# Patient Record
Sex: MALE | Race: WHITE | NOT HISPANIC OR LATINO | Employment: OTHER | URBAN - NONMETROPOLITAN AREA
[De-identification: names, ages, dates, MRNs, and addresses within clinical notes are randomized per-mention and may not be internally consistent; named-entity substitution may affect disease eponyms.]

---

## 2017-01-19 ENCOUNTER — TRANSFERRED RECORDS (OUTPATIENT)
Dept: HEALTH INFORMATION MANAGEMENT | Facility: HOSPITAL | Age: 77
End: 2017-01-19

## 2017-01-19 LAB
ALT SERPL-CCNC: 29 U/L (ref 23–71)
AST SERPL-CCNC: 19 U/L (ref 9–34)
CREAT SERPL-MCNC: 0.9 MG/DL (ref 0.7–1.4)
GFR SERPL CREATININE-BSD FRML MDRD: 88 ML/MIN/1.73M2
GLUCOSE SERPL-MCNC: 191 MG/DL (ref 64–112)
HBA1C MFR BLD: 8.2 % (ref 4.4–6.4)
POTASSIUM SERPL-SCNC: 4.1 MEQ/L (ref 3.5–5.3)

## 2017-01-23 ENCOUNTER — TRANSFERRED RECORDS (OUTPATIENT)
Dept: HEALTH INFORMATION MANAGEMENT | Facility: HOSPITAL | Age: 77
End: 2017-01-23

## 2017-01-23 ENCOUNTER — HOSPITAL ENCOUNTER (EMERGENCY)
Facility: HOSPITAL | Age: 77
Discharge: HOME | End: 2017-01-23
Attending: NURSE PRACTITIONER | Admitting: NURSE PRACTITIONER
Payer: MEDICARE

## 2017-01-23 VITALS
TEMPERATURE: 97.4 F | OXYGEN SATURATION: 96 % | SYSTOLIC BLOOD PRESSURE: 171 MMHG | RESPIRATION RATE: 18 BRPM | HEART RATE: 66 BPM | DIASTOLIC BLOOD PRESSURE: 83 MMHG

## 2017-01-23 DIAGNOSIS — L53.9 ERYTHEMATOUS CONDITION: ICD-10-CM

## 2017-01-23 DIAGNOSIS — R59.1 LYMPHADENOPATHY: ICD-10-CM

## 2017-01-23 DIAGNOSIS — Z91.148 NONCOMPLIANCE WITH MEDICATION REGIMEN: ICD-10-CM

## 2017-01-23 DIAGNOSIS — R60.0 EDEMA OF RIGHT LOWER EXTREMITY: ICD-10-CM

## 2017-01-23 LAB
ALBUMIN SERPL-MCNC: 3.7 G/DL (ref 3.4–5)
ALP SERPL-CCNC: 79 U/L (ref 40–150)
ALT SERPL W P-5'-P-CCNC: 25 U/L (ref 0–70)
ANION GAP SERPL CALCULATED.3IONS-SCNC: 8 MMOL/L (ref 3–14)
AST SERPL W P-5'-P-CCNC: 14 U/L (ref 0–45)
BASOPHILS # BLD AUTO: 0 10E9/L (ref 0–0.2)
BASOPHILS NFR BLD AUTO: 0.4 %
BILIRUB SERPL-MCNC: 0.4 MG/DL (ref 0.2–1.3)
BUN SERPL-MCNC: 19 MG/DL (ref 7–30)
CALCIUM SERPL-MCNC: 8.5 MG/DL (ref 8.5–10.1)
CHLORIDE SERPL-SCNC: 105 MMOL/L (ref 94–109)
CO2 SERPL-SCNC: 27 MMOL/L (ref 20–32)
CREAT SERPL-MCNC: 0.89 MG/DL (ref 0.66–1.25)
CRP SERPL-MCNC: 5.6 MG/L (ref 0–8)
DIFFERENTIAL METHOD BLD: ABNORMAL
EOSINOPHIL # BLD AUTO: 0.1 10E9/L (ref 0–0.7)
EOSINOPHIL NFR BLD AUTO: 1.5 %
ERYTHROCYTE [DISTWIDTH] IN BLOOD BY AUTOMATED COUNT: 13.2 % (ref 10–15)
ERYTHROCYTE [SEDIMENTATION RATE] IN BLOOD BY WESTERGREN METHOD: 9 MM/H (ref 0–20)
EST. AVERAGE GLUCOSE BLD GHB EST-MCNC: 180 MG/DL
GFR SERPL CREATININE-BSD FRML MDRD: 83 ML/MIN/1.7M2
GLUCOSE SERPL-MCNC: 190 MG/DL (ref 70–99)
HBA1C MFR BLD: 7.9 % (ref 4.3–6)
HCT VFR BLD AUTO: 38.8 % (ref 40–53)
HGB BLD-MCNC: 13.9 G/DL (ref 13.3–17.7)
IMM GRANULOCYTES # BLD: 0 10E9/L (ref 0–0.4)
IMM GRANULOCYTES NFR BLD: 0.6 %
LACTATE SERPL-SCNC: 0.9 MMOL/L (ref 0.4–2)
LYMPHOCYTES # BLD AUTO: 1.7 10E9/L (ref 0.8–5.3)
LYMPHOCYTES NFR BLD AUTO: 24.7 %
MCH RBC QN AUTO: 31.2 PG (ref 26.5–33)
MCHC RBC AUTO-ENTMCNC: 35.8 G/DL (ref 31.5–36.5)
MCV RBC AUTO: 87 FL (ref 78–100)
MONOCYTES # BLD AUTO: 0.5 10E9/L (ref 0–1.3)
MONOCYTES NFR BLD AUTO: 7.9 %
NEUTROPHILS # BLD AUTO: 4.4 10E9/L (ref 1.6–8.3)
NEUTROPHILS NFR BLD AUTO: 64.9 %
NRBC # BLD AUTO: 0 10*3/UL
NRBC BLD AUTO-RTO: 0 /100
PLATELET # BLD AUTO: 158 10E9/L (ref 150–450)
POTASSIUM SERPL-SCNC: 4 MMOL/L (ref 3.4–5.3)
PROT SERPL-MCNC: 7.2 G/DL (ref 6.8–8.8)
RBC # BLD AUTO: 4.46 10E12/L (ref 4.4–5.9)
SODIUM SERPL-SCNC: 140 MMOL/L (ref 133–144)
WBC # BLD AUTO: 6.7 10E9/L (ref 4–11)

## 2017-01-23 PROCEDURE — 80053 COMPREHEN METABOLIC PANEL: CPT | Performed by: NURSE PRACTITIONER

## 2017-01-23 PROCEDURE — 40000788 ZZHCL STATISTIC ESTIMATED AVERAGE GLUCOSE: Performed by: NURSE PRACTITIONER

## 2017-01-23 PROCEDURE — 87040 BLOOD CULTURE FOR BACTERIA: CPT | Mod: 91 | Performed by: NURSE PRACTITIONER

## 2017-01-23 PROCEDURE — 85652 RBC SED RATE AUTOMATED: CPT | Performed by: NURSE PRACTITIONER

## 2017-01-23 PROCEDURE — 85025 COMPLETE CBC W/AUTO DIFF WBC: CPT | Performed by: NURSE PRACTITIONER

## 2017-01-23 PROCEDURE — 36415 COLL VENOUS BLD VENIPUNCTURE: CPT | Performed by: NURSE PRACTITIONER

## 2017-01-23 PROCEDURE — 76882 US LMTD JT/FCL EVL NVASC XTR: CPT | Mod: TC,RT

## 2017-01-23 PROCEDURE — 83605 ASSAY OF LACTIC ACID: CPT | Performed by: NURSE PRACTITIONER

## 2017-01-23 PROCEDURE — 83036 HEMOGLOBIN GLYCOSYLATED A1C: CPT | Performed by: NURSE PRACTITIONER

## 2017-01-23 PROCEDURE — 99214 OFFICE O/P EST MOD 30 MIN: CPT | Performed by: NURSE PRACTITIONER

## 2017-01-23 PROCEDURE — 99214 OFFICE O/P EST MOD 30 MIN: CPT | Mod: 25

## 2017-01-23 PROCEDURE — 86140 C-REACTIVE PROTEIN: CPT | Performed by: NURSE PRACTITIONER

## 2017-01-23 ASSESSMENT — ENCOUNTER SYMPTOMS
APPETITE CHANGE: 0
FEVER: 0
WOUND: 1
SHORTNESS OF BREATH: 0
ABDOMINAL PAIN: 0

## 2017-01-23 NOTE — ED NOTES
Pt presents today alone for c/o swelling in right ankle and foot. Pt says this has been going on for about 3 weeks and gets worse everyday, he has no known injury.

## 2017-01-23 NOTE — DISCHARGE INSTRUCTIONS
Follow up with PCP for elevated blood sugars. You should start metformin as discussed.       Leg Swelling (1 Leg Only)  Swelling of the arms, feet, ankles, and legs is called edema. It is caused by extra fluid collecting in the tissues. Because of gravity, extra fluid in the body settles to the lowest part. That is why the legs and feet are most affected. You have swelling in only 1 leg.  Some of the causes for swelling in only one leg include:    Infection in the foot or leg    Long-term problem with a vein not working well (venous insufficiency)    Swollen, twisted vein in the leg (varicose veins)    Insect bite or sting on the foot or leg    Injury or recent surgery on the foot or leg    Blood clot in a deep vein of the leg (deep vein thrombosis or DVT)  Medical treatment will depend on what is causing your swelling.  Home care  Follow these guidelines when caring for yourself at home:    Don t wear tight clothing.    Keep your legs up while lying or sitting.    Take any medicines as directed.    If infection, injury, or recent surgery is the cause of your swelling, stay off your legs as much as possible until your symptoms get better.    If you have venous insufficiency or varicose veins, don t sit or  one place for long periods of time. Take breaks and walk around every few hours. Talk with your healthcare provider about wearing support stockings to help lessen swelling during the day.  Follow-up care  Follow up with your health care provider as advised.  Call 911  Call 911 if any of these occur:    Shortness of breath or trouble breathing    Chest pain    Coughing up blood    Fainting or loss of consciousness   When to seek medical advice  Call your healthcare provider right away if any of these occur:    Increased pain, swelling, warmth, or redness of the leg, ankle, or foot    Fever of 100.4 F (38 C) or higher, or as directed by your healthcare provider    Weakness or dizziness    5559-7083 The  [employer]. [address] [place] [address]. All rights reserved. This information is not intended as a substitute for professional medical care. Always follow your healthcare professional's instructions.          Lymphadenopathy  Lymphadenopathy is swelling of the lymph nodes. Lymph nodes are small, bean-shaped glands around the body.  What are lymph nodes?  Lymph nodes are part of your immune system. The glands are found in your neck, armpits, groin, chest, and abdomen. They act as filters for lymph fluid as it flows through your body. Lymph fluid contains white blood cells and other things that fight infection.  Why lymph nodes swell  Lymphadenopathy is very common. The glands often enlarge during a viral or bacterial infection. It can happen during a cold, the flu, or strep throat. The nodes may swell in just one area of the body, such as the neck (localized). Or nodes may swell all over the body (generalized). The neck (cervical) lymph nodes are the most common site of lymphadenopathy.  What causes lymphadenopathy?  Dead cells and fluid build up in the lymph nodes as they help fight infection or disease. This causes them to swell in size. Enlarged lymph nodes are often near the source of infection. This can help to find the cause of an infection. For example, swollen lymph nodes around the jaw may be because of an infection in the teeth or mouth. But lymphadenopathy may also be generalized. This is common in some viral illnesses such as mononucleosis or chickenpox (varicella).  Lymphadenopathy can also be caused by:    Infection of a lymph node or small group of nodes (lymphadenitis)    Cancer    Reactions to medicines such as antibiotics and some seizure medicines    Other health conditions, such as lupus  Symptoms of lymphadenopathy  Lymphadenopathy can cause symptoms such as:    Lumps under the jaw, on the sides or back of the neck, in the armpits, in the groin, or in the chest or belly  (abdomen)    Pain or tenderness in any of these areas    Redness or warmth in any of these areas  You may also have symptoms from an infection causing the swollen glands. These symptoms may include fever, sore throat, body aches, or cough.  Diagnosing lymphadenopathy  Your health care provider will ask about your health history and symptoms. He or she will give you a physical exam and check the areas where lymph nodes are enlarged. Your health care provider will check the size and location of the nodes, and ask how long they have been swollen and if they are painful. Diagnostic tests and referral to specialists may be recommended. They may include:    Blood tests. These are done to check for signs of infection and other problems.    Urine test. This is also done to check for infection and other problems.    Chest X-ray. This test can show enlarged lymph nodes or other problems.    Lymph node biopsy. If lymph nodes are swollen for 3 to 4 weeks, they may be checked with a biopsy. Small samples of lymph node tissue are taken and checked in a lab for signs of cancer. You may be referred to a specialist in blood disorders and cancer (hematologist and oncologist).  Treatment for lymphadenopathy  The treatment of enlarged lymph nodes depends on the cause. Enlarged lymph nodes are often harmless and go away without any treatment. Treatment is most often done on the cause of the enlarged nodes and may include:    Antibiotic medicine to treat a bacterial infection    Incision and drainage (I & D) of a lymph node for lymphadenitis    Other medicines or procedures to treat the cause of the enlarged nodes  You may need follow-up exam in 3 to 4 weeks to recheck enlarged nodes.     When to call your health care provider  Call your health care provider if you have lymph nodes that are still swollen after 3 to 4 weeks.           Diabetes and Heart Disease  If you have diabetes, you are two to four times more likely to have heart  disease than someone without diabetes. This higher risk is due to diabetes, but it is also due to other risk factors for heart disease that occur in people with diabetes. But there s good news. You can help control your health risks by making some changes in your life. You can take steps to reduce your risk of heart disease by half--similar to the risk in people who don't have diabetes.        Your health care team will develop a treatment plan that works best for you.     Your main risk factors  Three major risk factors for heart disease are high blood sugar, high blood pressure, and high levels of lipids. By keeping risk factors under control, you can help keep your heart and arteries healthy. This may reduce your chances of a heart attack.    Blood sugar. High blood sugar can make artery walls tough and rough. Plaque (waxy material in the blood) can then build up, making it harder for blood to flow through the arteries. Having high blood sugar increases the chances of having high blood pressure and high cholesterol.    Blood pressure. When blood pressure is high all the time, artery walls become damaged, increasing the risk for plaque build up.    Lipids. The body needs some lipids in the blood to stay healthy. But lipid levels that are too high can damage the artery walls. Lipids include cholesterol and triglycerides. There are two kinds of cholesterol. LDL ( bad ) cholesterol can damage the arteries. But HDL ( good ) cholesterol helps clear LDL cholesterol from the blood vessels. This helps keep the arteries healthy. When blood sugar is high, the level of triglycerides in the blood may also be high. High blood triglyceride levels can cause plaque to form.   Other risk factors  Certain lifestyle factors can increase levels of your blood sugar, blood pressure, and lipids. Such increases raise your risk of heart disease.    Smoking damages the lining of your arteries. This allows plaque to build up in the artery  walls. Smoking also constricts (narrows) the arteries, which can raise blood pressure. Smoking also increases your risk of getting type 2 diabetes.    Not being active makes it harder for your heart to do its work. Inactivity is linked to many other risk factors, such as high blood pressure and poor cholesterol levels. Inactivity also increases your risk of getting type 2 diabetes.    Being overweight makes it harder for your body to use insulin. It also makes your heart work too hard. Being overweight is also the main contributor to the development of type 2 diabetes,   Changes you can make     Take your medications as directed each day, even if you feel fine.   Following a few simple steps can help keep your risk factors under control. Work with your health care team to reach your goals.    Quitting smoking could save your life. Smoking damages the lining of the blood vessels and raises blood pressure. Smoking also affects how your body uses insulin. This makes it harder to keep blood sugar under control. If you smoke and need help quitting, talk to your health care team.     Testing your blood sugar is the only way to know whether it is under control. Be sure to test your blood sugar yourself. Also get your blood tested in the lab, as directed.    Monitoring your blood pressure and lipid levels can help you achieve safe levels. Visit your health care team as scheduled.    Taking medications as directed can help control blood sugar, blood pressure, blood clotting, and/or cholesterol levels.    Eating right can reduce your risk factors and help you lose weight. Try to limit the amount of processed or refined carbohydrates you eat at one time. Cut back on your total calorie intake. Eat foods low in saturated fat and cholesterol. Eat fiber, including vegetables and whole grains, and cut down on salt. A dietitian or diabetes educator can help form a meal plan that works for you.    Being active can help reduce your  weight, strengthen your heart, and lower your lipid levels and blood pressure. Exercise and activity are good for your whole body. Talk to your health care team about increasing your activity safely over time.    Keeping your appointments with your health care provider helps you stay healthy. Go in for checkups and lab tests as scheduled.    [id number] [employer] [facility]. [address] [place] [address], [place] [address]. All rights reserved. This information is not intended as a substitute for professional medical care. Always follow your healthcare professional's instructions.

## 2017-01-23 NOTE — ED PROVIDER NOTES
History     Chief Complaint   Patient presents with     Leg Swelling     right ankle and foot edema, red area above     The history is provided by the patient. No  was used.     Neel Kerr is a 76 year old male who presents with lower right ankle and foot swelling, with redness above his area of pain for the past 2-3 weeks. No injury. [facility] Was there last week as well and treated with Doxycycline. Has also seen Dr. Dean at the [facility] in [place]. He states both facilities have told him they don't know what it is. Here for evaluation of infection. Symptoms are getting progressively worse, starting to hurt now, didn't initially. Also noticed lumps in his upper leg as well that are getting worse. Patient has a history of diabetes and psoriasis.   Had an US last week that was negative for DVT. Labs were normal as well. Per the records reviewed, was advised to get a biopsy - [facility]. It was offered to do this for him in [place] at [facility] but would have to bill his Medicare insurance and would have to pay for part of the bill.     I have reviewed the Medications, Allergies, Past Medical and Surgical History, and Social History in the Epic system.    Review of Systems   Constitutional: Negative for fever and appetite change.   Respiratory: Negative for shortness of breath.    Cardiovascular: Negative for chest pain.   Gastrointestinal: Negative for abdominal pain.   Musculoskeletal:        Right leg pain, edema and erythema   Skin: Positive for rash and wound.       Physical Exam   BP: 171/83 mmHg  Pulse: 66  Temp: 97.4  F (36.3  C)  Resp: 18  SpO2: 96 %  Physical Exam   Constitutional: He is oriented to person, place, and time. He appears well-developed and well-nourished. No distress.   HENT:   Head: Normocephalic and atraumatic.   Right Ear: External ear normal.   Left Ear: External  ear normal.   Eyes: Conjunctivae are normal. No scleral icterus.   Neck: Normal range of motion.   Cardiovascular: Normal rate, regular rhythm and normal heart sounds.    Pulmonary/Chest: Effort normal and breath sounds normal.   Abdominal: Soft. Bowel sounds are normal. He exhibits no distension. There is no tenderness.   Musculoskeletal:        Right hip: He exhibits normal range of motion.        Legs:  5 cm mass near groin, below this is a 3 cm local swelling below this, then following along the greater saphenous there are 10 additional approximately 1 cm nodules traveling to the area of erythema on his lower leg. He has 2+pitting edema in his lower leg, CMS is intact. Good movement in his R foot, ankle and knee.    Lymphadenopathy:        Right: Inguinal adenopathy present.   Neurological: He is alert and oriented to person, place, and time. Coordination normal.   Skin: Skin is warm and dry. He is not diaphoretic.   Psychiatric: He has a normal mood and affect. His speech is normal and behavior is normal.   Nursing note and vitals reviewed.      ED Course   Procedures    Right lower extremity US: Dr. Silvestreheal in to perform US with tech present as well.     ULTRASOUND OF RIGHT LOWER EXTREMITY - LIMITED     HISTORY:  A 76-year-old male with right lower extremity cellulitis.  Patient has developed red discoloration in the medial aspect of the  lower leg just above the ankle, with palpable focal nodules or masses  deep to the skin surface along the medial aspect of the leg, roughly  corresponding to location of the greater saphenous and lesser  saphenous veins.  There is some redness associated with these palpable  areas of mass or firmness.     FINDINGS:  Gray scale and color Duplex ultrasonography was performed  of the affected area, from the groin to the level of the ankle in the  right lower extremity.  There is a hyperemic lymph node in the groin  correlating with the palpable area, measuring 2.3 x 2.8 x 1.0  cm in  dimension.  There is subcutaneous edema correlating with the other  palpable areas of concern.  These are closely associated with the  greater saphenous/lesser saphenous veins.  No focal soft tissue mass  or abnormal fluid collection is otherwise seen.  There is questionable  thrombosis of a small superficial vessel at the level of the ankle.     IMPRESSION:  THERE IS SUBCUTANEOUS EDEMA AND A REACTIVE LYMPH NODE  WITHIN THE GROIN.  NO FLUID COLLECTIONS ARE SEEN TO SUGGEST ABSCESS.  THERE IS NO EVIDENCE OF SUSPICIOUS SOFT TISSUE MASS.  Exam Date: Jan 23, 2017 03:10:00 PM  Author: ROSANGELA PENALOZA  This report is final and signed     Labs Ordered and Resulted from Time of ED Arrival Up to the Time of Departure from the ED   HEMOGLOBIN A1C - Abnormal; Notable for the following:     Hemoglobin A1C 7.9 (*)     All other components within normal limits   CBC WITH PLATELETS DIFFERENTIAL - Abnormal; Notable for the following:     Hematocrit 38.8 (*)     All other components within normal limits   COMPREHENSIVE METABOLIC PANEL - Abnormal; Notable for the following:     Glucose 190 (*)     All other components within normal limits   ERYTHROCYTE SEDIMENTATION RATE AUTO   CRP INFLAMMATION   LACTIC ACID   ESTIMATED AVERAGE GLUCOSE   BLOOD CULTURE   BLOOD CULTURE       Assessments & Plan (with Medical Decision Making)     I have reviewed the nursing notes.  I have reviewed the findings, diagnosis, plan and need for follow up with the patient.  Discussed elevated A1c and treatment for this. Discussed risks of uncontrolled DM, including infection, stroke, heart complications, kidney compromise.   Patient declined medications. Is going to start taking a herbal medication. Advised to see PCP for re-evaluation.   Discussed results of diagnostic tests today. Advised to pursue biopsy, f/u with PCP as scheduled.   Offered general surgery referral today for evaluation and treatment, did advise [facility]. Patient  declined referral and appointment.   [facility]. Given written educational material regarding his diagnosis for today's visit.    Final diagnoses:   Lymphadenopathy   Edema of right lower extremity   Erythematous condition - RLL   Uncontrolled type 2 diabetes mellitus with complication, without long-term current use of insulin (H)   Noncompliance with medication regimen       1/23/2017   [facility]      Karen Trivedi NP  01/23/17 3011

## 2017-01-23 NOTE — ED AVS SNAPSHOT
[facility]    [address] [address]    Phone:  [phone]                                       Neel Kerr   MRN: [id number]    Department:  [facility]   Date of Visit:  1/23/2017           After Visit Summary Signature Page     I have received my discharge instructions, and my questions have been answered. I have discussed any challenges I see with this plan with the nurse or doctor.    ..........................................................................................................................................  Patient/Patient Representative Signature      ..........................................................................................................................................  Patient Representative Print Name and Relationship to Patient    ..................................................               ................................................  Date                                            Time    ..........................................................................................................................................  Reviewed by Signature/Title    ...................................................              ..............................................  Date                                                            Time

## 2017-01-24 NOTE — PROGRESS NOTES
Quick Note:    Ultrasound of Right Lower Extremity - Limited report faxed to PCP, Sonya Sanford NP and [facility] per [facility] provider request. Impression - Subcutaneous edema and a reactive lymph node within the groin. Pt advised to follow up with [facility] as scheduled [facility]  ______

## 2017-01-29 LAB
BACTERIA SPEC CULT: NORMAL
BACTERIA SPEC CULT: NORMAL
Lab: NORMAL
Lab: NORMAL
MICRO REPORT STATUS: NORMAL
MICRO REPORT STATUS: NORMAL
SPECIMEN SOURCE: NORMAL
SPECIMEN SOURCE: NORMAL

## 2017-07-05 ENCOUNTER — HOSPITAL ENCOUNTER (EMERGENCY)
Facility: HOSPITAL | Age: 77
Discharge: HOME | End: 2017-07-05
Attending: FAMILY MEDICINE | Admitting: FAMILY MEDICINE
Payer: MEDICARE

## 2017-07-05 VITALS
HEIGHT: 67 IN | WEIGHT: 140 LBS | BODY MASS INDEX: 21.97 KG/M2 | SYSTOLIC BLOOD PRESSURE: 134 MMHG | RESPIRATION RATE: 16 BRPM | OXYGEN SATURATION: 94 % | TEMPERATURE: 97.1 F | DIASTOLIC BLOOD PRESSURE: 84 MMHG

## 2017-07-05 DIAGNOSIS — R11.0 NAUSEA: ICD-10-CM

## 2017-07-05 LAB
ANION GAP SERPL CALCULATED.3IONS-SCNC: 8 MMOL/L (ref 3–14)
BASOPHILS # BLD AUTO: 0 10E9/L (ref 0–0.2)
BASOPHILS NFR BLD AUTO: 0.4 %
BUN SERPL-MCNC: 19 MG/DL (ref 7–30)
CALCIUM SERPL-MCNC: 8.1 MG/DL (ref 8.5–10.1)
CHLORIDE SERPL-SCNC: 101 MMOL/L (ref 94–109)
CO2 SERPL-SCNC: 27 MMOL/L (ref 20–32)
CREAT SERPL-MCNC: 0.86 MG/DL (ref 0.66–1.25)
DIFFERENTIAL METHOD BLD: ABNORMAL
EOSINOPHIL # BLD AUTO: 0.1 10E9/L (ref 0–0.7)
EOSINOPHIL NFR BLD AUTO: 0.8 %
ERYTHROCYTE [DISTWIDTH] IN BLOOD BY AUTOMATED COUNT: 14.6 % (ref 10–15)
GFR SERPL CREATININE-BSD FRML MDRD: 87 ML/MIN/1.7M2
GLUCOSE SERPL-MCNC: 237 MG/DL (ref 70–99)
HCT VFR BLD AUTO: 38.4 % (ref 40–53)
HGB BLD-MCNC: 13.8 G/DL (ref 13.3–17.7)
IMM GRANULOCYTES # BLD: 0.1 10E9/L (ref 0–0.4)
IMM GRANULOCYTES NFR BLD: 1.5 %
LYMPHOCYTES # BLD AUTO: 1.2 10E9/L (ref 0.8–5.3)
LYMPHOCYTES NFR BLD AUTO: 14.7 %
MCH RBC QN AUTO: 30.5 PG (ref 26.5–33)
MCHC RBC AUTO-ENTMCNC: 35.9 G/DL (ref 31.5–36.5)
MCV RBC AUTO: 85 FL (ref 78–100)
MONOCYTES # BLD AUTO: 0.6 10E9/L (ref 0–1.3)
MONOCYTES NFR BLD AUTO: 6.9 %
NEUTROPHILS # BLD AUTO: 6.1 10E9/L (ref 1.6–8.3)
NEUTROPHILS NFR BLD AUTO: 75.7 %
NRBC # BLD AUTO: 0 10*3/UL
NRBC BLD AUTO-RTO: 0 /100
PLATELET # BLD AUTO: 177 10E9/L (ref 150–450)
POTASSIUM SERPL-SCNC: 4.3 MMOL/L (ref 3.4–5.3)
RBC # BLD AUTO: 4.53 10E12/L (ref 4.4–5.9)
SODIUM SERPL-SCNC: 136 MMOL/L (ref 133–144)
TROPONIN I SERPL-MCNC: 0.04 UG/L (ref 0–0.04)
WBC # BLD AUTO: 8 10E9/L (ref 4–11)

## 2017-07-05 PROCEDURE — 80048 BASIC METABOLIC PNL TOTAL CA: CPT | Performed by: FAMILY MEDICINE

## 2017-07-05 PROCEDURE — 85025 COMPLETE CBC W/AUTO DIFF WBC: CPT | Performed by: FAMILY MEDICINE

## 2017-07-05 PROCEDURE — 99285 EMERGENCY DEPT VISIT HI MDM: CPT | Performed by: FAMILY MEDICINE

## 2017-07-05 PROCEDURE — 93005 ELECTROCARDIOGRAM TRACING: CPT

## 2017-07-05 PROCEDURE — 93010 ELECTROCARDIOGRAM REPORT: CPT | Performed by: INTERNAL MEDICINE

## 2017-07-05 PROCEDURE — 36415 COLL VENOUS BLD VENIPUNCTURE: CPT | Performed by: FAMILY MEDICINE

## 2017-07-05 PROCEDURE — 71020 ZZHC CHEST TWO VIEWS, FRONT/LAT: CPT | Mod: TC

## 2017-07-05 PROCEDURE — 84484 ASSAY OF TROPONIN QUANT: CPT | Performed by: FAMILY MEDICINE

## 2017-07-05 PROCEDURE — 99285 EMERGENCY DEPT VISIT HI MDM: CPT | Mod: 25

## 2017-07-05 RX ORDER — HYDROCHLOROTHIAZIDE 25 MG/1
25 TABLET ORAL DAILY
Status: ON HOLD | COMMUNITY
End: 2020-09-21

## 2017-07-05 RX ORDER — ASPIRIN 81 MG/1
324 TABLET, CHEWABLE ORAL ONCE
Status: DISCONTINUED | OUTPATIENT
Start: 2017-07-05 | End: 2017-07-05 | Stop reason: HOSPADM

## 2017-07-05 RX ORDER — AMLODIPINE BESYLATE 10 MG/1
10 TABLET ORAL DAILY
Status: ON HOLD | COMMUNITY
End: 2020-09-21

## 2017-07-05 RX ORDER — ONDANSETRON 4 MG/1
4 TABLET, ORALLY DISINTEGRATING ORAL EVERY 8 HOURS PRN
Qty: 10 TABLET | Refills: 0 | Status: SHIPPED | OUTPATIENT
Start: 2017-07-05 | End: 2017-07-08

## 2017-07-05 RX ORDER — ITRACONAZOLE 100 MG/1
200 CAPSULE ORAL 2 TIMES DAILY
Status: ON HOLD | COMMUNITY
End: 2020-09-21

## 2017-07-05 ASSESSMENT — ENCOUNTER SYMPTOMS
ACTIVITY CHANGE: 1
DIAPHORESIS: 0
CONSTIPATION: 0
ABDOMINAL PAIN: 0
SHORTNESS OF BREATH: 0
FATIGUE: 1
DIARRHEA: 0
MUSCULOSKELETAL NEGATIVE: 1
DYSURIA: 0
PSYCHIATRIC NEGATIVE: 1
NAUSEA: 1
VOMITING: 0
WOUND: 1
NEUROLOGICAL NEGATIVE: 1

## 2017-07-05 NOTE — ED PROVIDER NOTES
"  History     Chief Complaint   Patient presents with     Nausea     c/o nausea and fatigue     HPI  Neel Kerr is a 76 year old male who is complaining of nausea and fatigue.  The concern is that there is a strong family history of cardiac disease and he continues to smoke.  He is diabetic as well.  He was seen at [place] and hospitalized for a rule out MI, left AMA due to being disgruntled with the hospital.  His troponin in [place] was as high as 0.18, was down to 0.14 yesterday.  Today it is significantly lower, but he had some chest pain this morning, took 2 NTG and came to the ED with ongoing nausea and fatigue.  He has a wound on his right leg that is not healing well, and his diabetes has been in good control per his account, 130-140 in the morning, he does not check it regularly during the day.    I have reviewed the Medications, Allergies, Past Medical and Surgical History, and Social History in the Epic system.    Allergies: No Known Allergies      No current facility-administered medications on file prior to encounter.   Current Outpatient Prescriptions on File Prior to Encounter:  Cholecalciferol (VITAMIN D3 PO) Take by mouth daily   Ascorbic Acid (VITAMIN C PO)    Cyanocobalamin (VITAMIN B 12 PO)    zinc sulfate (ZINCATE) 220 MG capsule Take 220 mg by mouth daily   NO ACTIVE MEDICATIONS        Patient Active Problem List   Diagnosis     Advanced care planning/counseling discussion       History reviewed. No pertinent surgical history.    Social History   Substance Use Topics     Smoking status: Former Smoker     Types: Cigarettes     Smokeless tobacco: Not on file      Comment: tried to quit, no passive exposure     Alcohol use No      Comment: former, last drink 25 years ago         There is no immunization history on file for this patient.    BMI: Estimated body mass index is 21.93 kg/(m^2) as calculated from the following:    Height as of this encounter: 1.702 m (5' 7\").    Weight as of this " encounter: 63.5 kg (140 lb).      Review of Systems   Constitutional: Positive for activity change and fatigue. Negative for diaphoresis.   HENT: Negative.    Respiratory: Negative for shortness of breath.    Cardiovascular: Positive for chest pain.        Gone before arrival in ED.   Gastrointestinal: Positive for nausea. Negative for abdominal pain, constipation, diarrhea and vomiting.   Genitourinary: Negative for dysuria.   Musculoskeletal: Negative.    Skin: Positive for wound.        Being treated for wound on leg.   Neurological: Negative.    Psychiatric/Behavioral: Negative.        Physical Exam      Physical Exam   Constitutional: He is oriented to person, place, and time. He appears well-developed and well-nourished. No distress.   HENT:   Head: Normocephalic and atraumatic.   Neck: Normal range of motion. Neck supple.   Cardiovascular: Normal rate, regular rhythm, normal heart sounds and intact distal pulses.    No murmur heard.  Pulmonary/Chest: Effort normal and breath sounds normal. No respiratory distress.   Abdominal: Soft. Bowel sounds are normal. He exhibits no distension. There is no tenderness.   Musculoskeletal: Normal range of motion. He exhibits no edema.   Neurological: He is alert and oriented to person, place, and time.   Skin: Skin is warm and dry.   Psychiatric: His affect is blunt.   Nursing note and vitals reviewed.      ED Course     ED Course     Procedures             EKG Interpretation:      Interpreted by Angelika Knott  Time reviewed: 0935  Symptoms at time of EKG: nausea and fatigue   Rhythm: normal sinus   Rate: normal  Axis: normal  Ectopy: none  Conduction: normal  ST Segments/ T Waves: No ST-T wave changes  Q Waves: none  Comparison to prior: Unchanged    Clinical Impression: normal EKG    Labs Ordered and Resulted from Time of ED Arrival Up to the Time of Departure from the ED   CBC WITH PLATELETS DIFFERENTIAL - Abnormal; Notable for the following:        Result  Value    Hematocrit 38.4 (*)     All other components within normal limits   BASIC METABOLIC PANEL - Abnormal; Notable for the following:     Glucose 237 (*)     Calcium 8.1 (*)     All other components within normal limits   TROPONIN I   PULSE OXIMETRY NURSING   CARDIAC CONTINUOUS MONITORING       Assessments & Plan (with Medical Decision Making)   [facility] [place].  They had apparently arranged for him to come there for angiogram, but the patient left AMA prior to their getting the approval.  He needs to go to his primary care and they can arrange for him to see cardiology, they will call him today to schedule this.  He is on an ACE inhibitor and Plavix, heart rate of 68 not adequate for beta blocker.  Patient informed of all of this and voices understanding.    I have reviewed the nursing notes.    I have reviewed the findings, diagnosis, plan and need for follow up with the patient.    New Prescriptions    ONDANSETRON (ZOFRAN ODT) 4 MG ODT TAB    Take 1 tablet (4 mg) by mouth every 8 hours as needed for nausea       Final diagnoses:   Nausea       7/5/2017   [facility]     Angelika Winslow MD  07/05/17 7007

## 2017-07-05 NOTE — ED NOTES
DC instructions reviewed with patient and family.  All verbalize understanding.  Home.  [facility] with the [facility].

## 2017-07-05 NOTE — ED AVS SNAPSHOT
[facility]    [address]    [place] [address]    Phone:  [phone]                                       Neel Kerr   MRN: [id number]    Department:  [facility]   Date of Visit:  7/5/2017           Patient Information     Date Of Birth          1940        Your diagnoses for this visit were:     Nausea        You were seen by Angelika Winslow MD.      Follow-up Information     Follow up with Sonya Sanford    Specialty:  Nurse Practitioner    Why:  they will call you with an appointment time to get your heart further evaluated.    Contact information:    [facility]  [address]   [facility] [id number] [phone]        Discharge References/Attachments     HEART ATTACK OR ANGINA, RECOGNIZING A (ENGLISH)         Review of your medicines      START taking        Dose / Directions Last dose taken    ondansetron 4 MG ODT tab   Commonly known as:  ZOFRAN ODT   Dose:  4 mg   Quantity:  10 tablet        Take 1 tablet (4 mg) by mouth every 8 hours as needed for nausea   Refills:  0          Our records show that you are taking the medicines listed below. If these are incorrect, please call your family doctor or clinic.        Dose / Directions Last dose taken    AMLODIPINE BESYLATE PO   Dose:  10 mg        Take 10 mg by mouth daily   Refills:  0        aspirin 81 MG tablet   Dose:  81 mg        Take 81 mg by mouth daily   Refills:  0        hydrochlorothiazide 25 MG tablet   Commonly known as:  HYDRODIURIL   Dose:  25 mg        Take 25 mg by mouth daily   Refills:  0        itraconazole 100 MG capsule   Commonly known as:  SPORANOX   Dose:  200 mg        Take 200 mg by mouth 2 times daily   Refills:  0        LISINOPRIL PO   Dose:  40 mg        Take 40 mg by mouth daily   Refills:  0        METFORMIN HCL PO   Dose:  1000 mg        Take 1,000 mg by mouth 2 times daily (with meals)   Refills:  0        NO ACTIVE MEDICATIONS        Refills:  0  "       PLAVIX PO   Dose:  75 mg        Take 75 mg by mouth daily   Refills:  0        VITAMIN B 12 PO        Refills:  0        VITAMIN C PO        Refills:  0        VITAMIN D3 PO        Take by mouth daily   Refills:  0        zinc sulfate 220 (50 ZN) MG capsule   Commonly known as:  ZINCATE   Dose:  220 mg        Take 220 mg by mouth daily   Refills:  0                Prescriptions were sent or printed at these locations (1 Prescription)                   [facility] [address]    Telephone:  [phone]   Fax:  [phone]   Hours:                  E-Prescribed (1 of 1)         ondansetron (ZOFRAN ODT) 4 MG ODT tab                Procedures and tests performed during your visit     Basic metabolic panel    CBC with platelets differential    Cardiac Continuous Monitoring    EKG 12-lead, tracing only    Pulse oximetry nursing    Troponin I    XR Chest 2 Views      Orders Needing Specimen Collection     None      Pending Results     No orders found from 7/3/2017 to 7/6/2017.            Pending Culture Results     No orders found from 7/3/2017 to 7/6/2017.            Thank you for choosing [facility]       Thank you for choosing [facility] for your care. Our goal is always to provide you with excellent care. Hearing back from our patients is one way we can continue to improve our services. Please take a few minutes to complete the written survey that you may receive in the mail after you visit with us. Thank you!        Violet Information     Violet lets you send messages to your doctor, view your test results, renew your prescriptions, schedule appointments and more. To sign up, go to [email or web address] . Click on \"Log in\" on the left side of the screen, which will take you to the Welcome page. Then click on \"Sign up Now\" on the right side of the page.     You will be asked to enter the access code listed below, as well as some " personal information. Please follow the directions to create your username and password.     Your access code is: [email or web address]  Expires: 10/3/2017  1:52 PM     Your access code will  in 90 days. If you need help or a new code, please call your [facility] clinic or [phone].        Care EveryWhere ID     This is your Care EveryWhere ID. This could be used by other organizations to access your [facility] medical records  [id number]        Equal Access to Services     [facility] Center[facility] : Radha bermudezo Soricardo, waaxda luqadaha, qaybta kaalmada adevidal, payton kirk . So [facility] [phone].    ATENCIÓN: Si habla español, tiene a johnson disposición servicios gratuitos de asistencia lingüística. Llame al [phone].    We comply with applicable federal civil rights laws and [place] laws. We do not discriminate on the basis of race, color, national origin, age, disability sex, sexual orientation or gender identity.            After Visit Summary       This is your record. Keep this with you and show to your community pharmacist(s) and doctor(s) at your next visit.

## 2017-07-05 NOTE — ED AVS SNAPSHOT
[facility]    [address] [address]    Phone:  [phone]                                       Neel Kerr   MRN: [id number]    Department[facility]  [facility]   Date of Visit:  7/5/2017           After Visit Summary Signature Page     I have received my discharge instructions, and my questions have been answered. I have discussed any challenges I see with this plan with the nurse or doctor.    ..........................................................................................................................................  Patient/Patient Representative Signature      ..........................................................................................................................................  Patient Representative Print Name and Relationship to Patient    ..................................................               ................................................  Date                                            Time    ..........................................................................................................................................  Reviewed by Signature/Title    ...................................................              ..............................................  Date                                                            Time

## 2017-07-06 ENCOUNTER — HOSPITAL ENCOUNTER (EMERGENCY)
Facility: HOSPITAL | Age: 77
Discharge: ANOTHER HOSPITAL | End: 2017-07-07
Attending: INTERNAL MEDICINE | Admitting: INTERNAL MEDICINE
Payer: MEDICARE

## 2017-07-06 DIAGNOSIS — I21.4 NSTEMI (NON-ST ELEVATED MYOCARDIAL INFARCTION) (H): ICD-10-CM

## 2017-07-06 LAB
BASOPHILS # BLD AUTO: 0 10E9/L (ref 0–0.2)
BASOPHILS NFR BLD AUTO: 0.3 %
DIFFERENTIAL METHOD BLD: NORMAL
EOSINOPHIL # BLD AUTO: 0.1 10E9/L (ref 0–0.7)
EOSINOPHIL NFR BLD AUTO: 1.3 %
ERYTHROCYTE [DISTWIDTH] IN BLOOD BY AUTOMATED COUNT: 14.4 % (ref 10–15)
HCT VFR BLD AUTO: 42.5 % (ref 40–53)
HGB BLD-MCNC: 15.1 G/DL (ref 13.3–17.7)
IMM GRANULOCYTES # BLD: 0.2 10E9/L (ref 0–0.4)
IMM GRANULOCYTES NFR BLD: 1.6 %
LYMPHOCYTES # BLD AUTO: 3 10E9/L (ref 0.8–5.3)
LYMPHOCYTES NFR BLD AUTO: 28.2 %
MCH RBC QN AUTO: 30.6 PG (ref 26.5–33)
MCHC RBC AUTO-ENTMCNC: 35.5 G/DL (ref 31.5–36.5)
MCV RBC AUTO: 86 FL (ref 78–100)
MONOCYTES # BLD AUTO: 0.8 10E9/L (ref 0–1.3)
MONOCYTES NFR BLD AUTO: 7.8 %
NEUTROPHILS # BLD AUTO: 6.4 10E9/L (ref 1.6–8.3)
NEUTROPHILS NFR BLD AUTO: 60.8 %
NRBC # BLD AUTO: 0 10*3/UL
NRBC BLD AUTO-RTO: 0 /100
PLATELET # BLD AUTO: 220 10E9/L (ref 150–450)
RBC # BLD AUTO: 4.94 10E12/L (ref 4.4–5.9)
WBC # BLD AUTO: 10.5 10E9/L (ref 4–11)

## 2017-07-06 PROCEDURE — 71020 ZZHC CHEST TWO VIEWS, FRONT/LAT: CPT | Mod: TC

## 2017-07-06 PROCEDURE — 82150 ASSAY OF AMYLASE: CPT | Performed by: INTERNAL MEDICINE

## 2017-07-06 PROCEDURE — 85025 COMPLETE CBC W/AUTO DIFF WBC: CPT | Performed by: INTERNAL MEDICINE

## 2017-07-06 PROCEDURE — 96375 TX/PRO/DX INJ NEW DRUG ADDON: CPT

## 2017-07-06 PROCEDURE — 36415 COLL VENOUS BLD VENIPUNCTURE: CPT | Performed by: INTERNAL MEDICINE

## 2017-07-06 PROCEDURE — 80053 COMPREHEN METABOLIC PANEL: CPT | Performed by: INTERNAL MEDICINE

## 2017-07-06 PROCEDURE — 99285 EMERGENCY DEPT VISIT HI MDM: CPT | Mod: 25

## 2017-07-06 PROCEDURE — 25000125 ZZHC RX 250: Performed by: INTERNAL MEDICINE

## 2017-07-06 PROCEDURE — S0028 INJECTION, FAMOTIDINE, 20 MG: HCPCS | Performed by: INTERNAL MEDICINE

## 2017-07-06 PROCEDURE — 84484 ASSAY OF TROPONIN QUANT: CPT | Performed by: INTERNAL MEDICINE

## 2017-07-06 PROCEDURE — 93005 ELECTROCARDIOGRAM TRACING: CPT

## 2017-07-06 PROCEDURE — 83690 ASSAY OF LIPASE: CPT | Performed by: INTERNAL MEDICINE

## 2017-07-06 PROCEDURE — 99285 EMERGENCY DEPT VISIT HI MDM: CPT | Performed by: INTERNAL MEDICINE

## 2017-07-06 PROCEDURE — 25000132 ZZH RX MED GY IP 250 OP 250 PS 637: Mod: GY | Performed by: INTERNAL MEDICINE

## 2017-07-06 PROCEDURE — A9270 NON-COVERED ITEM OR SERVICE: HCPCS | Mod: GY | Performed by: INTERNAL MEDICINE

## 2017-07-06 RX ORDER — ALUMINA, MAGNESIA, AND SIMETHICONE 2400; 2400; 240 MG/30ML; MG/30ML; MG/30ML
30 SUSPENSION ORAL EVERY 4 HOURS PRN
Status: DISCONTINUED | OUTPATIENT
Start: 2017-07-06 | End: 2017-07-07 | Stop reason: HOSPADM

## 2017-07-06 RX ADMIN — ALUMINUM HYDROXIDE, MAGNESIUM HYDROXIDE, AND DIMETHICONE 30 ML: 400; 400; 40 SUSPENSION ORAL at 23:51

## 2017-07-06 RX ADMIN — FAMOTIDINE 20 MG: 10 INJECTION, SOLUTION INTRAVENOUS at 23:51

## 2017-07-07 ENCOUNTER — TRANSFERRED RECORDS (OUTPATIENT)
Dept: HEALTH INFORMATION MANAGEMENT | Facility: HOSPITAL | Age: 77
End: 2017-07-07

## 2017-07-07 VITALS
RESPIRATION RATE: 15 BRPM | DIASTOLIC BLOOD PRESSURE: 83 MMHG | TEMPERATURE: 97.9 F | OXYGEN SATURATION: 97 % | SYSTOLIC BLOOD PRESSURE: 150 MMHG

## 2017-07-07 LAB
ALBUMIN SERPL-MCNC: 4.3 G/DL (ref 3.4–5)
ALP SERPL-CCNC: 92 U/L (ref 40–150)
ALT SERPL W P-5'-P-CCNC: 52 U/L (ref 0–70)
AMYLASE SERPL-CCNC: 47 U/L (ref 30–110)
ANION GAP SERPL CALCULATED.3IONS-SCNC: 10 MMOL/L (ref 3–14)
AST SERPL W P-5'-P-CCNC: 52 U/L (ref 0–45)
BILIRUB SERPL-MCNC: 0.8 MG/DL (ref 0.2–1.3)
BUN SERPL-MCNC: 21 MG/DL (ref 7–30)
CALCIUM SERPL-MCNC: 9.1 MG/DL (ref 8.5–10.1)
CHLORIDE SERPL-SCNC: 100 MMOL/L (ref 94–109)
CO2 SERPL-SCNC: 24 MMOL/L (ref 20–32)
CREAT SERPL-MCNC: 0.87 MG/DL (ref 0.66–1.25)
GFR SERPL CREATININE-BSD FRML MDRD: 85 ML/MIN/1.7M2
GLUCOSE SERPL-MCNC: 121 MG/DL (ref 70–99)
LIPASE SERPL-CCNC: 160 U/L (ref 73–393)
POTASSIUM SERPL-SCNC: 4.8 MMOL/L (ref 3.4–5.3)
PROT SERPL-MCNC: 8.3 G/DL (ref 6.8–8.8)
SODIUM SERPL-SCNC: 134 MMOL/L (ref 133–144)
TROPONIN I SERPL-MCNC: 0.22 UG/L (ref 0–0.04)

## 2017-07-07 PROCEDURE — A9270 NON-COVERED ITEM OR SERVICE: HCPCS | Mod: GY | Performed by: INTERNAL MEDICINE

## 2017-07-07 PROCEDURE — 96366 THER/PROPH/DIAG IV INF ADDON: CPT

## 2017-07-07 PROCEDURE — 96376 TX/PRO/DX INJ SAME DRUG ADON: CPT

## 2017-07-07 PROCEDURE — 25000132 ZZH RX MED GY IP 250 OP 250 PS 637: Mod: GY | Performed by: INTERNAL MEDICINE

## 2017-07-07 PROCEDURE — 96365 THER/PROPH/DIAG IV INF INIT: CPT

## 2017-07-07 PROCEDURE — 25000128 H RX IP 250 OP 636: Performed by: INTERNAL MEDICINE

## 2017-07-07 RX ORDER — CLOPIDOGREL 300 MG/1
300 TABLET, FILM COATED ORAL ONCE
Status: COMPLETED | OUTPATIENT
Start: 2017-07-07 | End: 2017-07-07

## 2017-07-07 RX ORDER — ASPIRIN 81 MG/1
162 TABLET, CHEWABLE ORAL ONCE
Status: COMPLETED | OUTPATIENT
Start: 2017-07-07 | End: 2017-07-07

## 2017-07-07 RX ADMIN — HEPARIN SODIUM 12 UNITS/KG/HR: 10000 INJECTION, SOLUTION INTRAVENOUS at 00:35

## 2017-07-07 RX ADMIN — CLOPIDOGREL BISULFATE 300 MG: 300 TABLET, FILM COATED ORAL at 00:33

## 2017-07-07 RX ADMIN — ASPIRIN 81 MG 162 MG: 81 TABLET ORAL at 00:33

## 2017-07-07 ASSESSMENT — ENCOUNTER SYMPTOMS
CHILLS: 0
CHEST TIGHTNESS: 0
LIGHT-HEADEDNESS: 0
PALPITATIONS: 0
CONFUSION: 0
MYALGIAS: 0
BLOOD IN STOOL: 0
COUGH: 0
VOMITING: 0
FREQUENCY: 0
COLOR CHANGE: 0
FEVER: 0
NUMBNESS: 0
FLANK PAIN: 0
SLEEP DISTURBANCE: 0
ABDOMINAL PAIN: 0
BACK PAIN: 0
VOICE CHANGE: 0
HEADACHES: 0
DIAPHORESIS: 0
WEAKNESS: 0
NECK PAIN: 0
NAUSEA: 1
WHEEZING: 0
DIZZINESS: 0
DYSURIA: 0
ANAL BLEEDING: 0
SHORTNESS OF BREATH: 0
ABDOMINAL DISTENTION: 0

## 2017-07-07 NOTE — ED NOTES
"77 y/o male presents with c/o chest pain, nausea and SOB. Patient states symptoms started 30 minutes ago while driving. States he felt nauseated and took an ODT Zofran. Patient was seen yesterday for similar symptoms as well as in [facility] within the last week. Patient states \"I had a heart attack 2 years ago and one earlier this week.\" When asked what was done yesterday in the ED and while in [facility], patient disgruntled and states \"just look it up why don't you?\" Patient repeatedly saying \"I'm hear because I'm having a heart attack.\" IV established, monitors applied.  "

## 2017-07-07 NOTE — ED PROVIDER NOTES
History     Chief Complaint   Patient presents with     Chest Pain     Patient is a 76 year old male presenting with chest pain. The history is provided by the patient.   Chest Pain   Pain location:  L chest  Pain quality: dull and pressure    Pain radiates to:  L shoulder  Onset quality:  Sudden  Duration:  30 minutes  Progression:  Improving  Chronicity:  Recurrent  Associated symptoms: nausea    Associated symptoms: no abdominal pain, no back pain, no cough, no diaphoresis, no dizziness, no fever, no headache, no numbness, no palpitations, no shortness of breath, no vomiting and no weakness          I have reviewed the Medications, Allergies, Past Medical and Surgical History, and Social History in the Epic system.        Review of Systems   Constitutional: Negative for chills, diaphoresis and fever.   HENT: Negative for voice change.    Eyes: Negative for visual disturbance.   Respiratory: Negative for cough, chest tightness, shortness of breath and wheezing.    Cardiovascular: Positive for chest pain. Negative for palpitations and leg swelling.   Gastrointestinal: Positive for nausea. Negative for abdominal distention, abdominal pain, anal bleeding, blood in stool and vomiting.   Genitourinary: Negative for decreased urine volume, dysuria, flank pain and frequency.   Musculoskeletal: Negative for back pain, gait problem, myalgias and neck pain.   Skin: Negative for color change, pallor and rash.   Neurological: Negative for dizziness, syncope, weakness, light-headedness, numbness and headaches.   Psychiatric/Behavioral: Negative for confusion, sleep disturbance and suicidal ideas.       Physical Exam   BP: 178/83  Heart Rate: 78  Temp: 97.9  F (36.6  C)  Resp: 16  SpO2: 98 %  Physical Exam   Constitutional: He is oriented to person, place, and time. He appears well-developed and well-nourished.   HENT:   Head: Normocephalic and atraumatic.   Mouth/Throat: No oropharyngeal exudate.   Eyes: Conjunctivae are  normal. Pupils are equal, round, and reactive to light.   Neck: Normal range of motion. Neck supple. No JVD present. No tracheal deviation present. No thyromegaly present.   Cardiovascular: Normal rate, regular rhythm, normal heart sounds and intact distal pulses.  Exam reveals no gallop and no friction rub.    No murmur heard.  Pulmonary/Chest: Effort normal and breath sounds normal. No stridor. No respiratory distress. He has no wheezes. He has no rales. He exhibits no tenderness.   Abdominal: Soft. Bowel sounds are normal. He exhibits no distension and no mass. There is no tenderness. There is no rebound and no guarding.   Musculoskeletal: Normal range of motion. He exhibits no edema or tenderness.   Lymphadenopathy:     He has no cervical adenopathy.   Neurological: He is alert and oriented to person, place, and time.   Skin: Skin is warm and dry. No rash noted. No erythema. No pallor.   Psychiatric: His behavior is normal.   Nursing note and vitals reviewed.      ED Course     ED Course     Procedures                 Labs Ordered and Resulted from Time of ED Arrival Up to the Time of Departure from the ED   COMPREHENSIVE METABOLIC PANEL - Abnormal; Notable for the following:        Result Value    Glucose 121 (*)     AST 52 (*)     All other components within normal limits   TROPONIN I - Abnormal; Notable for the following:     Troponin I ES 0.216 (*)     All other components within normal limits   CBC WITH PLATELETS DIFFERENTIAL   LIPASE   AMYLASE       Assessments & Plan (with Medical Decision Making)   Chest pain off and on for 1 wk, last episode 30 min ago, already improving  last week was in [facility] for the same problem , planning for cardiac cath but he signed AMA  Yesterday was here in our, trop came negative, discharged home  CXR; No acute finding, EKG: almost as yesterday, slight St depression in V4-6  Trop : 0.2  Heparin + ASA+ plavix started  [facility]  Pt preferred [facility]  Spoke to Dr Dobbins in [facility], accepted for transfer  I have reviewed the nursing notes.    I have reviewed the findings, diagnosis, plan and need for follow up with the patient.      Current Discharge Medication List          Final diagnoses:   NSTEMI (non-ST elevated myocardial infarction) ([facility])       7/6/2017   [facility]     Valdo Daniels MD  07/07/17 0120

## 2017-07-07 NOTE — ED NOTES
DATE:  7/7/2017   TIME OF RECEIPT FROM LAB:  0002  LAB TEST:  Troponin  LAB VALUE:  0.216  RESULTS GIVEN WITH READ-BACK TO (PROVIDER):  Valdo Daniels MD  TIME LAB VALUE REPORTED TO PROVIDER:   0003

## 2017-07-28 ENCOUNTER — AMBULATORY - GICH (OUTPATIENT)
Dept: INTERNAL MEDICINE | Facility: OTHER | Age: 77
End: 2017-07-28

## 2017-07-28 ENCOUNTER — COMMUNICATION - GICH (OUTPATIENT)
Dept: CARDIAC REHAB | Facility: OTHER | Age: 77
End: 2017-07-28

## 2017-07-28 DIAGNOSIS — Z95.5 PRESENCE OF CORONARY ANGIOPLASTY IMPLANT AND GRAFT: ICD-10-CM

## 2017-08-03 ENCOUNTER — HOSPITAL ENCOUNTER (OUTPATIENT)
Dept: CARDIAC REHAB | Facility: OTHER | Age: 77
Setting detail: THERAPIES SERIES
End: 2017-08-03
Attending: INTERNAL MEDICINE

## 2017-08-03 ENCOUNTER — COMMUNICATION - GICH (OUTPATIENT)
Dept: CARDIAC REHAB | Facility: OTHER | Age: 77
End: 2017-08-03

## 2017-08-03 ENCOUNTER — HISTORY (OUTPATIENT)
Dept: CARDIAC REHAB | Facility: OTHER | Age: 77
End: 2017-08-03

## 2017-08-03 DIAGNOSIS — Z95.5 PRESENCE OF CORONARY ANGIOPLASTY IMPLANT AND GRAFT: ICD-10-CM

## 2017-08-03 DIAGNOSIS — I21.4 NON-ST ELEVATION (NSTEMI) MYOCARDIAL INFARCTION (H): ICD-10-CM

## 2017-08-04 ENCOUNTER — HOSPITAL ENCOUNTER (OUTPATIENT)
Dept: CARDIAC REHAB | Facility: OTHER | Age: 77
Setting detail: THERAPIES SERIES
End: 2017-08-04
Attending: INTERNAL MEDICINE

## 2017-08-04 DIAGNOSIS — I21.4 NON-ST ELEVATION (NSTEMI) MYOCARDIAL INFARCTION (H): ICD-10-CM

## 2017-08-07 ENCOUNTER — HOSPITAL ENCOUNTER (OUTPATIENT)
Dept: CARDIAC REHAB | Facility: OTHER | Age: 77
Setting detail: THERAPIES SERIES
End: 2017-08-07
Attending: INTERNAL MEDICINE

## 2017-08-07 DIAGNOSIS — I21.4 NON-ST ELEVATION (NSTEMI) MYOCARDIAL INFARCTION (H): ICD-10-CM

## 2017-08-14 ENCOUNTER — HOSPITAL ENCOUNTER (OUTPATIENT)
Dept: CARDIAC REHAB | Facility: OTHER | Age: 77
Setting detail: THERAPIES SERIES
End: 2017-08-14
Attending: INTERNAL MEDICINE

## 2017-08-14 DIAGNOSIS — I21.4 NON-ST ELEVATION (NSTEMI) MYOCARDIAL INFARCTION (H): ICD-10-CM

## 2017-08-18 ENCOUNTER — HOSPITAL ENCOUNTER (OUTPATIENT)
Dept: CARDIAC REHAB | Facility: OTHER | Age: 77
Setting detail: THERAPIES SERIES
End: 2017-08-18
Attending: INTERNAL MEDICINE

## 2017-08-18 DIAGNOSIS — I21.4 NON-ST ELEVATION (NSTEMI) MYOCARDIAL INFARCTION (H): ICD-10-CM

## 2017-08-21 ENCOUNTER — HOSPITAL ENCOUNTER (OUTPATIENT)
Dept: CARDIAC REHAB | Facility: OTHER | Age: 77
Setting detail: THERAPIES SERIES
End: 2017-08-21
Attending: INTERNAL MEDICINE

## 2017-08-21 DIAGNOSIS — I21.4 NON-ST ELEVATION (NSTEMI) MYOCARDIAL INFARCTION (H): ICD-10-CM

## 2017-08-25 ENCOUNTER — HOSPITAL ENCOUNTER (OUTPATIENT)
Dept: CARDIAC REHAB | Facility: OTHER | Age: 77
Setting detail: THERAPIES SERIES
End: 2017-08-25
Attending: INTERNAL MEDICINE

## 2017-08-25 DIAGNOSIS — I21.4 NON-ST ELEVATION (NSTEMI) MYOCARDIAL INFARCTION (H): ICD-10-CM

## 2017-09-01 ENCOUNTER — HOSPITAL ENCOUNTER (OUTPATIENT)
Dept: CARDIAC REHAB | Facility: OTHER | Age: 77
Setting detail: THERAPIES SERIES
End: 2017-09-01
Attending: INTERNAL MEDICINE

## 2017-09-08 ENCOUNTER — HOSPITAL ENCOUNTER (OUTPATIENT)
Dept: CARDIAC REHAB | Facility: OTHER | Age: 77
Setting detail: THERAPIES SERIES
End: 2017-09-08
Attending: INTERNAL MEDICINE

## 2017-09-08 DIAGNOSIS — I21.4 NON-ST ELEVATION (NSTEMI) MYOCARDIAL INFARCTION (H): ICD-10-CM

## 2017-09-11 ENCOUNTER — HOSPITAL ENCOUNTER (OUTPATIENT)
Dept: CARDIAC REHAB | Facility: OTHER | Age: 77
Setting detail: THERAPIES SERIES
End: 2017-09-11
Attending: INTERNAL MEDICINE

## 2017-09-11 DIAGNOSIS — I21.4 NON-ST ELEVATION (NSTEMI) MYOCARDIAL INFARCTION (H): ICD-10-CM

## 2017-09-18 ENCOUNTER — HOSPITAL ENCOUNTER (OUTPATIENT)
Dept: CARDIAC REHAB | Facility: OTHER | Age: 77
Setting detail: THERAPIES SERIES
End: 2017-09-18
Attending: INTERNAL MEDICINE

## 2017-09-18 DIAGNOSIS — I21.4 NON-ST ELEVATION (NSTEMI) MYOCARDIAL INFARCTION (H): ICD-10-CM

## 2017-09-22 ENCOUNTER — HOSPITAL ENCOUNTER (OUTPATIENT)
Dept: CARDIAC REHAB | Facility: OTHER | Age: 77
Setting detail: THERAPIES SERIES
End: 2017-09-22
Attending: INTERNAL MEDICINE

## 2017-09-22 DIAGNOSIS — I21.4 NON-ST ELEVATION (NSTEMI) MYOCARDIAL INFARCTION (H): ICD-10-CM

## 2017-09-25 ENCOUNTER — HOSPITAL ENCOUNTER (OUTPATIENT)
Dept: CARDIAC REHAB | Facility: OTHER | Age: 77
Setting detail: THERAPIES SERIES
End: 2017-09-25
Attending: INTERNAL MEDICINE

## 2017-09-25 DIAGNOSIS — I21.4 NON-ST ELEVATION (NSTEMI) MYOCARDIAL INFARCTION (H): ICD-10-CM

## 2017-12-28 NOTE — TELEPHONE ENCOUNTER
Patient Information     Patient Name MRN Neel Dolan [id number] Male 1940      Telephone Encounter by Shannan Hess RN at 8/3/2017 11:00 AM     Author:  Shannan Hess RN Service:  (none) Author Type:  NURS- Registered Nurse     Filed:  8/3/2017 11:04 AM Encounter Date:  8/3/2017 Status:  Signed     :  Shannan Hess RN (NURS- Registered Nurse)            Called [facility] as pt reports he is out of his plavix which [facility] had ordered for him and he had filled at [facility].  Pt will go today to [facility] to  plavix.  [facility] spoke [facility] Rodriguez to put in request for the Plavix as per the discharge instruction sheet medications from [facility] discharge on .  Nurse states she will bring this to provider today and put the request in.

## 2017-12-28 NOTE — TELEPHONE ENCOUNTER
Patient Information     Patient Name MRN Neel Dolan [id number] Male 1940      Telephone Encounter by Shannan Hess RN at 2017  2:02 PM     Author:  Shannan Hess RN Service:  (none) Author Type:  NURS- Registered Nurse     Filed:  2017  2:03 PM Encounter Date:  2017 Status:  Signed     :  Shannan Hess RN (NURS- Registered Nurse)            Our direct number left on message machine to return our call at [facility].

## 2017-12-28 NOTE — PROGRESS NOTES
Patient Information     Patient Name MRN Sex Neel Talbot [id number] Male 1940      Progress Notes by Shannan Hess RN at 2017  1:15 PM     Author:  Shannan Hess RN Service:  (none) Author Type:  NURS- Registered Nurse     Filed:  2017  1:25 PM Date of Service:  2017  1:15 PM Status:  Signed     :  Shannan Hess RN (NURS- Registered Nurse)            Cardiac Rehabilitation   Outpatient  Discharge Summary    Neel Kerr has completed Phase II Cardiac Rehab on 2017.    Objective:          Diagnosis: Myocardial Infarction and Percutaneous Intervention        Sessions Attended:  12             Beginning Date: August 3, 2017                    Ending Date:  2017        Average beginning Met Level:  2.1                   Average Discharge Met Level:  4.7    Most Recent Labs:         No results for input(s): CHOL, HDL, LDL, TRIGLYCERIDE, HGBA1C, CK, TRPTQUANT in the last 720 hours.     Exercise Levels Completed:  Treadmill:     Initial:  10 Minutes    Speed 1.5     0.0 Grade    2.1 Mets     Discharge:       45 Minutes    Speed 2.0    8.0    4.7 Mets          Resting Heart Rate Range: 66                      Resting Blood Pressure Range:  144/72        Exercise Heart Rate Range: 112                    Exercise Blood Pressure Range:  120/64        Rhythm: Sinus Rhythm                 Initial Weight:  142.6lb        Discharge Weight:  141.4lb    Discharge Assessment:   Activity:    Aerobic Exercise: Mvezijqqu-9-5 times per week, Modality-Walking and Dancing, Minutes/Day: 45 Minutes or more     States appropriate RPE range: Yes       Long term plan: square dancing and walking.     Exercise program reviewed and sent: Yes    Nutrition:    Current Diet: Low Fat, Low Cholesterol, No Added Salt      Frequency with which the patient follows prescribed diet: Some of the time, 50%  CV Disease:    Verbalizes understanding of normal anatomy: Met     Verbalizes understanding of  pathological disease: Met     Demonstrates compliance toward personal risk factor modification: Met     States use of medications: Met     Verbalizes cardiac signs and symptoms and knows appropriate actions: Met     States personal signs and symptoms of exercise intolerance: Met     Demonstrates appropriate heart rate and blood pressure at rest: Met     Demonstrates ability to increase exercise levels safely: Met  Hyperlipidemia:    Unknown  Weight:    Current weight: 141.4lb  Tobacco:    Quit: Yes  , if yes, quit date: 1992  Hypertension:    Appropriate blood pressure response during rehab sessions: Yes    Other:    Completed 8 or more of prescribed rehab sessions: Yes    Symptoms: None    Goals:     Patient goal(s) as indentified in the initial evaluation have been met.     Tolerated exercise session(s) without cardiac symptoms or complaints.     Prevented deconditioning by increasing tolerance for activity through graded exercise program.     Increased knowledge of cardiac condition and provided with home exercise program and activity guidelines.    Plan:  Discharge to Home   structions on how    to complete the program safely and effectively.     Recommend: Pt continue with walking most days of the week and square dancing 1 to 2 times per week.

## 2017-12-28 NOTE — PROGRESS NOTES
"Patient Information     Patient Name MRN Sex Neel Talbot [id number] Male 1940      Progress Notes by Shannan Hess RN at 8/3/2017  1:54 PM     Author:  Shannan Hess RN Service:  (none) Author Type:  NURS- Registered Nurse     Filed:  2017  2:07 PM Date of Service:  8/3/2017  1:54 PM Status:  Signed     :  Shannan Hess RN (NURS- Registered Nurse)             Cardiac Rehabilitation   Outpatient Initial Assessment     Patient:  Neel Kerr    Primary Care Provider:  No primary care provider on file.  CR Referring MD/Provider: SMITHA ([facility] )  Cardiologist:        Date of Evaluation: 8/3/2017  Onset Date: 17  Outpatient Cardiac Rehab Provider: [facility] [phone]    Diagnosis: Myocardial Infarction and Percutaneous Intervention    Allergies:  No Known Allergies    Current Laboratory Results:     No results for input(s): CHOL, HDL, LDLCHOL, TRIGLYCERIDE, HGBA1C, CK, TRPTQUANT in the last 720 hours.    Physical Assessment:    Cardiovascular Exam:  Skin Color: Normal  Vital Signs:  /70  Pulse 70  Resp 20  Ht 1.702 m (5' 7\")  Wt 64.7 kg (142 lb 9.6 oz)  SpO2 94%  BMI 22.33 kg/m2   Auscultation:  Right Anterior-Clear  Right Posteriorr-Clear  Left Anteriorr-Clear  Left Posteriorr-Clear  Heart Rate:  70  Heart Sounds:  S1S2  Blood Pressure:  Right Arm, Sitting, 122/70 and Cuff Size:  Medium   Pain Assessment:         Location / Type: (not recorded)     Pain Intensity (0-10): (not recorded)     Onset: (not recorded)     Location / Type: (not recorded)     Pain Intensity (0-10): (not recorded)     Quality: (not recorded)  Body Composition:     Age: 76 y.o.     Height: Height: 170.2 cm (5' 7\")     Weight: Wt.: 64.7 kg (142 lb 9.6 oz)     BMI-kg/m: BMI: 22.33  Musculoskeletal: Neuropathy of feet   Fall Risk Assessment Total Score:       Total Score (If 5 or > is High Risk): 1  Patient Reports an Unexplained Fall in the Past " Year/Patient Considers him to be at Risk for Falling: No    Risk Factor Assessment:     Family History     Gender     Nutrition:    Current Diet: Low Fat, Low Cholesterol, No Added Salt     Readiness to Learn: Willing to Learn     Stage of Change: Contemplation     CV Disease:    Patient was given teaching materials.,    Readiness to Learn: Willing to Learn and    Stage of Change: Contemplation     Diabetes:    Management:       Oral Agents     Readiness to Learn: Willing to Learn     Stage of Change: Contemplation     Hyperlipidemia:    Unknown     Readiness to Learn: Willing to Learn     Stage of Change: Contemplation     Hypertension:    See Medications Above     Readiness to Learn: Willing to Learn     Stage of Change: Contemplation  Risk Stratification: Intermediate Risk: Functional capacity < 5 METs    Medical History:   Myocardial Infarction and Percutaneous Intervention    Social/Functional History:      Marital Status: Single        Home Responsibilities: lives alone independently         Living Situation:      lives alone         Substance Use/Abuse:  No          Domestic Violence:  No    Current Mobility and Impairments: Independent    Treatment Plan/Targeted Outcomes:      Frequency of Treatment: 2-3 Times Per Week      Duration of Treatment: 8-12 weeks    Goals:     Activity: Increase strength and endurance by exercising 35 minutes at the end of 2  weeks in Cardiac Rehab  Verbalizes an understanding of home exercise guidelines  Exercises 3-5 times per week  Develop long term exercise by discharge     Nutrition:     Verbalizes understanding of dietary limitation of fat, salt and cholesterol     CV Disease: Understands normal CV Anatomy  Understands basic definition of disease  Identifies personal risk factors of HD  Carries a list of medication and emergency information  Understands cardiac signs and symptoms  Understands normal CV symptoms of exercise tolerance     Hypertension: Verbalizes understanding  of hypertension  Verbalizes methods to help control high blood pressure  Blood pressure measurements within normal limits before, during and following exercise session     General Health:  Increased energy for day to day activities and self care.  Endurance / desire to return to leisure and social activities.     Return to normal daily activities..  Patient Personal Goal(s):  Increased endurance.     Patient and/or Family were included in goal selection: yes     Exercise Prescription: Mode: Treadmill  Frequency: 2-3 times per week  Duration: 30-40 minutes     Target Heart Rate (THR): THR= 65-80% (age adjusted THR)  THR=   On Beta Blocker        Exercise Equipment Safety Discussed: Yes    Patient Received the Following Information:    Nutritional Health Screen     Admission education done.     The patient was oriented to Cardiac Rehabilitation.  Exercise sessions can be seen on the flowsheet..

## 2017-12-28 NOTE — TELEPHONE ENCOUNTER
Patient Information     Patient Name MRN Neel Dolan [id number] Male 1940      Telephone Encounter by Shannan Hess RN at 2017  2:44 PM     Author:  Shannan Hess RN Service:  (none) Author Type:  NURS- Registered Nurse     Filed:  2017  2:45 PM Encounter Date:  2017 Status:  Signed     :  Shannan Hess RN (NURS- Registered Nurse)            Pt returned our call.  Goals and purpose of cardiac rehab instructed to patient.  Appt made for intake. Verbalized understanding.

## 2018-02-01 ENCOUNTER — DOCUMENTATION ONLY (OUTPATIENT)
Dept: FAMILY MEDICINE | Facility: OTHER | Age: 78
End: 2018-02-01

## 2018-02-01 PROBLEM — I21.4 NON-ST ELEVATION (NSTEMI) MYOCARDIAL INFARCTION (H): Status: ACTIVE | Noted: 2017-07-02

## 2018-02-01 PROBLEM — L40.9 PSORIASIS: Status: ACTIVE | Noted: 2018-02-01

## 2018-02-01 RX ORDER — PERPHENAZINE 16 MG
1200 TABLET ORAL DAILY
Status: ON HOLD | COMMUNITY
End: 2021-05-12

## 2018-02-01 RX ORDER — CLOPIDOGREL BISULFATE 75 MG/1
75 TABLET ORAL DAILY
Status: ON HOLD | COMMUNITY
Start: 2017-07-03 | End: 2020-09-21

## 2018-02-01 RX ORDER — ITRACONAZOLE 100 MG/1
100 CAPSULE ORAL 2 TIMES DAILY
Status: ON HOLD | COMMUNITY
End: 2020-09-21

## 2018-02-01 RX ORDER — LISINOPRIL 2.5 MG/1
40 TABLET ORAL
Status: ON HOLD | COMMUNITY
End: 2020-09-21

## 2018-02-01 RX ORDER — HYDROCHLOROTHIAZIDE 25 MG/1
25 TABLET ORAL
Status: ON HOLD | COMMUNITY
End: 2020-09-21

## 2018-02-01 RX ORDER — AMLODIPINE BESYLATE 2.5 MG/1
10 TABLET ORAL
Status: ON HOLD | COMMUNITY
End: 2020-09-21

## 2018-02-01 RX ORDER — LISINOPRIL 40 MG/1
40 TABLET ORAL DAILY
Status: ON HOLD | COMMUNITY
End: 2020-09-21

## 2018-02-01 RX ORDER — ROSUVASTATIN CALCIUM 40 MG/1
40 TABLET, COATED ORAL AT BEDTIME
Status: ON HOLD | COMMUNITY
End: 2020-09-21

## 2018-02-01 RX ORDER — GABAPENTIN 100 MG/1
200 CAPSULE ORAL AT BEDTIME
Status: ON HOLD | COMMUNITY
End: 2020-09-21

## 2018-02-01 RX ORDER — NITROGLYCERIN 0.4 MG/1
0.4 TABLET SUBLINGUAL EVERY 5 MIN PRN
COMMUNITY
Start: 2017-07-03 | End: 2018-07-08

## 2018-02-01 RX ORDER — ASPIRIN 81 MG/1
81 TABLET ORAL DAILY
Status: ON HOLD | COMMUNITY
End: 2020-09-21

## 2018-02-26 ENCOUNTER — HOSPITAL ENCOUNTER (EMERGENCY)
Facility: HOSPITAL | Age: 78
Discharge: HOME | End: 2018-02-26
Attending: FAMILY MEDICINE | Admitting: FAMILY MEDICINE
Payer: COMMERCIAL

## 2018-02-26 ENCOUNTER — APPOINTMENT (OUTPATIENT)
Dept: CT IMAGING | Facility: HOSPITAL | Age: 78
End: 2018-02-26
Attending: FAMILY MEDICINE
Payer: COMMERCIAL

## 2018-02-26 VITALS
DIASTOLIC BLOOD PRESSURE: 67 MMHG | OXYGEN SATURATION: 95 % | SYSTOLIC BLOOD PRESSURE: 109 MMHG | TEMPERATURE: 99.2 F | HEART RATE: 91 BPM | RESPIRATION RATE: 18 BRPM

## 2018-02-26 DIAGNOSIS — K11.20 PAROTITIS: ICD-10-CM

## 2018-02-26 LAB
ALBUMIN SERPL-MCNC: 4.3 G/DL (ref 3.4–5)
ALP SERPL-CCNC: 90 U/L (ref 40–150)
ALT SERPL W P-5'-P-CCNC: 22 U/L (ref 0–70)
ANION GAP SERPL CALCULATED.3IONS-SCNC: 9 MMOL/L (ref 3–14)
AST SERPL W P-5'-P-CCNC: 20 U/L (ref 0–45)
BASOPHILS # BLD AUTO: 0.1 10E9/L (ref 0–0.2)
BASOPHILS NFR BLD AUTO: 0.3 %
BILIRUB SERPL-MCNC: 0.2 MG/DL (ref 0.2–1.3)
BUN SERPL-MCNC: 31 MG/DL (ref 7–30)
CALCIUM SERPL-MCNC: 9.1 MG/DL (ref 8.5–10.1)
CHLORIDE SERPL-SCNC: 95 MMOL/L (ref 94–109)
CO2 SERPL-SCNC: 24 MMOL/L (ref 20–32)
CREAT SERPL-MCNC: 1.37 MG/DL (ref 0.66–1.25)
DIFFERENTIAL METHOD BLD: ABNORMAL
EOSINOPHIL # BLD AUTO: 0.1 10E9/L (ref 0–0.7)
EOSINOPHIL NFR BLD AUTO: 0.4 %
ERYTHROCYTE [DISTWIDTH] IN BLOOD BY AUTOMATED COUNT: 13.2 % (ref 10–15)
GFR SERPL CREATININE-BSD FRML MDRD: 51 ML/MIN/1.7M2
GLUCOSE SERPL-MCNC: 233 MG/DL (ref 70–99)
HCT VFR BLD AUTO: 38.7 % (ref 40–53)
HGB BLD-MCNC: 13.4 G/DL (ref 13.3–17.7)
IMM GRANULOCYTES # BLD: 0.1 10E9/L (ref 0–0.4)
IMM GRANULOCYTES NFR BLD: 0.7 %
LYMPHOCYTES # BLD AUTO: 2.2 10E9/L (ref 0.8–5.3)
LYMPHOCYTES NFR BLD AUTO: 13.3 %
MCH RBC QN AUTO: 31.4 PG (ref 26.5–33)
MCHC RBC AUTO-ENTMCNC: 34.6 G/DL (ref 31.5–36.5)
MCV RBC AUTO: 91 FL (ref 78–100)
MONOCYTES # BLD AUTO: 1.1 10E9/L (ref 0–1.3)
MONOCYTES NFR BLD AUTO: 6.6 %
NEUTROPHILS # BLD AUTO: 12.8 10E9/L (ref 1.6–8.3)
NEUTROPHILS NFR BLD AUTO: 78.7 %
NRBC # BLD AUTO: 0 10*3/UL
NRBC BLD AUTO-RTO: 0 /100
PLATELET # BLD AUTO: 314 10E9/L (ref 150–450)
POTASSIUM SERPL-SCNC: 5.4 MMOL/L (ref 3.4–5.3)
PROT SERPL-MCNC: 9.3 G/DL (ref 6.8–8.8)
RBC # BLD AUTO: 4.27 10E12/L (ref 4.4–5.9)
SODIUM SERPL-SCNC: 128 MMOL/L (ref 133–144)
WBC # BLD AUTO: 16.3 10E9/L (ref 4–11)

## 2018-02-26 PROCEDURE — 25000128 H RX IP 250 OP 636: Performed by: RADIOLOGY

## 2018-02-26 PROCEDURE — 99285 EMERGENCY DEPT VISIT HI MDM: CPT | Mod: 25

## 2018-02-26 PROCEDURE — 99285 EMERGENCY DEPT VISIT HI MDM: CPT | Performed by: FAMILY MEDICINE

## 2018-02-26 PROCEDURE — 36415 COLL VENOUS BLD VENIPUNCTURE: CPT | Performed by: FAMILY MEDICINE

## 2018-02-26 PROCEDURE — 85025 COMPLETE CBC W/AUTO DIFF WBC: CPT | Performed by: FAMILY MEDICINE

## 2018-02-26 PROCEDURE — 80053 COMPREHEN METABOLIC PANEL: CPT | Performed by: FAMILY MEDICINE

## 2018-02-26 PROCEDURE — 25000128 H RX IP 250 OP 636: Performed by: FAMILY MEDICINE

## 2018-02-26 PROCEDURE — 70491 CT SOFT TISSUE NECK W/DYE: CPT | Mod: TC

## 2018-02-26 PROCEDURE — 96365 THER/PROPH/DIAG IV INF INIT: CPT | Mod: XU

## 2018-02-26 RX ORDER — CEFTRIAXONE SODIUM 1 G/50ML
1 INJECTION, SOLUTION INTRAVENOUS ONCE
Status: COMPLETED | OUTPATIENT
Start: 2018-02-26 | End: 2018-02-26

## 2018-02-26 RX ORDER — IOPAMIDOL 612 MG/ML
100 INJECTION, SOLUTION INTRAVASCULAR ONCE
Status: COMPLETED | OUTPATIENT
Start: 2018-02-26 | End: 2018-02-26

## 2018-02-26 RX ADMIN — CEFTRIAXONE SODIUM 1 G: 1 INJECTION, SOLUTION INTRAVENOUS at 17:34

## 2018-02-26 RX ADMIN — IOPAMIDOL 100 ML: 612 INJECTION, SOLUTION INTRAVENOUS at 16:43

## 2018-02-26 NOTE — ED AVS SNAPSHOT
" [facility]    [address]    [place] [address]    Phone:  [phone]                                       Neel Kerr   MRN: [id number]    Department:  [facility]   Date of Visit:  2/26/2018           Patient Information     Date Of Birth          1940        Your diagnoses for this visit were:     Parotitis        You were seen by Ingrid Gloria MD.        Discharge Instructions           Salivary Gland Infection  Salivary glands make saliva. Saliva is mostly water. It also has minerals and proteins that help break down food and keep the mouth and teeth healthy. There are three pairs of salivary glands:    Parotid glands (in front of the ear)    Submandibular glands (below the jaw)    Sublingual glands (below the tongue)  A salivary gland can become infected by bacteria (germs). Things that make this more likely include dehydration and taking medicines that affect saliva flow. Infection is also more likely when the duct (channel) that carries saliva from the gland to the mouth is blocked. It may be blocked by a salivary gland \"stone.\" This is a collection of minerals that forms in the salivary gland.  Signs of infection include fever, severe pain in the gland, and redness and swelling over the gland. It may hurt to open the mouth. Symptoms may be worse when the flow of saliva is stimulated, such as by the smell of food.   Antibiotics are used to treat the infection. Drainage of the infection with a simple surgical procedure is sometimes needed. It a salivary gland stone is present, a procedure may be done to remove it.  Home Care:    Take antibiotics as directed until they are finished. Do this even if you start to feel better after only a few days.    Unless another medicine was prescribed, take over-the-counter medicines, such as acetaminophen or ibuprofen, to help relieve pain.    Moist heat can also help relieve swelling and pain. Wet a cloth with warm water and " put it over the affected gland for 10-15 minutes several times a day.    Gently massage the gland a few times a day.     Suck on lemon or other tart hard candies to encourage flow of saliva.  To help prevent blockages and infections:    Drink 6-8 glasses of fluid per day (such as water, tea, and clear soup) to keep well hydrated.    If you smoke, ask your healthcare provider for help to quit. Smoking makes salivary gland stones more likely.    Maintain good dental hygiene. Brush and floss your teeth daily. See your dentist for regular cleanings.  Follow-up care  Follow up with your healthcare provider or as advised.   When to seek medical advice  Call your healthcare provider if any of the following occur:    Fever over 100.4 F (38 C) after two days of taking antibiotics    Symptoms get worse or don't improve within a few days    Trouble breathing or swallowing  Date Last Reviewed: 5/4/2015 2000-2017 [employer]. [address] [place] [address]. All rights reserved. This information is not intended as a substitute for professional medical care. Always follow your healthcare professional's instructions.      What to expect when you have contrast    During your exam, we will inject  contrast  into your vein or artery. (Contrast is a clear liquid with iodine in it. It shows up on X-rays.)    You may feel warm or hot. You may have a metal taste in your mouth and a slight upset stomach. You may also feel pressure near the kidneys and bladder. These effects will last about 1 to 3 minutes.    Please tell us if you have:    Sneezing     Itching    Hives     Swelling in the face    A hoarse voice    Breathing problems    Other new symptoms    Serious problems are rare.  They may include:    Irregular heartbeat     Seizures    Kidney failure              Tissue damage    Shock      Death    If you have any problems during the exam, we  will treat them right away.    When you get home    Call your  hospital if you have any new symptoms in the next 2 days, like hives or swelling. (Phone numbers are at the bottom of this page.) Or call your family doctor.     If you have wheezing or trouble breathing, call 911.    If you have kidney problems or take metformin    Drink 4 to 8 large glasses of water for the next  2 days, if you are not on a fluid restriction.    ?If you take metformin (Glucophage or Glucovance) for diabetes, keep taking it.      ?Your kidney function tests are abnormal.  If you take Metformin, do not take it for 48 hours. Please go to your clinic for a blood test within 3 days after your exam before the restarting this medicine.     (Note to provider:please give patient prescription for lab tests.)    ?Special instructions: n/a    I have read and understand the above information.    Patient Sign Here:______________________________________Date:________Time:______    Staff Sign Here:________________________________________Date:_______Time:______      Radiology Departments:     ?[facility]: [phone] ?[place]: [phone]     ?[place]: [phone] ?[facility]:[phone]      ?[facility]: [phone]  ?[facility]: [phone]  ?[facility]:[phone]    ?[facility]:[phone]  ?[facility]:[phone]    [facility].       Review of your medicines      START taking        Dose / Directions Last dose taken    amoxicillin-clavulanate 875-125 MG per tablet   Commonly known as:  AUGMENTIN   Dose:  1 tablet   Quantity:  28 tablet        Take 1 tablet by mouth 2 times daily   Refills:  0          Our records show that you are taking the medicines listed below. If these are incorrect, please call your family doctor or clinic.        Dose / Directions Last dose taken    alpha-lipoic acid 600 MG Caps   Dose:  1200 mg        Take 1,200 mg by mouth   Refills:  0        * AMLODIPINE BESYLATE PO   Dose:  10 mg        Take 10 mg  by mouth daily   Refills:  0        * amLODIPine 2.5 MG tablet   Commonly known as:  NORVASC   Dose:  10 mg        Take 10 mg by mouth   Refills:  0        * aspirin 81 MG tablet   Dose:  81 mg        Take 81 mg by mouth daily   Refills:  0        * aspirin EC 81 MG EC tablet   Dose:  81 mg        Take 81 mg by mouth daily   Refills:  0        Aspirin-Calcium Carbonate  MG Tabs   Dose:  81 mg        Take 81 mg by mouth   Refills:  0        gabapentin 100 MG capsule   Commonly known as:  NEURONTIN   Dose:  200 mg        Take 200 mg by mouth At Bedtime   Refills:  0        * hydrochlorothiazide 25 MG tablet   Commonly known as:  HYDRODIURIL   Dose:  25 mg        Take 25 mg by mouth daily   Refills:  0        * hydrochlorothiazide 25 MG tablet   Commonly known as:  HYDRODIURIL   Dose:  25 mg        Take 25 mg by mouth   Refills:  0        * itraconazole 100 MG capsule   Commonly known as:  SPORANOX   Dose:  200 mg        Take 200 mg by mouth 2 times daily   Refills:  0        * itraconazole 100 MG capsule   Commonly known as:  SPORANOX   Dose:  100 mg        Take 100 mg by mouth 2 times daily   Refills:  0        * LISINOPRIL PO   Dose:  40 mg        Take 40 mg by mouth daily   Refills:  0        * lisinopril 40 MG tablet   Commonly known as:  PRINIVIL/ZESTRIL   Dose:  40 mg        Take 40 mg by mouth daily   Refills:  0        * lisinopril 2.5 MG tablet   Commonly known as:  PRINIVIL/Zestril   Dose:  40 mg        Take 40 mg by mouth   Refills:  0        * METFORMIN HCL PO   Dose:  1000 mg        Take 1,000 mg by mouth 2 times daily (with meals)   Refills:  0        * metFORMIN 500 MG tablet   Commonly known as:  GLUCOPHAGE   Dose:  500 mg        Take 500 mg by mouth Take 500 mg by mouth 2 times a day   Refills:  0        nitroGLYcerin 0.4 MG sublingual tablet   Commonly known as:  NITROSTAT   Dose:  0.4 mg        Place 0.4 mg under the tongue every 5 minutes as needed   Refills:  0        * PLAVIX PO   Dose:  75  mg        Take 75 mg by mouth daily   Refills:  0        * clopidogrel 75 MG tablet   Commonly known as:  PLAVIX   Dose:  75 mg        Take 75 mg by mouth daily   Refills:  0        rosuvastatin 40 MG tablet   Commonly known as:  CRESTOR   Dose:  40 mg        Take 40 mg by mouth At Bedtime   Refills:  0        zinc sulfate 220 (50 ZN) MG capsule   Commonly known as:  ZINCATE   Dose:  220 mg        Take 220 mg by mouth daily   Refills:  0        * Notice:  This list has 15 medication(s) that are the same as other medications prescribed for you. Read the directions carefully, and ask your doctor or other care provider to review them with you.            Prescriptions were sent or printed at these locations (1 Prescription)                   [facility] [address] [address]    Telephone:  [phone]   Fax:  [phone]   Hours:                  E-Prescribed (1 of 1)         amoxicillin-clavulanate (AUGMENTIN) 875-125 MG per tablet                Procedures and tests performed during your visit     CBC with platelets differential    CT Soft Tissue Neck w Contrast    Comprehensive metabolic panel      Orders Needing Specimen Collection     None      Pending Results     No orders found from 2/24/2018 to 2/27/2018.            Pending Culture Results     No orders found from 2/24/2018 to 2/27/2018.            Thank you for choosing [facility]       Thank you for choosing [facility] for your care. Our goal is always to provide you with excellent care. Hearing back from our patients is one way we can continue to improve our services. Please take a few minutes to complete the written survey that you may receive in the mail after you visit with us. Thank you!        [employer] Information     [employer] lets you send messages to your doctor, view your test results, renew your prescriptions, schedule appointments and more. To sign up, go to [email or web address] .  "Click on \"Log in\" on the left side of the screen, which will take you to the Welcome page. Then click on \"Sign up Now\" on the right side of the page.     You will be asked to enter the access code listed below, as well as some personal information. Please follow the directions to create your username and password.     Your access code is: [email or web address]  Expires: 2018  6:25 PM     Your access code will  in 90 days. If you need help or a new code, please call your [place] clinic or [phone].        Care EveryWhere ID     This is your Care EveryWhere ID. This could be used by other organizations to access your [place] medical records  [id number]        Equal Access to Services     ZELDA SALCIDO : Radha Strong, gsaton mcdonald, mani og, payton ramirez. So [facility] [phone].    ATENCIÓN: Si habla español, tiene a johnson disposición servicios gratuitos de asistencia lingüística. Llame al [phone].    We comply with applicable federal civil rights laws and [place] laws. We do not discriminate on the basis of race, color, national origin, age, disability, sex, sexual orientation, or gender identity.            After Visit Summary       This is your record. Keep this with you and show to your community pharmacist(s) and doctor(s) at your next visit.                  "

## 2018-02-26 NOTE — ED NOTES
"76 y/o male presents with reports of bilateral neck swelling. 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 Patient had \"spot\" removed from L neck 6 weeks ago. Patient states he has had swelling for last 3 weeks, was improving while on antibiotics, but now increasing again. [facility] for March, but [facility] provider would like it completed sooner.  "

## 2018-02-26 NOTE — DISCHARGE INSTRUCTIONS
"    Salivary Gland Infection  Salivary glands make saliva. Saliva is mostly water. It also has minerals and proteins that help break down food and keep the mouth and teeth healthy. There are three pairs of salivary glands:    Parotid glands (in front of the ear)    Submandibular glands (below the jaw)    Sublingual glands (below the tongue)  A salivary gland can become infected by bacteria (germs). Things that make this more likely include dehydration and taking medicines that affect saliva flow. Infection is also more likely when the duct (channel) that carries saliva from the gland to the mouth is blocked. It may be blocked by a salivary gland \"stone.\" This is a collection of minerals that forms in the salivary gland.  Signs of infection include fever, severe pain in the gland, and redness and swelling over the gland. It may hurt to open the mouth. Symptoms may be worse when the flow of saliva is stimulated, such as by the smell of food.   Antibiotics are used to treat the infection. Drainage of the infection with a simple surgical procedure is sometimes needed. It a salivary gland stone is present, a procedure may be done to remove it.  Home Care:    Take antibiotics as directed until they are finished. Do this even if you start to feel better after only a few days.    Unless another medicine was prescribed, take over-the-counter medicines, such as acetaminophen or ibuprofen, to help relieve pain.    Moist heat can also help relieve swelling and pain. Wet a cloth with warm water and put it over the affected gland for 10-15 minutes several times a day.    Gently massage the gland a few times a day.     Suck on lemon or other tart hard candies to encourage flow of saliva.  To help prevent blockages and infections:    Drink 6-8 glasses of fluid per day (such as water, tea, and clear soup) to keep well hydrated.    If you smoke, ask your healthcare provider for help to quit. Smoking makes salivary gland stones more " likely.    Maintain good dental hygiene. Brush and floss your teeth daily. See your dentist for regular cleanings.  Follow-up care  Follow up with your healthcare provider or as advised.   When to seek medical advice  Call your healthcare provider if any of the following occur:    Fever over 100.4 F (38 C) after two days of taking antibiotics    Symptoms get worse or don't improve within a few days    Trouble breathing or swallowing  Date Last Reviewed: 5/4/2015 2000-2017 [employer]. [address] [place] [address]. All rights reserved. This information is not intended as a substitute for professional medical care. Always follow your healthcare professional's instructions.      What to expect when you have contrast    During your exam, we will inject  contrast  into your vein or artery. (Contrast is a clear liquid with iodine in it. It shows up on X-rays.)    You may feel warm or hot. You may have a metal taste in your mouth and a slight upset stomach. You may also feel pressure near the kidneys and bladder. These effects will last about 1 to 3 minutes.    Please tell us if you have:    Sneezing     Itching    Hives     Swelling in the face    A hoarse voice    Breathing problems    Other new symptoms    Serious problems are rare.  They may include:    Irregular heartbeat     Seizures    Kidney failure              Tissue damage    Shock      Death    If you have any problems during the exam, we  will treat them right away.    When you get home    Call your hospital if you have any new symptoms in the next 2 days, like hives or swelling. (Phone numbers are at the bottom of this page.) Or call your family doctor.     If you have wheezing or trouble breathing, call 911.    If you have kidney problems or take metformin    Drink 4 to 8 large glasses of water for the next  2 days, if you are not on a fluid restriction.    ?If you take metformin (Glucophage or Glucovance) for diabetes, keep  taking it.      ?Your kidney function tests are abnormal.  If you take Metformin, do not take it for 48 hours. Please go to your clinic for a blood test within 3 days after your exam before the restarting this medicine.     (Note to provider:please give patient prescription for lab tests.)    ?Special instructions: n/a    I have read and understand the above information.    Patient Sign Here:______________________________________Date:________Time:______    Staff Sign Here:________________________________________Date:_______Time:______      Radiology Departments:     ?[facility]: [phone] ?[place]: [phone]     ?[place]: [phone] ?[facility]:[phone]      ?[facility]: [phone]  ?[facility]: [phone]  ?[facility]:[phone]    ?[facility]:[phone]  ?[facility]:[phone]    [facility] at the end of this week.

## 2018-02-26 NOTE — ED AVS SNAPSHOT
[facility]    [address] [address]    Phone:  [phone]                                       Neel Kerr   MRN: [id number]    Department[facility]  [facility]   Date of Visit:  2/26/2018           After Visit Summary Signature Page     I have received my discharge instructions, and my questions have been answered. I have discussed any challenges I see with this plan with the nurse or doctor.    ..........................................................................................................................................  Patient/Patient Representative Signature      ..........................................................................................................................................  Patient Representative Print Name and Relationship to Patient    ..................................................               ................................................  Date                                            Time    ..........................................................................................................................................  Reviewed by Signature/Title    ...................................................              ..............................................  Date                                                            Time

## 2018-03-01 NOTE — ED PROVIDER NOTES
eMERGENCY dEPARTMENT eNCOUnter        CHIEF COMPLAINT    Chief Complaint   Patient presents with     Facial Swelling     jaw swollen R>L       HPI    Neel Kerr is a 77 year old male who [facility].  He has a history of basal cell carcinoma removed from his left neck and he developed some sort of abscess after that.  He was placed on antibiotics which he just finished.  He was at the clinic today for reevaluation he was felt to have enlarged lymph nodes and is transferred here for evaluation.  When I see him he is complaining of some tenderness in his face but no difficulty breathing no difficulty swallowing he otherwise feels well has not had any fevers.       REVIEW OF SYSTEMS    Cardiac:  No syncope  Respiratory: no SOB, no chest tightness  ENT: no throat swelling, no tongue swelling  GI: No Vomiting  General: No Fever  SKIN: no rash  All other systems reviewed and are negative.    PAST MEDICAL & SURGICAL HISTORY    Past Medical History:   Diagnosis Date     Essential (primary) hypertension     No Comments Provided     Hyperlipidemia     No Comments Provided     Non-ST elevation (NSTEMI) myocardial infarction (H)     07/07/2017,[facility] PCI with stent circumflex     Old myocardial infarction     02/11/2015,[facility]  PCI with stent     Polyneuropathy     No Comments Provided     Type 2 diabetes mellitus without complications (H)     No Comments Provided     History reviewed. No pertinent surgical history.    CURRENT MEDICATIONS    Current Outpatient Rx   Medication Sig Dispense Refill     amoxicillin-clavulanate (AUGMENTIN) 875-125 MG per tablet Take 1 tablet by mouth 2 times daily 28 tablet 0     alpha-lipoic acid 600 MG CAPS Take 1,200 mg by mouth       Aspirin-Calcium Carbonate  MG TABS Take 81 mg by mouth       gabapentin (NEURONTIN) 100 MG capsule Take 200 mg by mouth At Bedtime       nitroGLYcerin (NITROSTAT) 0.4 MG sublingual tablet  Place 0.4 mg under the tongue every 5 minutes as needed       rosuvastatin (CRESTOR) 40 MG tablet Take 40 mg by mouth At Bedtime       amLODIPine (NORVASC) 2.5 MG tablet Take 10 mg by mouth       aspirin EC 81 MG EC tablet Take 81 mg by mouth daily       clopidogrel (PLAVIX) 75 MG tablet Take 75 mg by mouth daily       hydrochlorothiazide (HYDRODIURIL) 25 MG tablet Take 25 mg by mouth       itraconazole (SPORANOX) 100 MG capsule Take 100 mg by mouth 2 times daily       lisinopril (PRINIVIL/ZESTRIL) 40 MG tablet Take 40 mg by mouth daily       lisinopril (PRINIVIL/ZESTRIL) 2.5 MG tablet Take 40 mg by mouth       metFORMIN (GLUCOPHAGE) 500 MG tablet Take 500 mg by mouth Take 500 mg by mouth 2 times a day       METFORMIN HCL PO Take 1,000 mg by mouth 2 times daily (with meals)       itraconazole (SPORANOX) 100 MG capsule Take 200 mg by mouth 2 times daily       AMLODIPINE BESYLATE PO Take 10 mg by mouth daily       Clopidogrel Bisulfate (PLAVIX PO) Take 75 mg by mouth daily       hydrochlorothiazide (HYDRODIURIL) 25 MG tablet Take 25 mg by mouth daily       LISINOPRIL PO Take 40 mg by mouth daily       aspirin 81 MG tablet Take 81 mg by mouth daily       zinc sulfate (ZINCATE) 220 MG capsule Take 220 mg by mouth daily         ALLERGIES    No Known Allergies    SOCIAL & FAMILY HISTORY    Social History     Social History     Marital status: Single     Spouse name: N/A     Number of children: N/A     Years of education: N/A     Social History Main Topics     Smoking status: Former Smoker     Quit date: 1/1/1992     Smokeless tobacco: Never Used     Alcohol use No      Comment: Alcoholic Drinks/day: quit drinking 1985     Drug use: No     Sexual activity: Not Asked     Other Topics Concern     None     Social History Narrative    Pre loaded 03/25/13    Primary care through [place], [place]     No family history on file.    PHYSICAL EXAM    VITAL SIGNS: /67  Pulse 91  Temp 99.2  F (37.3  C) (Tympanic)  Resp 18   SpO2 95%   Constitutional:  Well developed, well nourished, no acute distress   HENT:  Atraumatic, no throat and tongue swelling, he has bilateral tenderness over the parotid glands.  No submandibular tenderness, no masses noted.   Neck: supple, no JVD, no neck swelling  Respiratory:  Lungs clear , no  retractions   Cardiovascular:  regular rate, no murmurs  GI:  Soft, nontender, normal bowel sounds  Musculoskeletal:  No edema, no acute deformities  Integument:  Skin is warm and dry, no rash   Neurologic:  Alert & oriented, no slurred speech  Psych: Pleasant affect, not anxious        RADIOLOGY/PROCEDURES    Results for orders placed or performed during the hospital encounter of 02/26/18   CT Soft Tissue Neck w Contrast    Narrative    PROCEDURE: CT SOFT TISSUE NECK W CONTRAST 2/26/2018 4:49 PM    HISTORY: history neck mass;     COMPARISONS: None.    Meds/Dose Given:    TECHNIQUE: CT scan of the neck with IV contrast. Sagittal coronal  reconstructions were obtained.    FINDINGS: There is moderate mucosal thickening in the left maxillary  sinus. Mild mucosal thickening is seen in the right maxillary sinus.  The muscles of mastication appear normal. Both parotid glands are  abnormally enlarged with pathologic enhancement. This would be  consistent with bilateral peritonitis. There are some small  calcifications seen in the right parotid gland. No parotid duct stones  were seen. Submandibular glands appear normal. The larynx and upper  trachea appears normal thyroid gland appears normal upper mediastinal  lymph nodes are normal. The cervical lymph nodes are normal in  caliber.           Impression    IMPRESSION: Bilateral parotid gland inflammation. No abscess is seen.    DOREEN UGALDE MD         ED COURSE & MEDICAL DECISION MAKING      See chart for details of medications given during the ED stay.    Vitals:    02/26/18 1413 02/26/18 1628 02/26/18 1710 02/26/18 1827   BP: 94/54 101/66 108/57 109/67   Pulse: 91       Resp: 16   18   Temp: 96.5  F (35.8  C)   99.2  F (37.3  C)   TempSrc: Tympanic   Tympanic   SpO2: 98%   95%       FINAL IMPRESSION    1. Parotitis        PLAN  He is given a dose of Rocephin here and discharged on Augmentin.  No evidence of abscess or other abnormalities on CT.  [facility]  At this point I think they can cancel his CT in [place] and he is happy about that.    (Please note that this note was completed with a voice recognition program.  Every attempt was made to edit the dictations, but inevitably there remain words that are mis-transcribed.)       Ingrid Gloria MD  02/28/18 1945

## 2019-03-12 ENCOUNTER — ALLIED HEALTH/NURSE VISIT (OUTPATIENT)
Dept: EDUCATION SERVICES | Facility: OTHER | Age: 79
End: 2019-03-12
Attending: NURSE PRACTITIONER
Payer: MEDICARE

## 2019-03-12 DIAGNOSIS — E11.65 UNCONTROLLED TYPE 2 DIABETES MELLITUS WITH HYPERGLYCEMIA (H): Primary | ICD-10-CM

## 2019-03-12 PROCEDURE — G0108 DIAB MANAGE TRN  PER INDIV: HCPCS | Performed by: REGISTERED NURSE

## 2019-03-12 NOTE — PATIENT INSTRUCTIONS
Diabetes Goals and Reminders    Your A1c test should be done every 3 months.  Your goal is less than 8%.   Your last result is[facility]  Lab Results   Component Value Date    A1C 7.9 01/23/2017       Your LDL cholesterol test should be done at least once a year.  Take a statin, if prescribed by your doctor, based on age and risk factors.  Your last result is:  No results found for: LDL    Have blood pressure and weight checked every three months.  Your blood pressure goal is 140/90 or less.  Your last blood pressure reading was:   BP Readings from Last 1 Encounters:   02/26/18 109/67       Test your blood sugar 3 times per day.  Your home blood glucose target ranges are:  Fasting or Before meals: 100-130  2 hours after a meal: Less than 200  Bedtime 110 - 150 mg/dL          Avoid all tobacco.  Follow your healthy diet and exercise plan.  See the eye doctor every year.  See the dentist every six months.  Have kidney function tested yearly.    Take all medicine as prescribed.  Please let me know if you are having symptoms you don t expect or if you wish to stop any medicine.    Follow Up Plan  Please call or visit Diabetes Education if blood sugars are consistently out of target.  Your future lab plan is:  HgA1c in May 2019.    If you need your cholesterol checked at your next appointment, you should fast 8 to 10 hours before your appointment.  Do not eat or drink anything besides water.  Drink plenty of water and take all your usual medicine.    SUMMARY FOR TODAY'S VISIT    Discussed background insulin adjustment to help tighten BG control.  Recommend Lantus increase from 18 to 22 units every morning.      1.  Continue testing blood sugar 3 time(s) daily, as directed.    2.  Discussed carbohydrate counting, using plate method for portion control.  Recommend limiting snacks to help with weight loss and tighter blood sugar control.     3.  Recommend low to moderate physical activity, such as  walking, working up to a minimum of 30 minutes most days, as tolerated.     4.  Follow-up for continued diabetes education, as needed.     Cinthia Benavides RN, BSN, CDE  3/12/2019 3:56 PM

## 2019-03-12 NOTE — PROGRESS NOTES
"Diabetes Self-Management Education & Support    Diabetes Education Self Management & Training    SUBJECTIVE/OBJECTIVE:  Presents for: Individual review  Accompanied by: Self  Diabetes education in the past 24mo: No  Focus of Visit: Reducing Risks, Problem Solving  Diabetes type: Type 2  Date of diagnosis: (\"about 3 - 4 years ago.\")  Disease course: Getting harder to manage  How confident are you filling out medical forms by yourself:: Somewhat  Transportation concerns: No  Other concerns:: None  Cultural Influences/Ethnic Background:  American      Diabetes Symptoms & Complications  Blurred vision: No  Fatigue: Yes(Less energy)  Neuropathy: Yes  Foot ulcerations: No  Polydipsia: Yes  Polyphagia: No  Polyuria: Yes  Visual change: No  Weakness: No  Weight loss: No  Slow healing wounds: No  Recent Infection(s): No  Symptom course: Stable  Weight trend: Stable  Autonomic neuropathy: No  CVA: No  Heart disease: Yes  Nephropathy: No  Peripheral neuropathy: Yes  Peripheral Vascular Disease: No  Retinopathy: No    Patient Problem List and Family Medical History reviewed for relevant medical history, current medical status, and diabetes risk factors.    Vitals:  There were no vitals taken for this visit.  Estimated body mass index is 21.93 kg/m  as calculated from the following:    Height as of 7/5/17: 1.702 m (5' 7\").    Weight as of 7/5/17: 63.5 kg (140 lb).   Last 3 BP:   BP Readings from Last 3 Encounters:   02/26/18 109/67   07/07/17 150/83   07/05/17 134/84       History   Smoking Status     Former Smoker     Quit date: 1/1/1992   Smokeless Tobacco     Never Used       Labs:  Lab Results   Component Value Date    A1C 7.9 01/23/2017     Lab Results   Component Value Date     02/26/2018     No results found for: LDL  No results found for: HDL]  GFR Estimate   Date Value Ref Range Status   02/26/2018 51 (L) >60 mL/min/1.7m2 Final     Comment:     Non  GFR Calc     GFR Estimate If Black   Date Value " Ref Range Status   02/26/2018 61 >60 mL/min/1.7m2 Final     Comment:      GFR Calc     Lab Results   Component Value Date    CR 1.37 02/26/2018     No results found for: MICROALBUMIN    Healthy Eating  Healthy Eating Assessed Today: Yes  Cultural/Caodaism diet restrictions?: No  Patient on a regular basis: Eats 3 meals a day  Meal planning: None  Meals include: Breakfast, Lunch, Dinner(rare snacking.)  Beverages: Water, Coffee  Has patient met with a dietitian in the past?: Yes    Being Active  Being Active Assessed Today: Yes  Exercise:: Currently not exercising(I work one day a week and square dance about 3 x a month.)  Barrier to exercise: None    Monitoring  Monitoring Assessed Today: Yes  Did patient bring glucose meter to appointment? : No  Blood Glucose Meter: Accu-check  Home Glucose (Sugar) Monitoring: 3-4 times per day  Low Glucose Range (mg/dL): 140-180  High Glucose Range (mg/dL): >200  Overall Range (mg/dL): >200    Patient did not bring meter or log book to appointment, but states BG range lowest 160 to highest lower 400s within the last two weeks.        Taking Medications  Diabetes Medication(s)     Biguanides       metFORMIN (GLUCOPHAGE) 500 MG tablet    Take 500 mg by mouth Take 500 mg by mouth 2 times a day     METFORMIN HCL PO    Take 1,000 mg by mouth 2 times daily (with meals)          Taking Medication Assessed Today: Yes  Current Treatments: Oral Agent (monotherapy), Diet  Problems taking diabetes medications regularly?: No  Diabetes medication side effects?: No  Treatment Compliance: All of the time    Problem Solving  Problem Solving Assessed Today: Yes  Hypoglycemia Frequency: Never    Reducing Risks  Reducing Risks Assessed Today: Yes  Diabetes Risks: Age over 45 years, Hyperlipidemia, Family History  CAD Risks: Family history, Diabetes Mellitus, Male sex, Dyslipidemia, Hypertension  Has dilated eye exam at least once a year?: No  Sees dentist every 6 months?: No  Sees  podiatrist (foot doctor)?: No    Healthy Coping  Healthy Coping Assessed Today: Yes  Emotional response to diabetes: Acceptance, Confidence diabetes can be controlled  Informal Support system:: None  Stage of change: PREPARATION (Decided to change - considering how)  Difficulty affording diabetes management supplies?: No  Support resources: Offerings in Clinic Communities  Patient Activation Measure Survey Score:  No flowsheet data found.    ASSESSMENT:  Reviewed diabetes regimen.  Patient shares has made dietary changes, reviewed plate method for portion control.      Discussed pathophysiology with interpretation and meaning of HgA1c.  Stressed importance of testing BG daily to help keep tight control of DM2, helping to minimize risk for possible complications of uncontrolled diabetes.  Discussed benefits of keeping A1c under 8% and encouraged patient to continue testing BG daily.    Recommend Lantus increase from 18 to 22 units every evening at bedtime.   Due to hyperglycemia discussed increasing Lantus 2 units every three days until FBG 90 - 130 mg/dL.      Patient interested in medication to help him lose weight and tighten BG control.  Discussed continuing Lantus and Metformin with future plan to discontinue Glipizide and begin SGLT2i, Jardiance with added CVD benefit.  Will discuss with PCP and follow up with patient.           Patient's most recent A1c 12.2%, [facility] lab 2/7/2019  Lab Results   Component Value Date    A1C 7.9 01/23/2017    is not meeting goal of <8.0    INTERVENTION:   Diabetes knowledge and skills assessment:     Patient is knowledgeable in diabetes management concepts related to: Monitoring and Taking Medication    Patient needs further education on the following diabetes management concepts: Healthy Eating, Being Active, Problem Solving and Reducing Risks    Based on learning assessment above, most appropriate setting for further diabetes education would be: Group class or Individual  setting.    Education provided today on:  AADE Self-Care Behaviors:  Diabetes Pathophysiology  Healthy Eating: portion control and plate planning method  Being Active: relationship to blood glucose  Problem Solving: high blood glucose - causes, signs/symptoms, treatment and prevention, low blood glucose - causes, signs/symptoms, treatment and prevention, carrying a carbohydrate source at all times and medical identification  Reducing Risks: major complications of diabetes, prevention, early diagnostic measures and treatment of complications, A1C - goals, relating to blood glucose levels, how often to check, lipids levels and goals and blood pressure and goals    Opportunities for ongoing education and support in diabetes-self management were discussed.    Pt verbalized understanding of concepts discussed and recommendations provided today.       Education Materials Provided:  Eating For Better Health, Activity Pyramid, A1c flier, Diabetes Success Plan, DSMS sheet.     PLAN:  See Patient Instructions for co-developed, patient-stated behavior change goals.  AVS printed and provided to patient today. See Follow-Up section for recommended follow-up.    Cinthia Benavides RN, BSN, CDE  3/12/2019 6:34 PM   Time Spent: 60 minutes  Encounter Type: Individual    Any diabetes medication dose changes were made via the CDE Protocol and Collaborative Practice Agreement with the patient's primary care provider. A copy of this encounter was shared with the provider.

## 2019-03-28 ENCOUNTER — TELEPHONE (OUTPATIENT)
Dept: EDUCATION SERVICES | Facility: OTHER | Age: 79
End: 2019-03-28

## 2019-05-22 NOTE — TELEPHONE ENCOUNTER
"Patient states his BG are better, \"I'm usually waking up in the lower 200s.\"  I am up to 40 units at bedtime.  Patient says he has stopped increasing the dose.    Recommend Lantus increase from 40 to 43 units every evening at bedtime.      Due to BG remaining elevated and insulin need up to 0.6 unit per kg, recommend additional medication such as SGLT2i.  Jardiance seems to be covered by patient's current insurance.    Message left with recommendation to patient PCP.     Cinthia Benavides RN, BSN, CDE  5/22/2019 2:34 PM   "

## 2019-06-03 NOTE — TELEPHONE ENCOUNTER
Message received, patient meets requirements for Jardiance.  He will begin 10 mg once daily per PCP.      Patient will follow up, as needed, for continued DSME.    Cinthia Benavides RN, BSN, CDE  6/3/2019 4:44 PM

## 2019-10-25 LAB — RETINOPATHY: NEGATIVE

## 2019-12-02 LAB
ALT SERPL-CCNC: 19 U/L (ref 13–61)
AST SERPL-CCNC: 20 U/L (ref 15–37)
CHOLEST SERPL-MCNC: 203 MG/DL (ref 0–200)
CREAT SERPL-MCNC: 1 MG/DL (ref 0.7–1.2)
GFR SERPL CREATININE-BSD FRML MDRD: >60 ML/MIN/1.73M2
GLUCOSE SERPL-MCNC: 206 MG/DL (ref 74–106)
HBA1C MFR BLD: 10.1 % (ref 4–6)
HDLC SERPL-MCNC: 27 MG/DL
LDLC SERPL CALC-MCNC: 118 MG/DL
NONHDLC SERPL-MCNC: 176 MG/DL
POTASSIUM SERPL-SCNC: 4.4 MMOL/L (ref 3.5–5)
TRIGL SERPL-MCNC: 288 MG/DL (ref 0–150)

## 2019-12-13 ENCOUNTER — HOSPITAL ENCOUNTER (OUTPATIENT)
Dept: CT IMAGING | Facility: OTHER | Age: 79
Discharge: HOME | End: 2019-12-13
Attending: NURSE PRACTITIONER | Admitting: NURSE PRACTITIONER
Payer: COMMERCIAL

## 2019-12-13 DIAGNOSIS — R59.1 LYMPHADENOPATHY: ICD-10-CM

## 2019-12-13 PROCEDURE — 70491 CT SOFT TISSUE NECK W/DYE: CPT

## 2019-12-13 PROCEDURE — 25500064 ZZH RX 255 OP 636: Performed by: RADIOLOGY

## 2019-12-13 RX ORDER — IODIXANOL 320 MG/ML
100 INJECTION, SOLUTION INTRAVASCULAR ONCE
Status: COMPLETED | OUTPATIENT
Start: 2019-12-13 | End: 2019-12-13

## 2019-12-13 RX ADMIN — IODIXANOL 100 ML: 320 INJECTION, SOLUTION INTRAVASCULAR at 13:05

## 2020-02-10 LAB
CREAT SERPL-MCNC: 1.1 MG/DL (ref 0.7–1.2)
GFR SERPL CREATININE-BSD FRML MDRD: >60 ML/MIN/1.73M2
GLUCOSE SERPL-MCNC: 348 MG/DL (ref 74–106)
HBA1C MFR BLD: 9.4 % (ref 4–6)
POTASSIUM SERPL-SCNC: 4.5 MMOL/L (ref 3.5–5)

## 2020-07-08 ENCOUNTER — TRANSFERRED RECORDS (OUTPATIENT)
Dept: HEALTH INFORMATION MANAGEMENT | Facility: CLINIC | Age: 80
End: 2020-07-08

## 2020-08-31 ENCOUNTER — TRANSFERRED RECORDS (OUTPATIENT)
Dept: HEALTH INFORMATION MANAGEMENT | Facility: CLINIC | Age: 80
End: 2020-08-31

## 2020-08-31 LAB
CREAT SERPL-MCNC: 1.3 MG/DL (ref 0.7–1.2)
GFR SERPL CREATININE-BSD FRML MDRD: 53 ML/MIN/1.73M2
GLUCOSE SERPL-MCNC: 442 MG/DL (ref 74–106)
HBA1C MFR BLD: 8.2 % (ref 4–6)

## 2020-09-20 ENCOUNTER — APPOINTMENT (OUTPATIENT)
Dept: GENERAL RADIOLOGY | Facility: HOSPITAL | Age: 80
DRG: 177 | End: 2020-09-20
Attending: PHYSICIAN ASSISTANT
Payer: COMMERCIAL

## 2020-09-20 ENCOUNTER — HOSPITAL ENCOUNTER (INPATIENT)
Facility: HOSPITAL | Age: 80
LOS: 1 days | Discharge: OTHER INSTITUTION | DRG: 177 | End: 2020-09-21
Attending: PHYSICIAN ASSISTANT | Admitting: INTERNAL MEDICINE
Payer: COMMERCIAL

## 2020-09-20 DIAGNOSIS — E11.9 TYPE 2 DIABETES MELLITUS WITHOUT COMPLICATION, WITH LONG-TERM CURRENT USE OF INSULIN (H): Primary | ICD-10-CM

## 2020-09-20 DIAGNOSIS — J96.01 ACUTE RESPIRATORY FAILURE WITH HYPOXIA (H): ICD-10-CM

## 2020-09-20 DIAGNOSIS — Z79.4 TYPE 2 DIABETES MELLITUS WITHOUT COMPLICATION, WITH LONG-TERM CURRENT USE OF INSULIN (H): Primary | ICD-10-CM

## 2020-09-20 DIAGNOSIS — J15.9 COMMUNITY ACQUIRED BACTERIAL PNEUMONIA: ICD-10-CM

## 2020-09-20 DIAGNOSIS — N18.30 CKD (CHRONIC KIDNEY DISEASE) STAGE 3, GFR 30-59 ML/MIN (H): ICD-10-CM

## 2020-09-20 DIAGNOSIS — E87.1 HYPONATREMIA: ICD-10-CM

## 2020-09-20 PROBLEM — J18.9 CAP (COMMUNITY ACQUIRED PNEUMONIA): Status: ACTIVE | Noted: 2020-09-20

## 2020-09-20 LAB
ALBUMIN SERPL-MCNC: 3.2 G/DL (ref 3.4–5)
ALP SERPL-CCNC: 82 U/L (ref 40–150)
ALT SERPL W P-5'-P-CCNC: 33 U/L (ref 0–70)
ANION GAP SERPL CALCULATED.3IONS-SCNC: 12 MMOL/L (ref 3–14)
AST SERPL W P-5'-P-CCNC: 50 U/L (ref 0–45)
BASOPHILS # BLD AUTO: 0 10E9/L (ref 0–0.2)
BASOPHILS NFR BLD AUTO: 0.2 %
BILIRUB SERPL-MCNC: 0.7 MG/DL (ref 0.2–1.3)
BUN SERPL-MCNC: 49 MG/DL (ref 7–30)
CALCIUM SERPL-MCNC: 8.5 MG/DL (ref 8.5–10.1)
CHLORIDE SERPL-SCNC: 93 MMOL/L (ref 94–109)
CO2 SERPL-SCNC: 20 MMOL/L (ref 20–32)
CREAT SERPL-MCNC: 1.34 MG/DL (ref 0.66–1.25)
DIFFERENTIAL METHOD BLD: ABNORMAL
EOSINOPHIL # BLD AUTO: 0 10E9/L (ref 0–0.7)
EOSINOPHIL NFR BLD AUTO: 0 %
ERYTHROCYTE [DISTWIDTH] IN BLOOD BY AUTOMATED COUNT: 13.1 % (ref 10–15)
EST. AVERAGE GLUCOSE BLD GHB EST-MCNC: 192 MG/DL
GFR SERPL CREATININE-BSD FRML MDRD: 50 ML/MIN/{1.73_M2}
GLUCOSE BLDC GLUCOMTR-MCNC: 227 MG/DL (ref 70–99)
GLUCOSE BLDC GLUCOMTR-MCNC: 292 MG/DL (ref 70–99)
GLUCOSE SERPL-MCNC: 252 MG/DL (ref 70–99)
HBA1C MFR BLD: 8.3 % (ref 0–5.6)
HCT VFR BLD AUTO: 50.3 % (ref 40–53)
HGB BLD-MCNC: 17.4 G/DL (ref 13.3–17.7)
IMM GRANULOCYTES # BLD: 0.1 10E9/L (ref 0–0.4)
IMM GRANULOCYTES NFR BLD: 0.8 %
LACTATE BLD-SCNC: 1.9 MMOL/L (ref 0.7–2)
LACTATE BLD-SCNC: 1.9 MMOL/L (ref 0.7–2)
LACTATE BLD-SCNC: 3.7 MMOL/L (ref 0.7–2)
LYMPHOCYTES # BLD AUTO: 1.5 10E9/L (ref 0.8–5.3)
LYMPHOCYTES NFR BLD AUTO: 11.8 %
MCH RBC QN AUTO: 30.2 PG (ref 26.5–33)
MCHC RBC AUTO-ENTMCNC: 34.6 G/DL (ref 31.5–36.5)
MCV RBC AUTO: 87 FL (ref 78–100)
MONOCYTES # BLD AUTO: 0.7 10E9/L (ref 0–1.3)
MONOCYTES NFR BLD AUTO: 6 %
NEUTROPHILS # BLD AUTO: 10 10E9/L (ref 1.6–8.3)
NEUTROPHILS NFR BLD AUTO: 81.2 %
NRBC # BLD AUTO: 0 10*3/UL
NRBC BLD AUTO-RTO: 0 /100
NT-PROBNP SERPL-MCNC: 140 PG/ML (ref 0–1800)
PLATELET # BLD AUTO: 215 10E9/L (ref 150–450)
POTASSIUM SERPL-SCNC: 5 MMOL/L (ref 3.4–5.3)
PROT SERPL-MCNC: 8.2 G/DL (ref 6.8–8.8)
RBC # BLD AUTO: 5.76 10E12/L (ref 4.4–5.9)
SODIUM SERPL-SCNC: 125 MMOL/L (ref 133–144)
WBC # BLD AUTO: 12.3 10E9/L (ref 4–11)

## 2020-09-20 PROCEDURE — 99284 EMERGENCY DEPT VISIT MOD MDM: CPT | Mod: Z6 | Performed by: PHYSICIAN ASSISTANT

## 2020-09-20 PROCEDURE — 85025 COMPLETE CBC W/AUTO DIFF WBC: CPT | Performed by: PHYSICIAN ASSISTANT

## 2020-09-20 PROCEDURE — 87635 SARS-COV-2 COVID-19 AMP PRB: CPT | Performed by: PHYSICIAN ASSISTANT

## 2020-09-20 PROCEDURE — 94664 DEMO&/EVAL PT USE INHALER: CPT

## 2020-09-20 PROCEDURE — 25000132 ZZH RX MED GY IP 250 OP 250 PS 637: Performed by: PHYSICIAN ASSISTANT

## 2020-09-20 PROCEDURE — 96365 THER/PROPH/DIAG IV INF INIT: CPT

## 2020-09-20 PROCEDURE — 25800030 ZZH RX IP 258 OP 636: Performed by: PHYSICIAN ASSISTANT

## 2020-09-20 PROCEDURE — 00000146 ZZHCL STATISTIC GLUCOSE BY METER IP

## 2020-09-20 PROCEDURE — 83605 ASSAY OF LACTIC ACID: CPT | Performed by: PHYSICIAN ASSISTANT

## 2020-09-20 PROCEDURE — 83605 ASSAY OF LACTIC ACID: CPT | Performed by: INTERNAL MEDICINE

## 2020-09-20 PROCEDURE — 25000131 ZZH RX MED GY IP 250 OP 636 PS 637: Performed by: INTERNAL MEDICINE

## 2020-09-20 PROCEDURE — 83880 ASSAY OF NATRIURETIC PEPTIDE: CPT | Performed by: PHYSICIAN ASSISTANT

## 2020-09-20 PROCEDURE — 96367 TX/PROPH/DG ADDL SEQ IV INF: CPT

## 2020-09-20 PROCEDURE — C9803 HOPD COVID-19 SPEC COLLECT: HCPCS

## 2020-09-20 PROCEDURE — 25000128 H RX IP 250 OP 636: Performed by: PHYSICIAN ASSISTANT

## 2020-09-20 PROCEDURE — 93010 ELECTROCARDIOGRAM REPORT: CPT | Performed by: INTERNAL MEDICINE

## 2020-09-20 PROCEDURE — 93005 ELECTROCARDIOGRAM TRACING: CPT

## 2020-09-20 PROCEDURE — 71045 X-RAY EXAM CHEST 1 VIEW: CPT | Mod: TC

## 2020-09-20 PROCEDURE — 87040 BLOOD CULTURE FOR BACTERIA: CPT | Performed by: PHYSICIAN ASSISTANT

## 2020-09-20 PROCEDURE — 12000000 ZZH R&B MED SURG/OB

## 2020-09-20 PROCEDURE — 83036 HEMOGLOBIN GLYCOSYLATED A1C: CPT | Performed by: INTERNAL MEDICINE

## 2020-09-20 PROCEDURE — 25800030 ZZH RX IP 258 OP 636: Performed by: INTERNAL MEDICINE

## 2020-09-20 PROCEDURE — 99223 1ST HOSP IP/OBS HIGH 75: CPT | Performed by: INTERNAL MEDICINE

## 2020-09-20 PROCEDURE — 40000275 ZZH STATISTIC RCP TIME EA 10 MIN

## 2020-09-20 PROCEDURE — 94640 AIRWAY INHALATION TREATMENT: CPT | Mod: 76

## 2020-09-20 PROCEDURE — 36415 COLL VENOUS BLD VENIPUNCTURE: CPT | Performed by: INTERNAL MEDICINE

## 2020-09-20 PROCEDURE — 25000132 ZZH RX MED GY IP 250 OP 250 PS 637: Performed by: INTERNAL MEDICINE

## 2020-09-20 PROCEDURE — 94640 AIRWAY INHALATION TREATMENT: CPT

## 2020-09-20 PROCEDURE — 36415 COLL VENOUS BLD VENIPUNCTURE: CPT | Performed by: PHYSICIAN ASSISTANT

## 2020-09-20 PROCEDURE — 99285 EMERGENCY DEPT VISIT HI MDM: CPT | Mod: 25

## 2020-09-20 PROCEDURE — 80053 COMPREHEN METABOLIC PANEL: CPT | Performed by: PHYSICIAN ASSISTANT

## 2020-09-20 PROCEDURE — 40000788 ZZHCL STATISTIC ESTIMATED AVERAGE GLUCOSE: Performed by: INTERNAL MEDICINE

## 2020-09-20 RX ORDER — LIDOCAINE 40 MG/G
CREAM TOPICAL
Status: DISCONTINUED | OUTPATIENT
Start: 2020-09-20 | End: 2020-09-21 | Stop reason: HOSPADM

## 2020-09-20 RX ORDER — PROCHLORPERAZINE MALEATE 5 MG
5 TABLET ORAL EVERY 6 HOURS PRN
Status: DISCONTINUED | OUTPATIENT
Start: 2020-09-20 | End: 2020-09-21 | Stop reason: HOSPADM

## 2020-09-20 RX ORDER — CEFTRIAXONE SODIUM 2 G/50ML
2 INJECTION, SOLUTION INTRAVENOUS ONCE
Status: COMPLETED | OUTPATIENT
Start: 2020-09-20 | End: 2020-09-20

## 2020-09-20 RX ORDER — AMLODIPINE BESYLATE 5 MG/1
10 TABLET ORAL DAILY
Status: DISCONTINUED | OUTPATIENT
Start: 2020-09-21 | End: 2020-09-21 | Stop reason: HOSPADM

## 2020-09-20 RX ORDER — ONDANSETRON 2 MG/ML
4 INJECTION INTRAMUSCULAR; INTRAVENOUS EVERY 6 HOURS PRN
Status: DISCONTINUED | OUTPATIENT
Start: 2020-09-20 | End: 2020-09-21 | Stop reason: HOSPADM

## 2020-09-20 RX ORDER — ACETAMINOPHEN 325 MG/1
650 TABLET ORAL EVERY 4 HOURS PRN
Status: DISCONTINUED | OUTPATIENT
Start: 2020-09-20 | End: 2020-09-21 | Stop reason: HOSPADM

## 2020-09-20 RX ORDER — CLOPIDOGREL BISULFATE 75 MG/1
75 TABLET ORAL DAILY
Status: DISCONTINUED | OUTPATIENT
Start: 2020-09-20 | End: 2020-09-21

## 2020-09-20 RX ORDER — ALBUTEROL SULFATE 90 UG/1
2 AEROSOL, METERED RESPIRATORY (INHALATION)
Status: DISCONTINUED | OUTPATIENT
Start: 2020-09-20 | End: 2020-09-21 | Stop reason: HOSPADM

## 2020-09-20 RX ORDER — ONDANSETRON 4 MG/1
4 TABLET, ORALLY DISINTEGRATING ORAL EVERY 6 HOURS PRN
Status: DISCONTINUED | OUTPATIENT
Start: 2020-09-20 | End: 2020-09-21 | Stop reason: HOSPADM

## 2020-09-20 RX ORDER — AMOXICILLIN 250 MG
2 CAPSULE ORAL 2 TIMES DAILY
Status: DISCONTINUED | OUTPATIENT
Start: 2020-09-20 | End: 2020-09-21 | Stop reason: HOSPADM

## 2020-09-20 RX ORDER — IPRATROPIUM BROMIDE AND ALBUTEROL SULFATE 2.5; .5 MG/3ML; MG/3ML
3 SOLUTION RESPIRATORY (INHALATION)
Status: DISCONTINUED | OUTPATIENT
Start: 2020-09-20 | End: 2020-09-20

## 2020-09-20 RX ORDER — ALBUTEROL SULFATE 90 UG/1
6 AEROSOL, METERED RESPIRATORY (INHALATION) ONCE
Status: COMPLETED | OUTPATIENT
Start: 2020-09-20 | End: 2020-09-20

## 2020-09-20 RX ORDER — GABAPENTIN 100 MG/1
200 CAPSULE ORAL AT BEDTIME
Status: DISCONTINUED | OUTPATIENT
Start: 2020-09-20 | End: 2020-09-21 | Stop reason: HOSPADM

## 2020-09-20 RX ORDER — NICOTINE POLACRILEX 4 MG
15-30 LOZENGE BUCCAL
Status: DISCONTINUED | OUTPATIENT
Start: 2020-09-20 | End: 2020-09-21 | Stop reason: HOSPADM

## 2020-09-20 RX ORDER — PROCHLORPERAZINE 25 MG
12.5 SUPPOSITORY, RECTAL RECTAL EVERY 12 HOURS PRN
Status: DISCONTINUED | OUTPATIENT
Start: 2020-09-20 | End: 2020-09-21 | Stop reason: HOSPADM

## 2020-09-20 RX ORDER — CEFTRIAXONE SODIUM 1 G/50ML
1 INJECTION, SOLUTION INTRAVENOUS AT BEDTIME
Status: DISCONTINUED | OUTPATIENT
Start: 2020-09-21 | End: 2020-09-21 | Stop reason: HOSPADM

## 2020-09-20 RX ORDER — LACTOBACILLUS RHAMNOSUS GG 10B CELL
1 CAPSULE ORAL 2 TIMES DAILY
Status: DISCONTINUED | OUTPATIENT
Start: 2020-09-20 | End: 2020-09-21 | Stop reason: HOSPADM

## 2020-09-20 RX ORDER — NALOXONE HYDROCHLORIDE 0.4 MG/ML
.1-.4 INJECTION, SOLUTION INTRAMUSCULAR; INTRAVENOUS; SUBCUTANEOUS
Status: DISCONTINUED | OUTPATIENT
Start: 2020-09-20 | End: 2020-09-21 | Stop reason: HOSPADM

## 2020-09-20 RX ORDER — DEXTROSE MONOHYDRATE 25 G/50ML
25-50 INJECTION, SOLUTION INTRAVENOUS
Status: DISCONTINUED | OUTPATIENT
Start: 2020-09-20 | End: 2020-09-21 | Stop reason: HOSPADM

## 2020-09-20 RX ORDER — LISINOPRIL 10 MG/1
40 TABLET ORAL DAILY
Status: DISCONTINUED | OUTPATIENT
Start: 2020-09-21 | End: 2020-09-21 | Stop reason: HOSPADM

## 2020-09-20 RX ORDER — HYDROCHLOROTHIAZIDE 25 MG/1
25 TABLET ORAL DAILY
Status: DISCONTINUED | OUTPATIENT
Start: 2020-09-21 | End: 2020-09-20

## 2020-09-20 RX ORDER — CALCIUM CARBONATE 500 MG/1
1000 TABLET, CHEWABLE ORAL 4 TIMES DAILY PRN
Status: DISCONTINUED | OUTPATIENT
Start: 2020-09-20 | End: 2020-09-21 | Stop reason: HOSPADM

## 2020-09-20 RX ORDER — DOXYCYCLINE 100 MG/1
100 CAPSULE ORAL 2 TIMES DAILY
Status: DISCONTINUED | OUTPATIENT
Start: 2020-09-20 | End: 2020-09-21 | Stop reason: HOSPADM

## 2020-09-20 RX ORDER — ASPIRIN 81 MG/1
81 TABLET, CHEWABLE ORAL DAILY
Status: DISCONTINUED | OUTPATIENT
Start: 2020-09-21 | End: 2020-09-21

## 2020-09-20 RX ORDER — AMOXICILLIN 250 MG
1 CAPSULE ORAL 2 TIMES DAILY
Status: DISCONTINUED | OUTPATIENT
Start: 2020-09-20 | End: 2020-09-21 | Stop reason: HOSPADM

## 2020-09-20 RX ORDER — PREDNISONE 20 MG/1
20 TABLET ORAL DAILY
Status: DISCONTINUED | OUTPATIENT
Start: 2020-09-20 | End: 2020-09-21 | Stop reason: HOSPADM

## 2020-09-20 RX ADMIN — INSULIN ASPART 2 UNITS: 100 INJECTION, SOLUTION INTRAVENOUS; SUBCUTANEOUS at 22:21

## 2020-09-20 RX ADMIN — AZITHROMYCIN 500 MG: 500 INJECTION, POWDER, LYOPHILIZED, FOR SOLUTION INTRAVENOUS at 17:41

## 2020-09-20 RX ADMIN — DOXYCYCLINE HYCLATE 100 MG: 100 CAPSULE ORAL at 20:09

## 2020-09-20 RX ADMIN — Medication 1 CAPSULE: at 20:09

## 2020-09-20 RX ADMIN — CEFTRIAXONE SODIUM 2 G: 2 INJECTION, SOLUTION INTRAVENOUS at 17:11

## 2020-09-20 RX ADMIN — ALBUTEROL SULFATE 6 PUFF: 90 AEROSOL, METERED RESPIRATORY (INHALATION) at 17:15

## 2020-09-20 RX ADMIN — ALBUTEROL SULFATE 2 PUFF: 90 INHALANT RESPIRATORY (INHALATION) at 21:05

## 2020-09-20 RX ADMIN — SODIUM CHLORIDE 1000 ML: 9 INJECTION, SOLUTION INTRAVENOUS at 19:13

## 2020-09-20 RX ADMIN — PREDNISONE 20 MG: 20 TABLET ORAL at 20:09

## 2020-09-20 ASSESSMENT — ENCOUNTER SYMPTOMS
NECK PAIN: 0
CHILLS: 0
SHORTNESS OF BREATH: 1
FEVER: 0
CHEST TIGHTNESS: 0
NAUSEA: 1
BRUISES/BLEEDS EASILY: 0
FATIGUE: 1
COUGH: 0
APPETITE CHANGE: 1
ABDOMINAL PAIN: 0
NECK STIFFNESS: 0
VOMITING: 0
WEAKNESS: 1
PALPITATIONS: 0
LIGHT-HEADEDNESS: 0
DIZZINESS: 0
ACTIVITY CHANGE: 1

## 2020-09-20 ASSESSMENT — MIFFLIN-ST. JEOR: SCORE: 1283.63

## 2020-09-20 ASSESSMENT — ACTIVITIES OF DAILY LIVING (ADL): ADLS_ACUITY_SCORE: 12

## 2020-09-20 NOTE — H&P
[facility]    History and Physical - Hospitalist Service       Date of Admission:  9/20/2020    Assessment & Plan   Neel Kerr is a 80 year old male admitted on 9/20/2020.    CAP interstitial pneumonia; RT treatments, empiric antibiotics, steroids, incentive spirometry    Associated acute hypoxic respiratory failure: treatments detailed above, oxygen supportive cares    Sepsis criteria met () + WBC 12.3): lactic acid also elevated. IV fluids, trend lactic acid    Hyponatremia: hold hydrochlorothiazide, IV fluids, check Legionella UA antigen, trend sodium    Uncontrolled type 2 diabetes (HgA1c 8.3 updated today) with stage 3 renal failure without insulin use: correction protocol          Diet: combination  DVT Prophylaxis: Pneumatic Compression Devices  Mata Catheter: not present  Code Status: DNR  Rule Out COVID-19 Handoff:       NOT Low Suspicion PUI Low Suspicion PUI   Clinical findings Symptoms highly suggestive of COVID-19 infection, including but not limited to fever, cough, dyspnea, hypoxemia. Few or no symptoms consistent with COVID-19 infection and/or an established alternative diagnosis.     No hypoxemia, fever, or cough.   Lab Abnormalities inconsistent with alternate diagnosis and/or findings consistent with COVID-19 infection. Abnormalities clearly suggestive of alternative diagnosis   Imaging Chest imaging with bilateral infiltrate Normal chest imaging   Rule Out Criteria Clinical course atypical for COVID and/or an alternative diagnosis emerges PLUS 2 negative tests* performed at least 72 hours apart.  Clinical course atypical for COVID and/or an alternative diagnosis is suspected PLUS 1 negative test*.        Disposition Plan   Expected discharge: 2 - 3 days, recommended to prior living arrangement once discharge plan established.  Entered: Alvino Luna DO 09/20/2020, 6:36 PM     The patient's care was discussed with the Patient.    [facility]    ______________________________________________________________________    Chief Complaint   Nausea, fatigue    History is obtained from the patient    History of Present Illness   Neel Kerr is a 80 year old male who is a former smoker, rarely has pneumonia and has uncontrolled type 2 diabetes    About a week ago, without noting shortness of breath or a cough, he started loosing his appetite and was nauseated without vomiting. These symptoms progressed over this last week    ER Course: vitals showed hypoxia (85%) on RA. Chest images shoed a bilateral nterstitial pneumonia. Lab diagnostics showed an elevated WBC (12.3) with left shift, chemistry panel showed hyponatremia (125). HgA1c was updated to 8.3 and lactic acid was elevated (3.7). he was given empiric antibiotics    Review of Systems       Constitutional: No fever or chills, some generalized weakness and lethargy, no unintentional weight change, loss of appetite  Ears, Nose & Throat: no sore throat, no nasal drainage, no congestion. No ear pain, no ear drainage, no particular change in hearing  Eyes: no particular change in vision, no redness, no drainage  Cardiovascular: No chest pain at rest, no chest pain with familiar activities.  Pulmonary: No cough, no particular change in work of breathing, no particular change in shortness of breath with position changes or familiar activites  Gastrointestinal: No abdominal pain, nausea, without vomiting, no diarrhea. No particular change in bowel movement pattern, no black stools, no bloody stools  Genitourinary: No particular change in incontinence, no pain with urination, no particular change in stream, no particular change in amount urinated with urge, no discharge  Skin: No particular change in bruising, no rashes  Musculoskeletal: no particular change in strength, no particular change in muscle development  Neurological: no numbness and tingling, no headache, no particular change in balance, no  dizziness  Psychologic: No particular change in depression and/or anxiety  Endocrine: No particular change in heat or cold intolerance        Past Medical History    I have reviewed this patient's medical history and updated it with pertinent information if needed.   Past Medical History:   Diagnosis Date     Essential (primary) hypertension     No Comments Provided     Hyperlipidemia     No Comments Provided     Non-ST elevation (NSTEMI) myocardial infarction (H)     2017,[facility] PCI with stent circumflex     Old myocardial infarction     2015,[facility]  PCI with stent     Polyneuropathy     No Comments Provided     Type 2 diabetes mellitus without complications (H)     No Comments Provided       Past Surgical History   I have reviewed this patient's surgical history and updated it with pertinent information if needed.  History reviewed. No pertinent surgical history.    Social History   I have reviewed this patient's social history and updated it with pertinent information if needed.  Social History     Tobacco Use     Smoking status: Former Smoker     Last attempt to quit: 1992     Years since quittin.7     Smokeless tobacco: Never Used   Substance Use Topics     Alcohol use: No     Comment: Alcoholic Drinks/day: quit drinking      Drug use: No       Family History         Prior to Admission Medications   Prior to Admission Medications   Prescriptions Last Dose Informant Patient Reported? Taking?   AMLODIPINE BESYLATE PO   Yes No   Sig: Take 10 mg by mouth daily   Aspirin-Calcium Carbonate  MG TABS   Yes No   Sig: Take 81 mg by mouth   Clopidogrel Bisulfate (PLAVIX PO)   Yes No   Sig: Take 75 mg by mouth daily   LISINOPRIL PO   Yes No   Sig: Take 40 mg by mouth daily   METFORMIN HCL PO   Yes No   Sig: Take 1,000 mg by mouth 2 times daily (with meals)   alpha-lipoic acid 600 MG CAPS   Yes No   Sig: Take 1,200 mg by mouth   amLODIPine (NORVASC) 2.5 MG tablet   Yes No   Sig:  "Take 10 mg by mouth   amoxicillin-clavulanate (AUGMENTIN) 875-125 MG per tablet   No No   Sig: Take 1 tablet by mouth 2 times daily   aspirin 81 MG tablet   Yes No   Sig: Take 81 mg by mouth daily   aspirin EC 81 MG EC tablet   Yes No   Sig: Take 81 mg by mouth daily   clopidogrel (PLAVIX) 75 MG tablet   Yes No   Sig: Take 75 mg by mouth daily   gabapentin (NEURONTIN) 100 MG capsule   Yes No   Sig: Take 200 mg by mouth At Bedtime   hydrochlorothiazide (HYDRODIURIL) 25 MG tablet   Yes No   Sig: Take 25 mg by mouth daily   hydrochlorothiazide (HYDRODIURIL) 25 MG tablet   Yes No   Sig: Take 25 mg by mouth   itraconazole (SPORANOX) 100 MG capsule   Yes No   Sig: Take 200 mg by mouth 2 times daily   itraconazole (SPORANOX) 100 MG capsule   Yes No   Sig: Take 100 mg by mouth 2 times daily   lisinopril (PRINIVIL/ZESTRIL) 2.5 MG tablet   Yes No   Sig: Take 40 mg by mouth   lisinopril (PRINIVIL/ZESTRIL) 40 MG tablet   Yes No   Sig: Take 40 mg by mouth daily   metFORMIN (GLUCOPHAGE) 500 MG tablet   Yes No   Sig: Take 500 mg by mouth Take 500 mg by mouth 2 times a day   rosuvastatin (CRESTOR) 40 MG tablet   Yes No   Sig: Take 40 mg by mouth At Bedtime   zinc sulfate (ZINCATE) 220 MG capsule   Yes No   Sig: Take 220 mg by mouth daily      Facility-Administered Medications: None     Allergies   No Known Allergies    Physical Exam   Vital Signs: Temp: 98.8  F (37.1  C) Temp src: Oral BP: 146/84 Pulse: 98   Resp: 12 SpO2: 95 % O2 Device: Nasal cannula Oxygen Delivery: 4 LPM  Weight: 0 lbs 0 oz       Case reviewed with the ER Provider, EHR reviewed; patient seen in ER room 3    Vital signs:  Temp: 98.8  F (37.1  C) Temp src: Temporal BP: 139/65 Pulse: 93   Resp: 14 SpO2: 92 % O2 Device: Nasal cannula Oxygen Delivery: 5 LPM Height: 170.2 cm (5' 7\") Weight: 60.5 kg (133 lb 6.1 oz)  Estimated body mass index is 20.89 kg/m  as calculated from the following:    Height as of this encounter: 1.702 m (5' 7\").    Weight as of this " encounter: 60.5 kg (133 lb 6.1 oz).      General: No distress, interactive  Head: normocephalic, no obvious trauma  Eyes: Gaze directed normally, sclera clear, no discharge, no abnormal ocular movements  Ears: Normal appearing age-related external ears, no discharge, stable hearing acuity loss  Nose: Normal age-related appearance  Mouth: Normal appearing oral mucosa, Gums and throat appear age-related normal  Neck: Normal age-related appearance, age-related range of motion, supple, no adenopathy  Pulmonary: Normal work of breathing with oxygen support, no expiratory delay, no coarseness, no wheezing  Cardiovascular: Distant heart sounds, regular rhythm   Abdomen: No obvious distention, soft, bowel sounds present with normal frequency and pitch  Rectal: Deferred  Back: Age-related normal  Skin: Age-related normal, no rashes  Extremities: Not tender, no lower extremity edema. Moving upper and lower extremities  Neurological: Grossly in tact  Psychiatric: Mood is stable, appropriately interactive          Data   Data reviewed today: I reviewed all medications, new labs and imaging results over the last 24 hours

## 2020-09-20 NOTE — ED PROVIDER NOTES
"  History     Chief Complaint   Patient presents with     Nausea     \"im kind of nauseated\" denies other symptoms     The history is provided by the patient.     Neel Kerr is a 80 year old male who presented to the emergency department for evaluation of \"nausea.\"  The patient was found to be rather appreciably hypoxic on arrival.  Reports he has been feeling short of breath for approximate last 1 week.  Denies any fevers.  Denies any chills.  Denies any cough.  Denies any chest discomfort.  Denies any abdominal pain.  He has no local primary care.    Allergies:  No Known Allergies    Problem List:    Patient Active Problem List    Diagnosis Date Noted     Psoriasis 2018     Priority: Medium     Non-ST elevation (NSTEMI) myocardial infarction (H) 2017     Priority: Medium     Advanced care planning/counseling discussion 2012     Priority: Medium     Bilateral impacted cerumen 03/10/2011     Priority: Medium        Past Medical History:    Past Medical History:   Diagnosis Date     Essential (primary) hypertension      Hyperlipidemia      Non-ST elevation (NSTEMI) myocardial infarction (H)      Old myocardial infarction      Polyneuropathy      Type 2 diabetes mellitus without complications (H)        Past Surgical History:    No past surgical history on file.    Family History:    No family history on file.    Social History:  Marital Status:  Single [1]  Social History     Tobacco Use     Smoking status: Former Smoker     Last attempt to quit: 1992     Years since quittin.7     Smokeless tobacco: Never Used   Substance Use Topics     Alcohol use: No     Comment: Alcoholic Drinks/day: quit drinking      Drug use: No        Medications:    alpha-lipoic acid 600 MG CAPS  amLODIPine (NORVASC) 2.5 MG tablet  AMLODIPINE BESYLATE PO  amoxicillin-clavulanate (AUGMENTIN) 875-125 MG per tablet  aspirin 81 MG tablet  aspirin EC 81 MG EC tablet  Aspirin-Calcium Carbonate  MG " TABS  clopidogrel (PLAVIX) 75 MG tablet  Clopidogrel Bisulfate (PLAVIX PO)  gabapentin (NEURONTIN) 100 MG capsule  hydrochlorothiazide (HYDRODIURIL) 25 MG tablet  hydrochlorothiazide (HYDRODIURIL) 25 MG tablet  itraconazole (SPORANOX) 100 MG capsule  itraconazole (SPORANOX) 100 MG capsule  lisinopril (PRINIVIL/ZESTRIL) 2.5 MG tablet  lisinopril (PRINIVIL/ZESTRIL) 40 MG tablet  LISINOPRIL PO  metFORMIN (GLUCOPHAGE) 500 MG tablet  METFORMIN HCL PO  rosuvastatin (CRESTOR) 40 MG tablet  zinc sulfate (ZINCATE) 220 MG capsule          Review of Systems   Constitutional: Positive for activity change, appetite change and fatigue. Negative for chills and fever.   Respiratory: Positive for shortness of breath. Negative for cough and chest tightness.    Cardiovascular: Negative for chest pain, palpitations and leg swelling.   Gastrointestinal: Positive for nausea. Negative for abdominal pain and vomiting.   Genitourinary: Negative.    Musculoskeletal: Negative for neck pain and neck stiffness.   Skin: Negative.    Neurological: Positive for weakness. Negative for dizziness and light-headedness.   Hematological: Does not bruise/bleed easily.       Physical Exam   BP: 157/92  Pulse: 102  Temp: 97.4  F (36.3  C)  Resp: 18  SpO2: (!) 85 %      Physical Exam  Vitals signs and nursing note reviewed.   Constitutional:       Appearance: Normal appearance. He is normal weight.      Comments: Chronically ill-appearing 80-year-old male found in no distress and seated upright on the exam bed   Cardiovascular:      Rate and Rhythm: Normal rate and regular rhythm.   Pulmonary:      Comments: Diminished bilaterally with mild expiratory rhonchi.  Abdominal:      General: Abdomen is flat.      Palpations: Abdomen is soft.   Musculoskeletal:      Right lower leg: No edema.      Left lower leg: No edema.   Skin:     General: Skin is warm and dry.      Capillary Refill: Capillary refill takes less than 2 seconds.   Neurological:      General: No  focal deficit present.      Mental Status: He is alert and oriented to person, place, and time.         ED Course        Procedures  EKG shows a normal sinus rhythm at a rate of 96.  Normal MT interval.  Normal QRS duration.  Normal QTC.  There is left axis deviation of -69 degrees.  There are no concerning ST segments.  He does have T wave inversion in aVL.  Comparison to previous EKG shows no significant change.        Chest x-ray shows rather generalized interstitial fullness right worse than left.    Critical Care time:  none               Results for orders placed or performed during the hospital encounter of 09/20/20 (from the past 24 hour(s))   CBC with platelets differential   Result Value Ref Range    WBC 12.3 (H) 4.0 - 11.0 10e9/L    RBC Count 5.76 4.4 - 5.9 10e12/L    Hemoglobin 17.4 13.3 - 17.7 g/dL    Hematocrit 50.3 40.0 - 53.0 %    MCV 87 78 - 100 fl    MCH 30.2 26.5 - 33.0 pg    MCHC 34.6 31.5 - 36.5 g/dL    RDW 13.1 10.0 - 15.0 %    Platelet Count 215 150 - 450 10e9/L    Diff Method Automated Method     % Neutrophils 81.2 %    % Lymphocytes 11.8 %    % Monocytes 6.0 %    % Eosinophils 0.0 %    % Basophils 0.2 %    % Immature Granulocytes 0.8 %    Nucleated RBCs 0 0 /100    Absolute Neutrophil 10.0 (H) 1.6 - 8.3 10e9/L    Absolute Lymphocytes 1.5 0.8 - 5.3 10e9/L    Absolute Monocytes 0.7 0.0 - 1.3 10e9/L    Absolute Eosinophils 0.0 0.0 - 0.7 10e9/L    Absolute Basophils 0.0 0.0 - 0.2 10e9/L    Abs Immature Granulocytes 0.1 0 - 0.4 10e9/L    Absolute Nucleated RBC 0.0    Comprehensive metabolic panel   Result Value Ref Range    Sodium 125 (L) 133 - 144 mmol/L    Potassium 5.0 3.4 - 5.3 mmol/L    Chloride 93 (L) 94 - 109 mmol/L    Carbon Dioxide 20 20 - 32 mmol/L    Anion Gap 12 3 - 14 mmol/L    Glucose 252 (H) 70 - 99 mg/dL    Urea Nitrogen 49 (H) 7 - 30 mg/dL    Creatinine 1.34 (H) 0.66 - 1.25 mg/dL    GFR Estimate 50 (L) >60 mL/min/[1.73_m2]    GFR Estimate If Black 58 (L) >60 mL/min/[1.73_m2]     Calcium 8.5 8.5 - 10.1 mg/dL    Bilirubin Total 0.7 0.2 - 1.3 mg/dL    Albumin 3.2 (L) 3.4 - 5.0 g/dL    Protein Total 8.2 6.8 - 8.8 g/dL    Alkaline Phosphatase 82 40 - 150 U/L    ALT 33 0 - 70 U/L    AST 50 (H) 0 - 45 U/L   Lactic acid whole blood   Result Value Ref Range    Lactic Acid 3.7 (H) 0.7 - 2.0 mmol/L   Nt probnp inpatient (BNP)   Result Value Ref Range    N-Terminal Pro BNP Inpatient 140 0 - 1,800 pg/mL   XR Chest Port 1 View    Narrative    PROCEDURE:  XR CHEST PORT 1 VW    HISTORY:  hypoxia.     COMPARISON:  2017    FINDINGS:   The cardiac silhouette is normal in size. The pulmonary vasculature is  normal.  Widespread interstitial thickening is noted worse on the  right than the left. This represents a change from 2017 and is  suspicious for interstitial pneumonia No pleural effusion or  pneumothorax.      Impression    IMPRESSION:  Widespread interstitial opacities worse on the right than  the left suspicious for interstitial pneumonia      DOREEN UGALDE MD       Medications   cefTRIAXone IN D5W (ROCEPHIN) intermittent infusion 2 g (2 g Intravenous New Bag 9/20/20 1711)   azithromycin (ZITHROMAX) 500 mg in sodium chloride 0.9 % 250 mL intermittent infusion (has no administration in time range)   albuterol (PROAIR HFA/PROVENTIL HFA/VENTOLIN HFA) 108 (90 Base) MCG/ACT inhaler 6 puff (6 puffs Inhalation Given 9/20/20 1715)       Assessments & Plan (with Medical Decision Making)   Findings as above.  Presented rather hypoxic and requires 4 L via nasal cannula to maintain oxygen saturations above 92%.  No profound tachypnea or increased work of breathing.  Chest x-ray shows concerns for possible widespread interstitial pneumonia.  Lactic acid is elevated.  Blood cultures were taken and the patient was provided IV fluids, Rocephin, and azithromycin.  Cover test was obtained.  Would fit any reasonable indication for admission.  Albuterol inhaler in the emergency department as well.  Patient was  graciously accepted by Dr. Luna.    This document was prepared using a combination of typing and voice generated software.  While every attempt was made for accuracy, spelling and grammatical errors may exist.    I have reviewed the nursing notes.    I have reviewed the findings, diagnosis, plan and need for follow up with the patient.       New Prescriptions    No medications on file       Final diagnoses:   Community acquired bacterial pneumonia   Acute respiratory failure with hypoxia (H)   Hyponatremia   CKD (chronic kidney disease) stage 3, GFR 30-59 ml/min (H)       9/20/2020   [facility]     Silver Matias PA-C  09/20/20 3437

## 2020-09-20 NOTE — ED NOTES
DATE:  9/20/2020   TIME OF RECEIPT FROM LAB:  4836  LAB TEST:  Lactic  LAB VALUE:  3.7  RESULTS GIVEN WITH READ-BACK TO (PROVIDER):  ELENA Sheppard  TIME LAB VALUE REPORTED TO PROVIDER:   6954

## 2020-09-20 NOTE — ED NOTES
"Patient's brother called and update given.  Patient's brother Mari is having a hernia surgery tomorrow morning.  States he and/or his wife will call tomorrow for an update.  They do have a family friend coming up that can be his \"1 visitor\" if need be.  Numbers verified in the chart.  Family notes that the patient has been weaker than normal lately.  Noted that today when they picked him up that he sounded \"congested\" and seemed to be struggling for breathes.      Report given to Shawnee WHALEY on [facility].  "

## 2020-09-21 ENCOUNTER — TRANSFERRED RECORDS (OUTPATIENT)
Dept: HEALTH INFORMATION MANAGEMENT | Facility: CLINIC | Age: 80
End: 2020-09-21

## 2020-09-21 VITALS
RESPIRATION RATE: 22 BRPM | SYSTOLIC BLOOD PRESSURE: 122 MMHG | HEIGHT: 67 IN | WEIGHT: 133.38 LBS | BODY MASS INDEX: 20.93 KG/M2 | TEMPERATURE: 98.5 F | DIASTOLIC BLOOD PRESSURE: 57 MMHG | OXYGEN SATURATION: 91 % | HEART RATE: 80 BPM

## 2020-09-21 PROBLEM — J96.01 ACUTE RESPIRATORY FAILURE WITH HYPOXIA (H): Status: ACTIVE | Noted: 2020-09-21

## 2020-09-21 LAB
ALBUMIN UR-MCNC: NEGATIVE MG/DL
ANION GAP SERPL CALCULATED.3IONS-SCNC: 7 MMOL/L (ref 3–14)
APPEARANCE UR: CLEAR
BILIRUB UR QL STRIP: NEGATIVE
BUN SERPL-MCNC: 42 MG/DL (ref 7–30)
CALCIUM SERPL-MCNC: 7.5 MG/DL (ref 8.5–10.1)
CHLORIDE SERPL-SCNC: 101 MMOL/L (ref 94–109)
CO2 SERPL-SCNC: 21 MMOL/L (ref 20–32)
COLOR UR AUTO: YELLOW
CREAT SERPL-MCNC: 0.96 MG/DL (ref 0.66–1.25)
ERYTHROCYTE [DISTWIDTH] IN BLOOD BY AUTOMATED COUNT: 13.1 % (ref 10–15)
GFR SERPL CREATININE-BSD FRML MDRD: 76 ML/MIN/{1.73_M2}
GLUCOSE BLDC GLUCOMTR-MCNC: 249 MG/DL (ref 70–99)
GLUCOSE BLDC GLUCOMTR-MCNC: 253 MG/DL (ref 70–99)
GLUCOSE BLDC GLUCOMTR-MCNC: 283 MG/DL (ref 70–99)
GLUCOSE BLDC GLUCOMTR-MCNC: 321 MG/DL (ref 70–99)
GLUCOSE SERPL-MCNC: 254 MG/DL (ref 70–99)
GLUCOSE UR STRIP-MCNC: NEGATIVE MG/DL
HCT VFR BLD AUTO: 39.3 % (ref 40–53)
HGB BLD-MCNC: 13.9 G/DL (ref 13.3–17.7)
HGB UR QL STRIP: NEGATIVE
KETONES UR STRIP-MCNC: NEGATIVE MG/DL
L PNEUMO1 AG UR QL IA: NORMAL
LABORATORY COMMENT REPORT: ABNORMAL
LACTATE BLD-SCNC: 1.1 MMOL/L (ref 0.7–2)
LEUKOCYTE ESTERASE UR QL STRIP: NEGATIVE
MCH RBC QN AUTO: 30.5 PG (ref 26.5–33)
MCHC RBC AUTO-ENTMCNC: 35.4 G/DL (ref 31.5–36.5)
MCV RBC AUTO: 86 FL (ref 78–100)
NITRATE UR QL: NEGATIVE
PH UR STRIP: 6 PH (ref 5–7)
PLATELET # BLD AUTO: 141 10E9/L (ref 150–450)
POTASSIUM SERPL-SCNC: 4.8 MMOL/L (ref 3.4–5.3)
RBC # BLD AUTO: 4.55 10E12/L (ref 4.4–5.9)
SARS-COV-2 RNA SPEC QL NAA+PROBE: NORMAL
SARS-COV-2 RNA SPEC QL NAA+PROBE: POSITIVE
SODIUM SERPL-SCNC: 129 MMOL/L (ref 133–144)
SOURCE: NORMAL
SP GR UR STRIP: 1.01 (ref 1–1.03)
SPECIMEN SOURCE: ABNORMAL
SPECIMEN SOURCE: NORMAL
SPECIMEN SOURCE: NORMAL
UROBILINOGEN UR STRIP-MCNC: NORMAL MG/DL (ref 0–2)
WBC # BLD AUTO: 7.1 10E9/L (ref 4–11)

## 2020-09-21 PROCEDURE — 94640 AIRWAY INHALATION TREATMENT: CPT

## 2020-09-21 PROCEDURE — 83605 ASSAY OF LACTIC ACID: CPT | Performed by: INTERNAL MEDICINE

## 2020-09-21 PROCEDURE — 00000146 ZZHCL STATISTIC GLUCOSE BY METER IP

## 2020-09-21 PROCEDURE — 99239 HOSP IP/OBS DSCHRG MGMT >30: CPT | Performed by: INTERNAL MEDICINE

## 2020-09-21 PROCEDURE — 36415 COLL VENOUS BLD VENIPUNCTURE: CPT | Performed by: INTERNAL MEDICINE

## 2020-09-21 PROCEDURE — 85027 COMPLETE CBC AUTOMATED: CPT | Performed by: INTERNAL MEDICINE

## 2020-09-21 PROCEDURE — 25000132 ZZH RX MED GY IP 250 OP 250 PS 637: Performed by: INTERNAL MEDICINE

## 2020-09-21 PROCEDURE — 94640 AIRWAY INHALATION TREATMENT: CPT | Mod: 76

## 2020-09-21 PROCEDURE — 87899 AGENT NOS ASSAY W/OPTIC: CPT | Performed by: INTERNAL MEDICINE

## 2020-09-21 PROCEDURE — 25800030 ZZH RX IP 258 OP 636: Performed by: INTERNAL MEDICINE

## 2020-09-21 PROCEDURE — 40000275 ZZH STATISTIC RCP TIME EA 10 MIN

## 2020-09-21 PROCEDURE — 25000131 ZZH RX MED GY IP 250 OP 636 PS 637: Performed by: INTERNAL MEDICINE

## 2020-09-21 PROCEDURE — 80048 BASIC METABOLIC PNL TOTAL CA: CPT | Performed by: INTERNAL MEDICINE

## 2020-09-21 PROCEDURE — 81003 URINALYSIS AUTO W/O SCOPE: CPT | Performed by: INTERNAL MEDICINE

## 2020-09-21 RX ORDER — ACETAMINOPHEN 325 MG/1
650 TABLET ORAL EVERY 4 HOURS PRN
COMMUNITY
Start: 2020-09-21 | End: 2022-01-01

## 2020-09-21 RX ORDER — TRIAMCINOLONE ACETONIDE 0.25 MG/G
CREAM TOPICAL DAILY PRN
Status: ON HOLD | COMMUNITY
End: 2020-09-21

## 2020-09-21 RX ORDER — AMLODIPINE BESYLATE 10 MG/1
10 TABLET ORAL DAILY
COMMUNITY
Start: 2020-09-22 | End: 2020-11-27

## 2020-09-21 RX ORDER — DOXYCYCLINE 100 MG/1
100 CAPSULE ORAL 2 TIMES DAILY
COMMUNITY
Start: 2020-09-21 | End: 2020-11-27

## 2020-09-21 RX ORDER — CEFTRIAXONE SODIUM 1 G/50ML
1 INJECTION, SOLUTION INTRAVENOUS EVERY 24 HOURS
COMMUNITY
Start: 2020-09-21 | End: 2020-11-27

## 2020-09-21 RX ORDER — SODIUM CHLORIDE 9 MG/ML
INJECTION, SOLUTION INTRAVENOUS CONTINUOUS
Status: DISCONTINUED | OUTPATIENT
Start: 2020-09-21 | End: 2020-09-21 | Stop reason: HOSPADM

## 2020-09-21 RX ORDER — NITROGLYCERIN 0.4 MG/1
0.4 TABLET SUBLINGUAL EVERY 5 MIN PRN
Status: ON HOLD | COMMUNITY
End: 2020-09-21

## 2020-09-21 RX ORDER — ALBUTEROL SULFATE 90 UG/1
2 AEROSOL, METERED RESPIRATORY (INHALATION)
COMMUNITY
Start: 2020-09-21

## 2020-09-21 RX ORDER — LISINOPRIL 40 MG/1
40 TABLET ORAL DAILY
Status: ON HOLD | COMMUNITY
Start: 2020-09-22 | End: 2021-05-12

## 2020-09-21 RX ADMIN — Medication 1 CAPSULE: at 11:11

## 2020-09-21 RX ADMIN — AMLODIPINE BESYLATE 10 MG: 5 TABLET ORAL at 11:16

## 2020-09-21 RX ADMIN — LISINOPRIL 40 MG: 10 TABLET ORAL at 11:11

## 2020-09-21 RX ADMIN — DOXYCYCLINE HYCLATE 100 MG: 100 CAPSULE ORAL at 11:11

## 2020-09-21 RX ADMIN — PREDNISONE 20 MG: 20 TABLET ORAL at 11:10

## 2020-09-21 RX ADMIN — ALBUTEROL SULFATE 2 PUFF: 90 INHALANT RESPIRATORY (INHALATION) at 08:09

## 2020-09-21 RX ADMIN — SODIUM CHLORIDE: 9 INJECTION, SOLUTION INTRAVENOUS at 08:07

## 2020-09-21 RX ADMIN — SENNOSIDES AND DOCUSATE SODIUM 1 TABLET: 8.6; 5 TABLET ORAL at 11:11

## 2020-09-21 RX ADMIN — METFORMIN HCL 500 MG: 500 TABLET ORAL at 11:11

## 2020-09-21 ASSESSMENT — ACTIVITIES OF DAILY LIVING (ADL)
ADLS_ACUITY_SCORE: 17
ADLS_ACUITY_SCORE: 13
ADLS_ACUITY_SCORE: 17
ADLS_ACUITY_SCORE: 13
ADLS_ACUITY_SCORE: 13

## 2020-09-21 NOTE — PLAN OF CARE
Here for CAP w/ COVID rule out. Pt is disoriented to situation. States he is here because of his diabetes. LSs have fine crackles to bases and right side. Satting 92-93% on 8 LPM HFNC. At beginning of shift pt was holding steady at 96% on 6 LPM HFNC. Attempted to wean pt to 5 LPM NC, but desatted to mid 80s. It was a struggle to get pt back up to 92-94% and at one point the pt was on 10 LPM HFNC for over an hour. BSs audible. Denies nausea. Report N&T to BLEs. Psoriasis and nonblanchable redness noted to buttocks. Barrier cream applied and pt encouraged to shift weight in bed. BGs of 253 & 254. AM LA of 1.1. Bed in lowest position. Call light in reach. ID band in place. COVID precautions maintained.     Face to face report given with opportunity to observe patient.    Report given to Darya Dean   9/21/2020  7:25 AM

## 2020-09-21 NOTE — PLAN OF CARE
Prior to Admission Medication Reconciliation:     Medications added:   [] None  [x] As listed below:    [facility]- [facility]- compliancy unknown- pt unsure and unsure of units     Insulin glargine- per [facility]- filled this month- compliancy possible- pt confirmed but is unsure of units    jardiance- [facility]- filled last month- compliancy possible- pt is unsure of if he takes    Triamcinolone- last filed February- prn- pt confirmed    Nitrostat- listed as NON [facility] medication- unsure if pt has at home- pt stated no and does not have heart pain- took off medlist    Medications deleted:   [] None  [x] As listed below:    plavix- was on for a year in 2015 and one year in 2018 for a stent, pt confirmed not taking    Gabapentin last filled 2017- [facility], pt confirmed not taking    Sporanox- from 2017- [facility]. Pt confirmed not taking    Changes made to existing medications:   [x] None  [] As listed below:    Last times/dates taken verified with patient:  [x] Yes- completed myself- pt thinks he last took medications a couple of days ago  [] Nurse completed no changes made  [] Unable to review with patient:  [] Nurse completed/changes made:     Allergy modifications:    [x]Did not review  []Patient verified NKA  []Patient verified current existing allergies: no changes made  []New allergies: listed below    Medication reconciliation sources:   [x]Patient  [x]Patient family member/emergency contact: Qiana Kerr- sister in law  [][facility] Report Review  []Epic Chart Review  []Care Everywhere review  [x]Pharmacy med list: [facility] pharmacy pharmacist Maryana   Name Strength Instructions Last Fill Date QTY/DS Notes   [x] Amlodipine  10 mg daily 2/26/20 90    [x] Hydrochlorothiazide  25 mg daily 4/23/20 90    [x] lisinopril 40 mg 40 mg daily 2/26/20 90    [x] metformin 1000 mg bid 2/26/20 180    [x] rosuvastatin 40 mg  daily 4/23/20 90    [x] Insulin aspart 100 units/ml 20 units TID wc 3/1/20 22 ds    [x]  Insulin glargine  63 units qam  9/10/20 45 ds     [x] nitrostat 0.4 mg SL PRN [facility]     [] acetylcarnitine  2 caps BID NON [facility]     [] prenisolone acetate 1% 1 gtt left eye qid  after surgery x 1 wk, then 1 gtt TID x 5 wks  3/11/20     [] Ketorolac 0.5%   1 gtt left eye qid 3 days before surgery x 1 wk, then 1 gtt TID x 5 wks 3/18/20     [x] jardiance 25 mg daily 8/3/20 60    [x] Triamcinolone cream 0.025% Topically daily prn to rash on buttock 2/26/20     []Pharmacy phone call  []Outside meds dispense report  []Nursing home or Assisted Living MAR:  []Other: **    Pharmacy desired at discharge: [facility] in [place]    Is patient on coumadin?  [x]No      Requests for consultation by provider or pharmacist:   [] Patient understands why all of their meds were prescribed and how to take them. No questions.   [] Fill dates coincide with compliancy for all maintenance meds.   [x] Fill dates do not coincide with compliancy with maintenance meds. See notes in PTA medlist about how patient is taking.   [] Patient has questions about the following:    Comments: spoke with Qiana who wasn't sure what pt is supposed to be taking. Stated that he is not usually compliant with his medication and can be forgetful. I spoke with patient and tried to get as much information as I could about his medication but there was a lot he was uncertain about. He doesn't know if he is taking both jardiance and metformin and is uncertain about his insulin dosing. His fill dates do not reflect compliancy for most of his medications. Both the [facility] and [facility]. Pt is only confident about the metformin. No other concerns at this time. Qiana is trying to track down someone to go over his pill bottles with me at his home and I will update at that time.       Mayra Chan on 9/21/2020 at 8:17 AM       Discrepancies: [x] No []Not Applicable []Yes: listed below    Time spent on medication reconciliation:   []5-20  mins  []20-40 mins  [x]> 40 mins    Issues completing PTA medication reconciliation:  [] On hold for a long time  [] Waited for a call back  [] Fax didn't come through  [] Fax took a long time  [] Other:    Notifying appropriate party of changes/additions/discrepancies:  []No pertinent changes made, notification not necessary.   [x] Notified attending provider via text page  [] Notified attending provider in person  [] Notified pharmacy  [] Notified nurse  [] Attending provider not available, left detailed notes  [] Changes/additions made don't need provider notification because provider has not seen patient or input any orders  [] Changes/additions made don't need provider notification because changes made are to medications not ordered  Medications Prior to Admission   Medication Sig Dispense Refill Last Dose     amLODIPine (NORVASC) 10 MG tablet Take 10 mg by mouth daily    Past Week at Unknown time     empagliflozin (JARDIANCE) 25 MG TABS tablet Take 25 mg by mouth daily   Past Week at Unknown time     insulin glargine (LANTUS PEN) 100 UNIT/ML pen Inject 63 Units Subcutaneous every morning   Past Week at Unknown time     lisinopril (PRINIVIL/ZESTRIL) 40 MG tablet Take 40 mg by mouth daily   Past Week at Unknown time     metFORMIN (GLUCOPHAGE) 1000 MG tablet Take 1,000 mg by mouth 2 times daily (with meals)    Past Week at Unknown time     rosuvastatin (CRESTOR) 40 MG tablet Take 40 mg by mouth At Bedtime   Past Week at Unknown time     alpha-lipoic acid 600 MG CAPS Take 1,200 mg by mouth daily    Unknown at Unknown time     hydrochlorothiazide (HYDRODIURIL) 25 MG tablet Take 25 mg by mouth daily   Unknown at Unknown time     insulin aspart (NOVOLOG PEN) 100 UNIT/ML pen Inject 20 Units Subcutaneous 3 times daily (with meals)   Unknown at Unknown time     triamcinolone (KENALOG) 0.025 % cream Apply topically daily as needed to rash on buttocks. Apply a thin layer.   Unknown at Unknown time

## 2020-09-21 NOTE — PROGRESS NOTES
[facility]    Hospitalist Progress Note      Assessment & Plan   Neel Kerr is a 80 year old male who was admitted on 9/20/2020.      1.  Acute respiratory failure with hypoxia: Patient is 72-year-old gentleman who was brought to the ER basically by family members.  He has been somewhat more fatigued over the last week or so, has somewhat poor appetite.  When he got to the ER he was hemodynamically stable temp 97 4 but room air sats were 85%.  He was started on nasal cannula oxygen.  Chest x-ray shows some interstitial changes.  His BNP was normal.  Did have a mild elevation of white count of 12,000.  Did not do procalcitonin.  Really was in no distress at all.  He was empirically started on doxycycline and Rocephin.  Overnight it was noted that they increased his O2 up to 10 L even at 1 point of high flow nasal cannula.  Patient continues to be in no distress.  No fever overnight white count is 7000 today.  I did order procalcitonin.  Looking at the patient he is completely asymptomatic in terms of pulmonary status.  Denies any shortness of breath cough purulent sputum.  Does have diffuse fine Velcro type crackles throughout his lung fields.  His neck veins are flat.  When I walked in his oxygen was on his cheek and his O2 sats were 91%.  Discussed with respiratory therapy will try and wean him down on his oxygen.  Really does not appear to be in heart failure at all he is being ruled out for COVID.  I think that is to be a low probability.  We will continue with some bronchodilators antibiotics wean his O2.  He is on some oral prednisone also.  Really does not have any obvious wheezes noted on examination either.  Hopefully will continue to improve over these next 1 to 2 days.    2.  Probable community acquired pneumonia: As above some interstitial changes on his chest x-ray.  No significant fevers mild elevation white count is normal.  Treated with Rocephin and doxycycline at this point.  White  counts normal no further fevers.  Continue with her current regimen.  Support his oxygen.    3.  Diabetes mellitus type 2: Usually is on metformin.  [facility]  Really not sure when he had his last visit.  His A1c  Was 8.3%.  He is on sliding scale currently.  We will restart his metformin.      4.  Hyponatremia: 125 on admission.  Up to 129 today.  Is on hydrochlorothiazide.  But think it is probably more because of his pneumonia poor intake increased free water intake.  Continue to monitor this closely.  Really do not feel he requires the hydrochlorothiazide only.  Will hold for now.    5.  History of coronary artery disease: Looking his records he did have a non-STEMI back in 2015 with stenting of his circumflex.  An echocardiogram done around that same time showed normal systolic function EF from the 70% range.  Troponins are negative for currently.  No real signs of heart failure or ischemia.      6.  Disposition: Anticipate will be able to go home in the next couple of days.  He is not a very good historian.  Being some of the previous admissions he is left AMA.  We will try and get his respiratory status a little more tuned up.  No signs of sepsis at this time.  No signs of heart failure.    7.  Renal status: BUN/creatinine were 49 and 1.34 on admission down to 42 and 0.96 currently.  Really did not get any IV fluid overnight.  I think he could stand a little bit of IV fluid there is really no signs of volume overload neck veins are flat no peripheral edema.      Diet: Combination Diet Low Saturated Fat Na <2400mg Diet  Fluids: Normal saline 100 cc an hour    DVT Prophylaxis: Pneumatic Compression Devices  Code Status: No CPR- Do NOT Intubate    Disposition: Expected discharge in 1-2 days once pulmonary status improved.    Leo Mackey    Interval History   Overnight nursing staff noted O2 sats were on the lower side.  They increase his O2 to high flow nasal cannula.  However this morning when  I walked in his oxygen nasal cannula is on his cheek and sats are 91%.  Will continue to work on trying to decrease this.  His exam is consistent with Velcro crackles throughout may be does have some chronic interstitial disease.  No real signs of sepsis he is got no fevers white counts normal no purulent sputum is on antibiotic therapy.  We will see how he does today we will get him up wean down his O2 and proceed after that once his COVID test is ruled out.  Really no wheezing at all.  If anything appears to be somewhat on the dry side really see no signs of any heart failure at all has a history of coronary artery stenting of his circumflex back about 5 years ago but no signs of ischemia.  He is not a very good historian.  Not really sure what his home status is I believe he lives with his brother.    -Data reviewed today: I reviewed all new labs and imaging results over the last 24 hours. I personally reviewed no images or EKG's today.    Physical Exam   Temp: 96.8  F (36  C) Temp src: Tympanic BP: 144/64 Pulse: 80   Resp: 18 SpO2: 93 % O2 Device: Nasal cannula Oxygen Delivery: 9 LPM  Vitals:    09/20/20 1850   Weight: 60.5 kg (133 lb 6.1 oz)     Vital Signs with Ranges  Temp:  [96.8  F (36  C)-98.8  F (37.1  C)] 96.8  F (36  C)  Pulse:  [] 80  Resp:  [12-21] 18  BP: (129-159)/() 144/64  SpO2:  [81 %-96 %] 93 %  I/O last 3 completed shifts:  In: -   Out: 500 [Urine:500]    Peripheral IV 09/20/20 Left (Active)   Site Assessment WDL 09/21/20 0038   Line Status Infusing 09/21/20 0038   Phlebitis Scale 0-->no symptoms 09/21/20 0038   Infiltration Scale 0 09/21/20 0038   Infiltration Site Treatment Method  None 09/20/20 1609   Extravasation? No 09/21/20 0038   Number of days: 1       Pressure Injury 09/20/20 Coccyx Stage 1 (Active)   Wound Base Erythema, non-blanchable 09/21/20 0038   Dorie-wound Assessment Blanchable erythema 09/21/20 0038   Wound Care/Cleansing Barrier applied ;Other (Comment) 09/21/20  0038   Dressing Open to air 09/21/20 0038   Number of days: 1     No line/device    Constitutional: Alert and oriented x3. No distress    HEENT: Normocephalic/atraumatic, PERRL, EOMI, mouth moist, neck supple, no LN.     Cardiovascular: RRR. no Murmur, no  rubs, or gallops.  JVD flat.  Bruits no.  Pulses 2+    Respiratory: Patient bilateral fine Velcro type crackles all lung fields.  Wheezing, no areas of consolidation or rhonchi noted.  Is in no respiratory distress    Abdomen: Soft, nontender, nondistended, no organomegaly. Bowel sounds present    Extremities: Warm/dry. noedema    Neuro:   Non focal, cranial nerves intact, Moves all extremities.  Medications     sodium chloride         amLODIPine  10 mg Oral Daily     aspirin  81 mg Oral Daily     cefTRIAXone  1 g Intravenous At Bedtime     clopidogrel  75 mg Oral Daily     doxycycline hyclate  100 mg Oral BID     gabapentin  200 mg Oral At Bedtime     insulin aspart  1-5 Units Subcutaneous At Bedtime     insulin aspart  1-7 Units Subcutaneous TID AC     ipratropium-albuterol  1 puff Inhalation 4x daily     lactobacillus rhamnosus (GG)  1 capsule Oral BID     lisinopril  40 mg Oral Daily     metFORMIN  500 mg Oral BID w/meals     predniSONE  20 mg Oral Daily     senna-docusate  1 tablet Oral BID    Or     senna-docusate  2 tablet Oral BID     sodium chloride (PF)  3 mL Intracatheter Q8H       Data   Recent Labs   Lab 09/21/20  0544 09/20/20  1604   WBC 7.1 12.3*   HGB 13.9 17.4   MCV 86 87   * 215   * 125*   POTASSIUM 4.8 5.0   CHLORIDE 101 93*   CO2 21 20   BUN 42* 49*   CR 0.96 1.34*   ANIONGAP 7 12   CARLOS 7.5* 8.5   * 252*   ALBUMIN  --  3.2*   PROTTOTAL  --  8.2   BILITOTAL  --  0.7   ALKPHOS  --  82   ALT  --  33   AST  --  50*       Recent Results (from the past 24 hour(s))   XR Chest Port 1 View    Narrative    PROCEDURE:  XR CHEST PORT 1 VW    HISTORY:  hypoxia.     COMPARISON:  2017    FINDINGS:   The cardiac silhouette is normal in  size. The pulmonary vasculature is  normal.  Widespread interstitial thickening is noted worse on the  right than the left. This represents a change from 2017 and is  suspicious for interstitial pneumonia No pleural effusion or  pneumothorax.      Impression    IMPRESSION:  Widespread interstitial opacities worse on the right than  the left suspicious for interstitial pneumonia      DOREEN UGALDE MD

## 2020-09-21 NOTE — DISCHARGE SUMMARY
[facility]  Hospitalist Discharge Summary      Date of Admission:  9/20/2020  Date of Discharge:  9/21/2020  Discharging Provider: Leo Mackey MD      Discharge Diagnoses   Acute respiratory failure with hypoxia  COVID-19 infection  Coronary artery disease  Diabetes mellitus type 2  Hyponatremia      Follow-ups Needed After Discharge       Unresulted Labs Ordered in the Past 30 Days of this Admission     Date and Time Order Name Status Description    9/20/2020 1854 Legionella pneumonia antigen urine In process     9/20/2020 1617 Blood culture Preliminary     9/20/2020 1617 Blood culture Preliminary       These results will be followed up by PCP    Discharge Disposition   Transferred to Critical access hospital  Condition at discharge: Stable      Hospital Course     Patient 80-year-old [facility]  He has diabetes mellitus type 2 is on insulin therapy with fair control.  Also has a history of coronary artery disease with a previous non-STEMI and stenting done about 5 years ago.  He presented to the ER yesterday with complaints of somewhat short of breath last week and some nausea just not feeling well.  He is a very vague historian.  When he came to the ER he was somewhat hypoxic sats 85% on room air he was hemodynamically stable he was afebrile.  Chest x-ray did show some diffuse interstitial changes.  BNP was normal had mild white count 12,000.  And not a significant moderate distress.  He was empirically treated as an outpatient pneumonia with doxycycline Rocephin.  He had no exposure to COVID-19 that he was aware of.  Overnight he did require increasing O2 up to even a 10 L high flow nasal cannula at 1.9.  No major distress except when he would get up and move around.  White count was 7000 today with no significant fever.  Really no significant purulent sputum at all.  His exam he did have some fine diffuse Velcro type crackles throughout his lung fields.  Neck  veins are flat if anything appear to be on the dry side of things.    His blood sugars were fairly well controlled.  Restart his metformin he is on Lantus is an outpatient.  Was getting sliding scale coverage here.  Tolerating regular diet.    Sodium was 125 admission up to 129 today.  He was definitely on the dry side of things which we are giving him some IV fluids.    Cardiac wise hemodynamically stable.  Does have a previous history of stenting of his circumflex back in 2015 no real signs of heart failure or ischemia or arrhythmias during his stay here.  Blood pressures remained stable.  Apparently he is no longer on Plavix or aspirin [facility] the [facility].    Does seem perhaps a bit prerenal as we have continued IV fluids on him.  BUN/creatinine were 49 and 1.34 admission down to 40-1.96 this morning.    His COVID-19 swab did return positive late this afternoon.  Informed the patient of this.  Our facility policy at this point given the fact that he is acutely hypoxic was to transfer him to a tertiary care center which can care for him more appropriately.  I did contact [facility].  Dr. Wright accepted him in transfer.  He will be sent her via ground ambulance.    His family will be notified of his diagnosis and transfer.    Consultations This Hospital Stay   None    Code Status   No CPR- Do NOT Intubate    Time Spent on this Encounter   I, Leo Mackey MD, personally saw the patient today and spent greater than 30 minutes discharging this patient.       Leo Mackey MD  [facility]  ______________________________________________________________________    Physical Exam   Vital Signs: Temp: 96.9  F (36.1  C) Temp src: Tympanic BP: 131/68 Pulse: 80   Resp: 18 SpO2: 92 % O2 Device: High Flow Nasal Cannula (HFNC) Oxygen Delivery: 7 LPM  Weight: 133 lbs 6.05 oz  Constitutional: awake, alert, cooperative, no apparent distress, and appears stated age  Respiratory: No obvious respiratory distress.   Does have some fine crackles throughout his lung fields.  No wheezing.  Cardiovascular: Normal apical impulse, regular rate and rhythm, normal S1 and S2, no S3 or S4, and no murmur noted  GI: No scars, normal bowel sounds, soft, non-distended, non-tender, no masses palpated, no hepatosplenomegally  Musculoskeletal: There is no redness, warmth, or swelling of the joints.  Full range of motion noted.  Motor strength is 5 out of 5 all extremities bilaterally.  Tone is normal.       Primary Care Physician   [facility]    Discharge Orders      IV access    Peripheral IV.     No CPR- Do NOT Intubate     Contact and Droplet Isolation       Significant Results and Procedures   Most Recent 2 LFT's:  Recent Labs   Lab Test 09/20/20  1604 02/26/18  1600   AST 50* 20   ALT 33 22   ALKPHOS 82 90   BILITOTAL 0.7 0.2     Most Recent 3 BNP's:  Recent Labs   Lab Test 09/20/20  1604 02/11/15  1010   NTBNPI 140 209     Most Recent 6 Bacteria Isolates From Any Culture (See EPIC Reports for Culture Details):  Recent Labs   Lab Test 09/20/20  1627 09/20/20  1604 01/23/17  1450 01/23/17  1442   CULT No growth after 16 hours No growth after 16 hours No growth after 6 days No growth after 6 days     Most Recent Hemoglobin A1c:  Recent Labs   Lab Test 09/20/20  1604   A1C 8.3*   ,   Results for orders placed or performed during the hospital encounter of 09/20/20   XR Chest Port 1 View    Narrative    PROCEDURE:  XR CHEST PORT 1 VW    HISTORY:  hypoxia.     COMPARISON:  2017    FINDINGS:   The cardiac silhouette is normal in size. The pulmonary vasculature is  normal.  Widespread interstitial thickening is noted worse on the  right than the left. This represents a change from 2017 and is  suspicious for interstitial pneumonia No pleural effusion or  pneumothorax.      Impression    IMPRESSION:  Widespread interstitial opacities worse on the right than  the left suspicious for interstitial pneumonia      DOREEN UGALDE MD        Discharge Medications   Current Discharge Medication List      START taking these medications    Details   acetaminophen (TYLENOL) 325 MG tablet Take 2 tablets (650 mg) by mouth every 4 hours as needed for mild pain  Qty:        albuterol (PROAIR HFA/PROVENTIL HFA/VENTOLIN HFA) 108 (90 Base) MCG/ACT inhaler Inhale 2 puffs into the lungs every 2 hours as needed for wheezing  Qty:      Comments: Pharmacy may dispense brand covered by insurance (Proair, or proventil or ventolin or generic albuterol inhaler)      cefTRIAXone in d5w (ROCEPHIN) 20 MG/ML SOLN Inject 1 g into the vein every 24 hours      doxycycline hyclate (VIBRAMYCIN) 100 MG capsule Take 1 capsule (100 mg) by mouth 2 times daily  Qty:           CONTINUE these medications which have CHANGED    Details   amLODIPine (NORVASC) 10 MG tablet Take 1 tablet (10 mg) by mouth daily  Qty:        !! insulin aspart (NOVOLOG PEN) 100 UNIT/ML pen Inject 1-5 Units Subcutaneous At Bedtime  Qty:        !! insulin aspart (NOVOLOG PEN) 100 UNIT/ML pen Inject 1-7 Units Subcutaneous 3 times daily (before meals)  Qty:        insulin glargine (LANTUS PEN) 100 UNIT/ML pen Inject 62 Units Subcutaneous every morning (before breakfast)  Qty:      Comments: If Lantus is not covered by insurance, may substitute Basaglar at same dose and frequency.    Associated Diagnoses: Type 2 diabetes mellitus without complication, with long-term current use of insulin (H)      lisinopril (ZESTRIL) 40 MG tablet Take 1 tablet (40 mg) by mouth daily  Qty:        metFORMIN (GLUCOPHAGE) 500 MG tablet Take 2 tablets (1,000 mg) by mouth 2 times daily (with meals)       !! - Potential duplicate medications found. Please discuss with provider.      CONTINUE these medications which have NOT CHANGED    Details   alpha-lipoic acid 600 MG CAPS Take 1,200 mg by mouth daily          STOP taking these medications       empagliflozin (JARDIANCE) 25 MG TABS tablet Comments:   Reason for Stopping:          gabapentin (NEURONTIN) 100 MG capsule Comments:   Reason for Stopping:         hydrochlorothiazide (HYDRODIURIL) 25 MG tablet Comments:   Reason for Stopping:         rosuvastatin (CRESTOR) 40 MG tablet Comments:   Reason for Stopping:         triamcinolone (KENALOG) 0.025 % cream Comments:   Reason for Stopping:             Allergies   No Known Allergies

## 2020-09-21 NOTE — PROGRESS NOTES
Patient's COVID test came back positive this afternoon.  He remains on 6 to 7 L high flow nasal cannula.  Hemodynamically stable no current fevers.    It is been our facility plan to send out positive studies with significant hypoxia.  Did contact [facility] they are evaluating whether or not they can accept him at this time.

## 2020-09-21 NOTE — PLAN OF CARE
0925- MD updated on 02 parameters., ok to titrate 02 to keep sats >90% per Dr. Mackey.     1015: Pt gives verbal consent for staff talk to friend Ema Willie. Update provided

## 2020-09-21 NOTE — PROGRESS NOTES
"Assessment completed by telephone with patient..    LOC: alert oriented    Dx: CAP (community acquired pneumonia)  Chronic Disease Management: Acute respiratory failure w/hypoxia, DM II, Hyponatremia, HX of CAD    Lives with: Alone  Living at:  Home, [place] area  Transportation: YES , pt states that he still drives himself around.  Brother/Nicolas will transport home via personal vehicle.    [facility]: [facility]  Insurance:  [employer]/[facility]  Medicare IM letter reviewed with patient.    Support System:  Family  Homecare/PCA: None, pt declines interest in any homecare services.    : YES      How was the [facility] notification completed: FDE emailed to [facility], patient financial, Florencia uJlee et Marta Reilly cc'd as well.    Health Care Directive: No, pt declined offer of resources from [employer] [employer].    Pharmacy: [facility], [place].  Meds management: Independent    Adequate Resources for needs (housing, utilities, food/med): YES  Household chores: Independent  Work/community/social activity: YES , pt states that he has to \" get back to square dancing\"     ADLs: Independent  Ambulation: Independent  Falls: Pt states that he has falls occasionally, his last one that he states that he had was \" the other day in his home\", but does not go into detail as to what caused the fall.  Nutrition: No concerns voiced.  Sleep: No concerns voiced.  Patient states that he sleeps in a bed.    Equipment used: None at home      Oxygen supplier: N/A      Does the supplier have valid oxygen orders: N/A    Mental health: No concerns voiced.  Substance abuse: Denies  Exposure to violence/abuse: Denies    Able to Return to Prior Living Arrangements: YES      ROM: Low score    Plan: Return to home via brother/ Nicolas    "

## 2020-09-21 NOTE — PLAN OF CARE
VSS. Afebrile. On 6L, sats at low to mid 90's. Disoriented to situation. When asked what brought him in, he stated 'maybe my diabetes'. Alert. Lungs dim throughout. Denies cough. Due to void. UA needed. Bolus infusing. Scattered bruising pt states they are from falls. Also scattered rashes (hx of psoriasis). Red, non blanchable buttocks. Barrier cream applied. Educated on importance of turning. BG in 200's. Sliding scale given. Stand by assistance. Call light in reach. Reminded pt frequently to hit red nursing button to make needs known. Call light in reach.     Face to face report given with opportunity to observe patient.    Report given to Ha Kruse RN   9/20/2020

## 2020-09-21 NOTE — PLAN OF CARE
Pt alert and oriented to self and place, disoriented to time and situation. VS and assessment as charted. Denies pain. On 7 LPM via HFNC with 02 sats 91-93% at rest, desats with any activity into the low - mid 80's but rebounds within a couple minutes. Remains CAPONE, lungs with fine crackles t/o, encouraging pulmonary hygiene. Blood sugar 249/321- coverage per MAR. Tolerating a cons cho low fat low sodium diet with a good appetite, IV infusing NS @ 75/hr. Free from falls/ injury, call light within reach and alarms activated.     Face to face report given with opportunity to observe patient.    Report given to Maris BLOCK RN  Darya Mohan RN   9/21/2020  3:21 PM

## 2020-09-22 NOTE — PLAN OF CARE
Patient discharged at 6:00 PM via ambulance transport. Prescriptions - None ordered for discharge.    Listed belongings gathered and sent with patient.    Patient discharged to [facility].   Report called to 7W medical floor as soon as patient left via ambulance.    Brother, Nicolas, updated that patient was Covid positive and being transferred.

## 2020-09-26 LAB
BACTERIA SPEC CULT: NORMAL
BACTERIA SPEC CULT: NORMAL
Lab: NORMAL
SPECIMEN SOURCE: NORMAL
SPECIMEN SOURCE: NORMAL

## 2020-10-05 ENCOUNTER — TRANSFERRED RECORDS (OUTPATIENT)
Dept: HEALTH INFORMATION MANAGEMENT | Facility: CLINIC | Age: 80
End: 2020-10-05

## 2020-10-27 ENCOUNTER — TRANSFERRED RECORDS (OUTPATIENT)
Dept: HEALTH INFORMATION MANAGEMENT | Facility: CLINIC | Age: 80
End: 2020-10-27

## 2020-11-10 ENCOUNTER — APPOINTMENT (OUTPATIENT)
Dept: GENERAL RADIOLOGY | Facility: HOSPITAL | Age: 80
End: 2020-11-10
Attending: EMERGENCY MEDICINE
Payer: MEDICARE

## 2020-11-10 ENCOUNTER — TRANSFERRED RECORDS (OUTPATIENT)
Dept: HEALTH INFORMATION MANAGEMENT | Facility: CLINIC | Age: 80
End: 2020-11-10

## 2020-11-10 ENCOUNTER — HOSPITAL ENCOUNTER (EMERGENCY)
Facility: HOSPITAL | Age: 80
Discharge: LEFT BEFORE DISCHARGE | End: 2020-11-10
Attending: EMERGENCY MEDICINE | Admitting: EMERGENCY MEDICINE
Payer: MEDICARE

## 2020-11-10 VITALS
HEART RATE: 84 BPM | RESPIRATION RATE: 18 BRPM | DIASTOLIC BLOOD PRESSURE: 106 MMHG | TEMPERATURE: 98 F | SYSTOLIC BLOOD PRESSURE: 178 MMHG | OXYGEN SATURATION: 96 %

## 2020-11-10 DIAGNOSIS — J96.01 ACUTE RESPIRATORY FAILURE WITH HYPOXIA (H): Primary | ICD-10-CM

## 2020-11-10 DIAGNOSIS — U07.1 INFECTION DUE TO 2019 NOVEL CORONAVIRUS: ICD-10-CM

## 2020-11-10 LAB
ALBUMIN SERPL-MCNC: 3.5 G/DL (ref 3.4–5)
ALP SERPL-CCNC: 107 U/L (ref 40–150)
ALT SERPL W P-5'-P-CCNC: 47 U/L (ref 0–70)
ANION GAP SERPL CALCULATED.3IONS-SCNC: 7 MMOL/L (ref 3–14)
AST SERPL W P-5'-P-CCNC: 29 U/L (ref 0–45)
BASE EXCESS BLDA CALC-SCNC: 1.2 MMOL/L
BASOPHILS # BLD AUTO: 0 10E9/L (ref 0–0.2)
BASOPHILS NFR BLD AUTO: 0.4 %
BILIRUB SERPL-MCNC: 1 MG/DL (ref 0.2–1.3)
BUN SERPL-MCNC: 19 MG/DL (ref 7–30)
CALCIUM SERPL-MCNC: 8.6 MG/DL (ref 8.5–10.1)
CHLORIDE SERPL-SCNC: 100 MMOL/L (ref 94–109)
CO2 SERPL-SCNC: 28 MMOL/L (ref 20–32)
CREAT SERPL-MCNC: 0.79 MG/DL (ref 0.66–1.25)
CRP SERPL-MCNC: 9.1 MG/L (ref 0–8)
D DIMER PPP FEU-MCNC: 1.6 UG/ML FEU (ref 0–0.5)
DIFFERENTIAL METHOD BLD: NORMAL
EOSINOPHIL # BLD AUTO: 0.1 10E9/L (ref 0–0.7)
EOSINOPHIL NFR BLD AUTO: 1.3 %
ERYTHROCYTE [DISTWIDTH] IN BLOOD BY AUTOMATED COUNT: 14 % (ref 10–15)
FERRITIN SERPL-MCNC: 377 NG/ML (ref 26–388)
GFR SERPL CREATININE-BSD FRML MDRD: 86 ML/MIN/{1.73_M2}
GLUCOSE SERPL-MCNC: 222 MG/DL (ref 70–99)
HCO3 BLD-SCNC: 26 MMOL/L (ref 21–28)
HCT VFR BLD AUTO: 42.7 % (ref 40–53)
HGB BLD-MCNC: 13.9 G/DL (ref 13.3–17.7)
IMM GRANULOCYTES # BLD: 0.1 10E9/L (ref 0–0.4)
IMM GRANULOCYTES NFR BLD: 1 %
LDH SERPL L TO P-CCNC: 188 U/L (ref 85–227)
LYMPHOCYTES # BLD AUTO: 1.5 10E9/L (ref 0.8–5.3)
LYMPHOCYTES NFR BLD AUTO: 19.3 %
MAGNESIUM SERPL-MCNC: 1.9 MG/DL (ref 1.6–2.3)
MCH RBC QN AUTO: 29.9 PG (ref 26.5–33)
MCHC RBC AUTO-ENTMCNC: 32.6 G/DL (ref 31.5–36.5)
MCV RBC AUTO: 92 FL (ref 78–100)
MONOCYTES # BLD AUTO: 0.5 10E9/L (ref 0–1.3)
MONOCYTES NFR BLD AUTO: 6.3 %
NEUTROPHILS # BLD AUTO: 5.6 10E9/L (ref 1.6–8.3)
NEUTROPHILS NFR BLD AUTO: 71.7 %
NRBC # BLD AUTO: 0 10*3/UL
NRBC BLD AUTO-RTO: 0 /100
NT-PROBNP SERPL-MCNC: 262 PG/ML (ref 0–1800)
O2/TOTAL GAS SETTING VFR VENT: NORMAL %
OXYHGB MFR BLD: 97 % (ref 92–100)
PCO2 BLD: 39 MM HG (ref 35–45)
PH BLD: 7.43 PH (ref 7.35–7.45)
PLATELET # BLD AUTO: 228 10E9/L (ref 150–450)
PO2 BLD: 90 MM HG (ref 80–105)
POTASSIUM SERPL-SCNC: 3.9 MMOL/L (ref 3.4–5.3)
PROCALCITONIN SERPL-MCNC: <0.05 NG/ML
PROT SERPL-MCNC: 8.4 G/DL (ref 6.8–8.8)
RBC # BLD AUTO: 4.65 10E12/L (ref 4.4–5.9)
SODIUM SERPL-SCNC: 135 MMOL/L (ref 133–144)
TROPONIN I SERPL-MCNC: <0.015 UG/L (ref 0–0.04)
WBC # BLD AUTO: 7.8 10E9/L (ref 4–11)

## 2020-11-10 PROCEDURE — 94640 AIRWAY INHALATION TREATMENT: CPT

## 2020-11-10 PROCEDURE — 80053 COMPREHEN METABOLIC PANEL: CPT | Performed by: EMERGENCY MEDICINE

## 2020-11-10 PROCEDURE — 99285 EMERGENCY DEPT VISIT HI MDM: CPT | Performed by: EMERGENCY MEDICINE

## 2020-11-10 PROCEDURE — 71045 X-RAY EXAM CHEST 1 VIEW: CPT

## 2020-11-10 PROCEDURE — 84145 PROCALCITONIN (PCT): CPT | Performed by: EMERGENCY MEDICINE

## 2020-11-10 PROCEDURE — 99285 EMERGENCY DEPT VISIT HI MDM: CPT | Mod: 25

## 2020-11-10 PROCEDURE — 86140 C-REACTIVE PROTEIN: CPT | Performed by: INTERNAL MEDICINE

## 2020-11-10 PROCEDURE — 83735 ASSAY OF MAGNESIUM: CPT | Performed by: EMERGENCY MEDICINE

## 2020-11-10 PROCEDURE — 250N000013 HC RX MED GY IP 250 OP 250 PS 637: Performed by: EMERGENCY MEDICINE

## 2020-11-10 PROCEDURE — 93010 ELECTROCARDIOGRAM REPORT: CPT | Performed by: INTERNAL MEDICINE

## 2020-11-10 PROCEDURE — 82728 ASSAY OF FERRITIN: CPT | Performed by: INTERNAL MEDICINE

## 2020-11-10 PROCEDURE — A9270 NON-COVERED ITEM OR SERVICE: HCPCS | Performed by: EMERGENCY MEDICINE

## 2020-11-10 PROCEDURE — 85025 COMPLETE CBC W/AUTO DIFF WBC: CPT | Performed by: EMERGENCY MEDICINE

## 2020-11-10 PROCEDURE — 82805 BLOOD GASES W/O2 SATURATION: CPT | Performed by: EMERGENCY MEDICINE

## 2020-11-10 PROCEDURE — 84484 ASSAY OF TROPONIN QUANT: CPT | Performed by: EMERGENCY MEDICINE

## 2020-11-10 PROCEDURE — 36600 WITHDRAWAL OF ARTERIAL BLOOD: CPT

## 2020-11-10 PROCEDURE — 93005 ELECTROCARDIOGRAM TRACING: CPT

## 2020-11-10 PROCEDURE — 83615 LACTATE (LD) (LDH) ENZYME: CPT | Performed by: INTERNAL MEDICINE

## 2020-11-10 PROCEDURE — 83880 ASSAY OF NATRIURETIC PEPTIDE: CPT | Performed by: EMERGENCY MEDICINE

## 2020-11-10 PROCEDURE — 36415 COLL VENOUS BLD VENIPUNCTURE: CPT

## 2020-11-10 PROCEDURE — 85379 FIBRIN DEGRADATION QUANT: CPT | Performed by: EMERGENCY MEDICINE

## 2020-11-10 RX ORDER — ALBUTEROL SULFATE 90 UG/1
6 AEROSOL, METERED RESPIRATORY (INHALATION)
Status: DISCONTINUED | OUTPATIENT
Start: 2020-11-10 | End: 2020-11-10 | Stop reason: HOSPADM

## 2020-11-10 RX ADMIN — ALBUTEROL SULFATE 6 PUFF: 90 AEROSOL, METERED RESPIRATORY (INHALATION) at 10:16

## 2020-11-10 ASSESSMENT — ENCOUNTER SYMPTOMS
FEVER: 0
ABDOMINAL PAIN: 0
SHORTNESS OF BREATH: 1

## 2020-11-10 NOTE — ED PROVIDER NOTES
History     Chief Complaint   Patient presents with     Shortness of Breath     HPI  Neel Kerr is a 80 year old male who res[facility] because of worsening shortness of breath.  Patient has been on oxygen 2 L/min nasal cannula for the last 1 month after being diagnosed with COVID-19 on September 28.  He was hospitalized and discharged home on oxygen.  He was feeling more short of breath than usual today.  Denies fever, chills, nausea or vomiting.  No chest pain.  Oxygen saturation on room air was 88% and was restarted on oxygen per nasal cannula.  [facility] from the local [facility]AS reported that patient's oxygen saturation dropped to 80% on room air.  He is feeling more short of breath and exam by [facility] showed wheezing, crackles.  Patient is feeling much better at the time of history and physical at the ED.    Allergies:  No Known Allergies    Problem List:    Patient Active Problem List    Diagnosis Date Noted     Hyponatremia 09/21/2020     Priority: Medium     Acute respiratory failure with hypoxia (H) 09/21/2020     Priority: Medium     Infection due to 2019 novel coronavirus 09/21/2020     Priority: Medium     CAP (community acquired pneumonia) 09/20/2020     Priority: Medium     Psoriasis 02/01/2018     Priority: Medium     Non-ST elevation (NSTEMI) myocardial infarction (H) 07/02/2017     Priority: Medium     Advanced care planning/counseling discussion 06/19/2012     Priority: Medium     Bilateral impacted cerumen 03/10/2011     Priority: Medium        Past Medical History:    Past Medical History:   Diagnosis Date     Essential (primary) hypertension      Hyperlipidemia      Non-ST elevation (NSTEMI) myocardial infarction (H)      Old myocardial infarction      Polyneuropathy      Type 2 diabetes mellitus without complications (H)        Past Surgical History:    No past surgical history on file.    Family History:    No family history on  file.    Social History:  Marital Status:  Single [1]  Social History     Tobacco Use     Smoking status: Former Smoker     Quit date: 1992     Years since quittin.8     Smokeless tobacco: Never Used   Substance Use Topics     Alcohol use: No     Comment: Alcoholic Drinks/day: quit drinking      Drug use: No        Medications:         acetaminophen (TYLENOL) 325 MG tablet       albuterol (PROAIR HFA/PROVENTIL HFA/VENTOLIN HFA) 108 (90 Base) MCG/ACT inhaler       alpha-lipoic acid 600 MG CAPS       amLODIPine (NORVASC) 10 MG tablet       cefTRIAXone in d5w (ROCEPHIN) 20 MG/ML SOLN       doxycycline hyclate (VIBRAMYCIN) 100 MG capsule       insulin aspart (NOVOLOG PEN) 100 UNIT/ML pen       insulin aspart (NOVOLOG PEN) 100 UNIT/ML pen       insulin glargine (LANTUS PEN) 100 UNIT/ML pen       lisinopril (ZESTRIL) 40 MG tablet       metFORMIN (GLUCOPHAGE) 500 MG tablet          Review of Systems   Constitutional: Negative for fever.   Respiratory: Positive for shortness of breath.    Cardiovascular: Negative for chest pain.   Gastrointestinal: Negative for abdominal pain.   All other systems reviewed and are negative.      Physical Exam   BP: 172/94  Pulse: 81  Temp: 98  F (36.7  C)  Resp: 18  SpO2: 97 %      Physical Exam  Vitals signs and nursing note reviewed.   Constitutional:       General: He is not in acute distress.     Appearance: He is well-developed. He is not diaphoretic.   HENT:      Head: Normocephalic and atraumatic.   Eyes:      Pupils: Pupils are equal, round, and reactive to light.   Cardiovascular:      Rate and Rhythm: Normal rate and regular rhythm.      Pulses: Normal pulses.      Heart sounds: Normal heart sounds.   Pulmonary:      Effort: Respiratory distress present.      Breath sounds: No stridor. Wheezing present.   Abdominal:      General: Bowel sounds are normal. There is no distension.      Tenderness: There is no abdominal tenderness.   Musculoskeletal:         General: No  tenderness.   Neurological:      General: No focal deficit present.      Mental Status: He is alert and oriented to person, place, and time.         ED Course   Patient evaluated and labs ordered including chest x-ray.  Albuterol nebs given.  Continued oxygen per nasal cannula.    Procedures       EKG Interpretation:      Interpreted by Tadeo Peters MD  Time reviewed: 10:49 AM  Symptoms at time of EKG: Shortness of breath  Rhythm: normal sinus   Rate: normal  Axis: normal  Ectopy: none  Conduction: Left anterior fascicular block  ST Segments/ T Waves: No ST-T wave changes  Q Waves: none  Comparison to prior: No old EKG available    Clinical Impression: Normal sinus rhythm, left anterior fascicular block      Results for orders placed or performed during the hospital encounter of 11/10/20 (from the past 24 hour(s))   Blood gas arterial and oxyhgb   Result Value Ref Range    pH Arterial 7.43 7.35 - 7.45 pH    pCO2 Arterial 39 35 - 45 mm Hg    pO2 Arterial 90 80 - 105 mm Hg    Bicarbonate Arterial 26 21 - 28 mmol/L    FIO2 28%     Oxyhemoglobin Arterial 97 92 - 100 %    Base Excess Art 1.2 mmol/L   CBC with platelets differential   Result Value Ref Range    WBC 7.8 4.0 - 11.0 10e9/L    RBC Count 4.65 4.4 - 5.9 10e12/L    Hemoglobin 13.9 13.3 - 17.7 g/dL    Hematocrit 42.7 40.0 - 53.0 %    MCV 92 78 - 100 fl    MCH 29.9 26.5 - 33.0 pg    MCHC 32.6 31.5 - 36.5 g/dL    RDW 14.0 10.0 - 15.0 %    Platelet Count 228 150 - 450 10e9/L    Diff Method Automated Method     % Neutrophils 71.7 %    % Lymphocytes 19.3 %    % Monocytes 6.3 %    % Eosinophils 1.3 %    % Basophils 0.4 %    % Immature Granulocytes 1.0 %    Nucleated RBCs 0 0 /100    Absolute Neutrophil 5.6 1.6 - 8.3 10e9/L    Absolute Lymphocytes 1.5 0.8 - 5.3 10e9/L    Absolute Monocytes 0.5 0.0 - 1.3 10e9/L    Absolute Eosinophils 0.1 0.0 - 0.7 10e9/L    Absolute Basophils 0.0 0.0 - 0.2 10e9/L    Abs Immature Granulocytes 0.1 0 - 0.4 10e9/L    Absolute Nucleated  RBC 0.0    Comprehensive metabolic panel   Result Value Ref Range    Sodium 135 133 - 144 mmol/L    Potassium 3.9 3.4 - 5.3 mmol/L    Chloride 100 94 - 109 mmol/L    Carbon Dioxide 28 20 - 32 mmol/L    Anion Gap 7 3 - 14 mmol/L    Glucose 222 (H) 70 - 99 mg/dL    Urea Nitrogen 19 7 - 30 mg/dL    Creatinine 0.79 0.66 - 1.25 mg/dL    GFR Estimate 86 >60 mL/min/[1.73_m2]    GFR Estimate If Black >90 >60 mL/min/[1.73_m2]    Calcium 8.6 8.5 - 10.1 mg/dL    Bilirubin Total 1.0 0.2 - 1.3 mg/dL    Albumin 3.5 3.4 - 5.0 g/dL    Protein Total 8.4 6.8 - 8.8 g/dL    Alkaline Phosphatase 107 40 - 150 U/L    ALT 47 0 - 70 U/L    AST 29 0 - 45 U/L   Magnesium   Result Value Ref Range    Magnesium 1.9 1.6 - 2.3 mg/dL   D dimer quantitative   Result Value Ref Range    D Dimer 1.6 (H) 0.0 - 0.50 ug/ml FEU   Troponin I   Result Value Ref Range    Troponin I ES <0.015 0.000 - 0.045 ug/L   Nt probnp inpatient   Result Value Ref Range    N-Terminal Pro BNP Inpatient 262 0 - 1,800 pg/mL   Procalcitonin   Result Value Ref Range    Procalcitonin <0.05 ng/ml   CRP inflammation   Result Value Ref Range    CRP Inflammation 9.1 (H) 0.0 - 8.0 mg/L   Ferritin   Result Value Ref Range    Ferritin 377 26 - 388 ng/mL   Lactate Dehydrogenase   Result Value Ref Range    Lactate Dehydrogenase 188 85 - 227 U/L   XR Chest Port 1 View    Narrative    PROCEDURE:  XR CHEST PORT 1 VW    HISTORY:  Shortness of breath.     COMPARISON:  None.    FINDINGS:   The cardiac silhouette is normal in size. The pulmonary vasculature is  normal.  Interstitial opacities in both lungs have remained unchanged  on the right and worsened on the left as compared to September 20, 2020 No pleural effusion or pneumothorax.      Impression    IMPRESSION:  Worsening interstitial opacities in the left lung as  compared to September 2020      DOREEN UGALDE MD       Medications   albuterol (PROAIR HFA/PROVENTIL HFA/VENTOLIN HFA) 108 (90 Base) MCG/ACT inhaler 6 puff (6 puffs  Inhalation Given 11/10/20 1016)       Assessments & Plan (with Medical Decision Making)   COVID-19:  Acute respiratory failure with hypoxia:  [facility]ness of breath.  Chest x-ray showed worsening left lower lobe patchy consolidation.  Patient is currently on oxygen at home but has increased oxygen demand.  Dr. Awad graciously accepted to admit this patient for further evaluation and trial of steroids on remdesivir but patient was adamant that he wanted to leave AMA.  He understood the consequences of his decision including death.  He left the emergency department AMA.  I have reviewed the nursing notes.    I have reviewed the findings, diagnosis, plan and need for follow up with the patient.    Discharge Medication List as of 11/10/2020 12:17 PM          Final diagnoses:   Acute respiratory failure with hypoxia (H)   Infection due to 2019 novel coronavirus       11/10/2020   [facility]     Tadeo Peters MD  11/10/20 1059

## 2020-11-27 ENCOUNTER — APPOINTMENT (OUTPATIENT)
Dept: GENERAL RADIOLOGY | Facility: HOSPITAL | Age: 80
End: 2020-11-27
Attending: EMERGENCY MEDICINE
Payer: COMMERCIAL

## 2020-11-27 ENCOUNTER — HOSPITAL ENCOUNTER (EMERGENCY)
Facility: HOSPITAL | Age: 80
Discharge: HOME | End: 2020-11-28
Attending: EMERGENCY MEDICINE | Admitting: EMERGENCY MEDICINE
Payer: COMMERCIAL

## 2020-11-27 DIAGNOSIS — J40 BRONCHITIS: ICD-10-CM

## 2020-11-27 LAB
ALBUMIN SERPL-MCNC: 3.9 G/DL (ref 3.4–5)
ALP SERPL-CCNC: 97 U/L (ref 40–150)
ALT SERPL W P-5'-P-CCNC: 30 U/L (ref 0–70)
ANION GAP SERPL CALCULATED.3IONS-SCNC: 8 MMOL/L (ref 3–14)
AST SERPL W P-5'-P-CCNC: 17 U/L (ref 0–45)
BASOPHILS # BLD AUTO: 0 10E9/L (ref 0–0.2)
BASOPHILS NFR BLD AUTO: 0.4 %
BILIRUB SERPL-MCNC: 0.6 MG/DL (ref 0.2–1.3)
BUN SERPL-MCNC: 31 MG/DL (ref 7–30)
CALCIUM SERPL-MCNC: 8.8 MG/DL (ref 8.5–10.1)
CHLORIDE SERPL-SCNC: 98 MMOL/L (ref 94–109)
CO2 SERPL-SCNC: 23 MMOL/L (ref 20–32)
CREAT SERPL-MCNC: 1.28 MG/DL (ref 0.66–1.25)
DIFFERENTIAL METHOD BLD: ABNORMAL
EOSINOPHIL # BLD AUTO: 0.1 10E9/L (ref 0–0.7)
EOSINOPHIL NFR BLD AUTO: 0.8 %
ERYTHROCYTE [DISTWIDTH] IN BLOOD BY AUTOMATED COUNT: 13.6 % (ref 10–15)
GFR SERPL CREATININE-BSD FRML MDRD: 53 ML/MIN/{1.73_M2}
GLUCOSE SERPL-MCNC: 416 MG/DL (ref 70–99)
HCT VFR BLD AUTO: 42.7 % (ref 40–53)
HGB BLD-MCNC: 14.1 G/DL (ref 13.3–17.7)
IMM GRANULOCYTES # BLD: 0.1 10E9/L (ref 0–0.4)
IMM GRANULOCYTES NFR BLD: 1.2 %
INR PPP: 0.96 (ref 0.86–1.14)
LYMPHOCYTES # BLD AUTO: 2.5 10E9/L (ref 0.8–5.3)
LYMPHOCYTES NFR BLD AUTO: 22.7 %
MCH RBC QN AUTO: 29.9 PG (ref 26.5–33)
MCHC RBC AUTO-ENTMCNC: 33 G/DL (ref 31.5–36.5)
MCV RBC AUTO: 91 FL (ref 78–100)
MONOCYTES # BLD AUTO: 0.7 10E9/L (ref 0–1.3)
MONOCYTES NFR BLD AUTO: 5.9 %
NEUTROPHILS # BLD AUTO: 7.7 10E9/L (ref 1.6–8.3)
NEUTROPHILS NFR BLD AUTO: 69 %
NRBC # BLD AUTO: 0 10*3/UL
NRBC BLD AUTO-RTO: 0 /100
NT-PROBNP SERPL-MCNC: 197 PG/ML (ref 0–1800)
PLATELET # BLD AUTO: 212 10E9/L (ref 150–450)
POTASSIUM SERPL-SCNC: 4.2 MMOL/L (ref 3.4–5.3)
PROT SERPL-MCNC: 8.4 G/DL (ref 6.8–8.8)
RBC # BLD AUTO: 4.72 10E12/L (ref 4.4–5.9)
SODIUM SERPL-SCNC: 129 MMOL/L (ref 133–144)
TROPONIN I SERPL-MCNC: <0.015 UG/L (ref 0–0.04)
WBC # BLD AUTO: 11.1 10E9/L (ref 4–11)

## 2020-11-27 PROCEDURE — 84484 ASSAY OF TROPONIN QUANT: CPT | Performed by: EMERGENCY MEDICINE

## 2020-11-27 PROCEDURE — 99284 EMERGENCY DEPT VISIT MOD MDM: CPT | Performed by: EMERGENCY MEDICINE

## 2020-11-27 PROCEDURE — 85025 COMPLETE CBC W/AUTO DIFF WBC: CPT | Performed by: EMERGENCY MEDICINE

## 2020-11-27 PROCEDURE — C9803 HOPD COVID-19 SPEC COLLECT: HCPCS

## 2020-11-27 PROCEDURE — 93010 ELECTROCARDIOGRAM REPORT: CPT | Performed by: INTERNAL MEDICINE

## 2020-11-27 PROCEDURE — 93005 ELECTROCARDIOGRAM TRACING: CPT

## 2020-11-27 PROCEDURE — 83880 ASSAY OF NATRIURETIC PEPTIDE: CPT | Performed by: EMERGENCY MEDICINE

## 2020-11-27 PROCEDURE — 71045 X-RAY EXAM CHEST 1 VIEW: CPT

## 2020-11-27 PROCEDURE — 80053 COMPREHEN METABOLIC PANEL: CPT | Performed by: EMERGENCY MEDICINE

## 2020-11-27 PROCEDURE — 85379 FIBRIN DEGRADATION QUANT: CPT | Performed by: EMERGENCY MEDICINE

## 2020-11-27 PROCEDURE — 99285 EMERGENCY DEPT VISIT HI MDM: CPT | Mod: 25

## 2020-11-27 PROCEDURE — 36415 COLL VENOUS BLD VENIPUNCTURE: CPT

## 2020-11-27 PROCEDURE — 85610 PROTHROMBIN TIME: CPT | Performed by: EMERGENCY MEDICINE

## 2020-11-27 PROCEDURE — U0003 INFECTIOUS AGENT DETECTION BY NUCLEIC ACID (DNA OR RNA); SEVERE ACUTE RESPIRATORY SYNDROME CORONAVIRUS 2 (SARS-COV-2) (CORONAVIRUS DISEASE [COVID-19]), AMPLIFIED PROBE TECHNIQUE, MAKING USE OF HIGH THROUGHPUT TECHNOLOGIES AS DESCRIBED BY CMS-2020-01-R: HCPCS | Performed by: EMERGENCY MEDICINE

## 2020-11-27 RX ORDER — ALBUTEROL SULFATE 90 UG/1
6 AEROSOL, METERED RESPIRATORY (INHALATION)
Status: DISPENSED | OUTPATIENT
Start: 2020-11-27 | End: 2020-11-27

## 2020-11-27 NOTE — ED AVS SNAPSHOT
[facility]  [address] [address]  Phone: [phone]                                    Neel Kerr   MRN: [id number]    Department: [facility]   Date of Visit: 11/27/2020           After Visit Summary Signature Page    I have received my discharge instructions, and my questions have been answered. I have discussed any challenges I see with this plan with the nurse or doctor.    ..........................................................................................................................................  Patient/Patient Representative Signature      ..........................................................................................................................................  Patient Representative Print Name and Relationship to Patient    ..................................................               ................................................  Date                                   Time    ..........................................................................................................................................  Reviewed by Signature/Title    ...................................................              ..............................................  Date                                               Time          22EPIC Rev 08/18

## 2020-11-28 ENCOUNTER — APPOINTMENT (OUTPATIENT)
Dept: CT IMAGING | Facility: HOSPITAL | Age: 80
End: 2020-11-28
Attending: EMERGENCY MEDICINE
Payer: COMMERCIAL

## 2020-11-28 VITALS
DIASTOLIC BLOOD PRESSURE: 91 MMHG | OXYGEN SATURATION: 96 % | SYSTOLIC BLOOD PRESSURE: 157 MMHG | TEMPERATURE: 97.4 F | RESPIRATION RATE: 22 BRPM | HEART RATE: 101 BPM

## 2020-11-28 LAB
D DIMER PPP FEU-MCNC: 5.7 UG/ML FEU (ref 0–0.5)
GLUCOSE BLDC GLUCOMTR-MCNC: 293 MG/DL (ref 70–99)

## 2020-11-28 PROCEDURE — 250N000013 HC RX MED GY IP 250 OP 250 PS 637: Performed by: EMERGENCY MEDICINE

## 2020-11-28 PROCEDURE — 71275 CT ANGIOGRAPHY CHEST: CPT

## 2020-11-28 PROCEDURE — 250N000012 HC RX MED GY IP 250 OP 636 PS 637: Performed by: EMERGENCY MEDICINE

## 2020-11-28 PROCEDURE — 250N000011 HC RX IP 250 OP 636: Performed by: EMERGENCY MEDICINE

## 2020-11-28 PROCEDURE — 999N001017 HC STATISTIC GLUCOSE BY METER IP

## 2020-11-28 RX ORDER — IOPAMIDOL 755 MG/ML
75 INJECTION, SOLUTION INTRAVASCULAR ONCE
Status: COMPLETED | OUTPATIENT
Start: 2020-11-28 | End: 2020-11-28

## 2020-11-28 RX ORDER — AZITHROMYCIN 250 MG/1
250 TABLET, FILM COATED ORAL DAILY
Qty: 4 TABLET | Refills: 0 | Status: SHIPPED | OUTPATIENT
Start: 2020-11-29 | End: 2020-12-03

## 2020-11-28 RX ORDER — AZITHROMYCIN 250 MG/1
500 TABLET, FILM COATED ORAL ONCE
Status: COMPLETED | OUTPATIENT
Start: 2020-11-28 | End: 2020-11-28

## 2020-11-28 RX ORDER — PREDNISONE 50 MG/1
50 TABLET ORAL DAILY
Qty: 2 TABLET | Refills: 0 | Status: ON HOLD | OUTPATIENT
Start: 2020-11-29 | End: 2021-05-12

## 2020-11-28 RX ORDER — PREDNISONE 20 MG/1
60 TABLET ORAL ONCE
Status: COMPLETED | OUTPATIENT
Start: 2020-11-28 | End: 2020-11-28

## 2020-11-28 RX ADMIN — AZITHROMYCIN 500 MG: 250 TABLET, FILM COATED ORAL at 04:17

## 2020-11-28 RX ADMIN — IOPAMIDOL 75 ML: 755 INJECTION, SOLUTION INTRAVENOUS at 01:40

## 2020-11-28 RX ADMIN — PREDNISONE 60 MG: 20 TABLET ORAL at 01:03

## 2020-11-28 ASSESSMENT — ENCOUNTER SYMPTOMS
CHILLS: 0
SHORTNESS OF BREATH: 1
ALLERGIC/IMMUNOLOGIC NEGATIVE: 1
ENDOCRINE NEGATIVE: 1
NECK PAIN: 0
MUSCULOSKELETAL NEGATIVE: 1
NEUROLOGICAL NEGATIVE: 1
COUGH: 1
HEMATOLOGIC/LYMPHATIC NEGATIVE: 1
GASTROINTESTINAL NEGATIVE: 1
PHOTOPHOBIA: 0
EYES NEGATIVE: 1
MYALGIAS: 0
PSYCHIATRIC NEGATIVE: 1
CARDIOVASCULAR NEGATIVE: 1
FEVER: 0
CONSTITUTIONAL NEGATIVE: 1
NECK STIFFNESS: 0

## 2020-11-28 NOTE — ED NOTES
To CT via bed. Oxygen off at this time to see how patient tolerates no oxygen for a bit. SpO2 94% on RA when leaving for CT.

## 2020-11-28 NOTE — ED NOTES
Discharge instructions reviewed. Verbalized understanding. Denies pain. States is going to get dressed and watch out the window for it to be a bit more light out before he drives home. Denies any further questions or concerns.

## 2020-11-28 NOTE — ED NOTES
Attempted to get patient up to walk around ED to see how oxygen level would be. SpO2 dropped to 75% on RA just sitting at edge of bed. Had patient lay back in bed with HOB elevated. SpO2 77% on RA. Placed back on oxygen via NC at 3 LPM and SpO2 went back above 90% within 30 seconds. Dr. Aceves informed and to bedside.

## 2020-11-28 NOTE — ED PROVIDER NOTES
History     Chief Complaint   Patient presents with     Shortness of Breath     c/o shortness of breath and cough for quite a while. notes symptoms worse tonight     HPI  Neel Kerr is a 80 year old male who presents today with complaints of shortness of breath or cough.  Symptoms gradually worsened prompting ER visit.  Patient seen recently seen and admitted to the hospital secondary to COPD exacerbation.  Patient today denies any chest pain.  Patient is currently chronically on oxygen at home.  No additional complaints at this time.    Allergies:  No Known Allergies    Problem List:    Patient Active Problem List    Diagnosis Date Noted     Hyponatremia 2020     Priority: Medium     Acute respiratory failure with hypoxia (H) 2020     Priority: Medium     Infection due to 2019 novel coronavirus 2020     Priority: Medium     CAP (community acquired pneumonia) 2020     Priority: Medium     Psoriasis 2018     Priority: Medium     Non-ST elevation (NSTEMI) myocardial infarction (H) 2017     Priority: Medium     Advanced care planning/counseling discussion 2012     Priority: Medium     Bilateral impacted cerumen 03/10/2011     Priority: Medium        Past Medical History:    Past Medical History:   Diagnosis Date     Essential (primary) hypertension      Hyperlipidemia      Non-ST elevation (NSTEMI) myocardial infarction (H)      Old myocardial infarction      Polyneuropathy      Type 2 diabetes mellitus without complications (H)        Past Surgical History:    No past surgical history on file.    Family History:    No family history on file.    Social History:  Marital Status:  Single [1]  Social History     Tobacco Use     Smoking status: Former Smoker     Quit date: 1992     Years since quittin.9     Smokeless tobacco: Never Used   Substance Use Topics     Alcohol use: No     Comment: Alcoholic Drinks/day: quit drinking      Drug use: No        Medications:          albuterol (PROAIR HFA/PROVENTIL HFA/VENTOLIN HFA) 108 (90 Base) MCG/ACT inhaler       alpha-lipoic acid 600 MG CAPS       lisinopril (ZESTRIL) 40 MG tablet       metFORMIN (GLUCOPHAGE) 500 MG tablet       acetaminophen (TYLENOL) 325 MG tablet          Review of Systems   Constitutional: Negative.  Negative for chills and fever.   HENT: Negative.    Eyes: Negative.  Negative for photophobia.   Respiratory: Positive for cough and shortness of breath.    Cardiovascular: Negative.  Negative for chest pain.   Gastrointestinal: Negative.    Endocrine: Negative.    Genitourinary: Negative.    Musculoskeletal: Negative.  Negative for myalgias, neck pain and neck stiffness.   Skin: Negative.    Allergic/Immunologic: Negative.    Neurological: Negative.    Hematological: Negative.    Psychiatric/Behavioral: Negative.        Physical Exam   BP: 166/99  Pulse: 99  Temp: 97.1  F (36.2  C)  Resp: 22  SpO2: (!) 87 %      Physical Exam  Constitutional:       General: He is not in acute distress.     Appearance: He is well-developed and normal weight. He is not toxic-appearing.   HENT:      Head: Normocephalic and atraumatic.   Neck:      Musculoskeletal: Normal range of motion.   Cardiovascular:      Rate and Rhythm: Normal rate and regular rhythm.   Pulmonary:      Effort: Pulmonary effort is normal.      Breath sounds: No decreased breath sounds, wheezing or rhonchi.   Abdominal:      Palpations: Abdomen is soft.   Musculoskeletal: Normal range of motion.   Skin:     General: Skin is warm.      Capillary Refill: Capillary refill takes less than 2 seconds.   Neurological:      Mental Status: He is alert.   Psychiatric:         Mood and Affect: Mood normal. Mood is not anxious.         Behavior: Behavior normal. Behavior is not agitated.         ED Course     ED Course as of Nov 28 0633   Sat Nov 28, 2020   0349 Patient sleeping comfortably.  Denies shortness of breath.  Sats 96% on 2 L.  Repeat blood sugar is 293         Procedures    EKG - NSR with LAE and incomplete RBBB and LVH      CXR - coarse reticular interstitial opacities suspicious for chronic interstitial lung disease. No pneumonia noted.     Results for orders placed or performed during the hospital encounter of 11/27/20 (from the past 24 hour(s))   D dimer quantitative   Result Value Ref Range    D Dimer 5.7 (H) 0.0 - 0.50 ug/ml FEU   CBC with platelets differential   Result Value Ref Range    WBC 11.1 (H) 4.0 - 11.0 10e9/L    RBC Count 4.72 4.4 - 5.9 10e12/L    Hemoglobin 14.1 13.3 - 17.7 g/dL    Hematocrit 42.7 40.0 - 53.0 %    MCV 91 78 - 100 fl    MCH 29.9 26.5 - 33.0 pg    MCHC 33.0 31.5 - 36.5 g/dL    RDW 13.6 10.0 - 15.0 %    Platelet Count 212 150 - 450 10e9/L    Diff Method Automated Method     % Neutrophils 69.0 %    % Lymphocytes 22.7 %    % Monocytes 5.9 %    % Eosinophils 0.8 %    % Basophils 0.4 %    % Immature Granulocytes 1.2 %    Nucleated RBCs 0 0 /100    Absolute Neutrophil 7.7 1.6 - 8.3 10e9/L    Absolute Lymphocytes 2.5 0.8 - 5.3 10e9/L    Absolute Monocytes 0.7 0.0 - 1.3 10e9/L    Absolute Eosinophils 0.1 0.0 - 0.7 10e9/L    Absolute Basophils 0.0 0.0 - 0.2 10e9/L    Abs Immature Granulocytes 0.1 0 - 0.4 10e9/L    Absolute Nucleated RBC 0.0    Comprehensive metabolic panel   Result Value Ref Range    Sodium 129 (L) 133 - 144 mmol/L    Potassium 4.2 3.4 - 5.3 mmol/L    Chloride 98 94 - 109 mmol/L    Carbon Dioxide 23 20 - 32 mmol/L    Anion Gap 8 3 - 14 mmol/L    Glucose 416 (H) 70 - 99 mg/dL    Urea Nitrogen 31 (H) 7 - 30 mg/dL    Creatinine 1.28 (H) 0.66 - 1.25 mg/dL    GFR Estimate 53 (L) >60 mL/min/[1.73_m2]    GFR Estimate If Black 62 >60 mL/min/[1.73_m2]    Calcium 8.8 8.5 - 10.1 mg/dL    Bilirubin Total 0.6 0.2 - 1.3 mg/dL    Albumin 3.9 3.4 - 5.0 g/dL    Protein Total 8.4 6.8 - 8.8 g/dL    Alkaline Phosphatase 97 40 - 150 U/L    ALT 30 0 - 70 U/L    AST 17 0 - 45 U/L   Troponin I   Result Value Ref Range    Troponin I ES <0.015 0.000 -  0.045 ug/L   Nt probnp inpatient (BNP)   Result Value Ref Range    N-Terminal Pro BNP Inpatient 197 0 - 1,800 pg/mL   INR   Result Value Ref Range    INR 0.96 0.86 - 1.14   XR Chest Port 1 View    Narrative    Procedure:XR CHEST PORT 1 VW    Clinical history:Male, 80 years, 72-year-old male with complaints of  gradual shortness of breath.  History of COPD.  Rule out pneumonia    Technique: Single view was obtained.    Comparison: 11/10/2020    Findings: The cardiac silhouette is normal. The pulmonary vasculature  is normal.    The lungs again demonstrate dense reticulonodular pattern throughout  both lungs. No focal pneumonia. Bony structures demonstrate no  evidence of an acute fracture. Degenerative changes are again seen at  the AC joints.      Impression    Impression:   No acute change compared to 11/10/2020. Reticular nodular pattern  again seen throughout both lungs suggesting interstitial lung disease.  No focal pneumonia.    This report is in agreement with the preliminary report.    KAZ BAH MD   Glucose by meter   Result Value Ref Range    Glucose 293 (H) 70 - 99 mg/dL       Medications   albuterol (PROAIR HFA/PROVENTIL HFA/VENTOLIN HFA) 108 (90 Base) MCG/ACT inhaler 6 puff (has no administration in time range)   predniSONE (DELTASONE) tablet 60 mg (has no administration in time range)   ipratropium-albuterol (COMBIVENT RESPIMAT) inhaler 1 puff (has no administration in time range)       Assessments & Plan (with Medical Decision Making)     80-year-old male who presents today with complaints of cough shortness of breath.  Shortness of breath has been gradual.  No chest pain.  Patient was notably hypoxic on triage at 86%.  He is normally on oxygen at home.      Patient received supportive care in the form albuterol and steroids and was able to ambulate with out difficulty.  Saturations were 96% to 90% on 2 L which he states is his baseline.      Labs as above and notable for elevated D-dimer and  blood sugar. CT of the chest showed no evidence of PE.  Chest x-ray showed no distinct pneumonia.  At no time did patient have any chest pain.  Etiology of symptoms not entirely clear.  I explained that he should be admitted to the hospital for observation but patient refused.  But given rapid improvement with albuterol and steroids I suspect COPD exacerbation.  Patient instructed to continue with albuterol inhalers at home and will be given patient antibiotics in the form of azithromycin to take at home.  Patient told that he may return at any time to continue his care.  Patient understands to return if he develops any new or worsening symptoms. COVID pending.     Due to the nature of this electronic medical record, laboratory results, imaging results, diagnosis, other information and medications reported above may not represent information available to me at the the time of my care and disposition. Medications reported above may have not been ordered by me.     Portions of the record may have been created with voice recognition software. Occasional wrong-word or 'sound-a- like' substitution may have occurred due to the inherent limitations of voice recognition software. Though the chart has been reviewed, there may be inadvertent transcription errors. Read the chart carefully and recognize, using context, where substitutions have occurred.       New Prescriptions    No medications on file       Final diagnoses:   Bronchitis       11/27/2020   [facility]     Patti Aceves MD  11/28/20 0838

## 2020-11-28 NOTE — DISCHARGE INSTRUCTIONS
1) You may return at anytime to continue your care.   2) Follow the aftercare instructions provided.   3) You have been given a prescription for a medication that can cause an allergic reactions. Return to the ER if you develop any itching, tongue swelling, wheezing or shortness of breath.  4) You may return at anytime to continue your care.   5) You must follow up with your doctor in 12 to 24 hours.     What to expect when you have contrast    During your exam, we will inject  contrast  into your vein or artery. (Contrast is a clear liquid with iodine in it. It shows up on X-rays.)    You may feel warm or hot. You may have a metal taste in your mouth and a slight upset stomach. You may also feel pressure near the kidneys and bladder. These effects will last about 1 to 3 minutes.    Please tell us if you have:   Sneezing    Itching   Hives    Swelling in the face   A hoarse voice   Breathing problems   Other new symptoms    Serious problems are rare.  They may include:   Irregular heartbeat    Seizures   Kidney failure             Tissue damage   Shock     Death    If you have any problems during the exam, we  will treat them right away.    When you get home    Call your hospital if you have any new symptoms in the next 2 days, like hives or swelling. (Phone numbers are at the bottom of this page.) Or call your family doctor.     If you have wheezing or trouble breathing, call 911.    Self-care  The contrast will pass out of your body in your  Urine(pee). This will happen in the next 24 hours. You  will not feel this. Your urine will not  change color.    If you have kidney problems or take metformin    Drink 4 to 8 large glasses of water for the next  2 days, if you are not on a fluid restriction.    ?If you take metformin (Glucophage or Glucovance) for diabetes, keep taking it.      ?Your kidney function tests are abnormal.  If you take Metformin, do not take it for 48 hours. Please go to your clinic for a blood  test within 3 days after your exam before the restarting this medicine.     (Note to provider:please give patient prescription for lab tests.)    ?Special instructions: -    I have read and understand the above information.    Patient Sign Here:______________________________________Date:________Time:______    Staff Sign Here:________________________________________Date:_______Time:______      Radiology Departments:     ?[facility]: [phone] ?[place]: [phone]     ?[place]: [phone] ?[facility]:[phone]      ?[place]: [phone]  ?[place]: [phone]  ?[place]:[phone]    ?[facility] [place]:[phone]  ?[facility] [place]:[phone]

## 2020-11-28 NOTE — ED NOTES
Ambulated into ED room 3 independently with steady gait with c/o SOB for several weeks. States it hasn't increased but it isn't getting better. Reports he was admitted to the hospital at the start of it but is not sure if he was tested for covid or not. Per chart review patient was admitted 9/20/20 and covid swab was done which was negative. Reports he is on oxygen at home most of the time and has been using that today. Reports he does have an albuterol inhaler he is suppose to use but has not used it. Denies pain. Denies any chest pressure or pain. Denies fevers. Changed into gown. Placed on oxygen via NC at 2 LPM which brought SpO2 from 87% on RA to 93% on 2 LPM. Call light within reach.

## 2020-11-29 LAB
SARS-COV-2 RNA SPEC QL NAA+PROBE: NOT DETECTED
SPECIMEN SOURCE: NORMAL

## 2021-05-11 ENCOUNTER — HOSPITAL ENCOUNTER (INPATIENT)
Facility: HOSPITAL | Age: 81
LOS: 14 days | Discharge: NURSING, REHAB OR LONG-TERM CARE | DRG: 177 | End: 2021-05-25
Attending: INTERNAL MEDICINE | Admitting: INTERNAL MEDICINE
Payer: COMMERCIAL

## 2021-05-11 ENCOUNTER — APPOINTMENT (OUTPATIENT)
Dept: GENERAL RADIOLOGY | Facility: HOSPITAL | Age: 81
DRG: 177 | End: 2021-05-11
Attending: INTERNAL MEDICINE
Payer: COMMERCIAL

## 2021-05-11 ENCOUNTER — APPOINTMENT (OUTPATIENT)
Dept: CT IMAGING | Facility: HOSPITAL | Age: 81
DRG: 177 | End: 2021-05-11
Attending: INTERNAL MEDICINE
Payer: COMMERCIAL

## 2021-05-11 DIAGNOSIS — E11.9 TYPE 2 DIABETES MELLITUS WITHOUT COMPLICATION, WITH LONG-TERM CURRENT USE OF INSULIN (H): ICD-10-CM

## 2021-05-11 DIAGNOSIS — R33.9 URINARY RETENTION: Primary | ICD-10-CM

## 2021-05-11 DIAGNOSIS — E11.10 DIABETIC KETOACIDOSIS WITHOUT COMA ASSOCIATED WITH TYPE 2 DIABETES MELLITUS (H): ICD-10-CM

## 2021-05-11 DIAGNOSIS — J44.9 CHRONIC OBSTRUCTIVE PULMONARY DISEASE, UNSPECIFIED COPD TYPE (H): ICD-10-CM

## 2021-05-11 DIAGNOSIS — Z79.4 TYPE 2 DIABETES MELLITUS WITHOUT COMPLICATION, WITH LONG-TERM CURRENT USE OF INSULIN (H): ICD-10-CM

## 2021-05-11 PROBLEM — N17.9 ACUTE ON CHRONIC RENAL FAILURE (H): Status: ACTIVE | Noted: 2021-05-11

## 2021-05-11 PROBLEM — E87.20 METABOLIC ACIDOSIS: Status: ACTIVE | Noted: 2021-05-11

## 2021-05-11 PROBLEM — R09.02 HYPOXIA: Status: ACTIVE | Noted: 2021-05-11
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
## 2021-05-11 PROBLEM — N18.9 ACUTE ON CHRONIC RENAL FAILURE (H): Status: ACTIVE | Noted: 2021-05-11

## 2021-05-11 LAB
ALBUMIN SERPL-MCNC: 3.6 G/DL (ref 3.4–5)
ALBUMIN UR-MCNC: 10 MG/DL
ALP SERPL-CCNC: 107 U/L (ref 40–150)
ALT SERPL W P-5'-P-CCNC: 16 U/L (ref 0–70)
AMPHETAMINES UR QL: NOT DETECTED NG/ML
ANION GAP SERPL CALCULATED.3IONS-SCNC: 19 MMOL/L (ref 3–14)
ANION GAP SERPL CALCULATED.3IONS-SCNC: 25 MMOL/L (ref 3–14)
APPEARANCE UR: CLEAR
AST SERPL W P-5'-P-CCNC: 10 U/L (ref 0–45)
BACTERIA #/AREA URNS HPF: ABNORMAL /HPF
BARBITURATES UR QL SCN: NOT DETECTED NG/ML
BASE DEFICIT BLDA-SCNC: 18.2 MMOL/L
BASOPHILS # BLD AUTO: 0 10E9/L (ref 0–0.2)
BASOPHILS NFR BLD AUTO: 0.2 %
BENZODIAZ UR QL SCN: NOT DETECTED NG/ML
BILIRUB SERPL-MCNC: 0.7 MG/DL (ref 0.2–1.3)
BILIRUB UR QL STRIP: NEGATIVE
BUN SERPL-MCNC: 81 MG/DL (ref 7–30)
BUN SERPL-MCNC: 98 MG/DL (ref 7–30)
BUPRENORPHINE UR QL: NOT DETECTED NG/ML
CALCIUM SERPL-MCNC: 7.3 MG/DL (ref 8.5–10.1)
CALCIUM SERPL-MCNC: 8.8 MG/DL (ref 8.5–10.1)
CANNABINOIDS UR QL: NOT DETECTED NG/ML
CHLORIDE SERPL-SCNC: 101 MMOL/L (ref 94–109)
CHLORIDE SERPL-SCNC: 83 MMOL/L (ref 94–109)
CK SERPL-CCNC: 116 U/L (ref 30–300)
CO2 SERPL-SCNC: 10 MMOL/L (ref 20–32)
CO2 SERPL-SCNC: 11 MMOL/L (ref 20–32)
COCAINE UR QL SCN: NOT DETECTED NG/ML
COLOR UR AUTO: ABNORMAL
CREAT SERPL-MCNC: 1.38 MG/DL (ref 0.66–1.25)
CREAT SERPL-MCNC: 1.83 MG/DL (ref 0.66–1.25)
CRP SERPL-MCNC: 130 MG/L (ref 0–8)
D-METHAMPHET UR QL: NOT DETECTED NG/ML
DIFFERENTIAL METHOD BLD: ABNORMAL
EOSINOPHIL # BLD AUTO: 0 10E9/L (ref 0–0.7)
EOSINOPHIL NFR BLD AUTO: 0 %
ERYTHROCYTE [DISTWIDTH] IN BLOOD BY AUTOMATED COUNT: 12.9 % (ref 10–15)
ETHANOL SERPL-MCNC: <0.01 G/DL
GFR SERPL CREATININE-BSD FRML MDRD: 34 ML/MIN/{1.73_M2}
GFR SERPL CREATININE-BSD FRML MDRD: 48 ML/MIN/{1.73_M2}
GLUCOSE BLDC GLUCOMTR-MCNC: 392 MG/DL (ref 70–99)
GLUCOSE SERPL-MCNC: 308 MG/DL (ref 70–99)
GLUCOSE SERPL-MCNC: 661 MG/DL (ref 70–99)
GLUCOSE UR STRIP-MCNC: >1000 MG/DL
HCO3 BLD-SCNC: 8 MMOL/L (ref 21–28)
HCT VFR BLD AUTO: 42.5 % (ref 40–53)
HGB BLD-MCNC: 14.5 G/DL (ref 13.3–17.7)
HGB UR QL STRIP: NEGATIVE
IMM GRANULOCYTES # BLD: 0.1 10E9/L (ref 0–0.4)
IMM GRANULOCYTES NFR BLD: 0.3 %
KETONES BLD-SCNC: 5.8 MMOL/L (ref 0–0.6)
KETONES UR STRIP-MCNC: 40 MG/DL
LABORATORY COMMENT REPORT: ABNORMAL
LACTATE BLD-SCNC: 2.4 MMOL/L (ref 0.7–2)
LEUKOCYTE ESTERASE UR QL STRIP: NEGATIVE
LYMPHOCYTES # BLD AUTO: 1.1 10E9/L (ref 0.8–5.3)
LYMPHOCYTES NFR BLD AUTO: 6.8 %
MCH RBC QN AUTO: 30.8 PG (ref 26.5–33)
MCHC RBC AUTO-ENTMCNC: 34.1 G/DL (ref 31.5–36.5)
MCV RBC AUTO: 90 FL (ref 78–100)
METHADONE UR QL SCN: NOT DETECTED NG/ML
MONOCYTES # BLD AUTO: 1.8 10E9/L (ref 0–1.3)
MONOCYTES NFR BLD AUTO: 11.4 %
MUCOUS THREADS #/AREA URNS LPF: PRESENT /LPF
NEUTROPHILS # BLD AUTO: 13 10E9/L (ref 1.6–8.3)
NEUTROPHILS NFR BLD AUTO: 81.3 %
NITRATE UR QL: NEGATIVE
NRBC # BLD AUTO: 0 10*3/UL
NRBC BLD AUTO-RTO: 0 /100
O2/TOTAL GAS SETTING VFR VENT: ABNORMAL %
OPIATES UR QL SCN: NOT DETECTED NG/ML
OXYCODONE UR QL SCN: NOT DETECTED NG/ML
OXYHGB MFR BLD: 93 % (ref 92–100)
PCO2 BLD: 19 MM HG (ref 35–45)
PCP UR QL SCN: NOT DETECTED NG/ML
PH BLD: 7.21 PH (ref 7.35–7.45)
PH UR STRIP: 5 PH (ref 4.7–8)
PLATELET # BLD AUTO: 282 10E9/L (ref 150–450)
PO2 BLD: 86 MM HG (ref 80–105)
POTASSIUM SERPL-SCNC: 4.8 MMOL/L (ref 3.4–5.3)
POTASSIUM SERPL-SCNC: 5 MMOL/L (ref 3.4–5.3)
PROPOXYPH UR QL: NOT DETECTED NG/ML
PROT SERPL-MCNC: 7.7 G/DL (ref 6.8–8.8)
RBC # BLD AUTO: 4.71 10E12/L (ref 4.4–5.9)
RBC #/AREA URNS AUTO: 0 /HPF (ref 0–2)
SARS-COV-2 RNA RESP QL NAA+PROBE: POSITIVE
SODIUM SERPL-SCNC: 119 MMOL/L (ref 133–144)
SODIUM SERPL-SCNC: 130 MMOL/L (ref 133–144)
SOURCE: ABNORMAL
SP GR UR STRIP: 1.02 (ref 1–1.03)
SPECIMEN SOURCE: ABNORMAL
SQUAMOUS #/AREA URNS AUTO: 0 /HPF (ref 0–1)
TRICYCLICS UR QL SCN: NOT DETECTED NG/ML
TROPONIN I SERPL-MCNC: <0.015 UG/L (ref 0–0.04)
UROBILINOGEN UR STRIP-MCNC: NORMAL MG/DL (ref 0–2)
WBC # BLD AUTO: 16 10E9/L (ref 4–11)
WBC #/AREA URNS AUTO: 1 /HPF (ref 0–5)

## 2021-05-11 PROCEDURE — 99285 EMERGENCY DEPT VISIT HI MDM: CPT | Performed by: INTERNAL MEDICINE

## 2021-05-11 PROCEDURE — 258N000003 HC RX IP 258 OP 636: Performed by: INTERNAL MEDICINE

## 2021-05-11 PROCEDURE — 96361 HYDRATE IV INFUSION ADD-ON: CPT | Performed by: NURSE PRACTITIONER

## 2021-05-11 PROCEDURE — 80048 BASIC METABOLIC PNL TOTAL CA: CPT | Performed by: INTERNAL MEDICINE

## 2021-05-11 PROCEDURE — 250N000012 HC RX MED GY IP 250 OP 636 PS 637: Performed by: INTERNAL MEDICINE

## 2021-05-11 PROCEDURE — 999N000157 HC STATISTIC RCP TIME EA 10 MIN

## 2021-05-11 PROCEDURE — 36415 COLL VENOUS BLD VENIPUNCTURE: CPT | Performed by: INTERNAL MEDICINE

## 2021-05-11 PROCEDURE — 82010 KETONE BODYS QUAN: CPT | Performed by: INTERNAL MEDICINE

## 2021-05-11 PROCEDURE — 93010 ELECTROCARDIOGRAM REPORT: CPT | Performed by: INTERNAL MEDICINE

## 2021-05-11 PROCEDURE — 250N000012 HC RX MED GY IP 250 OP 636 PS 637

## 2021-05-11 PROCEDURE — U0003 INFECTIOUS AGENT DETECTION BY NUCLEIC ACID (DNA OR RNA); SEVERE ACUTE RESPIRATORY SYNDROME CORONAVIRUS 2 (SARS-COV-2) (CORONAVIRUS DISEASE [COVID-19]), AMPLIFIED PROBE TECHNIQUE, MAKING USE OF HIGH THROUGHPUT TECHNOLOGIES AS DESCRIBED BY CMS-2020-01-R: HCPCS | Performed by: INTERNAL MEDICINE

## 2021-05-11 PROCEDURE — 81001 URINALYSIS AUTO W/SCOPE: CPT | Performed by: INTERNAL MEDICINE

## 2021-05-11 PROCEDURE — 99285 EMERGENCY DEPT VISIT HI MDM: CPT | Mod: 25 | Performed by: NURSE PRACTITIONER

## 2021-05-11 PROCEDURE — 96374 THER/PROPH/DIAG INJ IV PUSH: CPT | Performed by: NURSE PRACTITIONER

## 2021-05-11 PROCEDURE — 71045 X-RAY EXAM CHEST 1 VIEW: CPT

## 2021-05-11 PROCEDURE — 83605 ASSAY OF LACTIC ACID: CPT | Performed by: INTERNAL MEDICINE

## 2021-05-11 PROCEDURE — 82805 BLOOD GASES W/O2 SATURATION: CPT | Performed by: INTERNAL MEDICINE

## 2021-05-11 PROCEDURE — 96376 TX/PRO/DX INJ SAME DRUG ADON: CPT | Performed by: NURSE PRACTITIONER

## 2021-05-11 PROCEDURE — 86140 C-REACTIVE PROTEIN: CPT | Performed by: INTERNAL MEDICINE

## 2021-05-11 PROCEDURE — 36600 WITHDRAWAL OF ARTERIAL BLOOD: CPT

## 2021-05-11 PROCEDURE — 84484 ASSAY OF TROPONIN QUANT: CPT | Performed by: INTERNAL MEDICINE

## 2021-05-11 PROCEDURE — 200N000001 HC R&B ICU

## 2021-05-11 PROCEDURE — 93005 ELECTROCARDIOGRAM TRACING: CPT | Performed by: NURSE PRACTITIONER

## 2021-05-11 PROCEDURE — C9803 HOPD COVID-19 SPEC COLLECT: HCPCS | Performed by: NURSE PRACTITIONER

## 2021-05-11 PROCEDURE — 250N000009 HC RX 250: Performed by: INTERNAL MEDICINE

## 2021-05-11 PROCEDURE — 70450 CT HEAD/BRAIN W/O DYE: CPT

## 2021-05-11 PROCEDURE — U0005 INFEC AGEN DETEC AMPLI PROBE: HCPCS | Performed by: INTERNAL MEDICINE

## 2021-05-11 PROCEDURE — 80306 DRUG TEST PRSMV INSTRMNT: CPT | Performed by: INTERNAL MEDICINE

## 2021-05-11 PROCEDURE — 999N001017 HC STATISTIC GLUCOSE BY METER IP

## 2021-05-11 PROCEDURE — 80053 COMPREHEN METABOLIC PANEL: CPT | Performed by: INTERNAL MEDICINE

## 2021-05-11 PROCEDURE — 250N000009 HC RX 250

## 2021-05-11 PROCEDURE — 85025 COMPLETE CBC W/AUTO DIFF WBC: CPT | Performed by: INTERNAL MEDICINE

## 2021-05-11 PROCEDURE — 82077 ASSAY SPEC XCP UR&BREATH IA: CPT | Performed by: INTERNAL MEDICINE

## 2021-05-11 PROCEDURE — 999N001182 HC STATISTIC ESTIMATED AVERAGE GLUCOSE: Performed by: INTERNAL MEDICINE

## 2021-05-11 PROCEDURE — 83036 HEMOGLOBIN GLYCOSYLATED A1C: CPT | Performed by: INTERNAL MEDICINE

## 2021-05-11 PROCEDURE — 82550 ASSAY OF CK (CPK): CPT | Performed by: INTERNAL MEDICINE

## 2021-05-11 PROCEDURE — 99223 1ST HOSP IP/OBS HIGH 75: CPT | Performed by: INTERNAL MEDICINE

## 2021-05-11 RX ORDER — DEXTROSE MONOHYDRATE 25 G/50ML
25-50 INJECTION, SOLUTION INTRAVENOUS
Status: DISCONTINUED | OUTPATIENT
Start: 2021-05-11 | End: 2021-05-11

## 2021-05-11 RX ORDER — SODIUM CHLORIDE 9 MG/ML
INJECTION, SOLUTION INTRAVENOUS CONTINUOUS
Status: DISCONTINUED | OUTPATIENT
Start: 2021-05-11 | End: 2021-05-13

## 2021-05-11 RX ORDER — NICOTINE POLACRILEX 4 MG
15-30 LOZENGE BUCCAL
Status: DISCONTINUED | OUTPATIENT
Start: 2021-05-11 | End: 2021-05-25 | Stop reason: HOSPADM

## 2021-05-11 RX ORDER — NICOTINE POLACRILEX 4 MG
15-30 LOZENGE BUCCAL
Status: DISCONTINUED | OUTPATIENT
Start: 2021-05-11 | End: 2021-05-11

## 2021-05-11 RX ORDER — DEXTROSE MONOHYDRATE 100 MG/ML
INJECTION, SOLUTION INTRAVENOUS CONTINUOUS PRN
Status: DISCONTINUED | OUTPATIENT
Start: 2021-05-11 | End: 2021-05-13

## 2021-05-11 RX ORDER — SODIUM CHLORIDE 9 MG/ML
INJECTION, SOLUTION INTRAVENOUS CONTINUOUS
Status: DISCONTINUED | OUTPATIENT
Start: 2021-05-11 | End: 2021-05-12

## 2021-05-11 RX ORDER — LIDOCAINE HYDROCHLORIDE 20 MG/ML
JELLY TOPICAL
Status: COMPLETED
Start: 2021-05-11 | End: 2021-05-11

## 2021-05-11 RX ORDER — SODIUM CHLORIDE 9 MG/ML
INJECTION, SOLUTION INTRAVENOUS
Status: DISCONTINUED
Start: 2021-05-11 | End: 2021-05-12 | Stop reason: HOSPADM

## 2021-05-11 RX ORDER — DEXTROSE MONOHYDRATE 25 G/50ML
25-50 INJECTION, SOLUTION INTRAVENOUS
Status: DISCONTINUED | OUTPATIENT
Start: 2021-05-11 | End: 2021-05-25 | Stop reason: HOSPADM

## 2021-05-11 RX ADMIN — SODIUM CHLORIDE 1000 ML: 9 INJECTION, SOLUTION INTRAVENOUS at 21:18

## 2021-05-11 RX ADMIN — SODIUM CHLORIDE 4 UNITS/HR: 9 INJECTION, SOLUTION INTRAVENOUS at 23:35

## 2021-05-11 RX ADMIN — SODIUM CHLORIDE: 9 INJECTION, SOLUTION INTRAVENOUS at 23:36

## 2021-05-11 RX ADMIN — SODIUM CHLORIDE 1000 ML: 9 INJECTION, SOLUTION INTRAVENOUS at 20:16

## 2021-05-11 RX ADMIN — INSULIN HUMAN 10 UNITS: 100 INJECTION, SOLUTION PARENTERAL at 20:56

## 2021-05-11 RX ADMIN — SODIUM CHLORIDE 1000 ML: 9 INJECTION, SOLUTION INTRAVENOUS at 21:49

## 2021-05-11 RX ADMIN — INSULIN HUMAN 10 UNITS: 100 INJECTION, SOLUTION PARENTERAL at 23:34

## 2021-05-11 RX ADMIN — LIDOCAINE HYDROCHLORIDE: 20 JELLY TOPICAL at 21:00

## 2021-05-11 ASSESSMENT — ENCOUNTER SYMPTOMS
CHEST TIGHTNESS: 0
WEAKNESS: 1
NAUSEA: 0
NUMBNESS: 0
SHORTNESS OF BREATH: 0
WHEEZING: 0
FLANK PAIN: 0
HEADACHES: 0
SLEEP DISTURBANCE: 0
COUGH: 0
VOICE CHANGE: 0
VOMITING: 0
ABDOMINAL DISTENTION: 0
COLOR CHANGE: 0
CONFUSION: 1
LIGHT-HEADEDNESS: 0
PALPITATIONS: 0
TREMORS: 0
DIAPHORESIS: 0
MYALGIAS: 0
NECK PAIN: 0
BACK PAIN: 0
ANAL BLEEDING: 0
BLOOD IN STOOL: 0
FEVER: 0
DIZZINESS: 0
DYSURIA: 0
ABDOMINAL PAIN: 0
FATIGUE: 1
FREQUENCY: 0
ALTERED MENTAL STATUS: 1
CHILLS: 0

## 2021-05-11 ASSESSMENT — MIFFLIN-ST. JEOR: SCORE: 1255.63

## 2021-05-12 PROBLEM — U07.1 2019 NOVEL CORONAVIRUS DISEASE (COVID-19): Status: ACTIVE | Noted: 2020-09-21

## 2021-05-12 PROBLEM — E87.5 HYPERKALEMIA: Status: ACTIVE | Noted: 2021-05-12

## 2021-05-12 PROBLEM — Q61.3 POLYCYSTIC KIDNEY DISEASE: Chronic | Status: ACTIVE | Noted: 2021-05-12

## 2021-05-12 LAB
ABO + RH BLD: NORMAL
ABO + RH BLD: NORMAL
ALBUMIN SERPL-MCNC: 2.7 G/DL (ref 3.4–5)
ALBUMIN SERPL-MCNC: 2.7 G/DL (ref 3.4–5)
ALP SERPL-CCNC: 70 U/L (ref 40–150)
ALP SERPL-CCNC: 73 U/L (ref 40–150)
ALT SERPL W P-5'-P-CCNC: 13 U/L (ref 0–70)
ALT SERPL W P-5'-P-CCNC: 13 U/L (ref 0–70)
ANION GAP SERPL CALCULATED.3IONS-SCNC: 10 MMOL/L (ref 3–14)
ANION GAP SERPL CALCULATED.3IONS-SCNC: 10 MMOL/L (ref 3–14)
ANION GAP SERPL CALCULATED.3IONS-SCNC: 12 MMOL/L (ref 3–14)
ANION GAP SERPL CALCULATED.3IONS-SCNC: 8 MMOL/L (ref 3–14)
ANISOCYTOSIS BLD QL SMEAR: SLIGHT
APTT PPP: 42 SEC (ref 22–37)
AST SERPL W P-5'-P-CCNC: 23 U/L (ref 0–45)
AST SERPL W P-5'-P-CCNC: 8 U/L (ref 0–45)
BASOPHILS # BLD AUTO: 0 10E9/L (ref 0–0.2)
BASOPHILS NFR BLD AUTO: 0 %
BILIRUB SERPL-MCNC: 0.3 MG/DL (ref 0.2–1.3)
BILIRUB SERPL-MCNC: 0.3 MG/DL (ref 0.2–1.3)
BUN SERPL-MCNC: 37 MG/DL (ref 7–30)
BUN SERPL-MCNC: 51 MG/DL (ref 7–30)
BUN SERPL-MCNC: 66 MG/DL (ref 7–30)
BUN SERPL-MCNC: 73 MG/DL (ref 7–30)
CALCIUM SERPL-MCNC: 7.4 MG/DL (ref 8.5–10.1)
CALCIUM SERPL-MCNC: 7.5 MG/DL (ref 8.5–10.1)
CALCIUM SERPL-MCNC: 7.7 MG/DL (ref 8.5–10.1)
CALCIUM SERPL-MCNC: 7.7 MG/DL (ref 8.5–10.1)
CHLORIDE SERPL-SCNC: 107 MMOL/L (ref 94–109)
CHLORIDE SERPL-SCNC: 108 MMOL/L (ref 94–109)
CHLORIDE SERPL-SCNC: 108 MMOL/L (ref 94–109)
CHLORIDE SERPL-SCNC: 109 MMOL/L (ref 94–109)
CK SERPL-CCNC: 144 U/L (ref 30–300)
CO2 SERPL-SCNC: 16 MMOL/L (ref 20–32)
CO2 SERPL-SCNC: 17 MMOL/L (ref 20–32)
CO2 SERPL-SCNC: 18 MMOL/L (ref 20–32)
CO2 SERPL-SCNC: 22 MMOL/L (ref 20–32)
CREAT SERPL-MCNC: 0.87 MG/DL (ref 0.66–1.25)
CREAT SERPL-MCNC: 0.89 MG/DL (ref 0.66–1.25)
CREAT SERPL-MCNC: 1.13 MG/DL (ref 0.66–1.25)
CREAT SERPL-MCNC: 1.27 MG/DL (ref 0.66–1.25)
CRP SERPL-MCNC: 278 MG/L (ref 0–8)
D DIMER PPP FEU-MCNC: 7.5 UG/ML FEU (ref 0–0.5)
DIFFERENTIAL METHOD BLD: ABNORMAL
EOSINOPHIL # BLD AUTO: 0 10E9/L (ref 0–0.7)
EOSINOPHIL NFR BLD AUTO: 0 %
ERYTHROCYTE [DISTWIDTH] IN BLOOD BY AUTOMATED COUNT: 13.2 % (ref 10–15)
EST. AVERAGE GLUCOSE BLD GHB EST-MCNC: 338 MG/DL
FIBRINOGEN PPP-MCNC: 607 MG/DL (ref 200–420)
GFR SERPL CREATININE-BSD FRML MDRD: 53 ML/MIN/{1.73_M2}
GFR SERPL CREATININE-BSD FRML MDRD: 62 ML/MIN/{1.73_M2}
GFR SERPL CREATININE-BSD FRML MDRD: 81 ML/MIN/{1.73_M2}
GFR SERPL CREATININE-BSD FRML MDRD: 82 ML/MIN/{1.73_M2}
GLUCOSE BLDC GLUCOMTR-MCNC: 109 MG/DL (ref 70–99)
GLUCOSE BLDC GLUCOMTR-MCNC: 118 MG/DL (ref 70–99)
GLUCOSE BLDC GLUCOMTR-MCNC: 137 MG/DL (ref 70–99)
GLUCOSE BLDC GLUCOMTR-MCNC: 149 MG/DL (ref 70–99)
GLUCOSE BLDC GLUCOMTR-MCNC: 150 MG/DL (ref 70–99)
GLUCOSE BLDC GLUCOMTR-MCNC: 154 MG/DL (ref 70–99)
GLUCOSE BLDC GLUCOMTR-MCNC: 160 MG/DL (ref 70–99)
GLUCOSE BLDC GLUCOMTR-MCNC: 180 MG/DL (ref 70–99)
GLUCOSE BLDC GLUCOMTR-MCNC: 192 MG/DL (ref 70–99)
GLUCOSE BLDC GLUCOMTR-MCNC: 206 MG/DL (ref 70–99)
GLUCOSE BLDC GLUCOMTR-MCNC: 245 MG/DL (ref 70–99)
GLUCOSE BLDC GLUCOMTR-MCNC: 311 MG/DL (ref 70–99)
GLUCOSE BLDC GLUCOMTR-MCNC: 315 MG/DL (ref 70–99)
GLUCOSE SERPL-MCNC: 101 MG/DL (ref 70–99)
GLUCOSE SERPL-MCNC: 128 MG/DL (ref 70–99)
GLUCOSE SERPL-MCNC: 133 MG/DL (ref 70–99)
GLUCOSE SERPL-MCNC: 182 MG/DL (ref 70–99)
HBA1C MFR BLD: 13.4 % (ref 0–5.6)
HCT VFR BLD AUTO: 40.7 % (ref 40–53)
HGB BLD-MCNC: 13.9 G/DL (ref 13.3–17.7)
INR PPP: 1.11 (ref 0.86–1.14)
KETONES BLD-SCNC: 0.9 MMOL/L (ref 0–0.6)
KETONES BLD-SCNC: 1.3 MMOL/L (ref 0–0.6)
KETONES BLD-SCNC: 2.6 MMOL/L (ref 0–0.6)
KETONES BLD-SCNC: 2.8 MMOL/L (ref 0–0.6)
KETONES BLD-SCNC: 3.1 MMOL/L (ref 0–0.6)
LACTATE BLD-SCNC: 1.4 MMOL/L (ref 0.7–2)
LACTATE BLD-SCNC: 2.4 MMOL/L (ref 0.7–2)
LDH SERPL L TO P-CCNC: 205 U/L (ref 85–227)
LYMPHOCYTES # BLD AUTO: 1.1 10E9/L (ref 0.8–5.3)
LYMPHOCYTES NFR BLD AUTO: 15 %
MAGNESIUM SERPL-MCNC: 2.2 MG/DL (ref 1.6–2.3)
MAGNESIUM SERPL-MCNC: 2.3 MG/DL (ref 1.6–2.3)
MCH RBC QN AUTO: 30.5 PG (ref 26.5–33)
MCHC RBC AUTO-ENTMCNC: 34.2 G/DL (ref 31.5–36.5)
MCV RBC AUTO: 89 FL (ref 78–100)
METAMYELOCYTES # BLD: 0.1 10E9/L
METAMYELOCYTES NFR BLD MANUAL: 1 %
MONOCYTES # BLD AUTO: 0.2 10E9/L (ref 0–1.3)
MONOCYTES NFR BLD AUTO: 2 %
NEUTROPHILS # BLD AUTO: 3.9 10E9/L (ref 1.6–8.3)
NEUTROPHILS NFR BLD AUTO: 52 %
NEUTS BAND # BLD AUTO: 2.3 10E9/L (ref 0–0.6)
NEUTS BAND NFR BLD MANUAL: 30 %
NT-PROBNP SERPL-MCNC: 986 PG/ML (ref 0–1800)
PLATELET # BLD AUTO: 173 10E9/L (ref 150–450)
PLATELET # BLD EST: ABNORMAL 10*3/UL
POTASSIUM SERPL-SCNC: 3.1 MMOL/L (ref 3.4–5.3)
POTASSIUM SERPL-SCNC: 3.5 MMOL/L (ref 3.4–5.3)
POTASSIUM SERPL-SCNC: 3.8 MMOL/L (ref 3.4–5.3)
POTASSIUM SERPL-SCNC: 3.8 MMOL/L (ref 3.4–5.3)
PROCALCITONIN SERPL-MCNC: 1.6 NG/ML
PROT SERPL-MCNC: 6 G/DL (ref 6.8–8.8)
PROT SERPL-MCNC: 6.5 G/DL (ref 6.8–8.8)
RBC # BLD AUTO: 4.56 10E12/L (ref 4.4–5.9)
RBC MORPH BLD: ABNORMAL
SODIUM SERPL-SCNC: 135 MMOL/L (ref 133–144)
SODIUM SERPL-SCNC: 136 MMOL/L (ref 133–144)
SODIUM SERPL-SCNC: 136 MMOL/L (ref 133–144)
SODIUM SERPL-SCNC: 138 MMOL/L (ref 133–144)
SPECIMEN EXP DATE BLD: NORMAL
TROPONIN I SERPL-MCNC: <0.015 UG/L (ref 0–0.04)
WBC # BLD AUTO: 7.5 10E9/L (ref 4–11)
WBC # BLD AUTO: 9.8 10E9/L (ref 4–11)

## 2021-05-12 PROCEDURE — 250N000013 HC RX MED GY IP 250 OP 250 PS 637: Performed by: NURSE PRACTITIONER

## 2021-05-12 PROCEDURE — 84145 PROCALCITONIN (PCT): CPT | Performed by: INTERNAL MEDICINE

## 2021-05-12 PROCEDURE — 99233 SBSQ HOSP IP/OBS HIGH 50: CPT | Performed by: NURSE PRACTITIONER

## 2021-05-12 PROCEDURE — 999N000157 HC STATISTIC RCP TIME EA 10 MIN

## 2021-05-12 PROCEDURE — 258N000003 HC RX IP 258 OP 636

## 2021-05-12 PROCEDURE — 36415 COLL VENOUS BLD VENIPUNCTURE: CPT | Performed by: INTERNAL MEDICINE

## 2021-05-12 PROCEDURE — 84484 ASSAY OF TROPONIN QUANT: CPT | Performed by: NURSE PRACTITIONER

## 2021-05-12 PROCEDURE — 82010 KETONE BODYS QUAN: CPT | Performed by: INTERNAL MEDICINE

## 2021-05-12 PROCEDURE — 85025 COMPLETE CBC W/AUTO DIFF WBC: CPT | Performed by: NURSE PRACTITIONER

## 2021-05-12 PROCEDURE — 250N000009 HC RX 250: Performed by: NURSE PRACTITIONER

## 2021-05-12 PROCEDURE — 86140 C-REACTIVE PROTEIN: CPT | Performed by: NURSE PRACTITIONER

## 2021-05-12 PROCEDURE — 83605 ASSAY OF LACTIC ACID: CPT | Performed by: INTERNAL MEDICINE

## 2021-05-12 PROCEDURE — 83615 LACTATE (LD) (LDH) ENZYME: CPT | Performed by: NURSE PRACTITIONER

## 2021-05-12 PROCEDURE — 250N000011 HC RX IP 250 OP 636: Performed by: NURSE PRACTITIONER

## 2021-05-12 PROCEDURE — 82550 ASSAY OF CK (CPK): CPT | Performed by: NURSE PRACTITIONER

## 2021-05-12 PROCEDURE — 85048 AUTOMATED LEUKOCYTE COUNT: CPT | Performed by: INTERNAL MEDICINE

## 2021-05-12 PROCEDURE — 83880 ASSAY OF NATRIURETIC PEPTIDE: CPT | Performed by: NURSE PRACTITIONER

## 2021-05-12 PROCEDURE — 94799 UNLISTED PULMONARY SVC/PX: CPT

## 2021-05-12 PROCEDURE — 94640 AIRWAY INHALATION TREATMENT: CPT

## 2021-05-12 PROCEDURE — 36415 COLL VENOUS BLD VENIPUNCTURE: CPT | Performed by: NURSE PRACTITIONER

## 2021-05-12 PROCEDURE — 200N000001 HC R&B ICU

## 2021-05-12 PROCEDURE — 250N000012 HC RX MED GY IP 250 OP 636 PS 637: Performed by: NURSE PRACTITIONER

## 2021-05-12 PROCEDURE — 85379 FIBRIN DEGRADATION QUANT: CPT | Performed by: NURSE PRACTITIONER

## 2021-05-12 PROCEDURE — 83735 ASSAY OF MAGNESIUM: CPT | Performed by: INTERNAL MEDICINE

## 2021-05-12 PROCEDURE — 999N001017 HC STATISTIC GLUCOSE BY METER IP

## 2021-05-12 PROCEDURE — 250N000012 HC RX MED GY IP 250 OP 636 PS 637: Performed by: INTERNAL MEDICINE

## 2021-05-12 PROCEDURE — 80053 COMPREHEN METABOLIC PANEL: CPT | Performed by: INTERNAL MEDICINE

## 2021-05-12 PROCEDURE — 85384 FIBRINOGEN ACTIVITY: CPT | Performed by: NURSE PRACTITIONER

## 2021-05-12 PROCEDURE — 36416 COLLJ CAPILLARY BLOOD SPEC: CPT | Performed by: NURSE PRACTITIONER

## 2021-05-12 PROCEDURE — 86901 BLOOD TYPING SEROLOGIC RH(D): CPT | Performed by: NURSE PRACTITIONER

## 2021-05-12 PROCEDURE — 258N000003 HC RX IP 258 OP 636: Performed by: NURSE PRACTITIONER

## 2021-05-12 PROCEDURE — 80048 BASIC METABOLIC PNL TOTAL CA: CPT | Performed by: NURSE PRACTITIONER

## 2021-05-12 PROCEDURE — 85610 PROTHROMBIN TIME: CPT | Performed by: NURSE PRACTITIONER

## 2021-05-12 PROCEDURE — 80048 BASIC METABOLIC PNL TOTAL CA: CPT | Performed by: INTERNAL MEDICINE

## 2021-05-12 PROCEDURE — 258N000003 HC RX IP 258 OP 636: Performed by: INTERNAL MEDICINE

## 2021-05-12 PROCEDURE — 85730 THROMBOPLASTIN TIME PARTIAL: CPT | Performed by: NURSE PRACTITIONER

## 2021-05-12 PROCEDURE — 86900 BLOOD TYPING SEROLOGIC ABO: CPT | Performed by: NURSE PRACTITIONER

## 2021-05-12 PROCEDURE — XW033E5 INTRODUCTION OF REMDESIVIR ANTI-INFECTIVE INTO PERIPHERAL VEIN, PERCUTANEOUS APPROACH, NEW TECHNOLOGY GROUP 5: ICD-10-PCS | Performed by: INTERNAL MEDICINE

## 2021-05-12 PROCEDURE — 82010 KETONE BODYS QUAN: CPT | Performed by: NURSE PRACTITIONER

## 2021-05-12 PROCEDURE — 80053 COMPREHEN METABOLIC PANEL: CPT | Performed by: NURSE PRACTITIONER

## 2021-05-12 RX ORDER — DEXTROSE MONOHYDRATE, SODIUM CHLORIDE, AND POTASSIUM CHLORIDE 50; 1.49; 4.5 G/1000ML; G/1000ML; G/1000ML
INJECTION, SOLUTION INTRAVENOUS CONTINUOUS
Status: DISCONTINUED | OUTPATIENT
Start: 2021-05-12 | End: 2021-05-15

## 2021-05-12 RX ORDER — DEXTROSE MONOHYDRATE, SODIUM CHLORIDE, AND POTASSIUM CHLORIDE 50; .745; 4.5 G/1000ML; G/1000ML; G/1000ML
INJECTION, SOLUTION INTRAVENOUS
Status: COMPLETED
Start: 2021-05-12 | End: 2021-05-12

## 2021-05-12 RX ORDER — FUROSEMIDE 10 MG/ML
40 INJECTION INTRAMUSCULAR; INTRAVENOUS ONCE
Status: COMPLETED | OUTPATIENT
Start: 2021-05-12 | End: 2021-05-12

## 2021-05-12 RX ORDER — DEXTROSE MONOHYDRATE, SODIUM CHLORIDE, AND POTASSIUM CHLORIDE 50; 1.49; 4.5 G/1000ML; G/1000ML; G/1000ML
INJECTION, SOLUTION INTRAVENOUS
Status: COMPLETED
Start: 2021-05-12 | End: 2021-05-12

## 2021-05-12 RX ORDER — DEXAMETHASONE 2 MG/1
6 TABLET ORAL DAILY
Status: DISCONTINUED | OUTPATIENT
Start: 2021-05-12 | End: 2021-05-13

## 2021-05-12 RX ORDER — ATORVASTATIN CALCIUM 80 MG/1
40 TABLET, FILM COATED ORAL AT BEDTIME
COMMUNITY
End: 2021-07-26

## 2021-05-12 RX ORDER — DEXTROSE MONOHYDRATE, SODIUM CHLORIDE, AND POTASSIUM CHLORIDE 50; .745; 4.5 G/1000ML; G/1000ML; G/1000ML
INJECTION, SOLUTION INTRAVENOUS CONTINUOUS
Status: DISCONTINUED | OUTPATIENT
Start: 2021-05-12 | End: 2021-05-12 | Stop reason: DRUGHIGH

## 2021-05-12 RX ORDER — ATORVASTATIN CALCIUM 40 MG/1
40 TABLET, FILM COATED ORAL AT BEDTIME
Status: DISCONTINUED | OUTPATIENT
Start: 2021-05-12 | End: 2021-05-13

## 2021-05-12 RX ADMIN — POTASSIUM CHLORIDE, DEXTROSE MONOHYDRATE AND SODIUM CHLORIDE: 75; 5; 450 INJECTION, SOLUTION INTRAVENOUS at 13:39

## 2021-05-12 RX ADMIN — SODIUM CHLORIDE 3 UNITS/HR: 9 INJECTION, SOLUTION INTRAVENOUS at 15:31

## 2021-05-12 RX ADMIN — DEXAMETHASONE 6 MG: 2 TABLET ORAL at 18:45

## 2021-05-12 RX ADMIN — POTASSIUM CHLORIDE, DEXTROSE MONOHYDRATE AND SODIUM CHLORIDE: 150; 5; 450 INJECTION, SOLUTION INTRAVENOUS at 23:02

## 2021-05-12 RX ADMIN — POTASSIUM CHLORIDE, DEXTROSE MONOHYDRATE AND SODIUM CHLORIDE: 75; 5; 450 INJECTION, SOLUTION INTRAVENOUS at 03:50

## 2021-05-12 RX ADMIN — SODIUM CHLORIDE 50 ML: 9 INJECTION, SOLUTION INTRAVENOUS at 19:54

## 2021-05-12 RX ADMIN — FUROSEMIDE 40 MG: 10 INJECTION, SOLUTION INTRAMUSCULAR; INTRAVENOUS at 16:32

## 2021-05-12 RX ADMIN — ENOXAPARIN SODIUM 40 MG: 40 INJECTION, SOLUTION INTRAVENOUS; SUBCUTANEOUS at 16:32

## 2021-05-12 RX ADMIN — REMDESIVIR 200 MG: 100 INJECTION, POWDER, LYOPHILIZED, FOR SOLUTION INTRAVENOUS at 19:08

## 2021-05-12 RX ADMIN — IPRATROPIUM BROMIDE AND ALBUTEROL 2 PUFF: 20; 100 SPRAY, METERED RESPIRATORY (INHALATION) at 21:41

## 2021-05-12 ASSESSMENT — ACTIVITIES OF DAILY LIVING (ADL)
ADLS_ACUITY_SCORE: 18
ADLS_ACUITY_SCORE: 16
ADLS_ACUITY_SCORE: 18
ADLS_ACUITY_SCORE: 17
ADLS_ACUITY_SCORE: 18
ADLS_ACUITY_SCORE: 18

## 2021-05-12 NOTE — PLAN OF CARE
Pt oriented to self and remembered he was in a hospital. Disoriented to place and time, lethargic. On 6L per NC, LS course at start of shift. Encouraged coughing and deep breathing, unable to perform IS, attempted flutter. Received IV lasix dose x1, LS slightly less course after dose. NP ordered airvo and currently being placed on pt. Other VS stable. BS hypoactive, weathers in place with adequate urine, sediment present-large pale output after lasix, see flowsheets. Insulin gtt at 3 units/hr on alg 2. BG 130s-140s. D5 1/2NS+10K at 125ml/hr to PIV. Started decadron and remdesivir.  Remains NPO. Last ketone 1.3, next at 2200. Turn and repo every 2 hours with pillow support. Foam dressings to sacrum, R hip, elbows.     Finally able to cough up thick white sputum at 1850, suctioned with younker.     Face to face report given with opportunity to observe patient.    Report given to JUDD Alvarez RN   5/12/2021  7:20 PM

## 2021-05-12 NOTE — ED NOTES
DATE:  5/11/2021   TIME OF RECEIPT FROM LAB:  2033  LAB TEST:  glucose  LAB VALUE:  661  RESULTS GIVEN WITH READ-BACK TO (PROVIDER):  Latesha  TIME LAB VALUE REPORTED TO PROVIDER:   2033

## 2021-05-12 NOTE — ED NOTES
Abdomen distended and firm. Bladder scan revealed 999+ml. Updated Dr. Daniels. Orders to place weathers catheter.

## 2021-05-12 NOTE — ED NOTES
Call made to Notification line for admission. #[phone], talked to Jyothi. She states this admission is good for 30 days and we don't need to contact them again unless patient would need to stay longer than 30 days.   Referral ID #[id number]

## 2021-05-12 NOTE — PLAN OF CARE
[facility] Inpatient Admission Note:    Patient admitted to 3122/3122-1 at approximately 2315 via cart accompanied by transport tech and nurse from emergency room . Report received from Edison in SBAR format at 2300 via telephone. Patient transferred to bed via slide board.. Patient is alert and oriented X 1, denies pain; rates at 0 on 0-10 scale.  Patient oriented to room, unit, hourly rounding, and plan of care. Explained admission packet and patient handbook with patient bill of rights brochure. Will continue to monitor and document as needed.     Inpatient Nursing criteria listed below was met:    Health care directives status obtained and documented: No    Care Everywhere authorization obtained No    MRSA swab completed for patient 65 years and older: N/A    Patient identifies a surrogate decision maker: No If yes, who: Contact Information:     If initial lactic acid >2.0, repeat lactic acid drawn within one hour of arrival to unit: NA. If no, state reason:     Vaccination assessment and education completed: No    Clergy visit ordered if patient requests: N/A    Skin issues/needs documented: Yes    Isolation Patient: yes Education given, correct sign in place and documentation row added to PCS:  Yes    Fall Prevention Yes: Care plan updated, education given and documented, sticker and magnet in place: Yes    Care Plan initiated: Yes    Education Documented (including assessment): Yes    Patient has discharge needs : Yes If yes, please explain:Needs placement

## 2021-05-12 NOTE — PLAN OF CARE
Face to face report given with opportunity to observe patient.    Report given to JUDD Floyd   5/12/2021  6:59 AM

## 2021-05-12 NOTE — SIGNIFICANT EVENT
Significant Event Note    Time of event: 11:57 PM May 11, 2021    Description of event:  Received a call from a nurse that pt's Covid test came back positive    Plan:  Tested positive 9.20.2020 and negative 11.27.2020. Suspect reinfection.   Hold treatment for now because his symptoms are minimal.   Will implement special precautions     Discussed with bedside nurse    Tabitha Crawley MD

## 2021-05-12 NOTE — PLAN OF CARE
Pt A&O x4, intermittent confusion at times. VSS on 2L of O2 via NC. Denies any pain. Pt not OOB this shift, is able to turn himself, have also offered turns. HRR, tele reading SR 80s-90s. Lungs are now clear. BS active, denies any N&V. Pt is NPO. Insulin drip running at 3units/hr, last blood sugar was 150. IV running D5 1/2 NS with 10K at 125mls/hr. Mata in place with adequate output. Performing oral cares Q2H. Pt slept on and off throughout the night. No other significant events.

## 2021-05-12 NOTE — PROGRESS NOTES
Assessment completed by phone conversation with brothNicolas carr.  Attempted to reach sonLucas.  Unable to leave a message as voicemail full    LOC: Neel currently confused- per Nicolas, Neel has been more forgetful since having COVID in September    Dx: DKA, fall, COVID +   Chronic Disease Management: DMII, HTN    Lives with: alone   Living at:  Home   Transportation: YES Independent     Primary PCP: [facility]  Insurance:  VA/ Medicare   Medicare IMM letter reviewed with Nicolas cedeno.    Support System:  Limited to Nicolas and friend.  Typically only connect with Neel via phone conversations.  Neel's friend is who found him down and contacted Nicolas.  Not close with his three sons, who live in the cities.   Homecare/PCA: not connected   /County Services:   Not connected  : YES [employer], only eligible for snf for hospice care      How was the VA notification completed: contacted by ED    Health Care Directive: no; Nicolas doesn't believe one has been completed.  Left message for Health Information with the VA   Guardian: no   POA: no     Pharmacy: [facility]   Meds management: independent, compliance questionable     Adequate Resources for needs (housing, utilities, food/med): unable to assess   Household chores: independent   Work/community/social activity: YES out frequently for meals and is a member of the [employer] club and meets with them every Wednesday    ADLs: independently manages  Ambulation:independent   Falls: Nicolas not aware of additional falls   Nutrition: states Shaikh eats out frequently   Sleep: no able to assess     Equipment used: none       Oxygen supplier: no      Does the supplier have valid oxygen orders: n/a    Mental health: not able to assess  Substance abuse: Nicolasjannet Shaikh quit smoking and drinking 25-30 years ago   Exposure to violence/abuse: not able to assess   Stressors: not able to assess    Able to Return to Prior Living Arrangements: anticipate Neel will  need snf     Choice of Vendor: will look into skilled nursing facilities that will accept COVID +.  Brother, Nicolas would like him close to home. But also states that he hasn't ever been officially named Neel's decision maker.    Pt/family was provided with the Medicare Compare list for SNF.  Discussed associated Medicare star ratings to assist with choice for referrals/discharge planning Yes    Education was given to pt/family that star ratings are updated/maintained by Medicare and can be reviewed by visiting www.medicare.gov Yes    Patient/family choices for facility in priority are:   First Choice : Skilled Nursing Facility [facility]: [facility]     [phone]; unable to accept COVID+ d/t staffing    Second Choice: [facility]illed [facility] [place]: [facility]    [phone]; unable to accept COVID+    Third Choice: [facility]: [facility]         [phone]; unable to accept COVID+ d/t staffing    [facility]- left message     [facility]- left message     [facility]- spoke with Vida, would need to be 10 days out from positive test before willing to review.      Barriers: COVID +, cognition     ROM: low     Plan: SNF anticipated.  Per conversation with Neel's brotherNicolas

## 2021-05-12 NOTE — H&P
"[facility]    History and Physical  Hospitalist       Date of Admission:  5/11/2021    Assessment & Plan   Neel Kerr is a 80 year old male with PMH of DM-2, non-compliance, remote CAD with NSTEMI, chronic hyponatremia, bronchitis and intermittent hypoxia (usually when he gets sick for whatever reason), who presents to ED via ambulance after friend found him on the floor. Was accompanied by brother who stated that the patient has dementia. Diagnosed with DKA and will be admitted to ICU    Principal Problem:    Diabetic ketoacidosis without coma associated with type 2 diabetes mellitus (H)    Assessment: present on admission. Moderate severity. Other causes of AG metabolic acidosis considered but less likely.     Plan: will recheck his BMP (given insulin and 3 L NS in ED) and will determine what fluid to choose, insulin infusion rate.     Active Problems:    Hyponatremia    Assessment: chronic, today's \"low\" was also due to hyperglycemia    Plan: BNP 11:15 PM      Metabolic acidosis    Assessment: this is AG met acidosis with respiratory alkalosis.    Plan: will not use bibarb, only IVF, insulin. Pulmonary compensation appropriate.       Hypoxia    Assessment: He intermittently gets hypoxic when sick.     Plan: currently on 1 L NC. Monitor.       Fall    Assessment: unwitnessed. Major trauma related injuries r/o. Seizure remains the possiblity but less likely     Plan: PT evaluation in AM   In the mean time - bed rest, seizure precautions.       Acute on chronic renal failure (H)    Assessment: patient has diabetic nephropathy    Plan: will recheck bmp 11:15 PM    Leukocytosis  Assessment; present on Admission but no source of infection found. Most likely due to DKA  Plan: Monitor    Dehydration  Assessment: present on admission due to osmotic diarrhea  Plan: 3 L given in ED, continue NS but amount will be determined when 11 pM BNP results available.    Dry dark (blood looking) secretions in the mouth. " "  Assessment; denies abdominal pain, Gi bleed, black stools  Plan: will check FOBT    Dementia:   Assessment; present on admission.   Plan supportive care.     Urinary retention:   Assessment; present on admission. Mata placed in ED. 900 ml drained.   Plan remove per protocol.    Consider flomax when able to take PO    Hypoxia:   Assessment: patient diagnosed and treated for SARS-2 Covid 19 infection 9.2020. Today, he is mildly hypoxic. Covid 19 PCR ordered in ED. Results pending.     DVT Prophylaxis: Pneumatic Compression Devices. Consider lovenox if GI bleed r/o    Code Status: DNR / DNI    Disposition: Expected discharge in a few days once improved. Might need NH placement.    Tabitha Crawley    Primary Care Physician   [facility]    Chief Complaint  \"I cannot empty my bladder\"    Reason for Admission: AG metabolic acidosis, DKA, urinary retention    Admission status: ICU inpatient.     History is obtained from the patient, electronic health record and emergency department physician    History of Present Illness   Neel Kerr is a 80 year old male with PMH of DM-2, non-compliance, remote CAD with NSTEMI, chronic hyponatremia, covid pneumonia (9.2020), bronchitis and intermittent hypoxia (usually when he gets sick for whatever reason), who presents to ED via ambulance after friend found him on the floor. Fall unwitnessed, neighbor found him on the floor, brother accompanied by brother who stated that the patient has dementia. Patient's only complain is that he cannot empty his bladder. He has some dementia but able provide some reliable information about his PMH. He stated that he was not taking insulin or Metformin.   ED stay was uneventful. His blood work supports DKA - severe AG Met acidosis, respiratory alkalosis. He denies ASA or ibuprofen use and denies GI bleed.   ED w/u was negative for infection.   Mild leukocytosis, Na 119 K 4.8 CO2 11 Cr 1.83 (up from 1.28) GFR 34cRP 130  Ketone " quantitative 5.8 lactic acid 2.4   Ph 7.21/19/86  on 23% O2  Urine clean  Urine tox -ve  ETOH < 0.01    Diagnosed with DKA and will be admitted to ICU. Given 3 L N S and 10 units of regular insulin in ED.     12 elements of ROS obtained. Pertinent positives and negatives included in HPI.  The rest is negative.    ED treatment: NS boluses total 3 L and 10 Units of regular insulin IV. Glycemia decreased from 661 to 392    Past Medical History    I have reviewed this patient's medical history and updated it with pertinent information if needed.   Past Medical History:   Diagnosis Date     Essential (primary) hypertension     No Comments Provided     Hyperlipidemia     No Comments Provided     Non-ST elevation (NSTEMI) myocardial infarction (H)     07[facility]07/2017,[facility] PCI with stent circumflex     Old myocardial infarction     02/11/2015,[facility]  PCI with stent     Polyneuropathy     No Comments Provided     Type 2 diabetes mellitus without complications (H)     No Comments Provided       Past Surgical History   I have reviewed this patient's surgical history and updated it with pertinent information if needed.  No past surgical history on file.    Prior to Admission Medications   Prior to Admission Medications   Prescriptions Last Dose Informant Patient Reported? Taking?   acetaminophen (TYLENOL) 325 MG tablet Unknown at Unknown time  Yes No   Sig: Take 2 tablets (650 mg) by mouth every 4 hours as needed for mild pain   albuterol (PROAIR HFA/PROVENTIL HFA/VENTOLIN HFA) 108 (90 Base) MCG/ACT inhaler More than a month at Unknown time  Yes No   Sig: Inhale 2 puffs into the lungs every 2 hours as needed for wheezing   alpha-lipoic acid 600 MG CAPS 5/11/2021 at Unknown time  Yes Yes   Sig: Take 1,200 mg by mouth daily    lisinopril (ZESTRIL) 40 MG tablet 5/10/2021 at Unknown time  Yes Yes   Sig: Take 1 tablet (40 mg) by mouth daily   metFORMIN (GLUCOPHAGE) 500 MG tablet 5/11/2021 at Unknown time  Yes Yes   Sig: Take  2 tablets (1,000 mg) by mouth 2 times daily (with meals)   predniSONE (DELTASONE) 50 MG tablet More than a month at Unknown time  No No   Sig: Take 1 tablet (50 mg) by mouth daily      Facility-Administered Medications: None     Allergies   No Known Allergies    Social History   I have reviewed this patient's social history and updated it with pertinent information if needed. Neel Kerr  reports that he quit smoking about 29 years ago. He has never used smokeless tobacco. He reports that he does not drink alcohol or use drugs.    Family History   I have reviewed this patient's family history and updated it with pertinent information if needed.   No family history on file.    Review of Systems   See HPI.     Physical Exam   Temp: 97.2  F (36.2  C) Temp src: Tympanic BP: 137/82 Pulse: (!) 125   Resp: 24 SpO2: 98 % O2 Device: None (Room air)    Vital Signs with Ranges  Temp:  [97.2  F (36.2  C)] 97.2  F (36.2  C)  Pulse:  [100-125] 125  Resp:  [24-28] 24  BP: (114-151)/(64-93) 137/82  SpO2:  [92 %-98 %] 98 %  0 lbs 0 oz    Physical exam:   Constitutional: malnourished, disheveled, not in distress. Lays in bed.   HEENT: atraumatic, normocephalic.Dry blood look like secretions in the mouth, in the lips and palate. No obvious bleeding.     NECK: supple, no masses, no bruits, no LAD. No midline neck tenderness.    CVS: regular, S1, S2, no S3, no murmurs, rubs or gallops. No edema.  RESPIRATORY: wet lungs, congested, rales, but now wheezing or fine crackles.  GI: soft, not tender, not distended, no HSM, no pulsatile masses. BS present.   : no CVA tenderness. Bladder not palpable. Mata cath in place.  MS: normal muscle bulk, no fasciculations, mild sarcopenia.  NEUROLOGICAL: normal motor function,  no focal deficits.   PSYCHIATRIC: awake, oriented x 1-2, doesn't know the day of the month, day of week, year. Knows location and where he lives. Not agitated.   SKIN: warm, well perfused. No rash.   HEMATOLOGIC/LYMPHATIC: no  petechia, no ecchymoses, no lymphadenopathy.     Goals of care were not discussed with the patient (confused) and family (no family available). Code status was addressed on admission as well (will continue the previous code status). End of life care discussion not done.    Data   Data reviewed today:  I personally reviewed: blood work, chest XR, EKG and urinary test results.     See below radiology report.   Recent Labs   Lab 05/11/21 2005   WBC 16.0*   HGB 14.5   MCV 90      *   POTASSIUM 4.8   CHLORIDE 83*   CO2 11*   BUN 98*   CR 1.83*   ANIONGAP 25*   CARLOS 8.8   *   ALBUMIN 3.6   PROTTOTAL 7.7   BILITOTAL 0.7   ALKPHOS 107   ALT 16   AST 10   TROPI <0.015       Recent Results (from the past 24 hour(s))   XR Chest Port 1 View    Narrative    PROCEDURE:  XR CHEST PORT 1 VIEW    HISTORY: fall. .    COMPARISON:  11/28/2020    FINDINGS:    The cardiomediastinal contours are stable.  Advanced pulmonary fibrosis, grossly similar to prior. No effusion or  pneumothorax.      Impression    IMPRESSION:  Chronic pulmonary fibrosis.      JONATAN CARTER MD   CT Head w/o Contrast    Narrative    PROCEDURE: CT HEAD W/O CONTRAST     HISTORY: Head trauma, minor (Age >= 65y).    COMPARISON: None.    TECHNIQUE:  Helical images of the head from the foramen magnum to the  vertex were obtained without contrast.    FINDINGS: The ventricles and sulci are prominent, compatible with  moderate, generalized volume loss. No acute intracranial hemorrhage,  mass effect, midline shift, hydrocephalus or basilar cystern  effacement are present.    The grey-white matter interface is preserved. Scattered  hypoattenuation within the supratentorial subcortical and  periventricular white matter is most compatible with microvascular  ischemic change.     The calvarium is intact. The mastoid air cells are clear.  The  visualized paranasal sinuses are clear.      Impression    IMPRESSION: No acute intracranial hemorrhage.       JONATAN CARTER MD

## 2021-05-12 NOTE — ED NOTES
DATE:  5/11/2021   TIME OF RECEIPT FROM LAB:  2025  LAB TEST:  sodium  LAB VALUE:  119  RESULTS GIVEN WITH READ-BACK TO (PROVIDER):  Latesha  TIME LAB VALUE REPORTED TO PROVIDER:   2025

## 2021-05-12 NOTE — PLAN OF CARE
"Patient alert to self; believes he is at home in [facility], states the year is 1921, and is unsure of the month. Patient is intermittently tugging on cords, oxygen, etc; easily redirectable and reassured - restless.  Current Sats 89-90% on 4L O2 NC.    Brothlilly Valenzuela called @ 1215 - gave update on patient status/condition. Cici states \"I do my best to help, but I live 25 miles away.\" He states that he is really the only person that helps the patient out. Reports that the patient has had urinary retention/frquency/incontinence issues for some time. Reports the patient still drives and \"goes out to eat all the time, makes it over to [employer] frequently.\" Reports that the patient calls a friend in [place] daily and \"when she didn't hear from him for two days, that's when she called me, and we had him checked on.\" Patient brother cici states that he is unsure how long patient was on the floor, but he is glad he will be placed somewhere safe for him.   "

## 2021-05-12 NOTE — PROGRESS NOTES
[facility]    Hospitalist Progress Note    Date of Service (when I saw the patient): 05/12/2021    Assessment & Plan       Diabetic ketoacidosis without coma associated with type 2 diabetes mellitus (H): In the past he has only [facility]. However, A1c is >13. Glucose came down quickly with IV insulin. Anion gap now closed but ketones still elevated. Will continue IV insulin until ketones cleared. He is still quite fatigued do don't believe he is ready to eat yet either.       Hyponatremia: Resolved with IVF, likely due to dehydration and poor oral intake.       2019 novel coronavirus disease (COVID-19): Had COVID-19 in September and tested negative 11/20, positive on arrival and he is mildly hypoxic.       Fall: Fell at home, unsure how long he was laying on the floor. Will repeat CK-ED draw was normal.       Confusion: Possible metabolic encephalopathy, however family states he has been confused and having falls since his original covid diagnosis back in September.       Acute on chronic renal failure stage 3a(H): Due to dehydration/DKA.  Creatinine normally 1.0-1.3, on arrival 1.89. Cleared overnight with IVF, now back to baseline.      Probable moderate malnutrition: NO large amount of weight loss compared to 5 months ago. However he does have evidence of subcutaneous fat loss and muscle wasting.       DVT Prophylaxis: Enoxaparin (Lovenox) SQ  Code Status: No CPR- Do NOT Intubate    Disposition: Expected discharge in 1-3 days once respiratory status stable, DKA resolved.    Suzy Carpio,  CNP    Interval History   States he feels well, denies pain, dyspnea, cough, abdominal pain. Responses are very slow, cannot remember recent events, appears disoriented.     -Data reviewed today: I reviewed all new labs and imaging results over the last 24 hours. I personally reviewed .    Physical Exam   Temp: 98.3  F (36.8  C) Temp src: Tympanic BP: 128/72 Pulse: 101   Resp: 23 SpO2:  (!) 90 % O2 Device: Nasal cannula Oxygen Delivery: 6 LPM  Vitals:    05/11/21 2336   Weight: 57.7 kg (127 lb 3.3 oz)     Vital Signs with Ranges  Temp:  [97.2  F (36.2  C)-98.3  F (36.8  C)] 98.3  F (36.8  C)  Pulse:  [] 101  Resp:  [8-29] 23  BP: ()/(60-93) 128/72  SpO2:  [86 %-98 %] 90 %  I/O last 3 completed shifts:  In: 3833 [I.V.:3833]  Out: 4260 [Urine:4260]    Peripheral IV 05/11/21 Right Upper forearm (Active)   Site Assessment [facility] 05/12/21 1241   Line Status Infusing 05/12/21 1241   Phlebitis Scale 0-->no symptoms 05/12/21 1241   Infiltration Scale 0 05/12/21 1241   Number of days: 1       Peripheral IV 05/11/21 Left;Posterior Hand (Active)   Site Assessment [facility] 05/12/21 1241   Line Status Saline locked 05/12/21 1241   Phlebitis Scale 0-->no symptoms 05/12/21 1241   Infiltration Scale 0 05/12/21 1241   Number of days: 1       Urethral Catheter 16 fr (Active)   Tube Description Positional 05/12/21 0756   Catheter Care Done 05/12/21 0400   Collection Container Standard 05/12/21 0756   Securement Method Securing device (Describe) 05/12/21 0756   Rationale for Continued Use Strict 1-2 Hour I&O 05/12/21 0756   Urine Output 140 mL 05/12/21 1550   Number of days: 1       Pressure Injury 09/20/20 Coccyx Stage 1 (Active)   Number of days: 234     Line/device assessment(s) completed for medical necessity    Constitutional: Appears fatigued but awake,alert. Ill appearing, poor dentition. Ill appearing.   Respiratory: Lungs are very diminished bilaterally, no wheezes, crackles or rhonchi.   Cardiovascular: HRR, no murmurs, rubs, thrills.   GI: Soft,nontender,bowel sounds are positive.   Skin/Integumen: Pale, blanchable red areas noted to coccyx, right hip. Evidence of subcutaneous fat loss and muscle wasting.       Medications     dextrose       dextrose 5% and 0.45% NaCl + KCl 10 mEq/L 125 mL/hr at 05/12/21 1339     insulin (regular) 3 Units/hr (05/12/21 1531)     sodium chloride         atorvastatin  40  mg Oral At Bedtime       Data   Recent Labs   Lab 05/12/21  1052 05/12/21  0610 05/12/21  0258 05/11/21 2005 05/11/21 2005   WBC  --   --  9.8  --  16.0*   HGB  --   --   --   --  14.5   MCV  --   --   --   --  90   PLT  --   --   --   --  282    136 136   < > 119*   POTASSIUM 3.5 3.8 3.8   < > 4.8   CHLORIDE 109 108 107   < > 83*   CO2 16* 18* 17*   < > 11*   BUN 51* 66* 73*   < > 98*   CR 0.87 1.13 1.27*   < > 1.83*   ANIONGAP 10 10 12   < > 25*   CARLOS 7.4* 7.5* 7.7*   < > 8.8   * 133* 101*   < > 661*   ALBUMIN  --   --  2.7*  --  3.6   PROTTOTAL  --   --  6.0*  --  7.7   BILITOTAL  --   --  0.3  --  0.7   ALKPHOS  --   --  73  --  107   ALT  --   --  13  --  16   AST  --   --  8  --  10   TROPI  --   --   --   --  <0.015    < > = values in this interval not displayed.       Recent Results (from the past 24 hour(s))   XR Chest Port 1 View    Narrative    PROCEDURE:  XR CHEST PORT 1 VIEW    HISTORY: fall. .    COMPARISON:  11/28/2020    FINDINGS:    The cardiomediastinal contours are stable.  Advanced pulmonary fibrosis, grossly similar to prior. No effusion or  pneumothorax.      Impression    IMPRESSION:  Chronic pulmonary fibrosis.      JONATAN CARTER MD   CT Head w/o Contrast    Narrative    PROCEDURE: CT HEAD W/O CONTRAST     HISTORY: Head trauma, minor (Age >= 65y).    COMPARISON: None.    TECHNIQUE:  Helical images of the head from the foramen magnum to the  vertex were obtained without contrast.    FINDINGS: The ventricles and sulci are prominent, compatible with  moderate, generalized volume loss. No acute intracranial hemorrhage,  mass effect, midline shift, hydrocephalus or basilar cystern  effacement are present.    The grey-white matter interface is preserved. Scattered  hypoattenuation within the supratentorial subcortical and  periventricular white matter is most compatible with microvascular  ischemic change.     The calvarium is intact. The mastoid air cells are clear.   The  visualized paranasal sinuses are clear.      Impression    IMPRESSION: No acute intracranial hemorrhage.      JONATAN CARTER MD

## 2021-05-12 NOTE — ED PROVIDER NOTES
History     Chief Complaint   Patient presents with     Fall     Altered Mental Status     The history is provided by the patient and the EMS personnel.   Altered Mental Status  Presenting symptoms: confusion    Severity:  Mild  Episode history:  Single  Timing:  Constant  Associated symptoms: weakness    Associated symptoms: no abdominal pain, no fever, no headaches, no light-headedness, no nausea, no palpitations, no rash and no vomiting          Allergies:  No Known Allergies    Problem List:    Patient Active Problem List    Diagnosis Date Noted     Diabetic ketoacidosis without coma associated with type 2 diabetes mellitus (H) 2021     Priority: Medium     Metabolic acidosis 2021     Priority: Medium     Hypoxia 2021     Priority: Medium     Fall 2021     Priority: Medium     Hyponatremia 2020     Priority: Medium     Acute respiratory failure with hypoxia (H) 2020     Priority: Medium     Infection due to 2019 novel coronavirus 2020     Priority: Medium     CAP (community acquired pneumonia) 2020     Priority: Medium     Psoriasis 2018     Priority: Medium     Non-ST elevation (NSTEMI) myocardial infarction (H) 2017     Priority: Medium     Advanced care planning/counseling discussion 2012     Priority: Medium     Bilateral impacted cerumen 03/10/2011     Priority: Medium        Past Medical History:    Past Medical History:   Diagnosis Date     Essential (primary) hypertension      Hyperlipidemia      Non-ST elevation (NSTEMI) myocardial infarction (H)      Old myocardial infarction      Polyneuropathy      Type 2 diabetes mellitus without complications (H)        Past Surgical History:    No past surgical history on file.    Family History:    No family history on file.    Social History:  Marital Status:  Single [1]  Social History     Tobacco Use     Smoking status: Former Smoker     Quit date: 1992     Years since quittin.3      Smokeless tobacco: Never Used   Substance Use Topics     Alcohol use: No     Comment: Alcoholic Drinks/day: quit drinking 1985     Drug use: No        Medications:    alpha-lipoic acid 600 MG CAPS  lisinopril (ZESTRIL) 40 MG tablet  metFORMIN (GLUCOPHAGE) 500 MG tablet  acetaminophen (TYLENOL) 325 MG tablet  albuterol (PROAIR HFA/PROVENTIL HFA/VENTOLIN HFA) 108 (90 Base) MCG/ACT inhaler  predniSONE (DELTASONE) 50 MG tablet          Review of Systems   Constitutional: Positive for fatigue. Negative for chills, diaphoresis and fever.   HENT: Negative for voice change.    Eyes: Negative for visual disturbance.   Respiratory: Negative for cough, chest tightness, shortness of breath and wheezing.    Cardiovascular: Negative for chest pain, palpitations and leg swelling.   Gastrointestinal: Negative for abdominal distention, abdominal pain, anal bleeding, blood in stool, nausea and vomiting.   Genitourinary: Negative for decreased urine volume, dysuria, flank pain and frequency.   Musculoskeletal: Negative for back pain, gait problem, myalgias and neck pain.   Skin: Negative for color change, pallor and rash.   Neurological: Positive for weakness. Negative for dizziness, tremors, syncope, light-headedness, numbness and headaches.   Psychiatric/Behavioral: Positive for confusion. Negative for sleep disturbance and suicidal ideas.       Physical Exam   BP: 151/68  Pulse: 102  Temp: 97.2  F (36.2  C)  Resp: (!) 28  SpO2: 92 %      Physical Exam  Vitals signs and nursing note reviewed.   Constitutional:       Appearance: He is underweight. He is ill-appearing.   HENT:      Head: Normocephalic and atraumatic.   Eyes:      Conjunctiva/sclera: Conjunctivae normal.      Pupils: Pupils are equal, round, and reactive to light.   Neck:      Musculoskeletal: Normal range of motion and neck supple.      Thyroid: No thyromegaly.      Vascular: No JVD.      Trachea: No tracheal deviation.   Cardiovascular:      Rate and Rhythm: Regular  rhythm. Tachycardia present.      Heart sounds: Normal heart sounds. No murmur. No gallop.    Pulmonary:      Effort: Tachypnea present. No respiratory distress.      Breath sounds: Normal breath sounds. No stridor. No wheezing or rales.   Chest:      Chest wall: No tenderness.   Abdominal:      General: Bowel sounds are normal. There is no distension.      Palpations: Abdomen is soft. There is no mass.      Tenderness: There is no abdominal tenderness. There is no guarding or rebound.   Musculoskeletal: Normal range of motion.         General: No tenderness.   Lymphadenopathy:      Cervical: No cervical adenopathy.   Skin:     General: Skin is warm.      Coloration: Skin is not pale.      Findings: No erythema or rash.   Neurological:      Mental Status: He is confused.   Psychiatric:         Behavior: Behavior normal. Behavior is cooperative.         ED Course        Procedures                   Results for orders placed or performed during the hospital encounter of 05/11/21 (from the past 24 hour(s))   CBC with platelets differential   Result Value Ref Range    WBC 16.0 (H) 4.0 - 11.0 10e9/L    RBC Count 4.71 4.4 - 5.9 10e12/L    Hemoglobin 14.5 13.3 - 17.7 g/dL    Hematocrit 42.5 40.0 - 53.0 %    MCV 90 78 - 100 fl    MCH 30.8 26.5 - 33.0 pg    MCHC 34.1 31.5 - 36.5 g/dL    RDW 12.9 10.0 - 15.0 %    Platelet Count 282 150 - 450 10e9/L    Diff Method Automated Method     % Neutrophils 81.3 %    % Lymphocytes 6.8 %    % Monocytes 11.4 %    % Eosinophils 0.0 %    % Basophils 0.2 %    % Immature Granulocytes 0.3 %    Nucleated RBCs 0 0 /100    Absolute Neutrophil 13.0 (H) 1.6 - 8.3 10e9/L    Absolute Lymphocytes 1.1 0.8 - 5.3 10e9/L    Absolute Monocytes 1.8 (H) 0.0 - 1.3 10e9/L    Absolute Eosinophils 0.0 0.0 - 0.7 10e9/L    Absolute Basophils 0.0 0.0 - 0.2 10e9/L    Abs Immature Granulocytes 0.1 0 - 0.4 10e9/L    Absolute Nucleated RBC 0.0    CK total   Result Value Ref Range    CK Total 116 30 - 300 U/L    Comprehensive metabolic panel   Result Value Ref Range    Sodium 119 (LL) 133 - 144 mmol/L    Potassium 4.8 3.4 - 5.3 mmol/L    Chloride 83 (L) 94 - 109 mmol/L    Carbon Dioxide 11 (L) 20 - 32 mmol/L    Anion Gap 25 (H) 3 - 14 mmol/L    Glucose 661 (HH) 70 - 99 mg/dL    Urea Nitrogen 98 (H) 7 - 30 mg/dL    Creatinine 1.83 (H) 0.66 - 1.25 mg/dL    GFR Estimate 34 (L) >60 mL/min/[1.73_m2]    GFR Estimate If Black 40 (L) >60 mL/min/[1.73_m2]    Calcium 8.8 8.5 - 10.1 mg/dL    Bilirubin Total 0.7 0.2 - 1.3 mg/dL    Albumin 3.6 3.4 - 5.0 g/dL    Protein Total 7.7 6.8 - 8.8 g/dL    Alkaline Phosphatase 107 40 - 150 U/L    ALT 16 0 - 70 U/L    AST 10 0 - 45 U/L   Alcohol ethyl   Result Value Ref Range    Ethanol g/dL <0.01 0.01 g/dL   Lactic acid whole blood   Result Value Ref Range    Lactic Acid 2.4 (H) 0.7 - 2.0 mmol/L   CRP inflammation   Result Value Ref Range    CRP Inflammation 130.0 (H) 0.0 - 8.0 mg/L   Troponin I   Result Value Ref Range    Troponin I ES <0.015 0.000 - 0.045 ug/L   Ketone Beta-Hydroxybutyrate Quantitative   Result Value Ref Range    Ketone Quantitative 5.8 (H) 0.0 - 0.6 mmol/L   XR Chest Port 1 View    Narrative    PROCEDURE:  XR CHEST PORT 1 VIEW    HISTORY: fall. .    COMPARISON:  11/28/2020    FINDINGS:    The cardiomediastinal contours are stable.  Advanced pulmonary fibrosis, grossly similar to prior. No effusion or  pneumothorax.      Impression    IMPRESSION:  Chronic pulmonary fibrosis.      JONATAN CARTER MD   CT Head w/o Contrast    Narrative    PROCEDURE: CT HEAD W/O CONTRAST     HISTORY: Head trauma, minor (Age >= 65y).    COMPARISON: None.    TECHNIQUE:  Helical images of the head from the foramen magnum to the  vertex were obtained without contrast.    FINDINGS: The ventricles and sulci are prominent, compatible with  moderate, generalized volume loss. No acute intracranial hemorrhage,  mass effect, midline shift, hydrocephalus or basilar cystern  effacement are  present.    The grey-white matter interface is preserved. Scattered  hypoattenuation within the supratentorial subcortical and  periventricular white matter is most compatible with microvascular  ischemic change.     The calvarium is intact. The mastoid air cells are clear.  The  visualized paranasal sinuses are clear.      Impression    IMPRESSION: No acute intracranial hemorrhage.      JONATAN CARTER MD   Blood gas arterial and oxyhgb   Result Value Ref Range    pH Arterial 7.21 (L) 7.35 - 7.45 pH    pCO2 Arterial 19 (L) 35 - 45 mm Hg    pO2 Arterial 86 80 - 105 mm Hg    Bicarbonate Arterial 8 (L) 21 - 28 mmol/L    FIO2 1 L     Oxyhemoglobin Arterial 93 92 - 100 %    Base Deficit Art 18.2 mmol/L   UA with Microscopic reflex to Culture    Specimen: Urine clean catch; Catheterized Urine   Result Value Ref Range    Color Urine Straw     Appearance Urine Clear     Glucose Urine >1000 (A) NEG^Negative mg/dL    Bilirubin Urine Negative NEG^Negative    Ketones Urine 40 (A) NEG^Negative mg/dL    Specific Gravity Urine 1.020 1.003 - 1.035    Blood Urine Negative NEG^Negative    pH Urine 5.0 4.7 - 8.0 pH    Protein Albumin Urine 10 (A) NEG^Negative mg/dL    Urobilinogen mg/dL Normal 0.0 - 2.0 mg/dL    Nitrite Urine Negative NEG^Negative    Leukocyte Esterase Urine Negative NEG^Negative    Source Catheterized Urine     WBC Urine 1 0 - 5 /HPF    RBC Urine 0 0 - 2 /HPF    Bacteria Urine None (A) NEG^Negative /HPF    Squamous Epithelial /HPF Urine 0 0 - 1 /HPF    Mucous Urine Present (A) NEG^Negative /LPF   Urine Drugs of Abuse Screen Panel 13   Result Value Ref Range    Cannabinoids (76-yin-0-carboxy-9-THC) Not Detected NDET^Not Detected ng/mL    Phencyclidine (Phencyclidine) Not Detected NDET^Not Detected ng/mL    Cocaine (Benzoylecgonine) Not Detected NDET^Not Detected ng/mL    Methamphetamine (d-Methamphetamine) Not Detected NDET^Not Detected ng/mL    Opiates (Morphine) Not Detected NDET^Not Detected ng/mL     Amphetamine (d-Amphetamine) Not Detected NDET^Not Detected ng/mL    Benzodiazepines (Nordiazepam) Not Detected NDET^Not Detected ng/mL    Tricyclic Antidepressants (Desipramine) Not Detected NDET^Not Detected ng/mL    Methadone (Methadone) Not Detected NDET^Not Detected ng/mL    Barbiturates (Butalbital) Not Detected NDET^Not Detected ng/mL    Oxycodone (Oxycodone) Not Detected NDET^Not Detected ng/mL    Propoxyphene (Norpropoxyphene) Not Detected NDET^Not Detected ng/mL    Buprenorphine (Buprenorphine) Not Detected NDET^Not Detected ng/mL   Glucose by meter   Result Value Ref Range    Glucose 392 (H) 70 - 99 mg/dL       Medications   glucose gel 15-30 g (has no administration in time range)     Or   dextrose 50 % injection 25-50 mL (has no administration in time range)     Or   glucagon injection 1 mg (has no administration in time range)   0.9% sodium chloride BOLUS (0 mLs Intravenous Stopped 5/11/21 2117)   insulin (regular) (HumuLIN R/NovoLIN R) injection 10 Units (10 Units Intravenous Given 5/11/21 2056)   lidocaine (XYLOCAINE) 2 % external gel (  Given 5/11/21 2100)   0.9% sodium chloride BOLUS (0 mLs Intravenous Stopped 5/11/21 2149)   0.9% sodium chloride BOLUS (0 mLs Intravenous Stopped 5/11/21 2240)       Assessments & Plan (with Medical Decision Making)   Found on bathroom floor by neighbor, confused. Fall?  No obvious traumatic injury.  EKG;Sinus tachy  CXR: old scars  CT Head no acute finding  Labs: hyponatremia, DKA  3 lit NS given, 10 Unit IV insulin  I spoke to Dr Crawley, accepted for ICU admission.   I have reviewed the nursing notes.    I have reviewed the findings, diagnosis, plan and need for follow up with the patient.      New Prescriptions    No medications on file       Final diagnoses:   Diabetic ketoacidosis without coma associated with type 2 diabetes mellitus (H)       5/11/2021   [facility]     Valdo Daniels MD  05/11/21 0650

## 2021-05-12 NOTE — PLAN OF CARE
Prior to Admission Medication Reconciliation:     Medications added:   [] None  [x] As listed below:    Atorvastatin- fill dates do not reflect compliancy    jardiance- recently started- patient may not have started taking yet    Medications deleted:   [] None  [x] As listed below:    Lisinopril     Medications marked for review/removal by attending:  [x] None  [] As listed below:      Changes made to existing medications:   [] None  [x] As listed below:    Albuterol- marked as QID at [facility]. Likely pt takes PRN per fill dates    Last times/dates taken verified with patient:  [] Yes- completed myself  [] Prepared PTA medlist for review only. (will not be available to review personally)  [] Did not review with patient. Rx verification only. Review completed by nursing.    [] Nurse completed no changes made (double checked entries)  [x] Unable to review with patient at this time:  [] Nurse completed/changes made:     Allergies listed at another location:  []Not applicable   [x]See below:   augmentin    Allergy review:    []Did not review: reviewed by nursing  []Did not review: pt unable at this time  []Patient/MAR verified NKDA  [x]Input allergy from [facility]- unable to confirm with patient.     Medication reconciliation sources:   []Patient  []Patient family member/emergency contact: **  [][facility] Report Review  []Epic Chart Review  []Care Everywhere review  [x]Pharmacy med list: [facility]   Name Strength Instructions Last Fill Date QTY/DS Notes   [] Albuterol  90 mcg 1 puff QID for SOB 12/8/20 1/30    [] atorvastatin 80 mg 40 mg at bedtime  12/29/20 45/90    [] empagliflozin 25 mg 12.5 mg daily  5/3/21 45/90    [] metformin 1000 mg BID  4/27/21 180/90    [x][facility] phone call    jardiance and metformin only- insulin discontinued due to non-compliancy issues    Did not start any new bp medications due to compliance issues  []Outside meds dispense report  []Nursing home or Assisted Living MAR:  []Other: **    Pharmacy desired at  discharge: nurse input [facility]    Is patient on coumadin?  [x]No      Requests for consultation by provider or pharmacist:   [] Patient understands why all of their meds were prescribed and how to take them. No questions.   [] Managing party has no questions.   [] Patient/ managing party has questions about the following:  [x] Did not review with patient. Cannot assess.     Fill dates and reported compliancy:  [] Fill dates coincide with compliancy for all/most maintenance meds.   [] Fill dates do not coincide with compliancy with maintenance meds. See notes in PTA medlist and in comments.    [] Fill dates do not coincide with the following medications but pt reports compliancy:  [x] Did not review with patient. Cannot assess.     Historian accuracy:  [] Excellent- alert and oriented, understands why meds were prescribed and how to take, able to answer specifics  [] Good- alert and oriented, understands why meds were prescribed and how to take, some confusion   [] Fair- alert and oriented, doesn't know medications without list, cannot answer specifics about medications, but has a decent process for which to take at home  [] Poor- does not know medications, may not have a process to take at home, may be cognitively unable to review at this time  []Medication management done by family member or facility, no concerns about historian accuracy.   [x] Did not review with patient. Cannot assess.     Medication Management:  [] Manages meds independently  [] Family member/ other party manages meds:  [] Meds managed by staff at facility  [] Meds set up by home care, family/other party helps administer  [] Meds set up by home care, self administers  [x] Did not review with patient. Cannot assess.     Other medications aside from PTA:  [] Denies taking any medications aside from those listed in PTA meds  [] Reports taking another medication(s) but cannot recall the name(s)  [] Refuses to say.  [x] Did not review with patient.  Cannot assess.     Comments: pt unable to review with me. Patient last seen at the [facility] within the last 6 months and is the most up to date medlist available at this time. Pt was stopped on a lot of his medications due to non-compliancy issues. Jardiance recently started but may not have been started by patient yet since it was just shipped out 5/3/21,    Mayra Patinojohn on 5/12/2021 at 7:53 AM       Discrepancies: [x] No []Not Applicable []Yes: listed below    Time spent on medication reconciliation:   []5-20 mins  []20-40 mins  [x]> 40 mins    Issues completing PTA medication reconciliation:  [] On hold for a long time  [x] Waited for a call back  [] Fax didn't come through  [] Fax took a long time  [] Other:    Notifying appropriate party of changes/additions/discrepancies:  []No pertinent changes made, notification not necessary.   [x] Notified attending provider via text page/phone call  [] Notified attending provider in person  [] Notified pharmacy  [] Notified nurse  [] Attending provider not available, left detailed notes  [] Pt is not admitted to floor yet, PTA meds completed before admission.   Medications Prior to Admission   Medication Sig Dispense Refill Last Dose     acetaminophen (TYLENOL) 325 MG tablet Take 2 tablets (650 mg) by mouth every 4 hours as needed for mild pain   Unknown at Unknown time     albuterol (PROAIR HFA/PROVENTIL HFA/VENTOLIN HFA) 108 (90 Base) MCG/ACT inhaler Inhale 2 puffs into the lungs 4 times daily    Unknown at Unknown time     atorvastatin (LIPITOR) 80 MG tablet Take 40 mg by mouth At Bedtime   Unknown at Unknown time     empagliflozin (JARDIANCE) 25 MG TABS tablet Take 12.5 mg by mouth daily   Unknown at Unknown time     metFORMIN (GLUCOPHAGE) 1000 MG tablet Take 1,000 mg by mouth 2 times daily (with meals)    Unknown at Unknown time

## 2021-05-12 NOTE — ED NOTES
DATE:  5/11/2021   TIME OF RECEIPT FROM LAB:  2020  LAB TEST:  Lactic acid  LAB VALUE:  2.4  RESULTS GIVEN WITH READ-BACK TO (PROVIDER):  Latesha  TIME LAB VALUE REPORTED TO PROVIDER:   2020

## 2021-05-12 NOTE — PLAN OF CARE
DATE:  5/12/2021   TIME OF RECEIPT FROM LAB:  0306  LAB TEST:  Lactic  LAB VALUE:  2.4  RESULTS GIVEN WITH READ-BACK TO (PROVIDER): Dr. Crawley  TIME LAB VALUE REPORTED TO PROVIDER:   0304

## 2021-05-12 NOTE — PLAN OF CARE
Temp: 98.3  F (36.8  C) Temp src: Tympanic BP: 128/72 Pulse: 92   Resp: 20 SpO2 90% O2 Device: Nasal cannula Oxygen Delivery: 5 LPM    Patient alert and oriented only to self; believes he's at home; no evidence of learning new information. Patient full assessment(s) as charted. Foam protective dressings placed on R hip, R&L elbow, and sacral/coccyx area - blanchable redness; scattered scabs and bruises. Patient denies pain. Lungs sound coarse throughout, congested and wet. Unable to perform IS - cough is weak. Did not get patient out of bed. Mata patent; adequate pale to aliza urine output. Frequent oral cares needed - broken, missing teeth, dried blood in mouth. See earlier note by this writer citing conversation with patient's brother. COVID precautions maintained, call light in reach (does not call appropriately), bed low and locked, continual video and VS monitoring, alarms on and audible.     Face to face report given with opportunity to observe patient.    Report given to JUDD Nelson RN   5/12/2021  3:29 PM

## 2021-05-12 NOTE — ED NOTES
Patient brought here by [facility] after patient was found on bathroom floor by friend. Patient states he was in the bathroom and states he fell onto the floor. Denies pain anywhere and able to move all extremities. Patient states he doesn't remember why he fell or for sure how long. He states that it wasn't long. Patient was incontinent of urine and jeans are wet from urine.

## 2021-05-13 ENCOUNTER — HOSPITAL ENCOUNTER (INPATIENT)
Dept: CARDIOLOGY | Facility: HOSPITAL | Age: 81
DRG: 177 | End: 2021-05-13
Attending: NURSE PRACTITIONER
Payer: COMMERCIAL

## 2021-05-13 ENCOUNTER — APPOINTMENT (OUTPATIENT)
Dept: CT IMAGING | Facility: HOSPITAL | Age: 81
DRG: 177 | End: 2021-05-13
Attending: NURSE PRACTITIONER
Payer: COMMERCIAL

## 2021-05-13 ENCOUNTER — APPOINTMENT (OUTPATIENT)
Dept: SPEECH THERAPY | Facility: HOSPITAL | Age: 81
DRG: 177 | End: 2021-05-13
Attending: NURSE PRACTITIONER
Payer: COMMERCIAL

## 2021-05-13 ENCOUNTER — APPOINTMENT (OUTPATIENT)
Dept: GENERAL RADIOLOGY | Facility: HOSPITAL | Age: 81
DRG: 177 | End: 2021-05-13
Attending: NURSE PRACTITIONER
Payer: COMMERCIAL

## 2021-05-13 PROBLEM — J12.82 PNEUMONIA DUE TO 2019 NOVEL CORONAVIRUS: Status: ACTIVE | Noted: 2020-09-21

## 2021-05-13 PROBLEM — R13.12 OROPHARYNGEAL DYSPHAGIA: Status: ACTIVE | Noted: 2021-05-13

## 2021-05-13 LAB
ALBUMIN SERPL-MCNC: 2.4 G/DL (ref 3.4–5)
ALP SERPL-CCNC: 71 U/L (ref 40–150)
ALT SERPL W P-5'-P-CCNC: 14 U/L (ref 0–70)
ANION GAP SERPL CALCULATED.3IONS-SCNC: 8 MMOL/L (ref 3–14)
AST SERPL W P-5'-P-CCNC: 34 U/L (ref 0–45)
BILIRUB SERPL-MCNC: 0.3 MG/DL (ref 0.2–1.3)
BUN SERPL-MCNC: 32 MG/DL (ref 7–30)
CALCIUM SERPL-MCNC: 7.2 MG/DL (ref 8.5–10.1)
CHLORIDE SERPL-SCNC: 110 MMOL/L (ref 94–109)
CO2 SERPL-SCNC: 19 MMOL/L (ref 20–32)
CREAT SERPL-MCNC: 0.93 MG/DL (ref 0.66–1.25)
CRP SERPL-MCNC: 377 MG/L (ref 0–8)
D DIMER PPP FEU-MCNC: 9.5 UG/ML FEU (ref 0–0.5)
ERYTHROCYTE [DISTWIDTH] IN BLOOD BY AUTOMATED COUNT: 13.2 % (ref 10–15)
FIBRINOGEN PPP-MCNC: 648 MG/DL (ref 200–420)
GFR SERPL CREATININE-BSD FRML MDRD: 78 ML/MIN/{1.73_M2}
GLUCOSE BLDC GLUCOMTR-MCNC: 120 MG/DL (ref 70–99)
GLUCOSE BLDC GLUCOMTR-MCNC: 123 MG/DL (ref 70–99)
GLUCOSE BLDC GLUCOMTR-MCNC: 129 MG/DL (ref 70–99)
GLUCOSE BLDC GLUCOMTR-MCNC: 133 MG/DL (ref 70–99)
GLUCOSE BLDC GLUCOMTR-MCNC: 137 MG/DL (ref 70–99)
GLUCOSE BLDC GLUCOMTR-MCNC: 138 MG/DL (ref 70–99)
GLUCOSE BLDC GLUCOMTR-MCNC: 140 MG/DL (ref 70–99)
GLUCOSE BLDC GLUCOMTR-MCNC: 143 MG/DL (ref 70–99)
GLUCOSE BLDC GLUCOMTR-MCNC: 144 MG/DL (ref 70–99)
GLUCOSE BLDC GLUCOMTR-MCNC: 153 MG/DL (ref 70–99)
GLUCOSE BLDC GLUCOMTR-MCNC: 155 MG/DL (ref 70–99)
GLUCOSE BLDC GLUCOMTR-MCNC: 167 MG/DL (ref 70–99)
GLUCOSE BLDC GLUCOMTR-MCNC: 168 MG/DL (ref 70–99)
GLUCOSE BLDC GLUCOMTR-MCNC: 243 MG/DL (ref 70–99)
GLUCOSE BLDC GLUCOMTR-MCNC: 313 MG/DL (ref 70–99)
GLUCOSE BLDC GLUCOMTR-MCNC: 319 MG/DL (ref 70–99)
GLUCOSE SERPL-MCNC: 118 MG/DL (ref 70–99)
HCT VFR BLD AUTO: 39.5 % (ref 40–53)
HGB BLD-MCNC: 13.6 G/DL (ref 13.3–17.7)
KETONES BLD-SCNC: 0.2 MMOL/L (ref 0–0.6)
KETONES BLD-SCNC: 0.4 MMOL/L (ref 0–0.6)
KETONES BLD-SCNC: 1.9 MMOL/L (ref 0–0.6)
MCH RBC QN AUTO: 31 PG (ref 26.5–33)
MCHC RBC AUTO-ENTMCNC: 34.4 G/DL (ref 31.5–36.5)
MCV RBC AUTO: 90 FL (ref 78–100)
PLATELET # BLD AUTO: 150 10E9/L (ref 150–450)
POTASSIUM SERPL-SCNC: 3.1 MMOL/L (ref 3.4–5.3)
POTASSIUM SERPL-SCNC: 3.9 MMOL/L (ref 3.4–5.3)
PROCALCITONIN SERPL-MCNC: 1.66 NG/ML
PROT SERPL-MCNC: 6.2 G/DL (ref 6.8–8.8)
RBC # BLD AUTO: 4.39 10E12/L (ref 4.4–5.9)
SODIUM SERPL-SCNC: 137 MMOL/L (ref 133–144)
WBC # BLD AUTO: 10.3 10E9/L (ref 4–11)

## 2021-05-13 PROCEDURE — 250N000011 HC RX IP 250 OP 636: Performed by: RADIOLOGY

## 2021-05-13 PROCEDURE — 36415 COLL VENOUS BLD VENIPUNCTURE: CPT | Performed by: NURSE PRACTITIONER

## 2021-05-13 PROCEDURE — 999N001017 HC STATISTIC GLUCOSE BY METER IP

## 2021-05-13 PROCEDURE — 86140 C-REACTIVE PROTEIN: CPT | Performed by: NURSE PRACTITIONER

## 2021-05-13 PROCEDURE — 93306 TTE W/DOPPLER COMPLETE: CPT | Mod: 26 | Performed by: INTERNAL MEDICINE

## 2021-05-13 PROCEDURE — 250N000012 HC RX MED GY IP 250 OP 636 PS 637

## 2021-05-13 PROCEDURE — 93306 TTE W/DOPPLER COMPLETE: CPT

## 2021-05-13 PROCEDURE — 82010 KETONE BODYS QUAN: CPT | Performed by: NURSE PRACTITIONER

## 2021-05-13 PROCEDURE — 36415 COLL VENOUS BLD VENIPUNCTURE: CPT | Performed by: INTERNAL MEDICINE

## 2021-05-13 PROCEDURE — 70450 CT HEAD/BRAIN W/O DYE: CPT

## 2021-05-13 PROCEDURE — 82947 ASSAY GLUCOSE BLOOD QUANT: CPT | Performed by: INTERNAL MEDICINE

## 2021-05-13 PROCEDURE — 250N000011 HC RX IP 250 OP 636: Performed by: NURSE PRACTITIONER

## 2021-05-13 PROCEDURE — 85379 FIBRIN DEGRADATION QUANT: CPT | Performed by: NURSE PRACTITIONER

## 2021-05-13 PROCEDURE — 250N000012 HC RX MED GY IP 250 OP 636 PS 637: Performed by: NURSE PRACTITIONER

## 2021-05-13 PROCEDURE — 250N000012 HC RX MED GY IP 250 OP 636 PS 637: Performed by: INTERNAL MEDICINE

## 2021-05-13 PROCEDURE — 85027 COMPLETE CBC AUTOMATED: CPT | Performed by: NURSE PRACTITIONER

## 2021-05-13 PROCEDURE — 84145 PROCALCITONIN (PCT): CPT | Performed by: NURSE PRACTITIONER

## 2021-05-13 PROCEDURE — 92610 EVALUATE SWALLOWING FUNCTION: CPT | Mod: GN

## 2021-05-13 PROCEDURE — 70496 CT ANGIOGRAPHY HEAD: CPT

## 2021-05-13 PROCEDURE — 85384 FIBRINOGEN ACTIVITY: CPT | Performed by: NURSE PRACTITIONER

## 2021-05-13 PROCEDURE — 82010 KETONE BODYS QUAN: CPT | Performed by: INTERNAL MEDICINE

## 2021-05-13 PROCEDURE — 99233 SBSQ HOSP IP/OBS HIGH 50: CPT | Performed by: NURSE PRACTITIONER

## 2021-05-13 PROCEDURE — 84132 ASSAY OF SERUM POTASSIUM: CPT | Performed by: NURSE PRACTITIONER

## 2021-05-13 PROCEDURE — 82947 ASSAY GLUCOSE BLOOD QUANT: CPT | Performed by: NURSE PRACTITIONER

## 2021-05-13 PROCEDURE — 80053 COMPREHEN METABOLIC PANEL: CPT | Performed by: NURSE PRACTITIONER

## 2021-05-13 PROCEDURE — 999N000157 HC STATISTIC RCP TIME EA 10 MIN

## 2021-05-13 PROCEDURE — 200N000001 HC R&B ICU

## 2021-05-13 PROCEDURE — 250N000009 HC RX 250: Performed by: NURSE PRACTITIONER

## 2021-05-13 PROCEDURE — 71275 CT ANGIOGRAPHY CHEST: CPT

## 2021-05-13 PROCEDURE — 258N000003 HC RX IP 258 OP 636: Performed by: NURSE PRACTITIONER

## 2021-05-13 PROCEDURE — 71045 X-RAY EXAM CHEST 1 VIEW: CPT

## 2021-05-13 RX ORDER — FUROSEMIDE 10 MG/ML
20 INJECTION INTRAMUSCULAR; INTRAVENOUS ONCE
Status: COMPLETED | OUTPATIENT
Start: 2021-05-13 | End: 2021-05-13

## 2021-05-13 RX ORDER — MEROPENEM AND SODIUM CHLORIDE 1 G/50ML
1 INJECTION, SOLUTION INTRAVENOUS EVERY 8 HOURS
Status: DISCONTINUED | OUTPATIENT
Start: 2021-05-13 | End: 2021-05-16

## 2021-05-13 RX ORDER — POTASSIUM CHLORIDE 7.45 MG/ML
10 INJECTION INTRAVENOUS
Status: COMPLETED | OUTPATIENT
Start: 2021-05-13 | End: 2021-05-13

## 2021-05-13 RX ORDER — DEXAMETHASONE SODIUM PHOSPHATE 4 MG/ML
6 INJECTION, SOLUTION INTRA-ARTICULAR; INTRALESIONAL; INTRAMUSCULAR; INTRAVENOUS; SOFT TISSUE EVERY 24 HOURS
Status: DISCONTINUED | OUTPATIENT
Start: 2021-05-13 | End: 2021-05-19

## 2021-05-13 RX ORDER — IOPAMIDOL 755 MG/ML
50 INJECTION, SOLUTION INTRAVASCULAR ONCE
Status: COMPLETED | OUTPATIENT
Start: 2021-05-13 | End: 2021-05-13

## 2021-05-13 RX ORDER — FUROSEMIDE 10 MG/ML
40 INJECTION INTRAMUSCULAR; INTRAVENOUS ONCE
Status: COMPLETED | OUTPATIENT
Start: 2021-05-13 | End: 2021-05-13

## 2021-05-13 RX ORDER — IOPAMIDOL 755 MG/ML
100 INJECTION, SOLUTION INTRAVASCULAR ONCE
Status: COMPLETED | OUTPATIENT
Start: 2021-05-13 | End: 2021-05-13

## 2021-05-13 RX ADMIN — POTASSIUM CHLORIDE, DEXTROSE MONOHYDRATE AND SODIUM CHLORIDE: 150; 5; 450 INJECTION, SOLUTION INTRAVENOUS at 22:15

## 2021-05-13 RX ADMIN — IOPAMIDOL 50 ML: 755 INJECTION, SOLUTION INTRAVENOUS at 10:16

## 2021-05-13 RX ADMIN — INSULIN ASPART 3 UNITS: 100 INJECTION, SOLUTION INTRAVENOUS; SUBCUTANEOUS at 09:07

## 2021-05-13 RX ADMIN — SODIUM CHLORIDE 50 ML: 9 INJECTION, SOLUTION INTRAVENOUS at 18:49

## 2021-05-13 RX ADMIN — ENOXAPARIN SODIUM 30 MG: 30 INJECTION SUBCUTANEOUS at 12:21

## 2021-05-13 RX ADMIN — DEXAMETHASONE SODIUM PHOSPHATE 6 MG: 4 INJECTION, SOLUTION INTRA-ARTICULAR; INTRALESIONAL; INTRAMUSCULAR; INTRAVENOUS; SOFT TISSUE at 09:09

## 2021-05-13 RX ADMIN — POTASSIUM CHLORIDE 10 MEQ: 7.46 INJECTION, SOLUTION INTRAVENOUS at 08:58

## 2021-05-13 RX ADMIN — POTASSIUM CHLORIDE 10 MEQ: 7.46 INJECTION, SOLUTION INTRAVENOUS at 15:25

## 2021-05-13 RX ADMIN — POTASSIUM CHLORIDE 10 MEQ: 7.46 INJECTION, SOLUTION INTRAVENOUS at 10:22

## 2021-05-13 RX ADMIN — POTASSIUM CHLORIDE 10 MEQ: 7.46 INJECTION, SOLUTION INTRAVENOUS at 16:29

## 2021-05-13 RX ADMIN — INSULIN GLARGINE 12 UNITS: 100 INJECTION, SOLUTION SUBCUTANEOUS at 23:13

## 2021-05-13 RX ADMIN — POTASSIUM CHLORIDE 10 MEQ: 7.46 INJECTION, SOLUTION INTRAVENOUS at 12:20

## 2021-05-13 RX ADMIN — IPRATROPIUM BROMIDE AND ALBUTEROL 2 PUFF: 20; 100 SPRAY, METERED RESPIRATORY (INHALATION) at 21:19

## 2021-05-13 RX ADMIN — IPRATROPIUM BROMIDE AND ALBUTEROL 2 PUFF: 20; 100 SPRAY, METERED RESPIRATORY (INHALATION) at 16:40

## 2021-05-13 RX ADMIN — FUROSEMIDE 20 MG: 10 INJECTION, SOLUTION INTRAMUSCULAR; INTRAVENOUS at 16:34

## 2021-05-13 RX ADMIN — IPRATROPIUM BROMIDE AND ALBUTEROL 2 PUFF: 20; 100 SPRAY, METERED RESPIRATORY (INHALATION) at 12:34

## 2021-05-13 RX ADMIN — INSULIN ASPART 3 UNITS: 100 INJECTION, SOLUTION INTRAVENOUS; SUBCUTANEOUS at 12:27

## 2021-05-13 RX ADMIN — INSULIN GLARGINE 10 UNITS: 100 INJECTION, SOLUTION SUBCUTANEOUS at 01:13

## 2021-05-13 RX ADMIN — ENOXAPARIN SODIUM 30 MG: 30 INJECTION SUBCUTANEOUS at 21:16

## 2021-05-13 RX ADMIN — IPRATROPIUM BROMIDE AND ALBUTEROL 2 PUFF: 20; 100 SPRAY, METERED RESPIRATORY (INHALATION) at 09:14

## 2021-05-13 RX ADMIN — INSULIN ASPART 5 UNITS: 100 INJECTION, SOLUTION INTRAVENOUS; SUBCUTANEOUS at 02:31

## 2021-05-13 RX ADMIN — MEROPENEM AND SODIUM CHLORIDE 1 G: 1 INJECTION, SOLUTION INTRAVENOUS at 18:44

## 2021-05-13 RX ADMIN — REMDESIVIR 100 MG: 100 INJECTION, POWDER, LYOPHILIZED, FOR SOLUTION INTRAVENOUS at 16:29

## 2021-05-13 RX ADMIN — POTASSIUM CHLORIDE 10 MEQ: 7.46 INJECTION, SOLUTION INTRAVENOUS at 14:25

## 2021-05-13 RX ADMIN — POTASSIUM CHLORIDE 10 MEQ: 7.46 INJECTION, SOLUTION INTRAVENOUS at 13:21

## 2021-05-13 RX ADMIN — POTASSIUM CHLORIDE, DEXTROSE MONOHYDRATE AND SODIUM CHLORIDE: 150; 5; 450 INJECTION, SOLUTION INTRAVENOUS at 08:01

## 2021-05-13 RX ADMIN — FUROSEMIDE 40 MG: 10 INJECTION, SOLUTION INTRAMUSCULAR; INTRAVENOUS at 08:58

## 2021-05-13 RX ADMIN — IOPAMIDOL 100 ML: 755 INJECTION, SOLUTION INTRAVENOUS at 10:15

## 2021-05-13 RX ADMIN — POTASSIUM CHLORIDE 10 MEQ: 7.46 INJECTION, SOLUTION INTRAVENOUS at 11:21

## 2021-05-13 RX ADMIN — MEROPENEM AND SODIUM CHLORIDE 1 G: 1 INJECTION, SOLUTION INTRAVENOUS at 11:31

## 2021-05-13 ASSESSMENT — ACTIVITIES OF DAILY LIVING (ADL)
ADLS_ACUITY_SCORE: 15
ADLS_ACUITY_SCORE: 17
ADLS_ACUITY_SCORE: 15
ADLS_ACUITY_SCORE: 15
ADLS_ACUITY_SCORE: 16
ADLS_ACUITY_SCORE: 17

## 2021-05-13 ASSESSMENT — MIFFLIN-ST. JEOR: SCORE: 1242.63

## 2021-05-13 NOTE — PROGRESS NOTES
05/13/21 1100   General Information   Onset of Illness/Injury or Date of Surgery 05/11/21   Referring Physician Suzy Carpio, JÚNIOR   Pertinent History of Current Problem Patient is a 80 year old male with orders for a bedside swallow assessment. Patient is COVID positive. Noted coughing with intake of small sips of liquids this AM. Orders to assess swallow function and make appropriate diet recommendations.   General Observations Patient's voice is aphonic. Unable to answer how long he has been without a voice.    Past History of Dysphagia none known   Disability/Function   Difficulty Eating/Swallowing yes   Eating/Swallowing other (see comments)  (bedside swallow orders )   Type of Evaluation   Type of Evaluation Swallow Evaluation   Oral Motor   Oral Musculature generally intact   Dentition (Oral Motor)   Dentition (Oral Motor) natural dentition;significant number of missing teeth   Lip Function (Oral Motor)   Lip Range of Motion (Oral Motor) protrusion impairment   Tongue Function (Oral Motor)   Tongue ROM (Oral Motor) elevation is impaired   Jaw Function (Oral Motor)   Jaw Function (Oral Motor) strength impairment (bite/clench)   Cough/Swallow/Gag Reflex (Oral Motor)   Volitional Throat Clear/Cough (Oral Motor) impaired;reduced strength   Volitional Swallow (Oral Motor) significantly delayed;effortful;weak   Vocal Quality/Secretion Management (Oral Motor)   Vocal Quality (Oral Motor) aphonia;breathy;strained/strangled   General Swallowing Observations   Current Diet/Method of Nutritional Intake (General Swallowing Observations, NIS) NPO   Respiratory Support (General Swallowing Observations) supplemental oxygen   Comment, General Swallowing Observations A bedside swallow assessment cannot rule out silent aspiration. A video swallow may be warranted if a care provider suspects that patient may be silently aspirating.    Clinical Swallow Evaluation   Rationale for completing additional evaluation Clinical  swallow evaluation   Clinical Swallow Evaluation Textures Trialed Nectar-Thick Liquids;Honey-Thick Liquids;Puree Textures   Clinical Swallow Evaluation: Nectar-Thick Liquid Texture Trial   Mode of Presentation, Nectar spoon;fed by clinician   Volume of Nectar Presented 1 small spoonful   Oral Phase, Nectar Poor AP movement;Premature pharyngeal entry   Pharyngeal Phase, Nectar impaired;wet vocal quality after swallow;reduction in laryngeal movement   Diagnostic Statement Patient's breathing and vocal quality was wet and gurgly. Encouraged patient to cough and swallow a second time. Patient had difficulty following commancs and required tactile stimulation to swallow a second time.   Clinical Swallow Evaluation: Honey-Thick Liquid Texture Trial   Mode of Presentation, Honey spoon;fed by clinician   Volume of Honey Presented 2 small spoonfuls   Oral Phase, Honey Poor AP movement;Premature pharyngeal entry   Pharyngeal Phase, Honey impaired;wet vocal quality after swallow;reduction in laryngeal movement   Diagnostic Statement Patient's breathing and vocal quality was wet and gurgly. Encouraged patient to cough and swallow a second time. Patient had difficulty following commancs and required tactile stimulation to swallow a second time.   Clinical Swallow Evaluation: Puree Solid Texture Trial   Mode of Presentation, Puree spoon;fed by clinician   Volume of Puree Presented 1 spoon dipped in applesauce to elicit swallow   Oral Phase, Puree Poor AP movement;Premature pharyngeal entry   Pharyngeal Phase, Puree impaired;wet vocal quality after swallow;reduction in laryngeal movement   Diagnostic Statement Patient's breathing and vocal quality was wet and gurgly. Encouraged patient to cough and swallow a second time. Patient had difficulty following commancs and required tactile stimulation to swallow a second time.   Swallowing Recommendations   Diet Consistency Recommendations NPO   Comment, Swallowing Recommendations  Patient is demonstrating severe oropharyngeal dysphagia. Showing signs of aspiration with wet vocal quality despite patient being aphonic; breathing is wet and gurgly. Patient is demonstrating a significantly weak cough. Recommend NPO as patient is at high risk for aspiration. Complete frequent oral cares   General Therapy Interventions   Planned Therapy Interventions Dysphagia Treatment   SLP Therapy Assessment/Plan   Criteria for Skilled Therapeutic Interventions Met (SLP Eval) yes;treatment indicated   SLP Diagnosis Severe oropharyngeal dysphagia   Therapy Frequency (SLP Eval) 5 times/wk   Predicted Duration of Therapy Intervention (SLP Eval) 1 week   Comment, Therapy Assessment/Plan (SLP) SLP to re-assess patient tomorrow to determine if he is safe for oral intake    Therapy Plan Review/Discharge Plan (SLP)   Therapy Plan Review (SLP) evaluation/treatment results reviewed;care plan/treatment goals reviewed;patient   SLP Discharge Planning    SLP Discharge Recommendation (DC Rec) Transitional Care Facility   SLP Rationale for DC Rec Patient is demonstrating severe oropharyngeal dysphagia   SLP Brief overview of current status  Patient demonstrating signs of aspiration with all PO trials. Recommend NPO. Complete frequent oral cares. SLP will follow patient during his hospital stay to re-assess and determine if patient is safe for oral intake.    Therapy Certification   Start of Care Date 05/13/21   Certification date from 05/13/21   Certification date to 05/20/21   Medical Diagnosis Oropharyngeal dysphagia    Total Evaluation Time   Total Evaluation Time (Minutes) 15

## 2021-05-13 NOTE — PROGRESS NOTES
[facility]    Hospitalist Progress Note    Date of Service (when I saw the patient): 05/13/2021    Assessment & Plan         2019 novel coronavirus pnuemonia (COVID-19): Had COVID-19 in September and tested negative 11/20, positive on arrival and he was mildly hypoxic on arrival. Over the last 24 hours his pulmonary picture worsened significantly and he required high flow nasal cannula then Airvo. His pressure was up all the way with Fio2 requirement over 90% to maintain sats >90%. He is on remdesivir, dexamethasone, albuterol, dvt prophylaxis BID as D-dimer>9.    Acute respiratory failure with hypoxia: Multifactorial. Covid contributing, CT chest shows bilateral opacities considerably worse compared to yesterdays CXR. However he also has significant interstitial thickening bilaterally. Suspect this is pulmonary edema from the nearly 5 liters of fluid he required on arrival due to DKA. Gave 40mg of Lasix with profound response of over 2 liters in 8 hours. Will repeat with smaller dose of 20mg this evening and evaluate response. ECHO done today, result pending. His procalcitonin is also elevated with dysphagia and possible aspiration as ct shows fluid in esophagus and some thickening. Start meropenem for CAP with coverage for possible aspiration. He has not MDRO risk factors, no clear indicators need to cover for pseudomonas. I talked with both his brother and his son and confirmed DNR/DNI status with them. I made clear the severity of illness and that he may not improve and this may cause his death. This afternoon I did call his son Al and advise him that CT head and chest came back okay with exception of worsened pneumonia and that his oxygen needs improved substantially through the day.       Diabetic ketoacidosis without coma associated with type 2 diabetes mellitus (H): In the past [facility] has only [facility] Jard[facility]. However, A1c is >13. Glucose came down quickly with IV insulin. Anion gap  now closed but ketones still elevated. Will continue IV insulin until ketones cleared. He is still quite fatigued do don't believe he is ready to eat yet either.   -5/13: Insulin drip stopped during the night and lantus started as well as sliding scale. He was previously only on half dose jardiance (12.5mg) and glucophage. Since he is not able to eat anything still will continue d5 at 75ml an hour, once that is stopped adjustments to long acting insulin may be required.     Hypokalemia: Due to DKA with insulin requirements, now expect worsening due to large output after lasix. Replaced with 80meq IV, recheck at 4pm and in AM.        Hyponatremia: Resolved with IVF, likely due to dehydration and poor oral intake.       Fall: Fell at home, unsure how long he was laying on the floor.       Confusion: Possible metabolic encephalopathy, however family states he has been confused and having falls since his original covid diagnosis back in September.   -5/13: Worse this morning, he is awake and alert but admits he cannot remember even where he is or why. This afternoon nursing reports he has cleared significantly.       Acute on chronic renal failure stage 3a(H): Due to dehydration/DKA.  Creatinine normally 1.0-1.3, on arrival 1.89. Cleared overnight with IVF, now back to baseline.      Probable moderate malnutrition: No large amount of weight loss compared to 5 months ago. However he does have evidence of subcutaneous fat loss and muscle wasting.       DVT Prophylaxis: Enoxaparin (Lovenox) SQ  Code Status: No CPR- Do NOT Intubate    Disposition: Expected discharge in 3-5 days once respiratory status stable, DKA resolved.    Suzy Carpio,  CNP    Interval History   States he feels well, does admit to feeling short of breath today. Denies chest pain, abdominal pain or nausea. Responses are very slow, cannot remember recent events, appears disoriented.     -Data reviewed today: I reviewed all new labs and imaging  results over the last 24 hours. I personally reviewed .    Physical Exam   Temp: 98.6  F (37  C) Temp src: Tympanic BP: 105/66 Pulse: 100   Resp: 23 SpO2: 94 % O2 Device: Other (Comments)(AIR VO) Oxygen Delivery: 60 LPM  Vitals:    05/11/21 2336 05/13/21 0411   Weight: 57.7 kg (127 lb 3.3 oz) 56.4 kg (124 lb 5.4 oz)     Vital Signs with Ranges  Temp:  [98.3  F (36.8  C)-100  F (37.8  C)] 98.6  F (37  C)  Pulse:  [] 100  Resp:  [16-28] 23  BP: (104-143)/() 105/66  FiO2 (%):  [89 %-94 %] 94 %  SpO2:  [84 %-100 %] 94 %  I/O last 3 completed shifts:  In: 3246 [I.V.:3246]  Out: 4660 [Urine:4660]    Peripheral IV 05/11/21 Right Upper forearm (Active)   Site Assessment [facility] 05/13/21 1426   Line Status Saline locked;Checked every 1-2 hour 05/13/21 1426   Phlebitis Scale 0-->no symptoms 05/13/21 1426   Infiltration Scale 0 05/13/21 1224   Number of days: 2       Peripheral IV 05/13/21 Anterior;Right Lower forearm (Active)   Site Assessment [facility] 05/13/21 1426   Line Status Infusing;Checked every 1-2 hour 05/13/21 1426   Dressing Intervention New dressing  05/13/21 1132   Phlebitis Scale 0-->no symptoms 05/13/21 1426   Infiltration Scale 0 05/13/21 1224   Infiltration Site Treatment Method  None 05/13/21 1132   Number of days: 0       Urethral Catheter 16 fr (Active)   Tube Description Positional 05/13/21 0412   Catheter Care Catheter wipes 05/13/21 0800   Collection Container Standard 05/13/21 0800   Securement Method Securing device (Describe) 05/13/21 0800   Rationale for Continued Use Strict 1-2 Hour I&O 05/13/21 1427   Urine Output 350 mL 05/13/21 1427   Number of days: 2       Pressure Injury 09/20/20 Coccyx Stage 1 (Active)   Number of days: 235     Line/device assessment(s) completed for medical necessity    Constitutional: Appears fatigued but awake,alert. Ill appearing, poor dentition. Ill appearing.   Respiratory: Course crackles throughout, wet vocal quality. no wheezes, crackles or rhonchi.    Cardiovascular: HRR, no murmurs, rubs, thrills.   GI: Soft,nontender,bowel sounds are positive.   Skin/Integumen: Pale, blanchable red areas noted to coccyx, right hip. Evidence of subcutaneous fat loss and muscle wasting.       Medications     dextrose 5% and 0.45% NaCl + KCl 20 mEq/L 75 mL/hr at 05/13/21 0801     - MEDICATION INSTRUCTIONS -         remdesivir  100 mg Intravenous Q24H    And     sodium chloride 0.9%  50 mL Intravenous Q24H     dexamethasone  6 mg Intravenous Q24H     enoxaparin ANTICOAGULANT  30 mg Subcutaneous BID     insulin aspart  1-6 Units Subcutaneous Q4H     ipratropium-albuterol  2 puff Inhalation 4x Daily     meropenem  1 g Intravenous Q8H     potassium chloride  10 mEq Intravenous Q1H     sodium chloride (PF)  3 mL Intracatheter Q8H       Data   Recent Labs   Lab 05/13/21  0503 05/12/21  1846 05/12/21  1052 05/12/21  0258 05/12/21  0258 05/11/21 2005 05/11/21 2005   WBC 10.3 7.5  --   --  9.8  --  16.0*   HGB 13.6 13.9  --   --   --   --  14.5   MCV 90 89  --   --   --   --  90    173  --   --   --   --  282   INR  --  1.11  --   --   --   --   --     138 135   < > 136   < > 119*   POTASSIUM 3.1* 3.1* 3.5   < > 3.8   < > 4.8   CHLORIDE 110* 108 109   < > 107   < > 83*   CO2 19* 22 16*   < > 17*   < > 11*   BUN 32* 37* 51*   < > 73*   < > 98*   CR 0.93 0.89 0.87   < > 1.27*   < > 1.83*   ANIONGAP 8 8 10   < > 12   < > 25*   CARLOS 7.2* 7.7* 7.4*   < > 7.7*   < > 8.8   * 128* 182*   < > 101*   < > 661*   ALBUMIN 2.4* 2.7*  --   --  2.7*  --  3.6   PROTTOTAL 6.2* 6.5*  --   --  6.0*  --  7.7   BILITOTAL 0.3 0.3  --   --  0.3  --  0.7   ALKPHOS 71 70  --   --  73  --  107   ALT 14 13  --   --  13  --  16   AST 34 23  --   --  8  --  10   TROPI  --  <0.015  --   --   --   --  <0.015    < > = values in this interval not displayed.       Recent Results (from the past 24 hour(s))   XR Chest Port 1 View    Narrative    PROCEDURE:  XR CHEST PORT 1 VIEW    HISTORY:  COVID  PNEUMONIA.     COMPARISON:  May 11, 2021    FINDINGS:   The cardiac silhouette is normal in size. The pulmonary vasculature is  normal.  There are widespread pulmonary parenchymal opacities that  have worsened as compared to May 11, 2021. No pleural effusion or  pneumothorax.      Impression    IMPRESSION:  Worsening interstitial opacities from May 11, 2021      DOREEN UGALDE MD   CT Head w/o Contrast    Narrative    PROCEDURE: CT HEAD W/O CONTRAST     HISTORY: Neuro deficit, acute, stroke suspected.    COMPARISON: May 11, 2021    TECHNIQUE:  Helical images of the head from the foramen magnum to the  vertex were obtained without contrast.    FINDINGS: There is some enlargement of the cortical sulci consistent  with atrophy. There is white matter low density in both hemispheres  consistent with small vessel changes. There are no masses or  ventricular shifts or extracerebral collections. Cranial vault is  intact.       The visualized paranasal sinuses are clear.      Impression    IMPRESSION: No change from May 11, 2021      DOREEN UGALDE MD   CTA Chest with Contrast    Narrative    PROCEDURE: CTA CHEST WITH CONTRAST 5/13/2021 10:17 AM    HISTORY: PE suspected, high prob short of breath     COMPARISONS: November 2020    Meds/Dose Given: ISOVUE 370 150 mL    TECHNIQUE: CT angiogram of the chest with sagittal and coronal MIPS  reconstructions    FINDINGS: CT angiogram reveals no pulmonary emboli. Atherosclerotic  plaquing is seen in the thoracic aorta. Coronary artery calcifications  are noted. The heart is normal in size. Emphysematous changes are  noted in both lungs. There is bilateral interstitial thickening. There  are confluent opacities in both lungs which have worsened  significantly from previous examination and are suspicious for  pneumonia. There are some enlarged hilar and subcarinal lymph nodes  bilaterally. The esophagus wall is thickened in the upper esophagus.  There is some fluid seen in the more  distal esophagus. The upper  portion of the liver appears normal. The pancreas appears normal.  There is irregular enhancement of the spleen which may be due to  timing of the contrast. The upper portion of the pancreas appears  normal.         Impression    IMPRESSION: Opacities in both lungs suspicious for pneumonia this  represents a change from previous examination. Interstitial thickening  is noted. There are enlargements of the hilar and mediastinal lymph  nodes.    Some thickening of the esophageal wall is noted    DOREEN UGALDE MD   CTA Head Neck with Contrast    Narrative    PROCEDURE: CTA  HEAD NECK WITH CONTRAST 5/13/2021 11:48 AM    HISTORY: Neuro deficit, acute, stroke suspected; Worsening confusion,  new swallow deficit    COMPARISONS: None.    Meds/Dose Given: Isovue 370 (100 mL)    TECHNIQUE: CT angiogram of the neck and CT angiogram of the brain with  three-dimensional map reconstructions performed on a separate  workstation    FINDINGS: CT angiogram the neck: There is atherosclerotic plaquing in  the brachiocephalic and proximal right subclavian arteries. There is  calcific plaquing at the origin of the right vertebral artery. The  right vertebral artery is widely patent to the skull base. The right  common carotid artery is widely patent. There is some mild calcific  plaquing in the right carotid bifurcation. There is some  atherosclerotic plaquing in the proximal right internal carotid artery  with an approximately 45% stenosis noted. There is atherosclerotic  plaquing at the left carotid bifurcation. There is some plaquing of  the proximal internal carotid artery with an approximately 55%  stenosis of the proximal internal carotid artery. There is calcific  plaquing in the left subclavian artery. The left vertebral artery is  widely patent to the skull base.    CT angiogram of the brain: The distal vertebral and basilar arteries  are normal. The superior cerebellar and posterior cerebral  arteries  are normal. There is calcific plaquing in both carotid siphons. The  supraclinoid internal carotid arteries are patent. Both anterior  cerebral arteries appear normal. Both middle cerebral arteries are  widely patent.    The muscles of mastication and parapharyngeal spaces are normal. The  salivary glands are normal. The larynx and upper trachea is normal.  Thyroid gland appears normal. There are bilateral pleural effusions  left larger than right and advanced emphysematous changes are noted in  the lung apices. There are some confluent opacities in the left lower  lobe suspicious for pneumonia. The cervical and upper mediastinal  lymph nodes are normal in caliber. There is some wall thickening seen  in the esophagus.         Impression    IMPRESSION: Approximately 50% stenoses noted in both proximal internal  carotid arteries. No intracranial stenoses or occlusions are noted.    DOREEN UGALDE MD

## 2021-05-13 NOTE — PHARMACY-MEDICATION REGIMEN REVIEW
Pharmacy Antimicrobial Stewardship Assessment     Current Antimicrobial Therapy:  Anti-infectives (From now, onward)    Start     Dose/Rate Route Frequency Ordered Stop    05/13/21 1700  remdesivir 100 mg in sodium chloride 0.9 % 250 mL intermittent infusion     Note to Pharmacy: Gently invert the bag 20 times to mix the solution. Lyophilized powder product must be used for pediatric patients LESS than 40 kg.      100 mg  125-500 mL/hr over  Minutes Intravenous EVERY 24 HOURS 05/12/21 1830 05/17/21 1659          Indication: COVID-19    Days of Therapy: 2     Pertinent Labs:  WBC   Date Value Ref Range Status   05/13/2021 10.3 4.0 - 11.0 10e9/L Final   05/12/2021 7.5 4.0 - 11.0 10e9/L Final   05/12/2021 9.8 4.0 - 11.0 10e9/L Final     Procalcitonin   Date Value Ref Range Status   05/13/2021 1.66 ng/ml Final     Comment:     0.50-1.99 ng/ml  Moderate risk of systemic infection.  Recommendation:   Recommend antibiotics. Evaluate culture results and clinical features to   target antibacterial therapy. Obtain blood cultures and other relevant   cultures if not done.  If empiric antibiotics were started, recheck PCT in: 2 days to guide   antibiotic de-escalation.  Discontinue or de-escalate antibiotics when PCT   concentration is <80% of peak or abs PCT <0.5. If empiric antibiotics were NOT   started, recheck PCT in 6-24 hours to re-evaluate need for antibiotics.     05/12/2021 1.60 ng/ml Final     Comment:     0.50-1.99 ng/ml  Moderate risk of systemic infection.  Recommendation:   Recommend antibiotics. Evaluate culture results and clinical features to   target antibacterial therapy. Obtain blood cultures and other relevant   cultures if not done.  If empiric antibiotics were started, recheck PCT in: 2 days to guide   antibiotic de-escalation.  Discontinue or de-escalate antibiotics when PCT   concentration is <80% of peak or abs PCT <0.5. If empiric antibiotics were NOT   started, recheck PCT in 6-24 hours to  re-evaluate need for antibiotics.     11/10/2020 <0.05 ng/ml Final     Comment:     <0.05 ng/ml  Normal  Recommendation: Very low risk of bacterial infection.   Discourage antibiotics unless strong clinical suspicion for serious infection.       CRP Inflammation   Date Value Ref Range Status   05/13/2021 377.0 (H) 0.0 - 8.0 mg/L Final   05/12/2021 278.0 (H) 0.0 - 8.0 mg/L Final   05/11/2021 130.0 (H) 0.0 - 8.0 mg/L Final       Culture Results:   5/11/21: COVID positive  LFTs wnl at baseline       Recommendations/Interventions:  1. Per COVID Anticoagulation Guideline (D-dimer>5 mcg/mL) - recommend switching Lovenox to 0.5mg/kg BID = 30 mg Lovenox BID    Doreen Ramírez, [facility]  May 13, 2021

## 2021-05-13 NOTE — PLAN OF CARE
Pt confused. Throughout shift has episodes of alertness and orientated x3. 1.1 sitter at bedside. Oral cares every 2hr and prn. Remains NPO. Repositioned. Pillows in use for positioning. SCD's on. K+ being placed. CT completed this AM. Remains on AIRVO with decreased settings per RT. Mata patent - draining clear urine. Unable to get sputum. Pt has wet congested cough, cont attempting to clear throat. Lung sounds coarse/crackles. BS active. Denies pain.

## 2021-05-13 NOTE — PLAN OF CARE
Face to face report given with opportunity to observe patient.    Report given to JUDD Og   5/13/2021  6:52 AM

## 2021-05-13 NOTE — PLAN OF CARE
Attempted to give pt sips of water with meds and he was coughing the entire time. Did not give oral meds.

## 2021-05-13 NOTE — PLAN OF CARE
Skin assessed head to toe with pts permission during monthly skin rounds. Pt had areas of blanchable redness to buttock/coccyx, R and L elbows, R and L inner knees. Repositioned pt. Agree with primary RN plan of care to apply barrier cream and turn and reposition q2h.

## 2021-05-13 NOTE — PROGRESS NOTES
Assisted with transfer to CT scan.  On arrival back to room tried patient on high flow nasal cannula at 15L and SPO2 92%.  Put back on airvo with settingsl temp 31, flow 50L and FiO2 49% and SPO2 94%.

## 2021-05-13 NOTE — PLAN OF CARE
Pt Alert and oriented at times, can be intermittently confused. Pt likes to pull off his O2 and monitor cords and need frequent redirection to leave them on. VSS on airvo at 60lpm and 94% FiO2. Denies any pain. HRR, tele reading ST 100s. Lungs are coarse throughout, pt remains tachypneic with CAPONE. Pt did cough up some thick yellow sputum. BS active, 2 large black tarry stools this shift. Blood sugars have been stable ranging from 120s-150s, insulin drip stopped and 0230 and latus was given, along with novolog. Pt now on sliding scale insulin. IV running D5 1/2 NS with 20K at 125mls/hr. Pt is NPO as he was coughing with sips of water. Pt was up to the commode x3 with an A of 2. Pt was restless and up most of the night, attempted repositioning multiple times. Oral cares done as pt would allow. No other significant events to this point.

## 2021-05-13 NOTE — PLAN OF CARE
0720-PT O2 sats monitoring on monitor at nurses station. When looked up at pt video monitor, pt was noted to be laying on floor next to bed on.   2 staff entered room. Pt laying on back attempting to get up. Pt denies hitting head, but pt also confused. No noted skin alterations to head. Denies pain. Able to move all 4 extremities. Transferred with 2 staff assist and gait belt. Pt continous to deny pain. Pt up at edge of bed. Monitors replaced. VS obtained. Neuros intact. Mata patent. IV fluids infusing   Hospitalist and supervisor informed.

## 2021-05-13 NOTE — PROGRESS NOTES
Received message back from [facility] advising they would have to review with administration on a case by case basis.  Received message from [facility] advising could review but unable to accept into facility until 14-20 days post positive test depending on how immune compromised individual is.  Will continue to discharge plan as appropriate for patient.

## 2021-05-14 ENCOUNTER — APPOINTMENT (OUTPATIENT)
Dept: SPEECH THERAPY | Facility: HOSPITAL | Age: 81
DRG: 177 | End: 2021-05-14
Payer: COMMERCIAL

## 2021-05-14 LAB
ANION GAP SERPL CALCULATED.3IONS-SCNC: 5 MMOL/L (ref 3–14)
BUN SERPL-MCNC: 32 MG/DL (ref 7–30)
CALCIUM SERPL-MCNC: 6.9 MG/DL (ref 8.5–10.1)
CHLORIDE SERPL-SCNC: 112 MMOL/L (ref 94–109)
CO2 SERPL-SCNC: 24 MMOL/L (ref 20–32)
CREAT SERPL-MCNC: 0.92 MG/DL (ref 0.66–1.25)
CRP SERPL-MCNC: 322 MG/L (ref 0–8)
D DIMER PPP FEU-MCNC: 8.6 UG/ML FEU (ref 0–0.5)
ERYTHROCYTE [DISTWIDTH] IN BLOOD BY AUTOMATED COUNT: 13.5 % (ref 10–15)
FIBRINOGEN PPP-MCNC: 548 MG/DL (ref 200–420)
GFR SERPL CREATININE-BSD FRML MDRD: 78 ML/MIN/{1.73_M2}
GLUCOSE BLDC GLUCOMTR-MCNC: 272 MG/DL (ref 70–99)
GLUCOSE BLDC GLUCOMTR-MCNC: 287 MG/DL (ref 70–99)
GLUCOSE BLDC GLUCOMTR-MCNC: 288 MG/DL (ref 70–99)
GLUCOSE BLDC GLUCOMTR-MCNC: 303 MG/DL (ref 70–99)
GLUCOSE BLDC GLUCOMTR-MCNC: 457 MG/DL (ref 70–99)
GLUCOSE BLDC GLUCOMTR-MCNC: 459 MG/DL (ref 70–99)
GLUCOSE BLDC GLUCOMTR-MCNC: >600 MG/DL (ref 70–99)
GLUCOSE SERPL-MCNC: 256 MG/DL (ref 70–99)
HCT VFR BLD AUTO: 32.7 % (ref 40–53)
HGB BLD-MCNC: 11.4 G/DL (ref 13.3–17.7)
MCH RBC QN AUTO: 31 PG (ref 26.5–33)
MCHC RBC AUTO-ENTMCNC: 34.9 G/DL (ref 31.5–36.5)
MCV RBC AUTO: 89 FL (ref 78–100)
PLATELET # BLD AUTO: 152 10E9/L (ref 150–450)
POTASSIUM SERPL-SCNC: 3.6 MMOL/L (ref 3.4–5.3)
RBC # BLD AUTO: 3.68 10E12/L (ref 4.4–5.9)
SODIUM SERPL-SCNC: 141 MMOL/L (ref 133–144)
WBC # BLD AUTO: 9.4 10E9/L (ref 4–11)

## 2021-05-14 PROCEDURE — 92526 ORAL FUNCTION THERAPY: CPT | Mod: GN

## 2021-05-14 PROCEDURE — 36415 COLL VENOUS BLD VENIPUNCTURE: CPT | Performed by: NURSE PRACTITIONER

## 2021-05-14 PROCEDURE — 250N000009 HC RX 250: Performed by: NURSE PRACTITIONER

## 2021-05-14 PROCEDURE — 85027 COMPLETE CBC AUTOMATED: CPT | Performed by: NURSE PRACTITIONER

## 2021-05-14 PROCEDURE — 85384 FIBRINOGEN ACTIVITY: CPT | Performed by: NURSE PRACTITIONER

## 2021-05-14 PROCEDURE — 999N000157 HC STATISTIC RCP TIME EA 10 MIN

## 2021-05-14 PROCEDURE — 85379 FIBRIN DEGRADATION QUANT: CPT | Performed by: NURSE PRACTITIONER

## 2021-05-14 PROCEDURE — 200N000001 HC R&B ICU

## 2021-05-14 PROCEDURE — 80048 BASIC METABOLIC PNL TOTAL CA: CPT | Performed by: NURSE PRACTITIONER

## 2021-05-14 PROCEDURE — 999N001017 HC STATISTIC GLUCOSE BY METER IP

## 2021-05-14 PROCEDURE — 250N000011 HC RX IP 250 OP 636: Performed by: NURSE PRACTITIONER

## 2021-05-14 PROCEDURE — 86140 C-REACTIVE PROTEIN: CPT | Performed by: NURSE PRACTITIONER

## 2021-05-14 PROCEDURE — 99233 SBSQ HOSP IP/OBS HIGH 50: CPT | Performed by: INTERNAL MEDICINE

## 2021-05-14 PROCEDURE — 258N000003 HC RX IP 258 OP 636: Performed by: NURSE PRACTITIONER

## 2021-05-14 RX ADMIN — POTASSIUM CHLORIDE, DEXTROSE MONOHYDRATE AND SODIUM CHLORIDE: 150; 5; 450 INJECTION, SOLUTION INTRAVENOUS at 23:55

## 2021-05-14 RX ADMIN — ENOXAPARIN SODIUM 30 MG: 30 INJECTION SUBCUTANEOUS at 08:50

## 2021-05-14 RX ADMIN — INSULIN ASPART 18 UNITS: 100 INJECTION, SOLUTION INTRAVENOUS; SUBCUTANEOUS at 23:43

## 2021-05-14 RX ADMIN — ENOXAPARIN SODIUM 30 MG: 30 INJECTION SUBCUTANEOUS at 21:06

## 2021-05-14 RX ADMIN — MEROPENEM AND SODIUM CHLORIDE 1 G: 1 INJECTION, SOLUTION INTRAVENOUS at 03:35

## 2021-05-14 RX ADMIN — REMDESIVIR 100 MG: 100 INJECTION, POWDER, LYOPHILIZED, FOR SOLUTION INTRAVENOUS at 16:19

## 2021-05-14 RX ADMIN — SODIUM CHLORIDE 50 ML: 9 INJECTION, SOLUTION INTRAVENOUS at 17:40

## 2021-05-14 RX ADMIN — IPRATROPIUM BROMIDE AND ALBUTEROL 2 PUFF: 20; 100 SPRAY, METERED RESPIRATORY (INHALATION) at 09:14

## 2021-05-14 RX ADMIN — DEXAMETHASONE SODIUM PHOSPHATE 6 MG: 4 INJECTION, SOLUTION INTRA-ARTICULAR; INTRALESIONAL; INTRAMUSCULAR; INTRAVENOUS; SOFT TISSUE at 08:51

## 2021-05-14 RX ADMIN — MEROPENEM AND SODIUM CHLORIDE 1 G: 1 INJECTION, SOLUTION INTRAVENOUS at 10:56

## 2021-05-14 RX ADMIN — MEROPENEM AND SODIUM CHLORIDE 1 G: 1 INJECTION, SOLUTION INTRAVENOUS at 18:30

## 2021-05-14 RX ADMIN — IPRATROPIUM BROMIDE AND ALBUTEROL 2 PUFF: 20; 100 SPRAY, METERED RESPIRATORY (INHALATION) at 16:35

## 2021-05-14 RX ADMIN — IPRATROPIUM BROMIDE AND ALBUTEROL 2 PUFF: 20; 100 SPRAY, METERED RESPIRATORY (INHALATION) at 12:30

## 2021-05-14 RX ADMIN — IPRATROPIUM BROMIDE AND ALBUTEROL 2 PUFF: 20; 100 SPRAY, METERED RESPIRATORY (INHALATION) at 21:10

## 2021-05-14 RX ADMIN — INSULIN ASPART 18 UNITS: 100 INJECTION, SOLUTION INTRAVENOUS; SUBCUTANEOUS at 21:16

## 2021-05-14 RX ADMIN — POTASSIUM CHLORIDE, DEXTROSE MONOHYDRATE AND SODIUM CHLORIDE: 150; 5; 450 INJECTION, SOLUTION INTRAVENOUS at 10:55

## 2021-05-14 ASSESSMENT — ACTIVITIES OF DAILY LIVING (ADL)
ADLS_ACUITY_SCORE: 14
ADLS_ACUITY_SCORE: 14
ADLS_ACUITY_SCORE: 15
ADLS_ACUITY_SCORE: 14

## 2021-05-14 ASSESSMENT — MIFFLIN-ST. JEOR: SCORE: 1228.63

## 2021-05-14 NOTE — PROGRESS NOTES
05/14/21 1200   Signing Clinician's Name / Credentials   Signing clinician's name / credentials Jenna Crowe MS, CCC-SLP   Quick Adds   Rehab Discipline SLP   Quick Adds Speech Language Pathology   SLP - Dysphagia/Swallow   Minutes of Treatment 20 minutes   Symptoms Noted During/After Treatment Fatigue   Treatment Detail Patient seen for swallowing treatment to determine if patient is safe for oral intake. Patient sitting in chair at 90 degrees. Patient given cup of honey thickened liquids; took a large gulp and began coughing immediately. Patient given small spoonfuls of honey thickened liquids and demonstrated delayed cough. Instructed patient to take small spoonful, tuck his chin, and swallow 2x per bite. Signs of aspiration decreased with use of compensatory strategies. Patient tolerating pureed textures with verbal cues to use compensatory strategies. Patient did require pacing during eating as his endurance for a full meal is decreased. He reported feeling more tired as the swallowing treatment session continued. Patient trialing mechanical soft textures (diced peaches) with slowed and incomplete mastication due to missing molars and some front teeth. Patient had oral residue on the mid posterior portion of his tongue and soft velum. Recommend honey thickened liquids via spoon and pureed textures at this time. Patient continues to be a high risk for aspiration. Requires direct supervision for instruction of swallowing strategies to increase airway protection and reduce risks of aspiration. Patient unable to remember strategy use independently. A visual cue of chin tuck and swallow 2x was placed in the patient's room as a reminder. Recommendations for feeding below were placed in the patient's room for direct supervisor to follow. Give verbal instructions to the patient for small bite, small sip via spoon, eat slowly, tuck chin every swallow, swallow 2x per bite/sip.   SLP Discharge Planning    SLP  Discharge Recommendation (DC Rec) Transitional Care Facility   SLP Rationale for DC Rec Patient is demonstrating severe oropharyngeal dysphagia   SLP Brief overview of current status  Patient is at high risk for aspiration. Recommend honey thickened liquids fed via spoon and NDD1 pureed textures with use of compensatory strategies of chin tuck and swallow 2x per bite/sip. Patient requires direct supervision and verbal cues to utilize compensatory strategies to increase airway protection and reduce risks of aspiration   Additional Documentation   Rehab Comments DIRECT SUPERVISION during meals. Will need verbal cues to use compensatory strategies. Have patient stop eating if he is coughing, throat clearing, or his voice becomes wet and gurgly during oral intake   SLP Plan SLP will continue to treat patient during hospital stay.    Total Session Time   Total Session Time (minutes) 20 minutes       Recommendations for Feeding:  Diet: honey thick liquids fed via spoon and NDD1 pureed textures  - Patient requires direct supervision in his room during oral intake. Provide verbal instructions (chin down, swallow 2x per bite, liquids via spoon)   - Patient must sit in the chair at 90 degrees (may require pillows behind his back)  - Eliminate all distractions; turn off the TV  - Patient should only eat when he is alert  - Patient must drink liquids via spoon (patient would benefit from Ensure or Boost thickened to increase caloric intake)  - Tuck chin during every swallow to increase airway protection  - Swallow 2x per bite of food to eliminate pharyngeal residue  - Encourage small bite, small sip  - Patient must remain upright in chair at least 30-45 minutes after oral intake  - Patient may only be able to tolerate a few swallows per meal. He does not have the endurance for a full meal at this time.     NOTE: If patient becomes too tired, there are noted changes in his vocal quality/breathing (wet and gurgly), or he begins  coughing frequently while eating, stop feeding immediately and complete oral cares. SLP to re-assess patient s swallow

## 2021-05-14 NOTE — PROGRESS NOTES
[facility]    Hospitalist Progress Note    Date of Service (when I saw the patient): 05/14/2021    Assessment & Plan         2019 novel coronavirus pnuemonia (COVID-19):   Had COVID-19 in September and tested negative 11/20, positive on arrival and he was mildly hypoxic on arrival. Over the last 24 hours his pulmonary picture worsened significantly and he required high flow nasal cannula then Airvo. His pressure was up all the way with Fio2 requirement over 90% to maintain sats >90%. He is on remdesivir, dexamethasone, albuterol, dvt prophylaxis BID as D-dimer>9.  -5/14: Overall appears to have made significant improvement.  Supplemental oxygen requirements have declined substantially and his respiratory failure may have been as much on the basis of volume overload and aspiration pneumonitis as Covid pneumonitis.  Continue treatment directed at his Covid infection as well as vigorous pulmonary hygiene.    Acute respiratory failure with hypoxia:   Multifactorial. Covid contributing, CT chest shows bilateral opacities considerably worse compared to yesterdays CXR. However he also has significant interstitial thickening bilaterally. Suspect this is pulmonary edema from the nearly 5 liters of fluid he required on arrival due to DKA. Gave 40mg of Lasix with profound response of over 2 liters in 8 hours. Will repeat with smaller dose of 20mg this evening and evaluate response. ECHO done today, result pending. His procalcitonin is also elevated with dysphagia and possible aspiration as ct shows fluid in esophagus and some thickening. Start meropenem for CAP with coverage for possible aspiration. He has not MDRO risk factors, no clear indicators need to cover for pseudomonas. I talked with both his brother and his son and confirmed DNR/DNI status with them. I made clear the severity of illness and that he may not improve and this may cause his death. This afternoon I did call his son Al and advise him that CT  head and chest came back okay with exception of worsened pneumonia and that his oxygen needs improved substantially through the day.   -5/14: As above marked improvement.  Still requires moderate flow oxygen but significantly improved.  Tolerating standard nasal cannula.  Increasing activity which should help preserve lung volume and FRC.  Pulmonary hygiene.  As below continue antibiotic treatment but discontinuation in the near future may be possible.      Diabetic ketoacidosis without coma associated with type 2 diabetes mellitus (H):   In the past [facility] has only [facility] J[facility]. However, A1c is >13. Glucose came down quickly with IV insulin. Anion gap now closed but ketones still elevated. Will continue IV insulin until ketones cleared. He is still quite fatigued do don't believe he is ready to eat yet either.   -5/13: Insulin drip stopped during the night and lantus started as well as sliding scale. He was previously only on half dose jardiance (12.5mg) and glucophage. Since he is not able to eat anything still will continue d5 at 75ml an hour, once that is stopped adjustments to long acting insulin may be required.   -5/14: Still hyperglycemia but no evidence of ketoacidosis.  Mild elevation in ketones may have been on much on the basis of starvation ketosis given limitation of oral intake because of concerns for aspiration as from diabetes per se.  Accelerated both long and short acting insulin today to help decrease his level of glucose.    Hypokalemia: Due to DKA with insulin requirements, now expect worsening due to large output after lasix. Replaced with 80meq IV, recheck at 4pm and in AM.   -5/14: This appears largely resolved with resolution in his ketoacidosis.      Hyponatremia: Resolved with IVF, likely due to dehydration and poor oral intake.       Fall: Fell at home, unsure how long he was laying on the floor.       Confusion  Metabolic encephalopathy: Family does state he has been  confused and having falls since his original covid diagnosis back in September.   -5/13: Worse this morning, he is awake and alert but admits he cannot remember even where he is or why. This afternoon nursing reports he has cleared significantly.   -5/14: This appears to be significantly improved.  No agitation which nursing staff in particular note was a significant issue.  Still has some slowed responses and distractibility but overall is showing a significant improvement.  Likely  Predominantly metabolic encephalopathy.  May have some more chronic encephalopathy which family had noted beginning with his initial Covid infection.      Acute on chronic renal failure stage 3a(H):   Due to dehydration/DKA.  Creatinine normally 1.0-1.3, on arrival 1.89. Cleared overnight with IVF, now back to baseline.      Probable moderate malnutrition:   No large amount of weight loss compared to 5 months ago. However he does have evidence of subcutaneous fat loss and muscle wasting.    Aspiration pneumonitis  Dysphagia  -5/14: Suspect aspiration pneumonitis is inflammatory rather than infectious.  He has shown significant improvement in that time consistent with inflammatory response to an aspiration event.  Continue current antibiotic treatment at least for another day or so.  Usual laboratory evaluation such as procalcitonin poorly reflective distinction between infectious and inflammatory processes however.  Appreciate speech therapy's input.  Have been able to advance his diet with close attention on the part of nursing staff.  Neuromuscular function in terms of dysphagia appears to be relatively well-preserved but he needs regular prompting for safe swallowing.      DVT Prophylaxis: Enoxaparin (Lovenox) subcutaneously  Code Status: No CPR- Do NOT Intubate    Disposition: Expected discharge in 3-5 days once respiratory status stable, DKA resolved.        Interval History   Awake and alert.  Mildly distractible but mental state  based on review of records and discussion with nursing staff appears markedly improved.  Significant decrease in oxygen requirements.  Increased exercise tolerance.    -Data reviewed today: I reviewed all new labs and imaging results over the last 24 hours. I personally reviewed .    Physical Exam   Temp: 97.6  F (36.4  C) Temp src: Tympanic BP: 118/54 Pulse: 82   Resp: 19 SpO2: 98 % O2 Device: None (Room air) Oxygen Delivery: 2 LPM  Vitals:    05/11/21 2336 05/13/21 0411 05/14/21 0332   Weight: 57.7 kg (127 lb 3.3 oz) 56.4 kg (124 lb 5.4 oz) 55 kg (121 lb 4.1 oz)     Vital Signs with Ranges  Temp:  [97.5  F (36.4  C)-99.7  F (37.6  C)] 97.6  F (36.4  C)  Pulse:  [] 82  Resp:  [12-27] 19  BP: (100-143)/(54-82) 118/54  FiO2 (%):  [40 %-51 %] 40 %  SpO2:  [94 %-98 %] 98 %  I/O last 3 completed shifts:  In: 2615 [P.O.:120; I.V.:2495]  Out: 2680 [Urine:2680]    Peripheral IV 05/11/21 Right Upper forearm (Active)   Site Assessment WDL except;Draining 05/14/21 1306   Line Status Saline locked 05/14/21 1306   Phlebitis Scale 0-->no symptoms 05/14/21 1306   Infiltration Scale 0 05/14/21 1306   Number of days: 3       Peripheral IV 05/13/21 Anterior;Right Lower forearm (Active)   Site Assessment WDL 05/14/21 1306   Line Status Infusing;Checked every 1-2 hour 05/14/21 1306   Dressing Intervention New dressing  05/13/21 1132   Phlebitis Scale 0-->no symptoms 05/14/21 1306   Infiltration Scale 0 05/14/21 1306   Infiltration Site Treatment Method  None 05/13/21 1132   Number of days: 1       Pressure Injury 09/20/20 Coccyx Stage 1 (Active)   Number of days: 236     Line/device assessment(s) completed for medical necessity reviewed.  May 14.    Constitutional: Awake and alert.  Mildly distractible.  No resting dyspnea.  Respiratory: Aeration to bases.  Accessory muscles not in use.  Few right basilar wheezes.  Scattered mid inspiratory crackles.  No rhonchi.  No egophony.  Cardiovascular: PMI not displaced.  Distinct heart  tones.  Regular rate and rhythm.  No murmurs, rubs, thrills.   GI: Soft,nontender,bowel sounds are positive.   Skin/Integumen: Warm distally.    Medications     dextrose 5% and 0.45% NaCl + KCl 20 mEq/L 75 mL/hr at 05/14/21 1055     - MEDICATION INSTRUCTIONS -         remdesivir  100 mg Intravenous Q24H    And     sodium chloride 0.9%  50 mL Intravenous Q24H     dexamethasone  6 mg Intravenous Q24H     enoxaparin ANTICOAGULANT  30 mg Subcutaneous BID     insulin aspart  1-14 Units Subcutaneous Q4H     insulin glargine  16 Units Subcutaneous At Bedtime     ipratropium-albuterol  2 puff Inhalation 4x Daily     meropenem  1 g Intravenous Q8H     sodium chloride (PF)  3 mL Intracatheter Q8H       Data   Recent Labs   Lab 05/14/21  0518 05/13/21  1526 05/13/21  0503 05/12/21  1846 05/11/21 2005 05/11/21 2005   WBC 9.4  --  10.3 7.5   < > 16.0*   HGB 11.4*  --  13.6 13.9  --  14.5   MCV 89  --  90 89  --  90     --  150 173  --  282   INR  --   --   --  1.11  --   --      --  137 138   < > 119*   POTASSIUM 3.6 3.9 3.1* 3.1*   < > 4.8   CHLORIDE 112*  --  110* 108   < > 83*   CO2 24  --  19* 22   < > 11*   BUN 32*  --  32* 37*   < > 98*   CR 0.92  --  0.93 0.89   < > 1.83*   ANIONGAP 5  --  8 8   < > 25*   CARLOS 6.9*  --  7.2* 7.7*   < > 8.8   *  --  118* 128*   < > 661*   ALBUMIN  --   --  2.4* 2.7*   < > 3.6   PROTTOTAL  --   --  6.2* 6.5*   < > 7.7   BILITOTAL  --   --  0.3 0.3   < > 0.7   ALKPHOS  --   --  71 70   < > 107   ALT  --   --  14 13   < > 16   AST  --   --  34 23   < > 10   TROPI  --   --   --  <0.015  --  <0.015    < > = values in this interval not displayed.       No results found for this or any previous visit (from the past 24 hour(s)).

## 2021-05-14 NOTE — PLAN OF CARE
"Pt intermittently confused to time, place and situation. Pt forgetful, frequently reminded of weathers in place as pt tries to get OOB \"to use the bathroom\"- improved overnight. Remains on Airvo at 50L, 40% FiO2. LS diminished with crackles in bases. HR SR 80-90s. BS hypoactive, LBM 5/13. Weathers in place with large amounts of clear output. Scattered skin abrasions. Bruises as charted. Voice is hoarse, frequent oral cares. D5 1/2 NS +20K at 75ml/hr to PIV. attendant at bedside for impulsivity. Pt has been flipping from side to side frequently, turned as needed with pillow support. Remains NPO.    Face to face report given with opportunity to observe patient.    Report given to Tiara Richards RN   5/14/2021  7:16 AM    "

## 2021-05-14 NOTE — PLAN OF CARE
Face to face report given with opportunity to observe patient.    Report given to Mary Levin RN   5/13/2021  7:16 PM

## 2021-05-14 NOTE — PLAN OF CARE
Pt A&Ox3. Makes needs known. Denies pain. Up in chair. Transferred with SBA and gait belt. IVF infusing. Afebrile. Mata patent. Off AIRVO and on HFNC titrated from 8L to 6L.   1130 - O2 Titrated down to 5L.  1419 - Titrated to RA. Mata catheter removed. Awaiting to void.   1730 - Pt declined to ambulate. Gait steady when getting into chair.  Has been up in chair t/o day. IS 2000 and flutter valve. O2 at 2lpm via [place]. Pt has been coughing up thick yellow phlegm. Lung sound vary from coarse to crackles.   1830 - Pt has not voided, did have urge. Bladder scanned 708. Straight cathed 800cc clear aliza urine. 2 small clot clots while straight cathing.

## 2021-05-15 LAB
ANION GAP SERPL CALCULATED.3IONS-SCNC: 5 MMOL/L (ref 3–14)
BASOPHILS # BLD AUTO: 0 10E9/L (ref 0–0.2)
BASOPHILS NFR BLD AUTO: 0.3 %
BUN SERPL-MCNC: 36 MG/DL (ref 7–30)
CALCIUM SERPL-MCNC: 7.6 MG/DL (ref 8.5–10.1)
CHLORIDE SERPL-SCNC: 119 MMOL/L (ref 94–109)
CO2 SERPL-SCNC: 24 MMOL/L (ref 20–32)
CREAT SERPL-MCNC: 0.93 MG/DL (ref 0.66–1.25)
CRP SERPL-MCNC: 214 MG/L (ref 0–8)
DIFFERENTIAL METHOD BLD: ABNORMAL
EOSINOPHIL # BLD AUTO: 0 10E9/L (ref 0–0.7)
EOSINOPHIL NFR BLD AUTO: 0.2 %
ERYTHROCYTE [DISTWIDTH] IN BLOOD BY AUTOMATED COUNT: 14.1 % (ref 10–15)
FIBRINOGEN PPP-MCNC: 655 MG/DL (ref 200–420)
GFR SERPL CREATININE-BSD FRML MDRD: 78 ML/MIN/{1.73_M2}
GLUCOSE BLDC GLUCOMTR-MCNC: 130 MG/DL (ref 70–99)
GLUCOSE BLDC GLUCOMTR-MCNC: 154 MG/DL (ref 70–99)
GLUCOSE BLDC GLUCOMTR-MCNC: 160 MG/DL (ref 70–99)
GLUCOSE BLDC GLUCOMTR-MCNC: 267 MG/DL (ref 70–99)
GLUCOSE BLDC GLUCOMTR-MCNC: 308 MG/DL (ref 70–99)
GLUCOSE BLDC GLUCOMTR-MCNC: 325 MG/DL (ref 70–99)
GLUCOSE SERPL-MCNC: 113 MG/DL (ref 70–99)
HCT VFR BLD AUTO: 37 % (ref 40–53)
HGB BLD-MCNC: 12.5 G/DL (ref 13.3–17.7)
IMM GRANULOCYTES # BLD: 0.3 10E9/L (ref 0–0.4)
IMM GRANULOCYTES NFR BLD: 2.2 %
LYMPHOCYTES # BLD AUTO: 1 10E9/L (ref 0.8–5.3)
LYMPHOCYTES NFR BLD AUTO: 8.3 %
MCH RBC QN AUTO: 30.8 PG (ref 26.5–33)
MCHC RBC AUTO-ENTMCNC: 33.8 G/DL (ref 31.5–36.5)
MCV RBC AUTO: 91 FL (ref 78–100)
MONOCYTES # BLD AUTO: 1.2 10E9/L (ref 0–1.3)
MONOCYTES NFR BLD AUTO: 9.7 %
NEUTROPHILS # BLD AUTO: 9.5 10E9/L (ref 1.6–8.3)
NEUTROPHILS NFR BLD AUTO: 79.3 %
NRBC # BLD AUTO: 0 10*3/UL
NRBC BLD AUTO-RTO: 0 /100
PLATELET # BLD AUTO: 186 10E9/L (ref 150–450)
POTASSIUM SERPL-SCNC: 3.8 MMOL/L (ref 3.4–5.3)
RBC # BLD AUTO: 4.06 10E12/L (ref 4.4–5.9)
SODIUM SERPL-SCNC: 148 MMOL/L (ref 133–144)
WBC # BLD AUTO: 12 10E9/L (ref 4–11)

## 2021-05-15 PROCEDURE — 258N000003 HC RX IP 258 OP 636: Performed by: NURSE PRACTITIONER

## 2021-05-15 PROCEDURE — 250N000009 HC RX 250: Performed by: NURSE PRACTITIONER

## 2021-05-15 PROCEDURE — 250N000013 HC RX MED GY IP 250 OP 250 PS 637: Performed by: INTERNAL MEDICINE

## 2021-05-15 PROCEDURE — 80048 BASIC METABOLIC PNL TOTAL CA: CPT | Performed by: NURSE PRACTITIONER

## 2021-05-15 PROCEDURE — 99233 SBSQ HOSP IP/OBS HIGH 50: CPT | Performed by: INTERNAL MEDICINE

## 2021-05-15 PROCEDURE — 86140 C-REACTIVE PROTEIN: CPT | Performed by: NURSE PRACTITIONER

## 2021-05-15 PROCEDURE — 85025 COMPLETE CBC W/AUTO DIFF WBC: CPT | Performed by: NURSE PRACTITIONER

## 2021-05-15 PROCEDURE — 258N000003 HC RX IP 258 OP 636: Performed by: INTERNAL MEDICINE

## 2021-05-15 PROCEDURE — 36415 COLL VENOUS BLD VENIPUNCTURE: CPT | Performed by: NURSE PRACTITIONER

## 2021-05-15 PROCEDURE — 999N001017 HC STATISTIC GLUCOSE BY METER IP

## 2021-05-15 PROCEDURE — 120N000001 HC R&B MED SURG/OB

## 2021-05-15 PROCEDURE — 250N000011 HC RX IP 250 OP 636: Performed by: NURSE PRACTITIONER

## 2021-05-15 PROCEDURE — 250N000011 HC RX IP 250 OP 636: Performed by: INTERNAL MEDICINE

## 2021-05-15 PROCEDURE — 85384 FIBRINOGEN ACTIVITY: CPT | Performed by: NURSE PRACTITIONER

## 2021-05-15 RX ORDER — DEXTROSE MONOHYDRATE, SODIUM CHLORIDE, AND POTASSIUM CHLORIDE 50; 1.49; 4.5 G/1000ML; G/1000ML; G/1000ML
INJECTION, SOLUTION INTRAVENOUS CONTINUOUS
Status: DISCONTINUED | OUTPATIENT
Start: 2021-05-15 | End: 2021-05-17

## 2021-05-15 RX ORDER — TAMSULOSIN HYDROCHLORIDE 0.4 MG/1
0.4 CAPSULE ORAL DAILY
Status: DISCONTINUED | OUTPATIENT
Start: 2021-05-15 | End: 2021-05-25 | Stop reason: HOSPADM

## 2021-05-15 RX ORDER — FUROSEMIDE 20 MG
20 TABLET ORAL DAILY
Status: COMPLETED | OUTPATIENT
Start: 2021-05-15 | End: 2021-05-15

## 2021-05-15 RX ADMIN — INSULIN ASPART 10 UNITS: 100 INJECTION, SOLUTION INTRAVENOUS; SUBCUTANEOUS at 20:32

## 2021-05-15 RX ADMIN — ENOXAPARIN SODIUM 30 MG: 30 INJECTION SUBCUTANEOUS at 21:44

## 2021-05-15 RX ADMIN — SODIUM CHLORIDE 50 ML: 9 INJECTION, SOLUTION INTRAVENOUS at 18:37

## 2021-05-15 RX ADMIN — IPRATROPIUM BROMIDE AND ALBUTEROL 2 PUFF: 20; 100 SPRAY, METERED RESPIRATORY (INHALATION) at 14:43

## 2021-05-15 RX ADMIN — REMDESIVIR 100 MG: 100 INJECTION, POWDER, LYOPHILIZED, FOR SOLUTION INTRAVENOUS at 16:33

## 2021-05-15 RX ADMIN — INSULIN ASPART 13 UNITS: 100 INJECTION, SOLUTION INTRAVENOUS; SUBCUTANEOUS at 12:19

## 2021-05-15 RX ADMIN — IPRATROPIUM BROMIDE AND ALBUTEROL 2 PUFF: 20; 100 SPRAY, METERED RESPIRATORY (INHALATION) at 16:43

## 2021-05-15 RX ADMIN — DEXAMETHASONE SODIUM PHOSPHATE 6 MG: 4 INJECTION, SOLUTION INTRA-ARTICULAR; INTRALESIONAL; INTRAMUSCULAR; INTRAVENOUS; SOFT TISSUE at 08:50

## 2021-05-15 RX ADMIN — IPRATROPIUM BROMIDE AND ALBUTEROL 2 PUFF: 20; 100 SPRAY, METERED RESPIRATORY (INHALATION) at 20:41

## 2021-05-15 RX ADMIN — ENOXAPARIN SODIUM 30 MG: 30 INJECTION SUBCUTANEOUS at 08:48

## 2021-05-15 RX ADMIN — INSULIN ASPART 13 UNITS: 100 INJECTION, SOLUTION INTRAVENOUS; SUBCUTANEOUS at 16:49

## 2021-05-15 RX ADMIN — POTASSIUM CHLORIDE, DEXTROSE MONOHYDRATE AND SODIUM CHLORIDE: 150; 5; 450 INJECTION, SOLUTION INTRAVENOUS at 14:44

## 2021-05-15 RX ADMIN — MEROPENEM AND SODIUM CHLORIDE 1 G: 1 INJECTION, SOLUTION INTRAVENOUS at 19:09

## 2021-05-15 RX ADMIN — FUROSEMIDE 20 MG: 20 TABLET ORAL at 08:50

## 2021-05-15 RX ADMIN — MEROPENEM AND SODIUM CHLORIDE 1 G: 1 INJECTION, SOLUTION INTRAVENOUS at 12:14

## 2021-05-15 RX ADMIN — MEROPENEM AND SODIUM CHLORIDE 1 G: 1 INJECTION, SOLUTION INTRAVENOUS at 04:32

## 2021-05-15 RX ADMIN — TAMSULOSIN HYDROCHLORIDE 0.4 MG: 0.4 CAPSULE ORAL at 08:50

## 2021-05-15 RX ADMIN — IPRATROPIUM BROMIDE AND ALBUTEROL 2 PUFF: 20; 100 SPRAY, METERED RESPIRATORY (INHALATION) at 08:51

## 2021-05-15 RX ADMIN — INSULIN ASPART 3 UNITS: 100 INJECTION, SOLUTION INTRAVENOUS; SUBCUTANEOUS at 08:49

## 2021-05-15 ASSESSMENT — ACTIVITIES OF DAILY LIVING (ADL)
ADLS_ACUITY_SCORE: 14
ADLS_ACUITY_SCORE: 14
ADLS_ACUITY_SCORE: 15
ADLS_ACUITY_SCORE: 15
ADLS_ACUITY_SCORE: 12
ADLS_ACUITY_SCORE: 14

## 2021-05-15 ASSESSMENT — MIFFLIN-ST. JEOR: SCORE: 1243.63

## 2021-05-15 NOTE — PLAN OF CARE
"/59 (BP Location: Right arm)   Pulse 108   Temp 97.6  F (36.4  C) (Tympanic)   Resp 22   Ht 1.702 m (5' 7\")   Wt 56.5 kg (124 lb 9 oz)   SpO2 92%   BMI 19.51 kg/m      Pt is A&O x4. Denies pain. SOB at times and with activity. O2 titrated to 1.5 LPM NC. Sats 92-94% at rest. Pt did not void this shift, denied urge to void, bladder scan read 873 mL. Mata catheter in place with 950 mL of urine out. IVF infusing @ 75/hr. Has been tolerating honey thick liquids and pureed diet well this shift. Assistance with meals. Bed in lowest position, call light in reach, and alarms active. Pt has been using call light appropriately.    Face to face report given with opportunity to observe patient.    Report given to JUDD Flores RN   5/15/2021  3:43 PM    "

## 2021-05-15 NOTE — PLAN OF CARE
Staff member placed pt tray near his bed and left room. Pt then took pitcher of water and drank it all unsupervised ( regular water without thickening) pt swallowed the water without difficulty and no coughing noted afterwards. Pt ate 25% of meal. And stated he was full. sats 90% on 2 lpm while sitting up in bed eating. But when laying at rest sats 96%. dsicussed with pt and staff reasons he cannot just drink plain water. And the need to thicken it, verbalized understanding.

## 2021-05-15 NOTE — PLAN OF CARE
Face to face report given with opportunity to observe patient.    Report given to Carlotta WALKER RN   5/15/2021  7:24 AM

## 2021-05-15 NOTE — PLAN OF CARE
"Pt remains 02 at 2 LPM via NC, does have episodes of dyspnea with exertion, denies for the most part. Does have wet sounding congested cough, noted after taking po liquids, small amounts given and constant reminders to tuck chin when swallowing. Pt pulled IV x2 earlier in shift, reminded pt \"that he cant pull IV out.\" Wrapped in coban to prevent pt from pulling out IV. Pt fixates on \"wanting a drink of water.\" Small amounts of water given with rounds and when this writer's at bedside.\" Denies pain. See assessments and graphics as charted. Pt unable to void, did update provider and received order to straight cath x1 more time. Straight cath done tolerated well; results of 1400 ml of clear yellow urine. Fall precautions remains in  place and no injury t/o shift. Call light within reach and is able to use appropriately.   "

## 2021-05-15 NOTE — PLAN OF CARE
"Pt's blood glucose reading, \"was  High\" insulin given as ordered and  made aware and received new orders to give 10 units of Lantus now, done.  "

## 2021-05-15 NOTE — PLAN OF CARE
Face to face report given with opportunity to observe patient.    Report given to Luisa Levin RN   5/14/2021  7:11 PM

## 2021-05-15 NOTE — PROGRESS NOTES
[facility]    Hospitalist Progress Note    Date of Service (when I saw the patient): 05/15/2021    Assessment & Plan         2019 novel coronavirus pnuemonia (COVID-19):   Had COVID-19 in September and tested negative 11/20, positive on arrival and he was mildly hypoxic on arrival. Over the last 24 hours his pulmonary picture worsened significantly and he required high flow nasal cannula then Airvo. His pressure was up all the way with Fio2 requirement over 90% to maintain sats >90%. He is on remdesivir, dexamethasone, albuterol, dvt prophylaxis BID as D-dimer>9.  -5/14: Overall appears to have made significant improvement.  Supplemental oxygen requirements have declined substantially and his respiratory failure may have been as much on the basis of volume overload and aspiration pneumonitis as Covid pneumonitis.  Continue treatment directed at his Covid infection as well as vigorous pulmonary hygiene.  -5/15: Still has persistently low supplemental oxygen needs but certainly improved over the last 48 hours.  Significant degree of his hypoxemic respiratory failure may have been on the basis of volume overload and especially aspiration pneumonitis with certainly significant contribution from Covid pneumonitis.  Continue vigorous pulmonary hygiene and encouraging walking as well as usual treatment including remdesivir and dexamethasone directed at his Covid infection itself.    Acute respiratory failure with hypoxia:   Multifactorial. Covid contributing, CT chest shows bilateral opacities considerably worse compared to yesterdays CXR. However he also has significant interstitial thickening bilaterally. Suspect this is pulmonary edema from the nearly 5 liters of fluid he required on arrival due to DKA. Gave 40mg of Lasix with profound response of over 2 liters in 8 hours. Will repeat with smaller dose of 20mg this evening and evaluate response. ECHO done today, result pending. His procalcitonin is also  elevated with dysphagia and possible aspiration as ct shows fluid in esophagus and some thickening. Start meropenem for CAP with coverage for possible aspiration. He has not MDRO risk factors, no clear indicators need to cover for pseudomonas. I talked with both his brother and his son and confirmed DNR/DNI status with them. I made clear the severity of illness and that he may not improve and this may cause his death. This afternoon I did call his son Al and advise him that CT head and chest came back okay with exception of worsened pneumonia and that his oxygen needs improved substantially through the day.   -5/14: As above marked improvement.  Still requires moderate flow oxygen but significantly improved.  Tolerating standard nasal cannula.  Increasing activity which should help preserve lung volume and FRC.  Pulmonary hygiene.  As below continue antibiotic treatment but discontinuation in the near future may be possible.  -5/15: As above.  Continued improvement although trajectory of this has slowed.  Low flow oxygen.  Likely combination of Covid pneumonitis, aspiration pneumonitis and perhaps some degree of volume overload.  The latter 2 are under reasonable control.      Diabetic ketoacidosis without coma associated with type 2 diabetes mellitus (H):   In the past he has only been [facility] Jard[facility]. However, A1c is >13. Glucose came down quickly with IV insulin. Anion gap now closed but ketones still elevated. Will continue IV insulin until ketones cleared. He is still quite fatigued do don't believe he is ready to eat yet either.   -5/13: Insulin drip stopped during the night and lantus started as well as sliding scale. He was previously only on half dose jardiance (12.5mg) and glucophage. Since he is not able to eat anything still will continue d5 at 75ml an hour, once that is stopped adjustments to long acting insulin may be required.   -5/14: Still hyperglycemia but no evidence of ketoacidosis.   Mild elevation in ketones may have been on much on the basis of starvation ketosis given limitation of oral intake because of concerns for aspiration as from diabetes per se.  Accelerated both long and short acting insulin today to help decrease his level of glucose.  -5/15: Hyperglycemia improved although still present with significant increase in both long and short acting insulin prescription in the last 36 hours.  We will not change further significantly today.  Hyperglycemia in part driven by steroid treatment and would be in especially reluctant to increase long-acting insulin significantly.  No evidence of any ketoacidosis.    Hypokalemia:   Due to DKA with insulin requirements, now expect worsening due to large output after lasix. Replaced with 80meq IV, recheck at 4pm and in AM.   -5/14: This appears largely resolved with resolution in his ketoacidosis.      Hyponatremia:   Resolved with IVF, likely due to dehydration and poor oral intake.       Fall:   Fell at home, unsure how long he was laying on the floor.  However no evidence of significant injury.  Representative of his severe presenting illness.      Confusion  Metabolic encephalopathy: Family does state he has been confused and having falls since his original covid diagnosis back in September.   -5/13: Worse this morning, he is awake and alert but admits he cannot remember even where he is or why. This afternoon nursing reports he has cleared significantly.   -5/14: This appears to be significantly improved.  No agitation which nursing staff in particular note was a significant issue.  Still has some slowed responses and distractibility but overall is showing a significant improvement.  Likely  Predominantly metabolic encephalopathy.  May have some more chronic encephalopathy which family had noted beginning with his initial Covid infection.  -5/15: Likely some mild degree of residual nonfocal encephalopathy although improved certainly over the last  48 hours.  Continued close monitoring.      Acute on chronic renal failure stage 3a(H):   Due to dehydration/DKA.  Creatinine normally 1.0-1.3, on arrival 1.89. Cleared overnight with IVF, now back to baseline in the first several days of hospitalization.      Probable moderate malnutrition:   No large amount of weight loss compared to 5 months ago. However he does have evidence of subcutaneous fat loss and muscle wasting.  -4/15: Encouraging increase in oral intake.  Appreciate nutritionist input.    Aspiration pneumonitis  Dysphagia  -5/14: Suspect aspiration pneumonitis is inflammatory rather than infectious.  He has shown significant improvement in that time consistent with inflammatory response to an aspiration event.  Continue current antibiotic treatment at least for another day or so.  Usual laboratory evaluation such as procalcitonin poorly reflective distinction between infectious and inflammatory processes however.  Appreciate speech therapy's input.  Have been able to advance his diet with close attention on the part of nursing staff.  Neuromuscular function in terms of dysphagia appears to be relatively well-preserved but he needs regular prompting for safe swallowing.  -5/15: He is required strict monitoring by nursing staff and prohibition on taking any water except with direct supervision.  Overall I suspect his swallowing function however is improved somewhat.  Await speech therapy reevaluation when there are again available in several days.      DVT Prophylaxis: Enoxaparin (Lovenox) subcutaneously  Code Status: No CPR- Do NOT Intubate    Disposition: Expected discharge in 3-5 days once respiratory status has improved further, DKA resolved.  Depending on his functional abilities he may require subacute rehabilitation which unfortunately would require a full course of treatment for his Covid infection        Interval History   Awake and alert.  Minimally distractible but mental state continues to  show progressive although slow improvement.  Significant decrease in oxygen requirements.  Increased exercise tolerance.    -Data reviewed today: I reviewed all new labs and imaging results over the last 24 hours. I personally reviewed .    Physical Exam   Temp: 97.6  F (36.4  C) Temp src: Tympanic BP: 144/59 Pulse: 108   Resp: 22 SpO2: 92 % O2 Device: Nasal cannula Oxygen Delivery: 1.5 LPM  Vitals:    05/13/21 0411 05/14/21 0332 05/15/21 0437   Weight: 56.4 kg (124 lb 5.4 oz) 55 kg (121 lb 4.1 oz) 56.5 kg (124 lb 9 oz)     Vital Signs with Ranges  Temp:  [97.4  F (36.3  C)-98.9  F (37.2  C)] 97.6  F (36.4  C)  Pulse:  [] 108  Resp:  [10-29] 22  BP: (126-152)/(59-88) 144/59  SpO2:  [90 %-97 %] 92 %  I/O last 3 completed shifts:  In: 4836 [P.O.:2260; I.V.:2576]  Out: 3450 [Urine:3150; Stool:300]    Peripheral IV 05/14/21 Anterior;Left Wrist (Active)   Site Assessment WDL 05/15/21 1310   Line Status Infusing 05/15/21 1310   Phlebitis Scale 0-->no symptoms 05/15/21 1310   Infiltration Scale 0 05/15/21 1310   Number of days: 1       Urethral Catheter (Active)   Tube Description Positional 05/15/21 1440   Catheter Care Done 05/15/21 1440   Collection Container Standard 05/15/21 1440   Securement Method Securing device (Describe) 05/15/21 1440   Rationale for Continued Use Retention 05/15/21 1440   Urine Output 950 mL 05/15/21 1440   Number of days: 0       Pressure Injury 09/20/20 Coccyx Stage 1 (Active)   Number of days: 237     Line/device assessment(s) completed for medical necessity reviewed.  May 15.    Constitutional: Awake and alert.  Mildly distractible.  No resting dyspnea.  Respiratory: Aeration to bases.  Accessory muscles not in use.  Rare right basilar wheezes.  Scattered mid inspiratory crackles.  No rhonchi.  No egophony.  Cardiovascular: PMI not displaced.  Distinct heart tones.  Regular rate and rhythm.  No murmurs, rubs, thrills.   GI: Soft,nontender,bowel sounds are positive.   Skin/Integumen:  Warm distally.    Medications     dextrose 5% and 0.45% NaCl + KCl 20 mEq/L 75 mL/hr at 05/15/21 1444     - MEDICATION INSTRUCTIONS -         remdesivir  100 mg Intravenous Q24H    And     sodium chloride 0.9%  50 mL Intravenous Q24H     dexamethasone  6 mg Intravenous Q24H     enoxaparin ANTICOAGULANT  30 mg Subcutaneous BID     insulin aspart  1-18 Units Subcutaneous Q4H     insulin glargine  18 Units Subcutaneous At Bedtime     ipratropium-albuterol  2 puff Inhalation 4x Daily     meropenem  1 g Intravenous Q8H     sodium chloride (PF)  3 mL Intracatheter Q8H     tamsulosin  0.4 mg Oral Daily       Data   Recent Labs   Lab 05/15/21  0454 05/14/21  0518 05/13/21  1526 05/13/21  0503 05/12/21  1846 05/11/21 2005 05/11/21 2005   WBC 12.0* 9.4  --  10.3 7.5   < > 16.0*   HGB 12.5* 11.4*  --  13.6 13.9  --  14.5   MCV 91 89  --  90 89  --  90    152  --  150 173  --  282   INR  --   --   --   --  1.11  --   --    * 141  --  137 138   < > 119*   POTASSIUM 3.8 3.6 3.9 3.1* 3.1*   < > 4.8   CHLORIDE 119* 112*  --  110* 108   < > 83*   CO2 24 24  --  19* 22   < > 11*   BUN 36* 32*  --  32* 37*   < > 98*   CR 0.93 0.92  --  0.93 0.89   < > 1.83*   ANIONGAP 5 5  --  8 8   < > 25*   CARLOS 7.6* 6.9*  --  7.2* 7.7*   < > 8.8   * 256*  --  118* 128*   < > 661*   ALBUMIN  --   --   --  2.4* 2.7*   < > 3.6   PROTTOTAL  --   --   --  6.2* 6.5*   < > 7.7   BILITOTAL  --   --   --  0.3 0.3   < > 0.7   ALKPHOS  --   --   --  71 70   < > 107   ALT  --   --   --  14 13   < > 16   AST  --   --   --  34 23   < > 10   TROPI  --   --   --   --  <0.015  --  <0.015    < > = values in this interval not displayed.       No results found for this or any previous visit (from the past 24 hour(s)).

## 2021-05-16 LAB
ANION GAP SERPL CALCULATED.3IONS-SCNC: 5 MMOL/L (ref 3–14)
BASOPHILS # BLD AUTO: 0.1 10E9/L (ref 0–0.2)
BASOPHILS NFR BLD AUTO: 0.4 %
BUN SERPL-MCNC: 28 MG/DL (ref 7–30)
CALCIUM SERPL-MCNC: 7.2 MG/DL (ref 8.5–10.1)
CHLORIDE SERPL-SCNC: 115 MMOL/L (ref 94–109)
CO2 SERPL-SCNC: 24 MMOL/L (ref 20–32)
CREAT SERPL-MCNC: 0.78 MG/DL (ref 0.66–1.25)
CRP SERPL-MCNC: 96.7 MG/L (ref 0–8)
D DIMER PPP FEU-MCNC: 4.2 UG/ML FEU (ref 0–0.5)
DIFFERENTIAL METHOD BLD: ABNORMAL
EOSINOPHIL # BLD AUTO: 0 10E9/L (ref 0–0.7)
EOSINOPHIL NFR BLD AUTO: 0 %
ERYTHROCYTE [DISTWIDTH] IN BLOOD BY AUTOMATED COUNT: 14 % (ref 10–15)
GFR SERPL CREATININE-BSD FRML MDRD: 86 ML/MIN/{1.73_M2}
GLUCOSE BLDC GLUCOMTR-MCNC: 125 MG/DL (ref 70–99)
GLUCOSE BLDC GLUCOMTR-MCNC: 151 MG/DL (ref 70–99)
GLUCOSE BLDC GLUCOMTR-MCNC: 155 MG/DL (ref 70–99)
GLUCOSE BLDC GLUCOMTR-MCNC: 305 MG/DL (ref 70–99)
GLUCOSE BLDC GLUCOMTR-MCNC: 313 MG/DL (ref 70–99)
GLUCOSE BLDC GLUCOMTR-MCNC: 365 MG/DL (ref 70–99)
GLUCOSE SERPL-MCNC: 142 MG/DL (ref 70–99)
HCT VFR BLD AUTO: 32.2 % (ref 40–53)
HGB BLD-MCNC: 10.9 G/DL (ref 13.3–17.7)
IMM GRANULOCYTES # BLD: 0.5 10E9/L (ref 0–0.4)
IMM GRANULOCYTES NFR BLD: 3.9 %
LYMPHOCYTES # BLD AUTO: 1.3 10E9/L (ref 0.8–5.3)
LYMPHOCYTES NFR BLD AUTO: 10.8 %
MCH RBC QN AUTO: 30.9 PG (ref 26.5–33)
MCHC RBC AUTO-ENTMCNC: 33.9 G/DL (ref 31.5–36.5)
MCV RBC AUTO: 91 FL (ref 78–100)
MONOCYTES # BLD AUTO: 1.5 10E9/L (ref 0–1.3)
MONOCYTES NFR BLD AUTO: 12 %
NEUTROPHILS # BLD AUTO: 9 10E9/L (ref 1.6–8.3)
NEUTROPHILS NFR BLD AUTO: 72.9 %
NRBC # BLD AUTO: 0 10*3/UL
NRBC BLD AUTO-RTO: 0 /100
PLATELET # BLD AUTO: 191 10E9/L (ref 150–450)
POTASSIUM SERPL-SCNC: 3.6 MMOL/L (ref 3.4–5.3)
RBC # BLD AUTO: 3.53 10E12/L (ref 4.4–5.9)
SODIUM SERPL-SCNC: 144 MMOL/L (ref 133–144)
WBC # BLD AUTO: 12.3 10E9/L (ref 4–11)

## 2021-05-16 PROCEDURE — 86140 C-REACTIVE PROTEIN: CPT | Performed by: INTERNAL MEDICINE

## 2021-05-16 PROCEDURE — 250N000009 HC RX 250: Performed by: INTERNAL MEDICINE

## 2021-05-16 PROCEDURE — 80048 BASIC METABOLIC PNL TOTAL CA: CPT | Performed by: INTERNAL MEDICINE

## 2021-05-16 PROCEDURE — 258N000003 HC RX IP 258 OP 636: Performed by: INTERNAL MEDICINE

## 2021-05-16 PROCEDURE — 120N000001 HC R&B MED SURG/OB

## 2021-05-16 PROCEDURE — 250N000013 HC RX MED GY IP 250 OP 250 PS 637: Performed by: INTERNAL MEDICINE

## 2021-05-16 PROCEDURE — 99232 SBSQ HOSP IP/OBS MODERATE 35: CPT | Performed by: INTERNAL MEDICINE

## 2021-05-16 PROCEDURE — 85025 COMPLETE CBC W/AUTO DIFF WBC: CPT | Performed by: INTERNAL MEDICINE

## 2021-05-16 PROCEDURE — 85379 FIBRIN DEGRADATION QUANT: CPT | Performed by: INTERNAL MEDICINE

## 2021-05-16 PROCEDURE — 36415 COLL VENOUS BLD VENIPUNCTURE: CPT | Performed by: INTERNAL MEDICINE

## 2021-05-16 PROCEDURE — 999N001017 HC STATISTIC GLUCOSE BY METER IP

## 2021-05-16 PROCEDURE — 250N000011 HC RX IP 250 OP 636: Performed by: INTERNAL MEDICINE

## 2021-05-16 RX ADMIN — INSULIN ASPART 13 UNITS: 100 INJECTION, SOLUTION INTRAVENOUS; SUBCUTANEOUS at 20:56

## 2021-05-16 RX ADMIN — SODIUM CHLORIDE 50 ML: 9 INJECTION, SOLUTION INTRAVENOUS at 19:15

## 2021-05-16 RX ADMIN — ENOXAPARIN SODIUM 30 MG: 30 INJECTION SUBCUTANEOUS at 20:55

## 2021-05-16 RX ADMIN — IPRATROPIUM BROMIDE AND ALBUTEROL 2 PUFF: 20; 100 SPRAY, METERED RESPIRATORY (INHALATION) at 17:13

## 2021-05-16 RX ADMIN — IPRATROPIUM BROMIDE AND ALBUTEROL 2 PUFF: 20; 100 SPRAY, METERED RESPIRATORY (INHALATION) at 08:50

## 2021-05-16 RX ADMIN — METFORMIN HYDROCHLORIDE 1000 MG: 500 TABLET ORAL at 17:17

## 2021-05-16 RX ADMIN — INSULIN ASPART 1 UNITS: 100 INJECTION, SOLUTION INTRAVENOUS; SUBCUTANEOUS at 08:45

## 2021-05-16 RX ADMIN — TAMSULOSIN HYDROCHLORIDE 0.4 MG: 0.4 CAPSULE ORAL at 08:48

## 2021-05-16 RX ADMIN — INSULIN ASPART 3 UNITS: 100 INJECTION, SOLUTION INTRAVENOUS; SUBCUTANEOUS at 01:02

## 2021-05-16 RX ADMIN — IPRATROPIUM BROMIDE AND ALBUTEROL 2 PUFF: 20; 100 SPRAY, METERED RESPIRATORY (INHALATION) at 20:58

## 2021-05-16 RX ADMIN — ENOXAPARIN SODIUM 30 MG: 30 INJECTION SUBCUTANEOUS at 08:44

## 2021-05-16 RX ADMIN — INSULIN ASPART 15 UNITS: 100 INJECTION, SOLUTION INTRAVENOUS; SUBCUTANEOUS at 17:22

## 2021-05-16 RX ADMIN — IPRATROPIUM BROMIDE AND ALBUTEROL 2 PUFF: 20; 100 SPRAY, METERED RESPIRATORY (INHALATION) at 13:22

## 2021-05-16 RX ADMIN — MEROPENEM AND SODIUM CHLORIDE 1 G: 1 INJECTION, SOLUTION INTRAVENOUS at 04:07

## 2021-05-16 RX ADMIN — POTASSIUM CHLORIDE, DEXTROSE MONOHYDRATE AND SODIUM CHLORIDE: 150; 5; 450 INJECTION, SOLUTION INTRAVENOUS at 13:22

## 2021-05-16 RX ADMIN — DEXAMETHASONE SODIUM PHOSPHATE 6 MG: 4 INJECTION, SOLUTION INTRA-ARTICULAR; INTRALESIONAL; INTRAMUSCULAR; INTRAVENOUS; SOFT TISSUE at 08:40

## 2021-05-16 RX ADMIN — INSULIN ASPART 13 UNITS: 100 INJECTION, SOLUTION INTRAVENOUS; SUBCUTANEOUS at 12:26

## 2021-05-16 RX ADMIN — REMDESIVIR 100 MG: 100 INJECTION, POWDER, LYOPHILIZED, FOR SOLUTION INTRAVENOUS at 17:08

## 2021-05-16 ASSESSMENT — ACTIVITIES OF DAILY LIVING (ADL)
ADLS_ACUITY_SCORE: 13
ADLS_ACUITY_SCORE: 13
ADLS_ACUITY_SCORE: 15
ADLS_ACUITY_SCORE: 12

## 2021-05-16 ASSESSMENT — MIFFLIN-ST. JEOR: SCORE: 1226.63

## 2021-05-16 NOTE — PLAN OF CARE
Pt alert and oriented. He denies pain, gets agitated easily. Blood sugars 155 and 125, see MAR for coverage. Lung sounds are coarse throughout. Infrequent cough. Sats decrease with activity, remains on 1 LPM NC. IVF and IV ABX continued. Mata patent with adequate output. Large stool this shift. Alarms on, call light within reach and isolation continued.     Face to face report given with opportunity to observe patient.    Report given to JUDD Laguerre RN   5/16/2021  7:55 AM

## 2021-05-16 NOTE — PLAN OF CARE
"/65 (BP Location: Left arm)   Pulse 83   Temp 97.6  F (36.4  C) (Tympanic)   Resp 22   Ht 1.702 m (5' 7\")   Wt 54.8 kg (120 lb 13 oz)   SpO2 93%   BMI 18.92 kg/m      Pt is A&O. Denies pain. Shortness of breath with activity. Pt clears throat frequently when eating/drinking. Frequent reminders to slow down; 1:1 with any oral intake. Pts O2 was increased to 2.5 lpm while pt was eating breakfast; sats were dipping to low 80's on 1 lpm. Pt is currently on 1 LPM with sats 93-95% while sleeping on pt's right side. Pt has had multiple loose stools this shift. Both incontinent and continent. IVF infusing @ 50mL/hr. Bed in lowest position. Call light in reach. Alarms active and audible.     Face to face report given with opportunity to observe patient.    Report given to JUDD Nunez RN   5/16/2021  3:27 PM      "

## 2021-05-16 NOTE — PROGRESS NOTES
[facility]    Hospitalist Progress Note    Date of Service (when I saw the patient): 05/16/2021    Assessment & Plan         2019 novel coronavirus pnuemonia (COVID-19):   Had COVID-19 in September and tested negative 11/20, positive on arrival and he was mildly hypoxic on arrival. Over the last 24 hours his pulmonary picture worsened significantly and he required high flow nasal cannula then Airvo. His pressure was up all the way with Fio2 requirement over 90% to maintain sats >90%. He is on remdesivir, dexamethasone, albuterol, dvt prophylaxis BID as D-dimer>9.  -5/14: Overall appears to have made significant improvement.  Supplemental oxygen requirements have declined substantially and his respiratory failure may have been as much on the basis of volume overload and aspiration pneumonitis as Covid pneumonitis.  Continue treatment directed at his Covid infection as well as vigorous pulmonary hygiene.  -5/15: Still has persistently low supplemental oxygen needs but certainly improved over the last 48 hours.  Significant degree of his hypoxemic respiratory failure may have been on the basis of volume overload and especially aspiration pneumonitis with certainly significant contribution from Covid pneumonitis.  Continue vigorous pulmonary hygiene and encouraging walking as well as usual treatment including remdesivir and dexamethasone directed at his Covid infection itself.  5/16: Any progress has plateaued but this is not unexpected given Covid pneumonitis.  Encouraging pulmonary hygiene and mobilization.    Acute respiratory failure with hypoxia:   Multifactorial. Covid contributing, CT chest shows bilateral opacities considerably worse compared to yesterdays CXR. However he also has significant interstitial thickening bilaterally. Suspect this is pulmonary edema from the nearly 5 liters of fluid he required on arrival due to DKA. Gave 40mg of Lasix with profound response of over 2 liters in 8 hours.  Will repeat with smaller dose of 20mg this evening and evaluate response. ECHO done today, result pending. His procalcitonin is also elevated with dysphagia and possible aspiration as ct shows fluid in esophagus and some thickening. Start meropenem for CAP with coverage for possible aspiration. He has not MDRO risk factors, no clear indicators need to cover for pseudomonas. I talked with both his brother and his son and confirmed DNR/DNI status with them. I made clear the severity of illness and that he may not improve and this may cause his death. This afternoon I did call his son Al and advise him that CT head and chest came back okay with exception of worsened pneumonia and that his oxygen needs improved substantially through the day.   -5/14: As above marked improvement.  Still requires moderate flow oxygen but significantly improved.  Tolerating standard nasal cannula.  Increasing activity which should help preserve lung volume and FRC.  Pulmonary hygiene.  As below continue antibiotic treatment but discontinuation in the near future may be possible.  -5 /15: As above.  Continued improvement although trajectory of this has slowed.  Low flow oxygen.  Likely combination of Covid pneumonitis, aspiration pneumonitis and perhaps some degree of volume overload.  The latter 2 are under reasonable control.      Diabetic ketoacidosis without coma associated with type 2 diabetes mellitus (H):   In the past he has only [facility] Jard[facility]. However, A1c is >13. Glucose came down quickly with IV insulin. Anion gap now closed but ketones still elevated. Will continue IV insulin until ketones cleared. He is still quite fatigued do don't believe he is ready to eat yet either.   -5/13: Insulin drip stopped during the night and lantus started as well as sliding scale. He was previously only on half dose jardiance (12.5mg) and glucophage. Since he is not able to eat anything still will continue d5 at 75ml an hour,  once that is stopped adjustments to long acting insulin may be required.   -5/14: Still hyperglycemia but no evidence of ketoacidosis.  Mild elevation in ketones may have been on much on the basis of starvation ketosis given limitation of oral intake because of concerns for aspiration as from diabetes per se.  Accelerated both long and short acting insulin today to help decrease his level of glucose.  -5 /15: Hyperglycemia improved although still present with significant increase in both long and short acting insulin prescription in the last 36 hours.  We will not change further significantly today.  Hyperglycemia in part driven by steroid treatment and would be in especially reluctant to increase long-acting insulin significantly.  No evidence of any ketoacidosis.  -5/16: Some increase in overall level of glucose levels.  Continue current doses of insulin and resume his Metformin.    Hypokalemia:   Due to DKA with insulin requirements, now expect worsening due to large output after lasix. Replaced with 80meq IV, recheck at 4pm and in AM.   -5/14: This appears largely resolved with resolution in his ketoacidosis.      Hyponatremia:   Resolved with IVF, likely due to dehydration and poor oral intake.       Fall:   Fell at home, unsure how long he was laying on the floor.  However no evidence of significant injury.  Representative of his severe presenting illness.      Confusion  Metabolic encephalopathy: Family does state he has been confused and having falls since his original covid diagnosis back in September.   -5/13: Worse this morning, he is awake and alert but admits he cannot remember even where he is or why. This afternoon nursing reports he has cleared significantly.   -5/14: This appears to be significantly improved.  No agitation which nursing staff in particular note was a significant issue.  Still has some slowed responses and distractibility but overall is showing a significant improvement.   Likely  Predominantly metabolic encephalopathy.  May have some more chronic encephalopathy which family had noted beginning with his initial Covid infection.  -4/15: Likely some mild degree of residual nonfocal encephalopathy although improved certainly over the last 48 hours.  Continued close monitoring.      Acute on chronic renal failure stage 3a(H):   Due to dehydration/DKA.  Creatinine normally 1.0-1.3, on arrival 1.89. Cleared overnight with IVF, now back to baseline in the first several days of hospitalization.      Probable moderate malnutrition:   No large amount of weight loss compared to 5 months ago. However he does have evidence of subcutaneous fat loss and muscle wasting.  -4/15: Encouraging increase in oral intake.  Appreciate nutritionist input.    Aspiration pneumonitis  Dysphagia  -5/14: Suspect aspiration pneumonitis is inflammatory rather than infectious.  He has shown significant improvement in that time consistent with inflammatory response to an aspiration event.  Continue current antibiotic treatment at least for another day or so.  Usual laboratory evaluation such as procalcitonin poorly reflective distinction between infectious and inflammatory processes however.  Appreciate speech therapy's input.  Have been able to advance his diet with close attention on the part of nursing staff.  Neuromuscular function in terms of dysphagia appears to be relatively well-preserved but he needs regular prompting for safe swallowing.  -5 /15: He is required strict monitoring by nursing staff and prohibition on taking any water except with direct supervision.  Overall I suspect his swallowing function however is improved somewhat.  Await speech therapy reevaluation when there are again available in several days.  -5/16: Still needs close nursing supervision.  Anticipate speech therapy reevaluation tomorrow.  Is likely this will be a continued issue.    Urinary retention:  Required replacement of indwelling  catheter now several days ago after multiple attempts at spontaneous urination.  Tamsulosin started.  Depending on the course of his illness might be reasonable to resume trials of removal of catheter in another several days but I suspect he will need at least a week or so before this can be considered.    Generalized weakness  Likely a matter of deconditioning and acute illness.  Will discuss with nursing staff whether physical therapy evaluation would be warranted.  Its not unlikely the patient will benefit from subacute rehabilitation.      DVT Prophylaxis: Enoxaparin (Lovenox) subcutaneously  Code Status: No CPR- Do NOT Intubate    Disposition: Expected discharge in 3-5 days once respiratory status has improved further, DKA resolved.  Depending on his functional abilities he may require subacute rehabilitation which unfortunately would require a full course of treatment for his Covid infection        Interval History   Awake and alert.  Mobilization with some effort.  Does not indicate any discomfort.  Overall level of energy is relatively low.  Continues to require close supervision for any oral intake.    -Data reviewed today: I reviewed all new labs and imaging results over the last 24 hours. I personally reviewed .    Physical Exam   Temp: 97.6  F (36.4  C) Temp src: Tympanic BP: 127/54 Pulse: 103   Resp: 22 SpO2: (!) 89 % O2 Device: Nasal cannula Oxygen Delivery: 2.5 LPM  Vitals:    05/14/21 0332 05/15/21 0437 05/16/21 0415   Weight: 55 kg (121 lb 4.1 oz) 56.5 kg (124 lb 9 oz) 54.8 kg (120 lb 13 oz)     Vital Signs with Ranges  Temp:  [97.6  F (36.4  C)-98.3  F (36.8  C)] 97.6  F (36.4  C)  Pulse:  [] 103  Resp:  [20-22] 22  BP: (125-156)/(49-78) 127/54  SpO2:  [88 %-98 %] 89 %  I/O last 3 completed shifts:  In: 4896 [P.O.:3340; I.V.:1506; IV Piggyback:50]  Out: 3125 [Urine:2225; Stool:900]    Peripheral IV 05/14/21 Anterior;Left Wrist (Active)   Site Assessment WDL;WDL except 05/16/21 0900   Line  Status Infusing 05/16/21 0900   Phlebitis Scale 0-->no symptoms 05/16/21 0900   Infiltration Scale 0 05/16/21 0900   Number of days: 2       Urethral Catheter (Active)   Tube Description Positional 05/16/21 0900   Catheter Care Done 05/16/21 0900   Collection Container Standard 05/16/21 0900   Securement Method Securing device (Describe) 05/16/21 0900   Rationale for Continued Use Retention 05/16/21 0900   Urine Output 525 mL 05/16/21 0643   Number of days: 1       Pressure Injury 09/20/20 Coccyx Stage 1 (Active)   Number of days: 238     Line/device assessment(s) completed for medical necessity reviewed.  May 16.    Constitutional: Awake and alert.  Mildly distractible.  No resting dyspnea.  Respiratory: Aeration to bases.  Accessory muscles not in use.  Rare right basilar wheezes.  Scattered mid inspiratory crackles.  No rhonchi.  No egophony.  Cardiovascular: PMI not displaced.  Distinct heart tones.  Regular rate and rhythm.  No murmurs, rubs, thrills.   GI: Soft,nontender,bowel sounds are positive.   Skin/Integumen: Warm distally.    Medications     dextrose 5% and 0.45% NaCl + KCl 20 mEq/L 50 mL/hr at 05/15/21 2030     - MEDICATION INSTRUCTIONS -         remdesivir  100 mg Intravenous Q24H    And     sodium chloride 0.9%  50 mL Intravenous Q24H     dexamethasone  6 mg Intravenous Q24H     enoxaparin ANTICOAGULANT  30 mg Subcutaneous BID     insulin aspart  1-18 Units Subcutaneous Q4H     insulin glargine  18 Units Subcutaneous At Bedtime     ipratropium-albuterol  2 puff Inhalation 4x Daily     sodium chloride (PF)  3 mL Intracatheter Q8H     tamsulosin  0.4 mg Oral Daily       Data   Recent Labs   Lab 05/16/21  0623 05/15/21  0454 05/14/21  0518 05/13/21  0503 05/13/21  0503 05/12/21  1846 05/11/21 2005 05/11/21 2005   WBC 12.3* 12.0* 9.4  --  10.3 7.5   < > 16.0*   HGB 10.9* 12.5* 11.4*  --  13.6 13.9  --  14.5   MCV 91 91 89  --  90 89  --  90    186 152  --  150 173  --  282   INR  --   --   --    --   --  1.11  --   --     148* 141  --  137 138   < > 119*   POTASSIUM 3.6 3.8 3.6   < > 3.1* 3.1*   < > 4.8   CHLORIDE 115* 119* 112*  --  110* 108   < > 83*   CO2 24 24 24  --  19* 22   < > 11*   BUN 28 36* 32*  --  32* 37*   < > 98*   CR 0.78 0.93 0.92  --  0.93 0.89   < > 1.83*   ANIONGAP 5 5 5  --  8 8   < > 25*   CARLOS 7.2* 7.6* 6.9*  --  7.2* 7.7*   < > 8.8   * 113* 256*  --  118* 128*   < > 661*   ALBUMIN  --   --   --   --  2.4* 2.7*   < > 3.6   PROTTOTAL  --   --   --   --  6.2* 6.5*   < > 7.7   BILITOTAL  --   --   --   --  0.3 0.3   < > 0.7   ALKPHOS  --   --   --   --  71 70   < > 107   ALT  --   --   --   --  14 13   < > 16   AST  --   --   --   --  34 23   < > 10   TROPI  --   --   --   --   --  <0.015  --  <0.015    < > = values in this interval not displayed.       No results found for this or any previous visit (from the past 24 hour(s)).

## 2021-05-16 NOTE — PLAN OF CARE
"Reason for hospital stay: dka, positive covid  Living situation PTA: home  Most recent vitals: /78 (BP Location: Right arm)   Pulse 80   Temp 98.3  F (36.8  C) (Tympanic)   Resp 22   Ht 1.702 m (5' 7\")   Wt 56.5 kg (124 lb 9 oz)   SpO2 93%   BMI 19.51 kg/m      Pain Management:  denies pain this shift  LOC:  alert and oriented.  Cardiac:  wnl  Respiratory:  oxygen 1.5-2 lpm via nc this shift. Sats at rest 97% with activity drop to 91%  GI:  loose incont. stool times one this shift  : weathers placed on days d/t retention  Skin Issues:  scattered scrapes, scabs, bruises t/o    IVF:  d51/2ns with 20 meq kcl at 50/ hour. However when merrem and remdesivir running it is followed up with 50 ml ns bolus ( after remdesivir)  ABX:  merrem    Nutrition: pureed, honey thickened liquids. Pt did get a hold of his water pitcher and drank the whole thing. No coughing afterwards  ADL's:  ambulated to bathroom , needs assistance of one  Ambulation:ambulated to bathroom twice this shift  Safety: pt free from injury during this shift.      5/16/2021  12:05 AM  Sandra Mendez RN   Face to face report given with opportunity to observe patient.    Report given to  carine hutchinson RN   5/152021  2325pm  "

## 2021-05-17 ENCOUNTER — APPOINTMENT (OUTPATIENT)
Dept: SPEECH THERAPY | Facility: HOSPITAL | Age: 81
DRG: 177 | End: 2021-05-17
Payer: COMMERCIAL

## 2021-05-17 ENCOUNTER — APPOINTMENT (OUTPATIENT)
Dept: OCCUPATIONAL THERAPY | Facility: HOSPITAL | Age: 81
DRG: 177 | End: 2021-05-17
Attending: INTERNAL MEDICINE
Payer: COMMERCIAL

## 2021-05-17 ENCOUNTER — APPOINTMENT (OUTPATIENT)
Dept: PHYSICAL THERAPY | Facility: HOSPITAL | Age: 81
DRG: 177 | End: 2021-05-17
Attending: INTERNAL MEDICINE
Payer: COMMERCIAL

## 2021-05-17 LAB
ALBUMIN SERPL-MCNC: 1.9 G/DL (ref 3.4–5)
ALP SERPL-CCNC: 112 U/L (ref 40–150)
ALT SERPL W P-5'-P-CCNC: 16 U/L (ref 0–70)
ANION GAP SERPL CALCULATED.3IONS-SCNC: 6 MMOL/L (ref 3–14)
AST SERPL W P-5'-P-CCNC: 15 U/L (ref 0–45)
BASOPHILS # BLD AUTO: 0.1 10E9/L (ref 0–0.2)
BASOPHILS NFR BLD AUTO: 0.3 %
BILIRUB SERPL-MCNC: 0.3 MG/DL (ref 0.2–1.3)
BUN SERPL-MCNC: 21 MG/DL (ref 7–30)
CALCIUM SERPL-MCNC: 7 MG/DL (ref 8.5–10.1)
CHLORIDE SERPL-SCNC: 107 MMOL/L (ref 94–109)
CO2 SERPL-SCNC: 25 MMOL/L (ref 20–32)
CREAT SERPL-MCNC: 0.74 MG/DL (ref 0.66–1.25)
CRP SERPL-MCNC: 52.2 MG/L (ref 0–8)
DIFFERENTIAL METHOD BLD: ABNORMAL
EOSINOPHIL # BLD AUTO: 0 10E9/L (ref 0–0.7)
EOSINOPHIL NFR BLD AUTO: 0.1 %
ERYTHROCYTE [DISTWIDTH] IN BLOOD BY AUTOMATED COUNT: 13.3 % (ref 10–15)
FIBRINOGEN PPP-MCNC: 400 MG/DL (ref 200–420)
GFR SERPL CREATININE-BSD FRML MDRD: 88 ML/MIN/{1.73_M2}
GLUCOSE BLDC GLUCOMTR-MCNC: 105 MG/DL (ref 70–99)
GLUCOSE BLDC GLUCOMTR-MCNC: 115 MG/DL (ref 70–99)
GLUCOSE BLDC GLUCOMTR-MCNC: 127 MG/DL (ref 70–99)
GLUCOSE BLDC GLUCOMTR-MCNC: 264 MG/DL (ref 70–99)
GLUCOSE BLDC GLUCOMTR-MCNC: 318 MG/DL (ref 70–99)
GLUCOSE BLDC GLUCOMTR-MCNC: 323 MG/DL (ref 70–99)
GLUCOSE BLDC GLUCOMTR-MCNC: 468 MG/DL (ref 70–99)
GLUCOSE SERPL-MCNC: 109 MG/DL (ref 70–99)
HCT VFR BLD AUTO: 32.6 % (ref 40–53)
HGB BLD-MCNC: 11.1 G/DL (ref 13.3–17.7)
IMM GRANULOCYTES # BLD: 0.7 10E9/L (ref 0–0.4)
IMM GRANULOCYTES NFR BLD: 4.5 %
LYMPHOCYTES # BLD AUTO: 2.3 10E9/L (ref 0.8–5.3)
LYMPHOCYTES NFR BLD AUTO: 15.7 %
MCH RBC QN AUTO: 30.9 PG (ref 26.5–33)
MCHC RBC AUTO-ENTMCNC: 34 G/DL (ref 31.5–36.5)
MCV RBC AUTO: 91 FL (ref 78–100)
MONOCYTES # BLD AUTO: 1.6 10E9/L (ref 0–1.3)
MONOCYTES NFR BLD AUTO: 11.1 %
NEUTROPHILS # BLD AUTO: 9.9 10E9/L (ref 1.6–8.3)
NEUTROPHILS NFR BLD AUTO: 68.3 %
NRBC # BLD AUTO: 0 10*3/UL
NRBC BLD AUTO-RTO: 0 /100
PLATELET # BLD AUTO: 210 10E9/L (ref 150–450)
POTASSIUM SERPL-SCNC: 3.5 MMOL/L (ref 3.4–5.3)
PROT SERPL-MCNC: 4.9 G/DL (ref 6.8–8.8)
RBC # BLD AUTO: 3.59 10E12/L (ref 4.4–5.9)
SODIUM SERPL-SCNC: 138 MMOL/L (ref 133–144)
WBC # BLD AUTO: 14.6 10E9/L (ref 4–11)

## 2021-05-17 PROCEDURE — 999N001017 HC STATISTIC GLUCOSE BY METER IP

## 2021-05-17 PROCEDURE — 85025 COMPLETE CBC W/AUTO DIFF WBC: CPT | Performed by: INTERNAL MEDICINE

## 2021-05-17 PROCEDURE — 97530 THERAPEUTIC ACTIVITIES: CPT | Mod: GP | Performed by: PHYSICAL THERAPIST

## 2021-05-17 PROCEDURE — 86140 C-REACTIVE PROTEIN: CPT | Performed by: INTERNAL MEDICINE

## 2021-05-17 PROCEDURE — 97161 PT EVAL LOW COMPLEX 20 MIN: CPT | Mod: GP | Performed by: PHYSICAL THERAPIST

## 2021-05-17 PROCEDURE — 250N000011 HC RX IP 250 OP 636: Performed by: INTERNAL MEDICINE

## 2021-05-17 PROCEDURE — 99233 SBSQ HOSP IP/OBS HIGH 50: CPT | Performed by: INTERNAL MEDICINE

## 2021-05-17 PROCEDURE — 258N000003 HC RX IP 258 OP 636: Performed by: INTERNAL MEDICINE

## 2021-05-17 PROCEDURE — 120N000001 HC R&B MED SURG/OB

## 2021-05-17 PROCEDURE — 36415 COLL VENOUS BLD VENIPUNCTURE: CPT | Performed by: INTERNAL MEDICINE

## 2021-05-17 PROCEDURE — 97165 OT EVAL LOW COMPLEX 30 MIN: CPT | Mod: GO

## 2021-05-17 PROCEDURE — 250N000013 HC RX MED GY IP 250 OP 250 PS 637: Performed by: INTERNAL MEDICINE

## 2021-05-17 PROCEDURE — 80053 COMPREHEN METABOLIC PANEL: CPT | Performed by: INTERNAL MEDICINE

## 2021-05-17 PROCEDURE — 97129 THER IVNTJ 1ST 15 MIN: CPT | Mod: GO

## 2021-05-17 PROCEDURE — 85384 FIBRINOGEN ACTIVITY: CPT | Performed by: INTERNAL MEDICINE

## 2021-05-17 PROCEDURE — 92526 ORAL FUNCTION THERAPY: CPT | Mod: GN

## 2021-05-17 RX ADMIN — IPRATROPIUM BROMIDE AND ALBUTEROL 2 PUFF: 20; 100 SPRAY, METERED RESPIRATORY (INHALATION) at 21:08

## 2021-05-17 RX ADMIN — POTASSIUM CHLORIDE, DEXTROSE MONOHYDRATE AND SODIUM CHLORIDE: 150; 5; 450 INJECTION, SOLUTION INTRAVENOUS at 12:57

## 2021-05-17 RX ADMIN — IPRATROPIUM BROMIDE AND ALBUTEROL 2 PUFF: 20; 100 SPRAY, METERED RESPIRATORY (INHALATION) at 17:41

## 2021-05-17 RX ADMIN — ENOXAPARIN SODIUM 30 MG: 30 INJECTION SUBCUTANEOUS at 09:02

## 2021-05-17 RX ADMIN — IPRATROPIUM BROMIDE AND ALBUTEROL 2 PUFF: 20; 100 SPRAY, METERED RESPIRATORY (INHALATION) at 12:55

## 2021-05-17 RX ADMIN — DEXAMETHASONE SODIUM PHOSPHATE 6 MG: 4 INJECTION, SOLUTION INTRA-ARTICULAR; INTRALESIONAL; INTRAMUSCULAR; INTRAVENOUS; SOFT TISSUE at 09:01

## 2021-05-17 RX ADMIN — IPRATROPIUM BROMIDE AND ALBUTEROL 2 PUFF: 20; 100 SPRAY, METERED RESPIRATORY (INHALATION) at 09:03

## 2021-05-17 RX ADMIN — METFORMIN HYDROCHLORIDE 1000 MG: 500 TABLET ORAL at 17:40

## 2021-05-17 RX ADMIN — ENOXAPARIN SODIUM 30 MG: 30 INJECTION SUBCUTANEOUS at 21:10

## 2021-05-17 RX ADMIN — METFORMIN HYDROCHLORIDE 1000 MG: 500 TABLET ORAL at 09:02

## 2021-05-17 RX ADMIN — INSULIN ASPART 18 UNITS: 100 INJECTION, SOLUTION INTRAVENOUS; SUBCUTANEOUS at 20:59

## 2021-05-17 RX ADMIN — TAMSULOSIN HYDROCHLORIDE 0.4 MG: 0.4 CAPSULE ORAL at 09:02

## 2021-05-17 RX ADMIN — INSULIN ASPART 13 UNITS: 100 INJECTION, SOLUTION INTRAVENOUS; SUBCUTANEOUS at 16:42

## 2021-05-17 RX ADMIN — INSULIN ASPART 10 UNITS: 100 INJECTION, SOLUTION INTRAVENOUS; SUBCUTANEOUS at 12:53

## 2021-05-17 ASSESSMENT — ACTIVITIES OF DAILY LIVING (ADL)
ADLS_ACUITY_SCORE: 13
ADLS_ACUITY_SCORE: 13
ADLS_ACUITY_SCORE: 14
ADLS_ACUITY_SCORE: 15

## 2021-05-17 ASSESSMENT — MIFFLIN-ST. JEOR: SCORE: 1247.63

## 2021-05-17 NOTE — PLAN OF CARE
No falls or injuries this shift. Bed alarm on at all times. Turns self in bed. Mata intact. Inc of stool tonight-states he did not know he had been incontinent. IV Rendesivir given this shift. Staff 1:1 with pt when eating or any oral intake to monitor swallowing. Needs reminders to slow down when eating. Accuchecks Q4H. O2 at 1 L NC.     Face to face report given with opportunity to observe patient.    Report given to Deena Wesley RN   5/16/2021  11:15 PM

## 2021-05-17 NOTE — PROGRESS NOTES
Inpatient Physical Therapy Evaluation    Name: Neel Kerr MRN# [id number]   Age: 80 year old YOB: 1940     Date of Consultation: May 17, 2021  Consultation is requested by:  Dr. Johnson  Specific Consultation Request:  Discharge planning  Primary care provider: [facility]    General Information:   Onset Date: 2021    History of Current Problem/Admission: Diabetic ketoacidosis without coma associated with type 2 diabetes mellitus (H) [E11.10]    Prior Level of Function: Pt reports he was independent with ADLs at home.  Pt states he drives.  Pt reports he ambulates without assistive device at baseline.  Ambulation: 0 - Independent   Transferrin - Independent   Toiletin - Independent    Bathin - Independent   Dressin - Independent   Eatin - Independent   Communication: 0 - Understands/communicates without difficulty  Swallowin - swallows foods/liquids without difficulty  Cognition: 0 - no cognitive issues reported    Fall history within the last 6 months: Yes, reports 1 fall    Current Living Situation: Patient reports he has no steps to enter home.  Pt states he has a basement but that his bedroom and bathroom are on mainfloor.  Pt lives alone    Current Equipment Used at Home: none     Patient & Family Goals: does not verbalize goal     Past Medical History:   Past Medical History:   Diagnosis Date     Essential (primary) hypertension     No Comments Provided     Hyperlipidemia     No Comments Provided     Non-ST elevation (NSTEMI) myocardial infarction (H)     2017,[facility] PCI with stent circumflex     Old myocardial infarction     2015,[facility]  PCI with stent     Polyneuropathy     No Comments Provided     Type 2 diabetes mellitus without complications (H)     No Comments Provided       Past Surgical History:  No past surgical history on file.    Medications:   Current Facility-Administered Medications   Medication     dexamethasone  (DECADRON) injection 6 mg     dextrose 5% and 0.45% NaCl + KCl 20 mEq/L infusion     glucose gel 15-30 g    Or     dextrose 50 % injection 25-50 mL    Or     glucagon injection 1 mg     enoxaparin ANTICOAGULANT (LOVENOX) injection 30 mg     insulin aspart (NovoLOG) injection (RAPID ACTING)     insulin glargine (LANTUS PEN) injection 18 Units     ipratropium-albuterol (COMBIVENT RESPIMAT) inhaler 2 puff     Medication instructions: Do NOT use nebulized medications     metFORMIN (GLUCOPHAGE) tablet 1,000 mg     sodium chloride (PF) 0.9% PF flush 3 mL     tamsulosin (FLOMAX) capsule 0.4 mg       Weight Bearing Status: FWB LEs     Cognitive Status Examination:  Orientation: reported date as May 2021 but did not have specific date, oriented to place  Level of Consciousness: alert  Follows Commands and Answers Questions: 75% of the time  Personal Safety and Judgement: Intact  Memory: impaired  Comments:     Pain:   Currently in pain? no  Pain Location?   Pain Rating:     Physical Therapy Evaluation:   Integumentary/Edema: multiple old scars on LEs  ROM: LE AROM WFL  Strength: LE strength grossly 4-/5 throughout  Bed Mobility: sup>sit mod I  Transfers: sit<>stand CGA-SBA  Gait: ambulated in room approx 10' with FWW SBA on 1.5L O2 with sats 83%, dyspnea noted.  Stairs: NT  Balance: good with support  Sensory: denies numbness/tingling LEs  Coordination: NT    Physical Therapy Interventions: Balance, Bed Mobility, Gait Training , Neuro-muscular re-education, Strengthening, Transfer Training, Risk Factor Education and Progressive Activity/Exercise     Clinical Impressions:  Criteria for Skilled Therapeutic Intervention Met: Yes, treatment indicated  PT Diagnosis: gait disturbance  Influenced by the following impairments: deconditioning, weakness, decreased balance  Functional limitations due to impairment: decreased tolerance for functional mobility and ADLs  Clinical presentation: Stable/Uncomplicated  Clinical presentation  rationale: clinical judgement  Clinical Decision making (complexity): Low Complexity  Frequency: 6 times/week  Predicted Duration of Therapy Intervention (days/wks): 5 days  Anticipated Discharge Disposition: Transitional Care Facility   Anticipated Equipment Needs at Discharge:   Risks and Benefits of therapy have been explained: Yes  Patient, Family & other staff in agreement with plan of care: Yes  Clinical Impression Comments: PT evaluation completed.  Pt demonstrates limited activity tolerance, weakness, and decreased safety with functional mobility.  Pt would not be safe to return home at current level.  Pt would benefit from short term rehab to improve functional mobility and safety.    Total Eval Time: 10

## 2021-05-17 NOTE — PLAN OF CARE
Cognitive Assessment:     The Sam Cognitive Assessment (MoCA) was administered to assess different cognitive domains, including attention and concentration, executive functions, memory, language, visuoconstructional skills, conceptual thinking, calculations, and orientation. The total possible score is 30 points; a score of 26 or above is considered normal. The MoCA severity levels are 18-25 mild cognitive impairment (MCI), 10-17 moderate cognitive impairment, and 0-9 severe cognitive impairment. Pt scored 16, indicating moderate cognitive impairment. Subsections are as follows:      Visuospatial/executive 2/5  Pt demonstrated errors with alternate trail making. Pt demonstrated errors with copying the cube. Pt demonstrated errors with placement of hands on the clock.     Naming 3/3  Pt exhibited no difficulties with naming of animals.     Attention 3/6  Pt had difficulties with reverse digit span. Pt had difficulties with calculations.     Language 1/3  Pt demonstrated errors with 1/2 sentence repetition. Pt demonstrated diminished verbal fluency for the letter F.     Abstraction 2/2  Pt exhibited no errors with similarities.     Delayed recall 0/5  Pt exhibited impaired memory that slightly improved with verbal cueing.     Orientation 4/6  Pt presents with disorientation to day and date.      Pt received 1 extra point due to highest level of education equal to 12th grade.     Deficit areas include executive functions, memory, language, visuoconstructional skills, and calculations. These deficits may limit pt's ability to safety and independently complete ADLs and IADLs.

## 2021-05-17 NOTE — PROGRESS NOTES
05/17/21 1300   Signing Clinician's Name / Credentials   Signing clinician's name / credentials Sabina Zaman, OTR/L   Quick Adds   Rehab Discipline OT   Quick Adds Cognitive Assessment   Cognitive Assessments   Cognitive Assessments Completed MoCA   Sam Cognitive Assessment (MoCA) Total Score out of 30 16   Norms Score of 26 or above considered normal   Domains assessed: attention, executive functions, memory, language, visuoconstructional skills, conceptual thinking, calculations, orientation   OT Discharge Planning    OT Discharge Recommendation (DC Rec) Transitional Care Facility   OT Rationale for DC Rec Pt would not be safe to return home alone at current level of functioning. Furthermore, he scored in the moderate cognitive impairment range on the MoCA. Pt would benefit from short term rehab to improve his safety and independence in ADLs. Plan to treat pt during his acute care stay.   OT Brief overview of current status  Bed Mobility: Independent; Transfer Skills: CGA-SBA; Lower Body Dressing: SBA while sitting EOB; Eating/Self Feeding: set up; SpO2 on 1.5L was high 80s and then decreased to 80% with activity. Nursing in and turned O2 up but then changed pulse oximeter and on 1L spo2 was 89-91% after OT. Moderate cognitive impairment indicated on the MoCA.   Additional Documentation   Rehab Comments impaired cognition, decreased activity tolerance   OT Plan Progress strength, endurance, cognition as able for increased independence in ADLs   Total Session Time   Total Session Time (minutes) 8 minutes

## 2021-05-17 NOTE — PLAN OF CARE
Pt is A&O cooperative but does not use call light. Alarms are active and pt will get up without calling. Assist of 1 with gait belt and walker to transfer. Up in chair for breakfast and sat at bedside for lunch. Good appetite. Ate well and tolerated diet well. Encouraged fluids, cough and deep breath, and encouraged use of flutter valve. Pt needs to be reminded to take deep breath and hold breath in when using inhaler. Washed face and perineum but refused shower/bed bath. Gown has also been changed. IV has  D% and .45 NS plus KKCI 20 mEq/L at 50 mL/hr infusing. Took of O2 at beginning of shift but pt dipped into high 80s without it and even lower with ambulation. O2 1 lpm NC reapplied and saturation is staying around 93%. Denies shortness of breath Lower LS are coarse. Productive cough with yellow mucus. Mata cath remains in place with adequate output. Having loose watery stools. BS are active.     Face to face report given with opportunity to observe patient.    Report given to Agapito Carney RN   5/17/2021  3:20 PM

## 2021-05-17 NOTE — PROGRESS NOTES
05/17/21 1259   Signing Clinician's Name / Credentials   Signing clinician's name / credentials Letha Alfredo DPT   Quick Adds   Rehab Discipline PT   Therapeutic Activity   Minutes of Treatment 16 minutes   Symptoms Noted During/After Treatment Fatigue;Shortness of breath;Significant change in vital signs   Treatment Detail After completing bed mobility while on RA, pt's O2 sats noted to be 85-87%, provided cues for pursed lip breathing however no improvement noted.  Notified nursing who okayed O2 for activity.  Initially placed on 1L however unable to get O2 sats >90% so increased to 1.5L.  Pt then ambulated additional 60' with FWW in room on 1.5L O2 with dyspnea noted and O2 sats 81%.  Again provided cues for pursed lip breathing but pt not able to follow consistently and is at times noted to almost hold breath.  Required >2 minutes to recover to 90%.  Pt transferred sit>sup mod I back into bed.  Pt left resting in bed with call light in reach and bed alarm on.  Did notify nurse that O2 remained on at end of our session   PT Discharge Planning    PT Discharge Recommendation (DC Rec) Transitional Care Facility   PT Rationale for DC Rec limited activity tolerance, not safe to return home at current level of function   PT Brief overview of current status  After completing bed mobility while on RA, pt's O2 sats noted to be 85-87%, provided cues for pursed lip breathing however no improvement noted.  Notified nursing who okayed O2 for activity.  Initially placed on 1L however unable to get O2 sats >90% so increased to 1.5L.  Pt then ambulated additional 60' with FWW in room on 1.5L O2 with dyspnea noted and O2 sats 81%.  Again provided cues for pursed lip breathing but pt not able to follow consistently and is at times noted to almost hold breath.  Required >2 minutes to recover to 90%.  Pt transferred sit>sup mod I back into bed.  Pt left resting in bed with call light in reach and bed alarm on.  Did notify nurse  that O2 remained on at end of our session   Additional Documentation   Rehab Comments limited activity tolerance   PT Plan Progress strength and activity tolerance   Total Session Time   Total Session Time (minutes) 16 minutes

## 2021-05-17 NOTE — PLAN OF CARE
Pt A&Ox3. He denies pain. Weaned to room air with sats 90-93%. Denies SOB. Lung sounds are coarse. Bowel sounds are hyperactive, had large incontinent loose stool. Accu check 127. Mata patent with adequate output. IVF continued. Alarms on, call light within reach and covid isolation continued.     Face to face report given with opportunity to observe patient.    Report given to JUDD Nunez RN   5/17/2021  7:33 AM

## 2021-05-17 NOTE — PROGRESS NOTES
Spoke with Neel in regards to discharge planning.  Also, reviewed assessment information, answers match information provided by Neel's brother, Nicolas.  Discussed need for short term rehab.  Neel initially was hesitant to make a decision about going but further on in the conversation agreed to referrals being sent, prefers to stay in the [place] area.  Per conversations with admissions staff at [facility] and [facility], Neel would need to minimally be 10 days out from positive screen, 5/21/21 to review.  Will send referrals when all referral information available.       Pt/family was provided with the Medicare Compare list for SNF.  Discussed associated Medicare star ratings to assist with choice for referrals/discharge planning Yes    Education was given to pt/family that star ratings are updated/maintained by Medicare and can be reviewed by visiting www.medicare.gov Yes    Patient/family choices for facility in priority are:   First Choice : Skilled Nursing Facility [place]: [facility]             [phone]    Second Choice: Skilled Nursing Facility [place]: [facility]      [phone]

## 2021-05-17 NOTE — PROGRESS NOTES
Inpatient Occupational Therapy Evaluation    Name: Neel Kerr MRN# [id number]   Age: 80 year old YOB: 1940     Date of Consultation: May 17, 2021  Consultation is requested by: Dr. Johnson  Specific Consultation Request: encephalopathy; discharge planning  Primary care provider: [facility], [facility]    General Information:   Onset Date: 2021    History of Current Problem/Admission: Diabetic ketoacidosis without coma associated with type 2 diabetes mellitus (H) [E11.10]    Prior Level of Function: Pt reports he was previously independent in ADLs and did not use an AD for functional mobility.   Ambulation: 0 - Independent   Transferrin - Independent   Toiletin - Independent    Bathin - Independent   Dressin - Independent   Eatin - Independent   Communication: 0 - Understands/communicates without difficulty  Swallowin - swallows foods/liquids without difficulty  Cognition: 0 - no cognitive issues reported    Fall history within the last 6 months: Yes, reports 1    Current Living Situation: Pt lives alone in a house with a couple steps to enter and a couple steps inside home. Bathroom has a shower/tub combo and a regular height toilet, no grab bars.     Current Equipment Used at Home: None     Patient & Family Goals: did not state     Past Medical History:   Past Medical History:   Diagnosis Date     Essential (primary) hypertension     No Comments Provided     Hyperlipidemia     No Comments Provided     Non-ST elevation (NSTEMI) myocardial infarction (H)     2017,[facility] PCI with stent circumflex     Old myocardial infarction     2015,[facility]  PCI with stent     Polyneuropathy     No Comments Provided     Type 2 diabetes mellitus without complications (H)     No Comments Provided       Past Surgical History:  No past surgical history on file.    Medications:   Current Facility-Administered Medications   Medication     dexamethasone (DECADRON)  injection 6 mg     dextrose 5% and 0.45% NaCl + KCl 20 mEq/L infusion     glucose gel 15-30 g    Or     dextrose 50 % injection 25-50 mL    Or     glucagon injection 1 mg     enoxaparin ANTICOAGULANT (LOVENOX) injection 30 mg     insulin aspart (NovoLOG) injection (RAPID ACTING)     insulin glargine (LANTUS PEN) injection 18 Units     ipratropium-albuterol (COMBIVENT RESPIMAT) inhaler 2 puff     Medication instructions: Do NOT use nebulized medications     metFORMIN (GLUCOPHAGE) tablet 1,000 mg     sodium chloride (PF) 0.9% PF flush 3 mL     tamsulosin (FLOMAX) capsule 0.4 mg       Weight Bearing Status:      Cognitive Status Examination:  Orientation: disoriented to day and date, otherwise alert and oriented  Level of Consciousness: alert  Follows Commands and Answers Questions: 100% of the time  Personal Safety and Judgement: Intact  Memory: impaired   Comments: The [place] Cognitive Assessment (MoCA) was administered to assess different cognitive domains, including attention and concentration, executive functions, memory, language, visuoconstructional skills, conceptual thinking, calculations, and orientation. The total possible score is 30 points; a score of 26 or above is considered normal. The MoCA severity levels are 18-25 mild cognitive impairment (MCI), 10-17 moderate cognitive impairment, and 0-9 severe cognitive impairment. Pt scored 16, indicating moderate cognitive impairment. Subsections are as follows:     Visuospatial/executive 2/5  Pt demonstrated errors with alternate trail making. Pt demonstrated errors with copying the cube. Pt demonstrated errors with placement of hands on the clock.     Naming 3/3  Pt exhibited no difficulties with naming of animals.    Attention 3/6  Pt had difficulties with reverse digit span. Pt had difficulties with calculations.    Language 1/3  Pt demonstrated errors with 1/2 sentence repetition. Pt demonstrated diminished verbal fluency for the letter F.    Abstraction  2/2  Pt exhibited no errors with similarities.    Delayed recall 0/5  Pt exhibited impaired memory that slightly improved with verbal cueing.    Orientation 4/6  Pt presents with disorientation to day and date.     Pt received 1 extra point due to highest level of education equal to 12th grade.     Pain:   Currently in pain? No  Pain Location?   Pain Ratin (No pain)    Occupational Therapy Evaluation:   Integumentary/Edema: N/A  Range of Motion: WFL   Strength: WFL  Muscle Tone Assessment: no issues observed  Coordination: normal    Mobility:   Bed Mobility: Independent   Transfer Skills: CGA-SBA  Bed to Chair/Chair to Bed: N/A - wanted to lie back down after OT   Balance: no LOB     ADLs:   Lower Body Dressing: SBA while sitting EOB  Toileting: did not have to go with OT  Eating/Self Feeding: set up    SpO2 on 1.5L was high 80s and then decreased to 80% with activity. Nursing in and turned O2 up but then changed pulse oximeter and on 1L spo2 was 89-91% after OT    IADLs:   Previous Responsibilities of the Patient: Meal Prep, Housekeeping, Laundry, Shopping, Yard Work, Medication Management, Finances  and Driving   Comments: pt denied having any assistance in his home     Activities of Daily Living Analysis:   Impairments Contributing to Impaired Activities of Daily Living: weakness, decreased balance, decreased cognition, decreased activity tolerance    Occupational Therapy Interventions: ADL Retraining , IADL retraining, cognition , strengthening , progressive activity/exercise and risk factor education     Clinical Impressions:  Criteria for Skilled Therapeutic Intervention Met: Yes, treatment indicated  OT Diagnosis: impaired ADLs  Influenced by the following impairments: weakness, deconditioning, decreased cognition, decreased balance  Functional limitations due to impairment: difficulties completing ADLs and IADLs  Clinical presentation: Stable/Uncomplicated  Clinical presentation rationale: clinical  judgement  Clinical Decision making (complexity): Low Complexity  Frequency: 5 times/week  Predicted Duration of Therapy Intervention (days/wks): 5 days    Anticipated Discharge Disposition: Transitional Care Facility   Anticipated Equipment Needs at Discharge:   Risks and Benefits of therapy have been explained: Yes  Patient, Family & other staff in agreement with plan of care: Yes  Clinical Impression Comments: Pt would not be safe to return home alone at current level of functioning. Furthermore, he scored in the moderate cognitive impairment range on the MoCA. Pt would benefit from short term rehab to improve his safety and independence in ADLs. Plan to treat pt during his acute care stay.     Total Eval Time: 25

## 2021-05-17 NOTE — PROGRESS NOTES
05/17/21 1426   Signing Clinician's Name / Credentials   Signing clinician's name / credentials Beatris Obando MS, CCC-SLP   Quick Adds   Rehab Discipline SLP   SLP - Dysphagia/Swallow   Minutes of Treatment 20 minutes   Symptoms Noted During/After Treatment Fatigue   Treatment Detail Patient seen this PM for swallow tx. Patient continues to show weakness and an uncoordinated swallow. Attempted honey thick liquids from a cup and patient demonstrated difficulties. Continue to need supervision when eating/drinking.    SLP Discharge Planning    SLP Discharge Recommendation (DC Rec) Transitional Care Facility   SLP Rationale for DC Rec Patient is demonstrating severe oropharyngeal dysphagia   SLP Brief overview of current status  Patient is at high risk for aspiration. Recommend honey thickened liquids fed via spoon and NDD1 pureed textures with use of compensatory strategies of chin tuck and swallow 2x per bite/sip. Patient requires direct supervision and verbal cues to utilize compensatory strategies to increase airway protection and reduce risks of aspiration   Additional Documentation   SLP Plan SLP will continue to treat patient during hospital stay.    Total Session Time   Total Session Time (minutes) 20 minutes     Recommendations for Feeding:  Diet: honey thick liquids fed via spoon and NDD1 pureed textures    - Patient requires direct supervision in his room during oral intake. Provide verbal instructions (chin down, swallow 2x per bite, liquids via spoon)   - Patient must sit in the chair at 90 degrees (may require pillows behind his back)  - Eliminate all distractions; turn off the TV  - Patient should only eat when he is alert  - Patient must drink liquids via spoon (patient would benefit from Ensure or Boost thickened to increase caloric intake)  - Tuck chin during every swallow to increase airway protection  - Swallow 2x per bite of food to eliminate pharyngeal residue  - Encourage small bite, small  sip  - Patient must remain upright in chair at least 30-45 minutes after oral intake  - Patient may only be able to tolerate a few swallows per meal. He does not have the endurance for a full meal at this time.      NOTE: If patient becomes too tired, there are noted changes in his vocal quality/breathing (wet and gurgly), or he begins coughing frequently while eating, stop feeding immediately and complete oral cares. SLP to re-assess patient s swallow

## 2021-05-18 ENCOUNTER — APPOINTMENT (OUTPATIENT)
Dept: SPEECH THERAPY | Facility: HOSPITAL | Age: 81
DRG: 177 | End: 2021-05-18
Payer: COMMERCIAL

## 2021-05-18 ENCOUNTER — APPOINTMENT (OUTPATIENT)
Dept: OCCUPATIONAL THERAPY | Facility: HOSPITAL | Age: 81
DRG: 177 | End: 2021-05-18
Payer: COMMERCIAL

## 2021-05-18 ENCOUNTER — APPOINTMENT (OUTPATIENT)
Dept: PHYSICAL THERAPY | Facility: HOSPITAL | Age: 81
DRG: 177 | End: 2021-05-18
Payer: COMMERCIAL

## 2021-05-18 PROBLEM — J43.2 CENTRILOBULAR EMPHYSEMA (H): Chronic | Status: ACTIVE | Noted: 2021-05-18

## 2021-05-18 PROBLEM — J44.9 COPD (CHRONIC OBSTRUCTIVE PULMONARY DISEASE) (H): Status: ACTIVE | Noted: 2021-05-18

## 2021-05-18 PROBLEM — J96.21 ACUTE ON CHRONIC RESPIRATORY FAILURE WITH HYPOXIA (H): Status: ACTIVE | Noted: 2020-09-21

## 2021-05-18 LAB
ANION GAP SERPL CALCULATED.3IONS-SCNC: 11 MMOL/L (ref 3–14)
BUN SERPL-MCNC: 18 MG/DL (ref 7–30)
CALCIUM SERPL-MCNC: 7.2 MG/DL (ref 8.5–10.1)
CHLORIDE SERPL-SCNC: 98 MMOL/L (ref 94–109)
CO2 SERPL-SCNC: 24 MMOL/L (ref 20–32)
CREAT SERPL-MCNC: 0.69 MG/DL (ref 0.66–1.25)
ERYTHROCYTE [DISTWIDTH] IN BLOOD BY AUTOMATED COUNT: 13 % (ref 10–15)
GFR SERPL CREATININE-BSD FRML MDRD: >90 ML/MIN/{1.73_M2}
GLUCOSE BLDC GLUCOMTR-MCNC: 110 MG/DL (ref 70–99)
GLUCOSE BLDC GLUCOMTR-MCNC: 185 MG/DL (ref 70–99)
GLUCOSE BLDC GLUCOMTR-MCNC: 193 MG/DL (ref 70–99)
GLUCOSE BLDC GLUCOMTR-MCNC: 214 MG/DL (ref 70–99)
GLUCOSE BLDC GLUCOMTR-MCNC: 265 MG/DL (ref 70–99)
GLUCOSE BLDC GLUCOMTR-MCNC: 362 MG/DL (ref 70–99)
GLUCOSE BLDC GLUCOMTR-MCNC: 65 MG/DL (ref 70–99)
GLUCOSE BLDC GLUCOMTR-MCNC: 72 MG/DL (ref 70–99)
GLUCOSE SERPL-MCNC: 189 MG/DL (ref 70–99)
HCT VFR BLD AUTO: 37.4 % (ref 40–53)
HGB BLD-MCNC: 12.6 G/DL (ref 13.3–17.7)
MCH RBC QN AUTO: 30.7 PG (ref 26.5–33)
MCHC RBC AUTO-ENTMCNC: 33.7 G/DL (ref 31.5–36.5)
MCV RBC AUTO: 91 FL (ref 78–100)
PLATELET # BLD AUTO: 256 10E9/L (ref 150–450)
POTASSIUM SERPL-SCNC: 3.7 MMOL/L (ref 3.4–5.3)
RBC # BLD AUTO: 4.11 10E12/L (ref 4.4–5.9)
SODIUM SERPL-SCNC: 133 MMOL/L (ref 133–144)
WBC # BLD AUTO: 22 10E9/L (ref 4–11)

## 2021-05-18 PROCEDURE — 92526 ORAL FUNCTION THERAPY: CPT | Mod: GN

## 2021-05-18 PROCEDURE — 120N000001 HC R&B MED SURG/OB

## 2021-05-18 PROCEDURE — 97530 THERAPEUTIC ACTIVITIES: CPT | Mod: GP

## 2021-05-18 PROCEDURE — 36415 COLL VENOUS BLD VENIPUNCTURE: CPT | Performed by: INTERNAL MEDICINE

## 2021-05-18 PROCEDURE — 250N000012 HC RX MED GY IP 250 OP 636 PS 637: Performed by: INTERNAL MEDICINE

## 2021-05-18 PROCEDURE — 250N000011 HC RX IP 250 OP 636: Performed by: INTERNAL MEDICINE

## 2021-05-18 PROCEDURE — 80048 BASIC METABOLIC PNL TOTAL CA: CPT | Performed by: INTERNAL MEDICINE

## 2021-05-18 PROCEDURE — 999N001017 HC STATISTIC GLUCOSE BY METER IP

## 2021-05-18 PROCEDURE — 250N000013 HC RX MED GY IP 250 OP 250 PS 637: Performed by: INTERNAL MEDICINE

## 2021-05-18 PROCEDURE — 97530 THERAPEUTIC ACTIVITIES: CPT | Mod: GO

## 2021-05-18 PROCEDURE — 85027 COMPLETE CBC AUTOMATED: CPT | Performed by: INTERNAL MEDICINE

## 2021-05-18 PROCEDURE — 99233 SBSQ HOSP IP/OBS HIGH 50: CPT | Performed by: INTERNAL MEDICINE

## 2021-05-18 RX ORDER — ALBUTEROL SULFATE 90 UG/1
2 AEROSOL, METERED RESPIRATORY (INHALATION)
Status: DISCONTINUED | OUTPATIENT
Start: 2021-05-18 | End: 2021-05-25 | Stop reason: HOSPADM

## 2021-05-18 RX ORDER — PREDNISONE 20 MG/1
40 TABLET ORAL ONCE
Status: COMPLETED | OUTPATIENT
Start: 2021-05-18 | End: 2021-05-18

## 2021-05-18 RX ADMIN — IPRATROPIUM BROMIDE AND ALBUTEROL 2 PUFF: 20; 100 SPRAY, METERED RESPIRATORY (INHALATION) at 09:28

## 2021-05-18 RX ADMIN — DEXAMETHASONE SODIUM PHOSPHATE 6 MG: 4 INJECTION, SOLUTION INTRA-ARTICULAR; INTRALESIONAL; INTRAMUSCULAR; INTRAVENOUS; SOFT TISSUE at 09:26

## 2021-05-18 RX ADMIN — METFORMIN HYDROCHLORIDE 1000 MG: 500 TABLET ORAL at 09:26

## 2021-05-18 RX ADMIN — INSULIN ASPART 15 UNITS: 100 INJECTION, SOLUTION INTRAVENOUS; SUBCUTANEOUS at 17:21

## 2021-05-18 RX ADMIN — Medication 15 G: at 05:40

## 2021-05-18 RX ADMIN — ENOXAPARIN SODIUM 30 MG: 30 INJECTION SUBCUTANEOUS at 21:17

## 2021-05-18 RX ADMIN — ENOXAPARIN SODIUM 30 MG: 30 INJECTION SUBCUTANEOUS at 09:26

## 2021-05-18 RX ADMIN — TAMSULOSIN HYDROCHLORIDE 0.4 MG: 0.4 CAPSULE ORAL at 09:26

## 2021-05-18 RX ADMIN — IPRATROPIUM BROMIDE AND ALBUTEROL 2 PUFF: 20; 100 SPRAY, METERED RESPIRATORY (INHALATION) at 21:20

## 2021-05-18 RX ADMIN — PREDNISONE 40 MG: 20 TABLET ORAL at 12:35

## 2021-05-18 RX ADMIN — Medication 15 G: at 05:21

## 2021-05-18 RX ADMIN — INSULIN ASPART 7 UNITS: 100 INJECTION, SOLUTION INTRAVENOUS; SUBCUTANEOUS at 00:52

## 2021-05-18 RX ADMIN — INSULIN ASPART 6 UNITS: 100 INJECTION, SOLUTION INTRAVENOUS; SUBCUTANEOUS at 12:37

## 2021-05-18 RX ADMIN — IPRATROPIUM BROMIDE AND ALBUTEROL 2 PUFF: 20; 100 SPRAY, METERED RESPIRATORY (INHALATION) at 12:38

## 2021-05-18 RX ADMIN — INSULIN ASPART 6 UNITS: 100 INJECTION, SOLUTION INTRAVENOUS; SUBCUTANEOUS at 09:27

## 2021-05-18 RX ADMIN — METFORMIN HYDROCHLORIDE 1000 MG: 500 TABLET ORAL at 17:20

## 2021-05-18 RX ADMIN — INSULIN ASPART 10 UNITS: 100 INJECTION, SOLUTION INTRAVENOUS; SUBCUTANEOUS at 21:21

## 2021-05-18 ASSESSMENT — ACTIVITIES OF DAILY LIVING (ADL)
ADLS_ACUITY_SCORE: 12
ADLS_ACUITY_SCORE: 12
ADLS_ACUITY_SCORE: 13
ADLS_ACUITY_SCORE: 12

## 2021-05-18 NOTE — PROGRESS NOTES
05/18/21 1546   Signing Clinician's Name / Credentials   Signing clinician's name / credentials Ryanne Alston PTA   Quick Adds   Rehab Discipline PT   PT Assistant Visit Number 1   Therapeutic Activity   Minutes of Treatment 23 minutes   Symptoms Noted During/After Treatment Fatigue;Shortness of breath   Treatment Detail Pt was finishing with OT but was willing to work with PT but was very tired. Pt needed to use commode for bowel movement. Was incontientent of his bowel function. Pt walked in the PUI with No A.D. CGA1 a little impulsive with wanting to do things before hand. Pt was not unsafe with mobility walked 60 feet x1 on 1 liter of oxygen. Oxygen 83% but increased to 87-88% after 10 seconds and a couple deep breaths, 89 90% after a minute and a half.    PT Discharge Planning    PT Discharge Recommendation (DC Rec) Transitional Care Facility   PT Rationale for DC Rec limited activity tolerance, not safe to return home at current level of function   PT Brief overview of current status  Pt did walk 60 feet without A.D. CGA1 no LOB sit to supine SBA1 and managed the oxygen tubing.    Additional Documentation   PT Plan Progress strength and activity tolerance   Total Session Time   Total Session Time (minutes) 23 minutes

## 2021-05-18 NOTE — PLAN OF CARE
"Most recent vitals: /64 (BP Location: Right arm)   Pulse 72   Temp 97.8  F (36.6  C) (Tympanic)   Resp 20   Ht 1.702 m (5' 7\")   Wt 56.9 kg (125 lb 7.1 oz)   SpO2 95%   BMI 19.65 kg/m      Pain Management:  Denied pain   LOC: A&O  Cardiac: HRR. BP stable   Respiratory:  Currently on 1 L NC. Room air at rest majority of shift. Denies SOB. Does report CAPONE.   GI:  Denies nausea. Loose stools noted this shift.   :  Mata catheter in place. Output adequate.   Skin Issues: As charted     BG: Blood sugars as charted; hyperglycemic early on. Hypoglycemic this am; PRN glucose given per parameters.    IVF:  Currently IV SL   ABX:  N/A    Nutrition: Honey thick and pureed. Tolerated well   ADL's:  Requires assistance   Ambulation: Up with assist of one to commode   Safety:  Alarms active and audible. Impulsive at times. Call light within reach.     Face to face report given with opportunity to observe patient.    Report given to Devika Ratliff RN   5/18/2021  7:40 AM     "

## 2021-05-18 NOTE — PLAN OF CARE
Alert & oriented, some forgetfulness. Vital signs stable, afebrile. Needs are anticipated. Lung sounds are coarse, occasional cough, swallows phlegm. Has oxygen on at 1 LPM via nasal cannula. Accucheck this am 185 requiring 6 units of rapid acting insulin.  Takes medications as prescribed.   Affect is flat with short blunted answers.  Dyspnea on exertion. Is able to reposition self in bed.  Sat with patient while he ate his lunch, did not have any episodes of coughing. Ate 100% and drank 480 mg. Tolerated meal with no problems.        Face to face report given with opportunity to observe patient.    Report given to JUDD Altman RN   5/18/2021  3:22 PM

## 2021-05-18 NOTE — PROGRESS NOTES
05/18/21 1500   Signing Clinician's Name / Credentials   Signing clinician's name / credentials Sabina Zaman OTR/L   Quick Adds   Rehab Discipline OT   Therapeutic Activity   Minutes of Treatment 10 minutes   Symptoms Noted During/After Treatment Fatigue;Shortness of breath;Significant change in vital signs   Treatment Detail Pt sleeping in bed upon OT arrival, agreeable to tx. SpO2 on 1L was >93%. Pt transferred to sit EOB wtih SBA. Pt transferred sit to stand from the EOB with CGA. He ambulated to/from the BR using FWW with CGA and verbal cues to use the walker. Pt stood at the sink and brushed his teeth with SBA-CGA. Pt demonstrated a toilet transfer with verbal cues for safety and CGA. Pt ambulated out of the BR and to/from the doorway using FWW with CGA and again verbal cues for safety. Pt returned to the chair and SpO2 was 80% but increased to mid 80s within 5-7 seconds. O2 was 87-88% for a good 20-30 seconds and then increased to 92%. PT entered pt's room. Pt combed his hair with set up in sitting and was left with PT.   OT Discharge Planning    OT Discharge Recommendation (DC Rec) Transitional Care Facility   OT Rationale for DC Rec Pt needing assistance for ADLs and is still on O2. Pt would not be safe to return home and manage O2 tubing either. Pt also requiring verbal cues occasionally for safety.   OT Brief overview of current status  Pt sleeping in bed upon OT arrival, agreeable to tx. SpO2 on 1L was >93%. Pt transferred to sit EOB wtih SBA. Pt transferred sit to stand from the EOB with CGA. He ambulated to/from the BR using FWW with CGA and verbal cues to use the walker. Pt stood at the sink and brushed his teeth with SBA-CGA. Pt demonstrated a toilet transfer with verbal cues for safety and CGA. Pt ambulated out of the BR and to/from the doorway using FWW with CGA and again verbal cues for safety. Pt returned to the chair and SpO2 was 80% but increased to mid 80s within 5-7 seconds. O2 was 87-88%  for a good 20-30 seconds and then increased to 92%. PT entered pt's room. Pt combed his hair with set up in sitting and was left with PT.   Additional Documentation   Rehab Comments O2 decreased to 80% with ADLs. Pt required a good 30 second resting break before O2 returned to >90% on 1L.   OT Plan Progress strength, endurance, cognition as able for increased independence in ADLs   Total Session Time   Total Session Time (minutes) 10 minutes

## 2021-05-18 NOTE — PROGRESS NOTES
[facility]    Medicine Progress Note - Hospitalist Service       Date of Admission:  5/11/2021  Assessment & Plan          Assessment & Plan     2019 novel coronavirus pnuemonia (COVID-19):   Had COVID-19 in September and tested negative 11/20, positive on arrival and he was mildly hypoxic on arrival. Over the last 24 hours his pulmonary picture worsened significantly and he required high flow nasal cannula then Airvo. His pressure was up all the way with Fio2 requirement over 90% to maintain sats >90%. He is on remdesivir, dexamethasone, albuterol, dvt prophylaxis BID as D-dimer>9.  -5/14: Overall appears to have made significant improvement.  Supplemental oxygen requirements have declined substantially and his respiratory failure may have been as much on the basis of volume overload and aspiration pneumonitis as Covid pneumonitis.  Continue treatment directed at his Covid infection as well as vigorous pulmonary hygiene.  -5/15: Still has persistently low supplemental oxygen needs but certainly improved over the last 48 hours.  Significant degree of his hypoxemic respiratory failure may have been on the basis of volume overload and especially aspiration pneumonitis with certainly significant contribution from Covid pneumonitis.  Continue vigorous pulmonary hygiene and encouraging walking as well as usual treatment including remdesivir and dexamethasone directed at his Covid infection itself.  5/16: Any progress has plateaued but this is not unexpected given Covid pneumonitis.  Encouraging pulmonary hygiene and mobilization.  -5/17: still requiring 1 L/min.  Continue dexamethasone and bronchodilators     Acute respiratory failure with hypoxia:   Multifactorial. Covid contributing, CT chest shows bilateral opacities considerably worse compared to yesterdays CXR. However he also has significant interstitial thickening bilaterally. Suspect this is pulmonary edema from the nearly 5 liters of fluid he  required on arrival due to DKA. Gave 40mg of Lasix with profound response of over 2 liters in 8 hours. Will repeat with smaller dose of 20mg this evening and evaluate response. ECHO done today, result pending. His procalcitonin is also elevated with dysphagia and possible aspiration as ct shows fluid in esophagus and some thickening. Start meropenem for CAP with coverage for possible aspiration. He has not MDRO risk factors, no clear indicators need to cover for pseudomonas. I talked with both his brother and his son and confirmed DNR/DNI status with them. I made clear the severity of illness and that he may not improve and this may cause his death. This afternoon I did call his son Al and advise him that CT head and chest came back okay with exception of worsened pneumonia and that his oxygen needs improved substantially through the day.   -5/14: As above marked improvement.  Still requires moderate flow oxygen but significantly improved.  Tolerating standard nasal cannula.  Increasing activity which should help preserve lung volume and FRC.  Pulmonary hygiene.  As below continue antibiotic treatment but discontinuation in the near future may be possible.  -5 /15: As above.  Continued improvement although trajectory of this has slowed.  Low flow oxygen.  Likely combination of Covid pneumonitis, aspiration pneumonitis and perhaps some degree of volume overload.  The latter 2 are under reasonable control.       Diabetic ketoacidosis without coma associated with type 2 diabetes mellitus (H):   In the past [facility] has only [facility] J[facility]. However, A1c is >13. Glucose came down quickly with IV insulin. Anion gap now closed but ketones still elevated. Will continue IV insulin until ketones cleared. He is still quite fatigued do don't believe he is ready to eat yet either.   -5/13: Insulin drip stopped during the night and lantus started as well as sliding scale. He was previously only on half dose jardiance  (12.5mg) and glucophage. Since he is not able to eat anything still will continue d5 at 75ml an hour, once that is stopped adjustments to long acting insulin may be required.   -5/14: Still hyperglycemia but no evidence of ketoacidosis.  Mild elevation in ketones may have been on much on the basis of starvation ketosis given limitation of oral intake because of concerns for aspiration as from diabetes per se.  Accelerated both long and short acting insulin today to help decrease his level of glucose.  -5 /15: Hyperglycemia improved although still present with significant increase in both long and short acting insulin prescription in the last 36 hours.  We will not change further significantly today.  Hyperglycemia in part driven by steroid treatment and would be in especially reluctant to increase long-acting insulin significantly.  No evidence of any ketoacidosis.  -5/16: Some increase in overall level of glucose levels.  Continue current doses of insulin and resume his Metformin.  --5/17: BG range 105- 264.  Continue to monitor for hyperglycemia     Hypokalemia:   Due to DKA with insulin requirements, now expect worsening due to large output after lasix. Replaced with 80meq IV, recheck at 4pm and in AM.   -5/14: This appears largely resolved with resolution in his ketoacidosis.  --5/17: monitor K       Hyponatremia:   Resolved with IVF, likely due to dehydration and poor oral intake.        Fall:   Fell at home, unsure how long he was laying on the floor.  However no evidence of significant injury.  Representative of his severe presenting illness.  --5/17: will order PT/OT for functional assessment and cognitive eval       Confusion  Metabolic encephalopathy: Family does state he has been confused and having falls since his original covid diagnosis back in September.   -5/13: Worse this morning, he is awake and alert but admits he cannot remember even where he is or why. This afternoon nursing reports he has  cleared significantly.   -5/14: This appears to be significantly improved.  No agitation which nursing staff in particular note was a significant issue.  Still has some slowed responses and distractibility but overall is showing a significant improvement.  Likely  Predominantly metabolic encephalopathy.  May have some more chronic encephalopathy which family had noted beginning with his initial Covid infection.  -5/15: Likely some mild degree of residual nonfocal encephalopathy although improved certainly over the last 48 hours.  Continued close monitoring.       Acute on chronic renal failure stage 3a(H):   Due to dehydration/DKA.  Creatinine normally 1.0-1.3, on arrival 1.89. Cleared overnight with IVF, now back to baseline in the first several days of hospitalization.       Probable moderate malnutrition:   No large amount of weight loss compared to 5 months ago. However he does have evidence of subcutaneous fat loss and muscle wasting.  -5/15: Encouraging increase in oral intake.  Appreciate nutritionist input.     Aspiration pneumonitis  Dysphagia  -5/14: Suspect aspiration pneumonitis is inflammatory rather than infectious.  He has shown significant improvement in that time consistent with inflammatory response to an aspiration event.  Continue current antibiotic treatment at least for another day or so.  Usual laboratory evaluation such as procalcitonin poorly reflective distinction between infectious and inflammatory processes however.  Appreciate speech therapy's input.  Have been able to advance his diet with close attention on the part of nursing staff.  Neuromuscular function in terms of dysphagia appears to be relatively well-preserved but he needs regular prompting for safe swallowing.  -5 /15: He is required strict monitoring by nursing staff and prohibition on taking any water except with direct supervision.  Overall I suspect his swallowing function however is improved somewhat.  Await speech  therapy reevaluation when there are again available in several days.  -5/16: Still needs close nursing supervision.  Anticipate speech therapy reevaluation tomorrow.  Is likely this will be a continued issue.  -5/17: speech therapy evaluation showed oropharyngeal dysphagia and recommends honey thick liquids fed via spoon and NDD1 pureed textures     Urinary retention:  Required replacement of indwelling catheter now several days ago after multiple attempts at spontaneous urination.  Tamsulosin started.  Depending on the course of his illness might be reasonable to resume trials of removal of catheter in another several days but I suspect he will need at least a week or so before this can be considered.     Generalized weakness  Likely a matter of deconditioning and acute illness.  Will discuss with nursing staff whether physical therapy evaluation would be warranted.  Its not unlikely the patient will benefit from subacute rehabilitation.  - PT/OT evaluation         Diet: Dysphagia Diet Level 1 Pureed Honey Thickened Liquids (pre-thickened or use instant food thickener) (fed via spoon; no straws)    DVT Prophylaxis: Enoxaparin (Lovenox) SQ  Mata Catheter: in place, indication: Strict 1-2 Hour I&O, Retention  Code Status: No CPR- Do NOT Intubate           Disposition Plan   Expected discharge: 2 - 3 days, recommended to transitional care unit once above issues are addressed.  Entered: Wilder Johnson MD 05/17/2021, 8:11 PM       The patient's care was discussed with the Patient.    Wilder Johnson MD  Hospitalist Service  [facility]  Contact information available via [facility] Paging/Directory    ______________________________________________________________________    Interval History   Pt reports feeling better today; has not been very active.  Denies fever, chills, chest pain, nausea, vomiting, abdominal pain, diarrhea.    Data reviewed today: I reviewed all medications, new labs and imaging results over the last  24 hours. I personally reviewed no images or EKG's today.    Physical Exam   Vital Signs: Temp: 99.3  F (37.4  C) Temp src: Tympanic BP: 109/51 Pulse: 88   Resp: 20 SpO2: 95 % O2 Device: Nasal cannula Oxygen Delivery: 1 LPM  Weight: 125 lbs 7.07 oz  General Appearance: Lying in bed in NAD  Respiratory: bibasilar crackles, minimal wheezing  Cardiovascular: RRR, no murmurs or gallops  GI: soft NT ND  Skin: warm  Other: Neuro: non-focal    Data   Recent Labs   Lab 05/17/21  0611 05/16/21  0623 05/15/21  0454 05/13/21  0503 05/13/21  0503 05/12/21  1846 05/11/21 2005 05/11/21 2005   WBC 14.6* 12.3* 12.0*   < > 10.3 7.5   < > 16.0*   HGB 11.1* 10.9* 12.5*   < > 13.6 13.9  --  14.5   MCV 91 91 91   < > 90 89  --  90    191 186   < > 150 173  --  282   INR  --   --   --   --   --  1.11  --   --     144 148*   < > 137 138   < > 119*   POTASSIUM 3.5 3.6 3.8   < > 3.1* 3.1*   < > 4.8   CHLORIDE 107 115* 119*   < > 110* 108   < > 83*   CO2 25 24 24   < > 19* 22   < > 11*   BUN 21 28 36*   < > 32* 37*   < > 98*   CR 0.74 0.78 0.93   < > 0.93 0.89   < > 1.83*   ANIONGAP 6 5 5   < > 8 8   < > 25*   CARLOS 7.0* 7.2* 7.6*   < > 7.2* 7.7*   < > 8.8   * 142* 113*   < > 118* 128*   < > 661*   ALBUMIN 1.9*  --   --   --  2.4* 2.7*   < > 3.6   PROTTOTAL 4.9*  --   --   --  6.2* 6.5*   < > 7.7   BILITOTAL 0.3  --   --   --  0.3 0.3   < > 0.7   ALKPHOS 112  --   --   --  71 70   < > 107   ALT 16  --   --   --  14 13   < > 16   AST 15  --   --   --  34 23   < > 10   TROPI  --   --   --   --   --  <0.015  --  <0.015    < > = values in this interval not displayed.     No results found for this or any previous visit (from the past 24 hour(s)).  Medications     - MEDICATION INSTRUCTIONS -         dexamethasone  6 mg Intravenous Q24H     enoxaparin ANTICOAGULANT  30 mg Subcutaneous BID     insulin aspart  1-18 Units Subcutaneous Q4H     insulin glargine  18 Units Subcutaneous At Bedtime     ipratropium-albuterol  2 puff  Inhalation 4x Daily     metFORMIN  1,000 mg Oral BID w/meals     sodium chloride (PF)  3 mL Intracatheter Q8H     tamsulosin  0.4 mg Oral Daily

## 2021-05-18 NOTE — PROGRESS NOTES
"CLINICAL NUTRITION SERVICES  -  ASSESSMENT NOTE    Neel JOSEPH Cirilo : LOS    80 yom admitted for COVID-19. Pt has a hx of type 2 diabetes (most recent A1C was 13.4 on 5/11/21), HTN, HLD and myocardial infarction. BG today . Some higher BG in the 300s, highest level was 468 . Mostly poor intake this admission. Speech therapy recommended a pureed diet with honey thick liquids. Limited weights to determine any significant weight loss.    Diet Order: Dysphagia level 1 with honey thick liquids  Intake: 10 meals with % intake (7 out of 10 50% or less)    Height: 5' 7\"  Weight: 125 lbs 7.07 oz  Body mass index is 19.65 kg/m .  Weight Status:  Normal BMI  IBW: 66.1 kg (145 lb 11.6 oz)  Weight History:   Wt Readings from Last 10 Encounters:   05/17/21 56.9 kg (125 lb 7.1 oz)   05/11/21 57.7 kg (127 lb 3.3 oz)- admit weight   09/20/20 60.5 kg (133 lb 6.1 oz)   07/05/17 63.5 kg (140 lb)   02/11/15 70.5 kg (155 lb 6.8 oz)     Weight used to estimate nutrition needs - 57.7kg  Estimated Energy Needs: 5482-9716 kcals (30-35 Kcal/Kg)   Estimated Protein Needs: 60-70 grams protein (1-1.2 g pro/Kg)    Malnutrition Diagnosis: Unable to determine. Limited weights available. Unable to see pt in person due to COVID. Poor intake. Suspect nutrition status will continue to decline with poor nutrition intake    NUTRITION DIAGNOSIS:  Inadequate oral intake related to poor appetite/ altered mental status as evidenced by intake of 50% or less from most meals this week-long admission.    NUTRITION RECOMMENDATIONS  - Encourage frequent small meals/snacks  - Glucerna TID   - pt may benefit from a daily multivitamin/mineral supplement    MONITORING AND EVALUATION:  RD will monitor intake, weight, labs        "

## 2021-05-18 NOTE — PROGRESS NOTES
05/18/21 1200   Signing Clinician's Name / Credentials   Signing clinician's name / credentials Jenna Bazan MS, CCC-SLP   Quick Adds   Rehab Discipline SLP   Quick Adds Speech Language Pathology   SLP - Dysphagia/Swallow   Minutes of Treatment 15 minutes   Symptoms Noted During/After Treatment Fatigue   Treatment Detail Patient seen this AM for PO trials for swallowing therapy. Patient given honey thickened liquids via cup; patient took a large drink and coughed after intake. Patient tolerated 4 oz of honey thickened liquids via spoon with minimal signs of aspiration. Patient ate 4 oz of applesauce without difficulty. Took 3 bites of diced peaches; demonstrated prolonged chewing with oral residue. Cleared with liquid rinse. Patient demonstrating delayed throat clearing and fatigue with chewing. Continue on current diet recommendations. Follow aspiration precautions. Patient's vocal quality has improved since last week; no longer aphonic, but continues to demonstrate a weak, breathy, and hoarse voice.   SLP Discharge Planning    SLP Discharge Recommendation (DC Rec) Transitional Care Facility   SLP Rationale for DC Rec Patient is demonstrating moderate-severe oropharyngeal dysphagia   SLP Brief overview of current status  Patient is at high risk for aspiration. Recommend honey thickened liquids fed via spoon and NDD1 pureed textures with use of compensatory strategies of chin tuck and swallow 2x per bite/sip. Patient requires direct supervision and verbal cues to utilize compensatory strategies to increase airway protection and reduce risks of aspiration   Additional Documentation   SLP Plan SLP will continue to treat patient during hospital stay.    Total Session Time   Total Session Time (minutes) 15 minutes       Recommendations for Feeding:  Diet: honey thick liquids fed via spoon and NDD1 pureed textures  - Patient requires direct supervision in his room during oral intake. Provide verbal  instructions (chin down, swallow 2x per bite, liquids via spoon)   - Patient must sit in the chair at 90 degrees (may require pillows behind his back)  - Eliminate all distractions; turn off the TV  - Patient should only eat when he is alert  - Patient must drink liquids via spoon (patient would benefit from Ensure or Boost thickened to increase caloric intake)  - Tuck chin during every swallow to increase airway protection  - Swallow 2x per bite of food to eliminate pharyngeal residue  - Encourage small bite, small sip  - Patient must remain upright in chair at least 30-45 minutes after oral intake  - Patient may only be able to tolerate a few swallows per meal. He does not have the endurance for a full meal at this time.      NOTE: If patient becomes too tired, there are noted changes in his vocal quality/breathing (wet and gurgly), or he begins coughing frequently while eating, stop feeding immediately and complete oral cares. SLP to re-assess patient s swallow

## 2021-05-18 NOTE — PROGRESS NOTES
[facility]    Medicine Progress Note - Hospitalist Service       Date of Admission:  5/11/2021  Assessment & Plan       Principal Problem:    Acute on chronic respiratory failure with hypoxia (H)  Active Problems:    Hyponatremia    Pneumonia due to 2019 novel coronavirus    Diabetic ketoacidosis without coma associated with type 2 diabetes mellitus (H)    Fall    Acute on chronic renal failure (H)    Hyperkalemia    Polycystic kidney disease    Oropharyngeal dysphagia    Centrilobular emphysema (H)    COPD (chronic obstructive pulmonary disease) (H)    Assessment & Plan     2019 novel coronavirus pnuemonia (COVID-19):   Had COVID-19 in September and tested negative 11/20, positive on arrival and he was mildly hypoxic on arrival. Over the last 24 hours his pulmonary picture worsened significantly and he required high flow nasal cannula then Airvo. His pressure was up all the way with Fio2 requirement over 90% to maintain sats >90%. He is on remdesivir, dexamethasone, albuterol, dvt prophylaxis BID as D-dimer>9.  -5/14: Overall appears to have made significant improvement.  Supplemental oxygen requirements have declined substantially and his respiratory failure may have been as much on the basis of volume overload and aspiration pneumonitis as Covid pneumonitis.  Continue treatment directed at his Covid infection as well as vigorous pulmonary hygiene.  -5/15: Still has persistently low supplemental oxygen needs but certainly improved over the last 48 hours.  Significant degree of his hypoxemic respiratory failure may have been on the basis of volume overload and especially aspiration pneumonitis with certainly significant contribution from Covid pneumonitis.  Continue vigorous pulmonary hygiene and encouraging walking as well as usual treatment including remdesivir and dexamethasone directed at his Covid infection itself.  5/16: Any progress has plateaued but this is not unexpected given Covid  pneumonitis.  Encouraging pulmonary hygiene and mobilization.  -5/17: still requiring 1 L/min.  Continue dexamethasone and bronchodilators  -5/18: pt is still on 1 L/min.  Spoke with pt regarding emphysema shown on CT; he reports he used to smoke heavily.  Pt's hypoxia may be partially due to his COPD as well.       Acute respiratory failure with hypoxia:   Multifactorial. Covid contributing, CT chest shows bilateral opacities considerably worse compared to yesterdays CXR. However he also has significant interstitial thickening bilaterally. Suspect this is pulmonary edema from the nearly 5 liters of fluid he required on arrival due to DKA. Gave 40mg of Lasix with profound response of over 2 liters in 8 hours. Will repeat with smaller dose of 20mg this evening and evaluate response. ECHO done 5/13/2021, result showed borderline reduced LVEF 10-55%. His procalcitonin is also elevated with dysphagia and possible aspiration as ct shows fluid in esophagus and some thickening. Start meropenem for CAP with coverage for possible aspiration. He has not MDRO risk factors, no clear indicators need to cover for pseudomonas. I talked with both his brother and his son and confirmed DNR/DNI status with them. I made clear the severity of illness and that he may not improve and this may cause his death. This afternoon I did call his son Al and advise him that CT head and chest came back okay with exception of worsened pneumonia and that his oxygen needs improved substantially through the day.   -5/14: As above marked improvement.  Still requires moderate flow oxygen but significantly improved.  Tolerating standard nasal cannula.  Increasing activity which should help preserve lung volume and FRC.  Pulmonary hygiene.  As below continue antibiotic treatment but discontinuation in the near future may be possible.  -5 /15: As above.  Continued improvement although trajectory of this has slowed.  Low flow oxygen.  Likely combination of  Covid pneumonitis, aspiration pneumonitis and perhaps some degree of volume overload.  The latter 2 are under reasonable control.  - 5/18: persistent hypoxia, will add prednisone for COPD component in addition to dexamethasone.         Diabetic ketoacidosis without coma associated with type 2 diabetes mellitus (H):   In the past he has only [facility] Jard[facility]. However, A1c is >13. Glucose came down quickly with IV insulin. Anion gap now closed but ketones still elevated. Will continue IV insulin until ketones cleared. He is still quite fatigued do don't believe he is ready to eat yet either.   -5/13: Insulin drip stopped during the night and lantus started as well as sliding scale. He was previously only on half dose jardiance (12.5mg) and glucophage. Since he is not able to eat anything still will continue d5 at 75ml an hour, once that is stopped adjustments to long acting insulin may be required.   -5/14: Still hyperglycemia but no evidence of ketoacidosis.  Mild elevation in ketones may have been on much on the basis of starvation ketosis given limitation of oral intake because of concerns for aspiration as from diabetes per se.  Accelerated both long and short acting insulin today to help decrease his level of glucose.  -5 /15: Hyperglycemia improved although still present with significant increase in both long and short acting insulin prescription in the last 36 hours.  We will not change further significantly today.  Hyperglycemia in part driven by steroid treatment and would be in especially reluctant to increase long-acting insulin significantly.  No evidence of any ketoacidosis.  -5/16: Some increase in overall level of glucose levels.  Continue current doses of insulin and resume his Metformin.  --5/17: BG range 105- 264.  Continue to monitor for hyperglycemia  -5/18: resolved.  BG range .  Follow hypoglycemia closely     Hypokalemia:   Due to DKA with insulin requirements, now expect  worsening due to large output after lasix. Replaced with 80meq IV, recheck at 4pm and in AM.   -5/14: This appears largely resolved with resolution in his ketoacidosis.  --5/17: monitor K  - 5/18: K trending down; follow       Hyponatremia:   Resolved with IVF, likely due to dehydration and poor oral intake.        Fall:   Fell at home, unsure how long he was laying on the floor.  However no evidence of significant injury.  Representative of his severe presenting illness.  --5/17: will order PT/OT for functional assessment and cognitive eval  -5/18: PT recommends TCU for rehab at discharge.  Continue with therapy       Confusion  Metabolic encephalopathy: Family does state he has been confused and having falls since his original covid diagnosis back in September.   -5/13: Worse this morning, he is awake and alert but admits he cannot remember even where he is or why. This afternoon nursing reports he has cleared significantly.   -5/14: This appears to be significantly improved.  No agitation which nursing staff in particular note was a significant issue.  Still has some slowed responses and distractibility but overall is showing a significant improvement.  Likely  Predominantly metabolic encephalopathy.  May have some more chronic encephalopathy which family had noted beginning with his initial Covid infection.  -5/15: Likely some mild degree of residual nonfocal encephalopathy although improved certainly over the last 48 hours.  Continued close monitoring.  - 5/18: MoCA score 16/30.  Impaired cognition.       Acute on chronic renal failure stage 3a(H):   Due to dehydration/DKA.  Creatinine normally 1.0-1.3, on arrival 1.89. Cleared overnight with IVF, now back to baseline in the first several days of hospitalization.   -5/18: renal function now normal      Probable moderate malnutrition:   No large amount of weight loss compared to 5 months ago. However he does have evidence of subcutaneous fat loss and muscle  wasting.  -5/15: Encouraging increase in oral intake.  Appreciate nutritionist input.     Aspiration pneumonitis  Dysphagia  -5/14: Suspect aspiration pneumonitis is inflammatory rather than infectious.  He has shown significant improvement in that time consistent with inflammatory response to an aspiration event.  Continue current antibiotic treatment at least for another day or so.  Usual laboratory evaluation such as procalcitonin poorly reflective distinction between infectious and inflammatory processes however.  Appreciate speech therapy's input.  Have been able to advance his diet with close attention on the part of nursing staff.  Neuromuscular function in terms of dysphagia appears to be relatively well-preserved but he needs regular prompting for safe swallowing.  -5 /15: He is required strict monitoring by nursing staff and prohibition on taking any water except with direct supervision.  Overall I suspect his swallowing function however is improved somewhat.  Await speech therapy reevaluation when there are again available in several days.  -5/16: Still needs close nursing supervision.  Anticipate speech therapy reevaluation tomorrow.  Is likely this will be a continued issue.  -5/17: speech therapy evaluation showed oropharyngeal dysphagia and recommends honey thick liquids fed via spoon and NDD1 pureed textures     Urinary retention:  Required replacement of indwelling catheter now several days ago after multiple attempts at spontaneous urination.  Tamsulosin started.  Depending on the course of his illness might be reasonable to resume trials of removal of catheter in another several days but I suspect he will need at least a week or so before this can be considered.     Generalized weakness  Likely a matter of deconditioning and acute illness.  Will discuss with nursing staff whether physical therapy evaluation would be warranted.  Its not unlikely the patient will benefit from subacute  rehabilitation.  - PT/OT sessions  - TCU transfer at the time of discharge.     Diet: Dysphagia Diet Level 1 Pureed Honey Thickened Liquids (pre-thickened or use instant food thickener) (fed via spoon; no straws)    DVT Prophylaxis: Enoxaparin (Lovenox) SQ  Mata Catheter: in place, indication: Strict 1-2 Hour I&O, Retention  Code Status: No CPR- Do NOT Intubate           Disposition Plan   Expected discharge: 2 - 3 days, recommended to transitional care unit once safe disposition plan/ TCU bed available.  Entered: Wilder Johnson MD 05/18/2021, 10:38 AM       The patient's care was discussed with the Patient.  Total time spent 45 min, 30 min were spent on gathering data and coordinating care.    Wilder Johnson MD  Hospitalist Service  [facility]  Contact information available via [facility] Paging/Directory    ______________________________________________________________________    Interval History   Patient reports continued cough but stable respiratory status.  Still requiring oxygen 1 L/min.  Patient denies fever, chills, chest pain, nausea, vomiting, abdominal pain, diarrhea, edema.    Data reviewed today: I reviewed all medications, new labs and imaging results over the last 24 hours. I personally reviewed no images or EKG's today.    Physical Exam   Vital Signs: Temp: 98.2  F (36.8  C) Temp src: Tympanic BP: 114/56 Pulse: 84   Resp: 20 SpO2: 93 % O2 Device: Nasal cannula Oxygen Delivery: 1 LPM  Weight: 125 lbs 7.07 oz  General Appearance: Lying in bed in no acute distress  Respiratory: Bibasilar crackles much more on the left side, minimal wheezing  Cardiovascular: Regular rhythm and rate, no murmurs rubs or gallops  GI: soft NT ND  Skin: intact  Other: Neuro: non-focal    Data   Recent Labs   Lab 05/17/21  0611 05/16/21  0623 05/15/21  0454 05/13/21  0503 05/13/21  0503 05/12/21  1846 05/11/21 2005 05/11/21 2005   WBC 14.6* 12.3* 12.0*   < > 10.3 7.5   < > 16.0*   HGB 11.1* 10.9* 12.5*   < > 13.6 13.9  --   14.5   MCV 91 91 91   < > 90 89  --  90    191 186   < > 150 173  --  282   INR  --   --   --   --   --  1.11  --   --     144 148*   < > 137 138   < > 119*   POTASSIUM 3.5 3.6 3.8   < > 3.1* 3.1*   < > 4.8   CHLORIDE 107 115* 119*   < > 110* 108   < > 83*   CO2 25 24 24   < > 19* 22   < > 11*   BUN 21 28 36*   < > 32* 37*   < > 98*   CR 0.74 0.78 0.93   < > 0.93 0.89   < > 1.83*   ANIONGAP 6 5 5   < > 8 8   < > 25*   CARLOS 7.0* 7.2* 7.6*   < > 7.2* 7.7*   < > 8.8   * 142* 113*   < > 118* 128*   < > 661*   ALBUMIN 1.9*  --   --   --  2.4* 2.7*   < > 3.6   PROTTOTAL 4.9*  --   --   --  6.2* 6.5*   < > 7.7   BILITOTAL 0.3  --   --   --  0.3 0.3   < > 0.7   ALKPHOS 112  --   --   --  71 70   < > 107   ALT 16  --   --   --  14 13   < > 16   AST 15  --   --   --  34 23   < > 10   TROPI  --   --   --   --   --  <0.015  --  <0.015    < > = values in this interval not displayed.     No results found for this or any previous visit (from the past 24 hour(s)).  Medications     - MEDICATION INSTRUCTIONS -         dexamethasone  6 mg Intravenous Q24H     enoxaparin ANTICOAGULANT  30 mg Subcutaneous BID     insulin aspart  1-18 Units Subcutaneous Q4H     insulin glargine  18 Units Subcutaneous At Bedtime     ipratropium-albuterol  2 puff Inhalation 4x Daily     metFORMIN  1,000 mg Oral BID w/meals     predniSONE  40 mg Oral Once     sodium chloride (PF)  3 mL Intracatheter Q8H     tamsulosin  0.4 mg Oral Daily

## 2021-05-19 ENCOUNTER — APPOINTMENT (OUTPATIENT)
Dept: OCCUPATIONAL THERAPY | Facility: HOSPITAL | Age: 81
DRG: 177 | End: 2021-05-19
Payer: COMMERCIAL

## 2021-05-19 ENCOUNTER — APPOINTMENT (OUTPATIENT)
Dept: SPEECH THERAPY | Facility: HOSPITAL | Age: 81
DRG: 177 | End: 2021-05-19
Payer: COMMERCIAL

## 2021-05-19 ENCOUNTER — APPOINTMENT (OUTPATIENT)
Dept: PHYSICAL THERAPY | Facility: HOSPITAL | Age: 81
DRG: 177 | End: 2021-05-19
Payer: COMMERCIAL

## 2021-05-19 LAB
D DIMER PPP FEU-MCNC: 2.2 UG/ML FEU (ref 0–0.5)
GLUCOSE BLDC GLUCOMTR-MCNC: 100 MG/DL (ref 70–99)
GLUCOSE BLDC GLUCOMTR-MCNC: 124 MG/DL (ref 70–99)
GLUCOSE BLDC GLUCOMTR-MCNC: 175 MG/DL (ref 70–99)
GLUCOSE BLDC GLUCOMTR-MCNC: 268 MG/DL (ref 70–99)
GLUCOSE BLDC GLUCOMTR-MCNC: 275 MG/DL (ref 70–99)
GLUCOSE BLDC GLUCOMTR-MCNC: 89 MG/DL (ref 70–99)

## 2021-05-19 PROCEDURE — 85379 FIBRIN DEGRADATION QUANT: CPT | Performed by: INTERNAL MEDICINE

## 2021-05-19 PROCEDURE — 36415 COLL VENOUS BLD VENIPUNCTURE: CPT | Performed by: INTERNAL MEDICINE

## 2021-05-19 PROCEDURE — 92526 ORAL FUNCTION THERAPY: CPT | Mod: GN

## 2021-05-19 PROCEDURE — 97530 THERAPEUTIC ACTIVITIES: CPT | Mod: GO

## 2021-05-19 PROCEDURE — 120N000001 HC R&B MED SURG/OB

## 2021-05-19 PROCEDURE — 250N000012 HC RX MED GY IP 250 OP 636 PS 637: Performed by: INTERNAL MEDICINE

## 2021-05-19 PROCEDURE — 250N000013 HC RX MED GY IP 250 OP 250 PS 637: Performed by: INTERNAL MEDICINE

## 2021-05-19 PROCEDURE — 250N000011 HC RX IP 250 OP 636: Performed by: INTERNAL MEDICINE

## 2021-05-19 PROCEDURE — 99232 SBSQ HOSP IP/OBS MODERATE 35: CPT | Performed by: INTERNAL MEDICINE

## 2021-05-19 PROCEDURE — 97530 THERAPEUTIC ACTIVITIES: CPT | Mod: GP

## 2021-05-19 PROCEDURE — 999N001017 HC STATISTIC GLUCOSE BY METER IP

## 2021-05-19 RX ORDER — PREDNISONE 20 MG/1
20 TABLET ORAL ONCE
Status: COMPLETED | OUTPATIENT
Start: 2021-05-19 | End: 2021-05-19

## 2021-05-19 RX ORDER — DEXAMETHASONE 2 MG/1
6 TABLET ORAL DAILY
Status: DISCONTINUED | OUTPATIENT
Start: 2021-05-20 | End: 2021-05-21

## 2021-05-19 RX ADMIN — METFORMIN HYDROCHLORIDE 1000 MG: 500 TABLET ORAL at 17:18

## 2021-05-19 RX ADMIN — PREDNISONE 20 MG: 20 TABLET ORAL at 19:01

## 2021-05-19 RX ADMIN — INSULIN ASPART 10 UNITS: 100 INJECTION, SOLUTION INTRAVENOUS; SUBCUTANEOUS at 17:19

## 2021-05-19 RX ADMIN — IPRATROPIUM BROMIDE AND ALBUTEROL 2 PUFF: 20; 100 SPRAY, METERED RESPIRATORY (INHALATION) at 21:23

## 2021-05-19 RX ADMIN — INSULIN ASPART 10 UNITS: 100 INJECTION, SOLUTION INTRAVENOUS; SUBCUTANEOUS at 21:26

## 2021-05-19 RX ADMIN — IPRATROPIUM BROMIDE AND ALBUTEROL 2 PUFF: 20; 100 SPRAY, METERED RESPIRATORY (INHALATION) at 13:17

## 2021-05-19 RX ADMIN — IPRATROPIUM BROMIDE AND ALBUTEROL 2 PUFF: 20; 100 SPRAY, METERED RESPIRATORY (INHALATION) at 09:26

## 2021-05-19 RX ADMIN — ENOXAPARIN SODIUM 30 MG: 30 INJECTION SUBCUTANEOUS at 08:50

## 2021-05-19 RX ADMIN — IPRATROPIUM BROMIDE AND ALBUTEROL 2 PUFF: 20; 100 SPRAY, METERED RESPIRATORY (INHALATION) at 17:18

## 2021-05-19 RX ADMIN — DEXAMETHASONE SODIUM PHOSPHATE 6 MG: 4 INJECTION, SOLUTION INTRA-ARTICULAR; INTRALESIONAL; INTRAMUSCULAR; INTRAVENOUS; SOFT TISSUE at 09:22

## 2021-05-19 RX ADMIN — METFORMIN HYDROCHLORIDE 1000 MG: 500 TABLET ORAL at 08:49

## 2021-05-19 RX ADMIN — INSULIN ASPART 5 UNITS: 100 INJECTION, SOLUTION INTRAVENOUS; SUBCUTANEOUS at 13:15

## 2021-05-19 RX ADMIN — TAMSULOSIN HYDROCHLORIDE 0.4 MG: 0.4 CAPSULE ORAL at 08:49

## 2021-05-19 RX ADMIN — ENOXAPARIN SODIUM 30 MG: 30 INJECTION SUBCUTANEOUS at 21:24

## 2021-05-19 ASSESSMENT — ACTIVITIES OF DAILY LIVING (ADL)
ADLS_ACUITY_SCORE: 12
ADLS_ACUITY_SCORE: 12
ADLS_ACUITY_SCORE: 13
ADLS_ACUITY_SCORE: 12
ADLS_ACUITY_SCORE: 12
ADLS_ACUITY_SCORE: 13

## 2021-05-19 ASSESSMENT — MIFFLIN-ST. JEOR: SCORE: 1239.63

## 2021-05-19 NOTE — PLAN OF CARE
Patient did have a swallow eval today and diet was advanced, patient was able to feed himself, with no signs of aspiration, patient was able to go down to the COVID unit and was able to ambulate with stand by assist, ambulated well, the weathers is in place and is patent, vitals as charted, patient denies pain and adequately makes his needs known.

## 2021-05-19 NOTE — DOWNTIME EVENT NOTE
The EMR was down for 1 hours on 5/19/2021.    I was responsible for completing the paper charting during this time period.     The following information was re-entered into the system by Carlotta George RN: Flowsheet data    The following information will remain in the paper chart: N/A    Carlotta George RN  5/19/2021

## 2021-05-19 NOTE — PROGRESS NOTES
[facility]    Medicine Progress Note - Hospitalist Service       Date of Admission:  5/11/2021  Assessment & Plan             Principal Problem:    Acute on chronic respiratory failure with hypoxia (H)  Active Problems:    Hyponatremia    Pneumonia due to 2019 novel coronavirus    Diabetic ketoacidosis without coma associated with type 2 diabetes mellitus (H)    Fall    Acute on chronic renal failure (H)    Hyperkalemia    Polycystic kidney disease    Oropharyngeal dysphagia    Centrilobular emphysema (H)    COPD (chronic obstructive pulmonary disease) (H)    Assessment & Plan     2019 novel coronavirus pnuemonia (COVID-19):   Had COVID-19 in September and tested negative 11/20, positive on arrival and he was mildly hypoxic on arrival. Over the last 24 hours his pulmonary picture worsened significantly and he required high flow nasal cannula then Airvo. His pressure was up all the way with Fio2 requirement over 90% to maintain sats >90%. He is on remdesivir, dexamethasone, albuterol, dvt prophylaxis BID as D-dimer>9.  -5/14: Overall appears to have made significant improvement.  Supplemental oxygen requirements have declined substantially and his respiratory failure may have been as much on the basis of volume overload and aspiration pneumonitis as Covid pneumonitis.  Continue treatment directed at his Covid infection as well as vigorous pulmonary hygiene.  -5/15: Still has persistently low supplemental oxygen needs but certainly improved over the last 48 hours.  Significant degree of his hypoxemic respiratory failure may have been on the basis of volume overload and especially aspiration pneumonitis with certainly significant contribution from Covid pneumonitis.  Continue vigorous pulmonary hygiene and encouraging walking as well as usual treatment including remdesivir and dexamethasone directed at his Covid infection itself.  5/16: Any progress has plateaued but this is not unexpected given Covid  pneumonitis.  Encouraging pulmonary hygiene and mobilization.  -5/17: still requiring 1 L/min.  Continue dexamethasone and bronchodilators  -5/18: pt is still on 1 L/min.  Spoke with pt regarding emphysema shown on CT; he reports he used to smoke heavily.  Pt's hypoxia may be partially due to his COPD as well.    -5/19: pt appears to be more alert and interactive today.  Still requiring 1 L/min oxygen.  Continue dexamethasone (day 8/10).     Acute respiratory failure with hypoxia:   Multifactorial. Covid contributing, CT chest shows bilateral opacities considerably worse compared to yesterdays CXR. However he also has significant interstitial thickening bilaterally. Suspect this is pulmonary edema from the nearly 5 liters of fluid he required on arrival due to DKA. Gave 40mg of Lasix with profound response of over 2 liters in 8 hours. Will repeat with smaller dose of 20mg this evening and evaluate response. ECHO done 5/13/2021, result showed borderline reduced LVEF 10-55%. His procalcitonin is also elevated with dysphagia and possible aspiration as ct shows fluid in esophagus and some thickening. Start meropenem for CAP with coverage for possible aspiration. He has not MDRO risk factors, no clear indicators need to cover for pseudomonas. I talked with both his brother and his son and confirmed DNR/DNI status with them. I made clear the severity of illness and that he may not improve and this may cause his death. This afternoon I did call his son Al and advise him that CT head and chest came back okay with exception of worsened pneumonia and that his oxygen needs improved substantially through the day.   -5/14: As above marked improvement.  Still requires moderate flow oxygen but significantly improved.  Tolerating standard nasal cannula.  Increasing activity which should help preserve lung volume and FRC.  Pulmonary hygiene.  As below continue antibiotic treatment but discontinuation in the near future may be  possible.  -5 /15: As above.  Continued improvement although trajectory of this has slowed.  Low flow oxygen.  Likely combination of Covid pneumonitis, aspiration pneumonitis and perhaps some degree of volume overload.  The latter 2 are under reasonable control.  - 5/18: persistent hypoxia, will add prednisone for COPD component in addition to dexamethasone.    - 5/19: pt's respiration seems better after prednisone yesterday.  Will try another dose of prednisone today.       Diabetic ketoacidosis without coma associated with type 2 diabetes mellitus (H):   In the past [facility] has only [facility]. However, A1c is >13. Glucose came down quickly with IV insulin. Anion gap now closed but ketones still elevated. Will continue IV insulin until ketones cleared. He is still quite fatigued do don't believe he is ready to eat yet either.   -5/13: Insulin drip stopped during the night and lantus started as well as sliding scale. He was previously only on half dose jardiance (12.5mg) and glucophage. Since he is not able to eat anything still will continue d5 at 75ml an hour, once that is stopped adjustments to long acting insulin may be required.   -5/14: Still hyperglycemia but no evidence of ketoacidosis.  Mild elevation in ketones may have been on much on the basis of starvation ketosis given limitation of oral intake because of concerns for aspiration as from diabetes per se.  Accelerated both long and short acting insulin today to help decrease his level of glucose.  -5 /15: Hyperglycemia improved although still present with significant increase in both long and short acting insulin prescription in the last 36 hours.  We will not change further significantly today.  Hyperglycemia in part driven by steroid treatment and would be in especially reluctant to increase long-acting insulin significantly.  No evidence of any ketoacidosis.  -5/16: Some increase in overall level of glucose levels.  Continue current  doses of insulin and resume his Metformin.  --5/17: BG range 105- 264.  Continue to monitor for hyperglycemia  -5/18: resolved.  BG range .  Follow hypoglycemia closely  -5/19: BG range .     Hypokalemia:   Due to DKA with insulin requirements, now expect worsening due to large output after lasix. Replaced with 80meq IV, recheck at 4pm and in AM.   -5/14: This appears largely resolved with resolution in his ketoacidosis.  --5/17: monitor K  - 5/18: K trending down; follow       Hyponatremia:   Resolved with IVF, likely due to dehydration and poor oral intake.        Fall:   Fell at home, unsure how long he was laying on the floor.  However no evidence of significant injury.  Representative of his severe presenting illness.  --5/17: will order PT/OT for functional assessment and cognitive eval  -5/18: PT recommends TCU for rehab at discharge.  Continue with therapy       Confusion  Metabolic encephalopathy: Family does state he has been confused and having falls since his original covid diagnosis back in September.   -5/13: Worse this morning, he is awake and alert but admits he cannot remember even where he is or why. This afternoon nursing reports he has cleared significantly.   -5/14: This appears to be significantly improved.  No agitation which nursing staff in particular note was a significant issue.  Still has some slowed responses and distractibility but overall is showing a significant improvement.  Likely  Predominantly metabolic encephalopathy.  May have some more chronic encephalopathy which family had noted beginning with his initial Covid infection.  -5/15: Likely some mild degree of residual nonfocal encephalopathy although improved certainly over the last 48 hours.  Continued close monitoring.  - 5/18: MoCA score 16/30.  Impaired cognition.       Acute on chronic renal failure stage 3a(H):   Due to dehydration/DKA.  Creatinine normally 1.0-1.3, on arrival 1.89. Cleared overnight with IVF,  now back to baseline in the first several days of hospitalization.   -5/18: renal function now normal      Probable moderate malnutrition:   No large amount of weight loss compared to 5 months ago. However he does have evidence of subcutaneous fat loss and muscle wasting.  -5/15: Encouraging increase in oral intake.  Appreciate nutritionist input.     Aspiration pneumonitis  Dysphagia  -5/14: Suspect aspiration pneumonitis is inflammatory rather than infectious.  He has shown significant improvement in that time consistent with inflammatory response to an aspiration event.  Continue current antibiotic treatment at least for another day or so.  Usual laboratory evaluation such as procalcitonin poorly reflective distinction between infectious and inflammatory processes however.  Appreciate speech therapy's input.  Have been able to advance his diet with close attention on the part of nursing staff.  Neuromuscular function in terms of dysphagia appears to be relatively well-preserved but he needs regular prompting for safe swallowing.  -5 /15: He is required strict monitoring by nursing staff and prohibition on taking any water except with direct supervision.  Overall I suspect his swallowing function however is improved somewhat.  Await speech therapy reevaluation when there are again available in several days.  -5/16: Still needs close nursing supervision.  Anticipate speech therapy reevaluation tomorrow.  Is likely this will be a continued issue.  -5/17: speech therapy evaluation showed oropharyngeal dysphagia and recommends honey thick liquids fed via spoon and NDD1 pureed textures  -5/19: speech therapy reports improved swallowing; now recommends nectar thick liquids and NDD2 mechanical soft textures (adding gravies and sauces)     Urinary retention:  Required replacement of indwelling catheter now several days ago after multiple attempts at spontaneous urination.  Tamsulosin started.  Depending on the course of  his illness might be reasonable to resume trials of removal of catheter in another several days but I suspect he will need at least a week or so before this can be considered.     Generalized weakness  Likely a matter of deconditioning and acute illness.  Will discuss with nursing staff whether physical therapy evaluation would be warranted.  Its not unlikely the patient will benefit from subacute rehabilitation.  - PT/OT sessions  - TCU transfer at the time of discharge.       Diet: Dysphagia Diet Level 2 [facility] Altered Nectar Thickened Liquids (pre-thickened or use instant food thickener) (no straws)    DVT Prophylaxis: Enoxaparin (Lovenox) SQ  Mata Catheter: in place, indication: Strict 1-2 Hour I&O, Retention  Code Status: No CPR- Do NOT Intubate           Disposition Plan   Expected discharge: 2 - 3 days, recommended to transitional care unit once safe disposition plan/ TCU bed available.  Entered: Wilder Johnson MD 05/19/2021, 4:07 PM     The patient's care was discussed with the Patient.  Total time spent 45 min, 30 min were spent on gathering data and coordinating care.    Wilder Johnson MD  Hospitalist Service  [facility]  Contact information available via [facility] Paging/Directory    ______________________________________________________________________    Interval History   Pt appears more comfortable today.  Less dyspneic.  Denies fever, chills, chest pain, nausea, vomiting, diarrhea.    Data reviewed today: I reviewed all medications, new labs and imaging results over the last 24 hours. I personally reviewed no images or EKG's today.    Physical Exam   Vital Signs: Temp: 97.4  F (36.3  C) Temp src: Tympanic BP: 133/64 Pulse: 80   Resp: 18 SpO2: 93 % O2 Device: Nasal cannula Oxygen Delivery: 1 LPM  Weight: 123 lbs 10.85 oz  General Appearance: Sitting in bed in NAD  Respiratory: mild crackles at bases  Cardiovascular: RRR  GI: soft NT ND  Skin: intact  Other: Neuro: non-focal    Data   Recent Labs    Lab 05/18/21  1307 05/17/21  0611 05/16/21  0623 05/13/21  0503 05/13/21  0503 05/12/21  1846   WBC 22.0* 14.6* 12.3*   < > 10.3 7.5   HGB 12.6* 11.1* 10.9*   < > 13.6 13.9   MCV 91 91 91   < > 90 89    210 191   < > 150 173   INR  --   --   --   --   --  1.11    138 144   < > 137 138   POTASSIUM 3.7 3.5 3.6   < > 3.1* 3.1*   CHLORIDE 98 107 115*   < > 110* 108   CO2 24 25 24   < > 19* 22   BUN 18 21 28   < > 32* 37*   CR 0.69 0.74 0.78   < > 0.93 0.89   ANIONGAP 11 6 5   < > 8 8   CARLOS 7.2* 7.0* 7.2*   < > 7.2* 7.7*   * 109* 142*   < > 118* 128*   ALBUMIN  --  1.9*  --   --  2.4* 2.7*   PROTTOTAL  --  4.9*  --   --  6.2* 6.5*   BILITOTAL  --  0.3  --   --  0.3 0.3   ALKPHOS  --  112  --   --  71 70   ALT  --  16  --   --  14 13   AST  --  15  --   --  34 23   TROPI  --   --   --   --   --  <0.015    < > = values in this interval not displayed.     No results found for this or any previous visit (from the past 24 hour(s)).  Medications     - MEDICATION INSTRUCTIONS -         [START ON 5/20/2021] dexamethasone  6 mg Oral Daily     enoxaparin ANTICOAGULANT  30 mg Subcutaneous BID     insulin aspart  1-18 Units Subcutaneous Q4H     insulin glargine  18 Units Subcutaneous At Bedtime     ipratropium-albuterol  2 puff Inhalation 4x Daily     metFORMIN  1,000 mg Oral BID w/meals     predniSONE  20 mg Oral Once     sodium chloride (PF)  3 mL Intracatheter Q8H     tamsulosin  0.4 mg Oral Daily

## 2021-05-19 NOTE — PLAN OF CARE
"Most recent vitals: /72 (BP Location: Right arm)   Pulse 87   Temp 99  F (37.2  C) (Tympanic)   Resp 20   Ht 1.702 m (5' 7\")   Wt 56.9 kg (125 lb 7.1 oz)   SpO2 95%   BMI 19.65 kg/m      Pain Management:  Denied pain.   LOC:  A&O  Cardiac:  HRR. BP stable   Respiratory:  Currently on 1 L and remained for majority of shift. Denies SOB. Some CAPONE reported.   GI:  Denies nausea. Loose stools noted this shift.   :  Mata intact. Output adequate.   Skin Issues:  As charted.     IVF:  IV SL  ABX:  N/A    Nutrition: honey thick/pureed; tolerated well. One to one while eating   ADL's:  Up to commode   Ambulation: Assist of one with walker and gait belt.   Safety:  Alarms active and audible. Call light within reach.    Face to face report given with opportunity to observe patient.    Report given to Carlotta Ratliff RN   5/18/2021  11:38 PM        "

## 2021-05-19 NOTE — PROGRESS NOTES
05/19/21 0900   Signing Clinician's Name / Credentials   Signing clinician's name / credentials Jenna Crowe MS, CCC-SLP   Quick Adds   Rehab Discipline SLP   SLP - Dysphagia/Swallow   Minutes of Treatment 25 minutes   Symptoms Noted During/After Treatment Fatigue   Treatment Detail Patient was seen this AM for swallowing therapy. Patient sitting at the edge of the bed when SLP arrived eating his breakfast independently. Patient tolerated 4 oz of cup drinking of honey thickened liquids. Trialed nectar thick liquids; patient tolerated 4 oz of cup drinking of nectar thick liquids without signs of aspiration. Patient's vocal quality was clear and no coughing. Independently utilizing chin tuck at times . Patient tolerated 4 oz of diced pears with intermittent cues of small single bites. He occasionally reported feeling like the pears were getting stuck and throat clearing less than 25% of bites, but also noted that his throat has been sore the last few days. Upgrade patient's diet to NDD2 mechanical soft and nectar thick liquids. Continue to follow aspiration precautions and provide verbal cues of small bite/sip, chin tuck, swallow 2x per pite. Encourage patient to take his time while eating as he may fatigue with chewing. If patient is coughing frequently, stop feeding and SLP to re-assess.    SLP Discharge Planning    SLP Discharge Recommendation (DC Rec) Transitional Care Facility   SLP Rationale for DC Rec Patient demonstrating improving oropharyngeal dysphagia, however, diet modifications and compensatory strategies are still necessary   SLP Brief overview of current status  Patient is at high risk for aspiration. Recommend nectar thickened liquids and NDD2 mechanical soft textures with use of compensatory strategies of chin tuck and swallow 2x per bite/sip. Patient requires direct supervision and verbal cues to utilize compensatory strategies to increase airway protection and reduce risks of  aspiration   Additional Documentation   SLP Plan SLP will continue to treat patient during hospital stay.        Recommendations for Feeding:  Diet: nectar thick liquids and NDD2 mechanical soft textures (adding gravies and sauces)  - Patient would benefit from direct supervision in his room during oral intake. Provide verbal instructions (small bite/sip, chin down, swallow 2x per bite)   - Patient must sit in the chair at 90 degrees (may require pillows behind his back) or in bed at 90 degrees  - Eliminate all distractions; turn off the TV  - Patient should only eat when he is alert  - Patient would benefit from Ensure or Boost thickened to increase caloric intake  - Tuck chin during every swallow to increase airway protection  - Swallow 2x per bite of food to eliminate pharyngeal residue  - Encourage small bite, small sip  - Patient must remain upright in chair at least 30-45 minutes after oral intake     NOTE: If patient becomes too tired, there are noted changes in his vocal quality/breathing (wet and gurgly), or he begins coughing frequently while eating, stop feeding immediately and complete oral cares. SLP to re-assess patient s swallow

## 2021-05-19 NOTE — PLAN OF CARE
"Most recent vitals: /64 (BP Location: Right arm)   Pulse 90   Temp 98.9  F (37.2  C) (Tympanic)   Resp 20   Ht 1.702 m (5' 7\")   Wt 56.1 kg (123 lb 10.9 oz)   SpO2 92%   BMI 19.37 kg/m      Afebrile.   Pain Management:  Denied pain   LOC: A&O  Cardiac:  HRR. BP stable   Respiratory:  Remains on 1 L. Was able to be weaned to room air for some time at rest. Required the 1 L with activity. Cough infrequent. CAPONE but denies SOB.   GI:  Denies nausea. Appetite improving   :  Mata intact. Output adequate   Skin Issues:  As charted. Generalized bruising throughout.     IVF:  IV SL.   ABX:  N/A  B & 268   Nutrition: Advanced to nectar thick and mech altered. Tolerated well. Still  require 1:1 with meals to be reminded to slow down and take small bites.   ADL's:  Up to commode and chair this shift.   Ambulation: Assist of one with walker and gait belt.   Safety:  Alarms active and audible. Call light within reach. Has called appropriately.     Face to face report given with opportunity to observe patient.    Report given to Mayra Ratliff RN   2021  11:23 PM    "

## 2021-05-19 NOTE — PLAN OF CARE
"/56 (BP Location: Left arm)   Pulse 76   Temp 97.4  F (36.3  C) (Tympanic)   Resp 20   Ht 1.702 m (5' 7\")   Wt 56.1 kg (123 lb 10.9 oz)   SpO2 (!) 91%   BMI 19.37 kg/m      Pt is A&O. Denies pain or SOB at rest. Some dyspnea on exertion. Remains on 1 LPM NC with sats 90-94% at rest. Crackles to RLL and RML; LLL diminished. No loose stools this shift. IV Sl and patent. Bed in lowest position, call light in reach, and alarms active.     Face to face report given with opportunity to observe patient.    Report given to JUDD Benitez RN   5/19/2021  7:29 AM      "

## 2021-05-19 NOTE — PROGRESS NOTES
05/19/21 1300   Signing Clinician's Name / Credentials   Signing clinician's name / credentials Sabina Zaman OTR/L   Quick Adds   Rehab Discipline OT   Therapeutic Activity   Minutes of Treatment 20 minutes   Symptoms Noted During/After Treatment Shortness of breath;Fatigue   Treatment Detail Pt sitting up in the chair upon OT arrival, agreeable to tx. He participated in ADLs/grooming tasks standing at the sink. Pt completed sit<>stand chair transfers with CGA. Pt ambulated to/from the BR using FWW with CGA and verbal cues for safety; pt occasionally impulsive and doesn't always follow directions. Pt combed his hair in standing at the sink and needed a sitting break back at the chair due to SOB/decreased O2. O2 decreased to mid 80s on 1L and pt required 1-2 minutes rest and instruction in pursed lip breathing to return to >90%. Pt went back to the sink and brushed his teeth, but again required a sitting rest break after due to O2 decreased to 85%. Pt again needed 1-2 minutes to recover to >90%. Pt has difficulties following instructions to accurately complete pursed lip breathing, too. Pt was left resting in the chair, encouraged to remain sitting for anouther half hour or so. Clip alert attached and call light within reach.   OT Discharge Planning    OT Discharge Recommendation (DC Rec) Transitional Care Facility   OT Rationale for DC Rec Pt continues to require assistance for ADLs and is still on O2. Pt would not be safe to return home and manage O2 tubing either due to cognitive deficits. Pt also requiring verbal cues occasionally for safety/impulsiveness.   OT Brief overview of current status  Pt sitting up in the chair upon OT arrival, agreeable to tx. He participated in ADLs/grooming tasks standing at the sink. Pt completed sit<>stand chair transfers with CGA. Pt ambulated to/from the BR using FWW with CGA and verbal cues for safety; pt occasionally impulsive and doesn't always follow directions. Pt  combed his hair in standing at the sink and needed a sitting break back at the chair due to SOB/decreased O2. O2 decreased to mid 80s on 1L and pt required 1-2 minutes rest and instruction in pursed lip breathing to return to >90%. Pt went back to the sink and brushed his teeth, but again required a sitting rest break after due to O2 decreased to 85%. Pt again needed 1-2 minutes to recover to >90%. Pt has difficulties following instructions to accurately complete pursed lip breathing, too. Pt was left resting in the chair, encouraged to remain sitting for anouther half hour or so. Clip alert attached and call light within reach.   Additional Documentation   Rehab Comments No significant change in functional status. O2 continuing to decrease into the mid 80s with little activity.   OT Plan Progress strength, endurance, and cognition as able for increased independence in ADLs   Total Session Time   Total Session Time (minutes) 20 minutes

## 2021-05-19 NOTE — PLAN OF CARE
Face to face report given with opportunity to observe patient.    Report given to Agapito Marino, JUDD   5/19/2021  3:06 PM

## 2021-05-20 ENCOUNTER — APPOINTMENT (OUTPATIENT)
Dept: SPEECH THERAPY | Facility: HOSPITAL | Age: 81
DRG: 177 | End: 2021-05-20
Payer: COMMERCIAL

## 2021-05-20 ENCOUNTER — APPOINTMENT (OUTPATIENT)
Dept: PHYSICAL THERAPY | Facility: HOSPITAL | Age: 81
DRG: 177 | End: 2021-05-20
Payer: COMMERCIAL

## 2021-05-20 ENCOUNTER — APPOINTMENT (OUTPATIENT)
Dept: OCCUPATIONAL THERAPY | Facility: HOSPITAL | Age: 81
DRG: 177 | End: 2021-05-20
Payer: COMMERCIAL

## 2021-05-20 LAB
ALBUMIN SERPL-MCNC: 2 G/DL (ref 3.4–5)
ANION GAP SERPL CALCULATED.3IONS-SCNC: 6 MMOL/L (ref 3–14)
BUN SERPL-MCNC: 21 MG/DL (ref 7–30)
CALCIUM SERPL-MCNC: 7.2 MG/DL (ref 8.5–10.1)
CHLORIDE SERPL-SCNC: 102 MMOL/L (ref 94–109)
CO2 SERPL-SCNC: 28 MMOL/L (ref 20–32)
CREAT SERPL-MCNC: 0.66 MG/DL (ref 0.66–1.25)
CRP SERPL-MCNC: 15.1 MG/L (ref 0–8)
ERYTHROCYTE [DISTWIDTH] IN BLOOD BY AUTOMATED COUNT: 12.8 % (ref 10–15)
GFR SERPL CREATININE-BSD FRML MDRD: >90 ML/MIN/{1.73_M2}
GLUCOSE BLDC GLUCOMTR-MCNC: 199 MG/DL (ref 70–99)
GLUCOSE BLDC GLUCOMTR-MCNC: 208 MG/DL (ref 70–99)
GLUCOSE BLDC GLUCOMTR-MCNC: 267 MG/DL (ref 70–99)
GLUCOSE BLDC GLUCOMTR-MCNC: 269 MG/DL (ref 70–99)
GLUCOSE BLDC GLUCOMTR-MCNC: 350 MG/DL (ref 70–99)
GLUCOSE BLDC GLUCOMTR-MCNC: 74 MG/DL (ref 70–99)
GLUCOSE SERPL-MCNC: 68 MG/DL (ref 70–99)
HCT VFR BLD AUTO: 33.3 % (ref 40–53)
HGB BLD-MCNC: 11.4 G/DL (ref 13.3–17.7)
MAGNESIUM SERPL-MCNC: 1.8 MG/DL (ref 1.6–2.3)
MCH RBC QN AUTO: 30.6 PG (ref 26.5–33)
MCHC RBC AUTO-ENTMCNC: 34.2 G/DL (ref 31.5–36.5)
MCV RBC AUTO: 89 FL (ref 78–100)
PHOSPHATE SERPL-MCNC: 3.3 MG/DL (ref 2.5–4.5)
PLATELET # BLD AUTO: 295 10E9/L (ref 150–450)
POTASSIUM SERPL-SCNC: 3.3 MMOL/L (ref 3.4–5.3)
POTASSIUM SERPL-SCNC: 5 MMOL/L (ref 3.4–5.3)
RBC # BLD AUTO: 3.73 10E12/L (ref 4.4–5.9)
SODIUM SERPL-SCNC: 136 MMOL/L (ref 133–144)
WBC # BLD AUTO: 18.3 10E9/L (ref 4–11)

## 2021-05-20 PROCEDURE — 97530 THERAPEUTIC ACTIVITIES: CPT | Mod: GO

## 2021-05-20 PROCEDURE — 84132 ASSAY OF SERUM POTASSIUM: CPT | Performed by: INTERNAL MEDICINE

## 2021-05-20 PROCEDURE — 92526 ORAL FUNCTION THERAPY: CPT | Mod: GN

## 2021-05-20 PROCEDURE — 999N001017 HC STATISTIC GLUCOSE BY METER IP

## 2021-05-20 PROCEDURE — 120N000001 HC R&B MED SURG/OB

## 2021-05-20 PROCEDURE — 250N000012 HC RX MED GY IP 250 OP 636 PS 637: Performed by: INTERNAL MEDICINE

## 2021-05-20 PROCEDURE — 86140 C-REACTIVE PROTEIN: CPT | Performed by: INTERNAL MEDICINE

## 2021-05-20 PROCEDURE — 85027 COMPLETE CBC AUTOMATED: CPT | Performed by: INTERNAL MEDICINE

## 2021-05-20 PROCEDURE — 80069 RENAL FUNCTION PANEL: CPT | Performed by: INTERNAL MEDICINE

## 2021-05-20 PROCEDURE — 99232 SBSQ HOSP IP/OBS MODERATE 35: CPT | Performed by: INTERNAL MEDICINE

## 2021-05-20 PROCEDURE — 36415 COLL VENOUS BLD VENIPUNCTURE: CPT | Performed by: INTERNAL MEDICINE

## 2021-05-20 PROCEDURE — 250N000013 HC RX MED GY IP 250 OP 250 PS 637: Performed by: INTERNAL MEDICINE

## 2021-05-20 PROCEDURE — 97530 THERAPEUTIC ACTIVITIES: CPT | Mod: GP | Performed by: PHYSICAL THERAPIST

## 2021-05-20 PROCEDURE — 250N000011 HC RX IP 250 OP 636: Performed by: INTERNAL MEDICINE

## 2021-05-20 PROCEDURE — 83735 ASSAY OF MAGNESIUM: CPT | Performed by: INTERNAL MEDICINE

## 2021-05-20 RX ORDER — POTASSIUM CHLORIDE 1500 MG/1
40 TABLET, EXTENDED RELEASE ORAL
Status: COMPLETED | OUTPATIENT
Start: 2021-05-20 | End: 2021-05-20

## 2021-05-20 RX ADMIN — IPRATROPIUM BROMIDE AND ALBUTEROL 2 PUFF: 20; 100 SPRAY, METERED RESPIRATORY (INHALATION) at 10:05

## 2021-05-20 RX ADMIN — DEXAMETHASONE 6 MG: 2 TABLET ORAL at 09:57

## 2021-05-20 RX ADMIN — METFORMIN HYDROCHLORIDE 1000 MG: 500 TABLET ORAL at 09:59

## 2021-05-20 RX ADMIN — ENOXAPARIN SODIUM 30 MG: 30 INJECTION SUBCUTANEOUS at 09:59

## 2021-05-20 RX ADMIN — ENOXAPARIN SODIUM 30 MG: 30 INJECTION SUBCUTANEOUS at 22:38

## 2021-05-20 RX ADMIN — POTASSIUM CHLORIDE 40 MEQ: 1500 TABLET, EXTENDED RELEASE ORAL at 11:36

## 2021-05-20 RX ADMIN — METFORMIN HYDROCHLORIDE 1000 MG: 500 TABLET ORAL at 17:10

## 2021-05-20 RX ADMIN — IPRATROPIUM BROMIDE AND ALBUTEROL 2 PUFF: 20; 100 SPRAY, METERED RESPIRATORY (INHALATION) at 13:56

## 2021-05-20 RX ADMIN — INSULIN ASPART 14 UNITS: 100 INJECTION, SOLUTION INTRAVENOUS; SUBCUTANEOUS at 17:15

## 2021-05-20 RX ADMIN — INSULIN ASPART 7 UNITS: 100 INJECTION, SOLUTION INTRAVENOUS; SUBCUTANEOUS at 00:22

## 2021-05-20 RX ADMIN — TAMSULOSIN HYDROCHLORIDE 0.4 MG: 0.4 CAPSULE ORAL at 09:59

## 2021-05-20 RX ADMIN — POTASSIUM CHLORIDE 40 MEQ: 1500 TABLET, EXTENDED RELEASE ORAL at 08:29

## 2021-05-20 ASSESSMENT — ACTIVITIES OF DAILY LIVING (ADL)
ADLS_ACUITY_SCORE: 12

## 2021-05-20 ASSESSMENT — MIFFLIN-ST. JEOR: SCORE: 1255.63

## 2021-05-20 NOTE — PROGRESS NOTES
05/20/21 1500   Signing Clinician's Name / Credentials   Signing clinician's name / credentials Sabina Zaman OTR/L   Quick Adds   Rehab Discipline OT   Therapeutic Activity   Minutes of Treatment 20 minutes   Symptoms Noted During/After Treatment Fatigue;Shortness of breath;Significant change in vital signs   Treatment Detail Pt resting in bed upon OT arrival, agreeable to tx. Pt transferred to sit EOB with SBA. Pt on RA and sats were >90%. Worked on cognition; pt unable to recall what he ate nor what his allergy is. Pt had slight difficulties recalling what he did with PT today. Pt requested water and peaches. Pt was able to feed himself with supervision and verbal cues to slow down. Pt impulsive with feeding at times. Pt then doffed/donned into a clean gown with Oma. He transferred sit<>stand from the bed with CGA and stood for about 30 seconds with Oma and again SpO2 on RA remained >90%. Pt expressed fatigue and SOB. Pt agreeable to ambulating to the BR to comb his hair with some encouragement. Pt required close CGA and assist to manage his catheter when ambulating to/from the BR. Pt stood at the sink with CGA to combed his hair. He returned to the chair with CGA and SpO2 was 88% but quickly increased to 89-91%. Nursing entered and confirmed pt does not need the nasal cannula. Pt left with nursing. Clip alert attached and call light within reach.   OT Discharge Planning    OT Discharge Recommendation (DC Rec) Transitional Care Facility   OT Rationale for DC Rec Pt continues to require assistance for ADLs due to weakness, deconditioning, and impaired cognition. Pt would not be safe to return home alone at this time.   OT Brief overview of current status  Pt resting in bed upon OT arrival, agreeable to tx. Pt transferred to sit EOB with SBA. Pt on RA and sats were >90%. Worked on cognition; pt unable to recall what he ate nor what his allergy is. Pt had slight difficulties recalling what he did with PT  today. Pt requested water and peaches. Pt was able to feed himself with supervision and verbal cues to slow down. Pt impulsive with feeding at times. Pt then doffed/donned into a clean gown with Oma. He transferred sit<>stand from the bed with CGA and stood for about 30 seconds with Oma and again SpO2 on RA remained >90%. Pt expressed fatigue and SOB. Pt agreeable to ambulating to the BR to comb his hair with some encouragement. Pt required close CGA and assist to manage his catheter when ambulating to/from the BR. Pt stood at the sink with CGA to combed his hair. He returned to the chair with CGA and SpO2 was 88% but quickly increased to 89-91%. Nursing entered and confirmed pt does not need the nasal cannula. Pt left with nursing. Clip alert attached and call light within reach.   Additional Documentation   Rehab Comments Activity tolerance improved today. Pt still not at a safe functional level to return home alone, though.   OT Plan Progress strength, endurance, and cognition as able for increased independence in ADLs   Total Session Time   Total Session Time (minutes) 20 minutes

## 2021-05-20 NOTE — PROGRESS NOTES
05/19/21 1114   Signing Clinician's Name / Credentials   Signing clinician's name / credentials Ryanne Alston PTA   Quick Adds   Rehab Discipline PT   PT Assistant Visit Number 2   Therapeutic Activity   Minutes of Treatment 23 minutes   Symptoms Noted During/After Treatment Shortness of breath;Fatigue   Treatment Detail Pt was in bed upon arrival but awake. Pt was willing to participate in PT this morning. Pt walked 60 feet x2 with a seated rest period in between. Oxygen level dropped to 83% back upt 87-88% in 10 seconds 1 minutes 90-92% SBA1. Pt was SBA1 supine to sit and sit to stand. Pt impulsive with moving before instructions.    PT Discharge Planning    PT Discharge Recommendation (DC Rec) Transitional Care Facility   PT Rationale for DC Rec limited activity tolerance, not safe to return home at current level of function   PT Brief overview of current status  Pt impulsive with moving before instruction not aware of tubing today and sitting down without wanting to reach back to sit square on surfaces.    Additional Documentation   PT Plan Progress strength and activity tolerance   Total Session Time   Total Session Time (minutes) 23 minutes

## 2021-05-20 NOTE — PROGRESS NOTES
[facility]    Medicine Progress Note - Hospitalist Service       Date of Admission:  5/11/2021  Assessment & Plan                 Principal Problem:    Acute on chronic respiratory failure with hypoxia (H)  Active Problems:    Hyponatremia    Pneumonia due to 2019 novel coronavirus    Diabetic ketoacidosis without coma associated with type 2 diabetes mellitus (H)    Fall    Acute on chronic renal failure (H)    Hyperkalemia    Polycystic kidney disease    Oropharyngeal dysphagia    Centrilobular emphysema (H)    COPD (chronic obstructive pulmonary disease) (H)    Assessment & Plan     2019 novel coronavirus pnuemonia (COVID-19):   Had COVID-19 in September and tested negative 11/20, positive on arrival and he was mildly hypoxic on arrival. Over the last 24 hours his pulmonary picture worsened significantly and he required high flow nasal cannula then Airvo. His pressure was up all the way with Fio2 requirement over 90% to maintain sats >90%. He is on remdesivir, dexamethasone, albuterol, dvt prophylaxis BID as D-dimer>9.  -5/14: Overall appears to have made significant improvement.  Supplemental oxygen requirements have declined substantially and his respiratory failure may have been as much on the basis of volume overload and aspiration pneumonitis as Covid pneumonitis.  Continue treatment directed at his Covid infection as well as vigorous pulmonary hygiene.  -5/15: Still has persistently low supplemental oxygen needs but certainly improved over the last 48 hours.  Significant degree of his hypoxemic respiratory failure may have been on the basis of volume overload and especially aspiration pneumonitis with certainly significant contribution from Covid pneumonitis.  Continue vigorous pulmonary hygiene and encouraging walking as well as usual treatment including remdesivir and dexamethasone directed at his Covid infection itself.  5/16: Any progress has plateaued but this is not unexpected given Covid  pneumonitis.  Encouraging pulmonary hygiene and mobilization.  -5/17: still requiring 1 L/min.  Continue dexamethasone and bronchodilators  -5/18: pt is still on 1 L/min.  Spoke with pt regarding emphysema shown on CT; he reports he used to smoke heavily.  Pt's hypoxia may be partially due to his COPD as well.    -5/19: pt appears to be more alert and interactive today.  Still requiring 1 L/min oxygen.  Continue dexamethasone (day 8/10).  -5/20: in good spirit.  Stable respiratory status.  Continue dexamethasone day 9/10     Acute respiratory failure with hypoxia:   Multifactorial. Covid contributing, CT chest shows bilateral opacities considerably worse compared to yesterdays CXR. However he also has significant interstitial thickening bilaterally. Suspect this is pulmonary edema from the nearly 5 liters of fluid he required on arrival due to DKA. Gave 40mg of Lasix with profound response of over 2 liters in 8 hours. Will repeat with smaller dose of 20mg this evening and evaluate response. ECHO done 5/13/2021, result showed borderline reduced LVEF 10-55%. His procalcitonin is also elevated with dysphagia and possible aspiration as ct shows fluid in esophagus and some thickening. Start meropenem for CAP with coverage for possible aspiration. He has not MDRO risk factors, no clear indicators need to cover for pseudomonas. I talked with both his brother and his son and confirmed DNR/DNI status with them. I made clear the severity of illness and that he may not improve and this may cause his death. This afternoon I did call his son Al and advise him that CT head and chest came back okay with exception of worsened pneumonia and that his oxygen needs improved substantially through the day.   -5/14: As above marked improvement.  Still requires moderate flow oxygen but significantly improved.  Tolerating standard nasal cannula.  Increasing activity which should help preserve lung volume and FRC.  Pulmonary hygiene.  As  below continue antibiotic treatment but discontinuation in the near future may be possible.  -5 /15: As above.  Continued improvement although trajectory of this has slowed.  Low flow oxygen.  Likely combination of Covid pneumonitis, aspiration pneumonitis and perhaps some degree of volume overload.  The latter 2 are under reasonable control.  - 5/18: persistent hypoxia, will add prednisone for COPD component in addition to dexamethasone.    - 5/19: pt's respiration seems better after prednisone yesterday.  Will try another dose of prednisone today.  - 5/20: stable respiratory status, now on room air!       Diabetic ketoacidosis without coma associated with type 2 diabetes mellitus (H):   In the past [facility] has only [facility] Jard[facility]. However, A1c is >13. Glucose came down quickly with IV insulin. Anion gap now closed but ketones still elevated. Will continue IV insulin until ketones cleared. He is still quite fatigued do don't believe he is ready to eat yet either.   -5/13: Insulin drip stopped during the night and lantus started as well as sliding scale. He was previously only on half dose jardiance (12.5mg) and glucophage. Since he is not able to eat anything still will continue d5 at 75ml an hour, once that is stopped adjustments to long acting insulin may be required.   -5/14: Still hyperglycemia but no evidence of ketoacidosis.  Mild elevation in ketones may have been on much on the basis of starvation ketosis given limitation of oral intake because of concerns for aspiration as from diabetes per se.  Accelerated both long and short acting insulin today to help decrease his level of glucose.  -5 /15: Hyperglycemia improved although still present with significant increase in both long and short acting insulin prescription in the last 36 hours.  We will not change further significantly today.  Hyperglycemia in part driven by steroid treatment and would be in especially reluctant to increase  long-acting insulin significantly.  No evidence of any ketoacidosis.  -5/16: Some increase in overall level of glucose levels.  Continue current doses of insulin and resume his Metformin.  --5/17: BG range 105- 264.  Continue to monitor for hyperglycemia  -5/18: resolved.  BG range .  Follow hypoglycemia closely  -5/19: BG range .  -5/20: BG range 68 - 350.  Recurrent hypoglycemia in the morning.  Will reduce lantus     Hypokalemia:   Due to DKA with insulin requirements, now expect worsening due to large output after lasix. Replaced with 80meq IV, recheck at 4pm and in AM.   -5/14: This appears largely resolved with resolution in his ketoacidosis.  --5/17: monitor K  - 5/18: K trending down; follow  -5/20: hypokalemia with K 3.3; replace       Hyponatremia:   Resolved with IVF, likely due to dehydration and poor oral intake.        Fall:   Fell at home, unsure how long he was laying on the floor.  However no evidence of significant injury.  Representative of his severe presenting illness.  --5/17: will order PT/OT for functional assessment and cognitive eval  -5/18: PT recommends TCU for rehab at discharge.  Continue with therapy  -5/20: continuing with PT/OT       Confusion  Metabolic encephalopathy: Family does state he has been confused and having falls since his original covid diagnosis back in September.   -5/13: Worse this morning, he is awake and alert but admits he cannot remember even where he is or why. This afternoon nursing reports he has cleared significantly.   -5/14: This appears to be significantly improved.  No agitation which nursing staff in particular note was a significant issue.  Still has some slowed responses and distractibility but overall is showing a significant improvement.  Likely  Predominantly metabolic encephalopathy.  May have some more chronic encephalopathy which family had noted beginning with his initial Covid infection.  -5/15: Likely some mild degree of residual  nonfocal encephalopathy although improved certainly over the last 48 hours.  Continued close monitoring.  - 5/18: MoCA score 16/30.  Impaired cognition.       Acute on chronic renal failure stage 3a(H):   Due to dehydration/DKA.  Creatinine normally 1.0-1.3, on arrival 1.89. Cleared overnight with IVF, now back to baseline in the first several days of hospitalization.   -5/18: renal function now normal  -5/20: renal function normal      Probable moderate malnutrition:   No large amount of weight loss compared to 5 months ago. However he does have evidence of subcutaneous fat loss and muscle wasting.  -5/15: Encouraging increase in oral intake.  Appreciate nutritionist input.     Aspiration pneumonitis  Dysphagia  -5/14: Suspect aspiration pneumonitis is inflammatory rather than infectious.  He has shown significant improvement in that time consistent with inflammatory response to an aspiration event.  Continue current antibiotic treatment at least for another day or so.  Usual laboratory evaluation such as procalcitonin poorly reflective distinction between infectious and inflammatory processes however.  Appreciate speech therapy's input.  Have been able to advance his diet with close attention on the part of nursing staff.  Neuromuscular function in terms of dysphagia appears to be relatively well-preserved but he needs regular prompting for safe swallowing.  -5 /15: He is required strict monitoring by nursing staff and prohibition on taking any water except with direct supervision.  Overall I suspect his swallowing function however is improved somewhat.  Await speech therapy reevaluation when there are again available in several days.  -5/16: Still needs close nursing supervision.  Anticipate speech therapy reevaluation tomorrow.  Is likely this will be a continued issue.  -5/17: speech therapy evaluation showed oropharyngeal dysphagia and recommends honey thick liquids fed via spoon and NDD1 pureed  textures  -5/19: speech therapy reports improved swallowing; now recommends nectar thick liquids and NDD2 mechanical soft textures (adding gravies and sauces)  -5/20: speech therapy session noted.  Continue with therapy.     Urinary retention:  Required replacement of indwelling catheter now several days ago after multiple attempts at spontaneous urination.  Tamsulosin started.  Depending on the course of his illness might be reasonable to resume trials of removal of catheter in another several days but I suspect he will need at least a week or so before this can be considered.     Generalized weakness  Likely a matter of deconditioning and acute illness.  Will discuss with nursing staff whether physical therapy evaluation would be warranted.  Its not unlikely the patient will benefit from subacute rehabilitation.  - PT/OT sessions  - TCU transfer at the time of discharge.         Diet: Dysphagia Diet Level 2 [facility] Altered Nectar Thickened Liquids (pre-thickened or use instant food thickener) (no straws)    DVT Prophylaxis: Enoxaparin (Lovenox) SQ  Mata Catheter: in place, indication: Strict 1-2 Hour I&O, Retention  Code Status: No CPR- Do NOT Intubate           Disposition Plan   Expected discharge: Tomorrow, recommended to transitional care unit once safe disposition plan/ TCU bed available.  Entered: Wilder Johnson MD 05/20/2021, 10:10 AM       The patient's care was discussed with the Bedside Nurse, Care Coordinator/ and Patient.    Wilder Johnson MD  Hospitalist Service  [facility]  Contact information available via [facility] Paging/Directory    ______________________________________________________________________    Interval History   Pt is in good spirit today.  Mild dyspnea, seems better today, now on room air.  Denies fever, chills, dyspnea, chest pain, nausea, vomiting, abdominal pain.    Data reviewed today: I reviewed all medications, new labs and imaging results over the last 24 hours. I  personally reviewed no images or EKG's today.    Physical Exam   Vital Signs: Temp: 98  F (36.7  C) Temp src: Tympanic BP: 113/57 Pulse: 78   Resp: 19 SpO2: 95 % O2 Device: None (Room air) Oxygen Delivery: 1/2 LPM  Weight: 127 lbs 3.29 oz  General Appearance:  Sitting in bed in NAD  Respiratory: mild crackles at bases  Cardiovascular: RRR  GI: soft NT ND  Skin: intact  Other:  Neuro: non-focal    Data   Recent Labs   Lab 05/20/21  1520 05/20/21  0542 05/18/21  1307 05/17/21  0611   WBC  --  18.3* 22.0* 14.6*   HGB  --  11.4* 12.6* 11.1*   MCV  --  89 91 91   PLT  --  295 256 210   NA  --  136 133 138   POTASSIUM 5.0 3.3* 3.7 3.5   CHLORIDE  --  102 98 107   CO2  --  28 24 25   BUN  --  21 18 21   CR  --  0.66 0.69 0.74   ANIONGAP  --  6 11 6   CARLOS  --  7.2* 7.2* 7.0*   GLC  --  68* 189* 109*   ALBUMIN  --  2.0*  --  1.9*   PROTTOTAL  --   --   --  4.9*   BILITOTAL  --   --   --  0.3   ALKPHOS  --   --   --  112   ALT  --   --   --  16   AST  --   --   --  15     No results found for this or any previous visit (from the past 24 hour(s)).  Medications     - MEDICATION INSTRUCTIONS -         dexamethasone  6 mg Oral Daily     enoxaparin ANTICOAGULANT  30 mg Subcutaneous BID     insulin aspart  1-18 Units Subcutaneous 4x Daily AC & HS     insulin glargine  8 Units Subcutaneous At Bedtime     ipratropium-albuterol  2 puff Inhalation 4x Daily     metFORMIN  1,000 mg Oral BID w/meals     sodium chloride (PF)  3 mL Intracatheter Q8H     tamsulosin  0.4 mg Oral Daily

## 2021-05-20 NOTE — PLAN OF CARE
Pt is A&O,cooperative and pleasant, makes needs known but does not use call light. Bed alarms active but pt has not set the alarms off this shift. VSS and denies pain. HR is regular and denies chest pain. Has remained on RA this shift and O2 saturation has been between 90-95%. LS have fine crackles in bilateral bases. Denies shortness of breath. Mata catheter remains in place with adequate output. Nectar thickened liquids and mechanical altered food diet. IV is saline locked. Blood glucose checks have been 208 and 74. Call light remains within reach.   Face to face report given with opportunity to observe patient.    Report given to Giovanna Carney RN   5/20/2021  7:29 AM

## 2021-05-20 NOTE — PROGRESS NOTES
05/20/21 0903   Signing Clinician's Name / Credentials   Signing clinician's name / credentials Letha Alfredo DPT   Quick Adds   Rehab Discipline PT   Therapeutic Activity   Minutes of Treatment 38 minutes   Symptoms Noted During/After Treatment Fatigue;Shortness of breath;Significant change in vital signs   Treatment Detail Properly donned/doffed PPE for covid patient.  Pt resting in bed upon PT arrival, agreeable to PT.  Pt transferred sup>sit mod I to EOB, had O2 in nose however was not turned on.  O2 sats with activity 86-87%, spent several minutes working on cues for deep breathing to improve sats without success.  Discussed with RN Giovanna who okayed use of 2L O2 for ambulation.  Pt transferred into w/c in room without AD CGA, did need cues to slow down for safety due to O2 tubing and catheter.  Nursing then present to give medications.  While pt taking medications had him work on LAQ x15 bilaterally, hip flex x15 bilaterally.  Pt then lewis via w/c down to PUI unit for ambulation.  Pt placed on 2L, required approx 30 seconds for O2 sats to increase to 90% before initiating activity.  Pt transferred sit>stand SBA from chair.  Pt ambulated 100' without AD CGA on 2L O2, side ERIS with mild gait deviations however no beto LOB.  Pt's O2 sats initially on sitting 93% however within 5 seconds O2 sats began decreasing down to 84%, provided cues for pursed lip breathing and required a full 2 minutes for recovery back to 90%.  After seated rest break, ambulated an additional 100' without AD CGA, improved paced noted on second distance with less path deviations.  Ambulated on 1L O2, again immediately upon sitting O2 sats 92% but within 5 seconds began decreasing down to 83%.  Prolonged recovery again noted, requiring 2-3 minutes to return back to 90%.  Pt returned back to room via w/c.  Pt completed transfer back to room chair CGA.  Pt left sitting in chair with call light in reach, aid present to assist with  breakfast.   PT Discharge Planning    PT Discharge Recommendation (DC Rec) Transitional Care Facility   PT Rationale for DC Rec not safe to return home at current level of function and level of activity tolerance   PT Brief overview of current status  Properly donned/doffed PPE for covid patient.  Pt resting in bed upon PT arrival, agreeable to PT.  Pt transferred sup>sit mod I to EOB, had O2 in nose however was not turned on.  O2 sats with activity 86-87%, spent several minutes working on cues for deep breathing to improve sats without success.  Discussed with RN Giovanna who okayed use of 2L O2 for ambulation.  Pt transferred into w/c in room without AD CGA, did need cues to slow down for safety due to O2 tubing and catheter.  Nursing then present to give medications.  While pt taking medications had him work on LAQ x15 bilaterally, hip flex x15 bilaterally.  Pt then lewis via w/c down to PUI unit for ambulation.  Pt placed on 2L, required approx 30 seconds for O2 sats to increase to 90% before initiating activity.  Pt transferred sit>stand SBA from chair.  Pt ambulated 100' without AD CGA on 2L O2, side ERIS with mild gait deviations however no beto LOB.  Pt's O2 sats initially on sitting 93% however within 5 seconds O2 sats began decreasing down to 84%, provided cues for pursed lip breathing and required a full 2 minutes for recovery back to 90%.  After seated rest break, ambulated an additional 100' without AD CGA, improved paced noted on second distance with less path deviations.  Ambulated on 1L O2, again immediately upon sitting O2 sats 92% but within 5 seconds began decreasing down to 83%.  Prolonged recovery again noted, requiring 2-3 minutes to return back to 90%.  Pt returned back to room via w/c.  Pt completed transfer back to room chair CGA.  Pt left sitting in chair with call light in reach, aid present to assist with breakfast.   Additional Documentation   Rehab Comments improved stability and improved  distance however continues to desaturate both on RA and on 1-2L O2 with activity, dyspneic   PT Plan Progress activity tolerance and strength   Total Session Time   Total Session Time (minutes) 38 minutes

## 2021-05-20 NOTE — PLAN OF CARE
"Reason for hospital stay:  hypoxia, 5/11 positive covid 19  Living situation PTA: home alone  Most recent vitals: /57   Pulse 78   Temp 98  F (36.7  C) (Tympanic)   Resp 19   Ht 1.702 m (5' 7\")   Wt 57.7 kg (127 lb 3.3 oz)   SpO2 (!) 91%   BMI 19.92 kg/m      Pain Management:  denies  LOC:  alert oriented, forgetful at times  Respiratory:  dyspnea on exertion- 96% on room air, does desat with activity to 86%- placed on 2 LPM when up and moving.    GI:  bowel movement this shift.   :  weathers intact for retention, adequate output  Skin Issues:  no new issues  IVF:  saline lock  ABX:  completed  Nutrition:  mech altered nectar thick with supervised meals- tolerating well.    Safety:  no falls or injuries this shift, has not been calling will set off alarms. Needing standby assistance to help with weathers and other cords.     Discharge care conference held.   Attendees: Nursing, , Physical Therapy, Occupational Therapy, Pharmacy, Respiratory Therapy, and Physician  Comments:      5/20/2021  12:48 PM  Giovanna Payne RN      Face to face report given with opportunity to observe patient.    Report given to  Mayra JEONG Rn.     Giovanna Payne RN   5/20/2021  3:09 PM      "

## 2021-05-20 NOTE — PROGRESS NOTES
05/20/21 1100   Signing Clinician's Name / Credentials   Signing clinician's name / credentials Jenna Crowe MS, CCC-SLP   Quick Adds   Rehab Discipline SLP   SLP - Dysphagia/Swallow   Minutes of Treatment 15 minutes   Symptoms Noted During/After Treatment Fatigue   Treatment Detail Patient participated in PO trials of mechanical soft textures and liquids. Patient drank 4 sips of thin water and coughed on 3/4 trials. Continues to demonstrate signs of aspiration. Tolerating nectar thick liquids without signs of aspiration. Patient- demonstrates prolonged mastication with awareness of oral residue on left side, specifically in upper teeth. Patient clears with liquid rinse. Nursing assistant reports that patient tolerated breakfast with intermittent coughing. Required cues to slow down and take small bites. Patient continues to demonstrate impulsivity while eating   SLP Discharge Planning    SLP Discharge Recommendation (DC Rec) Transitional Care Facility   SLP Rationale for DC Rec Patient demonstrating improving oropharyngeal dysphagia, however, diet modifications and compensatory strategies are still necessary   SLP Brief overview of current status  Patient is at high risk for aspiration. Recommend nectar thickened liquids and NDD2 mechanical soft textures with use of compensatory strategies of chin tuck and swallow 2x per bite/sip. Patient requires direct supervision and verbal cues to utilize compensatory strategies to increase airway protection and reduce risks of aspiration   Additional Documentation   Rehab Comments Will advance diet and liquids as tolerated.   SLP Plan SLP will continue to treat patient during hospital stay.    Total Session Time   Total Session Time (minutes) 15 minutes       Recommendations for Feeding:  Diet: nectar thick liquids and NDD2 mechanical soft textures (adding gravies and sauces; soft and moist foods)  - Patient would benefit from direct supervision in his room during  oral intake. Provide verbal instructions (small bite/sip, chin down, swallow 2x per bite) - patient continues to demonstrate impulsivity with large bites and needs verbal cues to slow down.   - Patient must sit in the chair at 90 degrees (may require pillows behind his back) or in bed at 90 degrees  - Eliminate all distractions; turn off the TV  - Patient should only eat when he is alert  - Patient would benefit from Ensure or Boost thickened to increase caloric intake  - Tuck chin during every swallow to increase airway protection  - Swallow 2x per bite of food to eliminate pharyngeal residue  - Encourage small bite, small sip  - Patient must remain upright in chair at least 30-45 minutes after oral intake     NOTE: If patient becomes too tired, there are noted changes in his vocal quality/breathing (wet and gurgly), or he begins coughing frequently while eating, stop feeding immediately and complete oral cares. SLP to re-assess patient s swallow

## 2021-05-20 NOTE — PROGRESS NOTES
Referral sent to [facility].  Left message for brother, Nicolas.  Attempted to contact son, Lucas; no answer and unable to leave a message.

## 2021-05-21 ENCOUNTER — APPOINTMENT (OUTPATIENT)
Dept: SPEECH THERAPY | Facility: HOSPITAL | Age: 81
DRG: 177 | End: 2021-05-21
Payer: COMMERCIAL

## 2021-05-21 ENCOUNTER — APPOINTMENT (OUTPATIENT)
Dept: PHYSICAL THERAPY | Facility: HOSPITAL | Age: 81
DRG: 177 | End: 2021-05-21
Payer: COMMERCIAL

## 2021-05-21 ENCOUNTER — APPOINTMENT (OUTPATIENT)
Dept: OCCUPATIONAL THERAPY | Facility: HOSPITAL | Age: 81
DRG: 177 | End: 2021-05-21
Payer: COMMERCIAL

## 2021-05-21 LAB
ALBUMIN SERPL-MCNC: 1.9 G/DL (ref 3.4–5)
ANION GAP SERPL CALCULATED.3IONS-SCNC: 4 MMOL/L (ref 3–14)
BUN SERPL-MCNC: 16 MG/DL (ref 7–30)
CALCIUM SERPL-MCNC: 7.5 MG/DL (ref 8.5–10.1)
CHLORIDE SERPL-SCNC: 98 MMOL/L (ref 94–109)
CO2 SERPL-SCNC: 31 MMOL/L (ref 20–32)
CREAT SERPL-MCNC: 0.64 MG/DL (ref 0.66–1.25)
ERYTHROCYTE [DISTWIDTH] IN BLOOD BY AUTOMATED COUNT: 12.8 % (ref 10–15)
GFR SERPL CREATININE-BSD FRML MDRD: >90 ML/MIN/{1.73_M2}
GLUCOSE BLDC GLUCOMTR-MCNC: 109 MG/DL (ref 70–99)
GLUCOSE BLDC GLUCOMTR-MCNC: 200 MG/DL (ref 70–99)
GLUCOSE BLDC GLUCOMTR-MCNC: 320 MG/DL (ref 70–99)
GLUCOSE BLDC GLUCOMTR-MCNC: 329 MG/DL (ref 70–99)
GLUCOSE BLDC GLUCOMTR-MCNC: 94 MG/DL (ref 70–99)
GLUCOSE SERPL-MCNC: 94 MG/DL (ref 70–99)
HCT VFR BLD AUTO: 31 % (ref 40–53)
HGB BLD-MCNC: 10.6 G/DL (ref 13.3–17.7)
MAGNESIUM SERPL-MCNC: 1.7 MG/DL (ref 1.6–2.3)
MCH RBC QN AUTO: 30.7 PG (ref 26.5–33)
MCHC RBC AUTO-ENTMCNC: 34.2 G/DL (ref 31.5–36.5)
MCV RBC AUTO: 90 FL (ref 78–100)
PHOSPHATE SERPL-MCNC: 2.3 MG/DL (ref 2.5–4.5)
PLATELET # BLD AUTO: 320 10E9/L (ref 150–450)
POTASSIUM SERPL-SCNC: 4.5 MMOL/L (ref 3.4–5.3)
RBC # BLD AUTO: 3.45 10E12/L (ref 4.4–5.9)
SODIUM SERPL-SCNC: 133 MMOL/L (ref 133–144)
WBC # BLD AUTO: 20.2 10E9/L (ref 4–11)

## 2021-05-21 PROCEDURE — 250N000011 HC RX IP 250 OP 636: Performed by: INTERNAL MEDICINE

## 2021-05-21 PROCEDURE — 85027 COMPLETE CBC AUTOMATED: CPT | Performed by: INTERNAL MEDICINE

## 2021-05-21 PROCEDURE — 120N000001 HC R&B MED SURG/OB

## 2021-05-21 PROCEDURE — 250N000013 HC RX MED GY IP 250 OP 250 PS 637: Performed by: INTERNAL MEDICINE

## 2021-05-21 PROCEDURE — 250N000012 HC RX MED GY IP 250 OP 636 PS 637: Performed by: INTERNAL MEDICINE

## 2021-05-21 PROCEDURE — 92526 ORAL FUNCTION THERAPY: CPT | Mod: GN

## 2021-05-21 PROCEDURE — 80069 RENAL FUNCTION PANEL: CPT | Performed by: INTERNAL MEDICINE

## 2021-05-21 PROCEDURE — 999N001017 HC STATISTIC GLUCOSE BY METER IP

## 2021-05-21 PROCEDURE — 97530 THERAPEUTIC ACTIVITIES: CPT | Mod: GP | Performed by: PHYSICAL THERAPIST

## 2021-05-21 PROCEDURE — 99232 SBSQ HOSP IP/OBS MODERATE 35: CPT | Performed by: INTERNAL MEDICINE

## 2021-05-21 PROCEDURE — 36415 COLL VENOUS BLD VENIPUNCTURE: CPT | Performed by: INTERNAL MEDICINE

## 2021-05-21 PROCEDURE — 97530 THERAPEUTIC ACTIVITIES: CPT | Mod: GO

## 2021-05-21 PROCEDURE — 83735 ASSAY OF MAGNESIUM: CPT | Performed by: INTERNAL MEDICINE

## 2021-05-21 RX ORDER — PREDNISONE 20 MG/1
20 TABLET ORAL DAILY
Status: DISCONTINUED | OUTPATIENT
Start: 2021-05-22 | End: 2021-05-21

## 2021-05-21 RX ORDER — PREDNISONE 10 MG/1
10 TABLET ORAL DAILY
Status: DISCONTINUED | OUTPATIENT
Start: 2021-05-25 | End: 2021-05-25 | Stop reason: HOSPADM

## 2021-05-21 RX ORDER — PREDNISONE 20 MG/1
20 TABLET ORAL DAILY
Status: COMPLETED | OUTPATIENT
Start: 2021-05-22 | End: 2021-05-24

## 2021-05-21 RX ADMIN — METFORMIN HYDROCHLORIDE 1000 MG: 500 TABLET ORAL at 17:55

## 2021-05-21 RX ADMIN — IPRATROPIUM BROMIDE AND ALBUTEROL 2 PUFF: 20; 100 SPRAY, METERED RESPIRATORY (INHALATION) at 20:33

## 2021-05-21 RX ADMIN — IPRATROPIUM BROMIDE AND ALBUTEROL 2 PUFF: 20; 100 SPRAY, METERED RESPIRATORY (INHALATION) at 13:06

## 2021-05-21 RX ADMIN — ENOXAPARIN SODIUM 30 MG: 30 INJECTION SUBCUTANEOUS at 08:35

## 2021-05-21 RX ADMIN — ENOXAPARIN SODIUM 30 MG: 30 INJECTION SUBCUTANEOUS at 20:37

## 2021-05-21 RX ADMIN — IPRATROPIUM BROMIDE AND ALBUTEROL 2 PUFF: 20; 100 SPRAY, METERED RESPIRATORY (INHALATION) at 08:36

## 2021-05-21 RX ADMIN — DEXAMETHASONE 6 MG: 2 TABLET ORAL at 08:35

## 2021-05-21 RX ADMIN — TAMSULOSIN HYDROCHLORIDE 0.4 MG: 0.4 CAPSULE ORAL at 08:35

## 2021-05-21 RX ADMIN — METFORMIN HYDROCHLORIDE 1000 MG: 500 TABLET ORAL at 08:35

## 2021-05-21 RX ADMIN — IPRATROPIUM BROMIDE AND ALBUTEROL 2 PUFF: 20; 100 SPRAY, METERED RESPIRATORY (INHALATION) at 17:56

## 2021-05-21 ASSESSMENT — ACTIVITIES OF DAILY LIVING (ADL)
ADLS_ACUITY_SCORE: 12

## 2021-05-21 ASSESSMENT — MIFFLIN-ST. JEOR: SCORE: 1250.63

## 2021-05-21 NOTE — PROGRESS NOTES
05/21/21 1500   Signing Clinician's Name / Credentials   Signing clinician's name / credentials Sabina Zaman OTR/L   Quick Adds   Rehab Discipline OT   Therapeutic Activity   Minutes of Treatment 10 minutes   Symptoms Noted During/After Treatment Fatigue;Shortness of breath;Significant change in vital signs   Treatment Detail Pt resting in bed upon OT arrival. He reports feeling tired but agreeable to tx. Pt transferred supine<>sit with SBA. SpO2 on RA was >90%. Pt transferred sit<>stand from the EOB with SBA. He ambulated to/from the door without AD with CGA. Asked pt if he used the walker with PT today, but pt could not recall. Pt returned to the EOB and participated in UB AROM / strengthening exercises including shoulder flexion and shoulder elevation x 10 reps each. SpO2 decreased to 88% briefly but could have been due to pt's fingers moving and it quickly was back to >90%. Pt was breathing slightly harder than prior and then reported his breathing has been harder today compared to yesterday. Pt also declined any further exercises due to this and returned to supine. SpO2 when OT left was ~90% on RA. Nursing was informed and was going in to assess. Call light was within reach and bed alarm activated.   OT Discharge Planning    OT Discharge Recommendation (DC Rec) Transitional Care Facility   OT Rationale for DC Rec Pt continues to require assistance for ADLs due to weakness, deconditioning, and impaired cognition. Pt exhibits poor memory and would not be safe at home alone. Pt is also on a modified diet at this time.   OT Brief overview of current status  Pt resting in bed upon OT arrival. He reports feeling tired but agreeable to tx. Pt transferred supine<>sit with SBA. SpO2 on RA was >90%. Pt transferred sit<>stand from the EOB with SBA. He ambulated to/from the door without AD with CGA. Asked pt if he used the walker with PT today, but pt could not recall. Pt returned to the EOB and participated in UB  AROM / strengthening exercises including shoulder flexion and shoulder elevation x 10 reps each. SpO2 decreased to 88% briefly but could have been due to pt's fingers moving and it quickly was back to >90%. Pt was breathing slightly harder than prior and then reported his breathing has been harder today compared to yesterday. Pt also declined any further exercises due to this and returned to supine. SpO2 when OT left was ~90% on RA. Nursing was informed and was going in to assess. Call light was within reach and bed alarm activated.   Additional Documentation   Rehab Comments Pt appeared to have more difficulties with activity today despite O2 being WNL on RA.   OT Plan Progress strength, endurance, and cognition as able for increased independence in ADLs.   Total Session Time   Total Session Time (minutes) 10 minutes

## 2021-05-21 NOTE — PROGRESS NOTES
Left message at [facility] and [facility] updated Neel in regards to plan of care.  He remains agreeable.

## 2021-05-21 NOTE — PLAN OF CARE
No falls or injuries this shift. Up to BSC with SBA X1. Req to get up to BSC for stools. Glucose 350 & 267. Good uo thru weathers. A&O. Initiates conversation with staff. Bed alarm on at all times. Meals with 1:1 supervision.     Face to face report given with opportunity to observe patient.    Report given to Thania Wesley RN   5/20/2021  11:23 PM

## 2021-05-21 NOTE — PROGRESS NOTES
05/21/21 1426   Signing Clinician's Name / Credentials   Signing clinician's name / credentials Beatris Obando MS, CCC-SLP   Quick Adds   Rehab Discipline SLP   SLP - Dysphagia/Swallow   Minutes of Treatment 8 minutes   Symptoms Noted During/After Treatment None   Treatment Detail Patient was much more alert and seemed stronger today then previous sessions. Patient tolerated nectar thick liquids and mechanical soft textures. Patient trialed thin liquids with cough and voice changes.    SLP Discharge Planning    SLP Discharge Recommendation (DC Rec) Transitional Care Facility   SLP Rationale for DC Rec Patient demonstrating improving oropharyngeal dysphagia, however, diet modifications and compensatory strategies are still necessary   SLP Brief overview of current status  Patient is at high risk for aspiration. Recommend nectar thickened liquids and NDD2 mechanical soft textures with use of compensatory strategies of chin tuck and swallow 2x per bite/sip. Patient requires direct supervision and verbal cues to utilize compensatory strategies to increase airway protection and reduce risks of aspiration   Total Session Time   Total Session Time (minutes) 8 minutes   Recommendations for Feeding:  Diet: nectar thick liquids and NDD2 mechanical soft textures (adding gravies and sauces; soft and moist foods)  - Patient would benefit from direct supervision in his room during oral intake. Provide verbal instructions (small bite/sip, chin down, swallow 2x per bite) - patient continues to demonstrate impulsivity with large bites and needs verbal cues to slow down.   - Patient must sit in the chair at 90 degrees (may require pillows behind his back) or in bed at 90 degrees  - Eliminate all distractions; turn off the TV  - Patient should only eat when he is alert  - Patient would benefit from Ensure or Boost thickened to increase caloric intake  - Tuck chin during every swallow to increase airway protection  - Swallow 2x  per bite of food to eliminate pharyngeal residue  - Encourage small bite, small sip  - Patient must remain upright in chair at least 30-45 minutes after oral intake     NOTE: If patient becomes too tired, there are noted changes in his vocal quality/breathing (wet and gurgly), or he begins coughing frequently while eating, stop feeding immediately and complete oral cares. SLP to re-assess patient s swallow

## 2021-05-21 NOTE — PLAN OF CARE
"Patient resting in bed majority of the shift. Vitals remain stable. /59 (BP Location: Left arm)   Pulse 82   Temp 97.4  F (36.3  C) (Tympanic)   Resp 18   Ht 1.702 m (5' 7\")   Wt 57.2 kg (126 lb 1.7 oz)   SpO2 92%   BMI 19.75 kg/m    Alarms remain active and audible. Unable to void 4 hrs post weathers removal. bladder scan of 423. Will straight cath  "

## 2021-05-21 NOTE — PROGRESS NOTES
05/21/21 1000   Signing Clinician's Name / Credentials   Signing clinician's name / credentials Margarette Maurice DPT, OCS, Cert DN   Quick Adds   Rehab Discipline PT   Quick Adds Therapeutic Activity   Therapeutic Activity   Minutes of Treatment 14 minutes   Symptoms Noted During/After Treatment Fatigue;Shortness of breath;Significant change in vital signs   Treatment Detail Patient is now off of special precautions.  In bed upon PT arrival, performed bed mobility and transfers IND to FWW. Ambulated 200ft with SBA without LOB or concern with FWW. Reported fatigue following with spO2 88 on room air.  Still feels weak, but improving gradually   PT Discharge Planning    PT Discharge Recommendation (DC Rec) Transitional Care Facility   PT Rationale for DC Rec patient still has weakness and difficulties that would make it unsafe for him to return home as he lives alone with no assistance   PT Brief overview of current status  Patient is now off of special precautions.  In bed upon PT arrival, performed bed mobility and transfers IND to FWW. Ambulated 200ft with SBA without LOB or concern with FWW. Reported fatigue following with spO2 88 on room air.  Still feels weak, but improving gradually   Additional Documentation   Rehab Comments Improving activity tolerance   PT Plan Progress mobility and strengthening as able   Total Session Time   Total Session Time (minutes) 14 minutes

## 2021-05-21 NOTE — PLAN OF CARE
Pt is A&O. VSS, afebrile. Denies pain. Pt remains on RA, O2 sats 94-95%. LS clear, dim in bases. Infrequent productive cough. HRR. BS active. Mata catheter remains positional, draining adequately. IV SL. Bed alarm active and audible, call light within reach.     Face to face report given with opportunity to observe patient.    Report given to JUDD Posey RN   5/21/2021  6:59 AM

## 2021-05-21 NOTE — PLAN OF CARE
Mata reinserted per MD order. JUDD Mallory charge assist. Resisence met, but successfully reinserted with return of 500cc urine.     Screening completed with laith Woodson    Face to face report given with opportunity to observe patient.    Report given to JUDD Ellis RN   5/21/2021  3:19 PM

## 2021-05-21 NOTE — PROGRESS NOTES
[facility]    Medicine Progress Note - Hospitalist Service       Date of Admission:  5/11/2021  Assessment & Plan                       Principal Problem:    Acute on chronic respiratory failure with hypoxia (H)  Active Problems:    Hyponatremia    Pneumonia due to 2019 novel coronavirus    Diabetic ketoacidosis without coma associated with type 2 diabetes mellitus (H)    Fall    Acute on chronic renal failure (H)    Hyperkalemia    Polycystic kidney disease    Oropharyngeal dysphagia    Centrilobular emphysema (H)    COPD (chronic obstructive pulmonary disease) (H)    Assessment & Plan     2019 novel coronavirus pnuemonia (COVID-19):   Had COVID-19 in September and tested negative 11/20, positive on arrival and he was mildly hypoxic on arrival. Over the last 24 hours his pulmonary picture worsened significantly and he required high flow nasal cannula then Airvo. His pressure was up all the way with Fio2 requirement over 90% to maintain sats >90%. He is on remdesivir, dexamethasone, albuterol, dvt prophylaxis BID as D-dimer>9.  -5/14: Overall appears to have made significant improvement.  Supplemental oxygen requirements have declined substantially and his respiratory failure may have been as much on the basis of volume overload and aspiration pneumonitis as Covid pneumonitis.  Continue treatment directed at his Covid infection as well as vigorous pulmonary hygiene.  -5/15: Still has persistently low supplemental oxygen needs but certainly improved over the last 48 hours.  Significant degree of his hypoxemic respiratory failure may have been on the basis of volume overload and especially aspiration pneumonitis with certainly significant contribution from Covid pneumonitis.  Continue vigorous pulmonary hygiene and encouraging walking as well as usual treatment including remdesivir and dexamethasone directed at his Covid infection itself.  5/16: Any progress has plateaued but this is not unexpected given  Covid pneumonitis.  Encouraging pulmonary hygiene and mobilization.  -5/17: still requiring 1 L/min.  Continue dexamethasone and bronchodilators  -5/18: pt is still on 1 L/min.  Spoke with pt regarding emphysema shown on CT; he reports he used to smoke heavily.  Pt's hypoxia may be partially due to his COPD as well.    -5/19: pt appears to be more alert and interactive today.  Still requiring 1 L/min oxygen.  Continue dexamethasone (day 8/10).  -5/20: in good spirit.  Stable respiratory status.  Continue dexamethasone day 9/10  -5/21: day 10 of treatment; stopping dexamethasone and stop isolation.  Doing well.      Acute respiratory failure with hypoxia:   Multifactorial. Covid contributing, CT chest shows bilateral opacities considerably worse compared to yesterdays CXR. However he also has significant interstitial thickening bilaterally. Suspect this is pulmonary edema from the nearly 5 liters of fluid he required on arrival due to DKA. Gave 40mg of Lasix with profound response of over 2 liters in 8 hours. Will repeat with smaller dose of 20mg this evening and evaluate response. ECHO done 5/13/2021, result showed borderline reduced LVEF 10-55%. His procalcitonin is also elevated with dysphagia and possible aspiration as ct shows fluid in esophagus and some thickening. Start meropenem for CAP with coverage for possible aspiration. He has not MDRO risk factors, no clear indicators need to cover for pseudomonas. I talked with both his brother and his son and confirmed DNR/DNI status with them. I made clear the severity of illness and that he may not improve and this may cause his death. This afternoon I did call his son Al and advise him that CT head and chest came back okay with exception of worsened pneumonia and that his oxygen needs improved substantially through the day.   -5/14: As above marked improvement.  Still requires moderate flow oxygen but significantly improved.  Tolerating standard nasal cannula.   Increasing activity which should help preserve lung volume and FRC.  Pulmonary hygiene.  As below continue antibiotic treatment but discontinuation in the near future may be possible.  -5 /15: As above.  Continued improvement although trajectory of this has slowed.  Low flow oxygen.  Likely combination of Covid pneumonitis, aspiration pneumonitis and perhaps some degree of volume overload.  The latter 2 are under reasonable control.  - 5/18: persistent hypoxia, will add prednisone for COPD component in addition to dexamethasone.    - 5/19: pt's respiration seems better after prednisone yesterday.  Will try another dose of prednisone today.  - 5/20: stable respiratory status, now on room air!  -5/21: stable respiratory status on room air.  Start taper down prednisone       Diabetic ketoacidosis without coma associated with type 2 diabetes mellitus (H):   In the past [facility] has only [facility] Jard[facility]. However, A1c is >13. Glucose came down quickly with IV insulin. Anion gap now closed but ketones still elevated. Will continue IV insulin until ketones cleared. He is still quite fatigued do don't believe he is ready to eat yet either.   -5/13: Insulin drip stopped during the night and lantus started as well as sliding scale. He was previously only on half dose jardiance (12.5mg) and glucophage. Since he is not able to eat anything still will continue d5 at 75ml an hour, once that is stopped adjustments to long acting insulin may be required.   -5/14: Still hyperglycemia but no evidence of ketoacidosis.  Mild elevation in ketones may have been on much on the basis of starvation ketosis given limitation of oral intake because of concerns for aspiration as from diabetes per se.  Accelerated both long and short acting insulin today to help decrease his level of glucose.  -5 /15: Hyperglycemia improved although still present with significant increase in both long and short acting insulin prescription in the last  36 hours.  We will not change further significantly today.  Hyperglycemia in part driven by steroid treatment and would be in especially reluctant to increase long-acting insulin significantly.  No evidence of any ketoacidosis.  -5/16: Some increase in overall level of glucose levels.  Continue current doses of insulin and resume his Metformin.  --5/17: BG range 105- 264.  Continue to monitor for hyperglycemia  -5/18: resolved.  BG range .  Follow hypoglycemia closely  -5/19: BG range .  -5/20: BG range 68 - 350.  Recurrent hypoglycemia in the morning.  Will reduce lantus  -5/21: BG range      Hypokalemia:   Due to DKA with insulin requirements, now expect worsening due to large output after lasix. Replaced with 80meq IV, recheck at 4pm and in AM.   -5/14: This appears largely resolved with resolution in his ketoacidosis.  --5/17: monitor K  - 5/18: K trending down; follow  -5/20: hypokalemia with K 3.3; replace  -5/21: K 4.5       Hyponatremia:   Resolved with IVF, likely due to dehydration and poor oral intake.        Fall:   Fell at home, unsure how long he was laying on the floor.  However no evidence of significant injury.  Representative of his severe presenting illness.  --5/17: will order PT/OT for functional assessment and cognitive eval  -5/18: PT recommends TCU for rehab at discharge.  Continue with therapy  -5/20: continuing with PT/OT       Confusion  Metabolic encephalopathy: Family does state he has been confused and having falls since his original covid diagnosis back in September.   -5/13: Worse this morning, he is awake and alert but admits he cannot remember even where he is or why. This afternoon nursing reports he has cleared significantly.   -5/14: This appears to be significantly improved.  No agitation which nursing staff in particular note was a significant issue.  Still has some slowed responses and distractibility but overall is showing a significant improvement.   Likely  Predominantly metabolic encephalopathy.  May have some more chronic encephalopathy which family had noted beginning with his initial Covid infection.  -5/15: Likely some mild degree of residual nonfocal encephalopathy although improved certainly over the last 48 hours.  Continued close monitoring.  - 5/18: MoCA score 16/30.  Impaired cognition.       Acute on chronic renal failure stage 3a(H):   Due to dehydration/DKA.  Creatinine normally 1.0-1.3, on arrival 1.89. Cleared overnight with IVF, now back to baseline in the first several days of hospitalization.   -5/18: renal function now normal  -5/20: renal function normal      Probable moderate malnutrition:   No large amount of weight loss compared to 5 months ago. However he does have evidence of subcutaneous fat loss and muscle wasting.  -5/15: Encouraging increase in oral intake.  Appreciate nutritionist input.     Aspiration pneumonitis  Dysphagia  -5/14: Suspect aspiration pneumonitis is inflammatory rather than infectious.  He has shown significant improvement in that time consistent with inflammatory response to an aspiration event.  Continue current antibiotic treatment at least for another day or so.  Usual laboratory evaluation such as procalcitonin poorly reflective distinction between infectious and inflammatory processes however.  Appreciate speech therapy's input.  Have been able to advance his diet with close attention on the part of nursing staff.  Neuromuscular function in terms of dysphagia appears to be relatively well-preserved but he needs regular prompting for safe swallowing.  -5 /15: He is required strict monitoring by nursing staff and prohibition on taking any water except with direct supervision.  Overall I suspect his swallowing function however is improved somewhat.  Await speech therapy reevaluation when there are again available in several days.  -5/16: Still needs close nursing supervision.  Anticipate speech therapy  reevaluation tomorrow.  Is likely this will be a continued issue.  -5/17: speech therapy evaluation showed oropharyngeal dysphagia and recommends honey thick liquids fed via spoon and NDD1 pureed textures  -5/19: speech therapy reports improved swallowing; now recommends nectar thick liquids and NDD2 mechanical soft textures (adding gravies and sauces)  -5/20: speech therapy session noted.  Continue with therapy.  -5/21: continue with speech therapy.  On NDD2 mechanical soft textures with nectar thickened liquids and compensatory strategies of chin tuck and swallow 2x per bite/sip     Urinary retention:  Required replacement of indwelling catheter now several days ago after multiple attempts at spontaneous urination.  Tamsulosin started.  Depending on the course of his illness might be reasonable to resume trials of removal of catheter in another several days but I suspect he will need at least a week or so before this can be considered.  -5/21: discontinued Mata and followed pt's urine output, but he again retained urine with 450 ml after voiding trial.  Mata was replaced.  Pt will need urology follow up after discharge.     Generalized weakness  Likely a matter of deconditioning and acute illness.  Will discuss with nursing staff whether physical therapy evaluation would be warranted.  Its not unlikely the patient will benefit from subacute rehabilitation.  - PT/OT sessions  - TCU transfer at the time of discharge.       Diet: Dysphagia Diet Level 2 [facility] Altered Nectar Thickened Liquids (pre-thickened or use instant food thickener) (no straws)    DVT Prophylaxis: Enoxaparin (Lovenox) SQ  Mata Catheter: in place, indication: Strict 1-2 Hour I&O, Retention  Code Status: No CPR- Do NOT Intubate           Disposition Plan   Expected discharge: 2 - 3 days, recommended to transitional care unit once safe disposition plan/ TCU bed available.  Entered: Wilder Johnson MD 05/21/2021, 6:31 AM       The patient's care was  discussed with the Patient.    Wilder Johnson MD  Hospitalist Service  [facility]  Contact information available via [facility] Paging/Directory    ______________________________________________________________________    Interval History   Pt is stable on room air.  Tolerating NDD2 diet.  Denies dyspnea, chest pain, nausea, vomiting, abdominal pain, fever, or chills.  Mata was discontinued but pt retained urine >450 ml and it was replaced.      Data reviewed today: I reviewed all medications, new labs and imaging results over the last 24 hours. I personally reviewed no images or EKG's today.    Physical Exam   Vital Signs: Temp: 97.8  F (36.6  C) Temp src: Tympanic BP: 132/66 Pulse: 79   Resp: 18 SpO2: 93 % O2 Device: None (Room air)    Weight: 126 lbs 1.65 oz  General Appearance: In NAD  Respiratory: minimal crackles at bases  Cardiovascular: RRR, no murmurs or gallops  GI: soft NT ND  Skin: intact  Other: Neuro: non-focal    Data   Recent Labs   Lab 05/21/21  0552 05/20/21  1520 05/20/21  0542 05/18/21  1307 05/17/21  0611   WBC 20.2*  --  18.3* 22.0* 14.6*   HGB 10.6*  --  11.4* 12.6* 11.1*   MCV 90  --  89 91 91     --  295 256 210     --  136 133 138   POTASSIUM 4.5 5.0 3.3* 3.7 3.5   CHLORIDE 98  --  102 98 107   CO2 31  --  28 24 25   BUN 16  --  21 18 21   CR 0.64*  --  0.66 0.69 0.74   ANIONGAP 4  --  6 11 6   CARLOS 7.5*  --  7.2* 7.2* 7.0*   GLC 94  --  68* 189* 109*   ALBUMIN 1.9*  --  2.0*  --  1.9*   PROTTOTAL  --   --   --   --  4.9*   BILITOTAL  --   --   --   --  0.3   ALKPHOS  --   --   --   --  112   ALT  --   --   --   --  16   AST  --   --   --   --  15     No results found for this or any previous visit (from the past 24 hour(s)).  Medications     - MEDICATION INSTRUCTIONS -         dexamethasone  6 mg Oral Daily     enoxaparin ANTICOAGULANT  30 mg Subcutaneous BID     insulin aspart  1-18 Units Subcutaneous 4x Daily AC & HS     insulin glargine  8 Units Subcutaneous At Bedtime      ipratropium-albuterol  2 puff Inhalation 4x Daily     metFORMIN  1,000 mg Oral BID w/meals     sodium chloride (PF)  3 mL Intracatheter Q8H     tamsulosin  0.4 mg Oral Daily

## 2021-05-22 ENCOUNTER — APPOINTMENT (OUTPATIENT)
Dept: PHYSICAL THERAPY | Facility: HOSPITAL | Age: 81
DRG: 177 | End: 2021-05-22
Payer: COMMERCIAL

## 2021-05-22 LAB
ALBUMIN SERPL-MCNC: 1.9 G/DL (ref 3.4–5)
ANION GAP SERPL CALCULATED.3IONS-SCNC: 6 MMOL/L (ref 3–14)
BUN SERPL-MCNC: 19 MG/DL (ref 7–30)
CALCIUM SERPL-MCNC: 7.7 MG/DL (ref 8.5–10.1)
CHLORIDE SERPL-SCNC: 97 MMOL/L (ref 94–109)
CO2 SERPL-SCNC: 30 MMOL/L (ref 20–32)
CREAT SERPL-MCNC: 0.65 MG/DL (ref 0.66–1.25)
ERYTHROCYTE [DISTWIDTH] IN BLOOD BY AUTOMATED COUNT: 12.8 % (ref 10–15)
GFR SERPL CREATININE-BSD FRML MDRD: >90 ML/MIN/{1.73_M2}
GLUCOSE BLDC GLUCOMTR-MCNC: 156 MG/DL (ref 70–99)
GLUCOSE BLDC GLUCOMTR-MCNC: 249 MG/DL (ref 70–99)
GLUCOSE BLDC GLUCOMTR-MCNC: 273 MG/DL (ref 70–99)
GLUCOSE BLDC GLUCOMTR-MCNC: 362 MG/DL (ref 70–99)
GLUCOSE SERPL-MCNC: 117 MG/DL (ref 70–99)
HCT VFR BLD AUTO: 31.1 % (ref 40–53)
HGB BLD-MCNC: 10.7 G/DL (ref 13.3–17.7)
MAGNESIUM SERPL-MCNC: 1.7 MG/DL (ref 1.6–2.3)
MCH RBC QN AUTO: 31.1 PG (ref 26.5–33)
MCHC RBC AUTO-ENTMCNC: 34.4 G/DL (ref 31.5–36.5)
MCV RBC AUTO: 90 FL (ref 78–100)
PHOSPHATE SERPL-MCNC: 4 MG/DL (ref 2.5–4.5)
PLATELET # BLD AUTO: 321 10E9/L (ref 150–450)
POTASSIUM SERPL-SCNC: 4.3 MMOL/L (ref 3.4–5.3)
RBC # BLD AUTO: 3.44 10E12/L (ref 4.4–5.9)
SODIUM SERPL-SCNC: 133 MMOL/L (ref 133–144)
WBC # BLD AUTO: 18.5 10E9/L (ref 4–11)

## 2021-05-22 PROCEDURE — 36415 COLL VENOUS BLD VENIPUNCTURE: CPT | Performed by: INTERNAL MEDICINE

## 2021-05-22 PROCEDURE — 80069 RENAL FUNCTION PANEL: CPT | Performed by: INTERNAL MEDICINE

## 2021-05-22 PROCEDURE — 120N000001 HC R&B MED SURG/OB

## 2021-05-22 PROCEDURE — 83735 ASSAY OF MAGNESIUM: CPT | Performed by: INTERNAL MEDICINE

## 2021-05-22 PROCEDURE — 250N000011 HC RX IP 250 OP 636: Performed by: INTERNAL MEDICINE

## 2021-05-22 PROCEDURE — 999N001017 HC STATISTIC GLUCOSE BY METER IP

## 2021-05-22 PROCEDURE — 250N000013 HC RX MED GY IP 250 OP 250 PS 637: Performed by: INTERNAL MEDICINE

## 2021-05-22 PROCEDURE — 99232 SBSQ HOSP IP/OBS MODERATE 35: CPT | Performed by: INTERNAL MEDICINE

## 2021-05-22 PROCEDURE — 85027 COMPLETE CBC AUTOMATED: CPT | Performed by: INTERNAL MEDICINE

## 2021-05-22 PROCEDURE — 250N000012 HC RX MED GY IP 250 OP 636 PS 637: Performed by: INTERNAL MEDICINE

## 2021-05-22 PROCEDURE — 97530 THERAPEUTIC ACTIVITIES: CPT | Mod: GP | Performed by: PHYSICAL THERAPIST

## 2021-05-22 RX ADMIN — PREDNISONE 20 MG: 20 TABLET ORAL at 08:17

## 2021-05-22 RX ADMIN — IPRATROPIUM BROMIDE AND ALBUTEROL 2 PUFF: 20; 100 SPRAY, METERED RESPIRATORY (INHALATION) at 13:24

## 2021-05-22 RX ADMIN — METFORMIN HYDROCHLORIDE 1000 MG: 500 TABLET ORAL at 16:43

## 2021-05-22 RX ADMIN — ENOXAPARIN SODIUM 30 MG: 30 INJECTION SUBCUTANEOUS at 08:17

## 2021-05-22 RX ADMIN — IPRATROPIUM BROMIDE AND ALBUTEROL 2 PUFF: 20; 100 SPRAY, METERED RESPIRATORY (INHALATION) at 16:43

## 2021-05-22 RX ADMIN — IPRATROPIUM BROMIDE AND ALBUTEROL 2 PUFF: 20; 100 SPRAY, METERED RESPIRATORY (INHALATION) at 20:28

## 2021-05-22 RX ADMIN — METFORMIN HYDROCHLORIDE 1000 MG: 500 TABLET ORAL at 08:17

## 2021-05-22 RX ADMIN — IPRATROPIUM BROMIDE AND ALBUTEROL 2 PUFF: 20; 100 SPRAY, METERED RESPIRATORY (INHALATION) at 08:18

## 2021-05-22 RX ADMIN — TAMSULOSIN HYDROCHLORIDE 0.4 MG: 0.4 CAPSULE ORAL at 08:18

## 2021-05-22 RX ADMIN — ENOXAPARIN SODIUM 30 MG: 30 INJECTION SUBCUTANEOUS at 20:28

## 2021-05-22 ASSESSMENT — ACTIVITIES OF DAILY LIVING (ADL)
ADLS_ACUITY_SCORE: 15
ADLS_ACUITY_SCORE: 13
ADLS_ACUITY_SCORE: 15
ADLS_ACUITY_SCORE: 15

## 2021-05-22 ASSESSMENT — MIFFLIN-ST. JEOR: SCORE: 1247.63

## 2021-05-22 NOTE — PLAN OF CARE
"Patient rested throughout shift. Minimal impulsive transfers made. Ambulated with PT. Desat to the mid 80% recovers well on own within minutes. Remains on room air. Vitals stable. /71   Pulse 98   Temp 97.6  F (36.4  C) (Tympanic)   Resp 18   Ht 1.702 m (5' 7\")   Wt 56.9 kg (125 lb 7.1 oz)   SpO2 95%   BMI 19.65 kg/m    Mata patent and draining approprietly   "

## 2021-05-22 NOTE — PLAN OF CARE
"Reason for hospital stay:  Covid PNA, DKA    Most recent vitals: /61 (BP Location: Left arm)   Pulse 95   Temp 98.7  F (37.1  C) (Tympanic)   Resp 20   Ht 1.702 m (5' 7\")   Wt 57.2 kg (126 lb 1.7 oz)   SpO2 95%   BMI 19.75 kg/m      Pain Management:  Denied any pain this shift.     LOC:  A+O x4 - uses call light at times and other times forgets to ask for help. Impulsive at times. Pleasant and talkative.     Cardiac:  WDL    Respiratory:  Lungs diminished in bilateral bases otherwise clear throughout. Occasional congested cough. Dyspnea with mild exertion when getting up to commode or ambulating. Continues on scheduled Combivent inhaler. Maintaining sats 92-95% on RA.     GI:  WDL - tolerating mechanical soft diet and nectar thickened liquids.     :  Urinary retention - Mata catheter patent and draining adequate urine.     IVF:  SL    Activity: Standby assist with gait belt and walker.     Safety:  Alarms on    Comments: Blood glucose 320 at supper and 329 at HS. Insulin coverage given per order.       Face to face report given with opportunity to observe patient.    Report given to Thania Price RN   5/21/2021  11:28 PM      "

## 2021-05-22 NOTE — PLAN OF CARE
Pt is A&O, forgetful and compulsive at times. VSS, afebrile. Denies pain. Dyspnea on exertion. Remains on RA, O2 sat 95%. LS dim in bases, otherwise clear. Infrequent productive cough. HRR. BS active. Mata catheter positional, draining clear yellow urine. Tamper evident seal is not in place upon initial assessment. Previous shift stated patient tried to get up on his own and tubing was disconnected from bag. Mata cleaned with catheter wipes. Blanchable redness to buttocks. IV SL. Bed alarm active and audible, call light within reach.     Face to face report given with opportunity to observe patient.    Report given to JUDD Posey RN   5/22/2021  7:17 AM

## 2021-05-22 NOTE — PROGRESS NOTES
05/22/21 1131   Signing Clinician's Name / Credentials   Signing clinician's name / credentials Letha Alfredo DPT   Quick Adds   Rehab Discipline PT   Therapeutic Activity   Minutes of Treatment 24 minutes   Symptoms Noted During/After Treatment Fatigue;Shortness of breath;Significant change in vital signs   Treatment Detail Pt transferred sup>sit mod I to EOB.  Pt transferred sit>stand SBA from EOB.  Pt ambulated 225' without AD CGA-SBA on RA, demonstrates wide ERIS with ambulation and mild instability but no LOB requiring assist to correct.  Pt's O2 sats after ambulation 84%, .  Required 5 minutes with cues for pursed lip and deep breathing for O2 sats to return to 90%.  Transferred sit>stand SBA from w/c and ambulated additional 225' without AD, again CGA-SBA with improved stability on second distance.  Pt transferred into bed mod I.  Pt's O2 sats after second distance 86% with recover back to 90% within 2 minutes.  Pt left resting in bed with alarm on and call light in reach.   PT Discharge Planning    PT Discharge Recommendation (DC Rec) Transitional Care Facility   PT Rationale for DC Rec Pt still demonstrates limited activity tolerance and impaired cognition, not safe to return home alone.     PT Brief overview of current status  Pt transferred sup>sit mod I to EOB.  Pt transferred sit>stand SBA from EOB.  Pt ambulated 225' without AD CGA-SBA on RA, demonstrates wide ERIS with ambulation and mild instability but no LOB requiring assist to correct.  Pt's O2 sats after ambulation 84%, .  Required 5 minutes with cues for pursed lip and deep breathing for O2 sats to return to 90%.  Transferred sit>stand SBA from w/c and ambulated additional 225' without AD, again CGA-SBA with improved stability on second distance.  Pt transferred into bed mod I.  Pt's O2 sats after second distance 86% with recover back to 90% within 2 minutes.  Pt left resting in bed with alarm on and call light in reach.    Additional Documentation   Rehab Comments tolerated ambulation without AD, improved O2 sats on RA but still decrease with activity   PT Plan Progress mobility and strengthening as able   Total Session Time   Total Session Time (minutes) 24 minutes

## 2021-05-22 NOTE — PROGRESS NOTES
[facility]    Hospitalist Progress Note      Assessment & Plan   Neel Kerr is a 80 year old male who was admitted on 5/11/2021.     Principal Problem:    Acute on chronic respiratory failure with hypoxia (H)  Active Problems:    Hyponatremia    Pneumonia due to 2019 novel coronavirus    Diabetic ketoacidosis without coma associated with type 2 diabetes mellitus (H)    Fall    Acute on chronic renal failure (H)    Hyperkalemia    Polycystic kidney disease    Oropharyngeal dysphagia    Centrilobular emphysema (H)    COPD (chronic obstructive pulmonary disease) (H)     Assessment & Plan     2019 novel coronavirus pnuemonia (COVID-19):   Had COVID-19 in September and tested negative 11/20, positive on arrival and he was mildly hypoxic on arrival. Over the last 24 hours his pulmonary picture worsened significantly and he required high flow nasal cannula then Airvo. His pressure was up all the way with Fio2 requirement over 90% to maintain sats >90%. He is on remdesivir, dexamethasone, albuterol, dvt prophylaxis BID as D-dimer>9.  -5/14: Overall appears to have made significant improvement.  Supplemental oxygen requirements have declined substantially and his respiratory failure may have been as much on the basis of volume overload and aspiration pneumonitis as Covid pneumonitis.  Continue treatment directed at his Covid infection as well as vigorous pulmonary hygiene.  -5/15: Still has persistently low supplemental oxygen needs but certainly improved over the last 48 hours.  Significant degree of his hypoxemic respiratory failure may have been on the basis of volume overload and especially aspiration pneumonitis with certainly significant contribution from Covid pneumonitis.  Continue vigorous pulmonary hygiene and encouraging walking as well as usual treatment including remdesivir and dexamethasone directed at his Covid infection itself.  5/16: Any progress has plateaued but this is not unexpected given  Covid pneumonitis.  Encouraging pulmonary hygiene and mobilization.  -5/17: still requiring 1 L/min.  Continue dexamethasone and bronchodilators  -5/18: pt is still on 1 L/min.  Spoke with pt regarding emphysema shown on CT; he reports he used to smoke heavily.  Pt's hypoxia may be partially due to his COPD as well.    -5/19: pt appears to be more alert and interactive today.  Still requiring 1 L/min oxygen.  Continue dexamethasone (day 8/10).  -5/20: in good spirit.  Stable respiratory status.  Continue dexamethasone day 9/10  -5/21: day 10 of treatment; stopping dexamethasone and stop isolation.  Doing well.   -5/22: Patient continues to do well.  Room air sats are fine.  No complaints of dyspnea.    Acute respiratory failure with hypoxia:   Multifactorial. Covid contributing, CT chest shows bilateral opacities considerably worse compared to yesterdays CXR. However he also has significant interstitial thickening bilaterally. Suspect this is pulmonary edema from the nearly 5 liters of fluid he required on arrival due to DKA. Gave 40mg of Lasix with profound response of over 2 liters in 8 hours. Will repeat with smaller dose of 20mg this evening and evaluate response. ECHO done 5/13/2021, result showed borderline reduced LVEF 10-55%. His procalcitonin is also elevated with dysphagia and possible aspiration as ct shows fluid in esophagus and some thickening. Start meropenem for CAP with coverage for possible aspiration. He has not MDRO risk factors, no clear indicators need to cover for pseudomonas. I talked with both his brother and his son and confirmed DNR/DNI status with them. I made clear the severity of illness and that he may not improve and this may cause his death. This afternoon I did call his son Al and advise him that CT head and chest came back okay with exception of worsened pneumonia and that his oxygen needs improved substantially through the day.   -5/14: As above marked improvement.  Still requires  moderate flow oxygen but significantly improved.  Tolerating standard nasal cannula.  Increasing activity which should help preserve lung volume and FRC.  Pulmonary hygiene.  As below continue antibiotic treatment but discontinuation in the near future may be possible.  -5 /15: As above.  Continued improvement although trajectory of this has slowed.  Low flow oxygen.  Likely combination of Covid pneumonitis, aspiration pneumonitis and perhaps some degree of volume overload.  The latter 2 are under reasonable control.  - 5/18: persistent hypoxia, will add prednisone for COPD component in addition to dexamethasone.    - 5/19: pt's respiration seems better after prednisone yesterday.  Will try another dose of prednisone today.  - 5/20: stable respiratory status, now on room air!  -5/21: stable respiratory status on room air.  Start taper down prednisone  -5/22: Continues to do well room air no major dyspnea.        Diabetic ketoacidosis without coma associated with type 2 diabetes mellitus (H):   In the past he has only [facility] J[facility]. However, A1c is >13. Glucose came down quickly with IV insulin. Anion gap now closed but ketones still elevated. Will continue IV insulin until ketones cleared. He is still quite fatigued do don't believe he is ready to eat yet either.   -5/13: Insulin drip stopped during the night and lantus started as well as sliding scale. He was previously only on half dose jardiance (12.5mg) and glucophage. Since he is not able to eat anything still will continue d5 at 75ml an hour, once that is stopped adjustments to long acting insulin may be required.   -5/14: Still hyperglycemia but no evidence of ketoacidosis.  Mild elevation in ketones may have been on much on the basis of starvation ketosis given limitation of oral intake because of concerns for aspiration as from diabetes per se.  Accelerated both long and short acting insulin today to help decrease his level of  glucose.  -5 /15: Hyperglycemia improved although still present with significant increase in both long and short acting insulin prescription in the last 36 hours.  We will not change further significantly today.  Hyperglycemia in part driven by steroid treatment and would be in especially reluctant to increase long-acting insulin significantly.  No evidence of any ketoacidosis.  -5/16: Some increase in overall level of glucose levels.  Continue current doses of insulin and resume his Metformin.  --5/17: BG range 105- 264.  Continue to monitor for hyperglycemia  -5/18: resolved.  BG range .  Follow hypoglycemia closely  -5/19: BG range .  -5/20: BG range 68 - 350.  Recurrent hypoglycemia in the morning.  Will reduce lantus  -5/21: BG range   -5/22: Blood sugars good no major hypoglycemia.  Appetite is fair.    Hypokalemia:   Due to DKA with insulin requirements, now expect worsening due to large output after lasix. Replaced with 80meq IV, recheck at 4pm and in AM.   -5/14: This appears largely resolved with resolution in his ketoacidosis.  --5/17: monitor K  - 5/18: K trending down; follow  -5/20: hypokalemia with K 3.3; replace  -5/21: K 4.5  -/5/22: Resolved      Hyponatremia:   Resolved with IVF, likely due to dehydration and poor oral intake.        Fall:   Fell at home, unsure how long he was laying on the floor.  However no evidence of significant injury.  Representative of his severe presenting illness.  --5/17: will order PT/OT for functional assessment and cognitive eval  -5/18: PT recommends TCU for rehab at discharge.  Continue with therapy  -5/20: continuing with PT/OT  -5/22: Patient agrees with temporary stay at rehab hopefully still current Monday        Confusion  Metabolic encephalopathy: Family does state he has been confused and having falls since his original covid diagnosis back in September.   -5/13: Worse this morning, he is awake and alert but admits he cannot remember even  where he is or why. This afternoon nursing reports he has cleared significantly.   -5/14: This appears to be significantly improved.  No agitation which nursing staff in particular note was a significant issue.  Still has some slowed responses and distractibility but overall is showing a significant improvement.  Likely  Predominantly metabolic encephalopathy.  May have some more chronic encephalopathy which family had noted beginning with his initial Covid infection.  -5/15: Likely some mild degree of residual nonfocal encephalopathy although improved certainly over the last 48 hours.  Continued close monitoring.  - 5/18: MoCA score 16/30.  Impaired cognition.  -5/22: Very alert and appropriate on conversation today.        Acute on chronic renal failure stage 3a(H):   Due to dehydration/DKA.  Creatinine normally 1.0-1.3, on arrival 1.89. Cleared overnight with IVF, now back to baseline in the first several days of hospitalization.   -5/18: renal function now normal  -5/20: renal function normal      Probable moderate malnutrition:   No large amount of weight loss compared to 5 months ago. However he does have evidence of subcutaneous fat loss and muscle wasting.  -5/15: Encouraging increase in oral intake.  Appreciate nutritionist input.     Aspiration pneumonitis  Dysphagia  -5/14: Suspect aspiration pneumonitis is inflammatory rather than infectious.  He has shown significant improvement in that time consistent with inflammatory response to an aspiration event.  Continue current antibiotic treatment at least for another day or so.  Usual laboratory evaluation such as procalcitonin poorly reflective distinction between infectious and inflammatory processes however.  Appreciate speech therapy's input.  Have been able to advance his diet with close attention on the part of nursing staff.  Neuromuscular function in terms of dysphagia appears to be relatively well-preserved but he needs regular prompting for safe  swallowing.  -5 /15: He is required strict monitoring by nursing staff and prohibition on taking any water except with direct supervision.  Overall I suspect his swallowing function however is improved somewhat.  Await speech therapy reevaluation when there are again available in several days.  -5/16: Still needs close nursing supervision.  Anticipate speech therapy reevaluation tomorrow.  Is likely this will be a continued issue.  -5/17: speech therapy evaluation showed oropharyngeal dysphagia and recommends honey thick liquids fed via spoon and NDD1 pureed textures  -5/19: speech therapy reports improved swallowing; now recommends nectar thick liquids and NDD2 mechanical soft textures (adding gravies and sauces)  -5/20: speech therapy session noted.  Continue with therapy.  -5/21: continue with speech therapy.  On NDD2 mechanical soft textures with nectar thickened liquids and compensatory strategies of chin tuck and swallow 2x per bite/sip  -5/22: Understands the need for the dysphagia diet.  Hopefully will improve and I think it should over the next week or so.      Urinary retention:  Required replacement of indwelling catheter now several days ago after multiple attempts at spontaneous urination.  Tamsulosin started.  Depending on the course of his illness might be reasonable to resume trials of removal of catheter in another several days but I suspect he will need at least a week or so before this can be considered.  -5/21: discontinued Mata and followed pt's urine output, but he again retained urine with 450 ml after voiding trial.  Mata was replaced.  Pt will need urology follow up after discharge.  -5/22: He will need to see urology leave Mata in at this time.      Generalized weakness  Likely a matter of deconditioning and acute illness.  Will discuss with nursing staff whether physical therapy evaluation would be warranted.  Its not unlikely the patient will benefit from subacute rehabilitation.  -  PT/OT sessions  - TCU transfer at the time of discharge.        Diet: Dysphagia Diet Level 2 [facility] Al[facility] Nectar Thickened Liquids (pre-thickened or use instant food thickener) (no straws)  Fluids: None    DVT Prophylaxis: Enoxaparin (Lovenox) SQ  Code Status: No CPR- Do NOT Intubate    Disposition: Expected discharge in 2 days once appropriate disposition is found.    Leo Mackey    Interval History   Patient alert.  Appropriate really has no major complaints.  Been afebrile all labs look good still mild elevation of white count.  Understands current plan for going to rehab at discharge hopefully Monday.    -Data reviewed today: I reviewed all new labs and imaging results over the last 24 hours. I personally reviewed no images or EKG's today.    Physical Exam   Temp: 97.6  F (36.4  C) Temp src: Tympanic BP: 147/71 Pulse: 98   Resp: 18 SpO2: 95 % O2 Device: None (Room air)    Vitals:    05/20/21 0532 05/21/21 0431 05/22/21 0547   Weight: 57.7 kg (127 lb 3.3 oz) 57.2 kg (126 lb 1.7 oz) 56.9 kg (125 lb 7.1 oz)     Vital Signs with Ranges  Temp:  [97.6  F (36.4  C)-98.7  F (37.1  C)] 97.6  F (36.4  C)  Pulse:  [76-98] 98  Resp:  [18-20] 18  BP: (113-147)/(61-71) 147/71  SpO2:  [95 %] 95 %  I/O last 3 completed shifts:  In: 1480 [P.O.:1480]  Out: 3550 [Urine:3550]    Peripheral IV 05/17/21 Left Lower forearm (Active)   Site Assessment WDL 05/22/21 0832   Line Status Saline locked 05/22/21 0832   Phlebitis Scale 0-->no symptoms 05/22/21 0832   Infiltration Scale 0 05/22/21 0832   Infiltration Site Treatment Method  None 05/19/21 0900   Number of days: 5       Urethral Catheter (Active)   Tube Description Positional 05/22/21 0800   Catheter Care Done;Catheter wipes 05/22/21 0800   Collection Container Standard 05/22/21 0800   Securement Method Securing device (Describe) 05/22/21 0800   Rationale for Continued Use Retention 05/22/21 0800   Urine Output 900 mL 05/22/21 0600   Number of days: 1       External Urinary  Catheter (Active)   Urine Color Yellow 05/21/21 2200   Number of days: 1       Pressure Injury 09/20/20 Coccyx Stage 1 (Active)   Number of days: 244     No line/device    Constitutional: Alert and oriented x3. No distress    HEENT: Normocephalic/atraumatic, PERRL, EOMI, mouth moist, neck supple, no LN.     Cardiovascular: RRR.  No murmur, no  rubs, or gallops.  JVD no.  Bruits no.  Pulses 2+    Respiratory: Clear to auscultation bilaterally    Abdomen: Soft, nontender, nondistended, no organomegaly. Bowel sounds present    Extremities: Warm/dry.  Trace edema    Neuro:   Non focal, cranial nerves intact, Moves all extremities.  Medications     - MEDICATION INSTRUCTIONS -         enoxaparin ANTICOAGULANT  30 mg Subcutaneous BID     insulin aspart  1-18 Units Subcutaneous 4x Daily AC & HS     insulin glargine  8 Units Subcutaneous At Bedtime     ipratropium-albuterol  2 puff Inhalation 4x Daily     metFORMIN  1,000 mg Oral BID w/meals     predniSONE  20 mg Oral Daily    Followed by     [START ON 5/25/2021] predniSONE  10 mg Oral Daily     sodium chloride (PF)  3 mL Intracatheter Q8H     tamsulosin  0.4 mg Oral Daily       Data   Recent Labs   Lab 05/22/21  0555 05/21/21  0552 05/20/21  1520 05/20/21  0542 05/17/21  0611 05/17/21  0611   WBC 18.5* 20.2*  --  18.3*   < > 14.6*   HGB 10.7* 10.6*  --  11.4*   < > 11.1*   MCV 90 90  --  89   < > 91    320  --  295   < > 210    133  --  136   < > 138   POTASSIUM 4.3 4.5 5.0 3.3*   < > 3.5   CHLORIDE 97 98  --  102   < > 107   CO2 30 31  --  28   < > 25   BUN 19 16  --  21   < > 21   CR 0.65* 0.64*  --  0.66   < > 0.74   ANIONGAP 6 4  --  6   < > 6   CARLOS 7.7* 7.5*  --  7.2*   < > 7.0*   * 94  --  68*   < > 109*   ALBUMIN 1.9* 1.9*  --  2.0*  --  1.9*   PROTTOTAL  --   --   --   --   --  4.9*   BILITOTAL  --   --   --   --   --  0.3   ALKPHOS  --   --   --   --   --  112   ALT  --   --   --   --   --  16   AST  --   --   --   --   --  15    < > = values in  this interval not displayed.       No results found for this or any previous visit (from the past 24 hour(s)).

## 2021-05-23 LAB
ALBUMIN SERPL-MCNC: 1.8 G/DL (ref 3.4–5)
ANION GAP SERPL CALCULATED.3IONS-SCNC: 6 MMOL/L (ref 3–14)
BUN SERPL-MCNC: 16 MG/DL (ref 7–30)
CALCIUM SERPL-MCNC: 7.3 MG/DL (ref 8.5–10.1)
CHLORIDE SERPL-SCNC: 99 MMOL/L (ref 94–109)
CO2 SERPL-SCNC: 31 MMOL/L (ref 20–32)
CREAT SERPL-MCNC: 0.68 MG/DL (ref 0.66–1.25)
ERYTHROCYTE [DISTWIDTH] IN BLOOD BY AUTOMATED COUNT: 12.9 % (ref 10–15)
GFR SERPL CREATININE-BSD FRML MDRD: >90 ML/MIN/{1.73_M2}
GLUCOSE BLDC GLUCOMTR-MCNC: 158 MG/DL (ref 70–99)
GLUCOSE BLDC GLUCOMTR-MCNC: 168 MG/DL (ref 70–99)
GLUCOSE BLDC GLUCOMTR-MCNC: 268 MG/DL (ref 70–99)
GLUCOSE BLDC GLUCOMTR-MCNC: 303 MG/DL (ref 70–99)
GLUCOSE SERPL-MCNC: 110 MG/DL (ref 70–99)
HCT VFR BLD AUTO: 30.9 % (ref 40–53)
HGB BLD-MCNC: 10.6 G/DL (ref 13.3–17.7)
MAGNESIUM SERPL-MCNC: 1.7 MG/DL (ref 1.6–2.3)
MCH RBC QN AUTO: 31.1 PG (ref 26.5–33)
MCHC RBC AUTO-ENTMCNC: 34.3 G/DL (ref 31.5–36.5)
MCV RBC AUTO: 91 FL (ref 78–100)
PHOSPHATE SERPL-MCNC: 3.6 MG/DL (ref 2.5–4.5)
PLATELET # BLD AUTO: 311 10E9/L (ref 150–450)
POTASSIUM SERPL-SCNC: 3.9 MMOL/L (ref 3.4–5.3)
RBC # BLD AUTO: 3.41 10E12/L (ref 4.4–5.9)
SODIUM SERPL-SCNC: 136 MMOL/L (ref 133–144)
WBC # BLD AUTO: 16 10E9/L (ref 4–11)

## 2021-05-23 PROCEDURE — 250N000011 HC RX IP 250 OP 636: Performed by: INTERNAL MEDICINE

## 2021-05-23 PROCEDURE — 36415 COLL VENOUS BLD VENIPUNCTURE: CPT | Performed by: INTERNAL MEDICINE

## 2021-05-23 PROCEDURE — 120N000001 HC R&B MED SURG/OB

## 2021-05-23 PROCEDURE — 250N000013 HC RX MED GY IP 250 OP 250 PS 637: Performed by: INTERNAL MEDICINE

## 2021-05-23 PROCEDURE — 999N001017 HC STATISTIC GLUCOSE BY METER IP

## 2021-05-23 PROCEDURE — 85027 COMPLETE CBC AUTOMATED: CPT | Performed by: INTERNAL MEDICINE

## 2021-05-23 PROCEDURE — 80069 RENAL FUNCTION PANEL: CPT | Performed by: INTERNAL MEDICINE

## 2021-05-23 PROCEDURE — 250N000012 HC RX MED GY IP 250 OP 636 PS 637: Performed by: INTERNAL MEDICINE

## 2021-05-23 PROCEDURE — 99232 SBSQ HOSP IP/OBS MODERATE 35: CPT | Performed by: INTERNAL MEDICINE

## 2021-05-23 PROCEDURE — 83735 ASSAY OF MAGNESIUM: CPT | Performed by: INTERNAL MEDICINE

## 2021-05-23 RX ADMIN — ENOXAPARIN SODIUM 30 MG: 30 INJECTION SUBCUTANEOUS at 08:22

## 2021-05-23 RX ADMIN — PREDNISONE 20 MG: 20 TABLET ORAL at 08:23

## 2021-05-23 RX ADMIN — IPRATROPIUM BROMIDE AND ALBUTEROL 2 PUFF: 20; 100 SPRAY, METERED RESPIRATORY (INHALATION) at 08:24

## 2021-05-23 RX ADMIN — METFORMIN HYDROCHLORIDE 1000 MG: 500 TABLET ORAL at 08:23

## 2021-05-23 RX ADMIN — METFORMIN HYDROCHLORIDE 1000 MG: 500 TABLET ORAL at 16:40

## 2021-05-23 RX ADMIN — IPRATROPIUM BROMIDE AND ALBUTEROL 2 PUFF: 20; 100 SPRAY, METERED RESPIRATORY (INHALATION) at 11:50

## 2021-05-23 RX ADMIN — ENOXAPARIN SODIUM 30 MG: 30 INJECTION SUBCUTANEOUS at 20:49

## 2021-05-23 RX ADMIN — IPRATROPIUM BROMIDE AND ALBUTEROL 2 PUFF: 20; 100 SPRAY, METERED RESPIRATORY (INHALATION) at 20:51

## 2021-05-23 RX ADMIN — IPRATROPIUM BROMIDE AND ALBUTEROL 2 PUFF: 20; 100 SPRAY, METERED RESPIRATORY (INHALATION) at 16:40

## 2021-05-23 RX ADMIN — TAMSULOSIN HYDROCHLORIDE 0.4 MG: 0.4 CAPSULE ORAL at 08:23

## 2021-05-23 ASSESSMENT — ACTIVITIES OF DAILY LIVING (ADL)
ADLS_ACUITY_SCORE: 15
ADLS_ACUITY_SCORE: 14

## 2021-05-23 NOTE — PROGRESS NOTES
[facility]    Hospitalist Progress Note      Assessment & Plan   Neel Kerr is a 80 year old male who was admitted on 5/11/2021.      Assessment & Plan     2019 novel coronavirus pnuemonia (COVID-19):   Had COVID-19 in September and tested negative 11/20, positive on arrival and he was mildly hypoxic on arrival. Over the last 24 hours his pulmonary picture worsened significantly and he required high flow nasal cannula then Airvo. His pressure was up all the way with Fio2 requirement over 90% to maintain sats >90%. He is on remdesivir, dexamethasone, albuterol, dvt prophylaxis BID as D-dimer>9.  -5/14: Overall appears to have made significant improvement.  Supplemental oxygen requirements have declined substantially and his respiratory failure may have been as much on the basis of volume overload and aspiration pneumonitis as Covid pneumonitis.  Continue treatment directed at his Covid infection as well as vigorous pulmonary hygiene.  -5/15: Still has persistently low supplemental oxygen needs but certainly improved over the last 48 hours.  Significant degree of his hypoxemic respiratory failure may have been on the basis of volume overload and especially aspiration pneumonitis with certainly significant contribution from Covid pneumonitis.  Continue vigorous pulmonary hygiene and encouraging walking as well as usual treatment including remdesivir and dexamethasone directed at his Covid infection itself.  5/16: Any progress has plateaued but this is not unexpected given Covid pneumonitis.  Encouraging pulmonary hygiene and mobilization.  -5/17: still requiring 1 L/min.  Continue dexamethasone and bronchodilators  -5/18: pt is still on 1 L/min.  Spoke with pt regarding emphysema shown on CT; he reports he used to smoke heavily.  Pt's hypoxia may be partially due to his COPD as well.    -5/19: pt appears to be more alert and interactive today.  Still requiring 1 L/min oxygen.  Continue dexamethasone (day  8/10).  -5/20: in good spirit.  Stable respiratory status.  Continue dexamethasone day 9/10  -5/21: day 10 of treatment; stopping dexamethasone and stop isolation.  Doing well.   -5/22: Patient continues to do well.  Room air sats are fine.  No complaints of dyspnea.  -5/23: No significant pulmonary issues.  Room air sats fine.      Acute respiratory failure with hypoxia:   Multifactorial. Covid contributing, CT chest shows bilateral opacities considerably worse compared to yesterdays CXR. However he also has significant interstitial thickening bilaterally. Suspect this is pulmonary edema from the nearly 5 liters of fluid he required on arrival due to DKA. Gave 40mg of Lasix with profound response of over 2 liters in 8 hours. Will repeat with smaller dose of 20mg this evening and evaluate response. ECHO done 5/13/2021, result showed borderline reduced LVEF 10-55%. His procalcitonin is also elevated with dysphagia and possible aspiration as ct shows fluid in esophagus and some thickening. Start meropenem for CAP with coverage for possible aspiration. He has not MDRO risk factors, no clear indicators need to cover for pseudomonas. I talked with both his brother and his son and confirmed DNR/DNI status with them. I made clear the severity of illness and that he may not improve and this may cause his death. This afternoon I did call his son Al and advise him that CT head and chest came back okay with exception of worsened pneumonia and that his oxygen needs improved substantially through the day.   -5/14: As above marked improvement.  Still requires moderate flow oxygen but significantly improved.  Tolerating standard nasal cannula.  Increasing activity which should help preserve lung volume and FRC.  Pulmonary hygiene.  As below continue antibiotic treatment but discontinuation in the near future may be possible.  -5 /15: As above.  Continued improvement although trajectory of this has slowed.  Low flow oxygen.  Likely  combination of Covid pneumonitis, aspiration pneumonitis and perhaps some degree of volume overload.  The latter 2 are under reasonable control.  - 5/18: persistent hypoxia, will add prednisone for COPD component in addition to dexamethasone.    - 5/19: pt's respiration seems better after prednisone yesterday.  Will try another dose of prednisone today.  - 5/20: stable respiratory status, now on room air!  -5/21: stable respiratory status on room air.  Start taper down prednisone  -5/22: Continues to do well room air no major dyspnea.  -5/23: No respiratory complaints.  Is finishing up prednisone taper for COPD.       Diabetic ketoacidosis without coma associated with type 2 diabetes mellitus (H):   In the past [facility] only [facility]. However, A1c is >13. Glucose came down quickly with IV insulin. Anion gap now closed but ketones still elevated. Will continue IV insulin until ketones cleared. He is still quite fatigued do don't believe he is ready to eat yet either.   -5/13: Insulin drip stopped during the night and lantus started as well as sliding scale. He was previously only on half dose jardiance (12.5mg) and glucophage. Since he is not able to eat anything still will continue d5 at 75ml an hour, once that is stopped adjustments to long acting insulin may be required.   -5/14: Still hyperglycemia but no evidence of ketoacidosis.  Mild elevation in ketones may have been on much on the basis of starvation ketosis given limitation of oral intake because of concerns for aspiration as from diabetes per se.  Accelerated both long and short acting insulin today to help decrease his level of glucose.  -5 /15: Hyperglycemia improved although still present with significant increase in both long and short acting insulin prescription in the last 36 hours.  We will not change further significantly today.  Hyperglycemia in part driven by steroid treatment and would be in especially reluctant to increase  long-acting insulin significantly.  No evidence of any ketoacidosis.  -5/16: Some increase in overall level of glucose levels.  Continue current doses of insulin and resume his Metformin.  --5/17: BG range 105- 264.  Continue to monitor for hyperglycemia  -5/18: resolved.  BG range .  Follow hypoglycemia closely  -5/19: BG range .  -5/20: BG range 68 - 350.  Recurrent hypoglycemia in the morning.  Will reduce lantus  -5/21: BG range   -5/22: Blood sugars good no major hypoglycemia.  Appetite is fair.  -5/23: Sugars up and down a little higher now 110 this morning now 303 at midday.    Hypokalemia:   Due to DKA with insulin requirements, now expect worsening due to large output after lasix. Replaced with 80meq IV, recheck at 4pm and in AM.   -5/14: This appears largely resolved with resolution in his ketoacidosis.  --5/17: monitor K  - 5/18: K trending down; follow  -5/20: hypokalemia with K 3.3; replace  -5/21: K 4.5  -5/22: Resolved       Hyponatremia:   Resolved with IVF, likely due to dehydration and poor oral intake.        Fall:   Fell at home, unsure how long he was laying on the floor.  However no evidence of significant injury.  Representative of his severe presenting illness.  --5/17: will order PT/OT for functional assessment and cognitive eval  -5/18: PT recommends TCU for rehab at discharge.  Continue with therapy  -5/20: continuing with PT/OT  -5/22: Patient agrees with temporary stay at rehab hopefully still current Monday  -5/23: Plan still is for disposition to nursing home for rehab.       Confusion  Metabolic encephalopathy: Family does state he has been confused and having falls since his original covid diagnosis back in September.   -5/13: Worse this morning, he is awake and alert but admits he cannot remember even where he is or why. This afternoon nursing reports he has cleared significantly.   -5/14: This appears to be significantly improved.  No agitation which nursing staff  in particular note was a significant issue.  Still has some slowed responses and distractibility but overall is showing a significant improvement.  Likely  Predominantly metabolic encephalopathy.  May have some more chronic encephalopathy which family had noted beginning with his initial Covid infection.  -5/15: Likely some mild degree of residual nonfocal encephalopathy although improved certainly over the last 48 hours.  Continued close monitoring.  - 5/18: MoCA score 16/30.  Impaired cognition.  -5/22: Very alert and appropriate on conversation today.  -5/23: No major issues.       Acute on chronic renal failure stage 3a(H):   Due to dehydration/DKA.  Creatinine normally 1.0-1.3, on arrival 1.89. Cleared overnight with IVF, now back to baseline in the first several days of hospitalization.   -5/18: renal function now normal  -5/20: renal function normal      Probable moderate malnutrition:   No large amount of weight loss compared to 5 months ago. However he does have evidence of subcutaneous fat loss and muscle wasting.  -5/15: Encouraging increase in oral intake.  Appreciate nutritionist input.     Aspiration pneumonitis  Dysphagia  -5/14: Suspect aspiration pneumonitis is inflammatory rather than infectious.  He has shown significant improvement in that time consistent with inflammatory response to an aspiration event.  Continue current antibiotic treatment at least for another day or so.  Usual laboratory evaluation such as procalcitonin poorly reflective distinction between infectious and inflammatory processes however.  Appreciate speech therapy's input.  Have been able to advance his diet with close attention on the part of nursing staff.  Neuromuscular function in terms of dysphagia appears to be relatively well-preserved but he needs regular prompting for safe swallowing.  -5 /15: He is required strict monitoring by nursing staff and prohibition on taking any water except with direct supervision.   Overall I suspect his swallowing function however is improved somewhat.  Await speech therapy reevaluation when there are again available in several days.  -5/16: Still needs close nursing supervision.  Anticipate speech therapy reevaluation tomorrow.  Is likely this will be a continued issue.  -5/17: speech therapy evaluation showed oropharyngeal dysphagia and recommends honey thick liquids fed via spoon and NDD1 pureed textures  -5/19: speech therapy reports improved swallowing; now recommends nectar thick liquids and NDD2 mechanical soft textures (adding gravies and sauces)  -5/20: speech therapy session noted.  Continue with therapy.  -5/21: continue with speech therapy.  On NDD2 mechanical soft textures with nectar thickened liquids and compensatory strategies of chin tuck and swallow 2x per bite/sip  -5/22: Understands the need for the dysphagia diet.  Hopefully will improve and I think it should over the next week or so.  -5/23: Hopefully speech therapy will reevaluate tomorrow.  Tolerating his current diet.     Urinary retention:  Required replacement of indwelling catheter now several days ago after multiple attempts at spontaneous urination.  Tamsulosin started.  Depending on the course of his illness might be reasonable to resume trials of removal of catheter in another several days but I suspect he will need at least a week or so before this can be considered.  -5/21: discontinued Mata and followed pt's urine output, but he again retained urine with 450 ml after voiding trial.  Mata was replaced.  Pt will need urology follow up after discharge.  -5/22: He will need to see urology leave Mata in at this time.  -5/23: No issues Mata secured no bleeding troubles     Generalized weakness  Likely a matter of deconditioning and acute illness.  Will discuss with nursing staff whether physical therapy evaluation would be warranted.  Its not unlikely the patient will benefit from subacute rehabilitation.  -  PT/OT sessions  - TCU transfer at the time of discharge.      Diet: Dysphagia Diet Level 2 [facility] Altered Nectar Thickened Liquids (pre-thickened or use instant food thickener) (no straws)  Fluids: none    DVT Prophylaxis: Enoxaparin (Lovenox) SQ  Code Status: No CPR- Do NOT Intubate    Disposition: Expected discharge possibly tomorrow  Leo Narendra    Interval History   No major issues      -Data reviewed today: I reviewed all new labs and imaging results over the last 24 hours. I personally reviewed no images or EKG's today.    Physical Exam   Temp: 98.2  F (36.8  C) Temp src: Tympanic BP: 139/73 Pulse: 93   Resp: 18 SpO2: (!) 91 % O2 Device: None (Room air)    Vitals:    05/20/21 0532 05/21/21 0431 05/22/21 0547   Weight: 57.7 kg (127 lb 3.3 oz) 57.2 kg (126 lb 1.7 oz) 56.9 kg (125 lb 7.1 oz)     Vital Signs with Ranges  Temp:  [98.2  F (36.8  C)-98.4  F (36.9  C)] 98.2  F (36.8  C)  Pulse:  [92-95] 93  Resp:  [18] 18  BP: (102-139)/(57-73) 139/73  SpO2:  [91 %-95 %] 91 %  I/O last 3 completed shifts:  In: 1485 [P.O.:1485]  Out: 2200 [Urine:2200]    Peripheral IV 05/17/21 Left Lower forearm (Active)   Site Assessment WDL 05/23/21 0830   Line Status Saline locked 05/23/21 0830   Phlebitis Scale 0-->no symptoms 05/23/21 0830   Infiltration Scale 0 05/23/21 0830   Infiltration Site Treatment Method  None 05/19/21 0900   Number of days: 6       Urethral Catheter (Active)   Tube Description Positional 05/23/21 0823   Catheter Care Done;Soap and water/perineal cleanser 05/23/21 0823   Collection Container Standard 05/23/21 0823   Securement Method Securing device (Describe) 05/23/21 0823   Rationale for Continued Use Retention 05/23/21 0823   Urine Output 500 mL 05/23/21 0637   Number of days: 2       External Urinary Catheter (Active)   Urine Color Yellow 05/21/21 2200   Number of days: 2       Pressure Injury 09/20/20 Coccyx Stage 1 (Active)   Number of days: 245     No line/device    Constitutional: Alert and  oriented x3. No distress    HEENT: Normocephalic/atraumatic, PERRL, EOMI, mouth moist, neck supple, no LN.     Cardiovascular: RRR. no Murmur, no  rubs, or gallops.  JVD no.  Bruits no.  Pulses 2+    Respiratory: Clear to auscultation bilaterally    Abdomen: Soft, nontender, nondistended, no organomegaly. Bowel sounds present  Mata in place    Extremities: Warm/dry. No edema    Neuro:   Non focal, cranial nerves intact, Moves all extremities.  Medications     - MEDICATION INSTRUCTIONS -         enoxaparin ANTICOAGULANT  30 mg Subcutaneous BID     insulin aspart  1-18 Units Subcutaneous 4x Daily AC & HS     insulin glargine  8 Units Subcutaneous At Bedtime     ipratropium-albuterol  2 puff Inhalation 4x Daily     metFORMIN  1,000 mg Oral BID w/meals     predniSONE  20 mg Oral Daily    Followed by     [START ON 5/25/2021] predniSONE  10 mg Oral Daily     sodium chloride (PF)  3 mL Intracatheter Q8H     tamsulosin  0.4 mg Oral Daily       Data   Recent Labs   Lab 05/23/21  0539 05/22/21  0555 05/21/21  0552 05/17/21  0611 05/17/21  0611   WBC 16.0* 18.5* 20.2*   < > 14.6*   HGB 10.6* 10.7* 10.6*   < > 11.1*   MCV 91 90 90   < > 91    321 320   < > 210    133 133   < > 138   POTASSIUM 3.9 4.3 4.5   < > 3.5   CHLORIDE 99 97 98   < > 107   CO2 31 30 31   < > 25   BUN 16 19 16   < > 21   CR 0.68 0.65* 0.64*   < > 0.74   ANIONGAP 6 6 4   < > 6   CARLOS 7.3* 7.7* 7.5*   < > 7.0*   * 117* 94   < > 109*   ALBUMIN 1.8* 1.9* 1.9*   < > 1.9*   PROTTOTAL  --   --   --   --  4.9*   BILITOTAL  --   --   --   --  0.3   ALKPHOS  --   --   --   --  112   ALT  --   --   --   --  16   AST  --   --   --   --  15    < > = values in this interval not displayed.

## 2021-05-23 NOTE — PLAN OF CARE
"/57 (BP Location: Right arm)   Pulse 95   Temp 98.4  F (36.9  C) (Tympanic)   Resp 18   Ht 1.702 m (5' 7\")   Wt 56.9 kg (125 lb 7.1 oz)   SpO2 95%   BMI 19.65 kg/m       Pt is A&O, makes needs known. VSS, afebrile. Denies pain. Remains on RA, O2 sats mid 90's. LS clear, equal bilaterally. HRR. BS active. Mata remains patent, draining clear yellow urine, adequate urine output. IV SL.  Bed alarm active.     Face to face report given with opportunity to observe patient.    Report given to JUDD Posey RN   5/23/2021  7:26 AM    "

## 2021-05-23 NOTE — PLAN OF CARE
"Most recent vitals: /58 (BP Location: Right arm)   Pulse 92   Temp 98.4  F (36.9  C) (Tympanic)   Resp 18   Ht 1.702 m (5' 7\")   Wt 56.9 kg (125 lb 7.1 oz)   SpO2 93%   BMI 19.65 kg/m      Alert and oriented, able to make needs known. VSS. Afebrile. Denies pain. Lungs clear and diminished bilaterally. Remains on room air, combivent inhaler given per order. Bowel sounds active. Mata remains in place draining yellow urine, adequate urine output during the shift. IV SL. Remains on dysphagia diet, tolerated well. Ate 75% of supper tonight. Standby assist. BGs 362 and 249. Alarms active.    Face to face report given with opportunity to observe patient.    Report given to Thania Chung RN   5/22/2021  11:24 PM        "

## 2021-05-23 NOTE — PLAN OF CARE
"Patient minimally impulsive on shift. Mata remains in place and patent. Vitals remain stable. /73 (BP Location: Left arm)   Pulse 93   Temp 98.2  F (36.8  C) (Tympanic)   Resp 18   Ht 1.702 m (5' 7\")   Wt 56.9 kg (125 lb 7.1 oz)   SpO2 (!) 91%   BMI 19.65 kg/m    Blood sugars checked and covered appropriately. Alarms active and audible. Remains a SBA. Awaiting placement.   Face to face report given with opportunity to observe patient.    Report given to JUDD Flores RN   5/23/2021  2:47 PM      "

## 2021-05-24 ENCOUNTER — APPOINTMENT (OUTPATIENT)
Dept: OCCUPATIONAL THERAPY | Facility: HOSPITAL | Age: 81
DRG: 177 | End: 2021-05-24
Payer: COMMERCIAL

## 2021-05-24 ENCOUNTER — APPOINTMENT (OUTPATIENT)
Dept: PHYSICAL THERAPY | Facility: HOSPITAL | Age: 81
DRG: 177 | End: 2021-05-24
Payer: COMMERCIAL

## 2021-05-24 LAB
ALBUMIN SERPL-MCNC: 1.9 G/DL (ref 3.4–5)
ANION GAP SERPL CALCULATED.3IONS-SCNC: 4 MMOL/L (ref 3–14)
BUN SERPL-MCNC: 15 MG/DL (ref 7–30)
CALCIUM SERPL-MCNC: 7.2 MG/DL (ref 8.5–10.1)
CHLORIDE SERPL-SCNC: 99 MMOL/L (ref 94–109)
CO2 SERPL-SCNC: 31 MMOL/L (ref 20–32)
CREAT SERPL-MCNC: 0.68 MG/DL (ref 0.66–1.25)
ERYTHROCYTE [DISTWIDTH] IN BLOOD BY AUTOMATED COUNT: 12.8 % (ref 10–15)
GFR SERPL CREATININE-BSD FRML MDRD: >90 ML/MIN/{1.73_M2}
GLUCOSE BLDC GLUCOMTR-MCNC: 200 MG/DL (ref 70–99)
GLUCOSE BLDC GLUCOMTR-MCNC: 223 MG/DL (ref 70–99)
GLUCOSE BLDC GLUCOMTR-MCNC: 361 MG/DL (ref 70–99)
GLUCOSE SERPL-MCNC: 93 MG/DL (ref 70–99)
HCT VFR BLD AUTO: 31.4 % (ref 40–53)
HGB BLD-MCNC: 10.3 G/DL (ref 13.3–17.7)
MAGNESIUM SERPL-MCNC: 1.6 MG/DL (ref 1.6–2.3)
MCH RBC QN AUTO: 30.5 PG (ref 26.5–33)
MCHC RBC AUTO-ENTMCNC: 32.8 G/DL (ref 31.5–36.5)
MCV RBC AUTO: 93 FL (ref 78–100)
PHOSPHATE SERPL-MCNC: 3.1 MG/DL (ref 2.5–4.5)
PLATELET # BLD AUTO: 298 10E9/L (ref 150–450)
POTASSIUM SERPL-SCNC: 3.9 MMOL/L (ref 3.4–5.3)
RBC # BLD AUTO: 3.38 10E12/L (ref 4.4–5.9)
SODIUM SERPL-SCNC: 134 MMOL/L (ref 133–144)
WBC # BLD AUTO: 11.9 10E9/L (ref 4–11)

## 2021-05-24 PROCEDURE — 36415 COLL VENOUS BLD VENIPUNCTURE: CPT | Performed by: INTERNAL MEDICINE

## 2021-05-24 PROCEDURE — 120N000001 HC R&B MED SURG/OB

## 2021-05-24 PROCEDURE — 80069 RENAL FUNCTION PANEL: CPT | Performed by: INTERNAL MEDICINE

## 2021-05-24 PROCEDURE — 250N000011 HC RX IP 250 OP 636: Performed by: INTERNAL MEDICINE

## 2021-05-24 PROCEDURE — 97530 THERAPEUTIC ACTIVITIES: CPT | Mod: GO

## 2021-05-24 PROCEDURE — 99232 SBSQ HOSP IP/OBS MODERATE 35: CPT | Performed by: INTERNAL MEDICINE

## 2021-05-24 PROCEDURE — 250N000013 HC RX MED GY IP 250 OP 250 PS 637: Performed by: INTERNAL MEDICINE

## 2021-05-24 PROCEDURE — 85027 COMPLETE CBC AUTOMATED: CPT | Performed by: INTERNAL MEDICINE

## 2021-05-24 PROCEDURE — 250N000012 HC RX MED GY IP 250 OP 636 PS 637: Performed by: INTERNAL MEDICINE

## 2021-05-24 PROCEDURE — 83735 ASSAY OF MAGNESIUM: CPT | Performed by: INTERNAL MEDICINE

## 2021-05-24 PROCEDURE — 97530 THERAPEUTIC ACTIVITIES: CPT | Mod: GP

## 2021-05-24 PROCEDURE — 999N001017 HC STATISTIC GLUCOSE BY METER IP

## 2021-05-24 RX ADMIN — IPRATROPIUM BROMIDE AND ALBUTEROL 2 PUFF: 20; 100 SPRAY, METERED RESPIRATORY (INHALATION) at 21:25

## 2021-05-24 RX ADMIN — IPRATROPIUM BROMIDE AND ALBUTEROL 2 PUFF: 20; 100 SPRAY, METERED RESPIRATORY (INHALATION) at 13:09

## 2021-05-24 RX ADMIN — IPRATROPIUM BROMIDE AND ALBUTEROL 2 PUFF: 20; 100 SPRAY, METERED RESPIRATORY (INHALATION) at 08:57

## 2021-05-24 RX ADMIN — PREDNISONE 20 MG: 20 TABLET ORAL at 08:54

## 2021-05-24 RX ADMIN — METFORMIN HYDROCHLORIDE 1000 MG: 500 TABLET ORAL at 16:30

## 2021-05-24 RX ADMIN — ENOXAPARIN SODIUM 30 MG: 30 INJECTION SUBCUTANEOUS at 08:54

## 2021-05-24 RX ADMIN — IPRATROPIUM BROMIDE AND ALBUTEROL 2 PUFF: 20; 100 SPRAY, METERED RESPIRATORY (INHALATION) at 16:30

## 2021-05-24 RX ADMIN — ENOXAPARIN SODIUM 30 MG: 30 INJECTION SUBCUTANEOUS at 21:24

## 2021-05-24 RX ADMIN — TAMSULOSIN HYDROCHLORIDE 0.4 MG: 0.4 CAPSULE ORAL at 08:54

## 2021-05-24 RX ADMIN — METFORMIN HYDROCHLORIDE 1000 MG: 500 TABLET ORAL at 08:16

## 2021-05-24 ASSESSMENT — ACTIVITIES OF DAILY LIVING (ADL)
ADLS_ACUITY_SCORE: 14

## 2021-05-24 ASSESSMENT — MIFFLIN-ST. JEOR: SCORE: 1222.63

## 2021-05-24 NOTE — PLAN OF CARE
"Most recent vitals: /62 (BP Location: Left arm)   Pulse 87   Temp 98.2  F (36.8  C) (Tympanic)   Resp 18   Ht 1.702 m (5' 7\")   Wt 56.9 kg (125 lb 7.1 oz)   SpO2 93%   BMI 19.65 kg/m      Alert and oriented, able to make needs known. VSS. Afebrile. Denies pain. Lungs clear bilaterally. Bowel sounds active. Mata remains in place draining yellow urine, adequate output during the shift. IV SL. Ate 100% of supper. BGs 268 and 168. Standby assist.    Face to face report given with opportunity to observe patient.    Report given to Thania Chung RN   5/23/2021  11:24 PM        "

## 2021-05-24 NOTE — PROGRESS NOTES
Accepted to [facility].  [facility] arranged for 0800.  Updated Neel and  care team.  Message left for brother, Nicolas.

## 2021-05-24 NOTE — PROGRESS NOTES
"   05/24/21 1400   Signing Clinician's Name / Credentials   Signing clinician's name / credentials Sabina Zaman, OTR/L   Quick Adds   Rehab Discipline OT   Therapeutic Activity   Minutes of Treatment 15 minutes   Symptoms Noted During/After Treatment Fatigue;Shortness of breath   Treatment Detail Pt lying in bed upon OT arrival, agreeable to tx. Worked on memory questions, pt able to answer ~50% but stated \"i don't know\" for the other ~50%. Pt transferred to sit EOB with SBA. He sat EOB and doffed his gown with min-modA and re-donned a clean one with Oma. Pt washed his UB with set up, declined needing assist or to wash anything further. Pt doff/donned clean socks with set up and encouragement. Pt asked OT to assist but he was encouraged with complete himself for exercise, which pt then did without assist. He transferred sit to stand with verbal cues to slow down due to needing to get room set up and catheter prepared. Pt then ambulated into the BR without AD with close CGA; pt slightly unsteady but recorrected without assist. Pt stood at the sink and brushed his teeth with SBA-CGA. Pt returned to the bed with CGA. He was encouraged to sit up in the chair again, which he was agreeable to. Pt transferred bed to chair with SBA. SpO2 on RA was 89-91% and 93% by the time OT left. Pt was agreeable to the shades being opened but declined turning on the TV. Pt left sitting in the chair with the call light within reach and clip alert attached.   OT Discharge Planning    OT Discharge Recommendation (DC Rec) Transitional Care Facility   OT Rationale for DC Rec Pt continues to demonstrate decreased activity tolerance and cognitive deficits, and therefore is requiring assistance to safely complete ADLs. Pt would not be safe at home alone.   OT Brief overview of current status  Pt lying in bed upon OT arrival, agreeable to tx. Worked on memory questions, pt able to answer ~50% but stated \"i don't know\" for the other ~50%. Pt " transferred to sit EOB with SBA. He sat EOB and doffed his gown with min-modA and re-donned a clean one with Oma. Pt washed his UB with set up, declined needing assist or to wash anything further. Pt doff/donned clean socks with set up and encouragement. Pt asked OT to assist but he was encouraged with complete himself for exercise, which pt then did without assist. He transferred sit to stand with verbal cues to slow down due to needing to get room set up and catheter prepared. Pt then ambulated into the BR without AD with close CGA; pt slightly unsteady but recorrected without assist. Pt stood at the sink and brushed his teeth with SBA-CGA. Pt returned to the bed with CGA. He was encouraged to sit up in the chair again, which he was agreeable to. Pt transferred bed to chair with SBA. SpO2 on RA was 89-91% and 93% by the time OT left. Pt was agreeable to the shades being opened but declined turning on the TV. Pt left sitting in the chair with the call light within reach and clip alert attached.   Additional Documentation   Rehab Comments Pt slowly improving but not safe to return home alone due to ongoing deficits.   OT Plan Progress strength, endurance, and cognition as able for increased independence in ADLs.   Total Session Time   Total Session Time (minutes) 15 minutes

## 2021-05-24 NOTE — PROGRESS NOTES
Updated notes sent to [facility] and [place]. Left message at [facility] and [facility].

## 2021-05-24 NOTE — PLAN OF CARE
"BP (!) 94/47 (BP Location: Left arm)   Pulse 64   Temp 97.5  F (36.4  C) (Tympanic)   Resp 16   Ht 1.702 m (5' 7\")   Wt 54.4 kg (119 lb 14.9 oz)   SpO2 94%   BMI 18.78 kg/m       Alert and oriented, forgetful at times. BP soft this morning, MD notified no new orders. Other VSS. Remains on RA, LS dim in bases. Infrequent weak cough. HRR. BS active. Pt had a large BM today. Good appetite. BGL 93 & 200. IV SL. Bed alarm active.    Face to face report given with opportunity to observe patient.    Report given to JUDD Flores RN   5/24/2021  3:06 PM    "

## 2021-05-24 NOTE — PLAN OF CARE
"/52 (BP Location: Left arm)   Pulse 77   Temp 96.8  F (36  C) (Tympanic)   Resp 18   Ht 1.702 m (5' 7\")   Wt 54.4 kg (119 lb 14.9 oz)   SpO2 93%   BMI 18.78 kg/m       VSS, afebrile. Pt A&O, forgetful at times. Denies pain. Remains on RA, O2 sats mid 90's. Inspiratory wheezing to bases, lungs otherwise clear. HRR. BS active. Mata positional, draining clear yellow urine, adequate output. IV SL. Bed alarm active and audible.   "

## 2021-05-24 NOTE — PROGRESS NOTES
[facility]    Hospitalist Progress Note      Assessment & Plan   Neel Kerr is a 80 year old male who was admitted on 5/11/2021.      1.  2019 coronavirus pneumonia: Resolved room air no complaints of dyspnea at this time.    2.  Acute respiratory failure with hypoxia: Room air no complaints    3.  DKA: Resolved he is on Lantus and sliding scale sugars are up and down    4.  Hypokalemia/hyponatremia: Resolved    5.  Metabolic encephalopathy: Resolved    6.  Acute on chronic renal failure stage III: Also resolved.  Back to normal urinary retention: Has Mata catheter in place will need follow-up with urology.    7.  Generalized weakness: Recommended for short-term rehab.  He is Covid infection has been getting in the way of this.  Case management is hopefully find accepting facility he is ready for discharge from a medical standpoint.          Diet: Dysphagia Diet Level 2 [facility] Altered Nectar Thickened Liquids (pre-thickened or use instant food thickener) (no straws)  Fluids: None    DVT Prophylaxis: Enoxaparin (Lovenox) SQ  Code Status: No CPR- Do NOT Intubate    Disposition: Expected discharge as accepting facility is found    Leo Mackey    Interval History   No acute issues.  Hemodynamically stable pressures a little soft today but asymptomatic.  Appetites good vital signs fine labs good.  Awaiting placement    -Data reviewed today: I reviewed all new labs and imaging results over the last 24 hours. I personally reviewed no images or EKG's today.    Physical Exam   Temp: 97.5  F (36.4  C) Temp src: Tympanic BP: (!) 94/47 Pulse: 64   Resp: 16 SpO2: 94 % O2 Device: None (Room air)    Vitals:    05/21/21 0431 05/22/21 0547 05/24/21 0506   Weight: 57.2 kg (126 lb 1.7 oz) 56.9 kg (125 lb 7.1 oz) 54.4 kg (119 lb 14.9 oz)     Vital Signs with Ranges  Temp:  [96.8  F (36  C)-98.2  F (36.8  C)] 97.5  F (36.4  C)  Pulse:  [64-87] 64  Resp:  [16-18] 16  BP: ()/(47-62) 94/47  SpO2:  [93 %-94 %] 94  %  I/O last 3 completed shifts:  In: 1080 [P.O.:1080]  Out: 2500 [Urine:2500]    Peripheral IV 05/17/21 Left Lower forearm (Active)   Site Assessment WDL 05/24/21 0100   Line Status Saline locked 05/24/21 0100   Phlebitis Scale 0-->no symptoms 05/24/21 0100   Infiltration Scale 0 05/24/21 0100   Infiltration Site Treatment Method  None 05/19/21 0900   Number of days: 7       Urethral Catheter (Active)   Tube Description Positional 05/24/21 0059   Catheter Care Done;Catheter wipes 05/24/21 0059   Collection Container Standard 05/24/21 0059   Securement Method Securing device (Describe) 05/24/21 0059   Rationale for Continued Use Retention 05/24/21 0059   Urine Output 700 mL 05/24/21 0600   Number of days: 3       External Urinary Catheter (Active)   Urine Color Yellow 05/21/21 2200   Number of days: 3       Pressure Injury 09/20/20 Coccyx Stage 1 (Active)   Number of days: 246     No line/device    Constitutional: Alert and oriented x3. No distress    HEENT: Normocephalic/atraumatic, PERRL, EOMI, mouth moist, neck supple, no LN.     Cardiovascular: RRR.  No murmur, no  rubs, or gallops.  JVD flat.  Bruits no.  Pulses 2+    Respiratory: Clear to auscultation bilaterally    Abdomen: Soft, nontender, nondistended, no organomegaly. Bowel sounds present    Extremities: Warm/dry.  No edema    Neuro:   Non focal, cranial nerves intact, Moves all extremities.  Medications     - MEDICATION INSTRUCTIONS -         enoxaparin ANTICOAGULANT  30 mg Subcutaneous BID     insulin aspart  1-18 Units Subcutaneous 4x Daily AC & HS     insulin glargine  8 Units Subcutaneous At Bedtime     ipratropium-albuterol  2 puff Inhalation 4x Daily     metFORMIN  1,000 mg Oral BID w/meals     [START ON 5/25/2021] predniSONE  10 mg Oral Daily     sodium chloride (PF)  3 mL Intracatheter Q8H     tamsulosin  0.4 mg Oral Daily       Data   Recent Labs   Lab 05/24/21  0528 05/23/21  0539 05/22/21  0555   WBC 11.9* 16.0* 18.5*   HGB 10.3* 10.6* 10.7*    MCV 93 91 90    311 321    136 133   POTASSIUM 3.9 3.9 4.3   CHLORIDE 99 99 97   CO2 31 31 30   BUN 15 16 19   CR 0.68 0.68 0.65*   ANIONGAP 4 6 6   CARLOS 7.2* 7.3* 7.7*   GLC 93 110* 117*   ALBUMIN 1.9* 1.8* 1.9*       No results found for this or any previous visit (from the past 24 hour(s)).

## 2021-05-25 VITALS
RESPIRATION RATE: 16 BRPM | OXYGEN SATURATION: 92 % | HEART RATE: 89 BPM | DIASTOLIC BLOOD PRESSURE: 55 MMHG | SYSTOLIC BLOOD PRESSURE: 122 MMHG | WEIGHT: 118.39 LBS | TEMPERATURE: 98 F | BODY MASS INDEX: 18.58 KG/M2 | HEIGHT: 67 IN

## 2021-05-25 LAB
ALBUMIN SERPL-MCNC: 1.8 G/DL (ref 3.4–5)
ANION GAP SERPL CALCULATED.3IONS-SCNC: 6 MMOL/L (ref 3–14)
BUN SERPL-MCNC: 18 MG/DL (ref 7–30)
CALCIUM SERPL-MCNC: 7.1 MG/DL (ref 8.5–10.1)
CHLORIDE SERPL-SCNC: 101 MMOL/L (ref 94–109)
CO2 SERPL-SCNC: 27 MMOL/L (ref 20–32)
CREAT SERPL-MCNC: 0.62 MG/DL (ref 0.66–1.25)
ERYTHROCYTE [DISTWIDTH] IN BLOOD BY AUTOMATED COUNT: 13 % (ref 10–15)
GFR SERPL CREATININE-BSD FRML MDRD: >90 ML/MIN/{1.73_M2}
GLUCOSE BLDC GLUCOMTR-MCNC: 113 MG/DL (ref 70–99)
GLUCOSE SERPL-MCNC: 121 MG/DL (ref 70–99)
HCT VFR BLD AUTO: 31.2 % (ref 40–53)
HGB BLD-MCNC: 10.4 G/DL (ref 13.3–17.7)
MAGNESIUM SERPL-MCNC: 1.7 MG/DL (ref 1.6–2.3)
MCH RBC QN AUTO: 30.9 PG (ref 26.5–33)
MCHC RBC AUTO-ENTMCNC: 33.3 G/DL (ref 31.5–36.5)
MCV RBC AUTO: 93 FL (ref 78–100)
PHOSPHATE SERPL-MCNC: 2.9 MG/DL (ref 2.5–4.5)
PLATELET # BLD AUTO: 270 10E9/L (ref 150–450)
POTASSIUM SERPL-SCNC: 4 MMOL/L (ref 3.4–5.3)
RBC # BLD AUTO: 3.37 10E12/L (ref 4.4–5.9)
SODIUM SERPL-SCNC: 134 MMOL/L (ref 133–144)
WBC # BLD AUTO: 10.1 10E9/L (ref 4–11)

## 2021-05-25 PROCEDURE — 83735 ASSAY OF MAGNESIUM: CPT | Performed by: INTERNAL MEDICINE

## 2021-05-25 PROCEDURE — 99239 HOSP IP/OBS DSCHRG MGMT >30: CPT | Performed by: INTERNAL MEDICINE

## 2021-05-25 PROCEDURE — 80069 RENAL FUNCTION PANEL: CPT | Performed by: INTERNAL MEDICINE

## 2021-05-25 PROCEDURE — 250N000011 HC RX IP 250 OP 636: Performed by: INTERNAL MEDICINE

## 2021-05-25 PROCEDURE — 250N000013 HC RX MED GY IP 250 OP 250 PS 637: Performed by: INTERNAL MEDICINE

## 2021-05-25 PROCEDURE — 250N000012 HC RX MED GY IP 250 OP 636 PS 637: Performed by: INTERNAL MEDICINE

## 2021-05-25 PROCEDURE — 85027 COMPLETE CBC AUTOMATED: CPT | Performed by: INTERNAL MEDICINE

## 2021-05-25 PROCEDURE — 999N001017 HC STATISTIC GLUCOSE BY METER IP

## 2021-05-25 PROCEDURE — 36415 COLL VENOUS BLD VENIPUNCTURE: CPT | Performed by: INTERNAL MEDICINE

## 2021-05-25 RX ORDER — PREDNISONE 10 MG/1
10 TABLET ORAL DAILY
DISCHARGE
Start: 2021-05-25 | End: 2021-05-28

## 2021-05-25 RX ORDER — TAMSULOSIN HYDROCHLORIDE 0.4 MG/1
0.4 CAPSULE ORAL DAILY
DISCHARGE
Start: 2021-05-25 | End: 2022-01-01

## 2021-05-25 RX ADMIN — TAMSULOSIN HYDROCHLORIDE 0.4 MG: 0.4 CAPSULE ORAL at 07:42

## 2021-05-25 RX ADMIN — PREDNISONE 10 MG: 10 TABLET ORAL at 07:47

## 2021-05-25 RX ADMIN — ENOXAPARIN SODIUM 30 MG: 30 INJECTION SUBCUTANEOUS at 07:41

## 2021-05-25 RX ADMIN — METFORMIN HYDROCHLORIDE 1000 MG: 500 TABLET ORAL at 07:42

## 2021-05-25 ASSESSMENT — MIFFLIN-ST. JEOR: SCORE: 1215.63

## 2021-05-25 ASSESSMENT — ACTIVITIES OF DAILY LIVING (ADL)
ADLS_ACUITY_SCORE: 15
ADLS_ACUITY_SCORE: 14
ADLS_ACUITY_SCORE: 14

## 2021-05-25 NOTE — PROGRESS NOTES
PAS-RR    Per DHS regulation, CTS team completed and submitted PAS-RR to [place] [employer] via the [employer]. CTS team advised SNF and they are aware a PAS-RR has been submitted.     CTS team reviewed with pt or health care agent that they may be contacted for a follow up appointment within 10 days of hospital discharge if SNF stay is <30 days. Contact information for [employer] was also provided.     Pt or health care agent verbalized understanding.     PAS-RR # [id number]      Name: Neel Kerr    MRN#: [id number]    Reason for Hospitalization: Diabetic ketoacidosis without coma associated with type 2 diabetes mellitus (H) [E11.10]    ROM: low    Discharge Date: 5/25/2021    Patient / Family response to discharge plan: agreeable    Follow-Up Appt: No future appointments.    Other Providers (Care Coordinator, County Services, PCA services etc): Yes: orders faxed to [facility]    Discharge Disposition: nursing home- [facility] via [facility] at [phone]    CLAUDIA Shaw

## 2021-05-25 NOTE — PLAN OF CARE
Patient is A&O, some forgetfulness but able to make his needs known. VSS, afebrile. Assessment as charted. Denies pain. LS clear, crackles at bases. Mata cath remains in place, adequate output this shift.  IV removed this shift. Call light in reach, bed in low position. . No sliding scale coverage per order parameters.         Face to face report given with opportunity to observe patient.    Report given to JUDD Joy.     Melani Gustafson RN   5/25/2021  7:50 AM

## 2021-05-25 NOTE — PLAN OF CARE
9:42 AM  Patient discharged at 0800 AM via wheel chair accompanied by other:livia beard and staff. Prescriptions sent to patients preferred pharmacy. All belongings sent with patient.     Discharge instructions reviewed sent to [facility]. Listed belongings gathered and returned to patient.     Patient discharged to [facility].   Report called to Nursing Home:  Attempted to call twice. 2nd time to Brandon. Left another message. Will try to connect. Questions on medications.    Core Measures and Vaccines  Core Measures applicable during stay: No. If yes, state diagnosis: na  Pneumonia and Influenza given prior to discharge, if indicated: N/A    Surgical Patient   Surgical Procedures during stay: none  Did patient receive discharge instruction on wound care and recognition of infection symptoms? Yes    MISC  Follow up appointment made:  No. Pt to see urology  Home and hospital aquired medications returned to patient: N/A  Patient reports pain was well managed at discharge: Yes. denies

## 2021-05-25 NOTE — PLAN OF CARE
10:22 AM  Brandon called back and nurse to nurse report given. Brandon also received verbal order for sliding insulin scale as well as a verbal for pt to self administer nebulizations. Dr. Mackey was present during phone call and gave verbal consent for these orders. Brandon had no other questions at this time.

## 2021-05-25 NOTE — PLAN OF CARE
"Most recent vitals: /66 (BP Location: Left arm)   Pulse 93   Temp 98.9  F (37.2  C) (Tympanic)   Resp 16   Ht 1.702 m (5' 7\")   Wt 54.4 kg (119 lb 14.9 oz)   SpO2 92%   BMI 18.78 kg/m      Alert and oriented but forgetful. VSS. Afebrile. Lungs diminished but coarse crackles at the bases. Remains on room air, sats in the low to mid 90s. Bowel sounds active, large BM during the shift. Mata remains in place draining yellow urine, adequate urine output during the shift. IV SL. Ate 50% of supper. Standby assist.    Face to face report given with opportunity to observe patient.    Report given to Elke Chung RN   5/24/2021  11:21 PM        "

## 2021-05-25 NOTE — PLAN OF CARE
Occupational Therapy Discharge Summary    Reason for therapy discharge:    Discharged to transitional care facility.    Progress towards therapy goal(s). See goals on Care Plan in [facility] electronic health record for goal details.  Goals partially met.  Barriers to achieving goals:   discharge from facility.    Therapy recommendation(s):    Continued therapy is recommended.  Rationale/Recommendations:  to maximize safety and independence in ADLs and IADLs.

## 2021-06-03 ENCOUNTER — OFFICE VISIT (OUTPATIENT)
Dept: UROLOGY | Facility: OTHER | Age: 81
End: 2021-06-03
Attending: UROLOGY
Payer: MEDICARE

## 2021-06-03 VITALS
HEART RATE: 122 BPM | SYSTOLIC BLOOD PRESSURE: 120 MMHG | OXYGEN SATURATION: 96 % | BODY MASS INDEX: 17.54 KG/M2 | DIASTOLIC BLOOD PRESSURE: 60 MMHG | WEIGHT: 112 LBS | RESPIRATION RATE: 16 BRPM

## 2021-06-03 DIAGNOSIS — N40.1 URINARY RETENTION DUE TO BENIGN PROSTATIC HYPERPLASIA: Primary | ICD-10-CM

## 2021-06-03 DIAGNOSIS — R33.8 URINARY RETENTION DUE TO BENIGN PROSTATIC HYPERPLASIA: Primary | ICD-10-CM

## 2021-06-03 PROCEDURE — 51798 US URINE CAPACITY MEASURE: CPT | Performed by: UROLOGY

## 2021-06-03 PROCEDURE — G0463 HOSPITAL OUTPT CLINIC VISIT: HCPCS | Performed by: UROLOGY

## 2021-06-03 PROCEDURE — G0463 HOSPITAL OUTPT CLINIC VISIT: HCPCS | Mod: 25 | Performed by: UROLOGY

## 2021-06-03 PROCEDURE — 99203 OFFICE O/P NEW LOW 30 MIN: CPT | Mod: 25 | Performed by: UROLOGY

## 2021-06-03 PROCEDURE — 51700 IRRIGATION OF BLADDER: CPT | Performed by: UROLOGY

## 2021-06-03 PROCEDURE — G0463 HOSPITAL OUTPT CLINIC VISIT: HCPCS | Mod: 25

## 2021-06-03 ASSESSMENT — PAIN SCALES - GENERAL: PAINLEVEL: NO PAIN (0)

## 2021-06-03 NOTE — PROGRESS NOTES
Type of Visit  NPV    Chief Complaint  Urinary retention    HPI  Mr Kerr is a 80 year old male who presents with urinary retention.  He presented to the ED after an unwitnessed fall prompting a 2 week inpatient hospitalization stay.  Work-up revealed diabetic ketoacidosis as the likely cause of the fall.  Catheter was placed during this hospitalization stay almost 1 month ago.  He has not had catheters placed in the past.  He has not previously had prostate surgery.    He is currently on the following medications for urinary complaints: Flomax, started 1 week ago.    Recurrent bladder infections  No  Incontinence    No  Constipation    No      Past Medical History  He  has a past medical history of Essential (primary) hypertension, Hyperlipidemia, Non-ST elevation (NSTEMI) myocardial infarction (H), Old myocardial infarction, Polyneuropathy, and Type 2 diabetes mellitus without complications (H).  Patient Active Problem List   Diagnosis     Advanced care planning/counseling discussion     Bilateral impacted cerumen     Non-ST elevation (NSTEMI) myocardial infarction (H)     Psoriasis     CAP (community acquired pneumonia)     Hyponatremia     Acute on chronic respiratory failure with hypoxia (H)     Pneumonia due to 2019 novel coronavirus     Diabetic ketoacidosis without coma associated with type 2 diabetes mellitus (H)     Metabolic acidosis     Hypoxia     Fall     Acute on chronic renal failure (H)     Hyperkalemia     Polycystic kidney disease     HTN (hypertension)     Oropharyngeal dysphagia     Centrilobular emphysema (H)     COPD (chronic obstructive pulmonary disease) (H)       Past Surgical History  He  has no past surgical history on file.    Medications  He has a current medication list which includes the following prescription(s): acetaminophen, albuterol, atorvastatin, insulin aspart, insulin glargine, ipratropium-albuterol, metformin, and tamsulosin.    Allergies  Allergies   Allergen Reactions      Augmentin      Marked as an allergy at the [facility]       Social History  He  reports that he quit smoking about 29 years ago. He has never used smokeless tobacco. He reports that he does not drink alcohol or use drugs.  No drug abuse.    Family History  No family history on file.    Review of Systems  I personally reviewed the ROS with the patient.    Nursing Notes:   Ingrid Garcia LPN  6/3/2021  1:25 PM  Addendum  Chief Complaint   Patient presents with     Procedure     Void trial      Patient presents to the clinic today for a procedure for Void trial.    Review of Systems:    Weight loss:    Yes     Recent fever/chills:  No   Night sweats:   No  Current skin rash:  No   Recent hair loss:  No  Heat intolerance:  No   Cold intolerance:  yes  Chest pain:   No   Palpitations:   No  Shortness of breath:  yes   Wheezing:   No  Constipation:    No   Diarrhea:   No   Nausea:   No   Vomiting:   No   Kidney/side pain:  No   Back pain:   No  Frequent headaches:  No   Dizziness:     No  Leg swelling:   No   Calf pain:    No    Void Trial  Verbal order read back by Antonio August MD to perform void trial and post void residual bladder scan.  Patient's bladder was filled under gravity with 50mL of sterile saline.  Patient voided 0mL.  Post-void residual was measured by ultrasonic bladder scanner: 55 mL    Pt was unable to void in clinic.  Pt was advised to increase fluid intake throughout the morning and void several times. Pt is to return to the clinic  at 1 pm for bladder scan and assessment.  Pt was advised to return before 11:45 am if having abdominal pain.    Post-Void Residual  A post-void residual was measured by ultrasonic bladder scanner.  99 mL  Ingrid Garcia LPN  6/3/2021 1:25 PM    Medication Reconciliation: completed   Ingrid Garcia LPN  6/3/2021 8:39 AM       Physical Exam  Vitals:    06/03/21 0845   BP: 120/60   BP Location: Right arm   Patient Position: Sitting   Cuff Size: Adult Regular   Pulse: 122   Resp: 16    SpO2: 96%   Weight: 50.8 kg (112 lb)     Constitutional: No acute distress.  Alert and cooperative   Head: NCAT  Eyes: Conjunctivae normal  Cardiovascular: Regular rate.  Pulmonary/Chest: Respirations are even and non-labored bilaterally, no audible wheezing  Abdominal: Soft. No distension, tenderness, masses or guarding.   Neurological: A + O x 3.  Cranial Nerves II-XII grossly intact.  Extremities: SARAH x 4, Warm. No clubbing.  No cyanosis.    Skin: Pink, warm and dry.  No visible rashes noted.  Psychiatric:  Normal mood and affect  Back:  No left CVA tenderness.  No right CVA tenderness.  Genitourinary:  Catheter in place draining clear urine    Labs  Results for THELMA KERR (MRN [id number]) as of 6/3/2021 09:00   5/25/2021 05:36   Creatinine 0.62 (L)   GFR Estimate >90     Results for THELMA KERR (MRN [id number]) as of 6/3/2021 09:00   5/11/2021 21:12   Color Urine Straw   Appearance Urine Clear   Glucose Urine >1000 (A)   Bilirubin Urine Negative   Ketones Urine 40 (A)   Specific Gravity Urine 1.020   pH Urine 5.0   Protein Albumin Urine 10 (A)   Urobilinogen mg/dL Normal   Nitrite Urine Negative   Blood Urine Negative   Leukocyte Esterase Urine Negative   Source Catheterized Urine   WBC Urine 1   RBC Urine 0   Bacteria Urine None (A)   Squamous Epithelial /HPF Urine 0   Mucous Urine Present (A)       Assessment & Plan  Mr. Kerr is a 80 year old male who presents with urinary retention requiring catheter placement.  Patient presents with family members today.  The results of his void trial were mixed.  Family and caregivers did not observe the patient voiding and he does have some confusion.  His bladder scan is low.  I suggested a follow-up PVR next week after discussing the possible complications later today or over the weekend if he does develop symptomatic retention.  In the meantime continue Flomax and tight blood sugar management.  Follow-up next week for PVR.

## 2021-06-03 NOTE — NURSING NOTE
Chief Complaint   Patient presents with     Procedure     Void trial      Patient presents to the clinic today for a procedure for Void trial.    Review of Systems:    Weight loss:    Yes     Recent fever/chills:  No   Night sweats:   No  Current skin rash:  No   Recent hair loss:  No  Heat intolerance:  No   Cold intolerance:  yes  Chest pain:   No   Palpitations:   No  Shortness of breath:  yes   Wheezing:   No  Constipation:    No   Diarrhea:   No   Nausea:   No   Vomiting:   No   Kidney/side pain:  No   Back pain:   No  Frequent headaches:  No   Dizziness:     No  Leg swelling:   No   Calf pain:    No    Void Trial  Verbal order read back by Antonio August MD to perform void trial and post void residual bladder scan.  Patient's bladder was filled under gravity with 50mL of sterile saline.  Patient voided 0mL.  Post-void residual was measured by ultrasonic bladder scanner: 55 mL    Pt was unable to void in clinic.  Pt was advised to increase fluid intake throughout the morning and void several times. Pt is to return to the clinic  at 1 pm for bladder scan and assessment.  Pt was advised to return before 11:45 am if having abdominal pain.    Post-Void Residual  A post-void residual was measured by ultrasonic bladder scanner.  99 mL  Ingrid Garcia LPN  6/3/2021 1:25 PM    Medication Reconciliation: completed   Ingrid Garcia LPN  6/3/2021 8:39 AM

## 2021-06-04 ENCOUNTER — TELEPHONE (OUTPATIENT)
Dept: UROLOGY | Facility: OTHER | Age: 81
End: 2021-06-04

## 2021-06-04 NOTE — TELEPHONE ENCOUNTER
Marie from [facility] called and states in the middle of the night patient was complaining of abdominal pain and discomfort. They did a bladder scan and it showed 900-1200 cc. They straight cathed patient and removed about 700cc. They are wondering about getting a catheter back in. They are aware Dr. August is out of the office until Monday and they can try contacting patients PCP. Left message to return my call.    Marlys Carney on 6/4/2021 at 9:37 AM

## 2021-06-04 NOTE — TELEPHONE ENCOUNTER
This writer spoke to Monserrat at [facility] and relayed message to her from below.  Ingrid Garcia LPN on 6/4/2021 at 12:47 PM

## 2021-06-04 NOTE — TELEPHONE ENCOUNTER
Ok to place a weathers now and please have them contact Dr. August next week for further instructions.  Norma Taylor MD

## 2021-06-08 ENCOUNTER — OFFICE VISIT (OUTPATIENT)
Dept: FAMILY MEDICINE | Facility: OTHER | Age: 81
End: 2021-06-08
Attending: FAMILY MEDICINE
Payer: MEDICARE

## 2021-06-08 VITALS
WEIGHT: 118 LBS | HEART RATE: 113 BPM | RESPIRATION RATE: 22 BRPM | SYSTOLIC BLOOD PRESSURE: 124 MMHG | HEIGHT: 67 IN | OXYGEN SATURATION: 90 % | BODY MASS INDEX: 18.52 KG/M2 | TEMPERATURE: 98.3 F | DIASTOLIC BLOOD PRESSURE: 78 MMHG

## 2021-06-08 DIAGNOSIS — R33.9 URINARY RETENTION: ICD-10-CM

## 2021-06-08 DIAGNOSIS — E87.1 HYPONATREMIA: ICD-10-CM

## 2021-06-08 DIAGNOSIS — N18.9 ACUTE RENAL FAILURE SUPERIMPOSED ON CHRONIC KIDNEY DISEASE, UNSPECIFIED CKD STAGE, UNSPECIFIED ACUTE RENAL FAILURE TYPE: ICD-10-CM

## 2021-06-08 DIAGNOSIS — N17.9 ACUTE RENAL FAILURE SUPERIMPOSED ON CHRONIC KIDNEY DISEASE, UNSPECIFIED CKD STAGE, UNSPECIFIED ACUTE RENAL FAILURE TYPE: ICD-10-CM

## 2021-06-08 DIAGNOSIS — E11.10 DIABETIC KETOACIDOSIS WITHOUT COMA ASSOCIATED WITH TYPE 2 DIABETES MELLITUS (H): Primary | ICD-10-CM

## 2021-06-08 DIAGNOSIS — J12.82 PNEUMONIA DUE TO 2019 NOVEL CORONAVIRUS: ICD-10-CM

## 2021-06-08 DIAGNOSIS — U07.1 PNEUMONIA DUE TO 2019 NOVEL CORONAVIRUS: ICD-10-CM

## 2021-06-08 LAB
ALBUMIN SERPL-MCNC: 2.9 G/DL (ref 3.5–5.7)
ALP SERPL-CCNC: 71 U/L (ref 34–104)
ALT SERPL W P-5'-P-CCNC: 10 U/L (ref 7–52)
ANION GAP SERPL CALCULATED.3IONS-SCNC: 10 MMOL/L (ref 3–14)
AST SERPL W P-5'-P-CCNC: 12 U/L (ref 13–39)
BILIRUB SERPL-MCNC: 0.3 MG/DL (ref 0.3–1)
BUN SERPL-MCNC: 22 MG/DL (ref 7–25)
CALCIUM SERPL-MCNC: 7.5 MG/DL (ref 8.6–10.3)
CHLORIDE SERPL-SCNC: 100 MMOL/L (ref 98–107)
CO2 SERPL-SCNC: 26 MMOL/L (ref 21–31)
CREAT SERPL-MCNC: 0.95 MG/DL (ref 0.7–1.3)
DIFFERENTIAL METHOD BLD: ABNORMAL
EOSINOPHIL # BLD AUTO: 0.3 10E9/L (ref 0–0.7)
EOSINOPHIL NFR BLD AUTO: 3 %
ERYTHROCYTE [DISTWIDTH] IN BLOOD BY AUTOMATED COUNT: 12.8 % (ref 10–15)
GFR SERPL CREATININE-BSD FRML MDRD: 77 ML/MIN/{1.73_M2}
GLUCOSE SERPL-MCNC: 196 MG/DL (ref 70–105)
HCT VFR BLD AUTO: 36.8 % (ref 40–53)
HGB BLD-MCNC: 12.3 G/DL (ref 13.3–17.7)
LYMPHOCYTES # BLD AUTO: 4.5 10E9/L (ref 0.8–5.3)
LYMPHOCYTES NFR BLD AUTO: 40 %
MCH RBC QN AUTO: 30.8 PG (ref 26.5–33)
MCHC RBC AUTO-ENTMCNC: 33.4 G/DL (ref 31.5–36.5)
MCV RBC AUTO: 92 FL (ref 78–100)
METAMYELOCYTES # BLD: 0.1 10E9/L
METAMYELOCYTES NFR BLD MANUAL: 1 %
MONOCYTES # BLD AUTO: 0.6 10E9/L (ref 0–1.3)
MONOCYTES NFR BLD AUTO: 5 %
MYELOCYTES # BLD: 0.1 10E9/L
MYELOCYTES NFR BLD MANUAL: 1 %
NEUTROPHILS # BLD AUTO: 5.5 10E9/L (ref 1.6–8.3)
NEUTROPHILS NFR BLD AUTO: 49 %
NEUTS BAND # BLD AUTO: 0.1 10E9/L (ref 0–0.6)
NEUTS BAND NFR BLD MANUAL: 1 %
PLATELET # BLD AUTO: 532 10E9/L (ref 150–450)
POTASSIUM SERPL-SCNC: 4.5 MMOL/L (ref 3.5–5.1)
PROT SERPL-MCNC: 5.8 G/DL (ref 6.4–8.9)
RBC # BLD AUTO: 4 10E12/L (ref 4.4–5.9)
SODIUM SERPL-SCNC: 136 MMOL/L (ref 134–144)
WBC # BLD AUTO: 11.2 10E9/L (ref 4–11)

## 2021-06-08 PROCEDURE — G0463 HOSPITAL OUTPT CLINIC VISIT: HCPCS

## 2021-06-08 PROCEDURE — 36415 COLL VENOUS BLD VENIPUNCTURE: CPT | Mod: ZL | Performed by: FAMILY MEDICINE

## 2021-06-08 PROCEDURE — 99214 OFFICE O/P EST MOD 30 MIN: CPT | Performed by: FAMILY MEDICINE

## 2021-06-08 PROCEDURE — G0463 HOSPITAL OUTPT CLINIC VISIT: HCPCS | Performed by: FAMILY MEDICINE

## 2021-06-08 PROCEDURE — 80053 COMPREHEN METABOLIC PANEL: CPT | Mod: ZL | Performed by: FAMILY MEDICINE

## 2021-06-08 PROCEDURE — 85025 COMPLETE CBC W/AUTO DIFF WBC: CPT | Mod: ZL | Performed by: FAMILY MEDICINE

## 2021-06-08 ASSESSMENT — PAIN SCALES - GENERAL: PAINLEVEL: NO PAIN (0)

## 2021-06-08 ASSESSMENT — ENCOUNTER SYMPTOMS
CHILLS: 0
FEVER: 0
SHORTNESS OF BREATH: 1
COUGH: 0

## 2021-06-08 ASSESSMENT — MIFFLIN-ST. JEOR: SCORE: 1213.87

## 2021-06-08 NOTE — PROGRESS NOTES
"  SUBJECTIVE:   Nursing Notes:   Corin Camargo LPN  6/8/2021  3:02 PM  Sign at exiting of workspace  Chief Complaint   Patient presents with     Hospital F/U     urinary retention      RE-CERT     nursing home     Patient was in the hospital May 11 th for urinary retention . He has been at the nursing home now and needs a recert. [facility] but [facility] . Has concerns about billing.   Initial /78 (BP Location: Right arm, Patient Position: Sitting, Cuff Size: Adult Regular)   Pulse 113   Temp 98.3  F (36.8  C) (Tympanic)   Resp 22   Ht 1.702 m (5' 7\")   Wt 53.5 kg (118 lb)   SpO2 90%   BMI 18.48 kg/m   Estimated body mass index is 18.48 kg/m  as calculated from the following:    Height as of this encounter: 1.702 m (5' 7\").    Weight as of this encounter: 53.5 kg (118 lb).  Medication Reconciliation: complete    LAKE Joshua JAKE Kerr is a 80 year old male who presents to clinic today for follow up of recently hospitalization at [facility] in [place].  [facility].  He was admitted on 5/11/2021, discharged on 5/25/2021.  Discharge summary is not available.  He had been found on the floor by a friend.  It was noted in the Emergency Department that he was in DKA and was admitted to the ICU.  Initial glucose was 661.  He was not felt to have a diabetic coma.  He was given 3 L of NS in the Emergency Department and also insulin.  He has chronic hyponatremia. Sodium was 119 on admit.  He was hypoxic in the Emergency Department as well and was on 1 liter oxygen by NC.  He has chronic diabetic nephropathy as well.  He did test positive for covid when he was admitted as well.  His A1c on admission was 13.4.  His glucose prior to discharge was 113.  Discharge sodium was 134.  His brother Nicolas and sister-in-law Qiana are here with Neel today.  They tell me that he also had had Covid back in " September and had been hospitalized at [facility] at that time and was very ill.    He has had issues with urinary retention.  He had seen Dr. August recently to have a void trial after removing his catheter, which gave mixed results.  He was allowed to go back to [facility] without a catheter in place, but by the next day he was not able to void sufficiently and it was replaced.  It is still in place today.  He has a follow up appointment with Dr. August on 6/14/2021.    HPI    I personally reviewed medications/allergies/history listed below:    Patient Active Problem List    Diagnosis Date Noted     Centrilobular emphysema (H) 05/18/2021     Priority: Medium     COPD (chronic obstructive pulmonary disease) (H) 05/18/2021     Priority: Medium     Oropharyngeal dysphagia 05/13/2021     Priority: Medium     Hyperkalemia 05/12/2021     Priority: Medium     Polycystic kidney disease 05/12/2021     Priority: Medium     Diabetic ketoacidosis without coma associated with type 2 diabetes mellitus (H) 05/11/2021     Priority: Medium     Metabolic acidosis 05/11/2021     Priority: Medium     Hypoxia 05/11/2021     Priority: Medium     Fall 05/11/2021     Priority: Medium     Acute on chronic renal failure (H) 05/11/2021     Priority: Medium     Hyponatremia 09/21/2020     Priority: Medium     Acute on chronic respiratory failure with hypoxia (H) 09/21/2020     Priority: Medium     Pneumonia due to 2019 novel coronavirus 09/21/2020     Priority: Medium     CAP (community acquired pneumonia) 09/20/2020     Priority: Medium     Psoriasis 02/01/2018     Priority: Medium     Non-ST elevation (NSTEMI) myocardial infarction (H) 07/02/2017     Priority: Medium     HTN (hypertension) 02/11/2015     Priority: Medium     Advanced care planning/counseling discussion 06/19/2012     Priority: Medium     Bilateral impacted cerumen 03/10/2011     Priority: Medium     Past Medical History:   Diagnosis Date     Essential (primary)  hypertension     No Comments Provided     Hyperlipidemia     No Comments Provided     Non-ST elevation (NSTEMI) myocardial infarction (H)     2017,[facility] PCI with stent circumflex     Old myocardial infarction     2015,[facility]  PCI with stent     Polyneuropathy     No Comments Provided     Type 2 diabetes mellitus without complications (H)     No Comments Provided      History reviewed. No pertinent surgical history.  History reviewed. No pertinent family history.  Social History     Tobacco Use     Smoking status: Former Smoker     Quit date: 1992     Years since quittin.4     Smokeless tobacco: Never Used   Substance Use Topics     Alcohol use: No     Comment: Alcoholic Drinks/day: quit drinking      Social History     Social History Narrative    Pre loaded 15    Primary care through [place], [place]     Current Outpatient Medications   Medication Sig Dispense Refill     acetaminophen (TYLENOL) 325 MG tablet Take 2 tablets (650 mg) by mouth every 4 hours as needed for mild pain       albuterol (PROAIR HFA/PROVENTIL HFA/VENTOLIN HFA) 108 (90 Base) MCG/ACT inhaler Inhale 2 puffs into the lungs every 6 hours as needed for wheezing        atorvastatin (LIPITOR) 80 MG tablet Take 40 mg by mouth At Bedtime       insulin aspart (NOVOLOG PEN) 100 UNIT/ML pen Inject 1-18 Units Subcutaneous 4 times daily (before meals and nightly)       insulin glargine (LANTUS PEN) 100 UNIT/ML pen Inject 8 Units Subcutaneous At Bedtime       ipratropium-albuterol (COMBIVENT RESPIMAT)  MCG/ACT inhaler Inhale 1 puff into the lungs 4 times daily       metFORMIN (GLUCOPHAGE) 1000 MG tablet Take 1,000 mg by mouth 2 times daily (with meals)        tamsulosin (FLOMAX) 0.4 MG capsule Take 1 capsule (0.4 mg) by mouth daily       Allergies   Allergen Reactions     Augmentin      Marked as an allergy at the [facility]       Review of Systems   Constitutional: Negative for chills and fever.   Respiratory:  "Positive for shortness of breath. Negative for cough.    Cardiovascular: Negative for peripheral edema.   Psychiatric/Behavioral: Negative for mood changes.        OBJECTIVE:     /78 (BP Location: Right arm, Patient Position: Sitting, Cuff Size: Adult Regular)   Pulse 113   Temp 98.3  F (36.8  C) (Tympanic)   Resp 22   Ht 1.702 m (5' 7\")   Wt 53.5 kg (118 lb)   SpO2 90%   BMI 18.48 kg/m    Body mass index is 18.48 kg/m .  Physical Exam  Constitutional:       Comments: Chronically ill-appearing.  No apparent distress.   HENT:      Head: Normocephalic.   Eyes:      Extraocular Movements: Extraocular movements intact.      Pupils: Pupils are equal, round, and reactive to light.   Neck:      Musculoskeletal: Normal range of motion and neck supple.   Cardiovascular:      Rate and Rhythm: Normal rate and regular rhythm.      Pulses: Normal pulses.      Heart sounds: Normal heart sounds. No murmur.   Pulmonary:      Effort: Pulmonary effort is normal.      Breath sounds: Normal breath sounds. No wheezing, rhonchi or rales.   Abdominal:      General: Abdomen is flat. Bowel sounds are normal.      Tenderness: There is no abdominal tenderness.   Musculoskeletal:      Right lower leg: No edema.      Left lower leg: No edema.   Lymphadenopathy:      Cervical: No cervical adenopathy.   Neurological:      General: No focal deficit present.   Psychiatric:         Mood and Affect: Mood normal.         Behavior: Behavior normal.             I personally reviewed results withpatient as listed below:   Diagnostic Test Results:  none     Blood sugars at [facility] have ranged between 101-255.    ASSESSMENT/PLAN:       ICD-10-CM    1. Diabetic ketoacidosis without coma associated with type 2 diabetes mellitus (H)  E11.10 Comprehensive metabolic panel     Comprehensive metabolic panel   2. Hyponatremia  E87.1 Comprehensive metabolic panel     Comprehensive metabolic panel   3. Pneumonia due to 2019 novel coronavirus  " U07.1     J12.82    4. Acute renal failure superimposed on chronic kidney disease, unspecified CKD stage, unspecified acute renal failure type (H)  N17.9 CBC with platelets differential    N18.9 Comprehensive metabolic panel     Comprehensive metabolic panel     CBC with platelets differential   5. Urinary retention  R33.9        1.  Blood sugars have been stable since hospital discharge.  He is currently on lantus 8 units at bedtime plus novolog sliding scale with meals and at bedtime.  He is taking between 0-9 us of novolog at these times.  2.  Comprehensive Metabolic Profile obtained as above to recheck sodium.  3.  Respiratory status is stable.  4.  Recheck labs as above.  It was noted that his hemoglobin was a bit low in the hospital.  5.  As above, has upcoming appointment with Urology.      Norma Taylor MD  [facility]

## 2021-06-08 NOTE — NURSING NOTE
"Chief Complaint   Patient presents with     Hospital F/U     urinary retention      RE-CERT     nursing home     Patient was in the hospital May 11 th for urinary retention . He has been at the nursing home now and needs a recert. [facility] . Has concerns about billing.   Initial /78 (BP Location: Right arm, Patient Position: Sitting, Cuff Size: Adult Regular)   Pulse 113   Temp 98.3  F (36.8  C) (Tympanic)   Resp 22   Ht 1.702 m (5' 7\")   Wt 53.5 kg (118 lb)   SpO2 90%   BMI 18.48 kg/m   Estimated body mass index is 18.48 kg/m  as calculated from the following:    Height as of this encounter: 1.702 m (5' 7\").    Weight as of this encounter: 53.5 kg (118 lb).  Medication Reconciliation: complete    Corin Camargo, [place]KE  "

## 2021-06-14 ENCOUNTER — OFFICE VISIT (OUTPATIENT)
Dept: UROLOGY | Facility: OTHER | Age: 81
End: 2021-06-14
Attending: UROLOGY
Payer: MEDICARE

## 2021-06-14 VITALS
SYSTOLIC BLOOD PRESSURE: 122 MMHG | RESPIRATION RATE: 16 BRPM | BODY MASS INDEX: 18.67 KG/M2 | OXYGEN SATURATION: 96 % | HEART RATE: 107 BPM | DIASTOLIC BLOOD PRESSURE: 70 MMHG | WEIGHT: 119.2 LBS

## 2021-06-14 DIAGNOSIS — N40.1 URINARY RETENTION DUE TO BENIGN PROSTATIC HYPERPLASIA: Primary | ICD-10-CM

## 2021-06-14 DIAGNOSIS — R33.8 URINARY RETENTION DUE TO BENIGN PROSTATIC HYPERPLASIA: Primary | ICD-10-CM

## 2021-06-14 PROCEDURE — G0463 HOSPITAL OUTPT CLINIC VISIT: HCPCS

## 2021-06-14 PROCEDURE — 99214 OFFICE O/P EST MOD 30 MIN: CPT | Performed by: UROLOGY

## 2021-06-14 PROCEDURE — G0463 HOSPITAL OUTPT CLINIC VISIT: HCPCS | Performed by: UROLOGY

## 2021-06-14 PROCEDURE — 51798 US URINE CAPACITY MEASURE: CPT | Performed by: UROLOGY

## 2021-06-14 ASSESSMENT — PAIN SCALES - GENERAL: PAINLEVEL: NO PAIN (0)

## 2021-06-14 NOTE — PROGRESS NOTES
Type of Visit  EST    Chief Complaint  Urinary retention    HPI  Mr Kerr is a 80 year old male who presents with urinary retention.  He presented to the ED after an unwitnessed fall prompting a 2 week inpatient hospitalization stay.  Work-up revealed diabetic ketoacidosis as the likely cause of the fall.  Catheter was placed during this hospitalization stay over 1 month ago.  Almost 2 weeks ago the patient was seen as a new patient.  We performed a void trial.  Results were mixed with a moderately elevated residual.  Because of this I recommended a period of observation with a follow-up PVR in a short timeframe.  In the meantime however a catheter was replaced at the nursing home the patient resides at.  He presents today with a catheter in place.    Patient started Flomax about 1 month ago.      Review of Systems  I personally reviewed the ROS with the patient.    Nursing Notes:   Ingrid Garcia LPN  6/14/2021  2:41 PM  Addendum  Chief Complaint   Patient presents with     Follow Up     1 week urinary retention    Patient presents to the clinic today for a follow up for 1 week retention    Review of Systems:    Weight loss:    Yes     Recent fever/chills:  No   Night sweats:   No  Current skin rash:  No   Recent hair loss:  No  Heat intolerance:  No   Cold intolerance:  No  Chest pain:   No   Palpitations:   No  Shortness of breath:  Yes   Wheezing:   No  Constipation:    No   Diarrhea:   No   Nausea:   No   Vomiting:   No   Kidney/side pain:  No   Back pain:   No  Frequent headaches:  No   Dizziness:     No  Leg swelling:   No   Calf pain:    No          Medication Reconciliation: completed   Ingrid Garcia LPN  6/14/2021 2:34 PM       Physical Exam  Vitals:    06/14/21 1441   BP: 122/70   BP Location: Right arm   Patient Position: Sitting   Cuff Size: Adult Regular   Pulse: 107   Resp: 16   SpO2: 96%   Weight: 54.1 kg (119 lb 3.2 oz)     Constitutional: No acute distress.  Alert and cooperative   Head:  NCAT  Eyes: Conjunctivae normal  Cardiovascular: Regular rate.  Pulmonary/Chest: Respirations are even and non-labored bilaterally, no audible wheezing  Abdominal: Soft. No distension, tenderness, masses or guarding.   Neurological: A + O x 3.  Cranial Nerves II-XII grossly intact.  Extremities: SARAH x 4, Warm. No clubbing.  No cyanosis.    Skin: Pink, warm and dry.  No visible rashes noted.  Psychiatric:  Normal mood and affect  Back:  No left CVA tenderness.  No right CVA tenderness.  Genitourinary:  Catheter in place draining clear urine    Labs  Results for THELMA KERR (MRN [id number]) as of 6/3/2021 09:00   5/25/2021 05:36   Creatinine 0.62 (L)   GFR Estimate >90     Results for THELMA KERR (MRN [id number]) as of 6/3/2021 09:00   5/11/2021 21:12   Color Urine Straw   Appearance Urine Clear   Glucose Urine >1000 (A)   Bilirubin Urine Negative   Ketones Urine 40 (A)   Specific Gravity Urine 1.020   pH Urine 5.0   Protein Albumin Urine 10 (A)   Urobilinogen mg/dL Normal   Nitrite Urine Negative   Blood Urine Negative   Leukocyte Esterase Urine Negative   Source Catheterized Urine   WBC Urine 1   RBC Urine 0   Bacteria Urine None (A)   Squamous Epithelial /HPF Urine 0   Mucous Urine Present (A)     Assessment & Plan  Mr. Kerr is a 80 year old male who presents with urinary retention requiring catheter placement.  Patient presents with family members today.  The results of his void trial were mixed.  We have a long conversation regarding treatment options.  Certainly becoming catheter free would make the patient's life significantly simpler in terms of management.    I recommended another void trial with cystoscopy at the next visit.  We again discussed TURP in detail including the risks and benefits and alternatives.  Patient will follow up for 1 additional cystoscopy and void trial and if unsuccessful we will likely consider TURP.

## 2021-06-14 NOTE — NURSING NOTE
Chief Complaint   Patient presents with     Follow Up     1 week urinary retention    Patient presents to the clinic today for a follow up for 1 week retention    Review of Systems:    Weight loss:    Yes     Recent fever/chills:  No   Night sweats:   No  Current skin rash:  No   Recent hair loss:  No  Heat intolerance:  No   Cold intolerance:  No  Chest pain:   No   Palpitations:   No  Shortness of breath:  Yes   Wheezing:   No  Constipation:    No   Diarrhea:   No   Nausea:   No   Vomiting:   No   Kidney/side pain:  No   Back pain:   No  Frequent headaches:  No   Dizziness:     No  Leg swelling:   No   Calf pain:    No          Medication Reconciliation: completed   Ingrid Garcia LPN  6/14/2021 2:34 PM

## 2021-06-19 NOTE — DISCHARGE SUMMARY
[facility]  Hospitalist Discharge Summary      Date of Admission:  5/11/2021  Date of Discharge:  5/25/2021  8:00 AM  Discharging Provider: Leo Mackey MD      Discharge Diagnoses     Acute on chronic respiratory failure with hypoxia  Pneumonia secondary to Covid 19  Aspiration pneumonitis  Diabetic ketoacidosis  Type 2 diabetes myelitis.  Acute renal injury on chronic kidney disease stage III  Hyperkalemia  Oropharyngeal dysphagia  COPD  Hyponatremia  Metabolic encephalopathy  Urinary retention    Follow-ups Needed After Discharge   Follow-up Appointments     Follow Up and recommended labs and tests      Follow up with Nursing home physician.  No follow up labs or test are   needed.    Needs follow up with Urology to have catheter removed.  [place] is   closer so with Dr August.         None    Unresulted Labs Ordered in the Past 30 Days of this Admission     No orders found from 4/11/2021 to 5/12/2021.      These results will be followed up by not needed    Discharge Disposition   Discharged to nursing home  Condition at discharge: Stable      Hospital Course     Patient 80-year-old gentleman who has a significant past medical history of diabetes mellitus type 2, noncompliance, remote coronary artery disease with a non-STEMI, chronic hyponatremia Covid pneumonia diagnosed treated back in September 2020.  He was found on the floor by a friend.  His brother was notified who brought him into the ER.  He stated the patient does have history of probable dementia.  Patient was definitely demented able to provide some reliable information.  He has not been taking any insulin or Metformin.  Has been having a lot of difficulty emptying his bladder.    In the ER he was found to be in DKA.  His blood sugar was 661, ketones were 5.8, , lactic acid 2.4, serum sodium 119 with potassium 4.8 bicarbonate of 11.  BUN and creatinine were 98 and 1.83 respectively.  White count was 16,000 hemoglobin 14.5  platelet count 282,000.  CT of head just shows small vessel disease.  Chest x-ray showed chronic pulmonary fibrosis.  Blood pressures were stable 120-150 systolic heart rate dropped from 100 he was afebrile sats 92% on room air.    He was transferred to the ICU was given IV fluid resuscitation and started on insulin infusion for his DKA.    Diabetic ketoacidosis: This did resolve relatively quickly with appropriate treatment.  Eventually as he improved his medications were restarted.  He was eventually placed on Lantus 8 units at bedtime along with a sliding scale coverage 4 times a day.  Also Metformin 1000 mg p.o. twice daily with meals.    COVID-19 pneumonia/acute respiratory failure with hypoxia: It was a bit confusing but as he was reswabbed he was found to be positive once again for Covid.  He did require increasing amounts of oxygen support.  There was also question of probable aspiration during that initial stay.  He was treated for is covered with dexamethasone remdesivir.  Also on meropenem to cover for pneumonia with possible aspiration.  Did require relatively high flow O2 for quite some time.  But gradually was titrated down and by the 20th and days into this he was back on room air.  Does have some underlying COPD.    Hyponatremia/hypokalemia: Due to his DKA insulin requirements and etc. his values did improve significantly.  They are down to the normal range terms of his potassium was 4.5 his sodium was in the mid 130 range.    Metabolic encephalopathy: He was quite confused.  Family said that he has been confused was having falls since his original diagnosis of Covid back in September.  CT scan showed no acute abnormalities.  With time he did improve somewhat seemed relatively stable.  He had a mental status evaluation by Occupational Therapy.  His MoCA score was 16 out of 30.  While this is felt to be chronic some worsened by his acute illness.    Moderate malnutrition: Evaluation by nutrition  recommended supplements.    Aspiration pneumonitis/dysphagia: She did have presumed aspiration causing some of his respiratory failure as noted above.  He was seen by speech therapy.  Their evaluation felt that he has significant oropharyngeal dysphagia.  At the time of his discharge believe he was on nectar thickened liquids and mechanical soft.  Likely this will improve as his strength also improves.  This is quite debilitated.      Urinary retention: Patient did have significant amount of retention over 900 cc.  He was started on Flomax.  They did attempt a trial however he was unable to void.  They will be discharged to nursing home with a Mata catheter in place.  We will set up an appointment with urology for evaluation and possible removal.      Consultations This Hospital Stay   PHARMACY IP CONSULT  SWALLOW EVAL SPEECH PATH AT BEDSIDE IP CONSULT  PHYSICAL THERAPY ADULT IP CONSULT  OCCUPATIONAL THERAPY ADULT IP CONSULT  PHYSICAL THERAPY ADULT IP CONSULT  OCCUPATIONAL THERAPY ADULT IP CONSULT  SPEECH LANGUAGE PATH ADULT IP CONSULT    Code Status   No CPR- Do NOT Intubate    Time Spent on this Encounter   I, Leo Mackey MD, personally saw the patient today and spent greater than 30 minutes discharging this patient.       Leo Mackey MD  [facility]  [address] [place] [id number]  Phone: [phone]  Fax: [phone]  ______________________________________________________________________    Physical Exam   Vital Signs:                    Weight: 118 lbs 6.19 oz  Constitutional: awake, alert, cooperative, no apparent distress, and appears stated age  Respiratory: No increased work of breathing, good air exchange, clear to auscultation bilaterally, no crackles or wheezing  Cardiovascular: Normal apical impulse, regular rate and rhythm, normal S1 and S2, no S3 or S4, and no murmur noted  GI: No scars, normal bowel sounds, soft, non-distended, non-tender, no masses palpated, no  hepatosplenomegally  Musculoskeletal: There is no redness, warmth, or swelling of the joints.  Full range of motion noted.  Motor strength is 5 out of 5 all extremities bilaterally.  Tone is normal.       Primary Care Physician   Norma Taylor    Discharge Orders      General info for SNF    Length of Stay Estimate: Short Term Care: Estimated # of Days <30  Condition at Discharge: Improving  Level of care:skilled   Rehabilitation Potential: Good  Admission H&P remains valid and up-to-date: Yes  Recent Chemotherapy: N/A  Use Nursing Home Standing Orders: Yes     Reason for your hospital stay    Patient was admitted with acute respiratory failure due to COVID-19 pneumonia and also diabetic ketoacidosis.     Glucose monitor nursing POCT    Before meals and at bedtime     Mata catheter    To straight gravity drainage. Change catheter every 2 weeks and PRN for leaking or decreased uring output with signs of bladder distention. DO NOT change catheter without a specific MD order IF diagnosis of benign prostatic hypertrophy (BPH), neurogenic bladder, or other urological conditions     Follow Up and recommended labs and tests    Follow up with Nursing home physician.  No follow up labs or test are needed.    Needs follow up with Urology to have catheter removed.   [place] is closer so with Dr August.     Activity - Up with nursing assistance     No CPR- Do NOT Intubate     Physical Therapy Adult Consult    Evaluate and treat as clinically indicated.    Reason:  weakness     Occupational Therapy Adult Consult    Evaluate and treat as clinically indicated.    Reason:  weakness     Speech Language Path Adult Consult    Evaluate and treat as clinically indicated.    Reason:  dysphagia     Advance Diet as Tolerated    Follow this diet upon discharge: Orders Placed This Encounter      Dysphagia Diet Level 2 [facility] Altered Nectar Thickened Liquids (pre-thickened or use instant food thickener) (no straws)       Significant  Results and Procedures   Most Recent 3 CBC's:  Recent Labs   Lab Test 06/08/21  1536 05/25/21  0536 05/24/21  0528   WBC 11.2* 10.1 11.9*   HGB 12.3* 10.4* 10.3*   MCV 92 93 93   * 270 298     Most Recent 3 BMP's:  Recent Labs   Lab Test 06/08/21  1536 05/25/21  0536 05/24/21  0528    134 134   POTASSIUM 4.5 4.0 3.9   CHLORIDE 100 101 99   CO2 26 27 31   BUN 22 18 15   CR 0.95 0.62* 0.68   ANIONGAP 10 6 4   CARLOS 7.5* 7.1* 7.2*   * 121* 93     Most Recent 2 LFT's:  Recent Labs   Lab Test 06/08/21 1536 05/17/21  0611   AST 12* 15   ALT 10 16   ALKPHOS 71 112   BILITOTAL 0.3 0.3     Most Recent 3 INR's:  Recent Labs   Lab Test 05/12/21  1846 11/27/20  2249   INR 1.11 0.96     Most Recent 3 Troponin's:  Recent Labs   Lab Test 05/12/21  1846 05/11/21 2005 11/27/20  2249   TROPI <0.015 <0.015 <0.015     Most Recent D-dimer:  Recent Labs   Lab Test 05/19/21  0554   DD 2.2*     Most Recent 6 Bacteria Isolates From Any Culture (See EPIC Reports for Culture Details):  Recent Labs   Lab Test 09/20/20  1627 09/20/20  1604 01/23/17  1450 01/23/17  1442   CULT No growth after 6 days No growth after 6 days No growth after 6 days No growth after 6 days     Most Recent TSH and T4:No lab results found.  Most Recent Hemoglobin A1c:  Recent Labs   Lab Test 05/11/21  2325   A1C 13.4*     Most Recent 6 glucoses:  Recent Labs   Lab Test 06/08/21  1536 05/25/21  0536 05/24/21  0528 05/23/21  0539 05/22/21  0555 05/21/21  0552   * 121* 93 110* 117* 94     Most Recent Urinalysis:  Recent Labs   Lab Test 05/11/21  2112   COLOR Straw   APPEARANCE Clear   URINEGLC >1000*   URINEBILI Negative   URINEKETONE 40*   SG 1.020   UBLD Negative   URINEPH 5.0   PROTEIN 10*   NITRITE Negative   LEUKEST Negative   RBCU 0   WBCU 1     Most Recent ABG:  Recent Labs   Lab Test 05/11/21  2053 11/10/20  1025   PH 7.21* 7.43   PO2 86 90   PCO2 19* 39   HCO3 8* 26   GIOVANA  --  1.2     Most Recent ESR & CRP:  Recent Labs   Lab Test  05/20/21  1520 01/23/17  1442 01/23/17  1442   SED  --   --  9   CRP 15.1*   < > 5.6    < > = values in this interval not displayed.   ,   Results for orders placed or performed during the hospital encounter of 05/11/21   XR Chest Port 1 View    Narrative    PROCEDURE:  XR CHEST PORT 1 VIEW    HISTORY: fall. .    COMPARISON:  11/28/2020    FINDINGS:    The cardiomediastinal contours are stable.  Advanced pulmonary fibrosis, grossly similar to prior. No effusion or  pneumothorax.      Impression    IMPRESSION:  Chronic pulmonary fibrosis.      JONATAN CARTER MD   CT Head w/o Contrast    Narrative    PROCEDURE: CT HEAD W/O CONTRAST     HISTORY: Head trauma, minor (Age >= 65y).    COMPARISON: None.    TECHNIQUE:  Helical images of the head from the foramen magnum to the  vertex were obtained without contrast.    FINDINGS: The ventricles and sulci are prominent, compatible with  moderate, generalized volume loss. No acute intracranial hemorrhage,  mass effect, midline shift, hydrocephalus or basilar cystern  effacement are present.    The grey-white matter interface is preserved. Scattered  hypoattenuation within the supratentorial subcortical and  periventricular white matter is most compatible with microvascular  ischemic change.     The calvarium is intact. The mastoid air cells are clear.  The  visualized paranasal sinuses are clear.      Impression    IMPRESSION: No acute intracranial hemorrhage.      JONATAN CARTER MD   XR Chest Port 1 View    Narrative    PROCEDURE:  XR CHEST PORT 1 VIEW    HISTORY:  COVID PNEUMONIA.     COMPARISON:  May 11, 2021    FINDINGS:   The cardiac silhouette is normal in size. The pulmonary vasculature is  normal.  There are widespread pulmonary parenchymal opacities that  have worsened as compared to May 11, 2021. No pleural effusion or  pneumothorax.      Impression    IMPRESSION:  Worsening interstitial opacities from May 11, 2021      DOREEN UGALDE MD   CTA Chest with  Contrast    Narrative    PROCEDURE: CTA CHEST WITH CONTRAST 5/13/2021 10:17 AM    HISTORY: PE suspected, high prob short of breath     COMPARISONS: November 2020    Meds/Dose Given: ISOVUE 370 150 mL    TECHNIQUE: CT angiogram of the chest with sagittal and coronal MIPS  reconstructions    FINDINGS: CT angiogram reveals no pulmonary emboli. Atherosclerotic  plaquing is seen in the thoracic aorta. Coronary artery calcifications  are noted. The heart is normal in size. Emphysematous changes are  noted in both lungs. There is bilateral interstitial thickening. There  are confluent opacities in both lungs which have worsened  significantly from previous examination and are suspicious for  pneumonia. There are some enlarged hilar and subcarinal lymph nodes  bilaterally. The esophagus wall is thickened in the upper esophagus.  There is some fluid seen in the more distal esophagus. The upper  portion of the liver appears normal. The pancreas appears normal.  There is irregular enhancement of the spleen which may be due to  timing of the contrast. The upper portion of the pancreas appears  normal.         Impression    IMPRESSION: Opacities in both lungs suspicious for pneumonia this  represents a change from previous examination. Interstitial thickening  is noted. There are enlargements of the hilar and mediastinal lymph  nodes.    Some thickening of the esophageal wall is noted    DOREEN UGALDE MD   CTA Head Neck with Contrast    Narrative    PROCEDURE: CTA  HEAD NECK WITH CONTRAST 5/13/2021 11:48 AM    HISTORY: Neuro deficit, acute, stroke suspected; Worsening confusion,  new swallow deficit    COMPARISONS: None.    Meds/Dose Given: Isovue 370 (100 mL)    TECHNIQUE: CT angiogram of the neck and CT angiogram of the brain with  three-dimensional map reconstructions performed on a separate  workstation    FINDINGS: CT angiogram the neck: There is atherosclerotic plaquing in  the brachiocephalic and proximal right  subclavian arteries. There is  calcific plaquing at the origin of the right vertebral artery. The  right vertebral artery is widely patent to the skull base. The right  common carotid artery is widely patent. There is some mild calcific  plaquing in the right carotid bifurcation. There is some  atherosclerotic plaquing in the proximal right internal carotid artery  with an approximately 45% stenosis noted. There is atherosclerotic  plaquing at the left carotid bifurcation. There is some plaquing of  the proximal internal carotid artery with an approximately 55%  stenosis of the proximal internal carotid artery. There is calcific  plaquing in the left subclavian artery. The left vertebral artery is  widely patent to the skull base.    CT angiogram of the brain: The distal vertebral and basilar arteries  are normal. The superior cerebellar and posterior cerebral arteries  are normal. There is calcific plaquing in both carotid siphons. The  supraclinoid internal carotid arteries are patent. Both anterior  cerebral arteries appear normal. Both middle cerebral arteries are  widely patent.    The muscles of mastication and parapharyngeal spaces are normal. The  salivary glands are normal. The larynx and upper trachea is normal.  Thyroid gland appears normal. There are bilateral pleural effusions  left larger than right and advanced emphysematous changes are noted in  the lung apices. There are some confluent opacities in the left lower  lobe suspicious for pneumonia. The cervical and upper mediastinal  lymph nodes are normal in caliber. There is some wall thickening seen  in the esophagus.         Impression    IMPRESSION: Approximately 50% stenoses noted in both proximal internal  carotid arteries. No intracranial stenoses or occlusions are noted.    DOREEN UGALDE MD   CT Head w/o Contrast    Narrative    PROCEDURE: CT HEAD W/O CONTRAST     HISTORY: Neuro deficit, acute, stroke suspected.    COMPARISON: May 11,      TECHNIQUE:  Helical images of the head from the foramen magnum to the  vertex were obtained without contrast.    FINDINGS: There is some enlargement of the cortical sulci consistent  with atrophy. There is white matter low density in both hemispheres  consistent with small vessel changes. There are no masses or  ventricular shifts or extracerebral collections. Cranial vault is  intact.       The visualized paranasal sinuses are clear.      Impression    IMPRESSION: No change from May 11, 2021      DOREEN UGALDE MD   Echocardiogram Complete    Narrative    [id number]  [id number]  [id number]^SHAYNA^MARELY^R     [facility]  Echocardiography Laboratory  [address] [place] [id number]     Name: THELMA DODSON  MRN: [id number]  : 1940  Study Date: 2021 01:20 PM  Age: 80 yrs  Gender: Male  Patient Location: [facility]  Reason For Study: Heart Failure, Unspecified  Ordering Physician: MARELY CORRAL  Referring Physician: MARELY CORRAL  Performed By: Luana Iqbal RDCS     ______________________________________________________________________________  Procedure  Echocardiogram with two-dimensional, color and spectral Doppler performed.  Technically difficult study.     ______________________________________________________________________________  Interpretation Summary  No pericardial effusion is present.  Borderline (EF 50-55%) reduced left ventricular function is present.  The right ventricle is normal size.  Global right ventricular function is normal.  Both atria appear normal.  Trace mitral insufficiency is present.  Mild aortic valve calcification is present.  The aortic valve is tricuspid.  Trace tricuspid insufficiency is present.     ______________________________________________________________________________  Left Ventricle  Borderline (EF 50-55%) reduced left ventricular function is present. Abnormal  septal motion consistent with left  bundle branch block is present.     Right Ventricle  The right ventricle is normal size. Global right ventricular function is  normal.     Atria  Both atria appear normal.     Mitral Valve  Trace mitral insufficiency is present.     Aortic Valve  Mild aortic valve calcification is present. The aortic valve is tricuspid. The  mean AoV pressure gradient is 1.3 mmHg.     Tricuspid Valve  Trace tricuspid insufficiency is present.     Vessels  Ascending aorta 3.6 cm.     Pericardium  No pericardial effusion is present.  ______________________________________________________________________________  MMode/2D Measurements & Calculations  IVSd: 1.1 cm  LVIDd: 3.6 cm  LVIDs: 2.8 cm  LVPWd: 1.1 cm  FS: 24.3 %  LV mass(C)d: 124.6 grams  Ao root diam: 3.5 cm  asc Aorta Diam: 3.6 cm  LVOT diam: 2.2 cm  LVOT area: 3.9 cm2  RWT: 0.58  TAPSE: 2.2 cm     Time Measurements  Aortic HR: 269.5 BPM  Pulm. HR: 296.4 BPM     Doppler Measurements & Calculations  MV E max ovidio: 47.5 cm/sec  MV A max ovidio: 94.5 cm/sec  MV E/A: 0.50  MV dec slope: 259.1 cm/sec2  MV dec time: 0.18 sec  Ao V2 max: 72.5 cm/sec  Ao max P.1 mmHg  Ao V2 mean: 52.8 cm/sec  Ao mean P.3 mmHg  Ao V2 VTI: 11.7 cm  AFSHAN(I,D): 3.8 cm2  AFSHAN(V,D): 3.9 cm2  LV V1 max P.1 mmHg  LV V1 max: 73.0 cm/sec  LV V1 VTI: 11.6 cm  CO(LVOT): 12.1 l/min  SV(LVOT): 44.9 ml  PA V2 max: 82.4 cm/sec  PA max P.7 mmHg  PA mean P.6 mmHg  PA V2 VTI: 12.4 cm  AV Ovidio Ratio (DI): 1.0  E/E' av.0  Lateral E/e': 9.4  Medial E/e': 8.6     ______________________________________________________________________________  Report approved by: Radha Felipe 2021 03:49 PM               Discharge Medications   Discharge Medication List as of 2021  7:53 AM      START taking these medications    Details   insulin aspart (NOVOLOG PEN) 100 UNIT/ML pen Inject 1-18 Units Subcutaneous 4 times daily (before meals and nightly), Transitional      insulin glargine (LANTUS PEN) 100  UNIT/ML pen Inject 8 Units Subcutaneous At Bedtime, TransitionalIf Lantus is not covered by insurance, may substitute Basaglar at same dose and frequency.        ipratropium-albuterol (COMBIVENT RESPIMAT)  MCG/ACT inhaler Inhale 1 puff into the lungs 4 times daily, Transitional      predniSONE (DELTASONE) 10 MG tablet Take 1 tablet (10 mg) by mouth daily for 3 days, Transitional      tamsulosin (FLOMAX) 0.4 MG capsule Take 1 capsule (0.4 mg) by mouth daily, Transitional         CONTINUE these medications which have NOT CHANGED    Details   acetaminophen (TYLENOL) 325 MG tablet Take 2 tablets (650 mg) by mouth every 4 hours as needed for mild pain, Historical      albuterol (PROAIR HFA/PROVENTIL HFA/VENTOLIN HFA) 108 (90 Base) MCG/ACT inhaler Inhale 2 puffs into the lungs every 6 hours as needed for wheezing , HistoricalPharmacy may dispense brand covered by insurance (Proair, or proventil or ventolin or generic albuterol inhaler)      atorvastatin (LIPITOR) 80 MG tablet Take 40 mg by mouth At Bedtime, Historical      metFORMIN (GLUCOPHAGE) 1000 MG tablet Take 1,000 mg by mouth 2 times daily (with meals) , Historical         STOP taking these medications       empagliflozin (JARDIANCE) 25 MG TABS tablet Comments:   Reason for Stopping:             Allergies   Allergies   Allergen Reactions     Augmentin      Marked as an allergy at the [facility]

## 2021-06-22 ENCOUNTER — MEDICAL CORRESPONDENCE (OUTPATIENT)
Dept: HEALTH INFORMATION MANAGEMENT | Facility: OTHER | Age: 81
End: 2021-06-22

## 2021-06-30 ENCOUNTER — APPOINTMENT (OUTPATIENT)
Dept: CT IMAGING | Facility: OTHER | Age: 81
DRG: 698 | End: 2021-06-30
Attending: FAMILY MEDICINE
Payer: COMMERCIAL

## 2021-06-30 ENCOUNTER — HOSPITAL ENCOUNTER (INPATIENT)
Facility: OTHER | Age: 81
LOS: 3 days | Discharge: NURSING, REHAB OR LONG-TERM CARE | DRG: 698 | End: 2021-07-03
Attending: FAMILY MEDICINE | Admitting: FAMILY MEDICINE
Payer: COMMERCIAL

## 2021-06-30 ENCOUNTER — APPOINTMENT (OUTPATIENT)
Dept: GENERAL RADIOLOGY | Facility: OTHER | Age: 81
End: 2021-06-30
Attending: FAMILY MEDICINE
Payer: MEDICARE

## 2021-06-30 ENCOUNTER — NURSE TRIAGE (OUTPATIENT)
Dept: FAMILY MEDICINE | Facility: OTHER | Age: 81
End: 2021-06-30

## 2021-06-30 DIAGNOSIS — Z79.4 TYPE 2 DIABETES MELLITUS WITHOUT COMPLICATION, WITH LONG-TERM CURRENT USE OF INSULIN (H): ICD-10-CM

## 2021-06-30 DIAGNOSIS — A41.9 UNSPECIFIED SEPTICEMIA(038.9) (H): ICD-10-CM

## 2021-06-30 DIAGNOSIS — I10 ESSENTIAL HYPERTENSION, MALIGNANT: ICD-10-CM

## 2021-06-30 DIAGNOSIS — E78.5 HYPERLIPIDEMIA, UNSPECIFIED HYPERLIPIDEMIA TYPE: ICD-10-CM

## 2021-06-30 DIAGNOSIS — I25.2 OLD MYOCARDIAL INFARCTION: ICD-10-CM

## 2021-06-30 DIAGNOSIS — J43.2 CENTRILOBULAR EMPHYSEMA (H): Chronic | ICD-10-CM

## 2021-06-30 DIAGNOSIS — Z87.891 PERSONAL HISTORY OF TOBACCO USE, PRESENTING HAZARDS TO HEALTH: ICD-10-CM

## 2021-06-30 DIAGNOSIS — J44.9 CHRONIC OBSTRUCTIVE PULMONARY DISEASE, UNSPECIFIED COPD TYPE (H): ICD-10-CM

## 2021-06-30 DIAGNOSIS — Z79.899 ENCOUNTER FOR LONG-TERM (CURRENT) USE OF MEDICATIONS: ICD-10-CM

## 2021-06-30 DIAGNOSIS — J18.9 MULTIFOCAL PNEUMONIA: ICD-10-CM

## 2021-06-30 DIAGNOSIS — E11.9 TYPE 2 DIABETES MELLITUS WITHOUT COMPLICATION, WITH LONG-TERM CURRENT USE OF INSULIN (H): ICD-10-CM

## 2021-06-30 DIAGNOSIS — Z79.51 LONG TERM CURRENT USE OF INHALED STEROID: ICD-10-CM

## 2021-06-30 DIAGNOSIS — A41.89 SEPSIS DUE TO OTHER ETIOLOGY (H): ICD-10-CM

## 2021-06-30 DIAGNOSIS — I10 ESSENTIAL HYPERTENSION: Primary | ICD-10-CM

## 2021-06-30 DIAGNOSIS — J18.9 UNRESOLVED PNEUMONIA: ICD-10-CM

## 2021-06-30 LAB
ALBUMIN SERPL-MCNC: 3.6 G/DL (ref 3.5–5.7)
ALP SERPL-CCNC: 62 U/L (ref 34–104)
ALT SERPL W P-5'-P-CCNC: 10 U/L (ref 7–52)
ANION GAP SERPL CALCULATED.3IONS-SCNC: 14 MMOL/L (ref 3–14)
APTT PPP: 24 SEC (ref 22–37)
AST SERPL W P-5'-P-CCNC: 13 U/L (ref 13–39)
BASOPHILS # BLD AUTO: 0 10E9/L (ref 0–0.2)
BASOPHILS NFR BLD AUTO: 0.2 %
BILIRUB SERPL-MCNC: 0.5 MG/DL (ref 0.3–1)
BUN SERPL-MCNC: 22 MG/DL (ref 7–25)
CALCIUM SERPL-MCNC: 8.5 MG/DL (ref 8.6–10.3)
CHLORIDE SERPL-SCNC: 98 MMOL/L (ref 98–107)
CO2 SERPL-SCNC: 19 MMOL/L (ref 21–31)
CREAT SERPL-MCNC: 0.8 MG/DL (ref 0.7–1.3)
CREAT SERPL-MCNC: 0.87 MG/DL (ref 0.7–1.3)
D DIMER PPP FEU-MCNC: 9.9 UG/ML FEU (ref 0–0.5)
DIFFERENTIAL METHOD BLD: ABNORMAL
EOSINOPHIL # BLD AUTO: 0 10E9/L (ref 0–0.7)
EOSINOPHIL NFR BLD AUTO: 0.1 %
ERYTHROCYTE [DISTWIDTH] IN BLOOD BY AUTOMATED COUNT: 13.4 % (ref 10–15)
FLUAV RNA RESP QL NAA+PROBE: NEGATIVE
FLUBV RNA RESP QL NAA+PROBE: NEGATIVE
GFR SERPL CREATININE-BSD FRML MDRD: 85 ML/MIN/{1.73_M2}
GFR SERPL CREATININE-BSD FRML MDRD: >90 ML/MIN/{1.73_M2}
GLUCOSE SERPL-MCNC: 252 MG/DL (ref 70–105)
GRAM STN SPEC: ABNORMAL
HCO3 BLDV-SCNC: 21 MMOL/L (ref 21–28)
HCT VFR BLD AUTO: 36.7 % (ref 40–53)
HGB BLD-MCNC: 12.1 G/DL (ref 13.3–17.7)
IMM GRANULOCYTES # BLD: 0.2 10E9/L (ref 0–0.4)
IMM GRANULOCYTES NFR BLD: 1 %
INR PPP: 0.98 (ref 0.86–1.14)
INTERPRETATION ECG - MUSE: NORMAL
LABORATORY COMMENT REPORT: NORMAL
LACTATE BLD-SCNC: 1.7 MMOL/L (ref 0.7–2)
LACTATE BLD-SCNC: 4.1 MMOL/L (ref 0.7–2)
LACTATE BLD-SCNC: 5.7 MMOL/L (ref 0.7–2)
LYMPHOCYTES # BLD AUTO: 0.8 10E9/L (ref 0.8–5.3)
LYMPHOCYTES NFR BLD AUTO: 5 %
MCH RBC QN AUTO: 30.1 PG (ref 26.5–33)
MCHC RBC AUTO-ENTMCNC: 33 G/DL (ref 31.5–36.5)
MCV RBC AUTO: 91 FL (ref 78–100)
MONOCYTES # BLD AUTO: 0.4 10E9/L (ref 0–1.3)
MONOCYTES NFR BLD AUTO: 2.5 %
NEUTROPHILS # BLD AUTO: 15.2 10E9/L (ref 1.6–8.3)
NEUTROPHILS NFR BLD AUTO: 91.2 %
O2/TOTAL GAS SETTING VFR VENT: ABNORMAL %
OXYHGB MFR BLDV: 86 %
PCO2 BLDV: 29 MM HG (ref 40–50)
PH BLDV: 7.46 PH (ref 7.32–7.43)
PLATELET # BLD AUTO: 337 10E9/L (ref 150–450)
PO2 BLDV: 51 MM HG (ref 25–47)
POTASSIUM SERPL-SCNC: 3.9 MMOL/L (ref 3.5–5.1)
POTASSIUM SERPL-SCNC: 4.1 MMOL/L (ref 3.5–5.1)
PROCALCITONIN SERPL-MCNC: 1.59 NG/ML
PROT SERPL-MCNC: 7.4 G/DL (ref 6.4–8.9)
RBC # BLD AUTO: 4.02 10E12/L (ref 4.4–5.9)
RSV RNA SPEC NAA+PROBE: NEGATIVE
SARS-COV-2 RNA RESP QL NAA+PROBE: NEGATIVE
SODIUM SERPL-SCNC: 131 MMOL/L (ref 134–144)
SPECIMEN SOURCE: ABNORMAL
SPECIMEN SOURCE: NORMAL
SPECIMEN SOURCE: NORMAL
WBC # BLD AUTO: 16.7 10E9/L (ref 4–11)

## 2021-06-30 PROCEDURE — 250N000011 HC RX IP 250 OP 636: Performed by: FAMILY MEDICINE

## 2021-06-30 PROCEDURE — 250N000009 HC RX 250: Performed by: FAMILY MEDICINE

## 2021-06-30 PROCEDURE — 93005 ELECTROCARDIOGRAM TRACING: CPT | Performed by: FAMILY MEDICINE

## 2021-06-30 PROCEDURE — 87070 CULTURE OTHR SPECIMN AEROBIC: CPT | Performed by: FAMILY MEDICINE

## 2021-06-30 PROCEDURE — 999N000157 HC STATISTIC RCP TIME EA 10 MIN

## 2021-06-30 PROCEDURE — 96365 THER/PROPH/DIAG IV INF INIT: CPT | Performed by: FAMILY MEDICINE

## 2021-06-30 PROCEDURE — 85610 PROTHROMBIN TIME: CPT | Performed by: FAMILY MEDICINE

## 2021-06-30 PROCEDURE — 258N000003 HC RX IP 258 OP 636: Performed by: FAMILY MEDICINE

## 2021-06-30 PROCEDURE — 93010 ELECTROCARDIOGRAM REPORT: CPT | Performed by: INTERNAL MEDICINE

## 2021-06-30 PROCEDURE — 36415 COLL VENOUS BLD VENIPUNCTURE: CPT | Performed by: FAMILY MEDICINE

## 2021-06-30 PROCEDURE — 82565 ASSAY OF CREATININE: CPT | Performed by: FAMILY MEDICINE

## 2021-06-30 PROCEDURE — 99285 EMERGENCY DEPT VISIT HI MDM: CPT | Performed by: FAMILY MEDICINE

## 2021-06-30 PROCEDURE — 87205 SMEAR GRAM STAIN: CPT | Performed by: FAMILY MEDICINE

## 2021-06-30 PROCEDURE — 99285 EMERGENCY DEPT VISIT HI MDM: CPT | Mod: 25 | Performed by: FAMILY MEDICINE

## 2021-06-30 PROCEDURE — 87040 BLOOD CULTURE FOR BACTERIA: CPT | Mod: 91 | Performed by: FAMILY MEDICINE

## 2021-06-30 PROCEDURE — 82805 BLOOD GASES W/O2 SATURATION: CPT | Performed by: FAMILY MEDICINE

## 2021-06-30 PROCEDURE — 80053 COMPREHEN METABOLIC PANEL: CPT | Performed by: FAMILY MEDICINE

## 2021-06-30 PROCEDURE — 250N000012 HC RX MED GY IP 250 OP 636 PS 637: Performed by: FAMILY MEDICINE

## 2021-06-30 PROCEDURE — 83605 ASSAY OF LACTIC ACID: CPT | Performed by: FAMILY MEDICINE

## 2021-06-30 PROCEDURE — 96375 TX/PRO/DX INJ NEW DRUG ADDON: CPT | Performed by: FAMILY MEDICINE

## 2021-06-30 PROCEDURE — U0003 INFECTIOUS AGENT DETECTION BY NUCLEIC ACID (DNA OR RNA); SEVERE ACUTE RESPIRATORY SYNDROME CORONAVIRUS 2 (SARS-COV-2) (CORONAVIRUS DISEASE [COVID-19]), AMPLIFIED PROBE TECHNIQUE, MAKING USE OF HIGH THROUGHPUT TECHNOLOGIES AS DESCRIBED BY CMS-2020-01-R: HCPCS | Performed by: FAMILY MEDICINE

## 2021-06-30 PROCEDURE — 71045 X-RAY EXAM CHEST 1 VIEW: CPT

## 2021-06-30 PROCEDURE — 120N000001 HC R&B MED SURG/OB

## 2021-06-30 PROCEDURE — 85379 FIBRIN DEGRADATION QUANT: CPT | Performed by: FAMILY MEDICINE

## 2021-06-30 PROCEDURE — 87631 RESP VIRUS 3-5 TARGETS: CPT | Performed by: FAMILY MEDICINE

## 2021-06-30 PROCEDURE — U0005 INFEC AGEN DETEC AMPLI PROBE: HCPCS | Performed by: FAMILY MEDICINE

## 2021-06-30 PROCEDURE — 87077 CULTURE AEROBIC IDENTIFY: CPT | Performed by: FAMILY MEDICINE

## 2021-06-30 PROCEDURE — 250N000013 HC RX MED GY IP 250 OP 250 PS 637: Performed by: FAMILY MEDICINE

## 2021-06-30 PROCEDURE — 85730 THROMBOPLASTIN TIME PARTIAL: CPT | Performed by: FAMILY MEDICINE

## 2021-06-30 PROCEDURE — 71275 CT ANGIOGRAPHY CHEST: CPT

## 2021-06-30 PROCEDURE — 94640 AIRWAY INHALATION TREATMENT: CPT

## 2021-06-30 PROCEDURE — 85025 COMPLETE CBC W/AUTO DIFF WBC: CPT | Performed by: FAMILY MEDICINE

## 2021-06-30 PROCEDURE — 84145 PROCALCITONIN (PCT): CPT | Performed by: FAMILY MEDICINE

## 2021-06-30 PROCEDURE — 84132 ASSAY OF SERUM POTASSIUM: CPT | Performed by: FAMILY MEDICINE

## 2021-06-30 PROCEDURE — 99223 1ST HOSP IP/OBS HIGH 75: CPT | Mod: AI | Performed by: FAMILY MEDICINE

## 2021-06-30 RX ORDER — ATORVASTATIN CALCIUM 40 MG/1
40 TABLET, FILM COATED ORAL AT BEDTIME
Status: DISCONTINUED | OUTPATIENT
Start: 2021-06-30 | End: 2021-07-03 | Stop reason: HOSPADM

## 2021-06-30 RX ORDER — AZITHROMYCIN 500 MG/5ML
500 INJECTION, POWDER, LYOPHILIZED, FOR SOLUTION INTRAVENOUS ONCE
Status: COMPLETED | OUTPATIENT
Start: 2021-06-30 | End: 2021-06-30

## 2021-06-30 RX ORDER — ONDANSETRON 4 MG/1
4 TABLET, ORALLY DISINTEGRATING ORAL EVERY 6 HOURS PRN
Status: DISCONTINUED | OUTPATIENT
Start: 2021-06-30 | End: 2021-07-03 | Stop reason: HOSPADM

## 2021-06-30 RX ORDER — ACETAMINOPHEN 325 MG/1
650 TABLET ORAL EVERY 4 HOURS PRN
Status: DISCONTINUED | OUTPATIENT
Start: 2021-06-30 | End: 2021-07-03 | Stop reason: HOSPADM

## 2021-06-30 RX ORDER — ACETAMINOPHEN 325 MG/1
650 TABLET ORAL ONCE
Status: COMPLETED | OUTPATIENT
Start: 2021-06-30 | End: 2021-06-30

## 2021-06-30 RX ORDER — INSULIN GLARGINE 100 [IU]/ML
8 INJECTION, SOLUTION SUBCUTANEOUS AT BEDTIME
Status: DISCONTINUED | OUTPATIENT
Start: 2021-06-30 | End: 2021-07-03 | Stop reason: HOSPADM

## 2021-06-30 RX ORDER — ONDANSETRON 2 MG/ML
4 INJECTION INTRAMUSCULAR; INTRAVENOUS EVERY 6 HOURS PRN
Status: DISCONTINUED | OUTPATIENT
Start: 2021-06-30 | End: 2021-07-03 | Stop reason: HOSPADM

## 2021-06-30 RX ORDER — NICOTINE POLACRILEX 4 MG
15-30 LOZENGE BUCCAL
Status: DISCONTINUED | OUTPATIENT
Start: 2021-06-30 | End: 2021-07-03 | Stop reason: HOSPADM

## 2021-06-30 RX ORDER — ACETAMINOPHEN 650 MG/1
650 SUPPOSITORY RECTAL EVERY 4 HOURS PRN
Status: DISCONTINUED | OUTPATIENT
Start: 2021-06-30 | End: 2021-07-03 | Stop reason: HOSPADM

## 2021-06-30 RX ORDER — IOPAMIDOL 755 MG/ML
65 INJECTION, SOLUTION INTRAVASCULAR ONCE
Status: COMPLETED | OUTPATIENT
Start: 2021-06-30 | End: 2021-06-30

## 2021-06-30 RX ORDER — IPRATROPIUM BROMIDE AND ALBUTEROL SULFATE 2.5; .5 MG/3ML; MG/3ML
3 SOLUTION RESPIRATORY (INHALATION)
Status: DISCONTINUED | OUTPATIENT
Start: 2021-06-30 | End: 2021-07-03 | Stop reason: HOSPADM

## 2021-06-30 RX ORDER — TAMSULOSIN HYDROCHLORIDE 0.4 MG/1
0.4 CAPSULE ORAL AT BEDTIME
Status: DISCONTINUED | OUTPATIENT
Start: 2021-06-30 | End: 2021-07-03 | Stop reason: HOSPADM

## 2021-06-30 RX ORDER — LIDOCAINE 40 MG/G
CREAM TOPICAL
Status: DISCONTINUED | OUTPATIENT
Start: 2021-06-30 | End: 2021-07-03 | Stop reason: HOSPADM

## 2021-06-30 RX ORDER — DEXTROSE MONOHYDRATE 25 G/50ML
25-50 INJECTION, SOLUTION INTRAVENOUS
Status: DISCONTINUED | OUTPATIENT
Start: 2021-06-30 | End: 2021-07-03 | Stop reason: HOSPADM

## 2021-06-30 RX ORDER — METHYLPREDNISOLONE SODIUM SUCCINATE 40 MG/ML
40 INJECTION, POWDER, LYOPHILIZED, FOR SOLUTION INTRAMUSCULAR; INTRAVENOUS ONCE
Status: COMPLETED | OUTPATIENT
Start: 2021-06-30 | End: 2021-06-30

## 2021-06-30 RX ORDER — ALBUTEROL SULFATE 0.83 MG/ML
3 SOLUTION RESPIRATORY (INHALATION)
Status: DISCONTINUED | OUTPATIENT
Start: 2021-06-30 | End: 2021-07-03 | Stop reason: HOSPADM

## 2021-06-30 RX ORDER — CEFEPIME HYDROCHLORIDE 2 G/1
2 INJECTION, POWDER, FOR SOLUTION INTRAVENOUS ONCE
Status: COMPLETED | OUTPATIENT
Start: 2021-06-30 | End: 2021-06-30

## 2021-06-30 RX ORDER — SODIUM CHLORIDE 9 MG/ML
INJECTION, SOLUTION INTRAVENOUS CONTINUOUS
Status: DISCONTINUED | OUTPATIENT
Start: 2021-06-30 | End: 2021-07-03

## 2021-06-30 RX ORDER — SODIUM CHLORIDE 9 MG/ML
INJECTION, SOLUTION INTRAVENOUS CONTINUOUS
Status: DISCONTINUED | OUTPATIENT
Start: 2021-06-30 | End: 2021-06-30 | Stop reason: DRUGHIGH

## 2021-06-30 RX ORDER — INSULIN ASPART 100 [IU]/ML
INJECTION, SOLUTION INTRAVENOUS; SUBCUTANEOUS
COMMUNITY
End: 2021-09-10

## 2021-06-30 RX ORDER — CEFEPIME HYDROCHLORIDE 2 G/1
2 INJECTION, POWDER, FOR SOLUTION INTRAVENOUS EVERY 8 HOURS
Status: DISCONTINUED | OUTPATIENT
Start: 2021-06-30 | End: 2021-06-30

## 2021-06-30 RX ORDER — METHYLPREDNISOLONE SODIUM SUCCINATE 40 MG/ML
40 INJECTION, POWDER, LYOPHILIZED, FOR SOLUTION INTRAMUSCULAR; INTRAVENOUS EVERY 24 HOURS
Status: DISCONTINUED | OUTPATIENT
Start: 2021-07-01 | End: 2021-07-02

## 2021-06-30 RX ADMIN — CEFEPIME HYDROCHLORIDE 2 G: 2 INJECTION, POWDER, FOR SOLUTION INTRAVENOUS at 18:20

## 2021-06-30 RX ADMIN — TAMSULOSIN HYDROCHLORIDE 0.4 MG: 0.4 CAPSULE ORAL at 21:57

## 2021-06-30 RX ADMIN — METRONIDAZOLE 500 MG: 500 INJECTION, SOLUTION INTRAVENOUS at 23:32

## 2021-06-30 RX ADMIN — AZITHROMYCIN MONOHYDRATE 500 MG: 500 INJECTION, POWDER, LYOPHILIZED, FOR SOLUTION INTRAVENOUS at 11:14

## 2021-06-30 RX ADMIN — CEFEPIME HYDROCHLORIDE 2 G: 2 INJECTION, POWDER, FOR SOLUTION INTRAVENOUS at 10:38

## 2021-06-30 RX ADMIN — SODIUM CHLORIDE 1647 ML: 9 INJECTION, SOLUTION INTRAVENOUS at 10:28

## 2021-06-30 RX ADMIN — ATORVASTATIN CALCIUM 40 MG: 40 TABLET, FILM COATED ORAL at 21:57

## 2021-06-30 RX ADMIN — SODIUM CHLORIDE 1000 ML: 9 INJECTION, SOLUTION INTRAVENOUS at 10:06

## 2021-06-30 RX ADMIN — SODIUM CHLORIDE: 9 INJECTION, SOLUTION INTRAVENOUS at 14:00

## 2021-06-30 RX ADMIN — METHYLPREDNISOLONE SODIUM SUCCINATE 40 MG: 40 INJECTION, POWDER, FOR SOLUTION INTRAMUSCULAR; INTRAVENOUS at 10:36

## 2021-06-30 RX ADMIN — IPRATROPIUM BROMIDE AND ALBUTEROL SULFATE 3 ML: .5; 3 SOLUTION RESPIRATORY (INHALATION) at 19:05

## 2021-06-30 RX ADMIN — IOPAMIDOL 65 ML: 755 INJECTION, SOLUTION INTRAVENOUS at 14:25

## 2021-06-30 RX ADMIN — ACETAMINOPHEN 650 MG: 325 TABLET, FILM COATED ORAL at 09:48

## 2021-06-30 RX ADMIN — INSULIN GLARGINE 8 UNITS: 100 INJECTION, SOLUTION SUBCUTANEOUS at 21:57

## 2021-06-30 RX ADMIN — ENOXAPARIN SODIUM 40 MG: 100 INJECTION SUBCUTANEOUS at 16:33

## 2021-06-30 ASSESSMENT — ACTIVITIES OF DAILY LIVING (ADL)
DRESSING/BATHING_DIFFICULTY: NO
WHICH_OF_THE_ABOVE_FUNCTIONAL_RISKS_HAD_A_RECENT_ONSET_OR_CHANGE?: SWALLOWING;EATING
CONCENTRATING,_REMEMBERING_OR_MAKING_DECISIONS_DIFFICULTY: NO
TOILETING_ISSUES: OTHER (SEE COMMENTS)
HEARING_DIFFICULTY_OR_DEAF: NO
WALKING_OR_CLIMBING_STAIRS_DIFFICULTY: NO
FALL_HISTORY_WITHIN_LAST_SIX_MONTHS: NO
DIFFICULTY_COMMUNICATING: NO
ADLS_ACUITY_SCORE: 13
ADLS_ACUITY_SCORE: 13
WEAR_GLASSES_OR_BLIND: NO
DIFFICULTY_EATING/SWALLOWING: OTHER (SEE COMMENTS)
DOING_ERRANDS_INDEPENDENTLY_DIFFICULTY: NO

## 2021-06-30 ASSESSMENT — ENCOUNTER SYMPTOMS
GASTROINTESTINAL NEGATIVE: 1
ABDOMINAL PAIN: 0
FEVER: 1
EYES NEGATIVE: 1
SHORTNESS OF BREATH: 1
FATIGUE: 1

## 2021-06-30 NOTE — ED TRIAGE NOTES
ED Nursing Triage Note (General)   ________________________________    Neel Kerr is a 80 year old Male that presents to triage ambulance  With history of SOB that started this morning after breakfast, pt has a hx of aspiration. BS was 235 at nursing home.   /70   Pulse 142   Temp 102.6  F (39.2  C) (Temporal)   Resp 20   Wt 54.9 kg (121 lb 1.6 oz)   SpO2 91%   BMI 18.97 kg/m  t  Patient appears alert , in mild distress., and cooperative behavior.    GCS Total = 15  Airway: intact  Breathing noted as Normal  Circulation Normal  Skin:  Normal      PRE [facility] SITU[facility] Nursing Home

## 2021-06-30 NOTE — H&P
[facility]    History and Physical - Hospitalist Service       Date of Admission:  6/30/2021    Assessment & Plan      Neel Kerr is a 80 year old male admitted on 6/30/2021. He presented to the ED from [facility] after an episode of emesis after breakfast and then developed hypoxia and fever.    He was found in the emergency department to have a focal pneumonia.  He will be admitted and treated with IV antibiotics including coverage for aspiration given history and exam.    Patient demonstrated signs of severe sepsis on admit with temp 102.6, pulse 142, SPO2 of 91% and a lactate of 5.7.    Principal Problem:    Multifocal pneumonia    Assessment: Covid is now negative and patient previously diagnosed with Covid pneumonia.  Likely HCAP versus aspiration.  Currently improving rapidly despite evidence of severe sepsis with lactate of 5.7 on admit.    Plan: Continue fluid resuscitation.  Add Flagyl to previously ordered cefepime and azithromycin.  Albuterol nebs every 2 hours as needed, DuoNeb scheduled.  Active Problems:    Hyponatremia    Assessment: Level currently 131, in setting of elevated glucose at 252.  Patient with mild acidemia likely due to sepsis.  Patient is being treated with normal saline does not appear to have any symptoms of hyponatremia.    Plan: Fluid resuscitation, recheck in a.m.    Centrilobular emphysema (H)    Assessment: Chronic.  Currently no wheezing.    Plan: Continue home medications.  Suggest pulmonary follow-up on discharge given CT findings.  Severe sepsis (H)    Assessment: Lactate improved on recheck    Plan: Continue cefepime, azithromycin and Flagyl.  Continue fluid bolus and recheck lactate.    Type 2 diabetes mellitus, with long-term current use of insulin (H)    Assessment: Blood sugar mildly elevated.    Plan: Will hold long-acting insulin as he will be n.p.o. until speech evaluation.  Use NovoLog SSI.         Diet: NPO for Medical/Clinical Reasons  "Except for: Meds    DVT Prophylaxis: Enoxaparin (Lovenox) SQ  Mata Catheter: Not present  Central Lines: None  Code Status: Full Code      Risk Factors Present on Admission         # Hyponatremia: Na = 131 mmol/L (Ref range: 134 - 144 mmol/L) on admission, will monitor as appropriate           Disposition Plan   Expected discharge: 2 - 3 days, recommended to prior living arrangement once antibiotic plan established and SIRS/Sepsis treated.     The patient's care was discussed with the Patient.    Noe Thurston MD  [facility]  Securely message with the Vocera Web Console (learn more here)  Text page via [facility] Paging/Directory      ______________________________________________________________________    Chief Complaint   Fever, hypoxia    History obtained from patient and skilled nursing facility    History of Present Illness   Neel Kerr is a 80 year old male who seem to be in his usual state of health this morning.  He ate breakfast and promptly vomited.  Patient then developed hypoxia, tachycardia and fever.    Also staff noted this morning he was having hematuria and then no urine output.  30 ml flush performed and patient put out 600 mL.    Patient tells me that he feels okay.  He remembers that he is from [facility] and lives there and recently moved to [facility] after having Covid in May.  Tells me that he lives alone.  Seems like a fairly reliable historian but does not remember much about the events of this morning at present.    Currently denies any pain.  Denies any shortness of breath.  Tells me that he feels \"okay\" and has no concerns.    Review of Systems    Review of systems not obtained due to patient factors - confusion    Past Medical History    I have reviewed this patient's medical history and updated it with pertinent information if needed.   Past Medical History:   Diagnosis Date     Essential (primary) hypertension     No Comments Provided     Hyperlipidemia     No " Comments Provided     Non-ST elevation (NSTEMI) myocardial infarction (H)     2017,[facility] PCI with stent circumflex     Old myocardial infarction     2015,[facility]  PCI with stent     Polyneuropathy     No Comments Provided     Type 2 diabetes mellitus without complications (H)     No Comments Provided       Past Surgical History   Patient had drug-eluting stent placed to circumflex artery in  after NSTEMI.    Social History   I have reviewed this patient's social history and updated it with pertinent information if needed.  Social History     Tobacco Use     Smoking status: Former Smoker     Quit date: 1992     Years since quittin.5     Smokeless tobacco: Never Used   Substance Use Topics     Alcohol use: No     Comment: Alcoholic Drinks/day: quit drinking      Drug use: No       Family History   Family history noncontributory to current illness.    Prior to Admission Medications   Prior to Admission Medications   Prescriptions Last Dose Informant Patient Reported? Taking?   acetaminophen (TYLENOL) 325 MG tablet Unknown at Unknown time  Yes No   Sig: Take 2 tablets (650 mg) by mouth every 4 hours as needed for mild pain   albuterol (PROAIR HFA/PROVENTIL HFA/VENTOLIN HFA) 108 (90 Base) MCG/ACT inhaler Unknown at Unknown time  Yes No   Sig: Inhale 2 puffs into the lungs every 6 hours as needed for wheezing    atorvastatin (LIPITOR) 80 MG tablet 2021 at HS  Yes Yes   Sig: Take 40 mg by mouth At Bedtime   insulin aspart (NOVOLOG FLEXPEN) 100 UNIT/ML pen 2021 at AM  Yes Yes   Sig: Inject as per sliding scale:  0-139 = 0 units, 140-154 = 1 unit, 155-164 = 3 units, 165-179 = 5 units, 180-199 = 6 units, 200-229 = 7 units, 230-259 = 9 units, 260-279 = 10 units, 280-299 = 11 units, 300-329 = 13 units, 330-359 = 14 units, 360-389 = 15 units, 390-419 = 16 units, 420-999 = 18 units and notify PCP.   insulin glargine (LANTUS PEN) 100 UNIT/ML pen 2021 at HS  No Yes   Sig:  Inject 8 Units Subcutaneous At Bedtime   ipratropium-albuterol (COMBIVENT RESPIMAT)  MCG/ACT inhaler 6/30/2021 at AM  No No   Sig: Inhale 1 puff into the lungs 4 times daily   metFORMIN (GLUCOPHAGE) 1000 MG tablet 6/30/2021 at AM  Yes Yes   Sig: Take 1,000 mg by mouth 2 times daily (with meals)    tamsulosin (FLOMAX) 0.4 MG capsule 6/29/2021 at HS  No Yes   Sig: Take 1 capsule (0.4 mg) by mouth daily      Facility-Administered Medications: None     Allergies   Allergies   Allergen Reactions     Augmentin      Marked as an allergy at the [facility]       Physical Exam   Vital Signs: Temp: 98.3  F (36.8  C) Temp src: Temporal BP: 107/67 Pulse: 104   Resp: 20 SpO2: 91 % O2 Device: Nasal cannula Oxygen Delivery: 2 LPM  Weight: 125 lbs .01 oz    Constitutional: awake, alert, cooperative, no apparent distress, and appears stated age  Eyes: Lids and lashes normal, pupils equal, round and reactive to light, extra ocular muscles intact, sclera clear, conjunctiva normal  ENT: Normocephalic, without obvious abnormality, atraumatic, sinuses nontender on palpation, external ears without lesions, oral pharynx with moist mucous membranes, tonsils without erythema or exudates, gums normal and good dentition.  Hematologic / Lymphatic: No lymphadenopathy  Respiratory: Rhonchi right greater than left.  No focal consolidation.  Currently no increased work of breathing.  Cardiovascular: Regular rate and rhythm.  No murmurs rubs or gallops.  GI: Abdomen soft, nontender  Genitounirinary: No suprapubic or CVA tenderness, catheter in place  Skin: No abnormal rashes or skin lesions  Musculoskeletal: No synovitis.  Muscle strength age and body habitus appropriateas well as equal and even.   Neurologic: Awake, alert, oriented to name and location, intact for remote information but some confusion about this mornings events.  Cranial nerves II-XII are grossly intact.  Motor is 5 out of 5 bilaterally.  Cerebellar finger to nose, heel to shin  intact.  Sensory is intact.        Data   Data reviewed today: I reviewed all medications, new labs and imaging results over the last 24 hours. I personally reviewed the chest x-ray image(s) showing Multi-focal pneumonia and the chest CT image(s) showing No evidence of pulmonary embolus.  Pneumonia again demonstrated, appears bilateral and lower lobes.  Also extensive emphysema.    Recent Labs   Lab 06/30/21  1107 06/30/21  1005   WBC  --  16.7*   HGB  --  12.1*   MCV  --  91   PLT  --  337   INR  --  0.98   NA  --  131*   POTASSIUM 3.9 4.1   CHLORIDE  --  98   CO2  --  19*   BUN  --  22   CR 0.80 0.87   ANIONGAP  --  14   CARLOS  --  8.5*   GLC  --  252*   ALBUMIN  --  3.6   PROTTOTAL  --  7.4   BILITOTAL  --  0.5   ALKPHOS  --  62   ALT  --  10   AST  --  13     Recent Results (from the past 24 hour(s))   XR Chest Port 1 View    Narrative    PROCEDURE:  XR CHEST PORT 1 VIEW    HISTORY: Fever. .    COMPARISON:  5/13/2021    FINDINGS:    The cardiomediastinal contours are stable. There is calcific aortic  atherosclerosis.   Recurrent or residual multifocal infiltrates are present, superimposed  upon emphysema, basilar predominant. No pneumothorax or substantial  effusion is present.      Impression    IMPRESSION:  Findings suspicious for recurrent multifocal pneumonia  superimposed on emphysema. Aspiration is also in the differential.  Recommend follow-up to resolution.      JONATAN CARTER MD          SYSTEM ID:  [id number]   CT Chest Pulmonary Embolism w Contrast    Narrative    PROCEDURE:  CT CHEST PULMONARY EMBOLISM W CONTRAST.    HISTORY:  Evaluate for pulmonary embolism.  PE suspected,  low/intermediate prob, positive D-dimer    TECHNIQUE:  Initial pre-contrast  and localizer images were  obtained.  Contrast enhanced helical CT pulmonary angiography was then  performed.  Routine transaxial and post-processed (multiplanar and/or  MIP) reformations were obtained.    COMPARISON:  5/13/2021    MEDS/CONTRAST:  65 ml isovue 370    PULMONARY CTA FINDINGS:  This is a diagnostic quality helical CT  pulmonary angiogram.  There is no acute pulmonary embolism to the  first subsegmental pulmonary artery level.    OTHER FINDINGS:      Cardiac size is stable. Multiple enlarged thoracic lymph nodes are  similar in appearance to the prior. The largest is a right infrahilar  node spanning up to 2.0 x 2.1 cm.    Limited views of the upper abdomen reveal no adrenal mass or acute  process. Debris is present in the thoracic esophagus.    There is moderate to advanced centrilobular emphysema. There is  confluent consolidative opacity superimposed upon emphysema in the  lower lobes, similar in distribution to the prior. Patchy groundglass  opacity is present in the left upper lobe, similar to the immediate  prior and new when compared to more remote priors.    No suspicious osseous lesion is identified.      Impression    IMPRESSION:    No acute pulmonary emboli to the subsegmental level.    Fairly extensive lower lobe airspace consolidation superimposed upon  moderate to severe centrilobular emphysema. Mildly enlarged  mediastinal and right greater than left hilar nodes. The appearance is  similar to the immediate prior.    Differential considerations include recurrent aspiration, unresolved  pneumonia or less likely lymphangitic carcinomatosis. Consider  pulmonology consultation.     JONATAN CARTER MD          SYSTEM ID:  [id number]

## 2021-06-30 NOTE — ED PROVIDER NOTES
History     Chief Complaint   Patient presents with     Shortness of Breath     Fever     HPI  Neel Kerr is a 80 year old male arriving via EMS from [facility] who was apparently in his usual state of health and then after breakfast developed fever and hypoxia tachypnea.  He has a history of aspiration apparently.  He denies aspiration at this time.  He did have Covid in March.    Allergies:  Allergies   Allergen Reactions     Augmentin      Marked as an allergy at the [facility]       Problem List:    Patient Active Problem List    Diagnosis Date Noted     Sepsis due to other etiology (H) 06/30/2021     Priority: Medium     Multifocal pneumonia 06/30/2021     Priority: Medium     Centrilobular emphysema (H) 05/18/2021     Priority: Medium     COPD (chronic obstructive pulmonary disease) (H) 05/18/2021     Priority: Medium     Oropharyngeal dysphagia 05/13/2021     Priority: Medium     Hyperkalemia 05/12/2021     Priority: Medium     Polycystic kidney disease 05/12/2021     Priority: Medium     Diabetic ketoacidosis without coma associated with type 2 diabetes mellitus (H) 05/11/2021     Priority: Medium     Metabolic acidosis 05/11/2021     Priority: Medium     Hypoxia 05/11/2021     Priority: Medium     Fall 05/11/2021     Priority: Medium     Acute on chronic renal failure (H) 05/11/2021     Priority: Medium     Hyponatremia 09/21/2020     Priority: Medium     Acute on chronic respiratory failure with hypoxia (H) 09/21/2020     Priority: Medium     Pneumonia due to 2019 novel coronavirus 09/21/2020     Priority: Medium     CAP (community acquired pneumonia) 09/20/2020     Priority: Medium     Psoriasis 02/01/2018     Priority: Medium     Non-ST elevation (NSTEMI) myocardial infarction (H) 07/02/2017     Priority: Medium     HTN (hypertension) 02/11/2015     Priority: Medium     Advanced care planning/counseling discussion 06/19/2012     Priority: Medium     Bilateral impacted cerumen 03/10/2011     Priority:  Medium        Past Medical History:    Past Medical History:   Diagnosis Date     Essential (primary) hypertension      Hyperlipidemia      Non-ST elevation (NSTEMI) myocardial infarction (H)      Old myocardial infarction      Polyneuropathy      Type 2 diabetes mellitus without complications (H)        Past Surgical History:    History reviewed. No pertinent surgical history.    Family History:    History reviewed. No pertinent family history.    Social History:  Marital Status:  Single [1]  Social History     Tobacco Use     Smoking status: Former Smoker     Quit date: 1992     Years since quittin.5     Smokeless tobacco: Never Used   Substance Use Topics     Alcohol use: No     Comment: Alcoholic Drinks/day: quit 1985     Drug use: No        Medications:    acetaminophen (TYLENOL) 325 MG tablet  albuterol (PROAIR HFA/PROVENTIL HFA/VENTOLIN HFA) 108 (90 Base) MCG/ACT inhaler  atorvastatin (LIPITOR) 80 MG tablet  insulin aspart (NOVOLOG PEN) 100 UNIT/ML pen  insulin glargine (LANTUS PEN) 100 UNIT/ML pen  ipratropium-albuterol (COMBIVENT RESPIMAT)  MCG/ACT inhaler  metFORMIN (GLUCOPHAGE) 1000 MG tablet  tamsulosin (FLOMAX) 0.4 MG capsule          Review of Systems   Constitutional: Positive for fatigue and fever.   HENT: Negative.    Eyes: Negative.    Respiratory: Positive for shortness of breath.    Cardiovascular: Negative for leg swelling.   Gastrointestinal: Negative.  Negative for abdominal pain.   Genitourinary: Negative.    Skin: Negative.        Physical Exam   BP: 127/70  Pulse: 142  Temp: 102.6  F (39.2  C)  Resp: 20  Weight: 54.9 kg (121 lb 1.6 oz)  SpO2: 91 %      Physical Exam  Vitals signs and nursing note reviewed.   Constitutional:       Comments: Thin/cachectic elderly male alert and answering questions well.  Hypoxia improving with oxygen.   HENT:      Head: Normocephalic.      Mouth/Throat:      Mouth: Mucous membranes are moist.   Eyes:      Extraocular Movements:  Extraocular movements intact.      Pupils: Pupils are equal, round, and reactive to light.   Neck:      Musculoskeletal: Normal range of motion.   Cardiovascular:      Rate and Rhythm: Regular rhythm. Tachycardia present.   Pulmonary:      Breath sounds: Examination of the left-lower field reveals rales. Rales present.      Comments: Rales at left base  Chest:      Chest wall: No tenderness.   Abdominal:      General: Bowel sounds are normal.      Palpations: Abdomen is soft.      Tenderness: There is no abdominal tenderness.   Musculoskeletal:      Right lower leg: No edema.      Left lower leg: No edema.      Comments: No lower leg redness or swelling.   Neurological:      General: No focal deficit present.      Mental Status: He is alert.   Psychiatric:         Mood and Affect: Mood normal.         ED Course        Procedures          EKG: Sinus tachycardia 141 bpm with left axis deviation and slow R wave progression over the precordium.    Critical Care time:  none     The patient has signs of Severe Sepsis            If one the following conditions is present, a 30 mL/kg bolus is recommended as part of the 6 hour bundle (IBW can be used for BMI >30, or document refusal/contraindication):      1.   Initial hypotension  defined as 2 bps < 90 or map < 65 in the 6hrs before or 6hrs after time zero.     2.  Lactate >4.     The patient has signs of Severe Sepsis as evidenced by:    1. 2 SIRS criteria, AND  2. Suspected infection, AND   3. Organ dysfunction: Tachycardia and need for oxygen.    Time severe sepsis diagnosis confirmed: 10:26am  06/30/21 as this was the time when Lactate resulted, and the level was > 2.0    3 Hour Severe Sepsis Bundle Completion:    1. Initial Lactic Acid Result:   Recent Labs   Lab Test 06/30/21  1005 05/12/21  0610 05/12/21  0258   LACT 5.7* 1.4 2.4*     2. Blood Cultures before Antibiotics: Yes  3. Broad Spectrum Antibiotics Administered:  yes       Anti-infectives (From admission  through now)    Start     Dose/Rate Route Frequency Ordered Stop    06/30/21 1030  ceFEPIme (MAXIPIME) 2 g in NS      2 g  over 30 Minutes Intravenous ONCE 06/30/21 1025      06/30/21 1030  azithromycin 500 mg (ZITHROMAX) in 0.9% NaCl 250 mL intermittent infusion 500 mg      500 mg  over 1 Hours Intravenous ONCE 06/30/21 1025            4. Fluid volume administered in ED:  Full 30 mL/kg bolus given (see amount below).    BMI Readings from Last 1 Encounters:   06/30/21 18.97 kg/m      30 mL/kg fluids based on weight: 1,650 mL  30 mL/kg fluids based on IBW (must be >= 60 inches tall): 1,980 mL                Severe Sepsis reassessment:  1. Repeat Lactic Acid Level: pending  2. MAP>65 after initial IVF bolus, will continue to monitor fluid status and vital signs              Results for orders placed or performed during the hospital encounter of 06/30/21 (from the past 24 hour(s))   Lactic acid   Result Value Ref Range    Lactic Acid 5.7 (HH) 0.7 - 2.0 mmol/L   CBC with platelets differential   Result Value Ref Range    WBC 16.7 (H) 4.0 - 11.0 10e9/L    RBC Count 4.02 (L) 4.4 - 5.9 10e12/L    Hemoglobin 12.1 (L) 13.3 - 17.7 g/dL    Hematocrit 36.7 (L) 40.0 - 53.0 %    MCV 91 78 - 100 fl    MCH 30.1 26.5 - 33.0 pg    MCHC 33.0 31.5 - 36.5 g/dL    RDW 13.4 10.0 - 15.0 %    Platelet Count 337 150 - 450 10e9/L    Diff Method Automated Method     % Neutrophils 91.2 %    % Lymphocytes 5.0 %    % Monocytes 2.5 %    % Eosinophils 0.1 %    % Basophils 0.2 %    % Immature Granulocytes 1.0 %    Absolute Neutrophil 15.2 (H) 1.6 - 8.3 10e9/L    Absolute Lymphocytes 0.8 0.8 - 5.3 10e9/L    Absolute Monocytes 0.4 0.0 - 1.3 10e9/L    Absolute Eosinophils 0.0 0.0 - 0.7 10e9/L    Absolute Basophils 0.0 0.0 - 0.2 10e9/L    Abs Immature Granulocytes 0.2 0 - 0.4 10e9/L   Comprehensive metabolic panel   Result Value Ref Range    Sodium 131 (L) 134 - 144 mmol/L    Potassium 4.1 3.5 - 5.1 mmol/L    Chloride 98 98 - 107 mmol/L    Carbon  Dioxide 19 (L) 21 - 31 mmol/L    Anion Gap 14 3 - 14 mmol/L    Glucose 252 (H) 70 - 105 mg/dL    Urea Nitrogen 22 7 - 25 mg/dL    Creatinine 0.87 0.70 - 1.30 mg/dL    GFR Estimate 85 >60 mL/min/[1.73_m2]    GFR Estimate If Black >90 >60 mL/min/[1.73_m2]    Calcium 8.5 (L) 8.6 - 10.3 mg/dL    Bilirubin Total 0.5 0.3 - 1.0 mg/dL    Albumin 3.6 3.5 - 5.7 g/dL    Protein Total 7.4 6.4 - 8.9 g/dL    Alkaline Phosphatase 62 34 - 104 U/L    ALT 10 7 - 52 U/L    AST 13 13 - 39 U/L   Blood gas venous and oxyhgb   Result Value Ref Range    Ph Venous 7.46 (H) 7.32 - 7.43 pH    PCO2 Venous 29 (L) 40 - 50 mm Hg    PO2 Venous 51 (H) 25 - 47 mm Hg    Bicarbonate Venous 21 21 - 28 mmol/L    FIO2 Unknown     Oxyhemoglobin Venous 86 %   Procalcitonin   Result Value Ref Range    Procalcitonin 1.59 ng/ml   INR   Result Value Ref Range    INR 0.98 0.86 - 1.14   Partial thromboplastin time   Result Value Ref Range    PTT 24 22 - 37 sec   XR Chest Port 1 View    Narrative    PROCEDURE:  XR CHEST PORT 1 VIEW    HISTORY: Fever. .    COMPARISON:  5/13/2021    FINDINGS:    The cardiomediastinal contours are stable. There is calcific aortic  atherosclerosis.   Recurrent or residual multifocal infiltrates are present, superimposed  upon emphysema, basilar predominant. No pneumothorax or substantial  effusion is present.      Impression    IMPRESSION:  Findings suspicious for recurrent multifocal pneumonia  superimposed on emphysema. Aspiration is also in the differential.  Recommend follow-up to resolution.      JONATAN CARTER MD          SYSTEM ID:  [id number]   Influenza A and B and RSV PCR   Result Value Ref Range    Specimen Description Nasopharyngeal     Influenza A PCR Negative NEG^Negative    Influenza B PCR Negative NEG^Negative    Resp Syncytial Virus Negative NEG^Negative       Medications   sodium chloride (PF) 0.9% PF flush 3 mL (has no administration in time range)   sodium chloride (PF) 0.9% PF flush 3 mL (3 mLs Intracatheter  Not Given 6/30/21 1042)   ceFEPIme (MAXIPIME) 2 g in NS (2 g Intravenous Given 6/30/21 1038)   azithromycin 500 mg (ZITHROMAX) in 0.9% NaCl 250 mL intermittent infusion 500 mg (has no administration in time range)   0.9% sodium chloride BOLUS (1,647 mLs Intravenous New Bag 6/30/21 1028)   sodium chloride 0.9% infusion (has no administration in time range)   lidocaine 1 % 0.1-1 mL (has no administration in time range)   lidocaine (LMX4) cream (has no administration in time range)   sodium chloride (PF) 0.9% PF flush 3 mL (has no administration in time range)   sodium chloride (PF) 0.9% PF flush 3 mL (has no administration in time range)   enoxaparin ANTICOAGULANT (LOVENOX) injection 40 mg (has no administration in time range)   sodium chloride 0.9% infusion (has no administration in time range)   acetaminophen (TYLENOL) tablet 650 mg (has no administration in time range)   acetaminophen (TYLENOL) Suppository 650 mg (has no administration in time range)   ondansetron (ZOFRAN-ODT) ODT tab 4 mg (has no administration in time range)     Or   ondansetron (ZOFRAN) injection 4 mg (has no administration in time range)   acetaminophen (TYLENOL) tablet 650 mg (650 mg Oral Given 6/30/21 0948)   0.9% sodium chloride BOLUS (1,000 mLs Intravenous New Bag 6/30/21 1006)   methylPREDNISolone sodium succinate (solu-MEDROL) injection 40 mg (40 mg Intravenous Given 6/30/21 1036)     10:26 AM call from lab with critical lactic acid level of 5.7 and starting empiric antibiotics for community-acquired pneumonia - penicillin allergy, and sepsis fluid bolus.    10:59 AM I discussed patient with Dr. Thurston, hospitalist, accepting patient for ICU admission.    Assessments & Plan (with Medical Decision Making)   80-year-old male granules resident with history of aspiration pneumonitis and Covid this past March presenting with acute onset of shortness of breath and fever and tachycardia with significant lactic acid elevation, multifocal  pneumonia on chest x-ray, elevated white count, and started on empiric antibiotics for pneumonia and IV sepsis fluid bolus.    I have reviewed the nursing notes.    I have reviewed the findings, diagnosis, plan and need for follow up with the patient.       New Prescriptions    No medications on file       Final diagnoses:   Sepsis due to other etiology (H)   Multifocal pneumonia   Centrilobular emphysema (H)       6/30/2021   [facility]     Eliezer Reilly MD  06/30/21 0049

## 2021-06-30 NOTE — PROGRESS NOTES
Patient transferred from ICU at 1835.  Alert, oriented and reported to be impulsive- alarm in place, SBA for mobility.  Is NPO.  Malu Mendez RN on 6/30/2021 at 6:54 PM

## 2021-06-30 NOTE — ED TRIAGE NOTES
[facility] called to give pt report. Staff stated that they went to change pt's catheter this morning due to some hematuria, afterwards pt had more blood in urine. Staff stated that about 0850 pt became extremely SOB with respirations in the 40s. Pt is not normally on oxygen, staff placed him on 2L O2 and pt was at 82%. Staff stated that they were turning his oxygen up right now. Pt is covid recovered, was diagnosed on 5/11/21. Pt is full code.

## 2021-06-30 NOTE — TELEPHONE ENCOUNTER
States pt's respirations are down to 40 and pt is having shortness of breath and Hematuria coming from catheter, looking for orders to send to ER.

## 2021-06-30 NOTE — TELEPHONE ENCOUNTER
"Return call to [place] at [facility]. Verified name/. \"Pt is not doing well, with difficulty breathing\".   Per [facility] Nurse: Respirations are 40. Has dx of covid but is considered  recovered.   Okay to send to ER, NH calling EMS.      Reason for Disposition    Sounds like a life-threatening emergency to the triager    SEVERE difficulty breathing (e.g., struggling for each breath, speaks in single words, pulse > 120)    Protocols used: BREATHING DIFFICULTY-A-YESIKA Gaines RN ,....................  2021   9:06 AM    "

## 2021-06-30 NOTE — PROGRESS NOTES
[place] [facility] TRANSPORT NOTE  Data:   Reason for Transport:  [facility] floor    Neel Kerr was transported to [facility] via wheel chair at 1830.  Patient was accompanied by Registered Nurse. Equipment used for transport: None. Family was aware of reason for transport: yes    Action:  Report: given to Sury WHALEY    Response:  Patient's condition when transferred off unit was stable.    Ada Modi RN

## 2021-06-30 NOTE — PHARMACY-ADMISSION MEDICATION HISTORY
Pharmacy -- Admission Medication Reconciliation    Prior to admission (PTA) medications were reviewed and the patient's PTA medication list was updated.    Sources Consulted: [place] [facility], sure scripts    The reliability of this Medication Reconciliation is: Reliability: Reliable    The following significant changes were made:    Updated and added Novolog sliding scale directions    In addition, the patient's allergies were reviewed with the patient and updated as follows:   Allergies: Augmentin    The pharmacist has reviewed with the patient that all personal medications should be removed from the building or locked in the belongings safe.  Patient shall only take medications ordered by the physician and administered by the nursing staff.       Medication barriers identified: none noted   Medication adherence concerns: none   Understanding of emergency medications: unknown, nursing to assist    Mariia Kruse RPH, 6/30/2021,  12:28 PM

## 2021-06-30 NOTE — PHARMACY-CONSULT NOTE
Pharmacy - Transfer Medication Reconciliation     The patient's transfer medication orders have been compared to the medication administration record and to the Prior to Admissions Medications list - any noted discrepancies were resolved with the MD.     Thank you. Pharmacy will continue to monitor.     Mariia Kruse RPH ....................  6/30/2021   6:41 PM  
Yes

## 2021-06-30 NOTE — PROGRESS NOTES
Patient is doing quite well and mentation appears to be at baseline now.  Vital signs have remained stable.  He has had dramatic decline in his lactate.  No ongoing issues.  We will keep n.p.o. until speech evaluates.  I think he can transfer to the medical floor.  Continue current antibiotic regimen.    Called and updated his brother Nicolas.    Noe Thurston MD

## 2021-06-30 NOTE — PROGRESS NOTES
NSG ADMISSION NOTE    Patient admitted to room 917 at approximately 1215 via cart from emergency room. Patient was accompanied by nurse.     Verbal SBAR report received from JUDD Sanchez prior to patient arrival.     Patient trasferred to bed via self. Patient alert and oriented X 3. The patient is not having any pain.  . Admission vital signs: Blood pressure 107/67, pulse 104, temperature 98.3  F (36.8  C), temperature source Temporal, resp. rate 20, weight 56.7 kg (125 lb), SpO2 91 %. Patient was oriented to plan of care, call light, bed controls, tv, telephone and bathroom.     Risk Assessment    The following safety risks were identified during admission: fall and aspiration. Yellow risk band applied: YES.     Skin Initial Assessment    This writer admitted this patient and completed a full skin assessment and Wade score in the Adult PCS flowsheet. Appropriate interventions initiated as needed.            Education    Patient has a [place] to Observation order: No  Observation education completed and documented: N/A      Ada Modi RN

## 2021-06-30 NOTE — PLAN OF CARE
Continue to monitor  Problem: Adult Inpatient Plan of Care  Goal: Plan of Care Review  Outcome: No Change  Goal: Patient-Specific Goal (Individualized)  Outcome: No Change  Goal: Absence of Hospital-Acquired Illness or Injury  Outcome: No Change  Intervention: Identify and Manage Fall Risk  Recent Flowsheet Documentation  Taken 6/30/2021 1600 by Ada Modi RN  Safety Promotion/Fall Prevention:   activity supervised   assistive device/personal items within reach   bed alarm on   fall prevention program maintained   nonskid shoes/slippers when out of bed   patient and family education   room door open   room organization consistent   safety round/check completed  Taken 6/30/2021 1230 by Ada Modi RN  Safety Promotion/Fall Prevention:   activity supervised   assistive device/personal items within reach   bed alarm on   fall prevention program maintained   nonskid shoes/slippers when out of bed   patient and family education   room door open   room organization consistent   safety round/check completed  Intervention: Prevent Skin Injury  Recent Flowsheet Documentation  Taken 6/30/2021 1600 by Ada Modi RN  Body Position: position changed independently  Taken 6/30/2021 1230 by Ada Modi RN  Body Position: position changed independently  Intervention: Prevent and Manage VTE (Venous Thromboembolism) Risk  Recent Flowsheet Documentation  Taken 6/30/2021 1600 by Ada Modi RN  VTE Prevention/Management: anticoagulant therapy initiated  Taken 6/30/2021 1230 by Ada Modi RN  VTE Prevention/Management: anticoagulant therapy initiated  Goal: Optimal Comfort and Wellbeing  Outcome: No Change  Goal: Readiness for Transition of Care  Outcome: No Change  Intervention: Mutually Develop Transition Plan  Recent Flowsheet Documentation  Taken 6/30/2021 1300 by Ada Modi RN  Equipment Currently Used at Home: none     Problem: Swallowing Impairment  Goal: Improved Swallowing Without  Aspiration  Outcome: No Change     Problem: Infection (Pneumonia)  Goal: Resolution of Infection Signs and Symptoms  Outcome: No Change

## 2021-07-01 ENCOUNTER — APPOINTMENT (OUTPATIENT)
Dept: PHYSICAL THERAPY | Facility: OTHER | Age: 81
DRG: 698 | End: 2021-07-01
Attending: FAMILY MEDICINE
Payer: COMMERCIAL

## 2021-07-01 ENCOUNTER — APPOINTMENT (OUTPATIENT)
Dept: OCCUPATIONAL THERAPY | Facility: OTHER | Age: 81
DRG: 698 | End: 2021-07-01
Attending: FAMILY MEDICINE
Payer: COMMERCIAL

## 2021-07-01 ENCOUNTER — APPOINTMENT (OUTPATIENT)
Dept: SPEECH THERAPY | Facility: OTHER | Age: 81
DRG: 698 | End: 2021-07-01
Payer: COMMERCIAL

## 2021-07-01 LAB
ANION GAP SERPL CALCULATED.3IONS-SCNC: 7 MMOL/L (ref 3–14)
BUN SERPL-MCNC: 13 MG/DL (ref 7–25)
CALCIUM SERPL-MCNC: 6.9 MG/DL (ref 8.6–10.3)
CHLORIDE SERPL-SCNC: 108 MMOL/L (ref 98–107)
CO2 SERPL-SCNC: 22 MMOL/L (ref 21–31)
CREAT SERPL-MCNC: 0.59 MG/DL (ref 0.7–1.3)
ERYTHROCYTE [DISTWIDTH] IN BLOOD BY AUTOMATED COUNT: 13.6 % (ref 10–15)
GFR SERPL CREATININE-BSD FRML MDRD: >90 ML/MIN/{1.73_M2}
GLUCOSE SERPL-MCNC: 152 MG/DL (ref 70–105)
HCT VFR BLD AUTO: 29.2 % (ref 40–53)
HGB BLD-MCNC: 9.7 G/DL (ref 13.3–17.7)
MCH RBC QN AUTO: 30.3 PG (ref 26.5–33)
MCHC RBC AUTO-ENTMCNC: 33.2 G/DL (ref 31.5–36.5)
MCV RBC AUTO: 91 FL (ref 78–100)
PLATELET # BLD AUTO: 251 10E9/L (ref 150–450)
POTASSIUM SERPL-SCNC: 3.6 MMOL/L (ref 3.5–5.1)
RBC # BLD AUTO: 3.2 10E12/L (ref 4.4–5.9)
SODIUM SERPL-SCNC: 137 MMOL/L (ref 134–144)
WBC # BLD AUTO: 13 10E9/L (ref 4–11)

## 2021-07-01 PROCEDURE — 258N000003 HC RX IP 258 OP 636: Performed by: FAMILY MEDICINE

## 2021-07-01 PROCEDURE — 36415 COLL VENOUS BLD VENIPUNCTURE: CPT | Performed by: FAMILY MEDICINE

## 2021-07-01 PROCEDURE — 94640 AIRWAY INHALATION TREATMENT: CPT | Mod: 76

## 2021-07-01 PROCEDURE — 120N000001 HC R&B MED SURG/OB

## 2021-07-01 PROCEDURE — 85027 COMPLETE CBC AUTOMATED: CPT | Performed by: FAMILY MEDICINE

## 2021-07-01 PROCEDURE — 94640 AIRWAY INHALATION TREATMENT: CPT

## 2021-07-01 PROCEDURE — 99232 SBSQ HOSP IP/OBS MODERATE 35: CPT | Performed by: FAMILY MEDICINE

## 2021-07-01 PROCEDURE — 97530 THERAPEUTIC ACTIVITIES: CPT | Mod: GO | Performed by: OCCUPATIONAL THERAPIST

## 2021-07-01 PROCEDURE — 250N000012 HC RX MED GY IP 250 OP 636 PS 637: Performed by: FAMILY MEDICINE

## 2021-07-01 PROCEDURE — 97161 PT EVAL LOW COMPLEX 20 MIN: CPT | Mod: GP

## 2021-07-01 PROCEDURE — 92610 EVALUATE SWALLOWING FUNCTION: CPT | Mod: GN

## 2021-07-01 PROCEDURE — 250N000013 HC RX MED GY IP 250 OP 250 PS 637: Performed by: FAMILY MEDICINE

## 2021-07-01 PROCEDURE — 250N000009 HC RX 250: Performed by: FAMILY MEDICINE

## 2021-07-01 PROCEDURE — 80048 BASIC METABOLIC PNL TOTAL CA: CPT | Performed by: FAMILY MEDICINE

## 2021-07-01 PROCEDURE — 999N000157 HC STATISTIC RCP TIME EA 10 MIN

## 2021-07-01 PROCEDURE — 97116 GAIT TRAINING THERAPY: CPT | Mod: GP

## 2021-07-01 PROCEDURE — 250N000011 HC RX IP 250 OP 636: Performed by: FAMILY MEDICINE

## 2021-07-01 PROCEDURE — 97165 OT EVAL LOW COMPLEX 30 MIN: CPT | Mod: GO | Performed by: OCCUPATIONAL THERAPIST

## 2021-07-01 RX ORDER — AZITHROMYCIN 500 MG/5ML
500 INJECTION, POWDER, LYOPHILIZED, FOR SOLUTION INTRAVENOUS DAILY
Status: DISCONTINUED | OUTPATIENT
Start: 2021-07-01 | End: 2021-07-02

## 2021-07-01 RX ADMIN — CEFEPIME HYDROCHLORIDE 2 G: 2 INJECTION, POWDER, FOR SOLUTION INTRAVENOUS at 02:33

## 2021-07-01 RX ADMIN — AZITHROMYCIN MONOHYDRATE 500 MG: 500 INJECTION, POWDER, LYOPHILIZED, FOR SOLUTION INTRAVENOUS at 15:05

## 2021-07-01 RX ADMIN — METRONIDAZOLE 500 MG: 500 INJECTION, SOLUTION INTRAVENOUS at 23:14

## 2021-07-01 RX ADMIN — INSULIN ASPART 5 UNITS: 100 INJECTION, SOLUTION INTRAVENOUS; SUBCUTANEOUS at 18:23

## 2021-07-01 RX ADMIN — IPRATROPIUM BROMIDE AND ALBUTEROL SULFATE 3 ML: .5; 3 SOLUTION RESPIRATORY (INHALATION) at 10:42

## 2021-07-01 RX ADMIN — INSULIN ASPART 4 UNITS: 100 INJECTION, SOLUTION INTRAVENOUS; SUBCUTANEOUS at 12:34

## 2021-07-01 RX ADMIN — ATORVASTATIN CALCIUM 40 MG: 40 TABLET, FILM COATED ORAL at 21:22

## 2021-07-01 RX ADMIN — INSULIN GLARGINE 8 UNITS: 100 INJECTION, SOLUTION SUBCUTANEOUS at 21:26

## 2021-07-01 RX ADMIN — SODIUM CHLORIDE: 9 INJECTION, SOLUTION INTRAVENOUS at 15:06

## 2021-07-01 RX ADMIN — TAMSULOSIN HYDROCHLORIDE 0.4 MG: 0.4 CAPSULE ORAL at 21:22

## 2021-07-01 RX ADMIN — METHYLPREDNISOLONE SODIUM SUCCINATE 40 MG: 40 INJECTION, POWDER, FOR SOLUTION INTRAMUSCULAR; INTRAVENOUS at 11:12

## 2021-07-01 RX ADMIN — IPRATROPIUM BROMIDE AND ALBUTEROL SULFATE 3 ML: .5; 3 SOLUTION RESPIRATORY (INHALATION) at 18:22

## 2021-07-01 RX ADMIN — IPRATROPIUM BROMIDE AND ALBUTEROL SULFATE 3 ML: .5; 3 SOLUTION RESPIRATORY (INHALATION) at 14:51

## 2021-07-01 RX ADMIN — INSULIN ASPART 1 UNITS: 100 INJECTION, SOLUTION INTRAVENOUS; SUBCUTANEOUS at 07:50

## 2021-07-01 RX ADMIN — CEFEPIME HYDROCHLORIDE 2 G: 2 INJECTION, POWDER, FOR SOLUTION INTRAVENOUS at 11:13

## 2021-07-01 RX ADMIN — METFORMIN HYDROCHLORIDE 1000 MG: 1000 TABLET ORAL at 07:50

## 2021-07-01 RX ADMIN — CEFEPIME HYDROCHLORIDE 2 G: 2 INJECTION, POWDER, FOR SOLUTION INTRAVENOUS at 18:44

## 2021-07-01 RX ADMIN — IPRATROPIUM BROMIDE AND ALBUTEROL SULFATE 3 ML: .5; 3 SOLUTION RESPIRATORY (INHALATION) at 08:16

## 2021-07-01 RX ADMIN — METRONIDAZOLE 500 MG: 500 INJECTION, SOLUTION INTRAVENOUS at 11:52

## 2021-07-01 RX ADMIN — METFORMIN HYDROCHLORIDE 1000 MG: 1000 TABLET ORAL at 18:44

## 2021-07-01 RX ADMIN — ENOXAPARIN SODIUM 40 MG: 100 INJECTION SUBCUTANEOUS at 16:22

## 2021-07-01 ASSESSMENT — ACTIVITIES OF DAILY LIVING (ADL)
ADLS_ACUITY_SCORE: 13

## 2021-07-01 NOTE — PROGRESS NOTES
:    I called Vijaya at [facility] and gave her discharge update.  Patient is there for short term care and has used all of his insurance days.  He has a bill of $3,153.50 at [facility] and will need to pay that in full if he wants to return  Spoke with MD and PT/OT were consulted incase patient needs to go home with possible home care services.  I will meet with patient and inform him of this.  Anticipated discharge in 1-2 days.    CLAUDIA Masters on 7/1/2021 at 10:55 AM    :    I met with patient in room and he is wanting to go home at discharge.  He does not wish to return to [facility].  I did inform him of his bill at .  I offered him home care services and he declined.  Patient stated his brother or a friend will pick him up at discharge.  I called Vijaya at [facility] and gave her discharge update.    CLAUDIA Masters on 7/1/2021 at 2:32 PM

## 2021-07-01 NOTE — PROGRESS NOTES
07/01/21 1600   Quick Adds   Type of Visit Initial PT Evaluation   Living Environment   People in home alone   Current Living Arrangements house   Home Accessibility no concerns   Transportation Anticipated car, drives self   Self-Care   Usual Activity Tolerance good   Current Activity Tolerance moderate   Equipment Currently Used at Home none   Disability/Function   Hearing Difficulty or Deaf no   Wear Glasses or Blind no   Concentrating, Remembering or Making Decisions Difficulty no   Difficulty Communicating no   Walking or Climbing Stairs Difficulty no   Dressing/Bathing Difficulty no   Toileting issues   (Mata Catheter)   Fall history within last six months no   General Information   Referring Physician Karena   Patient/Family Therapy Goals Statement (PT) return home   Existing Precautions/Restrictions fall   Weight-Bearing Status - LLE full weight-bearing   Weight-Bearing Status - RLE full weight-bearing   Cognition   Orientation Status (Cognition) oriented x 4   Affect/Mental Status (Cognition) WFL   Follows Commands (Cognition) WFL   Pain Assessment   Patient Currently in Pain No   Integumentary/Edema   Integumentary/Edema no deficits were identifed   Posture    Posture Not impaired   Range of Motion (ROM)   ROM Quick Adds ROM WFL   Strength   Manual Muscle Testing Quick Adds Strength WFL   Bed Mobility   Comment (Bed Mobility) SBA   Transfers   Transfer Safety Comments CGA to SBA   Gait/Stairs (Locomotion)   [place] Level (Gait) contact guard   Assistive Device (Gait) other (see comments)  (no AD)   Distance in Feet (Required for LE Total Joints) 200   Pattern (Gait) 2-point   Balance   Balance Comments good without use of AD   Sensory Examination   Sensory Perception WFL   Coordination   Coordination no deficits were identified   Muscle Tone   Muscle Tone no deficits were identified   Clinical Impression   Criteria for Skilled Therapeutic Intervention yes, treatment indicated   PT Diagnosis (PT)  impaired mobility   Influenced by the following impairments fatigue   Functional limitations due to impairments activity tolerance   Clinical Presentation Stable/Uncomplicated   Clinical Decision Making (Complexity) low complexity   Therapy Frequency (PT) Daily   Predicted Duration of Therapy Intervention (days/wks) 1-2 days   Planned Therapy Interventions (PT) gait training   Anticipated Equipment Needs at Discharge (PT)   (no assistive gait device)   Risk & Benefits of therapy have been explained risks/benefits reviewed;patient   PT Discharge Planning    PT Discharge Recommendation (DC Rec) home with assist   PT Rationale for DC Rec patient approaching baseline mobility   PT Brief overview of current status  SBA for bed mobilities; CGA to SBA for transfers and ambulation without use of AD   Total Evaluation Time   Total Evaluation Time (Minutes) 15

## 2021-07-01 NOTE — PROGRESS NOTES
[facility]    Medicine Progress Note - Hospitalist Service       Date of Admission:  6/30/2021    Assessment & Plan              Neel Kerr is a 80 year old male admitted on 6/30/2021. He presented to the ED from [facility] after an episode of emesis after breakfast and then developed hypoxia and fever.    He was found in the emergency department to have a focal pneumonia.  He will be admitted and treated with IV antibiotics including coverage for aspiration given history and exam.    Patient demonstrated signs of severe sepsis on admit with temp 102.6, pulse 142, SPO2 of 91% and a lactate of 5.7.    Patient was admitted to the ICU treated with fluid boluses and started on cefepime, azithromycin and Flagyl.  He had significant improvement in vital signs and lactate and was transferred to the floor later that night.    He has had ongoing improvement in his condition and feels that he is currently at his baseline.  Blood cultures are growing out gram-positive cocci as well as gram-negative rods.  Should have speciation by tomorrow morning and can tailor antibiotics.  Also will follow up on PT/OT eval to determine disposition.    Principal Problem:    Multifocal pneumonia    Assessment: Covid is negative and patient previously diagnosed with Covid pneumonia.  Likely HCAP versus aspiration.  Has rapidly improved on current antibiotic regimen.    Plan: Continue cefepime, azithromycin and Flagyl for now, tailor based on blood culture results and clinical condition.  Albuterol nebs every 2 hours as needed, DuoNeb scheduled.  Active Problems:    Hyponatremia    Assessment: Resolved    Plan: Recheck in a.m.    Centrilobular emphysema (H)    Assessment: Chronic.  Currently no wheezing.    Plan: Continue home medications.  Suggest pulmonary follow-up on discharge given CT findings.  Severe sepsis (H)    Assessment: Resolved    Plan: Continue cefepime, azithromycin and Flagyl.     Type 2 diabetes  "mellitus, with long-term current use of insulin (H)    Assessment: Blood sugar mildly elevated.    Plan: Will restart long-acting insulin given liberalization of diet based on speech eval.  Continue to use NovoLog SSI.           Diet: Regular Diet Adult Thin Liquids (water, ice chips, juice, milk, gelatin, ice cream, etc)    DVT Prophylaxis: Enoxaparin (Lovenox) SQ  Mata Catheter: PRESENT, indication: Other (Comment)(Chronic)  Central Lines: None  Code Status: Full Code      Disposition Plan   Expected discharge: Tomorrow, recommended to prior living arrangement once antibiotic plan established and SIRS/Sepsis treated.     The patient's care was discussed with the Patient.    Noe Thurston MD  Hospitalist Service  [facility]  Securely message with the Vocera Web Console (learn more here)  Text page via [employer] Paging/Directory      Risk Factors Present on Admission               ______________________________________________________________________    Interval History   Patient reports that he feels well.  He tells me that he \"never felt sick\".  He does recall the episode of fever after vomiting yesterday and he recalls meeting me.  But feels that he \"was not really that sick\".  He denies any cough or shortness of breath.  Denies any chest pain.  Denies any abdominal pain.  No nausea.  He did not have any fevers overnight.  Nursing notes reviewed.    Data reviewed today: I reviewed all medications, new labs and imaging results over the last 24 hours. I personally reviewed no images or EKG's today.    Physical Exam   Vital Signs: Temp: 97.8  F (36.6  C) Temp src: Tympanic BP: 132/63 Pulse: 76   Resp: 18 SpO2: 94 % O2 Device: None (Room air) Oxygen Delivery: 1 LPM(put on room air)  Weight: 121 lbs 3.2 oz  Constitutional: awake, alert, cooperative, no apparent distress, and appears stated age  Respiratory: Clear to ascultation bilaterally.  No increased respiratory effort.   Cardiovascular: " Regularrate and rhythm.  No murmurs rubs or gallops heard.   GI: Abdomen Soft, non-tender.  No masses, rebound or guarding.   Musculoskeletal: No synovitis.  Muscle strength age and body habitus appropriateas well as equal and even.   Neuropsychiatric: General: normal, calm and normal eye contact  Orientation: oriented to self, place, time and situation      Data   Recent Labs   Lab 07/01/21  0605 06/30/21  1107 06/30/21  1005   WBC 13.0*  --  16.7*   HGB 9.7*  --  12.1*   MCV 91  --  91     --  337   INR  --   --  0.98     --  131*   POTASSIUM 3.6 3.9 4.1   CHLORIDE 108*  --  98   CO2 22  --  19*   BUN 13  --  22   CR 0.59* 0.80 0.87   ANIONGAP 7  --  14   CARLOS 6.9*  --  8.5*   *  --  252*   ALBUMIN  --   --  3.6   PROTTOTAL  --   --  7.4   BILITOTAL  --   --  0.5   ALKPHOS  --   --  62   ALT  --   --  10   AST  --   --  13     Recent Results (from the past 24 hour(s))   CT Chest Pulmonary Embolism w Contrast    Narrative    PROCEDURE:  CT CHEST PULMONARY EMBOLISM W CONTRAST.    HISTORY:  Evaluate for pulmonary embolism.  PE suspected,  low/intermediate prob, positive D-dimer    TECHNIQUE:  Initial pre-contrast  and localizer images were  obtained.  Contrast enhanced helical CT pulmonary angiography was then  performed.  Routine transaxial and post-processed (multiplanar and/or  MIP) reformations were obtained.    COMPARISON:  5/13/2021    MEDS/CONTRAST: 65 ml isovue 370    PULMONARY CTA FINDINGS:  This is a diagnostic quality helical CT  pulmonary angiogram.  There is no acute pulmonary embolism to the  first subsegmental pulmonary artery level.    OTHER FINDINGS:      Cardiac size is stable. Multiple enlarged thoracic lymph nodes are  similar in appearance to the prior. The largest is a right infrahilar  node spanning up to 2.0 x 2.1 cm.    Limited views of the upper abdomen reveal no adrenal mass or acute  process. Debris is present in the thoracic esophagus.    There is moderate to  advanced centrilobular emphysema. There is  confluent consolidative opacity superimposed upon emphysema in the  lower lobes, similar in distribution to the prior. Patchy groundglass  opacity is present in the left upper lobe, similar to the immediate  prior and new when compared to more remote priors.    No suspicious osseous lesion is identified.      Impression    IMPRESSION:    No acute pulmonary emboli to the subsegmental level.    Fairly extensive lower lobe airspace consolidation superimposed upon  moderate to severe centrilobular emphysema. Mildly enlarged  mediastinal and right greater than left hilar nodes. The appearance is  similar to the immediate prior.    Differential considerations include recurrent aspiration, unresolved  pneumonia or less likely lymphangitic carcinomatosis. Consider  pulmonology consultation.     JONATAN CARTER MD          SYSTEM ID:  [id number]

## 2021-07-01 NOTE — PLAN OF CARE
Pt is alert and oriented. Can be confused at times. LS fine crackles. Afebrile. VSS. SBA in room. O2 sat at 92% on room air. Denies SOB. Mata patent with clear yellow urine. Pt able to swallow meds with water just fine. Will continue to monitor.   /73   Pulse 72   Temp 96.9  F (36.1  C) (Tympanic)   Resp 18   Wt 56.7 kg (125 lb)   SpO2 92%   BMI 19.58 kg/m      Maria Eugenia Medel RN on 7/1/2021 at 4:11 AM

## 2021-07-01 NOTE — PROGRESS NOTES
07/01/21 1500   Quick Adds   Type of Visit Initial Occupational Therapy Evaluation   Living Environment   People in home alone   Current Living Arrangements house   Home Accessibility no concerns   Transportation Anticipated car, drives self   Self-Care   Usual Activity Tolerance good   Current Activity Tolerance moderate   Equipment Currently Used at Home none   Disability/Function   Hearing Difficulty or Deaf no   Wear Glasses or Blind no   Cognitive Status Examination   Orientation Status orientation to person, place and time   Affect/Mental Status (Cognitive) WFL   Follows Commands WFL   Pain Assessment   Patient Currently in Pain No   Range of Motion Comprehensive   General Range of Motion no range of motion deficits identified   Strength Comprehensive (MMT)   General Manual Muscle Testing (MMT) Assessment no strength deficits identified   Coordination   Upper Extremity Coordination No deficits were identified   Bed Mobility   Bed Mobility supine-sit   Supine-Sit [place] (Bed Mobility) supervision   Transfers   Transfers bed-chair transfer   Transfer Skill: Bed to Chair/Chair to Bed   Bed-Chair [place] (Transfers) supervision   Clinical Impression   Criteria for Skilled Therapeutic Interventions Met (OT) yes   OT Diagnosis pneumonia/weakness    OT Problem List-Impairments impacting ADL activity tolerance impaired   Assessment of Occupational Performance 1-3 Performance Deficits   Identified Performance Deficits mobility and self care    Planned Therapy Interventions (OT) ADL retraining   Clinical Decision Making Complexity (OT) low complexity   Therapy Frequency (OT) Daily   Predicted Duration of Therapy 1-2 days    Risk & Benefits of therapy have been explained risks/benefits reviewed   OT Discharge Planning    OT Discharge Recommendation (DC Rec) home   OT Rationale for DC Rec pt will likely have no OT needs at D/C, will assess bathing, etc tomorrow with recommendations to follow.    OT Brief  overview of current status  Pt very pleasant, ambulated around room and hallway with SBA/CGA for safety, mild fatigue noted but pt tolerated very well, will further assess self cares tomorrow.    Total Evaluation Time (Minutes)   Total Evaluation Time (Minutes) 15   Skin WDL   Skin WDL X

## 2021-07-01 NOTE — PROGRESS NOTES
Patient completed swallowing evaluation today with SLP and a general diet with thin liquids is recommended at this time given patient's tolerance of this diet and no overt signs/symptoms of aspiration/penetration. Patient has no history of dysphagia. Formal evaluation note to follow later today. See orders for more safe swallow strategy recommendations.     Thank you.   Melany Flores, SLP on 7/1/2021 at 8:48 AM

## 2021-07-01 NOTE — PROGRESS NOTES
07/01/21 0900   General Information   Patient/Family Therapy Goal Statement (SLP) To assess swallowing function and determine dietary recommendations for po intake during duration of hospital stay.   Pertinent History of Current Problem Patient was admitted for pneumonia and possible suspected aspiration pneumonia. Patient has been NPO since yesterday afternoon at time of admission. Patient reports no history of dysphagia and reports intake of general diet with thin liquids. Patient trailed all consistencies and textures today with no overt signs/symptoms of aspiration/penetration and is recommended to be placed on a general diet at this time.   Disability/Function   Hearing Difficulty or Deaf no   Wear Glasses or Blind no   Type of Evaluation   Type of Evaluation Swallow Evaluation   Oral Motor   Oral Musculature generally intact   Structural Abnormalities none present   Mucosal Quality good   Dentition (Oral Motor)   Dentition (Oral Motor) natural dentition;some missing teeth   Facial Symmetry (Oral Motor)   Facial Symmetry (Oral Motor) WNL   Lip Function (Oral Motor)   Lip Range of Motion (Oral Motor) WNL   Tongue Function (Oral Motor)   Tongue ROM (Oral Motor) elevation is impaired;WNL   Jaw Function (Oral Motor)   Jaw Function (Oral Motor) WNL   Cough/Swallow/Gag Reflex (Oral Motor)   Soft Palate/Velum (Oral Motor) WNL   Vocal Quality/Secretion Management (Oral Motor)   Vocal Quality (Oral Motor) WNL   General Swallowing Observations   Current Diet/Method of Nutritional Intake (General Swallowing Observations, NIS) NPO   Swallowing Evaluation Clinical swallow evaluation   Clinical Swallow Evaluation   Feeding Assistance no assistance needed   Clinical Swallow Evaluation Textures Trialed Thin Liquids;Puree Textures;Semi-Solid;Solid Foods   Clinical Swallow Eval: Thin Liquid Texture Trial   Mode of Presentation, Thin Liquids self-fed;cup   Volume of Liquid or Food Presented x6 sips of water   Diagnostic  Statement Patient trailed x6 sips of water via cup and demonstrated adequate oral bolus stripping and seal, adequate hyolaryngeal excursion, and no overt signs/symptoms of aspiration/penetration. Patient is safely tolerating thin liquids at this time. It is recommended that patient do NOT use straws and uses a slow rate of intake.   Clinical Swallow Evaluation: Puree Solid Texture Trial   Mode of Presentation, Puree spoon;self-fed   Volume of Puree Presented x2 spoonfuls applesauce   Diagnostic Statement Patient trailed x2 spoonfuls of applesauce and demonstrated adequate oral bolus stripping skills, no residuals upon observation, and no overt signs/symptoms of aspiration/penetration. Patient is safely tolerating puree textures at this time.   Clinical Swallow Evaluation: Semisolid Texture Trial   Mode of Presentation, Semisolid spoon;self-fed   Volume of Semisolid Food Presented x3 peaches   Diagnostic Statement Patient trialed x3 peaches and demonstrated adequate, timely, and coordinated mastication patterns and no residuals upon observation. Patient demonstrating no signs/symptoms of aspiration/penetration and is safely tolerating NDD 2/3 textures at this time.   Clinical Swallow Evaluation: Solid Food Texture Trial   Mode of Presentation, Solid self-fed   Volume of Solid Food Presented 1 lakesha doone cookie   Diagnostic Statement Patient trialed 1 lakesha doone cookie and demonstrated adequate and timely mastication patterns and mild residuals upon observation which patient was aware of. Patient completley cleared residuals upon liquid rinse with water. No signs/symptoms of aspiration/penetration observed. Patient is safely tolerating general textures.    Swallowing Recommendations   Diet Consistency Recommendations regular diet;thin liquids   Supervision Level for Intake patient independent   Mode of Delivery Recommendations no straws;slow rate of intake   Recommended Feeding/Eating Techniques (Swallow Eval)  maintain upright sitting position for eating   General Therapy Interventions   Planned Therapy Interventions   (EVAL only.)   SLP Therapy Assessment/Plan   Criteria for Skilled Therapeutic Interventions Met (SLP Eval) no problems identified which require skilled intervention;other (see comments)  (EVAL only. SLP follow peripherally. )   SLP Diagnosis no dysphagia identified   Therapy Frequency (SLP Eval) evaluation only   Therapy Plan Review/Discharge Plan (SLP)   Therapy Plan Review (SLP) evaluation/treatment results reviewed;patient   SLP Discharge Planning    SLP Brief overview of current status  Patient completed a skilled swallowing evaluation and trialed thin liquids, purees, semisolids, and general textures and demonstrated no overt signs/symptoms of aspiration/penetration. Patient presented with adequate hyolaryngeal excursion, good oral bolus stripping skills, and no instances of throat clearing, belching, coughing, etc. Patient is completely independent with feedings and was able to restate safe swallow strategies educated by SLP including a slow rate of intake and NO straws. A general diet with thin liquids is recommended and no skilled intevention is necessary at this time by SLP given patient's performance, however, SLP will follow peripherally to ensure safe oral intake and continued diet tolerance.    Total Evaluation Time   Total Evaluation Time (Minutes) 20

## 2021-07-01 NOTE — PROGRESS NOTES
Patient disconnected his weathers from the drainage bag. New drainage bag attached and bed change completed.  Wayne Garcia RN on 7/1/2021 at 9:28 AM

## 2021-07-02 ENCOUNTER — APPOINTMENT (OUTPATIENT)
Dept: PHYSICAL THERAPY | Facility: OTHER | Age: 81
DRG: 698 | End: 2021-07-02
Payer: COMMERCIAL

## 2021-07-02 ENCOUNTER — APPOINTMENT (OUTPATIENT)
Dept: OCCUPATIONAL THERAPY | Facility: OTHER | Age: 81
DRG: 698 | End: 2021-07-02
Payer: COMMERCIAL

## 2021-07-02 PROBLEM — J43.2 CENTRILOBULAR EMPHYSEMA (H): Chronic | Status: RESOLVED | Noted: 2021-05-18 | Resolved: 2021-07-02

## 2021-07-02 PROBLEM — R63.6 UNDERWEIGHT: Status: ACTIVE | Noted: 2021-07-02

## 2021-07-02 PROBLEM — J18.9 CAP (COMMUNITY ACQUIRED PNEUMONIA): Status: RESOLVED | Noted: 2020-09-20 | Resolved: 2021-07-02

## 2021-07-02 PROBLEM — E87.5 HYPERKALEMIA: Status: RESOLVED | Noted: 2021-05-12 | Resolved: 2021-07-02

## 2021-07-02 LAB
ANION GAP SERPL CALCULATED.3IONS-SCNC: 11 MMOL/L (ref 3–14)
BUN SERPL-MCNC: 13 MG/DL (ref 7–25)
CALCIUM SERPL-MCNC: 7.2 MG/DL (ref 8.6–10.3)
CHLORIDE SERPL-SCNC: 104 MMOL/L (ref 98–107)
CO2 SERPL-SCNC: 22 MMOL/L (ref 21–31)
CREAT SERPL-MCNC: 0.69 MG/DL (ref 0.7–1.3)
ERYTHROCYTE [DISTWIDTH] IN BLOOD BY AUTOMATED COUNT: 13.6 % (ref 10–15)
GFR SERPL CREATININE-BSD FRML MDRD: >90 ML/MIN/{1.73_M2}
GLUCOSE SERPL-MCNC: 138 MG/DL (ref 70–105)
HCT VFR BLD AUTO: 30.4 % (ref 40–53)
HGB BLD-MCNC: 9.6 G/DL (ref 13.3–17.7)
MCH RBC QN AUTO: 28.9 PG (ref 26.5–33)
MCHC RBC AUTO-ENTMCNC: 31.6 G/DL (ref 31.5–36.5)
MCV RBC AUTO: 92 FL (ref 78–100)
PLATELET # BLD AUTO: 262 10E9/L (ref 150–450)
POTASSIUM SERPL-SCNC: 3.8 MMOL/L (ref 3.5–5.1)
PROCALCITONIN SERPL-MCNC: 4.77 NG/ML
RBC # BLD AUTO: 3.32 10E12/L (ref 4.4–5.9)
SODIUM SERPL-SCNC: 137 MMOL/L (ref 134–144)
WBC # BLD AUTO: 12 10E9/L (ref 4–11)

## 2021-07-02 PROCEDURE — 94640 AIRWAY INHALATION TREATMENT: CPT | Mod: 76

## 2021-07-02 PROCEDURE — 84145 PROCALCITONIN (PCT): CPT | Performed by: INTERNAL MEDICINE

## 2021-07-02 PROCEDURE — 250N000009 HC RX 250: Performed by: FAMILY MEDICINE

## 2021-07-02 PROCEDURE — 36415 COLL VENOUS BLD VENIPUNCTURE: CPT | Performed by: FAMILY MEDICINE

## 2021-07-02 PROCEDURE — 94640 AIRWAY INHALATION TREATMENT: CPT

## 2021-07-02 PROCEDURE — 85027 COMPLETE CBC AUTOMATED: CPT | Performed by: FAMILY MEDICINE

## 2021-07-02 PROCEDURE — 87040 BLOOD CULTURE FOR BACTERIA: CPT | Performed by: INTERNAL MEDICINE

## 2021-07-02 PROCEDURE — 258N000003 HC RX IP 258 OP 636: Performed by: FAMILY MEDICINE

## 2021-07-02 PROCEDURE — 999N000157 HC STATISTIC RCP TIME EA 10 MIN

## 2021-07-02 PROCEDURE — 97116 GAIT TRAINING THERAPY: CPT | Mod: GP

## 2021-07-02 PROCEDURE — 250N000012 HC RX MED GY IP 250 OP 636 PS 637: Performed by: FAMILY MEDICINE

## 2021-07-02 PROCEDURE — 250N000011 HC RX IP 250 OP 636: Performed by: FAMILY MEDICINE

## 2021-07-02 PROCEDURE — 36415 COLL VENOUS BLD VENIPUNCTURE: CPT | Performed by: INTERNAL MEDICINE

## 2021-07-02 PROCEDURE — 250N000013 HC RX MED GY IP 250 OP 250 PS 637: Performed by: FAMILY MEDICINE

## 2021-07-02 PROCEDURE — 120N000001 HC R&B MED SURG/OB

## 2021-07-02 PROCEDURE — 99233 SBSQ HOSP IP/OBS HIGH 50: CPT | Performed by: INTERNAL MEDICINE

## 2021-07-02 PROCEDURE — 80048 BASIC METABOLIC PNL TOTAL CA: CPT | Performed by: FAMILY MEDICINE

## 2021-07-02 PROCEDURE — 97535 SELF CARE MNGMENT TRAINING: CPT | Mod: GO | Performed by: OCCUPATIONAL THERAPIST

## 2021-07-02 RX ORDER — PREDNISONE 20 MG/1
40 TABLET ORAL DAILY
Status: DISCONTINUED | OUTPATIENT
Start: 2021-07-03 | End: 2021-07-03 | Stop reason: HOSPADM

## 2021-07-02 RX ADMIN — METFORMIN HYDROCHLORIDE 1000 MG: 1000 TABLET ORAL at 08:45

## 2021-07-02 RX ADMIN — INSULIN ASPART 3 UNITS: 100 INJECTION, SOLUTION INTRAVENOUS; SUBCUTANEOUS at 17:27

## 2021-07-02 RX ADMIN — ENOXAPARIN SODIUM 40 MG: 100 INJECTION SUBCUTANEOUS at 16:41

## 2021-07-02 RX ADMIN — INSULIN ASPART 1 UNITS: 100 INJECTION, SOLUTION INTRAVENOUS; SUBCUTANEOUS at 08:45

## 2021-07-02 RX ADMIN — IPRATROPIUM BROMIDE AND ALBUTEROL SULFATE 3 ML: .5; 3 SOLUTION RESPIRATORY (INHALATION) at 10:22

## 2021-07-02 RX ADMIN — INSULIN ASPART 2 UNITS: 100 INJECTION, SOLUTION INTRAVENOUS; SUBCUTANEOUS at 12:37

## 2021-07-02 RX ADMIN — CEFEPIME HYDROCHLORIDE 2 G: 2 INJECTION, POWDER, FOR SOLUTION INTRAVENOUS at 11:26

## 2021-07-02 RX ADMIN — METFORMIN HYDROCHLORIDE 1000 MG: 1000 TABLET ORAL at 17:28

## 2021-07-02 RX ADMIN — SODIUM CHLORIDE: 9 INJECTION, SOLUTION INTRAVENOUS at 04:47

## 2021-07-02 RX ADMIN — METRONIDAZOLE 500 MG: 500 INJECTION, SOLUTION INTRAVENOUS at 22:34

## 2021-07-02 RX ADMIN — CEFEPIME HYDROCHLORIDE 2 G: 2 INJECTION, POWDER, FOR SOLUTION INTRAVENOUS at 02:30

## 2021-07-02 RX ADMIN — METRONIDAZOLE 500 MG: 500 INJECTION, SOLUTION INTRAVENOUS at 12:16

## 2021-07-02 RX ADMIN — IPRATROPIUM BROMIDE AND ALBUTEROL SULFATE 3 ML: .5; 3 SOLUTION RESPIRATORY (INHALATION) at 14:14

## 2021-07-02 RX ADMIN — METHYLPREDNISOLONE SODIUM SUCCINATE 40 MG: 40 INJECTION, POWDER, FOR SOLUTION INTRAMUSCULAR; INTRAVENOUS at 11:48

## 2021-07-02 RX ADMIN — TAMSULOSIN HYDROCHLORIDE 0.4 MG: 0.4 CAPSULE ORAL at 21:09

## 2021-07-02 RX ADMIN — CEFEPIME HYDROCHLORIDE 2 G: 2 INJECTION, POWDER, FOR SOLUTION INTRAVENOUS at 17:27

## 2021-07-02 RX ADMIN — IPRATROPIUM BROMIDE AND ALBUTEROL SULFATE 3 ML: .5; 3 SOLUTION RESPIRATORY (INHALATION) at 19:13

## 2021-07-02 RX ADMIN — AZITHROMYCIN MONOHYDRATE 500 MG: 500 INJECTION, POWDER, LYOPHILIZED, FOR SOLUTION INTRAVENOUS at 13:22

## 2021-07-02 RX ADMIN — IPRATROPIUM BROMIDE AND ALBUTEROL SULFATE 3 ML: .5; 3 SOLUTION RESPIRATORY (INHALATION) at 06:20

## 2021-07-02 RX ADMIN — ATORVASTATIN CALCIUM 40 MG: 40 TABLET, FILM COATED ORAL at 21:09

## 2021-07-02 RX ADMIN — INSULIN GLARGINE 8 UNITS: 100 INJECTION, SOLUTION SUBCUTANEOUS at 21:09

## 2021-07-02 ASSESSMENT — ACTIVITIES OF DAILY LIVING (ADL)
ADLS_ACUITY_SCORE: 13

## 2021-07-02 NOTE — PLAN OF CARE
Patient is pleasant. Vitals are stable. Patient's weathers was removed this morning to try a void trial. Patient was told to push call light when he feels like he needs to void.     1330- patient stated that he needed to void. Patient was unable to void. Patient attempted 5 more times within 15 minutes but could not void. Patient was bladder scanned and had 648ml. A new indwelling catheter was placed. The patient had 850ml out after insertion.   Wayne Garcia RN on 7/2/2021 at 2:18 PM

## 2021-07-02 NOTE — PROGRESS NOTES
[facility]    Hospitalist Progress Note      Assessment & Plan   Neel Kerr is a 80 year old male who was admitted on 6/30/2021.     Acute on chronic respiratory failure with hypoxia (H)    Assessment: Improving, initially requiring 5 L via nasal cannula in the ER, this is now been weaned to room air with no further productive cough, etiology likely secondary to bacterial pneumonia from an isolated aspiration event prior to admission.  No further respiratory symptoms, sputum culture positive for Klebsiella but given blood culture positivity without formal sensitivities will remain on broad-spectrum antibiotics at this point.    Plan:   -Continue cefepime, Flagyl day 3  -discontinue azithromycin after day 3  -Follow-up blood culture results from admit  -Repeat blood cultures today to ensure clearing  -Scheduled nebulizers  -po prednisone day 3      Sepsis due to other etiology (H)    Assessment: Criteria met on admit given fever, leukocytosis, tachypnea etiology likely secondary to the above.  Procalcitonin increasing today but clinically improving.  No other evidence of urinary, soft tissue or GI related infections.    Plan:   -Management as above    Urinary retention  Assessment: Relatively chronic with Mata catheter placed previously, he wanted to attempt a trial of void today but failed.  Plan:  - Resume Mata with changes every 3 weeks in urology clinic  - Continue home Flomax    Active Problems:    Hyponatremia    Assessment: Resolved with IV fluid resuscitation    Plan:   - Monitor        HTN (hypertension)    Assessment: Stable and not actively treated    Plan:   -Monitor      COPD (chronic obstructive pulmonary disease) (H)    Assessment: Chronic, no supplemental oxygen requirement at baseline, mild exacerbation on admit.    Plan:   - antibiotics, steroids and nebulizers as above        Type 2 diabetes mellitus, with long-term current use of insulin (H)    Assessment: Controlled with  last hemoglobin A1c of 13.4, blood sugars stable here    Plan:   -Continue home Lantus and sliding scale  -Continue home Metformin      Underweight    Assessment: Incidentally noted, seen by dietary has actually gained weight in the past month.    Deconditioning  Assessment: Patient has been at [facility] for acute rehab, is adamant that he does not want to return there for acute rehab, he is also adamant that he does not want to continue with home care for further rehab after being offered this.  He like he is at his baseline, he was evaluated by physical therapy and thought to be approaching his baseline in regards to mobility.  He remains deconditioned and a falls risk, he understands risks and benefits of his decisions and certainly his decision making capacity to return home of his own accord.    Diet: Regular Diet Adult Thin Liquids (water, ice chips, juice, milk, gelatin, ice cream, etc)    DVT Prophylaxis: Enoxaparin (Lovenox) SQ  Mata Catheter: PRESENT, indication: Other (Comment)  Code Status: Full Code           Disposition Plan   Expected discharge: Tomorrow, recommended to prior living arrangement once antibiotic plan established.  Entered: Dawit Chris MD 07/02/2021, 2:37 PM       The patient's care was discussed with the Patient.    Dawit Chris MD  Hospitalist Service  [facility]  Contact information available via [facility] Paging/Directory    ______________________________________________________________________    Interval History   Overnight no acute events and afebrile, feeling significantly improved, no shortness of breath cough chest pain orthopnea or increased lower extremity edema, he like to do a trial of void today to see if he can avoid having a Mata catheter after returning home.  He does not want to return back to [facility] and he is unwilling to have home care.  He also does not want to consider ambulatory physical therapy.  He thinks he is back to his best and  "prefers to \"take my chances\".  He is eager to discharge but willing to stay 1 more day to await susceptibilities on blood cultures.    -Data reviewed today: I reviewed all new labs and imaging results over the last 24 hours. I personally reviewed no images or EKG's today.    Physical Exam   Temp: 98  F (36.7  C) Temp src: Tympanic BP: (!) 144/60 Pulse: 67   Resp: 18 SpO2: 93 % O2 Device: None (Room air)    Vitals:    06/30/21 1230 07/01/21 0438 07/02/21 0200   Weight: 56.7 kg (125 lb) 55 kg (121 lb 3.2 oz) 56.6 kg (124 lb 11.2 oz)     Vital Signs with Ranges  Temp:  [97.7  F (36.5  C)-98.5  F (36.9  C)] 98  F (36.7  C)  Pulse:  [64-76] 67  Resp:  [18-20] 18  BP: (136-159)/(55-74) 144/60  SpO2:  [90 %-95 %] 93 %  I/O last 3 completed shifts:  In: 2964.9 [I.V.:2964.9]  Out: 3575 [Urine:3575]    Exam:   GENERAL: Talkative, laying in bed, in no apparent distress.  CARDIOVASCULAR: regular rate and rhythm, no murmurs, rubs, or gallops. Normal S1/S2. No lower extremity edema.   RESPIRATORY: clear to auscultation bilaterally, no wheezes, no crackles.   GI: soft, non-tender, non-distended, normoactive bowel sounds.  Mata in place with clear yellow urine.  MUSCULOSKELETAL: warm and well perfused, 2+ dorsalis pedis pulses bilaterally.    SKIN: no pallor, jaundice, or rashes    Medications     sodium chloride 100 mL/hr at 07/02/21 0447       atorvastatin  40 mg Oral At Bedtime     azithromycin  500 mg Intravenous Daily     ceFEPIme (MAXIPIME) IV  2 g Intravenous Q8H     enoxaparin ANTICOAGULANT  40 mg Subcutaneous Q24H     insulin aspart  1-7 Units Subcutaneous TID AC     insulin aspart  1-5 Units Subcutaneous At Bedtime     insulin glargine  8 Units Subcutaneous At Bedtime     ipratropium - albuterol 0.5 mg/2.5 mg/3 mL  3 mL Nebulization 4x daily     metFORMIN  1,000 mg Oral BID w/meals     methylPREDNISolone  40 mg Intravenous Q24H     metroNIDAZOLE  500 mg Intravenous Q12H     sodium chloride (PF)  3 mL Intracatheter Q8H "     tamsulosin  0.4 mg Oral At Bedtime       Data   Recent Labs   Lab 07/02/21  0635 07/01/21  0605 06/30/21  1107 06/30/21  1005   WBC 12.0* 13.0*  --  16.7*   HGB 9.6* 9.7*  --  12.1*   MCV 92 91  --  91    251  --  337   INR  --   --   --  0.98    137  --  131*   POTASSIUM 3.8 3.6 3.9 4.1   CHLORIDE 104 108*  --  98   CO2 22 22  --  19*   BUN 13 13  --  22   CR 0.69* 0.59* 0.80 0.87   ANIONGAP 11 7  --  14   CARLOS 7.2* 6.9*  --  8.5*   * 152*  --  252*   ALBUMIN  --   --   --  3.6   PROTTOTAL  --   --   --  7.4   BILITOTAL  --   --   --  0.5   ALKPHOS  --   --   --  62   ALT  --   --   --  10   AST  --   --   --  13       No results found for this or any previous visit (from the past 24 hour(s)).

## 2021-07-02 NOTE — PROGRESS NOTES
07/02/21 1400   Signing Clinician's Name / Credentials   Signing clinician's name / credentials Guillermo Gonzalez DPT   Quick Adds   Rehab Discipline PT   Gait Training   Symptoms Noted During/After Treatment (Gait Training) fatigue   Distance in Feet (Required for LE Total Joints) 300   Treatment Detail ambulation in hallway   [place] Level (Gait Training) contact guard   Physical Assistance Level (Gait Training) 1 person assist   Weight Bearing (Gait Training) full weight-bearing   Therapeutic Activity   Treatment Detail SBA with all transfers and bed mobility   PT Discharge Planning    PT Discharge Recommendation (DC Rec) home with assist   PT Rationale for DC Rec patient approaching baseline mobility   PT Brief overview of current status  SBA or CGA for all mobility. No assistive device with ambulation.   Additional Documentation   PT Plan Continue PT   Total Session Time   Total Session Time (minutes) 25 minutes

## 2021-07-02 NOTE — DISCHARGE SUMMARY
"[facility]    Discharge Summary  Hospitalist    Date of Admission:  6/30/2021  Date of Discharge:  7/3/2021  Discharging Provider: Dawit Chris  Date of Service (when I saw the patient): 07/03/21    Discharge Diagnoses   Principal Problem:    Acute on chronic respiratory failure with hypoxia (H) (9/21/2020)  Active Problems:    Hyponatremia (9/21/2020)    HTN (hypertension) (2/11/2015)    COPD (chronic obstructive pulmonary disease) (H) (5/18/2021)    Sepsis due to other etiology (H) (6/30/2021)    Multifocal pneumonia (6/30/2021)    Type 2 diabetes mellitus, with long-term current use of insulin (H) (6/30/2021)    Underweight (7/2/2021)      History of Present Illness   Neel Kerr is an 80 year old male who presented with the above.  Patient was admitted by Dr. Thurston and per H&P, \"80 year old male who seem to be in his usual state of health this morning.  He ate breakfast and promptly vomited.  Patient then developed hypoxia, tachycardia and fever.     Also staff noted this morning he was having hematuria and then no urine output.  30 ml flush performed and patient put out 600 mL.     Patient tells me that he feels okay.  He remembers that he is from [facility] and lives there and recently moved to [facility] after having Covid in May.  Tells me that he lives alone.  Seems like a fairly reliable historian but does not remember much about the events of this morning at present.     Currently denies any pain.  Denies any shortness of breath.  Tells me that he feels \"okay\" and has no concerns.\"     Hospital Course   Neel Kerr was admitted on 6/30/2021.  The following problems were addressed during his hospitalization:    Acute on chronic respiratory failure with hypoxia (H): He initially required 5 L via nasal cannula in the ER, this is now been weaned to room air with resting saturations greater than 90% on room air at rest with no further productive cough, etiology likely secondary to " bacterial pneumonia from an isolated aspiration versus healthcare associated pneumonia prior to admission.  No further respiratory symptoms, sputum culture positive for Klebsiella.  He was initially started on cefepime, azithromycin and Flagyl but given his culture results will complete a 10 day course of Levaquin as well as a 5-day course of prednisone.  He was seen and evaluated speech therapy, had no evidence of aspiration from their assessment and no dietary modifications made at this time.  He will follow-up in clinic with his PCP as scheduled.       Sepsis due to other etiology (H):  Initial criteria met on admit given fever, leukocytosis, tachypnea etiology likely secondary to the above.    With initial fluids and antibiotics improved, he remained afebrile, leukocytosis improved, procalcitonin trended down.  No other evidence of soft tissue or GI related infections.  Of note, blood cultures positive for E. coli and Enterococcus also susceptible to quinolones as above.  Repeat blood cultures were obtained and no growth to date without evidence of persistent bacteremia.  No urinalysis or urine culture were obtained on admit.  High suspicion for possible catheter associated UTI present on admission that would cause this infection.  Mata catheter was changed out day prior to discharge.  Again he will complete a full course of Levaquin for treatment as above.      Urinary retention: Mata catheter present on admission, he wanted to attempt a trial of void today but failed.  Mata was replaced, he will continue with changes every 3 weeks and continue his home Flomax.       Hyponatremia: Resolved with IV fluid resuscitation        HTN (hypertension): Frequent high blood pressures during his stay, requiring hydralazine as needed.  He will start Norvasc 5 mg daily and need further monitoring time of PCP follow-up.         COPD (chronic obstructive pulmonary disease) (H): Chronic, no supplemental oxygen requirement at  baseline, mild exacerbation on admit and improved with the above interventions.  Supplemental oxygen requirement prior to discharge with resting sats greater than 97 on room air at rest.        Type 2 diabetes mellitus, with long-term current use of insulin (H): Uncontrolled with last hemoglobin A1c of 13.4, blood sugars stable here and he will continue his home Lantus and Metformin.  He did not need supplies refilled the time of discharge.       Underweight: Incidentally noted, seen by dietary has actually gained weight in the past month.     Deconditioning: Patient has been at [facility] for acute rehab since prolonged hospitalization for Covid.  During his stay here he was adamant that he did not want to return to [facility] for acute rehab, he is also adamant that he does not want to continue with home care for further rehab after being offered this.  He  was evaluated by physical therapy and thought to be approaching his baseline in regards to mobility.  He remains deconditioned and a falls risk, he also understands risks and benefits of his decisions and certainly has decision making capacity to return home of his own accord.  On day of discharge he was initially discharged to home but after further discussion with his brother, brother insisted on returning patient to [facility] and patient subsequently agreed.    Dawit Chris MD    Significant Results and Procedures   none    Pending Results   These results will be followed up by pcp  Unresulted Labs Ordered in the Past 30 Days of this Admission     Date and Time Order Name Status Description    7/2/2021 0808 Blood culture Preliminary     7/2/2021 0808 Blood culture Preliminary     6/30/2021 0955 Sputum Culture Aerobic Bacterial Preliminary           Code Status   Full Code       Primary Care Physician   Norma Taylor    Physical Exam   Temp: 97.7  F (36.5  C) Temp src: Tympanic BP: (!) 157/84 Pulse: 90   Resp: 16 SpO2: 92 % O2 Device: None  (Room air)    Vitals:    07/01/21 0438 07/02/21 0200 07/03/21 0137   Weight: 55 kg (121 lb 3.2 oz) 56.6 kg (124 lb 11.2 oz) 54.1 kg (119 lb 3.2 oz)     Vital Signs with Ranges  Temp:  [97.7  F (36.5  C)-99.1  F (37.3  C)] 97.7  F (36.5  C)  Pulse:  [61-97] 90  Resp:  [12-20] 16  BP: (140-200)/(76-97) 157/84  SpO2:  [92 %-97 %] 92 %  I/O last 3 completed shifts:  In: 3200 [P.O.:2100; I.V.:1100]  Out: 5375 [Urine:5375]    Exam on day of discharge:   GENERAL: Talkative,  pleasant and appropriate, laying in bed, in no apparent distress.  HEENT: Poor dentition  CARDIOVASCULAR: regular rate and rhythm, no murmurs, rubs, or gallops. Normal S1/S2. No lower extremity edema.   RESPIRATORY: clear to auscultation bilaterally, no wheezes, no crackles.   GI: soft, non-tender, non-distended, normoactive bowel sounds.  Weathers in place with clear yellow urine.  MUSCULOSKELETAL: warm and well perfused, 2+ dorsalis pedis pulses bilaterally.    SKIN: no pallor, jaundice, or rashes  Neuro: Awake alert and oriented x3, moves all extremities symmetrically, grossly nonfocal.    Discharge Disposition   Discharged to short-term care facility  Condition at discharge: Stable    Consultations This Hospital Stay   SWALLOW EVAL SPEECH PATH AT BEDSIDE IP CONSULT  SOCIAL WORK IP CONSULT  PHYSICAL THERAPY ADULT IP CONSULT  OCCUPATIONAL THERAPY ADULT IP CONSULT  OCCUPATIONAL THERAPY ADULT IP CONSULT  PHYSICAL THERAPY ADULT IP CONSULT    Time Spent on this Encounter   I, Dawit Chris MD, personally saw the patient today and spent greater than 30 minutes discharging this patient.    Discharge Orders      When to contact your care team    Call your primary doctor if you have any of the following: temperature greater than 101,  increased shortness of breath, increased drainage, increased swelling or increased pain.     Tubes and drains    You are going home with the following tubes or drains: weathers catheter.  Follow the attached instructions to care for  your tube     Monitor and record    blood glucose 4 times a day, before meals and at bedtime     Discharge Instructions    -Take the antibiotic Levaquin once daily until it is gone to clear up your infection  -Start taking amlodipine daily to improve your blood pressure   -continue with your long and short acting insulin and blood sugar checks as you have previously  -Take the prednisone for 2 more days to help clear up your lungs     Follow-up and recommended labs and tests     Laly Romero NP 7/15/21 9:20 am    Make an appointment with Dr. August in 3 weeks to have your catheter changed     General info for SNF    Length of Stay Estimate: Short Term Care: Estimated # of Days <30  Condition at Discharge: Improving  Level of care:skilled   Rehabilitation Potential: Good  Admission H&P remains valid and up-to-date: Yes  Recent Chemotherapy: N/A  Use Nursing Home Standing Orders: Yes     Mantoux instructions    Give two-step Mantoux (PPD) Per Facility Policy Yes     Reason for your hospital stay    Sepsis due to pneumonia     Mata catheter    To straight gravity drainage. Change catheter every 3 weeks and PRN for leaking or decreased uring output with signs of bladder distention. DO NOT change catheter without a specific MD order IF diagnosis of benign prostatic hypertrophy (BPH), neurogenic bladder, or other urological conditions     Activity - Up ad alan     Full Code     Occupational Therapy Adult Consult    Evaluate and treat as clinically indicated.    Reason:  Deconditioning and weakness     Physical Therapy Adult Consult    Evaluate and treat as clinically indicated.    Reason:  Deconditioning and weakness     Diet    Follow this diet upon discharge: Orders Placed This Encounter      Regular Diet Adult Thin Liquids (water, ice chips, juice, milk, gelatin, ice cream, etc)     Discharge Medications   Current Discharge Medication List      START taking these medications    Details   amLODIPine (NORVASC) 5 MG tablet  Take 1 tablet (5 mg) by mouth daily  Qty: 30 tablet, Refills: 0    Associated Diagnoses: Essential hypertension      levofloxacin (LEVAQUIN) 750 MG tablet Take 1 tablet (750 mg) by mouth daily for 6 days  Qty: 6 tablet, Refills: 0    Associated Diagnoses: Sepsis due to other etiology (H)      predniSONE (DELTASONE) 20 MG tablet Take 2 tablets (40 mg) by mouth daily for 2 days  Qty: 4 tablet, Refills: 0    Associated Diagnoses: Chronic obstructive pulmonary disease, unspecified COPD type (H)         CONTINUE these medications which have NOT CHANGED    Details   atorvastatin (LIPITOR) 80 MG tablet Take 40 mg by mouth At Bedtime      insulin aspart (NOVOLOG FLEXPEN) 100 UNIT/ML pen Inject as per sliding scale:  0-139 = 0 units, 140-154 = 1 unit, 155-164 = 3 units, 165-179 = 5 units, 180-199 = 6 units, 200-229 = 7 units, 230-259 = 9 units, 260-279 = 10 units, 280-299 = 11 units, 300-329 = 13 units, 330-359 = 14 units, 360-389 = 15 units, 390-419 = 16 units, 420-999 = 18 units and notify PCP.      insulin glargine (LANTUS PEN) 100 UNIT/ML pen Inject 8 Units Subcutaneous At Bedtime  Qty:      Comments: If Lantus is not covered by insurance, may substitute Basaglar at same dose and frequency.    Associated Diagnoses: Diabetic ketoacidosis without coma associated with type 2 diabetes mellitus (H); Type 2 diabetes mellitus without complication, with long-term current use of insulin (H)      metFORMIN (GLUCOPHAGE) 1000 MG tablet Take 1,000 mg by mouth 2 times daily (with meals)       tamsulosin (FLOMAX) 0.4 MG capsule Take 1 capsule (0.4 mg) by mouth daily  Qty:      Associated Diagnoses: Urinary retention      acetaminophen (TYLENOL) 325 MG tablet Take 2 tablets (650 mg) by mouth every 4 hours as needed for mild pain  Qty:        albuterol (PROAIR HFA/PROVENTIL HFA/VENTOLIN HFA) 108 (90 Base) MCG/ACT inhaler Inhale 2 puffs into the lungs every 6 hours as needed for wheezing   Qty:      Comments: Pharmacy may dispense brand  covered by insurance (Proair, or proventil or ventolin or generic albuterol inhaler)      ipratropium-albuterol (COMBIVENT RESPIMAT)  MCG/ACT inhaler Inhale 1 puff into the lungs 4 times daily    Associated Diagnoses: Chronic obstructive pulmonary disease, unspecified COPD type (H)           Allergies   Allergies   Allergen Reactions     Augmentin      Marked as an allergy at the [facility]     Data   Most Recent 3 CBC's:  Recent Labs   Lab Test 07/03/21  0520 07/02/21  0635 07/01/21  0605   WBC 11.6* 12.0* 13.0*   HGB 10.4* 9.6* 9.7*   MCV 90 92 91    262 251      Most Recent 3 BMP's:  Recent Labs   Lab Test 07/02/21  0635 07/01/21  0605 06/30/21  1107 06/30/21  1005    137  --  131*   POTASSIUM 3.8 3.6 3.9 4.1   CHLORIDE 104 108*  --  98   CO2 22 22  --  19*   BUN 13 13  --  22   CR 0.69* 0.59* 0.80 0.87   ANIONGAP 11 7  --  14   CARLOS 7.2* 6.9*  --  8.5*   * 152*  --  252*     Most Recent 2 LFT's:  Recent Labs   Lab Test 06/30/21  1005 06/08/21  1536   AST 13 12*   ALT 10 10   ALKPHOS 62 71   BILITOTAL 0.5 0.3     Most Recent INR's and Anticoagulation Dosing History:  Anticoagulation Dose History     Recent Dosing and Labs Latest Ref Rng & Units 11/27/2020 5/12/2021 6/30/2021    INR 0.86 - 1.14 0.96 1.11 0.98        Most Recent 3 Troponin's:  Recent Labs   Lab Test 05/12/21  1846 05/11/21  2005 11/27/20  2249   TROPI <0.015 <0.015 <0.015     Most Recent Cholesterol Panel:  Recent Labs   Lab Test 12/02/19   CHOL 203*   *   HDL 27*   TRIG 288*     Most Recent 6 Bacteria Isolates From Any Culture (See EPIC Reports for Culture Details):  Recent Labs   Lab Test 07/02/21  0850 06/30/21  1118 06/30/21  1006 09/20/20  1627 09/20/20  1604 01/23/17  1450   CULT No growth after 1 day  No growth after 1 day Light growth  Klebsiella oxytoca  * Escherichia coli*  Enterococcus faecalis*  Critical Value called to and read back by  LUCAS IN MED SURGE 06/30/21 2208 KL    Gram positive cocci  Gram  negative rods  *  4 OF 4 BOTTLES POSITIVE  Critical result, provider not notified due to previous critical result notification.  refer to culture  for ID and Sensitivity. No growth after 6 days No growth after 6 days No growth after 6 days     Most Recent TSH, T4 and A1c Labs:  Recent Labs   Lab Test 05/11/21  2325   A1C 13.4*     Results for orders placed or performed during the hospital encounter of 06/30/21   XR Chest Port 1 View    Narrative    PROCEDURE:  XR CHEST PORT 1 VIEW    HISTORY: Fever. .    COMPARISON:  5/13/2021    FINDINGS:    The cardiomediastinal contours are stable. There is calcific aortic  atherosclerosis.   Recurrent or residual multifocal infiltrates are present, superimposed  upon emphysema, basilar predominant. No pneumothorax or substantial  effusion is present.      Impression    IMPRESSION:  Findings suspicious for recurrent multifocal pneumonia  superimposed on emphysema. Aspiration is also in the differential.  Recommend follow-up to resolution.      JONATAN CARTER MD          SYSTEM ID:  [id number]   CT Chest Pulmonary Embolism w Contrast    Narrative    PROCEDURE:  CT CHEST PULMONARY EMBOLISM W CONTRAST.    HISTORY:  Evaluate for pulmonary embolism.  PE suspected,  low/intermediate prob, positive D-dimer    TECHNIQUE:  Initial pre-contrast  and localizer images were  obtained.  Contrast enhanced helical CT pulmonary angiography was then  performed.  Routine transaxial and post-processed (multiplanar and/or  MIP) reformations were obtained.    COMPARISON:  5/13/2021    MEDS/CONTRAST: 65 ml isovue 370    PULMONARY CTA FINDINGS:  This is a diagnostic quality helical CT  pulmonary angiogram.  There is no acute pulmonary embolism to the  first subsegmental pulmonary artery level.    OTHER FINDINGS:      Cardiac size is stable. Multiple enlarged thoracic lymph nodes are  similar in appearance to the prior. The largest is a right infrahilar  node spanning up to 2.0 x 2.1  cm.    Limited views of the upper abdomen reveal no adrenal mass or acute  process. Debris is present in the thoracic esophagus.    There is moderate to advanced centrilobular emphysema. There is  confluent consolidative opacity superimposed upon emphysema in the  lower lobes, similar in distribution to the prior. Patchy groundglass  opacity is present in the left upper lobe, similar to the immediate  prior and new when compared to more remote priors.    No suspicious osseous lesion is identified.      Impression    IMPRESSION:    No acute pulmonary emboli to the subsegmental level.    Fairly extensive lower lobe airspace consolidation superimposed upon  moderate to severe centrilobular emphysema. Mildly enlarged  mediastinal and right greater than left hilar nodes. The appearance is  similar to the immediate prior.    Differential considerations include recurrent aspiration, unresolved  pneumonia or less likely lymphangitic carcinomatosis. Consider  pulmonology consultation.     JONATAN CARTER MD          SYSTEM ID:  [id number]

## 2021-07-02 NOTE — PROGRESS NOTES
SAFETY CHECKLIST  ID Bands and Risk clasps correct and in place (DNR, Fall risk, Allergy, Latex, Limb):  Yes  All Lines Reconciled and labeled correctly: Yes  Whiteboard updated:Yes  Environmental interventions (bed/chair alarm on, call light, side rails, restraints, sitter....): Yes  Verify Tele #: [id number]    Muna Verduzco RN on 7/1/2021 at 7:32 PM

## 2021-07-02 NOTE — PROGRESS NOTES
Weights reviewed per MST screen: pt has gained wt in past month, no significant wt loss noted. Has been at [facility] since May this year. Overall lost ~6% in past year but did have COVID and decreased appetite. Follow up weekly unless needed sooner. If so, please order consult.   Wt Readings from Last 10 Encounters:   07/02/21 56.6 kg (124 lb 11.2 oz)   06/14/21 54.1 kg (119 lb 3.2 oz)   06/08/21 53.5 kg (118 lb)   06/03/21 50.8 kg (112 lb)   05/25/21 53.7 kg (118 lb 6.2 oz)   09/20/20 60.5 kg (133 lb 6.1 oz)   07/05/17 63.5 kg (140 lb)   02/11/15 70.5 kg (155 lb 6.8 oz)   Giovanna Luther RD on 7/2/2021 at 10:47 AM

## 2021-07-02 NOTE — PLAN OF CARE
"Patient is alert and oriented x 4. Forgetful at times. Lung sounds are clear in upper lobes and diminished in the bases. Denies shortness of breath. SpO2 91-92% RA. HS blood sugar 321. Novolog and Lantus administered per MAR. Follow up blood sugar was 173. Patient ambulates with a stand-by assist. Bowel sounds are audible and active. Mata is patent with an output of 2925 mL.  Vital signs:  Temp: 97.7  F (36.5  C) Temp src: Tympanic BP: (!) 149/55 Pulse: 68   Resp: 20 SpO2: 91 % O2 Device: None (Room air) Oxygen Delivery: 1 LPM(put on room air)   Weight: 56.6 kg (124 lb 11.2 oz)  Estimated body mass index is 19.53 kg/m  as calculated from the following:    Height as of 6/8/21: 1.702 m (5' 7\").    Weight as of this encounter: 56.6 kg (124 lb 11.2 oz).      Muna Verduzco RN on 7/2/2021 at 5:51 AM    "

## 2021-07-02 NOTE — PROGRESS NOTES
07/02/21 1442   Signing Clinician's Name / Credentials   Signing clinician's name / credentials Lucy Palma OTR/L    Quick Adds   Rehab Discipline OT   Walk-In Shower Transfer Training   Walk-In Shower Transfer [place] Level (Training) stand-by assist   Walk-In Shower Transfer Physical Assistance Level (Training) supervision   Gait Training   Symptoms Noted During/After Treatment (Gait Training) fatigue   [place] Level (Gait Training) contact guard   Physical Assistance Level (Gait Training) 1 person assist   Bathing Training   [place] Level (Bathing Training) minimum assist (75% patient effort)   Assistance (Bathing Training) 1 person assist   Upper Body Dressing Training   [place] Level (Upper Body Dressing Training) stand-by assist   Assistance (Upper Body Dressing Training) supervision   Lower Body Dressing Training   Lower Body Dressing Training Assistance minimum assist (75% patient effort)   OT Discharge Planning    OT Discharge Recommendation (DC Rec) home   OT Rationale for DC Rec pt will likely have no further OT needs at D/C    OT Brief overview of current status  Pt progressing well, ambulating with SBA/CGA and completed full shower today with SBA, min assist for L/B dressing. Pt feels better, states he is at or near his baseline function.    Additional Documentation   OT Plan cont OT    Total Session Time   Total Session Time (minutes) 60 minutes

## 2021-07-02 NOTE — PROGRESS NOTES
Shift summary:    Pts:  RR: 20  Work of Breathing:  Patient has no complaints of shortness of breath  Breath sounds: diminished and clear  HR 88  SpO2 92  FiO2 Room Air  Weaning needs none  Respiratory Equipment used neb kit  Respiratory Equipment changes none  Respiratory Equipment Settings N/A  Respiratory Home Equipment Needs none at this time  Plan Possible discharge tomorrow per discharge planning.

## 2021-07-03 ENCOUNTER — APPOINTMENT (OUTPATIENT)
Dept: OCCUPATIONAL THERAPY | Facility: OTHER | Age: 81
DRG: 698 | End: 2021-07-03
Payer: COMMERCIAL

## 2021-07-03 ENCOUNTER — APPOINTMENT (OUTPATIENT)
Dept: PHYSICAL THERAPY | Facility: OTHER | Age: 81
DRG: 698 | End: 2021-07-03
Payer: COMMERCIAL

## 2021-07-03 VITALS
OXYGEN SATURATION: 90 % | RESPIRATION RATE: 16 BRPM | SYSTOLIC BLOOD PRESSURE: 157 MMHG | BODY MASS INDEX: 18.67 KG/M2 | HEART RATE: 90 BPM | TEMPERATURE: 97.7 F | WEIGHT: 119.2 LBS | DIASTOLIC BLOOD PRESSURE: 84 MMHG

## 2021-07-03 LAB
BACTERIA SPEC CULT: ABNORMAL
ERYTHROCYTE [DISTWIDTH] IN BLOOD BY AUTOMATED COUNT: 13.5 % (ref 10–15)
HCT VFR BLD AUTO: 31.3 % (ref 40–53)
HGB BLD-MCNC: 10.4 G/DL (ref 13.3–17.7)
MCH RBC QN AUTO: 29.9 PG (ref 26.5–33)
MCHC RBC AUTO-ENTMCNC: 33.2 G/DL (ref 31.5–36.5)
MCV RBC AUTO: 90 FL (ref 78–100)
PLATELET # BLD AUTO: 293 10E9/L (ref 150–450)
PROCALCITONIN SERPL-MCNC: 3.1 NG/ML
RBC # BLD AUTO: 3.48 10E12/L (ref 4.4–5.9)
SPECIMEN SOURCE: ABNORMAL
SPECIMEN SOURCE: ABNORMAL
WBC # BLD AUTO: 11.6 10E9/L (ref 4–11)

## 2021-07-03 PROCEDURE — 97530 THERAPEUTIC ACTIVITIES: CPT | Mod: GP

## 2021-07-03 PROCEDURE — 97116 GAIT TRAINING THERAPY: CPT | Mod: GP

## 2021-07-03 PROCEDURE — 36415 COLL VENOUS BLD VENIPUNCTURE: CPT | Performed by: INTERNAL MEDICINE

## 2021-07-03 PROCEDURE — 99239 HOSP IP/OBS DSCHRG MGMT >30: CPT | Performed by: INTERNAL MEDICINE

## 2021-07-03 PROCEDURE — 97535 SELF CARE MNGMENT TRAINING: CPT | Mod: GO

## 2021-07-03 PROCEDURE — 250N000011 HC RX IP 250 OP 636: Performed by: INTERNAL MEDICINE

## 2021-07-03 PROCEDURE — 250N000009 HC RX 250: Performed by: FAMILY MEDICINE

## 2021-07-03 PROCEDURE — 250N000013 HC RX MED GY IP 250 OP 250 PS 637: Performed by: INTERNAL MEDICINE

## 2021-07-03 PROCEDURE — 250N000011 HC RX IP 250 OP 636: Performed by: FAMILY MEDICINE

## 2021-07-03 PROCEDURE — 97530 THERAPEUTIC ACTIVITIES: CPT | Mod: GO

## 2021-07-03 PROCEDURE — 85027 COMPLETE CBC AUTOMATED: CPT | Performed by: INTERNAL MEDICINE

## 2021-07-03 PROCEDURE — 84145 PROCALCITONIN (PCT): CPT | Performed by: INTERNAL MEDICINE

## 2021-07-03 PROCEDURE — 94640 AIRWAY INHALATION TREATMENT: CPT

## 2021-07-03 PROCEDURE — 999N000157 HC STATISTIC RCP TIME EA 10 MIN

## 2021-07-03 PROCEDURE — 250N000012 HC RX MED GY IP 250 OP 636 PS 637: Performed by: INTERNAL MEDICINE

## 2021-07-03 PROCEDURE — 94640 AIRWAY INHALATION TREATMENT: CPT | Mod: 76

## 2021-07-03 RX ORDER — AMLODIPINE BESYLATE 5 MG/1
5 TABLET ORAL DAILY
Status: DISCONTINUED | OUTPATIENT
Start: 2021-07-03 | End: 2021-07-03 | Stop reason: HOSPADM

## 2021-07-03 RX ORDER — AMLODIPINE BESYLATE 5 MG/1
5 TABLET ORAL DAILY
Qty: 30 TABLET | Refills: 0 | Status: SHIPPED | OUTPATIENT
Start: 2021-07-03 | End: 2021-08-03

## 2021-07-03 RX ORDER — LEVOFLOXACIN 750 MG/1
750 TABLET, FILM COATED ORAL DAILY
Qty: 6 TABLET | Refills: 0 | Status: SHIPPED | OUTPATIENT
Start: 2021-07-03 | End: 2021-07-09

## 2021-07-03 RX ORDER — PREDNISONE 20 MG/1
40 TABLET ORAL DAILY
Qty: 4 TABLET | Refills: 0 | Status: SHIPPED | OUTPATIENT
Start: 2021-07-03 | End: 2021-07-05

## 2021-07-03 RX ORDER — LEVOFLOXACIN 750 MG/1
750 TABLET, FILM COATED ORAL DAILY
Status: DISCONTINUED | OUTPATIENT
Start: 2021-07-03 | End: 2021-07-03 | Stop reason: HOSPADM

## 2021-07-03 RX ORDER — HYDRALAZINE HYDROCHLORIDE 20 MG/ML
10 INJECTION INTRAMUSCULAR; INTRAVENOUS ONCE
Status: COMPLETED | OUTPATIENT
Start: 2021-07-03 | End: 2021-07-03

## 2021-07-03 RX ADMIN — PREDNISONE 40 MG: 20 TABLET ORAL at 10:28

## 2021-07-03 RX ADMIN — INSULIN ASPART 1 UNITS: 100 INJECTION, SOLUTION INTRAVENOUS; SUBCUTANEOUS at 08:25

## 2021-07-03 RX ADMIN — IPRATROPIUM BROMIDE AND ALBUTEROL SULFATE 3 ML: .5; 3 SOLUTION RESPIRATORY (INHALATION) at 10:54

## 2021-07-03 RX ADMIN — HYDRALAZINE HYDROCHLORIDE 10 MG: 20 INJECTION INTRAMUSCULAR; INTRAVENOUS at 05:42

## 2021-07-03 RX ADMIN — IPRATROPIUM BROMIDE AND ALBUTEROL SULFATE 3 ML: .5; 3 SOLUTION RESPIRATORY (INHALATION) at 06:13

## 2021-07-03 RX ADMIN — METFORMIN HYDROCHLORIDE 1000 MG: 1000 TABLET ORAL at 07:32

## 2021-07-03 RX ADMIN — CEFEPIME HYDROCHLORIDE 2 G: 2 INJECTION, POWDER, FOR SOLUTION INTRAVENOUS at 01:16

## 2021-07-03 RX ADMIN — AMLODIPINE BESYLATE 5 MG: 5 TABLET ORAL at 10:28

## 2021-07-03 RX ADMIN — LEVOFLOXACIN 750 MG: 750 TABLET, FILM COATED ORAL at 10:28

## 2021-07-03 ASSESSMENT — ACTIVITIES OF DAILY LIVING (ADL)
ADLS_ACUITY_SCORE: 13

## 2021-07-03 NOTE — PROGRESS NOTES
Discharge Note      Data:  Neel Kerr discharged to nursing home at 1210 via wheel chair. Accompanied by staff.    Action:  Written discharge/follow-up instructions were provided to patient and caregiver. Prescriptions sent to patients preferred pharmacy. All belongings sent with patient.    Response:  Patient and caregiver verbalized understanding of discharge instructions, reason for discharge, and necessary follow-up appointments.

## 2021-07-03 NOTE — PLAN OF CARE
Pt admitted 6/30/31 with hypoxia. VSS. Sats 93% on RA. Denies SOB. Lung sounds have crackles to bilat lower lobes. Mata patent. Blood sugar 344 at HS. Had snack with insulin. Able to make needs known.    0530- BP slowly increased throughout the night. Got 170//97, rechecked manual: 200/100. Pt asymptomatic. Recently had lab draw, states needles do not bother him, appears relaxed in bed. MD updated with order for 1x hydralazine. See MAR.

## 2021-07-03 NOTE — PLAN OF CARE
Occupational Therapy Discharge Summary    Reason for therapy discharge:    Discharged to transitional care facility.    Progress towards therapy goal(s). See goals on Care Plan in [facility] electronic health record for goal details.  Goals partially met.  Barriers to achieving goals:   Patient would benefit from ongoing skilled OT services to continue to address weakness.    Therapy recommendation(s):    Continued therapy is recommended.  Rationale/Recommendations:  Patient would benefit from further strengthening to maximize safety and independence in ADLs.

## 2021-07-03 NOTE — PHARMACY - DISCHARGE MEDICATION RECONCILIATION AND EDUCATION
[facility]  Part of [address] [address]    July 3, 2021    Dear Pharmacist,    Your customer, Neel Kerr, born on 1940, was recently discharged from [facility].  We have updated his medication list and want to alert you to the following:       Review of your medicines      START taking      Dose / Directions   amLODIPine 5 MG tablet  Commonly known as: NORVASC      Dose: 5 mg  Take 1 tablet (5 mg) by mouth daily  Quantity: 30 tablet  Refills: 0     levofloxacin 750 MG tablet  Commonly known as: LEVAQUIN  Indication: Infection with Dysregulated Inflammatory Response      Dose: 750 mg  Take 1 tablet (750 mg) by mouth daily for 6 days  Quantity: 6 tablet  Refills: 0     predniSONE 20 MG tablet  Commonly known as: DELTASONE      Dose: 40 mg  Take 2 tablets (40 mg) by mouth daily for 2 days  Quantity: 4 tablet  Refills: 0        CONTINUE these medicines which have NOT CHANGED      Dose / Directions   acetaminophen 325 MG tablet  Commonly known as: TYLENOL      Dose: 650 mg  Take 2 tablets (650 mg) by mouth every 4 hours as needed for mild pain  Quantity:    Refills: 0     albuterol 108 (90 Base) MCG/ACT inhaler  Commonly known as: PROAIR HFA/PROVENTIL HFA/VENTOLIN HFA      Dose: 2 puff  Inhale 2 puffs into the lungs every 6 hours as needed for wheezing  Quantity:    Refills: 0     atorvastatin 80 MG tablet  Commonly known as: LIPITOR      Dose: 40 mg  Take 40 mg by mouth At Bedtime  Refills: 0     insulin glargine 100 UNIT/ML pen  Commonly known as: LANTUS PEN  Used for: Diabetic ketoacidosis without coma associated with type 2 diabetes mellitus (H)      Dose: 8 Units  Inject 8 Units Subcutaneous At Bedtime  Quantity:    Refills: 0     ipratropium-albuterol  MCG/ACT inhaler  Commonly known as: COMBIVENT RESPIMAT      Dose: 1 puff  Inhale 1 puff into the lungs 4 times daily  Refills: 0     metFORMIN 1000 MG tablet  Commonly  known as: GLUCOPHAGE      Dose: 1,000 mg  Take 1,000 mg by mouth 2 times daily (with meals)  Refills: 0     NovoLOG FLEXPEN 100 UNIT/ML pen  Generic drug: insulin aspart      Inject as per sliding scale:  0-139 = 0 units, 140-154 = 1 unit, 155-164 = 3 units, 165-179 = 5 units, 180-199 = 6 units, 200-229 = 7 units, 230-259 = 9 units, 260-279 = 10 units, 280-299 = 11 units, 300-329 = 13 units, 330-359 = 14 units, 360-389 = 15 units, 390-419 = 16 units, 420-999 = 18 units and notify PCP.  Refills: 0     tamsulosin 0.4 MG capsule  Commonly known as: FLOMAX  Used for: Urinary retention      Dose: 0.4 mg  Take 1 capsule (0.4 mg) by mouth daily  Quantity:    Refills: 0           Where to get your medicines      These medications were sent to [id number] - [place] - [address]  [address], [facility] [address]    Phone: [phone]     amLODIPine 5 MG tablet    levofloxacin 750 MG tablet    predniSONE 20 MG tablet         We also reviewed Neel Kerr's allergy list and updated it as needed:  Allergies: Augmentin    Thank you for continuing to care for Neel Kerr.  We look forward to working together with you in the future.    Sincerely,  Edelmira Ray, [facility]

## 2021-07-03 NOTE — PLAN OF CARE
Patient is pleasant. Vitals are stable. BP slightly elevated, MD is aware. Patient states he is ready to go home. Plan is to discharge today.     1045- Family called and stated that they called [facility] and [facility] accepted the patient.  was called to confirm. New discharge orders were placed and patient will be discharging to .   Wayne Garcia RN on 7/3/2021 at 11:22 AM

## 2021-07-03 NOTE — PHARMACY - DISCHARGE MEDICATION RECONCILIATION AND EDUCATION
Pharmacy:  Discharge Counseling and Medication Reconciliation    Neel Kerr  [address] [place] [address]  [phone] (home)   80 year old male  PCP: Norma Taylor    Allergies: Augmentin    Discharge Counseling:    Patient is going to [facility], a [facility], where nursing staff administer medications. Pharmacist DID NOT meet with patient today to review the medication portion of the After Visit Summary.    Summary of Education: Provided printed handouts for nursing staff at [facility] for amlodipine, levofloxacin, and prednisone.    Materials Provided:  MedCounselor sheets printed from Clinical Pharmacology on: amlopidine, levofloxacin, and prednisone    Discharge Medication Reconciliation:    It has been determined that the patient has an adequate supply of medications available or which can be obtained from the patient's preferred pharmacy, which staff has [facility]. An updated medication list will be faxed to the patient's pharmacy.    Thank you for the consult.    Edelmira Ray [facility]........July 3, 2021 12:21 PM

## 2021-07-03 NOTE — PLAN OF CARE
Physical Therapy Discharge Summary    Reason for therapy discharge:    Discharged to transitional care facility.    Progress towards therapy goal(s). See goals on Care Plan in [facility] electronic health record for goal details.  Goals partially met.  Barriers to achieving goals:   discharge from facility.    Therapy recommendation(s):    Continued therapy is recommended.  Rationale/Recommendations:  to promote strength, gait stability and safe mobility.

## 2021-07-04 LAB
BACTERIA SPEC CULT: ABNORMAL
BACTERIA SPEC CULT: ABNORMAL
SPECIMEN SOURCE: ABNORMAL

## 2021-07-05 ENCOUNTER — PATIENT OUTREACH (OUTPATIENT)
Dept: CARE COORDINATION | Facility: CLINIC | Age: 81
End: 2021-07-05

## 2021-07-05 NOTE — PROGRESS NOTES
Transitional Care Management Phone Call    Summary of hospitalization:  [facility] discharge summary reviewed    DISCHARGE DIAGNOSIS: Sepsis, essential hypertension    DATE OF DISCHARGE: 07-    Diagnostic Tests/Treatments reviewed.  Follow up needed: Cath change every 3 weeks.     Post Discharge Medication Reconciliation: discharge medications reconciled, continue medications without change.    Medications reviewed by: Myself and Aylin mason    Problems taking medications regularly:  None    Problems adhering to non-medication therapy:  None    Other Healthcare Providers Involved in Patient s Care:         Specialist appointment - Urology, Dr. August ,  PT and OT at [facility], [facility].    Update since discharge: improved. Per nurseAylin, patient is back at baseline. Normal cath output with no concerns. Participating in activities and therapy.     Plan of care communicated with other healthcare provider, nurse at [facility]    Just a friendly reminder that you appointment is   Next 5 appointments (look out 90 days)    Jul 15, 2021  9:20 AM  Office Visit with Laly Romero PA-C  [facility] ([facility] ) [address]  [place] [id number] [phone]      .  We encourage you to keep this appointment.    Please remember to bring all of your pills in their bottles (including any vitamins or over the counter pills) with you to your appointment.   The patient indicates understanding of these issues and agrees with the plan of care.   Yes, plans to follow plan of care and keep the scheduled appointment.    Was the patient contacted within the 2 business days or other approved timeframe?  Yes]    Was the Medication reconciliation and management done since the patient was discharged? Yes    Lucy Cedeño RN  7/5/2021 2:29 PM                  Clinic Care Coordination  Contact  [facility]/Voicemail       Clinical Data: Care Coordinator Outreach  Outreach attempted x 1.    [facility] phone rang many times with no answer.     Plan: Care Coordinator will attempt again.   Lucy Cedeño RN on 7/5/2021 at 11:43 AM

## 2021-07-06 ENCOUNTER — DOCUMENTATION ONLY (OUTPATIENT)
Dept: OTHER | Facility: CLINIC | Age: 81
End: 2021-07-06

## 2021-07-08 ENCOUNTER — OFFICE VISIT (OUTPATIENT)
Dept: FAMILY MEDICINE | Facility: OTHER | Age: 81
End: 2021-07-08
Attending: NURSE PRACTITIONER
Payer: MEDICARE

## 2021-07-08 VITALS
OXYGEN SATURATION: 93 % | TEMPERATURE: 96.6 F | HEIGHT: 67 IN | RESPIRATION RATE: 22 BRPM | HEART RATE: 99 BPM | WEIGHT: 120.2 LBS | BODY MASS INDEX: 18.87 KG/M2 | DIASTOLIC BLOOD PRESSURE: 76 MMHG | SYSTOLIC BLOOD PRESSURE: 122 MMHG

## 2021-07-08 DIAGNOSIS — J44.9 CHRONIC OBSTRUCTIVE PULMONARY DISEASE, UNSPECIFIED COPD TYPE (H): ICD-10-CM

## 2021-07-08 DIAGNOSIS — I10 ESSENTIAL HYPERTENSION: ICD-10-CM

## 2021-07-08 DIAGNOSIS — J18.9 MULTIFOCAL PNEUMONIA: Primary | ICD-10-CM

## 2021-07-08 DIAGNOSIS — R33.9 URINARY RETENTION: ICD-10-CM

## 2021-07-08 LAB
BACTERIA SPEC CULT: NORMAL
BACTERIA SPEC CULT: NORMAL
SPECIMEN SOURCE: NORMAL
SPECIMEN SOURCE: NORMAL

## 2021-07-08 PROCEDURE — 99495 TRANSJ CARE MGMT MOD F2F 14D: CPT | Performed by: NURSE PRACTITIONER

## 2021-07-08 PROCEDURE — G0463 HOSPITAL OUTPT CLINIC VISIT: HCPCS

## 2021-07-08 ASSESSMENT — PAIN SCALES - GENERAL: PAINLEVEL: NO PAIN (0)

## 2021-07-08 ASSESSMENT — MIFFLIN-ST. JEOR: SCORE: 1223.85

## 2021-07-08 NOTE — PROGRESS NOTES
SUBJECTIVE:                                                      Patient presents for Hospital Followup Visit:    Hospital:  [facility]      Date of Admission: 6/30/21  Date of Discharge: 7/3/21  Transitional Care Management Phone Call:   Reason(s) for Admission: Multifocal pneumonia            Problems taking medications regularly:  None       Medication changes since discharge: None       Problems adhering to non-medication therapy:  None    Summary of hospitalization:  [facility] discharge summary reviewed  Diagnostic Tests/Treatments reviewed.  Follow up needed: none  Other Healthcare Providers Involved in Patient s Care:         SNF  Update since discharge: improved.     He was in the hospital May 2021 for urinary tension and COPD.  He is accompanied by his brother today.  He was discharged to skilled nursing facility and presented to the emergency room and subsequently to inpatient hospital at [place] on June 30, 2021 for essential hypertension, sepsis, multifocal pneumonia.  He was treated with antibiotics and has 1 day left of his Levaquin.  Oral prednisone was completed for 2 days after his hospital stay.  He is working with physical therapy at [facility].  He is able walk independently without assistive devices.  He reports some mild shortness of breath but no chest pain, cough, fevers.  No concerns noted from nursing facility staff.  He does report some mild right calf pain that started today.  He has not noticed any redness or swelling.  This is at the location of his catheter leg bag band.    ROS:  See above    OBJECTIVE:                                                      GENERAL APPEARANCE: healthy, alert and no distress  RESP: lungs clear to auscultation - no rales, rhonchi or wheezes  CV: regular rates and rhythm, normal S1 S2, no S3 or S4 and no murmur, click or rub  SKIN: Right calf examined and no redness, swelling, bruising.  Area of tenderness was  difficult to reproduce but appears to be just under the leg bag band.    ASSESSMENT/PLAN:                                                      (J18.9) Multifocal pneumonia  (primary encounter diagnosis)  Comment: Improving, continue antibiotic course as prescribed during inpatient hospital stay  Plan: See above    (J44.9) Chronic obstructive pulmonary disease, unspecified COPD type (H)  Comment: Continue current    (I10) Essential hypertension  Comment: Blood pressure records reviewed from [facility] and remained stable here in clinic today  Plan: Continue current amlodipine    (R33.9) Urinary retention  Comment: Urinary cath in place  Plan: Continue with urinary catheter in place and follow-up with urology as previously scheduled         Post Discharge Medication Reconciliation: discharge medications reconciled, continue medications without change.  Issues to address: No issues identified.  Plan of care communicated with patient and family      Type of Medical Decision Making Face-to-Face Visit within 7 Days Face-to-Face Visit within 14 days   Moderate Complexity [id number]   High Complexity [id number]

## 2021-07-08 NOTE — NURSING NOTE
"Chief Complaint   Patient presents with     Hospital F/U     Patient presents to clinic for hospital f/u. He states he is feeling so-so, and his breathing could be better.    Initial /76 (BP Location: Right arm, Patient Position: Sitting, Cuff Size: Adult Regular)   Pulse 99   Temp 96.6  F (35.9  C) (Temporal)   Resp 22   Ht 1.702 m (5' 7\")   Wt 54.5 kg (120 lb 3.2 oz)   SpO2 93%   BMI 18.83 kg/m   Estimated body mass index is 18.83 kg/m  as calculated from the following:    Height as of this encounter: 1.702 m (5' 7\").    Weight as of this encounter: 54.5 kg (120 lb 3.2 oz).     FOOD SECURITY SCREENING QUESTIONS  Hunger Vital Signs:  Within the past 12 months we worried whether our food would run out before we got money to buy more. Never  Within the past 12 months the food we bought just didn't last and we didn't have money to get more. Never      Medication Reconciliation: Complete      Chio Alejo   "

## 2021-07-22 ENCOUNTER — OFFICE VISIT (OUTPATIENT)
Dept: UROLOGY | Facility: OTHER | Age: 81
End: 2021-07-22
Attending: UROLOGY
Payer: COMMERCIAL

## 2021-07-22 VITALS — HEART RATE: 79 BPM | BODY MASS INDEX: 19.58 KG/M2 | WEIGHT: 125 LBS | RESPIRATION RATE: 16 BRPM

## 2021-07-22 DIAGNOSIS — R33.8 URINARY RETENTION DUE TO BENIGN PROSTATIC HYPERPLASIA: Primary | ICD-10-CM

## 2021-07-22 DIAGNOSIS — N40.1 URINARY RETENTION DUE TO BENIGN PROSTATIC HYPERPLASIA: Primary | ICD-10-CM

## 2021-07-22 PROCEDURE — 99214 OFFICE O/P EST MOD 30 MIN: CPT | Mod: 25 | Performed by: UROLOGY

## 2021-07-22 PROCEDURE — 52000 CYSTOURETHROSCOPY: CPT | Performed by: UROLOGY

## 2021-07-22 RX ORDER — CLINDAMYCIN PHOSPHATE 900 MG/50ML
900 INJECTION, SOLUTION INTRAVENOUS EVERY 8 HOURS
Status: CANCELLED | OUTPATIENT
Start: 2021-07-22

## 2021-07-22 ASSESSMENT — PAIN SCALES - GENERAL: PAINLEVEL: NO PAIN (0)

## 2021-07-22 NOTE — PROGRESS NOTES
Type of Visit  EST    Chief Complaint  BPH with urinary retention    HPI  Mr Kerr is a 80 year old male who follows up with urinary retention.  He presented to the ED after an unwitnessed fall prompting a 2 week inpatient hospitalization stay.  Work-up revealed diabetic ketoacidosis as the likely cause of the fall.  Catheter was placed during this hospitalization stay which was in May 2021.  Patient has since failed multiple void trials.  He presents today for cystoscopy and an additional void trial prior to considering TURP.    Patient started Flomax about 2 months ago.    He denies any new symptoms.  His goal is to become catheter free and void on his own.  He presents for cystoscopy.      Review of Systems  I personally reviewed the ROS with the patient.    Weight loss:    Yes     Recent fever/chills:  No   Night sweats:   No  Current skin rash:  No   Recent hair loss:  No  Heat intolerance:  No   Cold intolerance:  No  Chest pain:   No   Palpitations:   No  Shortness of breath:  Yes   Wheezing:   No  Constipation:    No   Diarrhea:   No   Nausea:   No   Vomiting:   No   Kidney/side pain:  No   Back pain:   No  Frequent headaches:  No   Dizziness:     No  Leg swelling:   No   Calf pain:    No    Nursing Notes:   Jyothi Luna LPN  7/22/2021  8:02 AM  Sign at exiting of workspace  Patient positioned in supine position, perineum area prepped with chlorhexidene Gluconate and patient draped per sterile technique. Per verbal order read back by Antonio August MD, Urojet 10mL 2% lidocaine jelly to be instilled into urethra.  Urojet- 10ml 2% Lidocaine jelly instilled into the urethra.    Urojet 2%  Lot#: [id number]  Expiration date: 1/2023  : [employer]  NDC: [address]-3    [place] Protocol    A. Pre-procedure verification complete Yes  1-relevant information / documentation available, reviewed and properly matched to the patient; 2-consent accurate and complete, 3-equipment and supplies available    B.  Site marking complete N/A  Site marked if not in continuous attendance with patient    C. TIME OUT completed Yes  Time Out was conducted just prior to starting procedure to verify the eight required elements: 1-patient identity, 2-consent accurate and complete, 3-position, 4-correct side/site marked (if applicable), 5-procedure, 6-relevant images / results properly labeled and displayed (if applicable), 7-antibiotics / irrigation fluids (if applicable), 8-safety precautions.    After procedure perineum area rinsed. Discharge instructions reviewed with patient. Patient verbalized understanding of discharge instructions and discharged ambulatory.  Jyothi Luna LPN..................7/22/2021  8:02 AM        Physical Exam  Vitals:    07/22/21 0803   Pulse: 79   Resp: 16   Weight: 56.7 kg (125 lb)     Constitutional: No acute distress.  Alert and cooperative   Head: NCAT  Eyes: Conjunctivae normal  Cardiovascular: Regular rate.  Pulmonary/Chest: Respirations are even and non-labored bilaterally, no audible wheezing  Abdominal: Soft. No distension, tenderness, masses or guarding.   Neurological: A + O x 3.  Cranial Nerves II-XII grossly intact.  Extremities: SARAH x 4, Warm. No clubbing.  No cyanosis.    Skin: Pink, warm and dry.  No visible rashes noted.  Psychiatric:  Normal mood and affect  Back:  No left CVA tenderness.  No right CVA tenderness.  Genitourinary:  Catheter in place draining clear urine    ^^^^^^^^^^^^^^^^^^^^^^^^^^^^^^^^^^^^^^^^^^^^^^^^^^^^^^^^^^^^^^^    Preprocedure diagnosis  BPH with retention    Postprocedure diagnosis  BPH with retention and obstruction    Procedure  Flexible Cystourethroscopy    Surgeon  Antonio August MD    Anesthesia  2% lidocaine jelly intraurethrally    Complications  None    Indications  80 year old male undergoing a flexible cystoscopy for the above mentioned indications.    Findings  Cystoscopic findings included a normal anterior urethra.    There was not a prominent  median lobe.    The lateral lobes were moderately obstructive in appearance.  The left lobe particularly is obstructive.  The bladder appeared to be normal capacity.    There were no tumors, stones or foreign bodies.    The orifices were slit-shaped and in their normal location.    Procedure  The patient was placed in supine position and prepped and draped in sterile fashion with lidocaine jelly per urethra for anesthesia.    I passed a lubricated 14F flexible cystoscope through the penile urethra and into the bladder and the bladder was completely visualized.  The cystoscope was retroflexed and the bladder neck and prostate visualized.    The cystoscope was slowly withdrawn while visualizing the urethra and the procedure terminated.    The patient tolerated the procedure well.      ^^^^^^^^^^^^^^^^^^^^^^^^^^^^^^^^^^^^^^^^^^^^^^^^^^^^^^^^^^^^^^^      Labs  Results for THELMA KERR (MRN [id number]) as of 6/3/2021 09:00   5/25/2021 05:36   Creatinine 0.62 (L)   GFR Estimate >90     Results for THELMA KERR (MRN [id number]) as of 6/3/2021 09:00   5/11/2021 21:12   Color Urine Straw   Appearance Urine Clear   Glucose Urine >1000 (A)   Bilirubin Urine Negative   Ketones Urine 40 (A)   Specific Gravity Urine 1.020   pH Urine 5.0   Protein Albumin Urine 10 (A)   Urobilinogen mg/dL Normal   Nitrite Urine Negative   Blood Urine Negative   Leukocyte Esterase Urine Negative   Source Catheterized Urine   WBC Urine 1   RBC Urine 0   Bacteria Urine None (A)   Squamous Epithelial /HPF Urine 0   Mucous Urine Present (A)     Assessment & Plan  Mr. Kerr is a 80 year old male who follows up with urinary retention requiring catheter placement.    Discussed continuing medications and possible CIC/indwelling catheter versus TUVP for his urinary retention.  The procedure was described to the patient.    It was explained to the patient that due to his urinary retention there is no guarantee he will be able to empty following the  "prostate procedure.    Possible complications and side effects were discussed with the patient, including but not limited to, infection or sepsis, bleeding, irritative voiding symptoms, injury to the ureter, injury to adjacent organs and incontinence.  Anesthesia related risks were also discussed such as cardiovascular and pulmonary complications.      All patient's questions were answered, and the patient was consented.     Plan  The patient was scheduled and consented for \"transurethral resection of prostate\" under general anesthesia   -Preoperative H&P per PCP is appreciated for preoperative medication management  "

## 2021-07-22 NOTE — PATIENT INSTRUCTIONS

## 2021-07-22 NOTE — NURSING NOTE
Patient positioned in supine position, perineum area prepped with chlorhexidene Gluconate and patient draped per sterile technique. Per verbal order read back by Antonio August MD, Urojet 10mL 2% lidocaine jelly to be instilled into urethra.  Urojet- 10ml 2% Lidocaine jelly instilled into the urethra.    Urojet 2%  Lot#: [id number]  Expiration date: 1/2023  : [employer]  NDC: [address]-6    [place] Protocol    A. Pre-procedure verification complete Yes  1-relevant information / documentation available, reviewed and properly matched to the patient; 2-consent accurate and complete, 3-equipment and supplies available    B. Site marking complete N/A  Site marked if not in continuous attendance with patient    C. TIME OUT completed Yes  Time Out was conducted just prior to starting procedure to verify the eight required elements: 1-patient identity, 2-consent accurate and complete, 3-position, 4-correct side/site marked (if applicable), 5-procedure, 6-relevant images / results properly labeled and displayed (if applicable), 7-antibiotics / irrigation fluids (if applicable), 8-safety precautions.    After procedure perineum area rinsed. Discharge instructions reviewed with patient. Patient verbalized understanding of discharge instructions and discharged ambulatory.  Jyothi Luna LPN..................7/22/2021  8:02 AM

## 2021-07-23 ENCOUNTER — OFFICE VISIT (OUTPATIENT)
Dept: INTERNAL MEDICINE | Facility: OTHER | Age: 81
End: 2021-07-23
Attending: NURSE PRACTITIONER
Payer: COMMERCIAL

## 2021-07-23 VITALS
BODY MASS INDEX: 19.45 KG/M2 | TEMPERATURE: 96.7 F | DIASTOLIC BLOOD PRESSURE: 74 MMHG | SYSTOLIC BLOOD PRESSURE: 124 MMHG | HEART RATE: 97 BPM | RESPIRATION RATE: 18 BRPM | OXYGEN SATURATION: 96 % | WEIGHT: 124.2 LBS

## 2021-07-23 DIAGNOSIS — Z01.818 PREOP GENERAL PHYSICAL EXAM: Primary | ICD-10-CM

## 2021-07-23 LAB
ALBUMIN SERPL-MCNC: 4.1 G/DL (ref 3.5–5.7)
ALP SERPL-CCNC: 71 U/L (ref 34–104)
ALT SERPL W P-5'-P-CCNC: 11 U/L (ref 7–52)
ANION GAP SERPL CALCULATED.3IONS-SCNC: 11 MMOL/L (ref 3–14)
AST SERPL W P-5'-P-CCNC: 14 U/L (ref 13–39)
BASOPHILS # BLD AUTO: 0.1 10E3/UL (ref 0–0.2)
BASOPHILS NFR BLD AUTO: 0 %
BILIRUB SERPL-MCNC: 0.5 MG/DL (ref 0.3–1)
BUN SERPL-MCNC: 17 MG/DL (ref 7–25)
CALCIUM SERPL-MCNC: 9.2 MG/DL (ref 8.6–10.3)
CHLORIDE BLD-SCNC: 99 MMOL/L (ref 98–107)
CO2 SERPL-SCNC: 26 MMOL/L (ref 21–31)
CREAT SERPL-MCNC: 0.84 MG/DL (ref 0.7–1.3)
EOSINOPHIL # BLD AUTO: 0.3 10E3/UL (ref 0–0.7)
EOSINOPHIL NFR BLD AUTO: 2 %
ERYTHROCYTE [DISTWIDTH] IN BLOOD BY AUTOMATED COUNT: 14.2 % (ref 10–15)
GFR SERPL CREATININE-BSD FRML MDRD: 84 ML/MIN/1.73M2
GLUCOSE BLD-MCNC: 152 MG/DL (ref 70–105)
HCT VFR BLD AUTO: 41.3 % (ref 40–53)
HGB BLD-MCNC: 13.3 G/DL (ref 13.3–17.7)
IMM GRANULOCYTES # BLD: 0.1 10E3/UL
IMM GRANULOCYTES NFR BLD: 1 %
LYMPHOCYTES # BLD AUTO: 4.5 10E3/UL (ref 0.8–5.3)
LYMPHOCYTES NFR BLD AUTO: 38 %
MCH RBC QN AUTO: 29.8 PG (ref 26.5–33)
MCHC RBC AUTO-ENTMCNC: 32.2 G/DL (ref 31.5–36.5)
MCV RBC AUTO: 93 FL (ref 78–100)
MONOCYTES # BLD AUTO: 0.9 10E3/UL (ref 0–1.3)
MONOCYTES NFR BLD AUTO: 8 %
NEUTROPHILS # BLD AUTO: 5.9 10E3/UL (ref 1.6–8.3)
NEUTROPHILS NFR BLD AUTO: 51 %
NRBC # BLD AUTO: 0 10E3/UL
NRBC BLD AUTO-RTO: 0 /100
PLATELET # BLD AUTO: 271 10E3/UL (ref 150–450)
POTASSIUM BLD-SCNC: 4.5 MMOL/L (ref 3.5–5.1)
PROT SERPL-MCNC: 7.3 G/DL (ref 6.4–8.9)
RBC # BLD AUTO: 4.46 10E6/UL (ref 4.4–5.9)
SARS-COV-2 RNA RESP QL NAA+PROBE: NEGATIVE
SODIUM SERPL-SCNC: 136 MMOL/L (ref 134–144)
WBC # BLD AUTO: 11.7 10E3/UL (ref 4–11)

## 2021-07-23 PROCEDURE — C9803 HOPD COVID-19 SPEC COLLECT: HCPCS

## 2021-07-23 PROCEDURE — 93000 ELECTROCARDIOGRAM COMPLETE: CPT | Performed by: INTERNAL MEDICINE

## 2021-07-23 PROCEDURE — 82374 ASSAY BLOOD CARBON DIOXIDE: CPT | Mod: ZL | Performed by: NURSE PRACTITIONER

## 2021-07-23 PROCEDURE — 99213 OFFICE O/P EST LOW 20 MIN: CPT | Performed by: NURSE PRACTITIONER

## 2021-07-23 PROCEDURE — 82247 BILIRUBIN TOTAL: CPT | Mod: ZL | Performed by: NURSE PRACTITIONER

## 2021-07-23 PROCEDURE — 85025 COMPLETE CBC W/AUTO DIFF WBC: CPT | Mod: ZL | Performed by: NURSE PRACTITIONER

## 2021-07-23 PROCEDURE — 36415 COLL VENOUS BLD VENIPUNCTURE: CPT | Mod: ZL | Performed by: NURSE PRACTITIONER

## 2021-07-23 PROCEDURE — U0005 INFEC AGEN DETEC AMPLI PROBE: HCPCS | Mod: ZL | Performed by: NURSE PRACTITIONER

## 2021-07-23 RX ORDER — ATORVASTATIN CALCIUM 40 MG/1
40 TABLET, FILM COATED ORAL AT BEDTIME
Status: ON HOLD | COMMUNITY
Start: 2021-07-21 | End: 2022-01-01

## 2021-07-23 ASSESSMENT — PAIN SCALES - GENERAL: PAINLEVEL: NO PAIN (0)

## 2021-07-23 NOTE — PATIENT INSTRUCTIONS
Medication Instructions:  Patient is to take all scheduled medications on the day of surgery EXCEPT for modifications listed below:   - Calcium Channel Blockers: May be continued on the day of surgery.   - Long acting insulin (e.g. glargine, detemir): Take 80% of the usual evening or morning dose before surgery.{   - short acting insulin (e.g. regular, lispro, aspart): HOLD on the morning of surgery.    - metformin: HOLD day of surgery.    Preparing for Your Surgery  Getting started  A nurse will call you to review your health history and instructions. They will give you an arrival time based on your scheduled surgery time.  Please be ready to share the following:    Your doctor's clinic name and phone number    Your medical, surgical and anesthesia history    A list of allergies and sensitivities    A list of medicines, including herbal treatments and over-the-counter drugs    Whether the patient has a legal guardian (ask how to send us the papers in advance)  If you have a child who's having surgery, please ask for a copy of Preparing for Your Child's Surgery.    Preparing for surgery    Within 30 days of surgery: Have a pre-op exam (sometimes called an H&P, or History and Physical). This can be done at a clinic or pre-operative center.  ? If you're having a , you may not need this exam. Talk to your care team    At your pre-op exam, talk to your care team about all medicines you take. If you need to stop any medicines before surgery, ask when to start taking them again.  ? We do this for your safety. Many medicines can make you bleed too much during surgery. Some change how well surgery (anesthesia) drugs work.    Call your insurance company to let them know you're having surgery. (If you don't have insurance, call [phone].)    Call your clinic if there's any change in your health. This includes signs of a cold or flu (sore throat, runny nose, cough, rash, fever). It also includes a scrape or  scratch near the surgery site.    If you have questions on the day of surgery, call your hospital or surgery center.  Eating and drinking guidelines  For your safety: Unless your surgeon tells you otherwise, follow the guidelines below.    Eat and drink as usual until 8 hours before surgery. After that, no food or milk.    Drink clear liquids until 2 hours before surgery. These are liquids you can see through, like water, Gatorade and Propel Water. You may also have black coffee and tea (no cream or milk).    Nothing by mouth within 2 hours of surgery. This includes gum, candy and breath mints.    If you drink, stop drinking alcohol the night before surgery.    If your care team tells you to take medicine on the morning of surgery, it's okay to take it with a sip of water.  Preventing infection    Shower or bathe the night before and morning of your surgery. Follow the instructions your clinic gave you. (If no instructions, use regular soap.)    Don't shave or clip hair near your surgery site. We'll remove the hair if needed.    Don't smoke or vape the morning of surgery. You may chew nicotine gum up to 2 hours before surgery. A nicotine patch is okay.  ? Note: Some surgeries require you to completely quit smoking and nicotine. Check with your surgeon.    Your care team will make every effort to keep you safe from infection. We will:  ? Clean our hands often with soap and water (or an alcohol-based hand rub).  ? Clean the skin at your surgery site with a special soap that kills germs.  ? Give you a special gown to keep you warm. (Cold raises the risk of infection.)  ? Wear special hair covers, masks, gowns and gloves during surgery.  ? Give antibiotic medicine, if prescribed. Not all surgeries need antibiotics.  What to bring on the day of surgery    Photo ID and insurance card    Copy of your health care directive, if you have one    Glasses and hearing aides (bring cases)  ? You can't wear contacts during  surgery    Inhaler and eye drops, if you use them (tell us about these when you arrive)    CPAP machine or breathing device, if you use them    A few personal items, if spending the night    If you have . . .  ? A pacemaker or ICD (cardiac defibrillator): Bring the ID card.  ? An implanted stimulator: Bring the remote control.  ? A legal guardian: Bring a copy of the certified (court-stamped) guardianship papers.  Please remove any jewelry, including body piercings. Leave jewelry and other valuables at home.  If you're going home the day of surgery  Important: If you don't follow the rules below, we must cancel your surgery.     Arrange for someone to drive you home after surgery. You may not drive, take a taxi or take public transportation by yourself (unless you'll have local anesthesia only).    Arrange for a responsible adult to stay with you overnight. If you don't, we may keep you in the hospital overnight, and you may need to pay the costs yourself.  Questions?   If you have any questions for your care team, list them here: _________________________________________________________________________________________________________________________________________________________________________________________________________________________________________________________________________________________________________________________  For informational purposes only. Not to replace the advice of your health care provider. Copyright   2003, 2019 [facility]. All rights reserved. Clinically reviewed by Alisia Adams MD. [employer] 385293 - REV 4/20.

## 2021-07-23 NOTE — NURSING NOTE
"Chief Complaint   Patient presents with     Pre-Op Exam     ADE, 7/27/21, KATELIN Rodriguez       Initial /74 (BP Location: Right arm, Patient Position: Sitting, Cuff Size: Adult Regular)   Pulse 97   Temp (!) 96.7  F (35.9  C) (Tympanic)   Resp 18   Wt 56.3 kg (124 lb 3.2 oz)   SpO2 96%   BMI 19.45 kg/m   Estimated body mass index is 19.45 kg/m  as calculated from the following:    Height as of 7/8/21: 1.702 m (5' 7\").    Weight as of this encounter: 56.3 kg (124 lb 3.2 oz).  Medication Reconciliation: complete  FOOD SECURITY SCREENING QUESTIONS  Hunger Vital Signs:  Within the past 12 months we worried whether our food would run out before we got money to buy more. Never  Within the past 12 months the food we bought just didn't last and we didn't have money to get more. Never      Mayra Medrano LPN    "

## 2021-07-23 NOTE — PROGRESS NOTES
[facility]  [address]  [place] [address]  Phone: [phone]  Primary Provider: Norma Taylor  Pre-op Performing Provider: ROGER CARTWRIGHT    PREOPERATIVE EVALUATION:  Today's date: 7/23/2021    Neel Kerr is a 80 year old male who presents for a preoperative evaluation.    Surgical Information:  Surgery/Procedure:  TURP  Surgery Location:  [facility]  Surgeon:   Dr. August  Surgery Date:   7/27/21  Time of Surgery:  TBD  Where patient plans to recover: At home with family and At a nursing home  Fax number for surgical facility: [facility]    Type of Anesthesia Anticipated: to be determined    Assessment & Plan     The proposed surgical procedure is considered LOW risk.    Preop general physical exam  - EKG 12-lead (E and [facility] Only)  - CBC with Platelets & Differential ([facility] Only)  - Comprehensive Metabolic Panel  - Asymptomatic COVID-19 Virus (Coronavirus) by PCR Nasopharyngeal    Risks and Recommendations:  The patient has the following additional risks and recommendations for perioperative complications:   - History of urinary retention  Cardiovascular:   - History of NSTEMI, EKG today similar to past two EKGs  Diabetes:  - Patient is on insulin therapy; diabetic NPO guidelines provided and discussed.  Pulmonary:    - Incentive spirometry post-op   - Recently treated pulmonary infection  Obstructive Sleep Apnea:       Medication Instructions:  Patient is to take all scheduled medications on the day of surgery EXCEPT for modifications listed below:   - Calcium Channel Blockers: May be continued on the day of surgery.   - Long acting insulin (e.g. glargine, detemir): Take 80% of the usual evening or morning dose before surgery.{   - short acting insulin (e.g. regular, lispro, aspart): HOLD on the morning of surgery.    - metformin: HOLD day of surgery.    RECOMMENDATION:  APPROVAL GIVEN to proceed with proposed procedure, without further diagnostic  evaluation.    22 minutes spent on the date of the encounter doing chart review, history and exam, documentation and further activities per the note        Subjective     HPI related to upcoming procedure: History of urinary retention due to BPH, polycystic kidney disease, acute on chronic renal failure, type 2 diabetes with long-term use of insulin, hyponatremia.      Preop Questions 7/23/2021   1. Have you ever had a heart attack or stroke? YES -non-STEMI 2017   2. Have you ever had surgery on your heart or blood vessels, such as a stent placement, a coronary artery bypass, or surgery on an artery in your head, neck, heart, or legs? YES    3. Do you have chest pain with activity? No   4. Do you have a history of  heart failure? No   5. Do you currently have a cold, bronchitis or symptoms of other infection? No   6. Do you have a cough, shortness of breath, or wheezing? No   7. Do you or anyone in your family have previous history of blood clots? YES    8. Do you or does anyone in your family have a serious bleeding problem such as prolonged bleeding following surgeries or cuts? No   9. Have you ever had problems with anemia or been told to take iron pills? No   10. Have you had any abnormal blood loss such as black, tarry or bloody stools? No   11. Have you ever had a blood transfusion? No   12. Are you willing to have a blood transfusion if it is medically needed before, during, or after your surgery? Yes   13. Have you or any of your relatives ever had problems with anesthesia? No   14. Do you have sleep apnea, excessive snoring or daytime drowsiness? No   15. Do you have any artifical heart valves or other implanted medical devices like a pacemaker, defibrillator, or continuous glucose monitor? No   16. Do you have artificial joints? No   17. Are you allergic to latex? No     Health Care Directive:  Patient has a Health Care Directive on file    Preoperative Review of :   reviewed - no record of  controlled substances prescribed.    Status of Chronic Conditions:  See problem list for active medical problems.  Problems all longstanding and stable, except as noted/documented.  See ROS for pertinent symptoms related to these conditions.    Review of Systems  Constitutional, neuro, ENT, endocrine, pulmonary, cardiac, gastrointestinal, genitourinary, musculoskeletal, integument and psychiatric systems are negative, except as otherwise noted.    Patient Active Problem List    Diagnosis Date Noted     Urinary retention due to benign prostatic hyperplasia 07/22/2021     Priority: Medium     Added automatically from request for surgery [id number]       Underweight 07/02/2021     Priority: Medium     Sepsis due to other etiology (H) 06/30/2021     Priority: Medium     Multifocal pneumonia 06/30/2021     Priority: Medium     Type 2 diabetes mellitus, with long-term current use of insulin (H) 06/30/2021     Priority: Medium     COPD (chronic obstructive pulmonary disease) (H) 05/18/2021     Priority: Medium     Oropharyngeal dysphagia 05/13/2021     Priority: Medium     Polycystic kidney disease 05/12/2021     Priority: Medium     Diabetic ketoacidosis without coma associated with type 2 diabetes mellitus (H) 05/11/2021     Priority: Medium     Metabolic acidosis 05/11/2021     Priority: Medium     Hypoxia 05/11/2021     Priority: Medium     Fall 05/11/2021     Priority: Medium     Acute on chronic renal failure (H) 05/11/2021     Priority: Medium     Hyponatremia 09/21/2020     Priority: Medium     Acute on chronic respiratory failure with hypoxia (H) 09/21/2020     Priority: Medium     Pneumonia due to 2019 novel coronavirus 09/21/2020     Priority: Medium     Psoriasis 02/01/2018     Priority: Medium     Non-ST elevation (NSTEMI) myocardial infarction (H) 07/02/2017     Priority: Medium     HTN (hypertension) 02/11/2015     Priority: Medium      Past Medical History:   Diagnosis Date     Essential (primary) hypertension      No Comments Provided     Hyperlipidemia     No Comments Provided     Non-ST elevation (NSTEMI) myocardial infarction (H)     2017,[facility] PCI with stent circumflex     Old myocardial infarction     2015,[facility]  PCI with stent     Polyneuropathy     No Comments Provided     Type 2 diabetes mellitus without complications (H)     No Comments Provided     No past surgical history on file.  Current Outpatient Medications   Medication Sig Dispense Refill     acetaminophen (TYLENOL) 325 MG tablet Take 2 tablets (650 mg) by mouth every 4 hours as needed for mild pain       albuterol (PROAIR HFA/PROVENTIL HFA/VENTOLIN HFA) 108 (90 Base) MCG/ACT inhaler Inhale 2 puffs into the lungs every 6 hours as needed for wheezing        amLODIPine (NORVASC) 5 MG tablet Take 1 tablet (5 mg) by mouth daily 30 tablet 0     atorvastatin (LIPITOR) 80 MG tablet Take 40 mg by mouth At Bedtime       insulin aspart (NOVOLOG FLEXPEN) 100 UNIT/ML pen Inject as per sliding scale:  0-139 = 0 units, 140-154 = 1 unit, 155-164 = 3 units, 165-179 = 5 units, 180-199 = 6 units, 200-229 = 7 units, 230-259 = 9 units, 260-279 = 10 units, 280-299 = 11 units, 300-329 = 13 units, 330-359 = 14 units, 360-389 = 15 units, 390-419 = 16 units, 420-999 = 18 units and notify PCP.       insulin glargine (LANTUS PEN) 100 UNIT/ML pen Inject 8 Units Subcutaneous At Bedtime       ipratropium-albuterol (COMBIVENT RESPIMAT)  MCG/ACT inhaler Inhale 1 puff into the lungs 4 times daily       metFORMIN (GLUCOPHAGE) 1000 MG tablet Take 1,000 mg by mouth 2 times daily (with meals)        tamsulosin (FLOMAX) 0.4 MG capsule Take 1 capsule (0.4 mg) by mouth daily       Allergies   Allergen Reactions     Augmentin      Marked as an allergy at the [facility]      Social History     Tobacco Use     Smoking status: Former Smoker     Quit date: 1992     Years since quittin.5     Smokeless tobacco: Never Used   Substance Use Topics     Alcohol use: No      Comment: Alcoholic Drinks/day: quit drinking 1985     History reviewed. No pertinent family history.  History   Drug Use No         Objective     /74 (BP Location: Right arm, Patient Position: Sitting, Cuff Size: Adult Regular)   Pulse 97   Temp (!) 96.7  F (35.9  C) (Tympanic)   Resp 18   Wt 56.3 kg (124 lb 3.2 oz)   SpO2 96%   BMI 19.45 kg/m      Physical Exam    GENERAL APPEARANCE: healthy, alert and no distress     EYES: EOMI,  PERRL     HENT: ear canals and TM's normal and nose and mouth     NECK: no adenopathy, no asymmetry, masses, or scars and thyroid normal to palpation     RESP: lungs clear to auscultation - no rales, rhonchi or wheezes     CV: regular rates and rhythm, normal S1 S2     ABDOMEN:  soft, nontender, no HSM or masses and bowel sounds normal     MS: extremities normal - no gross deformities noted, no evidence of inflammation in joints, FROM in all extremities.     SKIN: no suspicious lesions or rashes     NEURO: Normal strength and tone, sensory exam grossly normal, mentation intact and speech normal     PSYCH: mentation appears normal, age appropriate     LYMPHATICS: No cervical adenopathy    Recent Labs   Lab Test 07/03/21  0520 07/02/21  0635 07/01/21  0605 06/30/21  1005 05/12/21  1846 05/11/21  2325 09/21/20  0544 09/20/20  1604   HGB 10.4* 9.6* 9.7* 12.1* 13.9  --   --  17.4    262 251 337 173  --   --  215   INR  --   --   --  0.98 1.11  --    < >  --    NA  --  137 137 131* 138 130*  --  125*   POTASSIUM  --  3.8 3.6 4.1 3.1* 5.0  --  5.0   CR  --  0.69* 0.59* 0.87 0.89 1.38*  --  1.34*   A1C  --   --   --   --   --  13.4*  --  8.3*    < > = values in this interval not displayed.      Diagnostics:  Recent Results (from the past 24 hour(s))   Comprehensive Metabolic Panel    Collection Time: 07/23/21 10:47 AM   Result Value Ref Range    Sodium 136 134 - 144 mmol/L    Potassium 4.5 3.5 - 5.1 mmol/L    Chloride 99 98 - 107 mmol/L    Carbon Dioxide (CO2) 26 21 - 31 mmol/L     Anion Gap 11 3 - 14 mmol/L    Urea Nitrogen 17 7 - 25 mg/dL    Creatinine 0.84 0.70 - 1.30 mg/dL    Calcium 9.2 8.6 - 10.3 mg/dL    Glucose 152 (H) 70 - 105 mg/dL    Alkaline Phosphatase 71 34 - 104 U/L    AST 14 13 - 39 U/L    ALT 11 7 - 52 U/L    Protein Total 7.3 6.4 - 8.9 g/dL    Albumin 4.1 3.5 - 5.7 g/dL    Bilirubin Total 0.5 0.3 - 1.0 mg/dL    GFR Estimate 84 >60 mL/min/1.73m2   CBC with platelets and differential    Collection Time: 07/23/21 10:47 AM   Result Value Ref Range    WBC Count 11.7 (H) 4.0 - 11.0 10e3/uL    RBC Count 4.46 4.40 - 5.90 10e6/uL    Hemoglobin 13.3 13.3 - 17.7 g/dL    Hematocrit 41.3 40.0 - 53.0 %    MCV 93 78 - 100 fL    MCH 29.8 26.5 - 33.0 pg    MCHC 32.2 31.5 - 36.5 g/dL    RDW 14.2 10.0 - 15.0 %    Platelet Count 271 150 - 450 10e3/uL    % Neutrophils 51 %    % Lymphocytes 38 %    % Monocytes 8 %    % Eosinophils 2 %    % Basophils 0 %    % Immature Granulocytes 1 %    NRBCs per 100 WBC 0 <1 /100    Absolute Neutrophils 5.9 1.6 - 8.3 10e3/uL    Absolute Lymphocytes 4.5 0.8 - 5.3 10e3/uL    Absolute Monocytes 0.9 0.0 - 1.3 10e3/uL    Absolute Eosinophils 0.3 0.0 - 0.7 10e3/uL    Absolute Basophils 0.1 0.0 - 0.2 10e3/uL    Absolute Immature Granulocytes 0.1 (H) <=0.0 10e3/uL    Absolute NRBCs 0.0 10e3/uL   EKG 12-lead ([facility] Only)    Collection Time: 07/23/21 11:12 AM   Result Value Ref Range    Systolic Blood Pressure  mmHg    Diastolic Blood Pressure  mmHg    Ventricular Rate 92 BPM    Atrial Rate 92 BPM    TN Interval 162 ms    QRS Duration 86 ms     ms    QTc 445 ms    P Axis 47 degrees    R AXIS -58 degrees    T Axis 68 degrees    Interpretation ECG       Sinus rhythm  Left axis deviation  Minimal voltage criteria for LVH, may be normal variant  Anteroseptal infarct (cited on or before 30-JUN-2021)  Abnormal ECG  When compared with ECG of 30-JUN-2021 09:49,  Vent. rate has decreased BY  49 BPM  Questionable change in initial forces of Septal  leads    Unchanged from prior two EKG's. SRH      Revised Cardiac Risk Index (RCRI):  The patient has the following serious cardiovascular risks for perioperative complications:   - Coronary Artery Disease (MI, positive stress test, angina, Qs on EKG) = 1 point   - Diabetes Mellitus (on Insulin) = 1 point     RCRI Interpretation: 2 points: Class III (moderate risk - 6.6% complication rate)     Estimated Functional Capacity: Performs 4 METS exercise without symptoms (e.g., light housework, stairs, 4 mph walk, 7 mph bike, slow step dance)    Signed Electronically by: Tennille Barbosa NP  Copy of this evaluation report is provided to requesting physician.

## 2021-07-23 NOTE — H&P (VIEW-ONLY)
[facility]  [address]  [place] [address]  Phone: [phone]  Primary Provider: Norma Taylor  Pre-op Performing Provider: ROGER CARTWRIGHT    PREOPERATIVE EVALUATION:  Today's date: 7/23/2021    Neel Kerr is a 80 year old male who presents for a preoperative evaluation.    Surgical Information:  Surgery/Procedure:  TURP  Surgery Location:  [facility]  Surgeon:   Dr. August  Surgery Date:   7/27/21  Time of Surgery:  TBD  Where patient plans to recover: At home with family and At a nursing home  Fax number for surgical facility: [facility]    Type of Anesthesia Anticipated: to be determined    Assessment & Plan     The proposed surgical procedure is considered LOW risk.    Preop general physical exam  - EKG 12-lead (E and [facility] Only)  - CBC with Platelets & Differential ([facility] Only)  - Comprehensive Metabolic Panel  - Asymptomatic COVID-19 Virus (Coronavirus) by PCR Nasopharyngeal    Risks and Recommendations:  The patient has the following additional risks and recommendations for perioperative complications:   - History of urinary retention  Cardiovascular:   - History of NSTEMI, EKG today similar to past two EKGs  Diabetes:  - Patient is on insulin therapy; diabetic NPO guidelines provided and discussed.  Pulmonary:    - Incentive spirometry post-op   - Recently treated pulmonary infection  Obstructive Sleep Apnea:       Medication Instructions:  Patient is to take all scheduled medications on the day of surgery EXCEPT for modifications listed below:   - Calcium Channel Blockers: May be continued on the day of surgery.   - Long acting insulin (e.g. glargine, detemir): Take 80% of the usual evening or morning dose before surgery.{   - short acting insulin (e.g. regular, lispro, aspart): HOLD on the morning of surgery.    - metformin: HOLD day of surgery.    RECOMMENDATION:  APPROVAL GIVEN to proceed with proposed procedure, without further diagnostic  evaluation.    22 minutes spent on the date of the encounter doing chart review, history and exam, documentation and further activities per the note        Subjective     HPI related to upcoming procedure: History of urinary retention due to BPH, polycystic kidney disease, acute on chronic renal failure, type 2 diabetes with long-term use of insulin, hyponatremia.      Preop Questions 7/23/2021   1. Have you ever had a heart attack or stroke? YES -non-STEMI 2017   2. Have you ever had surgery on your heart or blood vessels, such as a stent placement, a coronary artery bypass, or surgery on an artery in your head, neck, heart, or legs? YES    3. Do you have chest pain with activity? No   4. Do you have a history of  heart failure? No   5. Do you currently have a cold, bronchitis or symptoms of other infection? No   6. Do you have a cough, shortness of breath, or wheezing? No   7. Do you or anyone in your family have previous history of blood clots? YES    8. Do you or does anyone in your family have a serious bleeding problem such as prolonged bleeding following surgeries or cuts? No   9. Have you ever had problems with anemia or been told to take iron pills? No   10. Have you had any abnormal blood loss such as black, tarry or bloody stools? No   11. Have you ever had a blood transfusion? No   12. Are you willing to have a blood transfusion if it is medically needed before, during, or after your surgery? Yes   13. Have you or any of your relatives ever had problems with anesthesia? No   14. Do you have sleep apnea, excessive snoring or daytime drowsiness? No   15. Do you have any artifical heart valves or other implanted medical devices like a pacemaker, defibrillator, or continuous glucose monitor? No   16. Do you have artificial joints? No   17. Are you allergic to latex? No     Health Care Directive:  Patient has a Health Care Directive on file    Preoperative Review of :   reviewed - no record of  controlled substances prescribed.    Status of Chronic Conditions:  See problem list for active medical problems.  Problems all longstanding and stable, except as noted/documented.  See ROS for pertinent symptoms related to these conditions.    Review of Systems  Constitutional, neuro, ENT, endocrine, pulmonary, cardiac, gastrointestinal, genitourinary, musculoskeletal, integument and psychiatric systems are negative, except as otherwise noted.    Patient Active Problem List    Diagnosis Date Noted     Urinary retention due to benign prostatic hyperplasia 07/22/2021     Priority: Medium     Added automatically from request for surgery [id number]       Underweight 07/02/2021     Priority: Medium     Sepsis due to other etiology (H) 06/30/2021     Priority: Medium     Multifocal pneumonia 06/30/2021     Priority: Medium     Type 2 diabetes mellitus, with long-term current use of insulin (H) 06/30/2021     Priority: Medium     COPD (chronic obstructive pulmonary disease) (H) 05/18/2021     Priority: Medium     Oropharyngeal dysphagia 05/13/2021     Priority: Medium     Polycystic kidney disease 05/12/2021     Priority: Medium     Diabetic ketoacidosis without coma associated with type 2 diabetes mellitus (H) 05/11/2021     Priority: Medium     Metabolic acidosis 05/11/2021     Priority: Medium     Hypoxia 05/11/2021     Priority: Medium     Fall 05/11/2021     Priority: Medium     Acute on chronic renal failure (H) 05/11/2021     Priority: Medium     Hyponatremia 09/21/2020     Priority: Medium     Acute on chronic respiratory failure with hypoxia (H) 09/21/2020     Priority: Medium     Pneumonia due to 2019 novel coronavirus 09/21/2020     Priority: Medium     Psoriasis 02/01/2018     Priority: Medium     Non-ST elevation (NSTEMI) myocardial infarction (H) 07/02/2017     Priority: Medium     HTN (hypertension) 02/11/2015     Priority: Medium      Past Medical History:   Diagnosis Date     Essential (primary) hypertension      No Comments Provided     Hyperlipidemia     No Comments Provided     Non-ST elevation (NSTEMI) myocardial infarction (H)     2017,[facility] PCI with stent circumflex     Old myocardial infarction     2015,[facility]  PCI with stent     Polyneuropathy     No Comments Provided     Type 2 diabetes mellitus without complications (H)     No Comments Provided     No past surgical history on file.  Current Outpatient Medications   Medication Sig Dispense Refill     acetaminophen (TYLENOL) 325 MG tablet Take 2 tablets (650 mg) by mouth every 4 hours as needed for mild pain       albuterol (PROAIR HFA/PROVENTIL HFA/VENTOLIN HFA) 108 (90 Base) MCG/ACT inhaler Inhale 2 puffs into the lungs every 6 hours as needed for wheezing        amLODIPine (NORVASC) 5 MG tablet Take 1 tablet (5 mg) by mouth daily 30 tablet 0     atorvastatin (LIPITOR) 80 MG tablet Take 40 mg by mouth At Bedtime       insulin aspart (NOVOLOG FLEXPEN) 100 UNIT/ML pen Inject as per sliding scale:  0-139 = 0 units, 140-154 = 1 unit, 155-164 = 3 units, 165-179 = 5 units, 180-199 = 6 units, 200-229 = 7 units, 230-259 = 9 units, 260-279 = 10 units, 280-299 = 11 units, 300-329 = 13 units, 330-359 = 14 units, 360-389 = 15 units, 390-419 = 16 units, 420-999 = 18 units and notify PCP.       insulin glargine (LANTUS PEN) 100 UNIT/ML pen Inject 8 Units Subcutaneous At Bedtime       ipratropium-albuterol (COMBIVENT RESPIMAT)  MCG/ACT inhaler Inhale 1 puff into the lungs 4 times daily       metFORMIN (GLUCOPHAGE) 1000 MG tablet Take 1,000 mg by mouth 2 times daily (with meals)        tamsulosin (FLOMAX) 0.4 MG capsule Take 1 capsule (0.4 mg) by mouth daily       Allergies   Allergen Reactions     Augmentin      Marked as an allergy at the [facility]      Social History     Tobacco Use     Smoking status: Former Smoker     Quit date: 1992     Years since quittin.5     Smokeless tobacco: Never Used   Substance Use Topics     Alcohol use: No      Comment: Alcoholic Drinks/day: quit drinking 1985     History reviewed. No pertinent family history.  History   Drug Use No         Objective     /74 (BP Location: Right arm, Patient Position: Sitting, Cuff Size: Adult Regular)   Pulse 97   Temp (!) 96.7  F (35.9  C) (Tympanic)   Resp 18   Wt 56.3 kg (124 lb 3.2 oz)   SpO2 96%   BMI 19.45 kg/m      Physical Exam    GENERAL APPEARANCE: healthy, alert and no distress     EYES: EOMI,  PERRL     HENT: ear canals and TM's normal and nose and mouth     NECK: no adenopathy, no asymmetry, masses, or scars and thyroid normal to palpation     RESP: lungs clear to auscultation - no rales, rhonchi or wheezes     CV: regular rates and rhythm, normal S1 S2     ABDOMEN:  soft, nontender, no HSM or masses and bowel sounds normal     MS: extremities normal - no gross deformities noted, no evidence of inflammation in joints, FROM in all extremities.     SKIN: no suspicious lesions or rashes     NEURO: Normal strength and tone, sensory exam grossly normal, mentation intact and speech normal     PSYCH: mentation appears normal, age appropriate     LYMPHATICS: No cervical adenopathy    Recent Labs   Lab Test 07/03/21  0520 07/02/21  0635 07/01/21  0605 06/30/21  1005 05/12/21  1846 05/11/21  2325 09/21/20  0544 09/20/20  1604   HGB 10.4* 9.6* 9.7* 12.1* 13.9  --   --  17.4    262 251 337 173  --   --  215   INR  --   --   --  0.98 1.11  --    < >  --    NA  --  137 137 131* 138 130*  --  125*   POTASSIUM  --  3.8 3.6 4.1 3.1* 5.0  --  5.0   CR  --  0.69* 0.59* 0.87 0.89 1.38*  --  1.34*   A1C  --   --   --   --   --  13.4*  --  8.3*    < > = values in this interval not displayed.      Diagnostics:  Recent Results (from the past 24 hour(s))   Comprehensive Metabolic Panel    Collection Time: 07/23/21 10:47 AM   Result Value Ref Range    Sodium 136 134 - 144 mmol/L    Potassium 4.5 3.5 - 5.1 mmol/L    Chloride 99 98 - 107 mmol/L    Carbon Dioxide (CO2) 26 21 - 31 mmol/L     Anion Gap 11 3 - 14 mmol/L    Urea Nitrogen 17 7 - 25 mg/dL    Creatinine 0.84 0.70 - 1.30 mg/dL    Calcium 9.2 8.6 - 10.3 mg/dL    Glucose 152 (H) 70 - 105 mg/dL    Alkaline Phosphatase 71 34 - 104 U/L    AST 14 13 - 39 U/L    ALT 11 7 - 52 U/L    Protein Total 7.3 6.4 - 8.9 g/dL    Albumin 4.1 3.5 - 5.7 g/dL    Bilirubin Total 0.5 0.3 - 1.0 mg/dL    GFR Estimate 84 >60 mL/min/1.73m2   CBC with platelets and differential    Collection Time: 07/23/21 10:47 AM   Result Value Ref Range    WBC Count 11.7 (H) 4.0 - 11.0 10e3/uL    RBC Count 4.46 4.40 - 5.90 10e6/uL    Hemoglobin 13.3 13.3 - 17.7 g/dL    Hematocrit 41.3 40.0 - 53.0 %    MCV 93 78 - 100 fL    MCH 29.8 26.5 - 33.0 pg    MCHC 32.2 31.5 - 36.5 g/dL    RDW 14.2 10.0 - 15.0 %    Platelet Count 271 150 - 450 10e3/uL    % Neutrophils 51 %    % Lymphocytes 38 %    % Monocytes 8 %    % Eosinophils 2 %    % Basophils 0 %    % Immature Granulocytes 1 %    NRBCs per 100 WBC 0 <1 /100    Absolute Neutrophils 5.9 1.6 - 8.3 10e3/uL    Absolute Lymphocytes 4.5 0.8 - 5.3 10e3/uL    Absolute Monocytes 0.9 0.0 - 1.3 10e3/uL    Absolute Eosinophils 0.3 0.0 - 0.7 10e3/uL    Absolute Basophils 0.1 0.0 - 0.2 10e3/uL    Absolute Immature Granulocytes 0.1 (H) <=0.0 10e3/uL    Absolute NRBCs 0.0 10e3/uL   EKG 12-lead ([facility] Only)    Collection Time: 07/23/21 11:12 AM   Result Value Ref Range    Systolic Blood Pressure  mmHg    Diastolic Blood Pressure  mmHg    Ventricular Rate 92 BPM    Atrial Rate 92 BPM    OR Interval 162 ms    QRS Duration 86 ms     ms    QTc 445 ms    P Axis 47 degrees    R AXIS -58 degrees    T Axis 68 degrees    Interpretation ECG       Sinus rhythm  Left axis deviation  Minimal voltage criteria for LVH, may be normal variant  Anteroseptal infarct (cited on or before 30-JUN-2021)  Abnormal ECG  When compared with ECG of 30-JUN-2021 09:49,  Vent. rate has decreased BY  49 BPM  Questionable change in initial forces of Septal  leads    Unchanged from prior two EKG's. SRH      Revised Cardiac Risk Index (RCRI):  The patient has the following serious cardiovascular risks for perioperative complications:   - Coronary Artery Disease (MI, positive stress test, angina, Qs on EKG) = 1 point   - Diabetes Mellitus (on Insulin) = 1 point     RCRI Interpretation: 2 points: Class III (moderate risk - 6.6% complication rate)     Estimated Functional Capacity: Performs 4 METS exercise without symptoms (e.g., light housework, stairs, 4 mph walk, 7 mph bike, slow step dance)    Signed Electronically by: Tennille Barbosa NP  Copy of this evaluation report is provided to requesting physician.

## 2021-07-26 ENCOUNTER — ANESTHESIA EVENT (OUTPATIENT)
Dept: SURGERY | Facility: OTHER | Age: 81
End: 2021-07-26
Payer: COMMERCIAL

## 2021-07-26 LAB
ATRIAL RATE - MUSE: 92 BPM
DIASTOLIC BLOOD PRESSURE - MUSE: NORMAL MMHG
INTERPRETATION ECG - MUSE: NORMAL
P AXIS - MUSE: 47 DEGREES
PR INTERVAL - MUSE: 162 MS
QRS DURATION - MUSE: 86 MS
QT - MUSE: 360 MS
QTC - MUSE: 445 MS
R AXIS - MUSE: -58 DEGREES
SYSTOLIC BLOOD PRESSURE - MUSE: NORMAL MMHG
T AXIS - MUSE: 68 DEGREES
VENTRICULAR RATE- MUSE: 92 BPM

## 2021-07-26 RX ORDER — CLINDAMYCIN PHOSPHATE 900 MG/50ML
900 INJECTION, SOLUTION INTRAVENOUS EVERY 8 HOURS
Status: DISCONTINUED | OUTPATIENT
Start: 2021-07-26 | End: 2021-07-27 | Stop reason: HOSPADM

## 2021-07-27 ENCOUNTER — HOSPITAL ENCOUNTER (OUTPATIENT)
Facility: OTHER | Age: 81
Discharge: NURSING, REHAB OR LONG-TERM CARE | End: 2021-07-28
Attending: UROLOGY | Admitting: UROLOGY
Payer: COMMERCIAL

## 2021-07-27 ENCOUNTER — ANESTHESIA (OUTPATIENT)
Dept: SURGERY | Facility: OTHER | Age: 81
End: 2021-07-27
Payer: COMMERCIAL

## 2021-07-27 DIAGNOSIS — R33.8 URINARY RETENTION DUE TO BENIGN PROSTATIC HYPERPLASIA: ICD-10-CM

## 2021-07-27 DIAGNOSIS — N40.1 URINARY RETENTION DUE TO BENIGN PROSTATIC HYPERPLASIA: ICD-10-CM

## 2021-07-27 PROCEDURE — 52601 PROSTATECTOMY (TURP): CPT | Performed by: NURSE ANESTHETIST, CERTIFIED REGISTERED

## 2021-07-27 PROCEDURE — 52601 PROSTATECTOMY (TURP): CPT | Performed by: UROLOGY

## 2021-07-27 PROCEDURE — 258N000003 HC RX IP 258 OP 636: Performed by: NURSE ANESTHETIST, CERTIFIED REGISTERED

## 2021-07-27 PROCEDURE — 258N000001 HC RX 258

## 2021-07-27 PROCEDURE — 94640 AIRWAY INHALATION TREATMENT: CPT

## 2021-07-27 PROCEDURE — 250N000011 HC RX IP 250 OP 636: Performed by: UROLOGY

## 2021-07-27 PROCEDURE — 999N000141 HC STATISTIC PRE-PROCEDURE NURSING ASSESSMENT: Performed by: UROLOGY

## 2021-07-27 PROCEDURE — 96372 THER/PROPH/DIAG INJ SC/IM: CPT | Performed by: INTERNAL MEDICINE

## 2021-07-27 PROCEDURE — 999N000157 HC STATISTIC RCP TIME EA 10 MIN

## 2021-07-27 PROCEDURE — 99100 ANES PT EXTEME AGE<1 YR&>70: CPT | Performed by: NURSE ANESTHETIST, CERTIFIED REGISTERED

## 2021-07-27 PROCEDURE — 710N000010 HC RECOVERY PHASE 1, LEVEL 2, PER MIN: Performed by: UROLOGY

## 2021-07-27 PROCEDURE — 272N000001 HC OR GENERAL SUPPLY STERILE: Performed by: UROLOGY

## 2021-07-27 PROCEDURE — 250N000012 HC RX MED GY IP 250 OP 636 PS 637: Performed by: INTERNAL MEDICINE

## 2021-07-27 PROCEDURE — 370N000017 HC ANESTHESIA TECHNICAL FEE, PER MIN: Performed by: UROLOGY

## 2021-07-27 PROCEDURE — 94640 AIRWAY INHALATION TREATMENT: CPT | Mod: 76

## 2021-07-27 PROCEDURE — 258N000001 HC RX 258: Performed by: UROLOGY

## 2021-07-27 PROCEDURE — 258N000003 HC RX IP 258 OP 636: Performed by: UROLOGY

## 2021-07-27 PROCEDURE — 250N000009 HC RX 250: Performed by: UROLOGY

## 2021-07-27 PROCEDURE — 250N000009 HC RX 250: Performed by: NURSE ANESTHETIST, CERTIFIED REGISTERED

## 2021-07-27 PROCEDURE — 360N000076 HC SURGERY LEVEL 3, PER MIN: Performed by: UROLOGY

## 2021-07-27 PROCEDURE — 250N000011 HC RX IP 250 OP 636: Performed by: NURSE ANESTHETIST, CERTIFIED REGISTERED

## 2021-07-27 PROCEDURE — 250N000025 HC SEVOFLURANE, PER MIN: Performed by: UROLOGY

## 2021-07-27 RX ORDER — AMLODIPINE BESYLATE 5 MG/1
5 TABLET ORAL DAILY
Status: DISCONTINUED | OUTPATIENT
Start: 2021-07-28 | End: 2021-07-28 | Stop reason: HOSPADM

## 2021-07-27 RX ORDER — SODIUM CHLORIDE 9 MG/ML
INJECTION, SOLUTION INTRAVENOUS CONTINUOUS
Status: DISCONTINUED | OUTPATIENT
Start: 2021-07-27 | End: 2021-07-28

## 2021-07-27 RX ORDER — ACETAMINOPHEN 325 MG/1
650 TABLET ORAL EVERY 4 HOURS PRN
Qty: 100 TABLET | Refills: 0 | Status: SHIPPED | OUTPATIENT
Start: 2021-07-27 | End: 2021-08-03

## 2021-07-27 RX ORDER — ONDANSETRON 2 MG/ML
4 INJECTION INTRAMUSCULAR; INTRAVENOUS EVERY 30 MIN PRN
Status: DISCONTINUED | OUTPATIENT
Start: 2021-07-27 | End: 2021-07-27 | Stop reason: HOSPADM

## 2021-07-27 RX ORDER — SODIUM CHLORIDE 9 MG/ML
INJECTION, SOLUTION INTRAVENOUS CONTINUOUS
Status: DISCONTINUED | OUTPATIENT
Start: 2021-07-27 | End: 2021-07-27 | Stop reason: HOSPADM

## 2021-07-27 RX ORDER — ATROPA BELLADONNA AND OPIUM 16.2; 3 MG/1; MG/1
SUPPOSITORY RECTAL PRN
Status: DISCONTINUED | OUTPATIENT
Start: 2021-07-27 | End: 2021-07-27 | Stop reason: HOSPADM

## 2021-07-27 RX ORDER — LIDOCAINE HYDROCHLORIDE 20 MG/ML
INJECTION, SOLUTION INFILTRATION; PERINEURAL PRN
Status: DISCONTINUED | OUTPATIENT
Start: 2021-07-27 | End: 2021-07-27

## 2021-07-27 RX ORDER — SODIUM CHLORIDE, SODIUM LACTATE, POTASSIUM CHLORIDE, CALCIUM CHLORIDE 600; 310; 30; 20 MG/100ML; MG/100ML; MG/100ML; MG/100ML
INJECTION, SOLUTION INTRAVENOUS CONTINUOUS PRN
Status: DISCONTINUED | OUTPATIENT
Start: 2021-07-27 | End: 2021-07-27

## 2021-07-27 RX ORDER — DEXTROSE MONOHYDRATE 25 G/50ML
25-50 INJECTION, SOLUTION INTRAVENOUS
Status: DISCONTINUED | OUTPATIENT
Start: 2021-07-27 | End: 2021-07-28 | Stop reason: HOSPADM

## 2021-07-27 RX ORDER — HYDROMORPHONE HYDROCHLORIDE 1 MG/ML
0.4 INJECTION, SOLUTION INTRAMUSCULAR; INTRAVENOUS; SUBCUTANEOUS
Status: DISCONTINUED | OUTPATIENT
Start: 2021-07-27 | End: 2021-07-28 | Stop reason: HOSPADM

## 2021-07-27 RX ORDER — ATROPA BELLADONNA AND OPIUM 16.2; 3 MG/1; MG/1
30 SUPPOSITORY RECTAL 3 TIMES DAILY PRN
Status: DISCONTINUED | OUTPATIENT
Start: 2021-07-27 | End: 2021-07-28 | Stop reason: HOSPADM

## 2021-07-27 RX ORDER — ONDANSETRON 4 MG/1
4-8 TABLET, ORALLY DISINTEGRATING ORAL EVERY 8 HOURS PRN
Qty: 10 TABLET | Refills: 0 | Status: SHIPPED | OUTPATIENT
Start: 2021-07-27 | End: 2021-09-10

## 2021-07-27 RX ORDER — FENTANYL CITRATE 50 UG/ML
INJECTION, SOLUTION INTRAMUSCULAR; INTRAVENOUS PRN
Status: DISCONTINUED | OUTPATIENT
Start: 2021-07-27 | End: 2021-07-27

## 2021-07-27 RX ORDER — IPRATROPIUM BROMIDE AND ALBUTEROL SULFATE 2.5; .5 MG/3ML; MG/3ML
3 SOLUTION RESPIRATORY (INHALATION) 4 TIMES DAILY
Status: DISCONTINUED | OUTPATIENT
Start: 2021-07-27 | End: 2021-07-28 | Stop reason: HOSPADM

## 2021-07-27 RX ORDER — NICOTINE POLACRILEX 4 MG
15-30 LOZENGE BUCCAL
Status: DISCONTINUED | OUTPATIENT
Start: 2021-07-27 | End: 2021-07-28 | Stop reason: HOSPADM

## 2021-07-27 RX ORDER — HYDROMORPHONE HYDROCHLORIDE 1 MG/ML
0.2 INJECTION, SOLUTION INTRAMUSCULAR; INTRAVENOUS; SUBCUTANEOUS
Status: DISCONTINUED | OUTPATIENT
Start: 2021-07-27 | End: 2021-07-28 | Stop reason: HOSPADM

## 2021-07-27 RX ORDER — SODIUM CHLORIDE 9 MG/ML
INJECTION, SOLUTION INTRAVENOUS CONTINUOUS PRN
Status: DISCONTINUED | OUTPATIENT
Start: 2021-07-27 | End: 2021-07-27

## 2021-07-27 RX ORDER — HYDROCODONE BITARTRATE AND ACETAMINOPHEN 5; 325 MG/1; MG/1
1-2 TABLET ORAL EVERY 4 HOURS PRN
Status: DISCONTINUED | OUTPATIENT
Start: 2021-07-27 | End: 2021-07-28 | Stop reason: HOSPADM

## 2021-07-27 RX ORDER — PROPOFOL 10 MG/ML
INJECTION, EMULSION INTRAVENOUS PRN
Status: DISCONTINUED | OUTPATIENT
Start: 2021-07-27 | End: 2021-07-27

## 2021-07-27 RX ORDER — LIDOCAINE 40 MG/G
CREAM TOPICAL
Status: DISCONTINUED | OUTPATIENT
Start: 2021-07-27 | End: 2021-07-28 | Stop reason: HOSPADM

## 2021-07-27 RX ORDER — HYDROXYZINE HYDROCHLORIDE 10 MG/1
10 TABLET, FILM COATED ORAL EVERY 6 HOURS PRN
Qty: 30 TABLET | Refills: 0 | Status: SHIPPED | OUTPATIENT
Start: 2021-07-27 | End: 2021-09-10

## 2021-07-27 RX ORDER — ALBUTEROL SULFATE 0.83 MG/ML
3 SOLUTION RESPIRATORY (INHALATION) EVERY 6 HOURS PRN
Status: DISCONTINUED | OUTPATIENT
Start: 2021-07-27 | End: 2021-07-28 | Stop reason: HOSPADM

## 2021-07-27 RX ORDER — ONDANSETRON 4 MG/1
4 TABLET, ORALLY DISINTEGRATING ORAL EVERY 30 MIN PRN
Status: DISCONTINUED | OUTPATIENT
Start: 2021-07-27 | End: 2021-07-27 | Stop reason: HOSPADM

## 2021-07-27 RX ORDER — LIDOCAINE 40 MG/G
CREAM TOPICAL
Status: DISCONTINUED | OUTPATIENT
Start: 2021-07-27 | End: 2021-07-28

## 2021-07-27 RX ORDER — NEOMYCIN/BACITRACIN/POLYMYXINB 3.5-400-5K
OINTMENT (GRAM) TOPICAL 4 TIMES DAILY PRN
Status: DISCONTINUED | OUTPATIENT
Start: 2021-07-27 | End: 2021-07-28 | Stop reason: HOSPADM

## 2021-07-27 RX ADMIN — SODIUM CHLORIDE: 900 INJECTION, SOLUTION INTRAVENOUS at 12:14

## 2021-07-27 RX ADMIN — IPRATROPIUM BROMIDE AND ALBUTEROL SULFATE 3 ML: 2.5; .5 SOLUTION RESPIRATORY (INHALATION) at 14:31

## 2021-07-27 RX ADMIN — FENTANYL CITRATE 50 MCG: 50 INJECTION, SOLUTION INTRAMUSCULAR; INTRAVENOUS at 12:15

## 2021-07-27 RX ADMIN — SODIUM CHLORIDE: 900 IRRIGANT IRRIGATION at 22:21

## 2021-07-27 RX ADMIN — GENTAMICIN SULFATE 280 MG: 40 INJECTION, SOLUTION INTRAMUSCULAR; INTRAVENOUS at 12:15

## 2021-07-27 RX ADMIN — INSULIN ASPART 4 UNITS: 100 INJECTION, SOLUTION INTRAVENOUS; SUBCUTANEOUS at 16:57

## 2021-07-27 RX ADMIN — INSULIN GLARGINE 8 UNITS: 100 INJECTION, SOLUTION SUBCUTANEOUS at 22:19

## 2021-07-27 RX ADMIN — FENTANYL CITRATE 25 MCG: 50 INJECTION, SOLUTION INTRAMUSCULAR; INTRAVENOUS at 12:37

## 2021-07-27 RX ADMIN — SODIUM CHLORIDE: 900 IRRIGANT IRRIGATION at 14:12

## 2021-07-27 RX ADMIN — SODIUM CHLORIDE, POTASSIUM CHLORIDE, SODIUM LACTATE AND CALCIUM CHLORIDE: 600; 310; 30; 20 INJECTION, SOLUTION INTRAVENOUS at 12:49

## 2021-07-27 RX ADMIN — IPRATROPIUM BROMIDE AND ALBUTEROL SULFATE 3 ML: 2.5; .5 SOLUTION RESPIRATORY (INHALATION) at 18:17

## 2021-07-27 RX ADMIN — CLINDAMYCIN IN 5 PERCENT DEXTROSE 900 MG: 18 INJECTION, SOLUTION INTRAVENOUS at 12:19

## 2021-07-27 RX ADMIN — SODIUM CHLORIDE: 900 IRRIGANT IRRIGATION at 13:19

## 2021-07-27 RX ADMIN — PROPOFOL 110 MG: 10 INJECTION, EMULSION INTRAVENOUS at 12:17

## 2021-07-27 RX ADMIN — SODIUM CHLORIDE: 9 INJECTION, SOLUTION INTRAVENOUS at 13:17

## 2021-07-27 RX ADMIN — LIDOCAINE HYDROCHLORIDE 60 MG: 20 INJECTION, SOLUTION INFILTRATION; PERINEURAL at 12:17

## 2021-07-27 RX ADMIN — SODIUM CHLORIDE: 900 IRRIGANT IRRIGATION at 20:36

## 2021-07-27 ASSESSMENT — COPD QUESTIONNAIRES
CAT_SEVERITY: MODERATE
COPD: 1

## 2021-07-27 ASSESSMENT — LIFESTYLE VARIABLES: TOBACCO_USE: 1

## 2021-07-27 NOTE — ANESTHESIA CARE TRANSFER NOTE
Patient: Neel Kerr    Procedure(s):  Transurethral resection of prostate    Diagnosis: Urinary retention due to benign prostatic hyperplasia [N40.1, R33.8]  Diagnosis Additional Information: No value filed.    Anesthesia Type:   General     Note:      Level of Consciousness: drowsy  Oxygen Supplementation: face mask (Transported on 8 L/min)  Level of Supplemental Oxygen (L/min / FiO2): 98%  Independent Airway: airway patency satisfactory and stable  Dentition: dentition unchanged  Vital Signs Stable: post-procedure vital signs reviewed and stable  Report to RN Given: handoff report given  Patient transferred to: PACU    Handoff Report: Identifed the Patient, Identified the Reponsible Provider, Reviewed the pertinent medical history, Discussed the surgical course, Reviewed Intra-OP anesthesia mangement and issues during anesthesia, Set expectations for post-procedure period and Allowed opportunity for questions and acknowledgement of understanding      Vitals:  Vitals Value Taken Time   /68 07/27/21 1301   Temp     Pulse 75 07/27/21 1302   Resp 11 07/27/21 1302   SpO2 99 % 07/27/21 1302   Vitals shown include unvalidated device data.    Electronically Signed By: David Kellerman, APRN CRNA  July 27, 2021  1:04 PM

## 2021-07-27 NOTE — OR NURSING
18 F weathers catheter removed pre prep by Shae ROMERO RN. Letha concentrated urine in leg bag noted.

## 2021-07-27 NOTE — ANESTHESIA PREPROCEDURE EVALUATION
Anesthesia Pre-Procedure Evaluation    Patient: Neel Kerr   MRN: [id number] : 1940        Preoperative Diagnosis: Urinary retention due to benign prostatic hyperplasia [N40.1, R33.8]   Procedure : Procedure(s):  Transurethral resection of prostate     Past Medical History:   Diagnosis Date     Essential (primary) hypertension     No Comments Provided     Hyperlipidemia     No Comments Provided     Non-ST elevation (NSTEMI) myocardial infarction (H)     2017,[facility] PCI with stent circumflex     Old myocardial infarction     2015,[facility]  PCI with stent     Polyneuropathy     No Comments Provided     Type 2 diabetes mellitus without complications (H)     No Comments Provided      History reviewed. No pertinent surgical history.   Allergies   Allergen Reactions     Augmentin      Marked as an allergy at the [facility]      Social History     Tobacco Use     Smoking status: Former Smoker     Quit date: 1992     Years since quittin.5     Smokeless tobacco: Never Used   Substance Use Topics     Alcohol use: No     Comment: Alcoholic Drinks/day: quit drinking       Wt Readings from Last 1 Encounters:   21 56.7 kg (125 lb)        Anesthesia Evaluation   Pt has had prior anesthetic.     No history of anesthetic complications       ROS/MED HX  ENT/Pulmonary:     (+) tobacco use, Past use, moderate,  COPD,     Neurologic: Comment: Dysphagia      Cardiovascular:     (+) hypertension--CAD -past MI --CAPONE.     METS/Exercise Tolerance: 4 - Raking leaves, gardening    Hematologic:  - neg hematologic  ROS     Musculoskeletal:  - neg musculoskeletal ROS     GI/Hepatic:  - neg GI/hepatic ROS     Renal/Genitourinary:     (+) renal disease, type: CRI, Pt does not require dialysis, BPH,     Endo:     (+) type II DM,     Psychiatric/Substance Use:  - neg psychiatric ROS     Infectious Disease: Comment: Hx Covid 19 infection      Malignancy:  - neg malignancy ROS     Other:  - neg other ROS           Physical Exam    Airway        Mallampati: II   TM distance: > 3 FB   Neck ROM: full   Mouth opening: > 3 cm    Respiratory Devices and Support         Dental       (+) missing      Cardiovascular   cardiovascular exam normal       Rhythm and rate: regular and normal     Pulmonary           (+) decreased breath sounds           OUTSIDE LABS:  CBC:   Lab Results   Component Value Date    WBC 11.7 (H) 07/23/2021    WBC 11.6 (H) 07/03/2021    HGB 13.3 07/23/2021    HGB 10.4 (L) 07/03/2021    HCT 41.3 07/23/2021    HCT 31.3 (L) 07/03/2021     07/23/2021     07/03/2021     BMP:   Lab Results   Component Value Date     07/23/2021     07/02/2021    POTASSIUM 4.5 07/23/2021    POTASSIUM 3.8 07/02/2021    CHLORIDE 99 07/23/2021    CHLORIDE 104 07/02/2021    CO2 26 07/23/2021    CO2 22 07/02/2021    BUN 17 07/23/2021    BUN 13 07/02/2021    CR 0.84 07/23/2021    CR 0.69 (L) 07/02/2021     (H) 07/23/2021     (H) 07/02/2021     COAGS:   Lab Results   Component Value Date    PTT 24 06/30/2021    INR 0.98 06/30/2021    FIBR 400 05/17/2021     POC:   Lab Results   Component Value Date     (H) 05/25/2021     HEPATIC:   Lab Results   Component Value Date    ALBUMIN 4.1 07/23/2021    PROTTOTAL 7.3 07/23/2021    ALT 11 07/23/2021    AST 14 07/23/2021    ALKPHOS 71 07/23/2021    BILITOTAL 0.5 07/23/2021     OTHER:   Lab Results   Component Value Date    PH 7.21 (L) 05/11/2021    LACT 1.7 06/30/2021    A1C 13.4 (H) 05/11/2021    CARLOS 9.2 07/23/2021    PHOS 2.9 05/25/2021    MAG 1.7 05/25/2021    LIPASE 160 07/06/2017    AMYLASE 47 07/06/2017    CRP 15.1 (H) 05/20/2021    SED 9 01/23/2017       Anesthesia Plan    ASA Status:  3   NPO Status:  NPO Appropriate    Anesthesia Type: General.     - Airway: LMA   Induction: Intravenous, Propofol.           Consents    Anesthesia Plan(s) and associated risks, benefits, and realistic alternatives discussed. Questions answered and  patient/representative(s) expressed understanding.     - Discussed with:  Patient      - Extended Intubation/Ventilatory Support Discussed: No.      - Patient is DNR/DNI Status: No    Use of blood products discussed: No .     Postoperative Care    Pain management: IV analgesics.   PONV prophylaxis: Ondansetron (or other 5HT-3)     Comments:                GRIS Akbar CRNA

## 2021-07-27 NOTE — PROGRESS NOTES
Incentive Spirometry education completed.  Pt goal 2800 mls.  Pt achieved 1750 mls.  Pt instructed to perform 10/hr while awake with at least one deep breath and cough per hour until able to perform baseline activity.  RT will follow and re-assess as need.      Sabina Damian, RT on 7/27/2021 at 4:01 PM

## 2021-07-27 NOTE — OR NURSING
PACU Transfer Note    Neel Kerr was transferred to [facility] via bed.  Equipment used for transport:  O2.  Accompanied by:  Kya  Prescriptions were: n/a    PACU Respiratory Event Documentation     1) Episodes of Apnea greater than or equal to 10 seconds: 0    2) Bradypnea - less than 8 breaths per minute: 0    3) Pain score on 0 to 10 scale: 0    4) Pain-sedation mismatch (yes or no): npo    5) Repeated 02 desaturation less than 90% (yes or no): no    Anesthesia notified? (yes or no): n/a      Any of the above events occuring repeatedly in separate 30 minute intervals may be considered recurrent PACU respiratory events.    Patient stable and meets phase 1 discharge criteria for transport from PACU.    Report to JUDD Tay

## 2021-07-27 NOTE — PROGRESS NOTES
[facility] ADMISSION NOTE    Patient admitted to room 315 at approximately 1330 via bed from PACU. Patient was accompanied by nurse.     Verbal SBAR report received from Wes prior to patient arrival.     Patient cane over via bed from PACU. Patient alert and oriented X 3. The patient is not having any pain.  . Admission vital signs: Blood pressure 97/65, pulse 73, temperature 97.9  F (36.6  C), temperature source Temporal, resp. rate 17, weight 56.7 kg (125 lb), SpO2 92 %. Patient was oriented to plan of care, call light, bed controls, tv, telephone, bathroom and visiting hours.     Risk Assessment    The following safety risks were identified during admission: fall. Yellow risk band applied: YES.     Skin Initial Assessment    This writer admitted this patient and completed a full skin assessment and Wade score in the Adult PCS flowsheet. Appropriate interventions initiated as needed.     Secondary skin check completed by Maggi.         Education    Patient has a [place] to Observation order: No  Observation education completed and documented: No      Melita Lepe RN

## 2021-07-27 NOTE — OP NOTE
Preoperative diagnosis  Clinical BPH with lower urinary tract symptoms including urinary retention    Postoperative diagnosis  Clinical BPH with lower urinary tract symptoms including urinary retention    Procedure performed  Transurethral vaporization of prostate, bipolar    Surgeon  Antonio August MD    Surgeon(s)/Proceduralist(s) and Assistants (if any)  Surgeon(s):  Antonio August MD  Circulator: Yanelis Tinajero RN  First Assistant: Kaitlin Relily RN; Rachael Garcia RN  2nd Scrub: Yris Lewis    Specimen(s)  No    (EBL) Estimated blood loss (ml)  25    Anesthesia  General    Complications  None    Findings  Cystoscopy revealed trilobar prostatic hyperplasia.  There were no abnormalities within the bladder with no bladder tumors or stones.  Ureteral orifices were in the normal anatomic position.  Post-procedure cystoscopy revealed bilateral ureteral orifices that were uninvolved in the vaporization.  Treatment did not extend distal to the verumontanum.    Indications  80 year old male agreed to undergo the above named procedure after discussion of the alternatives, risks and benefits.    He was found to have significant lower urinary tract symptoms consistent with clinical BPH.  These were refractory to oral medication.  Informed consent was obtained.      Procedure  The patient was taken to the operating room and placed supine on the operating table.  Pre-operative antibiotics were administered.  Bilateral lower extremity SCDs were placed.  After induction of anesthesia the patient was positioned in dorsal lithotomy.  A time-out was performed.  An exam under anesthesia was performed with the above described findings.  The patient was prepped and draped in a sterile fashion.      Serial dilation of the meatus was performed to 30 Danish in order to allow the scope to safely be passed through the urethra.    A 27 Danish continuous flow resectoscope was introduced into the urethra and bladder.  Using the  button working element, the prostate was treated in a systematic fashion.  First the left and right lateral lobes were treated followed by the median lobe.  Hemostasis was assured throughout the case.  The bilateral ureteral orifices and verumontanum were used as landmarks and repeatedly visualized throughout the procedure.  Care was taken to carry the treatment of the prostate up to, but not distal to, the verumontanum.  A 22 [place]bourgish catheter was placed without difficulty.      The patient tolerated the procedure well, was awakened, extubated and transferred to the recovery room in stable condition.    Plan  Admit overnight for observation

## 2021-07-27 NOTE — ANESTHESIA PROCEDURE NOTES
Airway       Patient location during procedure: OR       Procedure Start/Stop Times: 7/27/2021 12:17 PM  Staff -        CRNA: Kellerman, David, APRN CRNA       Performed By: CRNA  Consent for Airway        Urgency: elective  Indications and Patient Condition       Indications for airway management: paula-procedural       Induction type:intravenous       Mask difficulty assessment: 0 - not attempted    Final Airway Details       Final airway type: supraglottic airway    Supraglottic Airway Details        Type: LMA       Brand: I-Gel       LMA size: 4    Post intubation assessment        Placement verified by: capnometry, equal breath sounds and chest rise        Number of attempts at approach: 1       Number of other approaches attempted: 0       Ease of procedure: easy       Dentition: Intact and Unchanged

## 2021-07-27 NOTE — ANESTHESIA POSTPROCEDURE EVALUATION
Patient: Neel Kerr    Procedure(s):  Transurethral resection of prostate    Diagnosis:Urinary retention due to benign prostatic hyperplasia [N40.1, R33.8]  Diagnosis Additional Information: No value filed.    Anesthesia Type:  General    Note:  Disposition: Admission   Postop Pain Control: Uneventful            Sign Out: Well controlled pain   PONV: No   Neuro/Psych: Uneventful            Sign Out: Acceptable/Baseline neuro status   Airway/Respiratory: Uneventful            Sign Out: Acceptable/Baseline resp. status   CV/Hemodynamics: Uneventful            Sign Out: Acceptable CV status; No obvious hypovolemia; No obvious fluid overload   Other NRE: NONE   DID A NON-ROUTINE EVENT OCCUR? No           Last vitals:  Vitals Value Taken Time   BP 97/65 07/27/21 1325   Temp 97.9  F (36.6  C) 07/27/21 1325   Pulse 69 07/27/21 1329   Resp 22 07/27/21 1329   SpO2 96 % 07/27/21 1329   Vitals shown include unvalidated device data.    Electronically Signed By: GRIS Akbar CRNA  July 27, 2021  2:54 PM

## 2021-07-28 VITALS
HEART RATE: 69 BPM | OXYGEN SATURATION: 92 % | BODY MASS INDEX: 19.59 KG/M2 | DIASTOLIC BLOOD PRESSURE: 68 MMHG | RESPIRATION RATE: 16 BRPM | TEMPERATURE: 97 F | WEIGHT: 125.1 LBS | SYSTOLIC BLOOD PRESSURE: 134 MMHG

## 2021-07-28 LAB — HBA1C MFR BLD: 6.8 % (ref 4–6.2)

## 2021-07-28 PROCEDURE — 83036 HEMOGLOBIN GLYCOSYLATED A1C: CPT | Performed by: INTERNAL MEDICINE

## 2021-07-28 PROCEDURE — 94640 AIRWAY INHALATION TREATMENT: CPT

## 2021-07-28 PROCEDURE — 258N000001 HC RX 258: Performed by: UROLOGY

## 2021-07-28 PROCEDURE — 94640 AIRWAY INHALATION TREATMENT: CPT | Mod: 76

## 2021-07-28 PROCEDURE — 999N000157 HC STATISTIC RCP TIME EA 10 MIN

## 2021-07-28 PROCEDURE — 250N000009 HC RX 250: Performed by: UROLOGY

## 2021-07-28 PROCEDURE — 36415 COLL VENOUS BLD VENIPUNCTURE: CPT | Performed by: INTERNAL MEDICINE

## 2021-07-28 PROCEDURE — 250N000013 HC RX MED GY IP 250 OP 250 PS 637: Performed by: UROLOGY

## 2021-07-28 PROCEDURE — 96372 THER/PROPH/DIAG INJ SC/IM: CPT | Performed by: INTERNAL MEDICINE

## 2021-07-28 RX ADMIN — AMLODIPINE BESYLATE 5 MG: 5 TABLET ORAL at 09:20

## 2021-07-28 RX ADMIN — INSULIN ASPART 3 UNITS: 100 INJECTION, SOLUTION INTRAVENOUS; SUBCUTANEOUS at 07:59

## 2021-07-28 RX ADMIN — IPRATROPIUM BROMIDE AND ALBUTEROL SULFATE 3 ML: 2.5; .5 SOLUTION RESPIRATORY (INHALATION) at 06:09

## 2021-07-28 RX ADMIN — INSULIN ASPART 2 UNITS: 100 INJECTION, SOLUTION INTRAVENOUS; SUBCUTANEOUS at 11:38

## 2021-07-28 RX ADMIN — IPRATROPIUM BROMIDE AND ALBUTEROL SULFATE 3 ML: 2.5; .5 SOLUTION RESPIRATORY (INHALATION) at 10:33

## 2021-07-28 RX ADMIN — SODIUM CHLORIDE: 900 IRRIGANT IRRIGATION at 00:17

## 2021-07-28 NOTE — PROGRESS NOTES
End of shift   Patient remained stable throughout the shift no changes  Patient currently  On RA   No SOB OR  Wob throughout shift    RT Quinn on 7/28/2021 at 6:42 AM

## 2021-07-28 NOTE — PROGRESS NOTES
:    I called CLAUDIA Logan at [facility] and gave her discharge update.  Patient will be discharging today. Patients brother would like to transport.  [facility] was updated. No further needs at this time.    CLAUDIA Masters on 7/28/2021 at 9:25 AM

## 2021-07-28 NOTE — PROGRESS NOTES
Patient up multiple times to attempt to void. Patient unable to void. Call placed to Dr. August, order received to replace the weathers catheter. And have patient come in for a void trial in one week. Weathers catheter placed with no problem. Return of 500 of bloody urine.

## 2021-07-28 NOTE — PLAN OF CARE
BP (!) 141/61   Pulse 57   Temp 96.8  F (36  C) (Tympanic)   Resp 18   Wt 56.7 kg (125 lb)   SpO2 96%   BMI 19.58 kg/m      VS. CBI flow slow, clear/pink output, no clots noted.  No complaint per pt.  Vijaya Thurston RN.............................7/28/2021 5:27 AM

## 2021-07-28 NOTE — PROGRESS NOTES
NSG DISCHARGE NOTE    Patient discharged to nursing home at 11:53 AM via ambulation. Accompanied by other:brother and staff. Discharge instructions reviewed with nurse to nurse given to [facility], opportunity offered to ask questions. Prescriptions sent to patients preferred pharmacy. All belongings sent with patient.    Melita Lepe RN

## 2021-07-28 NOTE — PLAN OF CARE
Patient bladder filled with normal saline. Catheter removed. Will bladder scan after patient voids. Vital signs stable. Patient denies pain.

## 2021-07-28 NOTE — PROGRESS NOTES
Patient up to commode x3 in last 20 minutes.  Unable to void.  , 573, 534.  Bladder slight distention- denied pain or urge to void.  Malu Mendez RN on 7/28/2021 at 10:27 AM

## 2021-07-29 ENCOUNTER — PATIENT OUTREACH (OUTPATIENT)
Dept: CARE COORDINATION | Facility: CLINIC | Age: 81
End: 2021-07-29

## 2021-07-29 NOTE — PROGRESS NOTES
Transitional Care Management Phone Call    Summary of hospitalization:  [facility] discharge summary reviewed  DISCHARGE DIAGNOSIS: TURP Transurethral resection of prostate  DATE OF DISCHARGE: 07/28/2021    Diagnostic Tests/Treatments reviewed.  Follow up needed: Follow-up appointments Dr. Ayla Howell on August 3, 2021 11:00 AM,  8:15 AM urology only visit  Post Discharge Medication Reconciliation: discharge medications reconciled, continue medications without change.  Medications reviewed by: by myself    Problems taking medications regularly:  None  Problems adhering to non-medication therapy:  None    Other Healthcare Providers Involved in Patient s Care:         Patient is currently at [facility]  Update since discharge: improved. Talked with Ariana BETHEA at [facility] she states patient has not verbalized any concerns at this time with pain or bleeding or trouble after TURP.  Ariana is aware of dates and times of appointment.   Plan of care communicated with patient and caregiver Ariana BETHEA at [facility] Just a friendly reminder that you appointment is   Next 5 appointments (look out 90 days)    Aug 03, 2021  8:15 AM  Nurse Only with  UROLOGY NURSE  [facility] [address]  [place] [address]  [phone]   Aug 03, 2021 11:00 AM  Office Visit with Norma Taylor MD  [facility] [address]  [place] [address]  [phone]      .  We encourage you to keep this appointment.  Please remember to bring all of your pills in their bottles (including any vitamins or over the counter pills) with you to your appointment.   The patient indicates understanding of these issues and agrees with the plan of care.   Yes    [facility] was contacted within the 2 business days or other approved timeframe?   Yes  Was the Medication reconciliation and management done since the patient was discharged? Yes    Martha Pate RN  7/29/2021 3:30 PM

## 2021-08-03 ENCOUNTER — ALLIED HEALTH/NURSE VISIT (OUTPATIENT)
Dept: UROLOGY | Facility: OTHER | Age: 81
End: 2021-08-03
Attending: UROLOGY
Payer: COMMERCIAL

## 2021-08-03 ENCOUNTER — TELEPHONE (OUTPATIENT)
Dept: FAMILY MEDICINE | Facility: OTHER | Age: 81
End: 2021-08-03

## 2021-08-03 ENCOUNTER — OFFICE VISIT (OUTPATIENT)
Dept: FAMILY MEDICINE | Facility: OTHER | Age: 81
End: 2021-08-03
Attending: FAMILY MEDICINE
Payer: COMMERCIAL

## 2021-08-03 VITALS
DIASTOLIC BLOOD PRESSURE: 78 MMHG | TEMPERATURE: 97.1 F | RESPIRATION RATE: 16 BRPM | WEIGHT: 122.8 LBS | BODY MASS INDEX: 19.23 KG/M2 | OXYGEN SATURATION: 95 % | HEART RATE: 95 BPM | SYSTOLIC BLOOD PRESSURE: 128 MMHG

## 2021-08-03 VITALS
TEMPERATURE: 97 F | HEART RATE: 112 BPM | BODY MASS INDEX: 19.26 KG/M2 | WEIGHT: 123 LBS | OXYGEN SATURATION: 93 % | RESPIRATION RATE: 16 BRPM

## 2021-08-03 DIAGNOSIS — J44.9 CHRONIC OBSTRUCTIVE PULMONARY DISEASE, UNSPECIFIED COPD TYPE (H): ICD-10-CM

## 2021-08-03 DIAGNOSIS — N40.1 URINARY RETENTION DUE TO BENIGN PROSTATIC HYPERPLASIA: Primary | ICD-10-CM

## 2021-08-03 DIAGNOSIS — E11.69 TYPE 2 DIABETES MELLITUS WITH OTHER SPECIFIED COMPLICATION, WITH LONG-TERM CURRENT USE OF INSULIN (H): ICD-10-CM

## 2021-08-03 DIAGNOSIS — R33.8 URINARY RETENTION DUE TO BENIGN PROSTATIC HYPERPLASIA: Primary | ICD-10-CM

## 2021-08-03 DIAGNOSIS — I10 ESSENTIAL HYPERTENSION: ICD-10-CM

## 2021-08-03 DIAGNOSIS — Z79.4 TYPE 2 DIABETES MELLITUS WITH OTHER SPECIFIED COMPLICATION, WITH LONG-TERM CURRENT USE OF INSULIN (H): ICD-10-CM

## 2021-08-03 PROCEDURE — G0463 HOSPITAL OUTPT CLINIC VISIT: HCPCS

## 2021-08-03 PROCEDURE — 51798 US URINE CAPACITY MEASURE: CPT

## 2021-08-03 PROCEDURE — 99213 OFFICE O/P EST LOW 20 MIN: CPT | Performed by: FAMILY MEDICINE

## 2021-08-03 RX ORDER — AMLODIPINE BESYLATE 2.5 MG/1
2.5 TABLET ORAL DAILY
Qty: 30 TABLET | Refills: 0 | COMMUNITY
Start: 2021-08-03

## 2021-08-03 ASSESSMENT — ENCOUNTER SYMPTOMS
FEVER: 0
COUGH: 0
NERVOUS/ANXIOUS: 0
CHILLS: 0
SHORTNESS OF BREATH: 0

## 2021-08-03 ASSESSMENT — PAIN SCALES - GENERAL
PAINLEVEL: NO PAIN (0)
PAINLEVEL: NO PAIN (0)

## 2021-08-03 NOTE — PROGRESS NOTES
SUBJECTIVE:   There are no exam notes on file for this visit.    Neel Kerr is a 80 year old male who presents to clinic today for follow up of a recent hospitalization from 7/27-7/28/2021 for a TURP due to BPH.  He is having a void trial with Urology today to see if he is able to go without his weathers catheter.  Has done well since surgery.  No pain.  No fevers.  No visible hematuria has been noted by patient.    Prior to that, he had also been hospitalized between 6/30-7/3/2021 for acute on chronic respiratory failure with hypoxia.  He does have underlying COPD and was felt to have multifocal pneumonia at the time as well.  He feels that his breathing has been stable.      He has diabetes.  Last A1c on 7/28/2021 was 6.8.  Prior to that, it had been up to 13.4 on 5/11/2021.    HPI    I personally reviewed medications/allergies/history listed below:    Patient Active Problem List    Diagnosis Date Noted     Urinary retention due to benign prostatic hyperplasia 07/22/2021     Priority: Medium     Added automatically from request for surgery [id number]       Underweight 07/02/2021     Priority: Medium     Sepsis due to other etiology (H) 06/30/2021     Priority: Medium     Multifocal pneumonia 06/30/2021     Priority: Medium     Type 2 diabetes mellitus, with long-term current use of insulin (H) 06/30/2021     Priority: Medium     COPD (chronic obstructive pulmonary disease) (H) 05/18/2021     Priority: Medium     Oropharyngeal dysphagia 05/13/2021     Priority: Medium     Polycystic kidney disease 05/12/2021     Priority: Medium     Diabetic ketoacidosis without coma associated with type 2 diabetes mellitus (H) 05/11/2021     Priority: Medium     Metabolic acidosis 05/11/2021     Priority: Medium     Hypoxia 05/11/2021     Priority: Medium     Fall 05/11/2021     Priority: Medium     Acute on chronic renal failure (H) 05/11/2021     Priority: Medium     Hyponatremia 09/21/2020     Priority: Medium     Acute on  chronic respiratory failure with hypoxia (H) 2020     Priority: Medium     Pneumonia due to 2019 novel coronavirus 2020     Priority: Medium     Psoriasis 2018     Priority: Medium     Non-ST elevation (NSTEMI) myocardial infarction (H) 2017     Priority: Medium     HTN (hypertension) 2015     Priority: Medium     Past Medical History:   Diagnosis Date     Essential (primary) hypertension     No Comments Provided     Hyperlipidemia     No Comments Provided     Non-ST elevation (NSTEMI) myocardial infarction (H)     2017,[facility] PCI with stent circumflex     Old myocardial infarction     2015,[facility]  PCI with stent     Polyneuropathy     No Comments Provided     Type 2 diabetes mellitus without complications (H)     No Comments Provided      Past Surgical History:   Procedure Laterality Date     CYSTOSCOPY, TRANSURETHRAL RESECTION (TUR) PROSTATE, COMBINED N/A 2021    Procedure: Transurethral resection of prostate;  Surgeon: Antonio August MD;  Location:  OR     History reviewed. No pertinent family history.  Social History     Tobacco Use     Smoking status: Former Smoker     Quit date: 1992     Years since quittin.6     Smokeless tobacco: Never Used   Substance Use Topics     Alcohol use: No     Comment: Alcoholic Drinks/day: quit drinking      Social History     Social History Narrative    Primary care through [place], [place].        Brother Nicolas Kerr.  Sister-in-law Qiana Kerr.     Current Outpatient Medications   Medication Sig Dispense Refill     acetaminophen (TYLENOL) 325 MG tablet Take 2 tablets (650 mg) by mouth every 4 hours as needed for mild pain       albuterol (PROAIR HFA/PROVENTIL HFA/VENTOLIN HFA) 108 (90 Base) MCG/ACT inhaler Inhale 2 puffs into the lungs every 6 hours as needed for wheezing        amLODIPine (NORVASC) 5 MG tablet Take 1 tablet (5 mg) by mouth daily 30 tablet 0     atorvastatin (LIPITOR) 40 MG tablet Take 40 mg  by mouth At Bedtime       hydrOXYzine (ATARAX) 10 MG tablet Take 1 tablet (10 mg) by mouth every 6 hours as needed for itching or anxiety (with pain, moderate pain) 30 tablet 0     insulin aspart (NOVOLOG FLEXPEN) 100 UNIT/ML pen Inject as per sliding scale:  0-139 = 0 units, 140-154 = 1 unit, 155-164 = 3 units, 165-179 = 5 units, 180-199 = 6 units, 200-229 = 7 units, 230-259 = 9 units, 260-279 = 10 units, 280-299 = 11 units, 300-329 = 13 units, 330-359 = 14 units, 360-389 = 15 units, 390-419 = 16 units, 420-999 = 18 units and notify PCP.       insulin glargine (LANTUS PEN) 100 UNIT/ML pen Inject 8 Units Subcutaneous At Bedtime       ipratropium-albuterol (COMBIVENT RESPIMAT)  MCG/ACT inhaler Inhale 1 puff into the lungs 4 times daily       metFORMIN (GLUCOPHAGE) 1000 MG tablet Take 1,000 mg by mouth 2 times daily (with meals)        ondansetron (ZOFRAN-ODT) 4 MG ODT tab Take 1-2 tablets (4-8 mg) by mouth every 8 hours as needed for nausea Dissolve ON the tongue. 10 tablet 0     tamsulosin (FLOMAX) 0.4 MG capsule Take 1 capsule (0.4 mg) by mouth daily       Allergies   Allergen Reactions     Augmentin      Marked as an allergy at the [facility]       Review of Systems   Constitutional: Negative for chills and fever.   Respiratory: Negative for cough and shortness of breath.    Cardiovascular: Negative for peripheral edema.   Psychiatric/Behavioral: Negative for mood changes. The patient is not nervous/anxious.         OBJECTIVE:     /78 (BP Location: Right arm, Patient Position: Sitting, Cuff Size: Adult Regular)   Pulse 95   Temp 97.1  F (36.2  C) (Tympanic)   Resp 16   Wt 55.7 kg (122 lb 12.8 oz)   SpO2 95%   BMI 19.23 kg/m    Body mass index is 19.23 kg/m .  Physical Exam  Constitutional:       Appearance: Normal appearance.   HENT:      Head: Normocephalic.   Eyes:      Extraocular Movements: Extraocular movements intact.      Pupils: Pupils are equal, round, and reactive to light.   Cardiovascular:       Rate and Rhythm: Normal rate and regular rhythm.      Heart sounds: Normal heart sounds. No murmur heard.     Pulmonary:      Effort: Pulmonary effort is normal.      Breath sounds: Normal breath sounds. No wheezing, rhonchi or rales.   Abdominal:      General: Abdomen is flat. Bowel sounds are normal.   Musculoskeletal:      Cervical back: Normal range of motion and neck supple.      Right lower leg: No edema.      Left lower leg: No edema.   Lymphadenopathy:      Cervical: No cervical adenopathy.   Neurological:      Mental Status: He is alert.   Psychiatric:         Mood and Affect: Mood normal.         Behavior: Behavior normal.           No flowsheet data found.    PHQ-2 Score:     PHQ-2 ( 1999 Pfizer) 8/3/2021 7/23/2021   Q1: Little interest or pleasure in doing things 0 0   Q2: Feeling down, depressed or hopeless 0 0   PHQ-2 Score 0 0       No flowsheet data found.      No flowsheet data found.      I personally reviewed results withpatient as listed below:   Diagnostic Test Results:  none     ASSESSMENT/PLAN:       ICD-10-CM    1. Urinary retention due to benign prostatic hyperplasia  N40.1     R33.8    2. Chronic obstructive pulmonary disease, unspecified COPD type (H)  J44.9    3. Type 2 diabetes mellitus with other specified complication, with long-term current use of insulin (H)  E11.69     Z79.4    4. Essential hypertension  I10 amLODIPine (NORVASC) 5 MG tablet       1.  Doing well.  No pain.  In the midst of void trial per Urology.  2.  Stable.    3.  Improved A1c on recent testing while in the hospital.  No change to medications at this time.  4.  Blood pressure stable.  No change to medications at this time.  Labs are up to date.    Norma Taylor MD  [facility]

## 2021-08-03 NOTE — NURSING NOTE
Patient returned to clinic at 11:35  Post-Void Residual  A post-void residual was measured by ultrasonic bladder scanner.  180 mL    Explained to patient that his return was earlier than originally planned.  Encouraged patient to come back at 2pm.  Drink lots of fluids and urinate several times

## 2021-08-03 NOTE — PROGRESS NOTES
Medication Reconciliation: complete     FOOD SECURITY SCREENING QUESTIONS  Hunger Vital Signs:  Within the past 12 months we worried whether our food would run out before we got money to buy more. Never  Within the past 12 months the food we bought just didn't last and we didn't have money to get more. Never  Lisa Meza LPN .............8/3/2021  10:58 AM

## 2021-08-03 NOTE — TELEPHONE ENCOUNTER
Had appt today and during appt they mentioned they wanted him to get the COVID Vaccine, [facility]  [place] [facility] 3/23/2021. Thanks!    Argelia Iraheta on 8/3/2021 at 4:01 PM

## 2021-08-03 NOTE — NURSING NOTE
Chief Complaint   Patient presents with     Procedure     Void trial   Patient presents to the clinic today for a void trial.      Pt was unable to void in clinic.  Pt was advised to increase fluid intake throughout the morning and void several times. Pt is to return to the clinic  at 1pm for bladder scan and assessment.  Pt was advised to return before 11:45 am if having abdominal pain.        Medication Reconciliation: completed   Ingrid Garcia LPN  8/3/2021 8:13 AM

## 2021-08-03 NOTE — NURSING NOTE
Patient returned at 1:15(earlier than scheduled time)  Post-Void Residual  A post-void residual was measured by ultrasonic bladder scanner.  118 mL    Instructions sent to [facility] - If patient is having urinary pain/discompfort, catheter should be placed.  Patient scheduled for a one week  PVR follow up

## 2021-08-03 NOTE — PATIENT INSTRUCTIONS
You may schedule a Covid vaccine any time after 8/11/2021.  At this time we are vaccinating all people age 12 and older with the Pfizer COVID vaccine.   Beginning June 28th, you may make an appointment, walk-in or receive your vaccine after your clinic appointment anytime between 8am-12pm and 1pm-4:10pm Monday - Friday.   To schedule your appointment please log in to [employer] to see when and where we have openings. Appointments open up throughout the week, check back for additional openings. You can also schedule an appointment by calling our appointment line at [phone], weekdays 7:00AM - 5:00 PM.   We no longer have the [employer] vaccine available.   We appreciate your patience as we work to vaccinate as many people as we can as quickly, safely and efficiently as possible.

## 2021-08-12 ENCOUNTER — OFFICE VISIT (OUTPATIENT)
Dept: UROLOGY | Facility: OTHER | Age: 81
End: 2021-08-12
Attending: UROLOGY
Payer: COMMERCIAL

## 2021-08-12 VITALS
RESPIRATION RATE: 16 BRPM | SYSTOLIC BLOOD PRESSURE: 114 MMHG | HEART RATE: 108 BPM | WEIGHT: 122 LBS | BODY MASS INDEX: 19.11 KG/M2 | DIASTOLIC BLOOD PRESSURE: 80 MMHG | OXYGEN SATURATION: 95 %

## 2021-08-12 DIAGNOSIS — N40.1 URINARY RETENTION DUE TO BENIGN PROSTATIC HYPERPLASIA: Primary | ICD-10-CM

## 2021-08-12 DIAGNOSIS — R33.8 URINARY RETENTION DUE TO BENIGN PROSTATIC HYPERPLASIA: Primary | ICD-10-CM

## 2021-08-12 PROCEDURE — 99024 POSTOP FOLLOW-UP VISIT: CPT | Performed by: UROLOGY

## 2021-08-12 PROCEDURE — 51798 US URINE CAPACITY MEASURE: CPT | Mod: 58 | Performed by: UROLOGY

## 2021-08-12 ASSESSMENT — PAIN SCALES - GENERAL: PAINLEVEL: NO PAIN (0)

## 2021-08-12 NOTE — PROGRESS NOTES
Type of Visit  Established    Type of Visit  Established    Chief Complaint  Post op TURP    HPI  Mr. Kerr is a 80 year old male follows up 2 weeks s/p TURP.  Patient was catheter dependent prior to surgery.  The patient passed a void trial when he was 1 week postop.  This was 1 week ago.  Over the past week he states he has been voiding well and he follows up today for a PVR.      Review of Systems  I reviewed the ROS with the patient.    Nursing Notes:   Ingrid Garcia LPN  8/12/2021  9:29 AM  Signed  Chief Complaint   Patient presents with     Follow Up     1 week void trial   patient presents to the clinic today for a 1 week follow up for Void Trial    Review of Systems:    Weight loss:    No     Recent fever/chills:  No   Night sweats:   No  Current skin rash:  No   Recent hair loss:  No  Heat intolerance:  No   Cold intolerance:  No  Chest pain:   No   Palpitations:   No  Shortness of breath:  No   Wheezing:   No  Constipation:    No   Diarrhea:   No   Nausea:   No   Vomiting:   No   Kidney/side pain:  No   Back pain:   No  Frequent headaches:  No   Dizziness:     No  Leg swelling:   No   Calf pain:    No    Post-Void Residual  A post-void residual was measured by ultrasonic bladder scanner.  22 mL  Ingrid Garcia LPN  8/12/2021 9:21 AM        Medication Reconciliation: completed   Ingrid Garcia LPN  8/12/2021 9:20 AM       Family History  No family history on file.    Physical Exam  Vitals:    08/12/21 0927   BP: 114/80   BP Location: Right arm   Patient Position: Sitting   Cuff Size: Adult Regular   Pulse: 108   Resp: 16   SpO2: 95%   Weight: 55.3 kg (122 lb)     Constitutional: NAD, WDWN.  Cardiovascular: Regular rate.  Pulmonary/Chest: Respirations are even and non-labored bilaterally.  Abdominal: Soft. No distension, tenderness, masses or guarding. No CVA tenderness.  Extremities: SARAH x 4, Warm. No clubbing.  No cyanosis.    Skin: Pink, warm and dry.  No rashes noted.  Genitourinary: nonpalpable  bladder    Post-Void Residual  A post-void residual was measured by ultrasonic bladder scanner.  22 mL  Assessment  Mr. Kerr is a 80 year old male follows up 2 weeks s/p TURP.  Patient was catheter dependent prior to TURP.  The patient passed his void trial on postop week 1 and continues to empty extremely well based on PVR today.    Plan  Follow up as needed

## 2021-08-12 NOTE — NURSING NOTE
Chief Complaint   Patient presents with     Follow Up     1 week void trial   patient presents to the clinic today for a 1 week follow up for Void Trial    Review of Systems:    Weight loss:    No     Recent fever/chills:  No   Night sweats:   No  Current skin rash:  No   Recent hair loss:  No  Heat intolerance:  No   Cold intolerance:  No  Chest pain:   No   Palpitations:   No  Shortness of breath:  No   Wheezing:   No  Constipation:    No   Diarrhea:   No   Nausea:   No   Vomiting:   No   Kidney/side pain:  No   Back pain:   No  Frequent headaches:  No   Dizziness:     No  Leg swelling:   No   Calf pain:    No    Post-Void Residual  A post-void residual was measured by ultrasonic bladder scanner.  22 mL  Ingrid Garcia LPN  8/12/2021 9:21 AM        Medication Reconciliation: completed   Ingrid Garcia LPN  8/12/2021 9:20 AM

## 2021-09-02 ENCOUNTER — NURSING HOME VISIT (OUTPATIENT)
Dept: GERIATRICS | Facility: OTHER | Age: 81
End: 2021-09-02
Attending: NURSE PRACTITIONER
Payer: MEDICARE

## 2021-09-02 DIAGNOSIS — I10 ESSENTIAL HYPERTENSION: ICD-10-CM

## 2021-09-02 DIAGNOSIS — E11.10 DIABETIC KETOACIDOSIS WITHOUT COMA ASSOCIATED WITH TYPE 2 DIABETES MELLITUS (H): ICD-10-CM

## 2021-09-02 DIAGNOSIS — Z79.4 TYPE 2 DIABETES MELLITUS WITHOUT COMPLICATION, WITH LONG-TERM CURRENT USE OF INSULIN (H): ICD-10-CM

## 2021-09-02 DIAGNOSIS — E11.00 TYPE 2 DIABETES MELLITUS WITH HYPEROSMOLARITY WITHOUT COMA, WITH LONG-TERM CURRENT USE OF INSULIN (H): Primary | ICD-10-CM

## 2021-09-02 DIAGNOSIS — Z98.890 HX OF TRANSURETHRAL RESECTION OF PROSTATE: ICD-10-CM

## 2021-09-02 DIAGNOSIS — Z90.79 HX OF TRANSURETHRAL RESECTION OF PROSTATE: ICD-10-CM

## 2021-09-02 DIAGNOSIS — Z79.4 TYPE 2 DIABETES MELLITUS WITH HYPEROSMOLARITY WITHOUT COMA, WITH LONG-TERM CURRENT USE OF INSULIN (H): Primary | ICD-10-CM

## 2021-09-02 DIAGNOSIS — E11.9 TYPE 2 DIABETES MELLITUS WITHOUT COMPLICATION, WITH LONG-TERM CURRENT USE OF INSULIN (H): ICD-10-CM

## 2021-09-02 DIAGNOSIS — J41.0 SIMPLE CHRONIC BRONCHITIS (H): ICD-10-CM

## 2021-09-02 PROCEDURE — 99310 SBSQ NF CARE HIGH MDM 45: CPT | Performed by: NURSE PRACTITIONER

## 2021-09-07 NOTE — PROGRESS NOTES
Documentation of Face-to-Face and Certification for Home Health Services     Patient: Neel Kerr   YOB: 1940  MR Number: [id number]  Today's Date: 9/6/2021    I certify that patient: Neel Kerr is under my care and that I had a face-to-face encounter with this patient on: 9/2/2021.    This encounter with the patient was in whole, or in part, for the following medical condition, which is the primary reason for home health care: weakness, TURP, hx of Covid in May.     Pt had surgery for significant BPH with urinary retention. Dr. August performed a transurethral resection of prostate on 7/27/21. He had weathers catheter s/p surgery and follow up with Dr. August for void trial one week post surgery was successful. Follow up 2 weeks post surgery he continues to be voiding well and minimal to no retention.   Pt has been receiving therapies at the [facility] since 7/28 and has been doing well.    He has diabetes type 2 and currently taking insulin once a day with sliding scale insulin based on the blood sugar. Per nursing staff he is struggling with dialing up his insulin pen correctly consistently. Some days he does well with no issues and other days he dials up to a higher number and nursing staff need to prompt him to double check if this is correct. He does wear glasses and states he can see the numbers on the pen just fine so it is unclear why he is dialing to wrong number at times.     I certify that, based on my findings, the following services are medically necessary home health services: Nursing.    My clinical findings support the need for the above services because: Nurse is needed: To assess blood sugars after changes in medications or other medical regimen. and To provide caregiver training to assist with: dialing up correct insulin dose and proper injection of insulin..  Per nursing staff they are working on getting him prefilled syringes that home nursing care could provide for  him and assess that he is still injecting correctly. This would minimize any error in dialing up to the wrong number. Pt needs further education and evaluation to ensure he is taking his insulin correctly and taking his medications as directed.     Current Outpatient Medications   Medication Sig Dispense Refill     acetaminophen (TYLENOL) 325 MG tablet Take 2 tablets (650 mg) by mouth every 4 hours as needed for mild pain       albuterol (PROAIR HFA/PROVENTIL HFA/VENTOLIN HFA) 108 (90 Base) MCG/ACT inhaler Inhale 2 puffs into the lungs every 6 hours as needed for wheezing        amLODIPine (NORVASC) 5 MG tablet Take 1 tablet (5 mg) by mouth daily 30 tablet 0     atorvastatin (LIPITOR) 40 MG tablet Take 40 mg by mouth At Bedtime       hydrOXYzine (ATARAX) 10 MG tablet Take 1 tablet (10 mg) by mouth every 6 hours as needed for itching or anxiety (with pain, moderate pain) 30 tablet 0     insulin aspart (NOVOLOG FLEXPEN) 100 UNIT/ML pen Inject as per sliding scale:  0-139 = 0 units, 140-154 = 1 unit, 155-164 = 3 units, 165-179 = 5 units, 180-199 = 6 units, 200-229 = 7 units, 230-259 = 9 units, 260-279 = 10 units, 280-299 = 11 units, 300-329 = 13 units, 330-359 = 14 units, 360-389 = 15 units, 390-419 = 16 units, 420-999 = 18 units and notify PCP.       insulin glargine (LANTUS PEN) 100 UNIT/ML pen Inject 8 Units Subcutaneous At Bedtime       ipratropium-albuterol (COMBIVENT RESPIMAT)  MCG/ACT inhaler Inhale 1 puff into the lungs 4 times daily       metFORMIN (GLUCOPHAGE) 1000 MG tablet Take 1,000 mg by mouth 2 times daily (with meals)        ondansetron (ZOFRAN-ODT) 4 MG ODT tab Take 1-2 tablets (4-8 mg) by mouth every 8 hours as needed for nausea Dissolve ON the tongue. 10 tablet 0     tamsulosin (FLOMAX) 0.4 MG capsule Take 1 capsule (0.4 mg) by mouth daily         Further, I certify that my clinical findings support that this patient is homebound (i.e. absences from home require considerable and taxing effort  and are for medical reasons or [employer] services or infrequently or of short duration when for other reasons) because: Requires assistance of another person or specialized equipment to access medical services because patient:  requires monitoring of safe medication and insulin administration...    (E11.00,  Z79.4) Type 2 diabetes mellitus with hyperosmolarity without coma, with long-term current use of insulin (H)  (primary encounter diagnosis)  Comment: simplify plan for success at home  Plan: Increased lantus to 9 units at bedtime and stopped sliding scale. Blood sugars have been in good control (110-170s) with this regimen with the occasional blood sugar in 200s. I would recommend if insurance covers would start a GLP-1 or SGLT2 instead of going to sliding scale insulin to help keep a simpler plan. May even be able to get off insulin if these work well for him.   Pt will have home nursing assessment to ensure proper use of insulin and reviewing home medication regimen.   Also taking metformin 1000 mg BID  Pt will have diabetes education Oct 11 and follow up with [facility] Sept 16th      (J41.0) Simple chronic bronchitis (H)  Comment: stable  Plan: cont combivent inhaler QID and albuterol PRN    (I10) Essential hypertension  Comment: stable, BPs 110-130s.   Plan: cont norvasc    (Z98.890,  Z90.79) Hx of transurethral resection of prostate  Comment: stable, doing well post-op  Plan: cont flomax and follow up with Dr August if needed.       Based on the above findings. I certify that this patient is confined to the home and needs intermittent skilled nursing care, physical therapy and/or speech therapy.  The patient is under my care, and I have initiated the establishment of the plan of care.  This patient will be followed by a physician who will periodically review the plan of care.  Physician/Provider to provide follow up care: Norma Taylor, GRIS CNP on 9/10/2021 at  11:45 AM

## 2021-09-10 ENCOUNTER — MEDICAL CORRESPONDENCE (OUTPATIENT)
Dept: HEALTH INFORMATION MANAGEMENT | Facility: OTHER | Age: 81
End: 2021-09-10

## 2021-09-10 PROBLEM — Z98.890 HX OF TRANSURETHRAL RESECTION OF PROSTATE: Status: ACTIVE | Noted: 2021-09-10

## 2021-09-10 PROBLEM — Z90.79 HX OF TRANSURETHRAL RESECTION OF PROSTATE: Status: ACTIVE | Noted: 2021-09-10

## 2021-10-11 ENCOUNTER — ALLIED HEALTH/NURSE VISIT (OUTPATIENT)
Dept: EDUCATION SERVICES | Facility: OTHER | Age: 81
End: 2021-10-11
Payer: MEDICARE

## 2021-10-11 DIAGNOSIS — Z79.4 TYPE 2 DIABETES MELLITUS WITH HYPEROSMOLARITY WITHOUT COMA, WITH LONG-TERM CURRENT USE OF INSULIN (H): Primary | ICD-10-CM

## 2021-10-11 DIAGNOSIS — E11.00 TYPE 2 DIABETES MELLITUS WITH HYPEROSMOLARITY WITHOUT COMA, WITH LONG-TERM CURRENT USE OF INSULIN (H): Primary | ICD-10-CM

## 2021-10-11 PROCEDURE — G0108 DIAB MANAGE TRN  PER INDIV: HCPCS

## 2021-10-11 NOTE — PATIENT INSTRUCTIONS
Diabetes Goals and Reminders    Your A1c test should be done every 3 months.  Your goal is less than 8%.   Your last result is:  Lab Results   Component Value Date    A1C 6.8 07/28/2021    A1C 13.4 05/11/2021       Your LDL cholesterol test should be done at least once a year.  Take a statin, if prescribed by your doctor, based on age and risk factors.  Your last result is:  LDL Cholesterol Calculated   Date Value Ref Range Status   12/02/2019 118 (H) <100 mg/dL Final       Have blood pressure and weight checked every three months.  Your blood pressure goal is 140/90 or less.  Your last blood pressure reading was:   BP Readings from Last 1 Encounters:   08/12/21 114/80       Test your blood sugar 2 times per day.  Your home blood glucose target ranges are:  Fasting or Before meals: 100-130  2 hours after a meal: Less than 200  Bedtime 110 - 150 mg/dL       Avoid all tobacco.  Follow your healthy diet and exercise plan.  See the eye doctor every year.  See the dentist every six months.  Have kidney function tested yearly.    Take all medicine as prescribed.  Please let me know if you are having symptoms you don t expect or if you wish to stop any medicine.    Follow Up Plan  Please call or visit Diabetes Education if blood sugars are consistently out of target.  Your future lab plan is:  HgA1c between October 2021 and January 2022.    If you need your cholesterol checked at your next appointment, you should fast 8 to 10 hours before your appointment.  Do not eat or drink anything besides water.  Drink plenty of water and take all your usual medicine.    SUMMARY FOR TODAY'S VISIT    1.  Begin testing blood sugar 2 time(s) daily, before breakfast and at bedtime.    2.  Discussed Insulin, Lantus plan.  Please take Lantus 9 units every evening at bedtime, as prescribed.  Please see My Insulin Plan brochure for more information.    3.  Discussed plate method for portion control.  See Eating for Better Health brochure for  more information.     4.  Recommend low to moderate physical activity, such as walking, working up to a minimum of 30 minutes most days, as tolerated.     5.  Please call for follow-up blood sugar check in 2 - 3 weeks or as needed.  [phone] Cinthia Benavides RN, BSN, [facility]  10/11/2021 11:49 AM

## 2021-10-11 NOTE — PROGRESS NOTES
"Diabetes Self-Management Education & Support    Presents for: Individual review    SUBJECTIVE/OBJECTIVE:  Presents for: Individual review  Accompanied by: Self  Diabetes education in the past 24mo: No  Focus of Visit: Reducing Risks, Problem Solving, Healthy Eating, Being Active  Diabetes type: Type 2  Date of diagnosis:  (\"about 3 - 4 years ago.\")  Disease course: Getting harder to manage  How confident are you filling out medical forms by yourself:: Somewhat  Transportation concerns: No  Other concerns:: None  Cultural Influences/Ethnic Background:  Not  or   Patient states he is doing well with his diabetes does not need another medication, but cannot recall if he takes his diabetes medications.  \"I think I do, I take them whenever I remember.\"     Concerned patient is forgetful, he is unsure how to answer medication questions, he states, \"I don't know, it's fine the way it is.\"       Diabetes Symptoms & Complications:  Fatigue: No (Less energy)  Neuropathy: No  Polydipsia: Sometimes  Polyphagia: No  Polyuria: No  Visual change: No  Slow healing wounds: No  Symptom course: Stable  Weight trend: Stable  Complications assessed today?: Yes  Autonomic neuropathy: No  CVA: No  Heart disease: Yes  Nephropathy: Yes  Peripheral neuropathy: Yes  Foot ulcerations: No  Peripheral Vascular Disease: No  Retinopathy: No    Patient Problem List and Family Medical History reviewed for relevant medical history, current medical status, and diabetes risk factors.    Vitals:  There were no vitals taken for this visit.  Estimated body mass index is 19.11 kg/m  as calculated from the following:    Height as of 7/8/21: 1.702 m (5' 7\").    Weight as of 8/12/21: 55.3 kg (122 lb).   Last 3 BP:   BP Readings from Last 3 Encounters:   08/12/21 114/80   08/03/21 128/78   07/28/21 134/68       History   Smoking Status     Former Smoker     Quit date: 1/1/1992   Smokeless Tobacco     Never Used       Labs:  Lab Results "   Component Value Date    A1C 6.8 07/28/2021    A1C 13.4 05/11/2021     Lab Results   Component Value Date     07/23/2021     07/02/2021     Lab Results   Component Value Date     12/02/2019     HDL Cholesterol   Date Value Ref Range Status   12/02/2019 27 (L) >=60 mg/dL Final   ]  GFR Estimate   Date Value Ref Range Status   07/23/2021 84 >60 mL/min/1.73m2 Final     Comment:     As of July 11, 2021, eGFR is calculated by the CKD-EPI creatinine equation, without race adjustment. eGFR can be influenced by muscle mass, exercise, and diet. The reported eGFR is an estimation only and is only applicable if the renal function is stable.   07/02/2021 >90 >60 mL/min/[1.73_m2] Final     GFR Estimate If Black   Date Value Ref Range Status   07/02/2021 >90 >60 mL/min/[1.73_m2] Final     Lab Results   Component Value Date    CR 0.84 07/23/2021    CR 0.69 07/02/2021     No results found for: MICROALBUMIN    Healthy Eating:  Healthy Eating Assessed Today: Yes  Cultural/[employer] diet restrictions?: No  Meal planning/habits: None  How many times a week on average do you eat food made away from home (restaurant/take-out)?: 2  Meals include: Breakfast, Dinner, Lunch (rare snacking.)  Breakfast: eggs, tellez and potatoes  Lunch: meat and cheese sandwich made with 2 slices of bread  Dinner: Potatoes, vegetables and a hamburger with bun or on bread  Beverages: Water, Coffee  Has patient met with a dietitian in the past?: Yes    Being Active:  Being Active Assessed Today: Yes  Exercise:: Yes (I am busy every day working in my garage, but no regular activity. Every week I [facility] [employer] in [facility] so that is busy too.)  How intense was your typical exercise? : Light (like stretching or slow walking)  Barrier to exercise: None    Monitoring:  Monitoring Assessed Today: Yes  Did patient bring glucose meter to appointment? : No (Patient states he does not test BG at this time.)  Patient states he has a BG meter,  "but does not remember if he has current supplies.  He will contact pharmacy for new supplies, as needed.    Patient SMBG today during visit, 560 mg/dL.     Due to hyperglycemia, encouraged patient to visit emergency department, but he declined, stating, \"I feel just fine, I will begin to take my insulin again today.\"  Patient states, \"I will go to the ER if I begin to feel poorly.\"    Taking Medications:  Diabetes Medication(s)     Biguanides       metFORMIN (GLUCOPHAGE) 1000 MG tablet    Take 1,000 mg by mouth 2 times daily (with meals)     Insulin       insulin glargine (LANTUS PEN) 100 UNIT/ML pen    Inject 9 Units Subcutaneous At Bedtime          Taking Medication Assessed Today: Yes  Current Treatments: Oral Medication (taken by mouth)  Problems taking diabetes medications regularly?: Yes (\"I take one Metformin pill every day, but no set time.  I have the insulin pen at home, but I haven't taken it in a long time.\")  Diabetes medication side effects?: No    Problem Solving:  Problem Solving Assessed Today: Yes  Hypoglycemia Frequency: Never  Does patient have severe weather/disaster plan for diabetes management?: Yes (\"I get all my meds from the [facility] or they tell me where to get it.\")      Reducing Risks:  Reducing Risks Assessed Today: Yes  Diabetes Risks: Age over 45 years, Hyperlipidemia, Family History  CAD Risks: Family history, Diabetes Mellitus, Male sex, Dyslipidemia, Hypertension  Has dilated eye exam at least once a year?: No  Sees dentist every 6 months?: No  Feet checked by healthcare provider in the last year?: No    Healthy Coping:  Healthy Coping Assessed Today: Yes  Emotional response to diabetes: Acceptance, Other (Patient states, \"I am not too concerned about my diabetes.\")  Informal Support system:: None  Stage of change: PREPARATION (Decided to change - considering how)  Support resources: Offerings in Clinic Communities  Patient Activation Measure Survey Score:  No flowsheet data " found.    Diabetes knowledge and skills assessment:   Patient is knowledgeable in diabetes management concepts related to: Being Active and Monitoring    Patient needs further education on the following diabetes management concepts: Healthy Eating, Being Active, Monitoring, Taking Medication, Problem Solving, Reducing Risks and Healthy Coping    Based on learning assessment above, most appropriate setting for further diabetes education would be: Individual setting.      INTERVENTIONS:    Education provided today on:  AADE Self-Care Behaviors:  Healthy Eating: consistency in amount, composition, and timing of food intake, portion control and plate planning method  Being Active: relationship to blood glucose  Monitoring: purpose, proper technique, individual blood glucose targets, frequency of monitoring and proper sharps disposal  Taking Medication: action of prescribed medication, drawing up, administering and storing injectable diabetes medications, proper site selection and rotation for injections, side effects of prescribed medications and when to take medications  Problem Solving: high blood glucose - causes, signs/symptoms, treatment and prevention, low blood glucose - causes, signs/symptoms, treatment and prevention and carrying a carbohydrate source at all times  Reducing Risks: major complications of diabetes, annual eye exam, aspirin therapy, A1C - goals, relating to blood glucose levels, how often to check, lipids levels and goals and blood pressure and goals  Insulin administration technique taught today. Patient verbalized understanding and was able to perform an accurate return demonstration of administration technique.    Opportunities for ongoing education and support in diabetes-self management were discussed.    Pt verbalized understanding of concepts discussed and recommendations provided today.       Education Materials Provided:  Eating For Better Health, Activity Pyramid, My Insulin Plan, D5  "Health Goals        ASSESSMENT:  Patient visits for annual diabetes self-management education.  Discussed pathophysiology with interpretation and meaning of HgA1c.  Stressed importance of testing BG daily to help keep tight control of DM2, helping to minimize risk for possible complications of uncontrolled diabetes.  Discussed benefits of keeping A1c under 8% and encouraged patient to continue testing BG daily.     Discussed carbohydrate counting using the plate method for portion control.  Encouraged patient to continue his 3 meals daily and limiting CHO snacks.    Discussed D5 Health Goals and patient has met 4 of 5 goals at this time.  (BP less than 140/90, ASA therapy as recommended, statin therapy as recommended, A1c less than 8%, tobacco free).  Patient is prescribed a statin, but states \"I don't take it.\"  Discussed benefits of statin therapy, as recommended, and patient shares he will begin to take that pill tonight at bedtime.    DM Medication plan written down for daily visual review:  Take Metformin one tab with breakfast  Take Metformin one tab with evening meal  Take Lantus injection 9 units at bedtime  Take Lipitor one tab at bedtime.     Patient will reach out to [facility] if any questions or concerns.      [facility] will also reach out to patient for BG assessment Thursday, 10/14 and again on 10/26, 8:30 am.    If BG readings are not to target with current plan, Metformin BID and Lantus 9 units, patient willing to begin a new medication such as oral SGLT2i.      Due to today's BG reading, 560 mg/dL and history of HgA1c greater than 12%, patient may need to return to mealtime sliding scale.          Patient's most recent   Lab Results   Component Value Date    A1C 6.8 07/28/2021    A1C 13.4 05/11/2021    is meeting goal of <8.0    PLAN  See Patient Instructions for co-developed, patient-stated behavior change goals.  AVS printed and provided to patient today. See Follow-Up section for recommended " follow-up.    Cinthai Benavides RN, BSN, [facility]  10/11/2021 1:46 PM   Time Spent: 60 minutes  Encounter Type: Individual    Any diabetes medication dose changes were made via the CDE Protocol and Collaborative Practice Agreement with the patient's primary care provider. A copy of this encounter was shared with the provider.

## 2021-10-14 ENCOUNTER — TELEPHONE (OUTPATIENT)
Dept: EDUCATION SERVICES | Facility: OTHER | Age: 81
End: 2021-10-14

## 2021-11-04 NOTE — TELEPHONE ENCOUNTER
Unable to reach patient, messages left with contact information, to please call [facility] for BG assessment.    Cinthia Benavides RN, BSN, [facility]  11/4/2021 8:59 AM

## 2021-12-23 ENCOUNTER — IMMUNIZATION (OUTPATIENT)
Dept: FAMILY MEDICINE | Facility: OTHER | Age: 81
End: 2021-12-23
Attending: FAMILY MEDICINE
Payer: MEDICARE

## 2021-12-23 PROCEDURE — 91300 PR COVID VAC PFIZER DIL RECON 30 MCG/0.3 ML IM: CPT | Performed by: PHARMACIST

## 2022-01-01 ENCOUNTER — OFFICE VISIT (OUTPATIENT)
Dept: FAMILY MEDICINE | Facility: OTHER | Age: 82
End: 2022-01-01
Attending: FAMILY MEDICINE
Payer: MEDICARE

## 2022-01-01 ENCOUNTER — APPOINTMENT (OUTPATIENT)
Dept: SPEECH THERAPY | Facility: HOSPITAL | Age: 82
DRG: 064 | End: 2022-01-01
Payer: COMMERCIAL

## 2022-01-01 ENCOUNTER — APPOINTMENT (OUTPATIENT)
Dept: PHYSICAL THERAPY | Facility: HOSPITAL | Age: 82
DRG: 064 | End: 2022-01-01
Payer: COMMERCIAL

## 2022-01-01 ENCOUNTER — APPOINTMENT (OUTPATIENT)
Dept: GENERAL RADIOLOGY | Facility: HOSPITAL | Age: 82
DRG: 064 | End: 2022-01-01
Attending: INTERNAL MEDICINE
Payer: COMMERCIAL

## 2022-01-01 ENCOUNTER — APPOINTMENT (OUTPATIENT)
Dept: MRI IMAGING | Facility: HOSPITAL | Age: 82
DRG: 064 | End: 2022-01-01
Attending: INTERNAL MEDICINE
Payer: COMMERCIAL

## 2022-01-01 ENCOUNTER — APPOINTMENT (OUTPATIENT)
Dept: CT IMAGING | Facility: HOSPITAL | Age: 82
DRG: 064 | End: 2022-01-01
Attending: NURSE PRACTITIONER
Payer: COMMERCIAL

## 2022-01-01 ENCOUNTER — APPOINTMENT (OUTPATIENT)
Dept: OCCUPATIONAL THERAPY | Facility: HOSPITAL | Age: 82
DRG: 064 | End: 2022-01-01
Payer: COMMERCIAL

## 2022-01-01 ENCOUNTER — APPOINTMENT (OUTPATIENT)
Dept: CT IMAGING | Facility: OTHER | Age: 82
DRG: 064 | End: 2022-01-01
Attending: PHYSICIAN ASSISTANT
Payer: COMMERCIAL

## 2022-01-01 ENCOUNTER — APPOINTMENT (OUTPATIENT)
Dept: CT IMAGING | Facility: HOSPITAL | Age: 82
DRG: 064 | End: 2022-01-01
Attending: FAMILY MEDICINE
Payer: COMMERCIAL

## 2022-01-01 ENCOUNTER — ANESTHESIA (OUTPATIENT)
Dept: SURGERY | Facility: HOSPITAL | Age: 82
DRG: 064 | End: 2022-01-01
Payer: COMMERCIAL

## 2022-01-01 ENCOUNTER — HOSPITAL ENCOUNTER (INPATIENT)
Facility: HOSPITAL | Age: 82
LOS: 24 days | DRG: 064 | End: 2022-12-26
Attending: FAMILY MEDICINE | Admitting: INTERNAL MEDICINE
Payer: COMMERCIAL

## 2022-01-01 ENCOUNTER — HOSPITAL ENCOUNTER (INPATIENT)
Facility: OTHER | Age: 82
LOS: 3 days | Discharge: HOME HEALTH | DRG: 064 | End: 2022-01-06
Attending: PHYSICIAN ASSISTANT | Admitting: INTERNAL MEDICINE
Payer: COMMERCIAL

## 2022-01-01 ENCOUNTER — APPOINTMENT (OUTPATIENT)
Dept: OCCUPATIONAL THERAPY | Facility: HOSPITAL | Age: 82
DRG: 064 | End: 2022-01-01
Attending: STUDENT IN AN ORGANIZED HEALTH CARE EDUCATION/TRAINING PROGRAM
Payer: COMMERCIAL

## 2022-01-01 ENCOUNTER — APPOINTMENT (OUTPATIENT)
Dept: MRI IMAGING | Facility: OTHER | Age: 82
DRG: 064 | End: 2022-01-01
Attending: PHYSICIAN ASSISTANT
Payer: COMMERCIAL

## 2022-01-01 ENCOUNTER — APPOINTMENT (OUTPATIENT)
Dept: CARDIOLOGY | Facility: HOSPITAL | Age: 82
DRG: 064 | End: 2022-01-01
Attending: STUDENT IN AN ORGANIZED HEALTH CARE EDUCATION/TRAINING PROGRAM
Payer: COMMERCIAL

## 2022-01-01 ENCOUNTER — APPOINTMENT (OUTPATIENT)
Dept: GENERAL RADIOLOGY | Facility: HOSPITAL | Age: 82
DRG: 064 | End: 2022-01-01
Attending: STUDENT IN AN ORGANIZED HEALTH CARE EDUCATION/TRAINING PROGRAM
Payer: COMMERCIAL

## 2022-01-01 ENCOUNTER — HOSPITAL ENCOUNTER (OUTPATIENT)
Age: 82
End: 2022-01-01
Attending: INTERNAL MEDICINE
Payer: MEDICARE

## 2022-01-01 ENCOUNTER — HOSPITAL ENCOUNTER (EMERGENCY)
Facility: HOSPITAL | Age: 82
Discharge: ANOTHER HOSPITAL | End: 2022-11-22
Attending: EMERGENCY MEDICINE | Admitting: EMERGENCY MEDICINE
Payer: COMMERCIAL

## 2022-01-01 ENCOUNTER — APPOINTMENT (OUTPATIENT)
Dept: CT IMAGING | Facility: HOSPITAL | Age: 82
DRG: 064 | End: 2022-01-01
Attending: STUDENT IN AN ORGANIZED HEALTH CARE EDUCATION/TRAINING PROGRAM
Payer: COMMERCIAL

## 2022-01-01 ENCOUNTER — APPOINTMENT (OUTPATIENT)
Dept: PHYSICAL THERAPY | Facility: HOSPITAL | Age: 82
DRG: 064 | End: 2022-01-01
Attending: STUDENT IN AN ORGANIZED HEALTH CARE EDUCATION/TRAINING PROGRAM
Payer: COMMERCIAL

## 2022-01-01 ENCOUNTER — APPOINTMENT (OUTPATIENT)
Dept: CT IMAGING | Facility: HOSPITAL | Age: 82
End: 2022-01-01
Attending: EMERGENCY MEDICINE
Payer: COMMERCIAL

## 2022-01-01 ENCOUNTER — APPOINTMENT (OUTPATIENT)
Dept: SPEECH THERAPY | Facility: HOSPITAL | Age: 82
DRG: 064 | End: 2022-01-01
Attending: STUDENT IN AN ORGANIZED HEALTH CARE EDUCATION/TRAINING PROGRAM
Payer: COMMERCIAL

## 2022-01-01 ENCOUNTER — PATIENT OUTREACH (OUTPATIENT)
Dept: CARE COORDINATION | Facility: CLINIC | Age: 82
End: 2022-01-01
Payer: MEDICARE

## 2022-01-01 ENCOUNTER — APPOINTMENT (OUTPATIENT)
Dept: GENERAL RADIOLOGY | Facility: OTHER | Age: 82
DRG: 064 | End: 2022-01-01
Attending: PHYSICIAN ASSISTANT
Payer: COMMERCIAL

## 2022-01-01 ENCOUNTER — ANESTHESIA EVENT (OUTPATIENT)
Dept: SURGERY | Facility: HOSPITAL | Age: 82
DRG: 064 | End: 2022-01-01
Payer: COMMERCIAL

## 2022-01-01 ENCOUNTER — APPOINTMENT (OUTPATIENT)
Dept: GENERAL RADIOLOGY | Facility: OTHER | Age: 82
DRG: 064 | End: 2022-01-01
Attending: INTERNAL MEDICINE
Payer: COMMERCIAL

## 2022-01-01 ENCOUNTER — APPOINTMENT (OUTPATIENT)
Dept: OCCUPATIONAL THERAPY | Facility: OTHER | Age: 82
DRG: 064 | End: 2022-01-01
Payer: COMMERCIAL

## 2022-01-01 ENCOUNTER — APPOINTMENT (OUTPATIENT)
Dept: OCCUPATIONAL THERAPY | Facility: OTHER | Age: 82
DRG: 064 | End: 2022-01-01
Attending: INTERNAL MEDICINE
Payer: COMMERCIAL

## 2022-01-01 ENCOUNTER — APPOINTMENT (OUTPATIENT)
Dept: CT IMAGING | Facility: HOSPITAL | Age: 82
DRG: 064 | End: 2022-01-01
Attending: HOSPITALIST
Payer: COMMERCIAL

## 2022-01-01 ENCOUNTER — APPOINTMENT (OUTPATIENT)
Dept: CARDIOLOGY | Facility: OTHER | Age: 82
DRG: 064 | End: 2022-01-01
Attending: INTERNAL MEDICINE
Payer: COMMERCIAL

## 2022-01-01 ENCOUNTER — TRANSFERRED RECORDS (OUTPATIENT)
Dept: HEALTH INFORMATION MANAGEMENT | Facility: CLINIC | Age: 82
End: 2022-01-01

## 2022-01-01 ENCOUNTER — APPOINTMENT (OUTPATIENT)
Dept: PHYSICAL THERAPY | Facility: OTHER | Age: 82
DRG: 064 | End: 2022-01-01
Attending: INTERNAL MEDICINE
Payer: COMMERCIAL

## 2022-01-01 ENCOUNTER — APPOINTMENT (OUTPATIENT)
Dept: SPEECH THERAPY | Facility: OTHER | Age: 82
DRG: 064 | End: 2022-01-01
Attending: INTERNAL MEDICINE
Payer: COMMERCIAL

## 2022-01-01 ENCOUNTER — APPOINTMENT (OUTPATIENT)
Dept: PHYSICAL THERAPY | Facility: OTHER | Age: 82
DRG: 064 | End: 2022-01-01
Payer: COMMERCIAL

## 2022-01-01 VITALS
HEART RATE: 83 BPM | TEMPERATURE: 98.4 F | SYSTOLIC BLOOD PRESSURE: 95 MMHG | DIASTOLIC BLOOD PRESSURE: 49 MMHG | BODY MASS INDEX: 18.16 KG/M2 | RESPIRATION RATE: 28 BRPM | WEIGHT: 122.58 LBS | HEIGHT: 69 IN | OXYGEN SATURATION: 98 %

## 2022-01-01 VITALS
WEIGHT: 112.88 LBS | SYSTOLIC BLOOD PRESSURE: 161 MMHG | HEIGHT: 66 IN | RESPIRATION RATE: 20 BRPM | TEMPERATURE: 97.4 F | OXYGEN SATURATION: 98 % | HEART RATE: 71 BPM | DIASTOLIC BLOOD PRESSURE: 85 MMHG | BODY MASS INDEX: 18.14 KG/M2

## 2022-01-01 VITALS
SYSTOLIC BLOOD PRESSURE: 132 MMHG | HEIGHT: 66 IN | SYSTOLIC BLOOD PRESSURE: 159 MMHG | DIASTOLIC BLOOD PRESSURE: 68 MMHG | WEIGHT: 124.4 LBS | TEMPERATURE: 98.3 F | DIASTOLIC BLOOD PRESSURE: 58 MMHG | RESPIRATION RATE: 16 BRPM | HEART RATE: 76 BPM | RESPIRATION RATE: 20 BRPM | OXYGEN SATURATION: 91 % | WEIGHT: 133 LBS | TEMPERATURE: 96.8 F | HEART RATE: 98 BPM | BODY MASS INDEX: 19.99 KG/M2 | OXYGEN SATURATION: 94 % | BODY MASS INDEX: 21.47 KG/M2

## 2022-01-01 DIAGNOSIS — I63.89 CEREBROVASCULAR ACCIDENT (CVA) DUE TO OTHER MECHANISM (H): Primary | ICD-10-CM

## 2022-01-01 DIAGNOSIS — R79.89 ELEVATED TROPONIN: ICD-10-CM

## 2022-01-01 DIAGNOSIS — E11.65 UNCONTROLLED TYPE 2 DIABETES MELLITUS WITH HYPERGLYCEMIA (H): ICD-10-CM

## 2022-01-01 DIAGNOSIS — I25.2 OLD MYOCARDIAL INFARCTION: ICD-10-CM

## 2022-01-01 DIAGNOSIS — E11.10 DIABETIC KETOACIDOSIS WITHOUT COMA ASSOCIATED WITH TYPE 2 DIABETES MELLITUS (H): ICD-10-CM

## 2022-01-01 DIAGNOSIS — R73.9 HYPERGLYCEMIA: ICD-10-CM

## 2022-01-01 DIAGNOSIS — J18.9 MULTIFOCAL PNEUMONIA: Primary | ICD-10-CM

## 2022-01-01 DIAGNOSIS — E87.1 HYPONATREMIA: ICD-10-CM

## 2022-01-01 DIAGNOSIS — I63.89 CEREBROVASCULAR ACCIDENT (CVA) DUE TO OTHER MECHANISM (H): ICD-10-CM

## 2022-01-01 DIAGNOSIS — E87.20 ACIDOSIS: ICD-10-CM

## 2022-01-01 DIAGNOSIS — E11.00 TYPE 2 DIABETES MELLITUS WITH HYPEROSMOLARITY WITHOUT COMA, WITH LONG-TERM CURRENT USE OF INSULIN (H): ICD-10-CM

## 2022-01-01 DIAGNOSIS — I10 ESSENTIAL HYPERTENSION, MALIGNANT: ICD-10-CM

## 2022-01-01 DIAGNOSIS — Z79.4 TYPE 2 DIABETES MELLITUS WITHOUT COMPLICATION, WITH LONG-TERM CURRENT USE OF INSULIN (H): ICD-10-CM

## 2022-01-01 DIAGNOSIS — Z87.891 PERSONAL HISTORY OF TOBACCO USE, PRESENTING HAZARDS TO HEALTH: ICD-10-CM

## 2022-01-01 DIAGNOSIS — Z79.899 ENCOUNTER FOR LONG-TERM (CURRENT) USE OF MEDICATIONS: ICD-10-CM

## 2022-01-01 DIAGNOSIS — M62.81 GENERALIZED MUSCLE WEAKNESS: ICD-10-CM
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
## 2022-01-01 DIAGNOSIS — Z79.4 ENCOUNTER FOR LONG-TERM (CURRENT) USE OF INSULIN (H): ICD-10-CM

## 2022-01-01 DIAGNOSIS — Z79.51 LONG TERM CURRENT USE OF INHALED STEROID: ICD-10-CM
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
## 2022-01-01 DIAGNOSIS — R62.7 FAILURE TO THRIVE IN ADULT: ICD-10-CM

## 2022-01-01 DIAGNOSIS — Z79.4 TYPE 2 DIABETES MELLITUS WITH HYPEROSMOLARITY WITHOUT COMA, WITH LONG-TERM CURRENT USE OF INSULIN (H): ICD-10-CM

## 2022-01-01 DIAGNOSIS — I63.9 IMPENDING CEREBROVASCULAR ACCIDENT (H): ICD-10-CM

## 2022-01-01 DIAGNOSIS — R79.89 ELEVATED TROPONIN LEVEL: ICD-10-CM

## 2022-01-01 DIAGNOSIS — I25.3 PSEUDOANEURYSM OF LEFT VENTRICLE OF HEART: Primary | ICD-10-CM

## 2022-01-01 DIAGNOSIS — E11.9 TYPE 2 DIABETES MELLITUS WITHOUT COMPLICATION, WITH LONG-TERM CURRENT USE OF INSULIN (H): ICD-10-CM

## 2022-01-01 DIAGNOSIS — R41.0 SUBACUTE DELIRIUM: ICD-10-CM

## 2022-01-01 DIAGNOSIS — I21.4 NSTEMI (NON-ST ELEVATED MYOCARDIAL INFARCTION) (H): ICD-10-CM

## 2022-01-01 DIAGNOSIS — E78.5 HYPERLIPIDEMIA, UNSPECIFIED HYPERLIPIDEMIA TYPE: ICD-10-CM

## 2022-01-01 DIAGNOSIS — Z11.52 ENCOUNTER FOR SCREENING LABORATORY TESTING FOR COVID-19 VIRUS: ICD-10-CM

## 2022-01-01 DIAGNOSIS — R41.0 CONFUSION: ICD-10-CM

## 2022-01-01 LAB
ABO/RH(D): NORMAL
ALBUMIN SERPL BCG-MCNC: 2.3 G/DL (ref 3.5–5.2)
ALBUMIN SERPL BCG-MCNC: 2.6 G/DL (ref 3.5–5.2)
ALBUMIN SERPL BCG-MCNC: 2.8 G/DL (ref 3.5–5.2)
ALBUMIN SERPL BCG-MCNC: 2.8 G/DL (ref 3.5–5.2)
ALBUMIN SERPL BCG-MCNC: 2.9 G/DL (ref 3.5–5.2)
ALBUMIN SERPL BCG-MCNC: 3 G/DL (ref 3.5–5.2)
ALBUMIN SERPL BCG-MCNC: 3.5 G/DL (ref 3.5–5.2)
ALBUMIN SERPL-MCNC: 4.7 G/DL (ref 3.5–5.7)
ALBUMIN UR-MCNC: 10 MG/DL
ALBUMIN UR-MCNC: 100 MG/DL
ALBUMIN UR-MCNC: 200 MG/DL
ALBUMIN UR-MCNC: 300 MG/DL
ALBUMIN UR-MCNC: 50 MG/DL
ALBUMIN UR-MCNC: 70 MG/DL
ALBUMIN UR-MCNC: NEGATIVE MG/DL
ALP SERPL-CCNC: 100 U/L (ref 40–129)
ALP SERPL-CCNC: 106 U/L (ref 40–129)
ALP SERPL-CCNC: 109 U/L (ref 40–129)
ALP SERPL-CCNC: 110 U/L (ref 40–129)
ALP SERPL-CCNC: 165 U/L (ref 40–129)
ALP SERPL-CCNC: 86 U/L (ref 34–104)
ALP SERPL-CCNC: 98 U/L (ref 40–129)
ALP SERPL-CCNC: 98 U/L (ref 40–129)
ALT SERPL W P-5'-P-CCNC: 11 U/L (ref 7–52)
ALT SERPL W P-5'-P-CCNC: 14 U/L (ref 10–50)
ALT SERPL W P-5'-P-CCNC: 15 U/L (ref 10–50)
ALT SERPL W P-5'-P-CCNC: 16 U/L (ref 10–50)
ALT SERPL W P-5'-P-CCNC: 18 U/L (ref 10–50)
ALT SERPL W P-5'-P-CCNC: 20 U/L (ref 10–50)
ALT SERPL W P-5'-P-CCNC: 21 U/L (ref 10–50)
ALT SERPL W P-5'-P-CCNC: 8 U/L (ref 10–50)
ANION GAP SERPL CALCULATED.3IONS-SCNC: 10 MMOL/L (ref 7–15)
ANION GAP SERPL CALCULATED.3IONS-SCNC: 11 MMOL/L (ref 7–15)
ANION GAP SERPL CALCULATED.3IONS-SCNC: 13 MMOL/L (ref 7–15)
ANION GAP SERPL CALCULATED.3IONS-SCNC: 14 MMOL/L (ref 3–14)
ANION GAP SERPL CALCULATED.3IONS-SCNC: 14 MMOL/L (ref 7–15)
ANION GAP SERPL CALCULATED.3IONS-SCNC: 15 MMOL/L (ref 7–15)
ANION GAP SERPL CALCULATED.3IONS-SCNC: 21 MMOL/L (ref 3–14)
ANION GAP SERPL CALCULATED.3IONS-SCNC: 24 MMOL/L (ref 3–14)
ANION GAP SERPL CALCULATED.3IONS-SCNC: 5 MMOL/L (ref 7–15)
ANION GAP SERPL CALCULATED.3IONS-SCNC: 6 MMOL/L (ref 7–15)
ANION GAP SERPL CALCULATED.3IONS-SCNC: 7 MMOL/L (ref 3–14)
ANION GAP SERPL CALCULATED.3IONS-SCNC: 7 MMOL/L (ref 3–14)
ANION GAP SERPL CALCULATED.3IONS-SCNC: 7 MMOL/L (ref 7–15)
ANION GAP SERPL CALCULATED.3IONS-SCNC: 8 MMOL/L (ref 7–15)
ANION GAP SERPL CALCULATED.3IONS-SCNC: 9 MMOL/L (ref 3–14)
ANION GAP SERPL CALCULATED.3IONS-SCNC: 9 MMOL/L (ref 7–15)
ANION GAP SERPL CALCULATED.3IONS-SCNC: 9 MMOL/L (ref 7–15)
ANTIBODY SCREEN: NEGATIVE
APPEARANCE UR: ABNORMAL
APPEARANCE UR: ABNORMAL
APPEARANCE UR: CLEAR
APTT PPP: 131 SECONDS (ref 22–38)
APTT PPP: 24 SECONDS (ref 22–38)
APTT PPP: 29 SECONDS (ref 22–38)
AST SERPL W P-5'-P-CCNC: 16 U/L (ref 10–50)
AST SERPL W P-5'-P-CCNC: 16 U/L (ref 13–39)
AST SERPL W P-5'-P-CCNC: 17 U/L (ref 10–50)
AST SERPL W P-5'-P-CCNC: 19 U/L (ref 10–50)
AST SERPL W P-5'-P-CCNC: 21 U/L (ref 10–50)
AST SERPL W P-5'-P-CCNC: 24 U/L (ref 10–50)
AST SERPL W P-5'-P-CCNC: 29 U/L (ref 10–50)
AST SERPL W P-5'-P-CCNC: 31 U/L (ref 10–50)
ATRIAL RATE - MUSE: 83 BPM
B-OH-BUTYR SERPL-MCNC: 0.9 MMOL/L (ref 0–0.6)
B-OH-BUTYR SERPL-MCNC: 3.1 MMOL/L (ref 0–0.6)
B-OH-BUTYR SERPL-MCNC: 7 MMOL/L (ref 0–0.6)
BACTERIA #/AREA URNS HPF: ABNORMAL /HPF
BACTERIA BLD CULT: NO GROWTH
BACTERIA BLD CULT: NO GROWTH
BACTERIA UR CULT: ABNORMAL
BASOPHILS # BLD AUTO: 0 10E3/UL (ref 0–0.2)
BASOPHILS NFR BLD AUTO: 0 %
BILIRUB DIRECT SERPL-MCNC: <0.2 MG/DL (ref 0–0.3)
BILIRUB DIRECT SERPL-MCNC: <0.2 MG/DL (ref 0–0.3)
BILIRUB SERPL-MCNC: 0.3 MG/DL
BILIRUB SERPL-MCNC: 0.4 MG/DL
BILIRUB SERPL-MCNC: 0.4 MG/DL
BILIRUB SERPL-MCNC: 0.5 MG/DL
BILIRUB SERPL-MCNC: 0.5 MG/DL
BILIRUB SERPL-MCNC: 0.7 MG/DL (ref 0.3–1)
BILIRUB UR QL STRIP: NEGATIVE
BUN SERPL-MCNC: 12.2 MG/DL (ref 8–23)
BUN SERPL-MCNC: 12.8 MG/DL (ref 8–23)
BUN SERPL-MCNC: 14.3 MG/DL (ref 8–23)
BUN SERPL-MCNC: 14.7 MG/DL (ref 8–23)
BUN SERPL-MCNC: 15.4 MG/DL (ref 8–23)
BUN SERPL-MCNC: 15.6 MG/DL (ref 8–23)
BUN SERPL-MCNC: 16.5 MG/DL (ref 8–23)
BUN SERPL-MCNC: 17.7 MG/DL (ref 8–23)
BUN SERPL-MCNC: 18.3 MG/DL (ref 8–23)
BUN SERPL-MCNC: 19 MG/DL (ref 7–25)
BUN SERPL-MCNC: 19.5 MG/DL (ref 8–23)
BUN SERPL-MCNC: 19.6 MG/DL (ref 8–23)
BUN SERPL-MCNC: 20.1 MG/DL (ref 8–23)
BUN SERPL-MCNC: 20.2 MG/DL (ref 8–23)
BUN SERPL-MCNC: 23 MG/DL (ref 7–25)
BUN SERPL-MCNC: 23.6 MG/DL (ref 8–23)
BUN SERPL-MCNC: 24 MG/DL (ref 8–23)
BUN SERPL-MCNC: 24.4 MG/DL (ref 8–23)
BUN SERPL-MCNC: 26 MG/DL (ref 7–25)
BUN SERPL-MCNC: 27.4 MG/DL (ref 8–23)
BUN SERPL-MCNC: 34.1 MG/DL (ref 8–23)
BUN SERPL-MCNC: 34.5 MG/DL (ref 8–23)
BUN SERPL-MCNC: 35 MG/DL (ref 7–25)
BUN SERPL-MCNC: 37 MG/DL (ref 8–23)
BUN SERPL-MCNC: 37.8 MG/DL (ref 8–23)
BUN SERPL-MCNC: 38 MG/DL (ref 7–25)
BUN SERPL-MCNC: 41 MG/DL (ref 8–23)
BUN SERPL-MCNC: 42 MG/DL (ref 7–25)
BUN SERPL-MCNC: 6.7 MG/DL (ref 8–23)
CALCIUM SERPL-MCNC: 10.2 MG/DL (ref 8.6–10.3)
CALCIUM SERPL-MCNC: 7.4 MG/DL (ref 8.8–10.2)
CALCIUM SERPL-MCNC: 7.5 MG/DL (ref 8.8–10.2)
CALCIUM SERPL-MCNC: 7.6 MG/DL (ref 8.8–10.2)
CALCIUM SERPL-MCNC: 7.7 MG/DL (ref 8.8–10.2)
CALCIUM SERPL-MCNC: 7.7 MG/DL (ref 8.8–10.2)
CALCIUM SERPL-MCNC: 7.8 MG/DL (ref 8.8–10.2)
CALCIUM SERPL-MCNC: 7.9 MG/DL (ref 8.8–10.2)
CALCIUM SERPL-MCNC: 7.9 MG/DL (ref 8.8–10.2)
CALCIUM SERPL-MCNC: 8 MG/DL (ref 8.8–10.2)
CALCIUM SERPL-MCNC: 8.1 MG/DL (ref 8.6–10.3)
CALCIUM SERPL-MCNC: 8.1 MG/DL (ref 8.6–10.3)
CALCIUM SERPL-MCNC: 8.1 MG/DL (ref 8.8–10.2)
CALCIUM SERPL-MCNC: 8.1 MG/DL (ref 8.8–10.2)
CALCIUM SERPL-MCNC: 8.2 MG/DL (ref 8.8–10.2)
CALCIUM SERPL-MCNC: 8.5 MG/DL (ref 8.6–10.3)
CALCIUM SERPL-MCNC: 8.6 MG/DL (ref 8.8–10.2)
CALCIUM SERPL-MCNC: 8.7 MG/DL (ref 8.8–10.2)
CALCIUM SERPL-MCNC: 9 MG/DL (ref 8.8–10.2)
CALCIUM SERPL-MCNC: 9.1 MG/DL (ref 8.8–10.2)
CALCIUM SERPL-MCNC: 9.2 MG/DL (ref 8.6–10.3)
CALCIUM SERPL-MCNC: 9.4 MG/DL (ref 8.6–10.3)
CHLORIDE BLD-SCNC: 101 MMOL/L (ref 98–107)
CHLORIDE BLD-SCNC: 88 MMOL/L (ref 98–107)
CHLORIDE BLD-SCNC: 90 MMOL/L (ref 98–107)
CHLORIDE BLD-SCNC: 97 MMOL/L (ref 98–107)
CHLORIDE BLD-SCNC: 97 MMOL/L (ref 98–107)
CHLORIDE BLD-SCNC: 99 MMOL/L (ref 98–107)
CHLORIDE SERPL-SCNC: 100 MMOL/L (ref 98–107)
CHLORIDE SERPL-SCNC: 100 MMOL/L (ref 98–107)
CHLORIDE SERPL-SCNC: 102 MMOL/L (ref 98–107)
CHLORIDE SERPL-SCNC: 102 MMOL/L (ref 98–107)
CHLORIDE SERPL-SCNC: 104 MMOL/L (ref 98–107)
CHLORIDE SERPL-SCNC: 104 MMOL/L (ref 98–107)
CHLORIDE SERPL-SCNC: 107 MMOL/L (ref 98–107)
CHLORIDE SERPL-SCNC: 88 MMOL/L (ref 98–107)
CHLORIDE SERPL-SCNC: 90 MMOL/L (ref 98–107)
CHLORIDE SERPL-SCNC: 92 MMOL/L (ref 98–107)
CHLORIDE SERPL-SCNC: 94 MMOL/L (ref 98–107)
CHLORIDE SERPL-SCNC: 95 MMOL/L (ref 98–107)
CHLORIDE SERPL-SCNC: 97 MMOL/L (ref 98–107)
CHLORIDE SERPL-SCNC: 98 MMOL/L (ref 98–107)
CHLORIDE SERPL-SCNC: 98 MMOL/L (ref 98–107)
CHLORIDE SERPL-SCNC: 99 MMOL/L (ref 98–107)
CHOLEST SERPL-MCNC: 177 MG/DL
CHOLEST SERPL-MCNC: 88 MG/DL
CHOLESTEROL (EXTERNAL): 136 MG/DL
CK SERPL-CCNC: 127 U/L (ref 39–308)
CK SERPL-CCNC: 26 U/L (ref 39–308)
CK SERPL-CCNC: 26 U/L (ref 39–308)
CO2 SERPL-SCNC: 18 MMOL/L (ref 21–31)
CO2 SERPL-SCNC: 20 MMOL/L (ref 21–31)
CO2 SERPL-SCNC: 20 MMOL/L (ref 21–31)
CO2 SERPL-SCNC: 24 MMOL/L (ref 21–31)
CO2 SERPL-SCNC: 26 MMOL/L (ref 21–31)
CO2 SERPL-SCNC: 27 MMOL/L (ref 21–31)
COLOR UR AUTO: ABNORMAL
COLOR UR AUTO: YELLOW
CREAT SERPL-MCNC: 0.45 MG/DL (ref 0.67–1.17)
CREAT SERPL-MCNC: 0.46 MG/DL (ref 0.67–1.17)
CREAT SERPL-MCNC: 0.47 MG/DL (ref 0.67–1.17)
CREAT SERPL-MCNC: 0.48 MG/DL (ref 0.67–1.17)
CREAT SERPL-MCNC: 0.5 MG/DL (ref 0.67–1.17)
CREAT SERPL-MCNC: 0.51 MG/DL (ref 0.67–1.17)
CREAT SERPL-MCNC: 0.52 MG/DL (ref 0.67–1.17)
CREAT SERPL-MCNC: 0.53 MG/DL (ref 0.67–1.17)
CREAT SERPL-MCNC: 0.54 MG/DL (ref 0.67–1.17)
CREAT SERPL-MCNC: 0.57 MG/DL (ref 0.67–1.17)
CREAT SERPL-MCNC: 0.63 MG/DL (ref 0.67–1.17)
CREAT SERPL-MCNC: 0.66 MG/DL (ref 0.67–1.17)
CREAT SERPL-MCNC: 0.68 MG/DL (ref 0.67–1.17)
CREAT SERPL-MCNC: 0.7 MG/DL (ref 0.67–1.17)
CREAT SERPL-MCNC: 0.72 MG/DL (ref 0.67–1.17)
CREAT SERPL-MCNC: 0.74 MG/DL (ref 0.7–1.3)
CREAT SERPL-MCNC: 0.76 MG/DL (ref 0.7–1.3)
CREAT SERPL-MCNC: 0.78 MG/DL (ref 0.67–1.17)
CREAT SERPL-MCNC: 0.79 MG/DL (ref 0.67–1.17)
CREAT SERPL-MCNC: 0.85 MG/DL (ref 0.7–1.3)
CREAT SERPL-MCNC: 0.94 MG/DL (ref 0.67–1.17)
CREAT SERPL-MCNC: 0.99 MG/DL (ref 0.7–1.3)
CREAT SERPL-MCNC: 1.22 MG/DL (ref 0.7–1.3)
CREAT SERPL-MCNC: 1.27 MG/DL (ref 0.7–1.3)
CREAT UR-MCNC: 22.5 MG/DL
CRP SERPL-MCNC: 24 MG/L
CRP SERPL-MCNC: 24.98 MG/L
CRP SERPL-MCNC: 5.22 MG/L
DEPRECATED HCO3 PLAS-SCNC: 19 MMOL/L (ref 22–29)
DEPRECATED HCO3 PLAS-SCNC: 21 MMOL/L (ref 22–29)
DEPRECATED HCO3 PLAS-SCNC: 21 MMOL/L (ref 22–29)
DEPRECATED HCO3 PLAS-SCNC: 22 MMOL/L (ref 22–29)
DEPRECATED HCO3 PLAS-SCNC: 23 MMOL/L (ref 22–29)
DEPRECATED HCO3 PLAS-SCNC: 24 MMOL/L (ref 22–29)
DEPRECATED HCO3 PLAS-SCNC: 25 MMOL/L (ref 22–29)
DEPRECATED HCO3 PLAS-SCNC: 26 MMOL/L (ref 22–29)
DEPRECATED HCO3 PLAS-SCNC: 27 MMOL/L (ref 22–29)
DEPRECATED HCO3 PLAS-SCNC: 28 MMOL/L (ref 22–29)
DIASTOLIC BLOOD PRESSURE - MUSE: NORMAL MMHG
EOSINOPHIL # BLD AUTO: 0 10E3/UL (ref 0–0.7)
EOSINOPHIL # BLD AUTO: 0.1 10E3/UL (ref 0–0.7)
EOSINOPHIL # BLD AUTO: 0.2 10E3/UL (ref 0–0.7)
EOSINOPHIL NFR BLD AUTO: 0 %
EOSINOPHIL NFR BLD AUTO: 1 %
ERYTHROCYTE [DISTWIDTH] IN BLOOD BY AUTOMATED COUNT: 13 % (ref 10–15)
ERYTHROCYTE [DISTWIDTH] IN BLOOD BY AUTOMATED COUNT: 13 % (ref 10–15)
ERYTHROCYTE [DISTWIDTH] IN BLOOD BY AUTOMATED COUNT: 13.1 % (ref 10–15)
ERYTHROCYTE [DISTWIDTH] IN BLOOD BY AUTOMATED COUNT: 13.2 % (ref 10–15)
ERYTHROCYTE [DISTWIDTH] IN BLOOD BY AUTOMATED COUNT: 13.3 % (ref 10–15)
ERYTHROCYTE [DISTWIDTH] IN BLOOD BY AUTOMATED COUNT: 13.4 % (ref 10–15)
ERYTHROCYTE [DISTWIDTH] IN BLOOD BY AUTOMATED COUNT: 13.5 % (ref 10–15)
ERYTHROCYTE [DISTWIDTH] IN BLOOD BY AUTOMATED COUNT: 13.5 % (ref 10–15)
ERYTHROCYTE [DISTWIDTH] IN BLOOD BY AUTOMATED COUNT: 13.7 % (ref 10–15)
ERYTHROCYTE [DISTWIDTH] IN BLOOD BY AUTOMATED COUNT: 14 % (ref 10–15)
ERYTHROCYTE [DISTWIDTH] IN BLOOD BY AUTOMATED COUNT: 14.1 % (ref 10–15)
ERYTHROCYTE [DISTWIDTH] IN BLOOD BY AUTOMATED COUNT: 14.6 % (ref 10–15)
ERYTHROCYTE [DISTWIDTH] IN BLOOD BY AUTOMATED COUNT: 14.6 % (ref 10–15)
ERYTHROCYTE [DISTWIDTH] IN BLOOD BY AUTOMATED COUNT: 14.7 % (ref 10–15)
ERYTHROCYTE [DISTWIDTH] IN BLOOD BY AUTOMATED COUNT: 14.9 % (ref 10–15)
ERYTHROCYTE [DISTWIDTH] IN BLOOD BY AUTOMATED COUNT: 15.2 % (ref 10–15)
ERYTHROCYTE [DISTWIDTH] IN BLOOD BY AUTOMATED COUNT: 15.9 % (ref 10–15)
ERYTHROCYTE [DISTWIDTH] IN BLOOD BY AUTOMATED COUNT: 17.2 % (ref 10–15)
ERYTHROCYTE [SEDIMENTATION RATE] IN BLOOD BY WESTERGREN METHOD: 17 MM/HR (ref 0–20)
ERYTHROCYTE [SEDIMENTATION RATE] IN BLOOD BY WESTERGREN METHOD: 7 MM/HR (ref 0–20)
EST. AVERAGE GLUCOSE BLD GHB EST-MCNC: 197 MG/DL
FLUAV RNA SPEC QL NAA+PROBE: NEGATIVE
FLUAV RNA SPEC QL NAA+PROBE: NEGATIVE
FLUBV RNA RESP QL NAA+PROBE: NEGATIVE
FLUBV RNA RESP QL NAA+PROBE: NEGATIVE
GFR SERPL CREATININE-BSD FRML MDRD: 57 ML/MIN/1.73M2
GFR SERPL CREATININE-BSD FRML MDRD: 60 ML/MIN/1.73M2
GFR SERPL CREATININE-BSD FRML MDRD: 77 ML/MIN/1.73M2
GFR SERPL CREATININE-BSD FRML MDRD: 82 ML/MIN/1.73M2
GFR SERPL CREATININE-BSD FRML MDRD: 88 ML/MIN/1.73M2
GFR SERPL CREATININE-BSD FRML MDRD: 90 ML/MIN/1.73M2
GFR SERPL CREATININE-BSD FRML MDRD: 90 ML/MIN/1.73M2
GFR SERPL CREATININE-BSD FRML MDRD: >90 ML/MIN/1.73M2
GLUCOSE BLD-MCNC: 120 MG/DL (ref 70–105)
GLUCOSE BLD-MCNC: 137 MG/DL (ref 70–105)
GLUCOSE BLD-MCNC: 149 MG/DL (ref 70–105)
GLUCOSE BLD-MCNC: 187 MG/DL (ref 70–105)
GLUCOSE BLD-MCNC: 397 MG/DL (ref 70–105)
GLUCOSE BLD-MCNC: 400 MG/DL (ref 70–105)
GLUCOSE BLDC GLUCOMTR-MCNC: 105 MG/DL (ref 70–99)
GLUCOSE BLDC GLUCOMTR-MCNC: 115 MG/DL (ref 70–99)
GLUCOSE BLDC GLUCOMTR-MCNC: 116 MG/DL (ref 70–99)
GLUCOSE BLDC GLUCOMTR-MCNC: 122 MG/DL (ref 70–99)
GLUCOSE BLDC GLUCOMTR-MCNC: 126 MG/DL (ref 70–99)
GLUCOSE BLDC GLUCOMTR-MCNC: 129 MG/DL (ref 70–99)
GLUCOSE BLDC GLUCOMTR-MCNC: 129 MG/DL (ref 70–99)
GLUCOSE BLDC GLUCOMTR-MCNC: 130 MG/DL (ref 70–99)
GLUCOSE BLDC GLUCOMTR-MCNC: 130 MG/DL (ref 70–99)
GLUCOSE BLDC GLUCOMTR-MCNC: 135 MG/DL (ref 70–99)
GLUCOSE BLDC GLUCOMTR-MCNC: 136 MG/DL (ref 70–99)
GLUCOSE BLDC GLUCOMTR-MCNC: 136 MG/DL (ref 70–99)
GLUCOSE BLDC GLUCOMTR-MCNC: 139 MG/DL (ref 70–99)
GLUCOSE BLDC GLUCOMTR-MCNC: 139 MG/DL (ref 70–99)
GLUCOSE BLDC GLUCOMTR-MCNC: 142 MG/DL (ref 70–99)
GLUCOSE BLDC GLUCOMTR-MCNC: 143 MG/DL (ref 70–99)
GLUCOSE BLDC GLUCOMTR-MCNC: 144 MG/DL (ref 70–99)
GLUCOSE BLDC GLUCOMTR-MCNC: 144 MG/DL (ref 70–99)
GLUCOSE BLDC GLUCOMTR-MCNC: 145 MG/DL (ref 70–99)
GLUCOSE BLDC GLUCOMTR-MCNC: 145 MG/DL (ref 70–99)
GLUCOSE BLDC GLUCOMTR-MCNC: 148 MG/DL (ref 70–99)
GLUCOSE BLDC GLUCOMTR-MCNC: 150 MG/DL (ref 70–99)
GLUCOSE BLDC GLUCOMTR-MCNC: 151 MG/DL (ref 70–99)
GLUCOSE BLDC GLUCOMTR-MCNC: 152 MG/DL (ref 70–99)
GLUCOSE BLDC GLUCOMTR-MCNC: 153 MG/DL (ref 70–99)
GLUCOSE BLDC GLUCOMTR-MCNC: 155 MG/DL (ref 70–99)
GLUCOSE BLDC GLUCOMTR-MCNC: 156 MG/DL (ref 70–99)
GLUCOSE BLDC GLUCOMTR-MCNC: 156 MG/DL (ref 70–99)
GLUCOSE BLDC GLUCOMTR-MCNC: 157 MG/DL (ref 70–99)
GLUCOSE BLDC GLUCOMTR-MCNC: 157 MG/DL (ref 70–99)
GLUCOSE BLDC GLUCOMTR-MCNC: 162 MG/DL (ref 70–99)
GLUCOSE BLDC GLUCOMTR-MCNC: 162 MG/DL (ref 70–99)
GLUCOSE BLDC GLUCOMTR-MCNC: 163 MG/DL (ref 70–99)
GLUCOSE BLDC GLUCOMTR-MCNC: 163 MG/DL (ref 70–99)
GLUCOSE BLDC GLUCOMTR-MCNC: 164 MG/DL (ref 70–99)
GLUCOSE BLDC GLUCOMTR-MCNC: 164 MG/DL (ref 70–99)
GLUCOSE BLDC GLUCOMTR-MCNC: 166 MG/DL (ref 70–99)
GLUCOSE BLDC GLUCOMTR-MCNC: 166 MG/DL (ref 70–99)
GLUCOSE BLDC GLUCOMTR-MCNC: 168 MG/DL (ref 70–99)
GLUCOSE BLDC GLUCOMTR-MCNC: 169 MG/DL (ref 70–99)
GLUCOSE BLDC GLUCOMTR-MCNC: 171 MG/DL (ref 70–99)
GLUCOSE BLDC GLUCOMTR-MCNC: 172 MG/DL (ref 70–99)
GLUCOSE BLDC GLUCOMTR-MCNC: 173 MG/DL (ref 70–99)
GLUCOSE BLDC GLUCOMTR-MCNC: 173 MG/DL (ref 70–99)
GLUCOSE BLDC GLUCOMTR-MCNC: 174 MG/DL (ref 70–99)
GLUCOSE BLDC GLUCOMTR-MCNC: 175 MG/DL (ref 70–99)
GLUCOSE BLDC GLUCOMTR-MCNC: 175 MG/DL (ref 70–99)
GLUCOSE BLDC GLUCOMTR-MCNC: 176 MG/DL (ref 70–99)
GLUCOSE BLDC GLUCOMTR-MCNC: 176 MG/DL (ref 70–99)
GLUCOSE BLDC GLUCOMTR-MCNC: 177 MG/DL (ref 70–99)
GLUCOSE BLDC GLUCOMTR-MCNC: 178 MG/DL (ref 70–99)
GLUCOSE BLDC GLUCOMTR-MCNC: 179 MG/DL (ref 70–99)
GLUCOSE BLDC GLUCOMTR-MCNC: 179 MG/DL (ref 70–99)
GLUCOSE BLDC GLUCOMTR-MCNC: 181 MG/DL (ref 70–99)
GLUCOSE BLDC GLUCOMTR-MCNC: 181 MG/DL (ref 70–99)
GLUCOSE BLDC GLUCOMTR-MCNC: 182 MG/DL (ref 70–99)
GLUCOSE BLDC GLUCOMTR-MCNC: 182 MG/DL (ref 70–99)
GLUCOSE BLDC GLUCOMTR-MCNC: 184 MG/DL (ref 70–99)
GLUCOSE BLDC GLUCOMTR-MCNC: 185 MG/DL (ref 70–99)
GLUCOSE BLDC GLUCOMTR-MCNC: 185 MG/DL (ref 70–99)
GLUCOSE BLDC GLUCOMTR-MCNC: 188 MG/DL (ref 70–99)
GLUCOSE BLDC GLUCOMTR-MCNC: 188 MG/DL (ref 70–99)
GLUCOSE BLDC GLUCOMTR-MCNC: 190 MG/DL (ref 70–99)
GLUCOSE BLDC GLUCOMTR-MCNC: 191 MG/DL (ref 70–99)
GLUCOSE BLDC GLUCOMTR-MCNC: 193 MG/DL (ref 70–99)
GLUCOSE BLDC GLUCOMTR-MCNC: 194 MG/DL (ref 70–99)
GLUCOSE BLDC GLUCOMTR-MCNC: 197 MG/DL (ref 70–99)
GLUCOSE BLDC GLUCOMTR-MCNC: 197 MG/DL (ref 70–99)
GLUCOSE BLDC GLUCOMTR-MCNC: 198 MG/DL (ref 70–99)
GLUCOSE BLDC GLUCOMTR-MCNC: 199 MG/DL (ref 70–99)
GLUCOSE BLDC GLUCOMTR-MCNC: 199 MG/DL (ref 70–99)
GLUCOSE BLDC GLUCOMTR-MCNC: 201 MG/DL (ref 70–99)
GLUCOSE BLDC GLUCOMTR-MCNC: 203 MG/DL (ref 70–99)
GLUCOSE BLDC GLUCOMTR-MCNC: 204 MG/DL (ref 70–99)
GLUCOSE BLDC GLUCOMTR-MCNC: 204 MG/DL (ref 70–99)
GLUCOSE BLDC GLUCOMTR-MCNC: 209 MG/DL (ref 70–99)
GLUCOSE BLDC GLUCOMTR-MCNC: 212 MG/DL (ref 70–99)
GLUCOSE BLDC GLUCOMTR-MCNC: 213 MG/DL (ref 70–99)
GLUCOSE BLDC GLUCOMTR-MCNC: 216 MG/DL (ref 70–99)
GLUCOSE BLDC GLUCOMTR-MCNC: 220 MG/DL (ref 70–99)
GLUCOSE BLDC GLUCOMTR-MCNC: 221 MG/DL (ref 70–99)
GLUCOSE BLDC GLUCOMTR-MCNC: 227 MG/DL (ref 70–99)
GLUCOSE BLDC GLUCOMTR-MCNC: 231 MG/DL (ref 70–99)
GLUCOSE BLDC GLUCOMTR-MCNC: 231 MG/DL (ref 70–99)
GLUCOSE BLDC GLUCOMTR-MCNC: 238 MG/DL (ref 70–99)
GLUCOSE BLDC GLUCOMTR-MCNC: 239 MG/DL (ref 70–99)
GLUCOSE BLDC GLUCOMTR-MCNC: 244 MG/DL (ref 70–99)
GLUCOSE BLDC GLUCOMTR-MCNC: 247 MG/DL (ref 70–99)
GLUCOSE BLDC GLUCOMTR-MCNC: 248 MG/DL (ref 70–99)
GLUCOSE BLDC GLUCOMTR-MCNC: 255 MG/DL (ref 70–99)
GLUCOSE BLDC GLUCOMTR-MCNC: 255 MG/DL (ref 70–99)
GLUCOSE BLDC GLUCOMTR-MCNC: 259 MG/DL (ref 70–99)
GLUCOSE BLDC GLUCOMTR-MCNC: 262 MG/DL (ref 70–99)
GLUCOSE BLDC GLUCOMTR-MCNC: 266 MG/DL (ref 70–99)
GLUCOSE BLDC GLUCOMTR-MCNC: 272 MG/DL (ref 70–99)
GLUCOSE BLDC GLUCOMTR-MCNC: 281 MG/DL (ref 70–99)
GLUCOSE BLDC GLUCOMTR-MCNC: 292 MG/DL (ref 70–99)
GLUCOSE BLDC GLUCOMTR-MCNC: 292 MG/DL (ref 70–99)
GLUCOSE BLDC GLUCOMTR-MCNC: 308 MG/DL (ref 70–99)
GLUCOSE BLDC GLUCOMTR-MCNC: 309 MG/DL (ref 70–99)
GLUCOSE BLDC GLUCOMTR-MCNC: 338 MG/DL (ref 70–99)
GLUCOSE BLDC GLUCOMTR-MCNC: 354 MG/DL (ref 70–99)
GLUCOSE BLDC GLUCOMTR-MCNC: 67 MG/DL (ref 70–99)
GLUCOSE BLDC GLUCOMTR-MCNC: 75 MG/DL (ref 70–99)
GLUCOSE BLDC GLUCOMTR-MCNC: 75 MG/DL (ref 70–99)
GLUCOSE BLDC GLUCOMTR-MCNC: 79 MG/DL (ref 70–99)
GLUCOSE BLDC GLUCOMTR-MCNC: 80 MG/DL (ref 70–99)
GLUCOSE BLDC GLUCOMTR-MCNC: 87 MG/DL (ref 70–99)
GLUCOSE SERPL-MCNC: 109 MG/DL (ref 70–99)
GLUCOSE SERPL-MCNC: 130 MG/DL (ref 70–99)
GLUCOSE SERPL-MCNC: 136 MG/DL (ref 70–99)
GLUCOSE SERPL-MCNC: 151 MG/DL (ref 70–99)
GLUCOSE SERPL-MCNC: 154 MG/DL (ref 70–99)
GLUCOSE SERPL-MCNC: 163 MG/DL (ref 70–99)
GLUCOSE SERPL-MCNC: 170 MG/DL (ref 70–99)
GLUCOSE SERPL-MCNC: 170 MG/DL (ref 70–99)
GLUCOSE SERPL-MCNC: 180 MG/DL (ref 70–99)
GLUCOSE SERPL-MCNC: 190 MG/DL (ref 70–99)
GLUCOSE SERPL-MCNC: 192 MG/DL (ref 70–99)
GLUCOSE SERPL-MCNC: 192 MG/DL (ref 70–99)
GLUCOSE SERPL-MCNC: 193 MG/DL (ref 70–99)
GLUCOSE SERPL-MCNC: 193 MG/DL (ref 70–99)
GLUCOSE SERPL-MCNC: 195 MG/DL (ref 70–99)
GLUCOSE SERPL-MCNC: 196 MG/DL (ref 70–99)
GLUCOSE SERPL-MCNC: 198 MG/DL (ref 70–99)
GLUCOSE SERPL-MCNC: 208 MG/DL (ref 70–99)
GLUCOSE SERPL-MCNC: 243 MG/DL (ref 70–99)
GLUCOSE SERPL-MCNC: 271 MG/DL (ref 70–99)
GLUCOSE SERPL-MCNC: 311 MG/DL (ref 70–99)
GLUCOSE UR STRIP-MCNC: 150 MG/DL
GLUCOSE UR STRIP-MCNC: 500 MG/DL
GLUCOSE UR STRIP-MCNC: 500 MG/DL
GLUCOSE UR STRIP-MCNC: 70 MG/DL
GLUCOSE UR STRIP-MCNC: >1000 MG/DL
HBA1C MFR BLD: 12.5 % (ref 4–6.2)
HBA1C MFR BLD: 8.5 %
HBA1C MFR BLD: 9.6 %
HCT VFR BLD AUTO: 15.7 % (ref 40–53)
HCT VFR BLD AUTO: 22.6 % (ref 40–53)
HCT VFR BLD AUTO: 35.1 % (ref 40–53)
HCT VFR BLD AUTO: 35.7 % (ref 40–53)
HCT VFR BLD AUTO: 35.8 % (ref 40–53)
HCT VFR BLD AUTO: 35.9 % (ref 40–53)
HCT VFR BLD AUTO: 36.5 % (ref 40–53)
HCT VFR BLD AUTO: 36.9 % (ref 40–53)
HCT VFR BLD AUTO: 37 % (ref 40–53)
HCT VFR BLD AUTO: 37.6 % (ref 40–53)
HCT VFR BLD AUTO: 38.2 % (ref 40–53)
HCT VFR BLD AUTO: 38.3 % (ref 40–53)
HCT VFR BLD AUTO: 38.3 % (ref 40–53)
HCT VFR BLD AUTO: 38.4 % (ref 40–53)
HCT VFR BLD AUTO: 38.5 % (ref 40–53)
HCT VFR BLD AUTO: 39.3 % (ref 40–53)
HCT VFR BLD AUTO: 39.6 % (ref 40–53)
HCT VFR BLD AUTO: 41.9 % (ref 40–53)
HCT VFR BLD AUTO: 42.6 % (ref 40–53)
HCT VFR BLD AUTO: 42.8 % (ref 40–53)
HCT VFR BLD AUTO: 44.5 % (ref 40–53)
HCT VFR BLD AUTO: 47.8 % (ref 40–53)
HDLC SERPL-MCNC: 26 MG/DL
HDLC SERPL-MCNC: 29 MG/DL
HDLC SERPL-MCNC: 41 MG/DL (ref 23–92)
HGB BLD-MCNC: 11.7 G/DL (ref 13.3–17.7)
HGB BLD-MCNC: 11.9 G/DL (ref 13.3–17.7)
HGB BLD-MCNC: 12 G/DL (ref 13.3–17.7)
HGB BLD-MCNC: 12.1 G/DL (ref 13.3–17.7)
HGB BLD-MCNC: 12.2 G/DL (ref 13.3–17.7)
HGB BLD-MCNC: 12.4 G/DL (ref 13.3–17.7)
HGB BLD-MCNC: 12.6 G/DL (ref 13.3–17.7)
HGB BLD-MCNC: 12.7 G/DL (ref 13.3–17.7)
HGB BLD-MCNC: 12.8 G/DL (ref 13.3–17.7)
HGB BLD-MCNC: 12.8 G/DL (ref 13.3–17.7)
HGB BLD-MCNC: 12.9 G/DL (ref 13.3–17.7)
HGB BLD-MCNC: 13 G/DL (ref 13.3–17.7)
HGB BLD-MCNC: 13.1 G/DL (ref 13.3–17.7)
HGB BLD-MCNC: 13.6 G/DL (ref 13.3–17.7)
HGB BLD-MCNC: 13.9 G/DL (ref 13.3–17.7)
HGB BLD-MCNC: 13.9 G/DL (ref 13.3–17.7)
HGB BLD-MCNC: 14.1 G/DL (ref 13.3–17.7)
HGB BLD-MCNC: 14.1 G/DL (ref 13.3–17.7)
HGB BLD-MCNC: 14.5 G/DL (ref 13.3–17.7)
HGB BLD-MCNC: 16.1 G/DL (ref 13.3–17.7)
HGB BLD-MCNC: 5.1 G/DL (ref 13.3–17.7)
HGB BLD-MCNC: 7.4 G/DL (ref 13.3–17.7)
HGB UR QL STRIP: ABNORMAL
HGB UR QL STRIP: NEGATIVE
HOLD SPECIMEN: NORMAL
HYALINE CASTS: 1 /LPF
HYALINE CASTS: 12 /LPF
IMM GRANULOCYTES # BLD: 0 10E3/UL
IMM GRANULOCYTES # BLD: 0.1 10E3/UL
IMM GRANULOCYTES # BLD: 0.2 10E3/UL
IMM GRANULOCYTES NFR BLD: 0 %
IMM GRANULOCYTES NFR BLD: 1 %
IMM GRANULOCYTES NFR BLD: 2 %
INR PPP: 0.98 (ref 0.85–1.15)
INR PPP: 1.08 (ref 0.85–1.15)
INTERPRETATION ECG - MUSE: NORMAL
KETONES UR STRIP-MCNC: 10 MG/DL
KETONES UR STRIP-MCNC: 20 MG/DL
KETONES UR STRIP-MCNC: 20 MG/DL
KETONES UR STRIP-MCNC: >150 MG/DL
KETONES UR STRIP-MCNC: NEGATIVE MG/DL
LACTATE SERPL-SCNC: 0.9 MMOL/L (ref 0.7–2)
LACTATE SERPL-SCNC: 1 MMOL/L (ref 0.7–2)
LACTATE SERPL-SCNC: 1 MMOL/L (ref 0.7–2)
LACTATE SERPL-SCNC: 2 MMOL/L (ref 0.7–2)
LACTATE SERPL-SCNC: 2.7 MMOL/L (ref 0.7–2)
LACTATE SERPL-SCNC: 3.2 MMOL/L (ref 0.7–2)
LACTATE SERPL-SCNC: 3.4 MMOL/L (ref 0.7–2)
LACTATE SERPL-SCNC: 4 MMOL/L (ref 0.7–2)
LDL CHOLESTEROL CALCULATED (EXTERNAL): 70 MG/DL
LDLC SERPL CALC-MCNC: 117 MG/DL
LDLC SERPL CALC-MCNC: 32 MG/DL
LEUKOCYTE ESTERASE UR QL STRIP: ABNORMAL
LEUKOCYTE ESTERASE UR QL STRIP: ABNORMAL
LEUKOCYTE ESTERASE UR QL STRIP: NEGATIVE
LVEF ECHO: NORMAL
LVEF ECHO: NORMAL
LYMPHOCYTES # BLD AUTO: 1.3 10E3/UL (ref 0.8–5.3)
LYMPHOCYTES # BLD AUTO: 1.9 10E3/UL (ref 0.8–5.3)
LYMPHOCYTES # BLD AUTO: 2.1 10E3/UL (ref 0.8–5.3)
LYMPHOCYTES # BLD AUTO: 2.3 10E3/UL (ref 0.8–5.3)
LYMPHOCYTES # BLD AUTO: 2.3 10E3/UL (ref 0.8–5.3)
LYMPHOCYTES # BLD AUTO: 2.4 10E3/UL (ref 0.8–5.3)
LYMPHOCYTES # BLD AUTO: 2.4 10E3/UL (ref 0.8–5.3)
LYMPHOCYTES # BLD AUTO: 2.7 10E3/UL (ref 0.8–5.3)
LYMPHOCYTES # BLD AUTO: 3 10E3/UL (ref 0.8–5.3)
LYMPHOCYTES # BLD AUTO: 3 10E3/UL (ref 0.8–5.3)
LYMPHOCYTES NFR BLD AUTO: 12 %
LYMPHOCYTES NFR BLD AUTO: 13 %
LYMPHOCYTES NFR BLD AUTO: 13 %
LYMPHOCYTES NFR BLD AUTO: 14 %
LYMPHOCYTES NFR BLD AUTO: 14 %
LYMPHOCYTES NFR BLD AUTO: 17 %
LYMPHOCYTES NFR BLD AUTO: 24 %
LYMPHOCYTES NFR BLD AUTO: 27 %
LYMPHOCYTES NFR BLD AUTO: 31 %
LYMPHOCYTES NFR BLD AUTO: 7 %
MAGNESIUM SERPL-MCNC: 1.6 MG/DL (ref 1.7–2.3)
MAGNESIUM SERPL-MCNC: 1.8 MG/DL (ref 1.9–2.7)
MAGNESIUM SERPL-MCNC: 1.8 MG/DL (ref 1.9–2.7)
MAGNESIUM SERPL-MCNC: 1.9 MG/DL (ref 1.7–2.3)
MAGNESIUM SERPL-MCNC: 1.9 MG/DL (ref 1.7–2.3)
MAGNESIUM SERPL-MCNC: 1.9 MG/DL (ref 1.9–2.7)
MAGNESIUM SERPL-MCNC: 2 MG/DL (ref 1.7–2.3)
MAGNESIUM SERPL-MCNC: 2 MG/DL (ref 1.7–2.3)
MCH RBC QN AUTO: 28.6 PG (ref 26.5–33)
MCH RBC QN AUTO: 28.7 PG (ref 26.5–33)
MCH RBC QN AUTO: 28.7 PG (ref 26.5–33)
MCH RBC QN AUTO: 28.8 PG (ref 26.5–33)
MCH RBC QN AUTO: 29.1 PG (ref 26.5–33)
MCH RBC QN AUTO: 29.1 PG (ref 26.5–33)
MCH RBC QN AUTO: 29.3 PG (ref 26.5–33)
MCH RBC QN AUTO: 29.4 PG (ref 26.5–33)
MCH RBC QN AUTO: 29.5 PG (ref 26.5–33)
MCH RBC QN AUTO: 29.5 PG (ref 26.5–33)
MCH RBC QN AUTO: 29.6 PG (ref 26.5–33)
MCH RBC QN AUTO: 29.6 PG (ref 26.5–33)
MCH RBC QN AUTO: 29.9 PG (ref 26.5–33)
MCH RBC QN AUTO: 30.4 PG (ref 26.5–33)
MCH RBC QN AUTO: 30.6 PG (ref 26.5–33)
MCH RBC QN AUTO: 30.7 PG (ref 26.5–33)
MCH RBC QN AUTO: 31 PG (ref 26.5–33)
MCH RBC QN AUTO: 31.1 PG (ref 26.5–33)
MCHC RBC AUTO-ENTMCNC: 32.5 G/DL (ref 31.5–36.5)
MCHC RBC AUTO-ENTMCNC: 32.6 G/DL (ref 31.5–36.5)
MCHC RBC AUTO-ENTMCNC: 32.7 G/DL (ref 31.5–36.5)
MCHC RBC AUTO-ENTMCNC: 32.9 G/DL (ref 31.5–36.5)
MCHC RBC AUTO-ENTMCNC: 33.1 G/DL (ref 31.5–36.5)
MCHC RBC AUTO-ENTMCNC: 33.2 G/DL (ref 31.5–36.5)
MCHC RBC AUTO-ENTMCNC: 33.2 G/DL (ref 31.5–36.5)
MCHC RBC AUTO-ENTMCNC: 33.3 G/DL (ref 31.5–36.5)
MCHC RBC AUTO-ENTMCNC: 33.3 G/DL (ref 31.5–36.5)
MCHC RBC AUTO-ENTMCNC: 33.4 G/DL (ref 31.5–36.5)
MCHC RBC AUTO-ENTMCNC: 33.4 G/DL (ref 31.5–36.5)
MCHC RBC AUTO-ENTMCNC: 33.5 G/DL (ref 31.5–36.5)
MCHC RBC AUTO-ENTMCNC: 33.6 G/DL (ref 31.5–36.5)
MCHC RBC AUTO-ENTMCNC: 33.7 G/DL (ref 31.5–36.5)
MCHC RBC AUTO-ENTMCNC: 33.8 G/DL (ref 31.5–36.5)
MCHC RBC AUTO-ENTMCNC: 34 G/DL (ref 31.5–36.5)
MCHC RBC AUTO-ENTMCNC: 34.1 G/DL (ref 31.5–36.5)
MCHC RBC AUTO-ENTMCNC: 34.1 G/DL (ref 31.5–36.5)
MCHC RBC AUTO-ENTMCNC: 34.6 G/DL (ref 31.5–36.5)
MCHC RBC AUTO-ENTMCNC: 35.1 G/DL (ref 31.5–36.5)
MCV RBC AUTO: 86 FL (ref 78–100)
MCV RBC AUTO: 87 FL (ref 78–100)
MCV RBC AUTO: 88 FL (ref 78–100)
MCV RBC AUTO: 89 FL (ref 78–100)
MCV RBC AUTO: 90 FL (ref 78–100)
MCV RBC AUTO: 90 FL (ref 78–100)
MCV RBC AUTO: 91 FL (ref 78–100)
MCV RBC AUTO: 95 FL (ref 78–100)
MONOCYTES # BLD AUTO: 0.7 10E3/UL (ref 0–1.3)
MONOCYTES # BLD AUTO: 0.7 10E3/UL (ref 0–1.3)
MONOCYTES # BLD AUTO: 0.9 10E3/UL (ref 0–1.3)
MONOCYTES # BLD AUTO: 1 10E3/UL (ref 0–1.3)
MONOCYTES # BLD AUTO: 1.1 10E3/UL (ref 0–1.3)
MONOCYTES # BLD AUTO: 1.1 10E3/UL (ref 0–1.3)
MONOCYTES NFR BLD AUTO: 10 %
MONOCYTES NFR BLD AUTO: 4 %
MONOCYTES NFR BLD AUTO: 5 %
MONOCYTES NFR BLD AUTO: 6 %
MONOCYTES NFR BLD AUTO: 7 %
MUCOUS THREADS #/AREA URNS LPF: PRESENT /LPF
NEUTROPHILS # BLD AUTO: 10.4 10E3/UL (ref 1.6–8.3)
NEUTROPHILS # BLD AUTO: 11.6 10E3/UL (ref 1.6–8.3)
NEUTROPHILS # BLD AUTO: 12.9 10E3/UL (ref 1.6–8.3)
NEUTROPHILS # BLD AUTO: 13.5 10E3/UL (ref 1.6–8.3)
NEUTROPHILS # BLD AUTO: 14.7 10E3/UL (ref 1.6–8.3)
NEUTROPHILS # BLD AUTO: 15.2 10E3/UL (ref 1.6–8.3)
NEUTROPHILS # BLD AUTO: 16.3 10E3/UL (ref 1.6–8.3)
NEUTROPHILS # BLD AUTO: 5.3 10E3/UL (ref 1.6–8.3)
NEUTROPHILS # BLD AUTO: 5.6 10E3/UL (ref 1.6–8.3)
NEUTROPHILS # BLD AUTO: 8.5 10E3/UL (ref 1.6–8.3)
NEUTROPHILS NFR BLD AUTO: 56 %
NEUTROPHILS NFR BLD AUTO: 65 %
NEUTROPHILS NFR BLD AUTO: 67 %
NEUTROPHILS NFR BLD AUTO: 74 %
NEUTROPHILS NFR BLD AUTO: 78 %
NEUTROPHILS NFR BLD AUTO: 79 %
NEUTROPHILS NFR BLD AUTO: 80 %
NEUTROPHILS NFR BLD AUTO: 80 %
NEUTROPHILS NFR BLD AUTO: 82 %
NEUTROPHILS NFR BLD AUTO: 88 %
NITRATE UR QL: NEGATIVE
NONHDLC SERPL-MCNC: 136 MG/DL
NONHDLC SERPL-MCNC: 59 MG/DL
NRBC # BLD AUTO: 0 10E3/UL
NRBC BLD AUTO-RTO: 0 /100
NT-PROBNP SERPL-MCNC: 189 PG/ML (ref 0–100)
NT-PROBNP SERPL-MCNC: 4226 PG/ML (ref 0–1800)
NT-PROBNP SERPL-MCNC: 5894 PG/ML (ref 0–1800)
OSMOLALITY SERPL: 291 MMOL/KG (ref 280–301)
OSMOLALITY UR: 676 MMOL/KG (ref 100–1200)
P AXIS - MUSE: 36 DEGREES
PH UR STRIP: 5 [PH] (ref 4.7–8)
PH UR STRIP: 5.5 [PH] (ref 4.7–8)
PH UR STRIP: 5.5 [PH] (ref 5–9)
PHOSPHATE SERPL-MCNC: 2.7 MG/DL (ref 2.5–4.5)
PLATELET # BLD AUTO: 182 10E3/UL (ref 150–450)
PLATELET # BLD AUTO: 191 10E3/UL (ref 150–450)
PLATELET # BLD AUTO: 214 10E3/UL (ref 150–450)
PLATELET # BLD AUTO: 214 10E3/UL (ref 150–450)
PLATELET # BLD AUTO: 236 10E3/UL (ref 150–450)
PLATELET # BLD AUTO: 242 10E3/UL (ref 150–450)
PLATELET # BLD AUTO: 246 10E3/UL (ref 150–450)
PLATELET # BLD AUTO: 253 10E3/UL (ref 150–450)
PLATELET # BLD AUTO: 259 10E3/UL (ref 150–450)
PLATELET # BLD AUTO: 260 10E3/UL (ref 150–450)
PLATELET # BLD AUTO: 265 10E3/UL (ref 150–450)
PLATELET # BLD AUTO: 275 10E3/UL (ref 150–450)
PLATELET # BLD AUTO: 280 10E3/UL (ref 150–450)
PLATELET # BLD AUTO: 281 10E3/UL (ref 150–450)
PLATELET # BLD AUTO: 299 10E3/UL (ref 150–450)
PLATELET # BLD AUTO: 314 10E3/UL (ref 150–450)
PLATELET # BLD AUTO: 322 10E3/UL (ref 150–450)
PLATELET # BLD AUTO: 322 10E3/UL (ref 150–450)
PLATELET # BLD AUTO: 340 10E3/UL (ref 150–450)
PLATELET # BLD AUTO: 345 10E3/UL (ref 150–450)
PLATELET # BLD AUTO: 379 10E3/UL (ref 150–450)
PLATELET # BLD AUTO: 421 10E3/UL (ref 150–450)
POTASSIUM BLD-SCNC: 3.8 MMOL/L (ref 3.5–5.1)
POTASSIUM BLD-SCNC: 4.2 MMOL/L (ref 3.5–5.1)
POTASSIUM BLD-SCNC: 4.6 MMOL/L (ref 3.5–5.1)
POTASSIUM BLD-SCNC: 4.6 MMOL/L (ref 3.5–5.1)
POTASSIUM SERPL-SCNC: 3.4 MMOL/L (ref 3.4–5.3)
POTASSIUM SERPL-SCNC: 3.5 MMOL/L (ref 3.4–5.3)
POTASSIUM SERPL-SCNC: 3.6 MMOL/L (ref 3.4–5.3)
POTASSIUM SERPL-SCNC: 3.6 MMOL/L (ref 3.4–5.3)
POTASSIUM SERPL-SCNC: 3.8 MMOL/L (ref 3.4–5.3)
POTASSIUM SERPL-SCNC: 3.9 MMOL/L (ref 3.4–5.3)
POTASSIUM SERPL-SCNC: 4 MMOL/L (ref 3.4–5.3)
POTASSIUM SERPL-SCNC: 4 MMOL/L (ref 3.4–5.3)
POTASSIUM SERPL-SCNC: 4.1 MMOL/L (ref 3.4–5.3)
POTASSIUM SERPL-SCNC: 4.2 MMOL/L (ref 3.4–5.3)
POTASSIUM SERPL-SCNC: 4.2 MMOL/L (ref 3.4–5.3)
POTASSIUM SERPL-SCNC: 4.3 MMOL/L (ref 3.4–5.3)
POTASSIUM SERPL-SCNC: 4.3 MMOL/L (ref 3.4–5.3)
POTASSIUM SERPL-SCNC: 4.4 MMOL/L (ref 3.4–5.3)
POTASSIUM SERPL-SCNC: 4.4 MMOL/L (ref 3.4–5.3)
POTASSIUM SERPL-SCNC: 4.6 MMOL/L (ref 3.4–5.3)
POTASSIUM SERPL-SCNC: 4.7 MMOL/L (ref 3.4–5.3)
POTASSIUM SERPL-SCNC: 5 MMOL/L (ref 3.4–5.3)
PR INTERVAL - MUSE: 164 MS
PROCALCITONIN SERPL IA-MCNC: 0.12 NG/ML
PROCALCITONIN SERPL-MCNC: 0.66 NG/ML
PROT SERPL-MCNC: 5.3 G/DL (ref 6.4–8.3)
PROT SERPL-MCNC: 5.6 G/DL (ref 6.4–8.3)
PROT SERPL-MCNC: 5.8 G/DL (ref 6.4–8.3)
PROT SERPL-MCNC: 6.1 G/DL (ref 6.4–8.3)
PROT SERPL-MCNC: 6.3 G/DL (ref 6.4–8.3)
PROT SERPL-MCNC: 6.3 G/DL (ref 6.4–8.3)
PROT SERPL-MCNC: 7.8 G/DL (ref 6.4–8.3)
PROT SERPL-MCNC: 8.3 G/DL (ref 6.4–8.9)
QRS DURATION - MUSE: 92 MS
QT - MUSE: 404 MS
QTC - MUSE: 474 MS
R AXIS - MUSE: -64 DEGREES
RBC # BLD AUTO: 1.66 10E6/UL (ref 4.4–5.9)
RBC # BLD AUTO: 2.5 10E6/UL (ref 4.4–5.9)
RBC # BLD AUTO: 3.95 10E6/UL (ref 4.4–5.9)
RBC # BLD AUTO: 4.04 10E6/UL (ref 4.4–5.9)
RBC # BLD AUTO: 4.12 10E6/UL (ref 4.4–5.9)
RBC # BLD AUTO: 4.12 10E6/UL (ref 4.4–5.9)
RBC # BLD AUTO: 4.15 10E6/UL (ref 4.4–5.9)
RBC # BLD AUTO: 4.21 10E6/UL (ref 4.4–5.9)
RBC # BLD AUTO: 4.23 10E6/UL (ref 4.4–5.9)
RBC # BLD AUTO: 4.31 10E6/UL (ref 4.4–5.9)
RBC # BLD AUTO: 4.37 10E6/UL (ref 4.4–5.9)
RBC # BLD AUTO: 4.38 10E6/UL (ref 4.4–5.9)
RBC # BLD AUTO: 4.41 10E6/UL (ref 4.4–5.9)
RBC # BLD AUTO: 4.42 10E6/UL (ref 4.4–5.9)
RBC # BLD AUTO: 4.46 10E6/UL (ref 4.4–5.9)
RBC # BLD AUTO: 4.46 10E6/UL (ref 4.4–5.9)
RBC # BLD AUTO: 4.48 10E6/UL (ref 4.4–5.9)
RBC # BLD AUTO: 4.84 10E6/UL (ref 4.4–5.9)
RBC # BLD AUTO: 4.89 10E6/UL (ref 4.4–5.9)
RBC # BLD AUTO: 4.93 10E6/UL (ref 4.4–5.9)
RBC # BLD AUTO: 4.99 10E6/UL (ref 4.4–5.9)
RBC # BLD AUTO: 5.26 10E6/UL (ref 4.4–5.9)
RBC URINE: 0 /HPF
RBC URINE: 0 /HPF
RBC URINE: 1 /HPF
RBC URINE: 1 /HPF
RBC URINE: <1 /HPF
RBC URINE: >182 /HPF
RBC URINE: >182 /HPF
RSV RNA SPEC NAA+PROBE: NEGATIVE
RSV RNA SPEC NAA+PROBE: NEGATIVE
SARS-COV-2 RNA RESP QL NAA+PROBE: NEGATIVE
SODIUM SERPL-SCNC: 126 MMOL/L (ref 136–145)
SODIUM SERPL-SCNC: 127 MMOL/L (ref 136–145)
SODIUM SERPL-SCNC: 129 MMOL/L (ref 136–145)
SODIUM SERPL-SCNC: 129 MMOL/L (ref 136–145)
SODIUM SERPL-SCNC: 130 MMOL/L (ref 134–144)
SODIUM SERPL-SCNC: 130 MMOL/L (ref 136–145)
SODIUM SERPL-SCNC: 131 MMOL/L (ref 134–144)
SODIUM SERPL-SCNC: 131 MMOL/L (ref 136–145)
SODIUM SERPL-SCNC: 132 MMOL/L (ref 134–144)
SODIUM SERPL-SCNC: 132 MMOL/L (ref 136–145)
SODIUM SERPL-SCNC: 132 MMOL/L (ref 136–145)
SODIUM SERPL-SCNC: 133 MMOL/L (ref 136–145)
SODIUM SERPL-SCNC: 134 MMOL/L (ref 134–144)
SODIUM SERPL-SCNC: 134 MMOL/L (ref 136–145)
SODIUM SERPL-SCNC: 134 MMOL/L (ref 136–145)
SODIUM SERPL-SCNC: 136 MMOL/L (ref 136–145)
SODIUM SERPL-SCNC: 137 MMOL/L (ref 136–145)
SODIUM UR-SCNC: 152 MMOL/L
SP GR UR STRIP: 1.02 (ref 1–1.03)
SP GR UR STRIP: 1.03 (ref 1–1.03)
SP GR UR STRIP: 1.04 (ref 1–1.03)
SPECIMEN EXPIRATION DATE: NORMAL
SQUAMOUS EPITHELIAL: 0 /HPF
SQUAMOUS EPITHELIAL: 1 /HPF
SQUAMOUS EPITHELIAL: 2 /HPF
SYSTOLIC BLOOD PRESSURE - MUSE: NORMAL MMHG
T AXIS - MUSE: 50 DEGREES
T4 FREE SERPL-MCNC: 0.96 NG/DL (ref 0.9–1.7)
TRIGL SERPL-MCNC: 133 MG/DL
TRIGL SERPL-MCNC: 95 MG/DL
TRIGLYCERIDES (EXTERNAL): 200 MG/DL
TROPONIN I SERPL-MCNC: 103.3 PG/ML (ref 0–34)
TROPONIN I SERPL-MCNC: 105.4 PG/ML (ref 0–34)
TROPONIN I SERPL-MCNC: 107.9 PG/ML (ref 0–34)
TROPONIN I SERPL-MCNC: 59.5 PG/ML (ref 0–34)
TROPONIN T SERPL HS-MCNC: 120 NG/L
TROPONIN T SERPL HS-MCNC: 137 NG/L
TROPONIN T SERPL HS-MCNC: 185 NG/L
TROPONIN T SERPL HS-MCNC: 192 NG/L
TROPONIN T SERPL HS-MCNC: 220 NG/L
TROPONIN T SERPL HS-MCNC: 88 NG/L
TSH SERPL DL<=0.005 MIU/L-ACNC: 10.14 UIU/ML (ref 0.3–4.2)
TSH SERPL DL<=0.005 MIU/L-ACNC: 3.76 MU/L (ref 0.4–4)
URATE CRY #/AREA URNS HPF: ABNORMAL /HPF
URATE CRY #/AREA URNS HPF: ABNORMAL /HPF
UROBILINOGEN UR STRIP-MCNC: NORMAL MG/DL
VENTRICULAR RATE- MUSE: 83 BPM
WBC # BLD AUTO: 10.2 10E3/UL (ref 4–11)
WBC # BLD AUTO: 10.5 10E3/UL (ref 4–11)
WBC # BLD AUTO: 10.7 10E3/UL (ref 4–11)
WBC # BLD AUTO: 10.8 10E3/UL (ref 4–11)
WBC # BLD AUTO: 11.1 10E3/UL (ref 4–11)
WBC # BLD AUTO: 12.7 10E3/UL (ref 4–11)
WBC # BLD AUTO: 13.5 10E3/UL (ref 4–11)
WBC # BLD AUTO: 15.6 10E3/UL (ref 4–11)
WBC # BLD AUTO: 16.3 10E3/UL (ref 4–11)
WBC # BLD AUTO: 16.4 10E3/UL (ref 4–11)
WBC # BLD AUTO: 16.6 10E3/UL (ref 4–11)
WBC # BLD AUTO: 18.4 10E3/UL (ref 4–11)
WBC # BLD AUTO: 18.5 10E3/UL (ref 4–11)
WBC # BLD AUTO: 18.6 10E3/UL (ref 4–11)
WBC # BLD AUTO: 8.3 10E3/UL (ref 4–11)
WBC # BLD AUTO: 8.9 10E3/UL (ref 4–11)
WBC # BLD AUTO: 8.9 10E3/UL (ref 4–11)
WBC # BLD AUTO: 9 10E3/UL (ref 4–11)
WBC # BLD AUTO: 9.3 10E3/UL (ref 4–11)
WBC # BLD AUTO: 9.3 10E3/UL (ref 4–11)
WBC # BLD AUTO: 9.5 10E3/UL (ref 4–11)
WBC # BLD AUTO: 9.5 10E3/UL (ref 4–11)
WBC URINE: 1 /HPF
WBC URINE: 9 /HPF
WBC URINE: 9 /HPF
WBC URINE: <1 /HPF
WBC URINE: <1 /HPF

## 2022-01-01 PROCEDURE — 71045 X-RAY EXAM CHEST 1 VIEW: CPT

## 2022-01-01 PROCEDURE — 250N000011 HC RX IP 250 OP 636: Performed by: INTERNAL MEDICINE

## 2022-01-01 PROCEDURE — C9803 HOPD COVID-19 SPEC COLLECT: HCPCS

## 2022-01-01 PROCEDURE — 250N000011 HC RX IP 250 OP 636: Performed by: NURSE ANESTHETIST, CERTIFIED REGISTERED

## 2022-01-01 PROCEDURE — 99285 EMERGENCY DEPT VISIT HI MDM: CPT | Mod: 25

## 2022-01-01 PROCEDURE — 86140 C-REACTIVE PROTEIN: CPT | Performed by: INTERNAL MEDICINE

## 2022-01-01 PROCEDURE — 120N000001 HC R&B MED SURG/OB

## 2022-01-01 PROCEDURE — 250N000012 HC RX MED GY IP 250 OP 636 PS 637: Performed by: PHYSICIAN ASSISTANT

## 2022-01-01 PROCEDURE — 99232 SBSQ HOSP IP/OBS MODERATE 35: CPT | Performed by: INTERNAL MEDICINE

## 2022-01-01 PROCEDURE — 70450 CT HEAD/BRAIN W/O DYE: CPT

## 2022-01-01 PROCEDURE — 92526 ORAL FUNCTION THERAPY: CPT | Mod: GN

## 2022-01-01 PROCEDURE — 85025 COMPLETE CBC W/AUTO DIFF WBC: CPT | Performed by: EMERGENCY MEDICINE

## 2022-01-01 PROCEDURE — 36415 COLL VENOUS BLD VENIPUNCTURE: CPT | Performed by: INTERNAL MEDICINE

## 2022-01-01 PROCEDURE — G0463 HOSPITAL OUTPT CLINIC VISIT: HCPCS | Performed by: FAMILY MEDICINE

## 2022-01-01 PROCEDURE — 250N000013 HC RX MED GY IP 250 OP 250 PS 637: Performed by: INTERNAL MEDICINE

## 2022-01-01 PROCEDURE — 70553 MRI BRAIN STEM W/O & W/DYE: CPT

## 2022-01-01 PROCEDURE — 99100 ANES PT EXTEME AGE<1 YR&>70: CPT | Performed by: NURSE ANESTHETIST, CERTIFIED REGISTERED

## 2022-01-01 PROCEDURE — 250N000013 HC RX MED GY IP 250 OP 250 PS 637: Performed by: PHYSICIAN ASSISTANT

## 2022-01-01 PROCEDURE — 93005 ELECTROCARDIOGRAM TRACING: CPT

## 2022-01-01 PROCEDURE — 99285 EMERGENCY DEPT VISIT HI MDM: CPT | Performed by: EMERGENCY MEDICINE

## 2022-01-01 PROCEDURE — 94640 AIRWAY INHALATION TREATMENT: CPT

## 2022-01-01 PROCEDURE — 94640 AIRWAY INHALATION TREATMENT: CPT | Mod: 76

## 2022-01-01 PROCEDURE — 250N000009 HC RX 250: Performed by: INTERNAL MEDICINE

## 2022-01-01 PROCEDURE — 250N000011 HC RX IP 250 OP 636: Performed by: PHYSICIAN ASSISTANT

## 2022-01-01 PROCEDURE — 258N000003 HC RX IP 258 OP 636: Performed by: INTERNAL MEDICINE

## 2022-01-01 PROCEDURE — 84443 ASSAY THYROID STIM HORMONE: CPT | Performed by: PHYSICIAN ASSISTANT

## 2022-01-01 PROCEDURE — 97110 THERAPEUTIC EXERCISES: CPT | Mod: GO

## 2022-01-01 PROCEDURE — 258N000003 HC RX IP 258 OP 636: Performed by: EMERGENCY MEDICINE

## 2022-01-01 PROCEDURE — 250N000013 HC RX MED GY IP 250 OP 250 PS 637: Performed by: EMERGENCY MEDICINE

## 2022-01-01 PROCEDURE — 82310 ASSAY OF CALCIUM: CPT | Performed by: STUDENT IN AN ORGANIZED HEALTH CARE EDUCATION/TRAINING PROGRAM

## 2022-01-01 PROCEDURE — 85027 COMPLETE CBC AUTOMATED: CPT | Performed by: INTERNAL MEDICINE

## 2022-01-01 PROCEDURE — 82435 ASSAY OF BLOOD CHLORIDE: CPT | Performed by: INTERNAL MEDICINE

## 2022-01-01 PROCEDURE — 80048 BASIC METABOLIC PNL TOTAL CA: CPT | Performed by: INTERNAL MEDICINE

## 2022-01-01 PROCEDURE — U0003 INFECTIOUS AGENT DETECTION BY NUCLEIC ACID (DNA OR RNA); SEVERE ACUTE RESPIRATORY SYNDROME CORONAVIRUS 2 (SARS-COV-2) (CORONAVIRUS DISEASE [COVID-19]), AMPLIFIED PROBE TECHNIQUE, MAKING USE OF HIGH THROUGHPUT TECHNOLOGIES AS DESCRIBED BY CMS-2020-01-R: HCPCS | Performed by: NURSE PRACTITIONER

## 2022-01-01 PROCEDURE — 36415 COLL VENOUS BLD VENIPUNCTURE: CPT | Performed by: STUDENT IN AN ORGANIZED HEALTH CARE EDUCATION/TRAINING PROGRAM

## 2022-01-01 PROCEDURE — 99232 SBSQ HOSP IP/OBS MODERATE 35: CPT | Performed by: HOSPITALIST

## 2022-01-01 PROCEDURE — 258N000003 HC RX IP 258 OP 636: Performed by: SURGERY

## 2022-01-01 PROCEDURE — 999N000141 HC STATISTIC PRE-PROCEDURE NURSING ASSESSMENT: Performed by: SURGERY

## 2022-01-01 PROCEDURE — 72141 MRI NECK SPINE W/O DYE: CPT

## 2022-01-01 PROCEDURE — 85610 PROTHROMBIN TIME: CPT | Performed by: FAMILY MEDICINE

## 2022-01-01 PROCEDURE — 97110 THERAPEUTIC EXERCISES: CPT | Mod: GP

## 2022-01-01 PROCEDURE — 250N000013 HC RX MED GY IP 250 OP 250 PS 637: Performed by: SURGERY

## 2022-01-01 PROCEDURE — 99233 SBSQ HOSP IP/OBS HIGH 50: CPT | Performed by: INTERNAL MEDICINE

## 2022-01-01 PROCEDURE — 258N000003 HC RX IP 258 OP 636: Performed by: NURSE PRACTITIONER

## 2022-01-01 PROCEDURE — 82010 KETONE BODYS QUAN: CPT | Performed by: INTERNAL MEDICINE

## 2022-01-01 PROCEDURE — 74019 RADEX ABDOMEN 2 VIEWS: CPT

## 2022-01-01 PROCEDURE — 83735 ASSAY OF MAGNESIUM: CPT | Performed by: FAMILY MEDICINE

## 2022-01-01 PROCEDURE — 82248 BILIRUBIN DIRECT: CPT | Performed by: EMERGENCY MEDICINE

## 2022-01-01 PROCEDURE — 999N000157 HC STATISTIC RCP TIME EA 10 MIN

## 2022-01-01 PROCEDURE — 97530 THERAPEUTIC ACTIVITIES: CPT | Mod: GO

## 2022-01-01 PROCEDURE — 73110 X-RAY EXAM OF WRIST: CPT | Mod: RT

## 2022-01-01 PROCEDURE — 84439 ASSAY OF FREE THYROXINE: CPT | Performed by: INTERNAL MEDICINE

## 2022-01-01 PROCEDURE — 99232 SBSQ HOSP IP/OBS MODERATE 35: CPT | Performed by: NURSE PRACTITIONER

## 2022-01-01 PROCEDURE — G0378 HOSPITAL OBSERVATION PER HR: HCPCS

## 2022-01-01 PROCEDURE — 250N000012 HC RX MED GY IP 250 OP 636 PS 637: Performed by: INTERNAL MEDICINE

## 2022-01-01 PROCEDURE — 85025 COMPLETE CBC W/AUTO DIFF WBC: CPT | Performed by: INTERNAL MEDICINE

## 2022-01-01 PROCEDURE — 83605 ASSAY OF LACTIC ACID: CPT | Performed by: PHYSICIAN ASSISTANT

## 2022-01-01 PROCEDURE — 250N000009 HC RX 250: Performed by: FAMILY MEDICINE

## 2022-01-01 PROCEDURE — 97530 THERAPEUTIC ACTIVITIES: CPT | Mod: GP | Performed by: PHYSICAL THERAPIST

## 2022-01-01 PROCEDURE — 36415 COLL VENOUS BLD VENIPUNCTURE: CPT | Performed by: FAMILY MEDICINE

## 2022-01-01 PROCEDURE — 36415 COLL VENOUS BLD VENIPUNCTURE: CPT | Performed by: EMERGENCY MEDICINE

## 2022-01-01 PROCEDURE — 85018 HEMOGLOBIN: CPT | Performed by: EMERGENCY MEDICINE

## 2022-01-01 PROCEDURE — 93010 ELECTROCARDIOGRAM REPORT: CPT | Performed by: INTERNAL MEDICINE

## 2022-01-01 PROCEDURE — A9585 GADOBUTROL INJECTION: HCPCS | Performed by: RADIOLOGY

## 2022-01-01 PROCEDURE — 80053 COMPREHEN METABOLIC PANEL: CPT | Performed by: INTERNAL MEDICINE

## 2022-01-01 PROCEDURE — 97110 THERAPEUTIC EXERCISES: CPT | Mod: GP | Performed by: PHYSICAL THERAPIST

## 2022-01-01 PROCEDURE — 96366 THER/PROPH/DIAG IV INF ADDON: CPT

## 2022-01-01 PROCEDURE — 96365 THER/PROPH/DIAG IV INF INIT: CPT

## 2022-01-01 PROCEDURE — 84443 ASSAY THYROID STIM HORMONE: CPT | Performed by: INTERNAL MEDICINE

## 2022-01-01 PROCEDURE — 84484 ASSAY OF TROPONIN QUANT: CPT | Mod: 91 | Performed by: PHYSICIAN ASSISTANT

## 2022-01-01 PROCEDURE — 82310 ASSAY OF CALCIUM: CPT | Performed by: INTERNAL MEDICINE

## 2022-01-01 PROCEDURE — 97530 THERAPEUTIC ACTIVITIES: CPT | Mod: GP

## 2022-01-01 PROCEDURE — 80048 BASIC METABOLIC PNL TOTAL CA: CPT | Performed by: FAMILY MEDICINE

## 2022-01-01 PROCEDURE — 83735 ASSAY OF MAGNESIUM: CPT | Performed by: INTERNAL MEDICINE

## 2022-01-01 PROCEDURE — 93005 ELECTROCARDIOGRAM TRACING: CPT | Performed by: PHYSICIAN ASSISTANT

## 2022-01-01 PROCEDURE — 36415 COLL VENOUS BLD VENIPUNCTURE: CPT | Performed by: PHYSICIAN ASSISTANT

## 2022-01-01 PROCEDURE — 81001 URINALYSIS AUTO W/SCOPE: CPT | Performed by: FAMILY MEDICINE

## 2022-01-01 PROCEDURE — 99214 OFFICE O/P EST MOD 30 MIN: CPT | Performed by: FAMILY MEDICINE

## 2022-01-01 PROCEDURE — 99140 ANES COMP EMERGENCY COND: CPT | Performed by: NURSE ANESTHETIST, CERTIFIED REGISTERED

## 2022-01-01 PROCEDURE — 250N000013 HC RX MED GY IP 250 OP 250 PS 637: Performed by: FAMILY MEDICINE

## 2022-01-01 PROCEDURE — 97110 THERAPEUTIC EXERCISES: CPT | Mod: GP,CQ

## 2022-01-01 PROCEDURE — 250N000009 HC RX 250: Performed by: NURSE ANESTHETIST, CERTIFIED REGISTERED

## 2022-01-01 PROCEDURE — 82010 KETONE BODYS QUAN: CPT | Performed by: PHYSICIAN ASSISTANT

## 2022-01-01 PROCEDURE — 83605 ASSAY OF LACTIC ACID: CPT | Performed by: INTERNAL MEDICINE

## 2022-01-01 PROCEDURE — 96375 TX/PRO/DX INJ NEW DRUG ADDON: CPT

## 2022-01-01 PROCEDURE — 85025 COMPLETE CBC W/AUTO DIFF WBC: CPT | Performed by: PHYSICIAN ASSISTANT

## 2022-01-01 PROCEDURE — 84145 PROCALCITONIN (PCT): CPT | Performed by: INTERNAL MEDICINE

## 2022-01-01 PROCEDURE — 85652 RBC SED RATE AUTOMATED: CPT | Performed by: INTERNAL MEDICINE

## 2022-01-01 PROCEDURE — 82962 GLUCOSE BLOOD TEST: CPT

## 2022-01-01 PROCEDURE — 97116 GAIT TRAINING THERAPY: CPT | Mod: GP

## 2022-01-01 PROCEDURE — 93306 TTE W/DOPPLER COMPLETE: CPT

## 2022-01-01 PROCEDURE — 87637 SARSCOV2&INF A&B&RSV AMP PRB: CPT | Performed by: EMERGENCY MEDICINE

## 2022-01-01 PROCEDURE — 82550 ASSAY OF CK (CPK): CPT | Performed by: FAMILY MEDICINE

## 2022-01-01 PROCEDURE — 258N000003 HC RX IP 258 OP 636: Performed by: PHYSICIAN ASSISTANT

## 2022-01-01 PROCEDURE — 74230 X-RAY XM SWLNG FUNCJ C+: CPT

## 2022-01-01 PROCEDURE — 92610 EVALUATE SWALLOWING FUNCTION: CPT | Mod: GN

## 2022-01-01 PROCEDURE — 83735 ASSAY OF MAGNESIUM: CPT | Performed by: STUDENT IN AN ORGANIZED HEALTH CARE EDUCATION/TRAINING PROGRAM

## 2022-01-01 PROCEDURE — 84100 ASSAY OF PHOSPHORUS: CPT | Performed by: INTERNAL MEDICINE

## 2022-01-01 PROCEDURE — 83036 HEMOGLOBIN GLYCOSYLATED A1C: CPT | Performed by: NURSE PRACTITIONER

## 2022-01-01 PROCEDURE — 87088 URINE BACTERIA CULTURE: CPT | Performed by: INTERNAL MEDICINE

## 2022-01-01 PROCEDURE — 51702 INSERT TEMP BLADDER CATH: CPT

## 2022-01-01 PROCEDURE — 81001 URINALYSIS AUTO W/SCOPE: CPT | Performed by: PHYSICIAN ASSISTANT

## 2022-01-01 PROCEDURE — 82550 ASSAY OF CK (CPK): CPT | Performed by: INTERNAL MEDICINE

## 2022-01-01 PROCEDURE — 85610 PROTHROMBIN TIME: CPT | Performed by: PHYSICIAN ASSISTANT

## 2022-01-01 PROCEDURE — 255N000002 HC RX 255 OP 636: Performed by: RADIOLOGY

## 2022-01-01 PROCEDURE — 97166 OT EVAL MOD COMPLEX 45 MIN: CPT | Mod: GO

## 2022-01-01 PROCEDURE — 97535 SELF CARE MNGMENT TRAINING: CPT | Mod: GO | Performed by: OCCUPATIONAL THERAPIST

## 2022-01-01 PROCEDURE — 83036 HEMOGLOBIN GLYCOSYLATED A1C: CPT | Performed by: INTERNAL MEDICINE

## 2022-01-01 PROCEDURE — 87040 BLOOD CULTURE FOR BACTERIA: CPT | Performed by: NURSE PRACTITIONER

## 2022-01-01 PROCEDURE — 710N000010 HC RECOVERY PHASE 1, LEVEL 2, PER MIN: Performed by: SURGERY

## 2022-01-01 PROCEDURE — 82310 ASSAY OF CALCIUM: CPT | Mod: ZL | Performed by: FAMILY MEDICINE

## 2022-01-01 PROCEDURE — 71046 X-RAY EXAM CHEST 2 VIEWS: CPT

## 2022-01-01 PROCEDURE — 99238 HOSP IP/OBS DSCHRG MGMT 30/<: CPT | Performed by: FAMILY MEDICINE

## 2022-01-01 PROCEDURE — 86140 C-REACTIVE PROTEIN: CPT | Performed by: PHYSICIAN ASSISTANT

## 2022-01-01 PROCEDURE — 92611 MOTION FLUOROSCOPY/SWALLOW: CPT | Mod: GN

## 2022-01-01 PROCEDURE — 97161 PT EVAL LOW COMPLEX 20 MIN: CPT | Mod: GP

## 2022-01-01 PROCEDURE — 85730 THROMBOPLASTIN TIME PARTIAL: CPT | Performed by: EMERGENCY MEDICINE

## 2022-01-01 PROCEDURE — G0426 INPT/ED TELECONSULT50: HCPCS | Mod: G0 | Performed by: PHYSICIAN ASSISTANT

## 2022-01-01 PROCEDURE — 85041 AUTOMATED RBC COUNT: CPT | Performed by: STUDENT IN AN ORGANIZED HEALTH CARE EDUCATION/TRAINING PROGRAM

## 2022-01-01 PROCEDURE — 97162 PT EVAL MOD COMPLEX 30 MIN: CPT | Mod: GP | Performed by: PHYSICAL THERAPIST

## 2022-01-01 PROCEDURE — 80053 COMPREHEN METABOLIC PANEL: CPT | Performed by: PHYSICIAN ASSISTANT

## 2022-01-01 PROCEDURE — 84145 PROCALCITONIN (PCT): CPT | Performed by: PHYSICIAN ASSISTANT

## 2022-01-01 PROCEDURE — 96361 HYDRATE IV INFUSION ADD-ON: CPT

## 2022-01-01 PROCEDURE — 71250 CT THORAX DX C-: CPT

## 2022-01-01 PROCEDURE — 360N000075 HC SURGERY LEVEL 2, PER MIN: Performed by: SURGERY

## 2022-01-01 PROCEDURE — 99285 EMERGENCY DEPT VISIT HI MDM: CPT | Performed by: FAMILY MEDICINE

## 2022-01-01 PROCEDURE — 99222 1ST HOSP IP/OBS MODERATE 55: CPT | Mod: AI | Performed by: INTERNAL MEDICINE

## 2022-01-01 PROCEDURE — 99495 TRANSJ CARE MGMT MOD F2F 14D: CPT | Performed by: FAMILY MEDICINE

## 2022-01-01 PROCEDURE — 81001 URINALYSIS AUTO W/SCOPE: CPT | Performed by: INTERNAL MEDICINE

## 2022-01-01 PROCEDURE — 85027 COMPLETE CBC AUTOMATED: CPT | Performed by: NURSE PRACTITIONER

## 2022-01-01 PROCEDURE — 85041 AUTOMATED RBC COUNT: CPT | Performed by: INTERNAL MEDICINE

## 2022-01-01 PROCEDURE — 85025 COMPLETE CBC W/AUTO DIFF WBC: CPT | Performed by: HOSPITALIST

## 2022-01-01 PROCEDURE — 99285 EMERGENCY DEPT VISIT HI MDM: CPT | Mod: 25 | Performed by: PHYSICIAN ASSISTANT

## 2022-01-01 PROCEDURE — 250N000009 HC RX 250: Performed by: SURGERY

## 2022-01-01 PROCEDURE — 84484 ASSAY OF TROPONIN QUANT: CPT | Performed by: INTERNAL MEDICINE

## 2022-01-01 PROCEDURE — 370N000017 HC ANESTHESIA TECHNICAL FEE, PER MIN: Performed by: SURGERY

## 2022-01-01 PROCEDURE — 82374 ASSAY BLOOD CARBON DIOXIDE: CPT | Performed by: INTERNAL MEDICINE

## 2022-01-01 PROCEDURE — 36415 COLL VENOUS BLD VENIPUNCTURE: CPT | Performed by: NURSE PRACTITIONER

## 2022-01-01 PROCEDURE — 85652 RBC SED RATE AUTOMATED: CPT | Performed by: PHYSICIAN ASSISTANT

## 2022-01-01 PROCEDURE — 87637 SARSCOV2&INF A&B&RSV AMP PRB: CPT | Performed by: PHYSICIAN ASSISTANT

## 2022-01-01 PROCEDURE — 96365 THER/PROPH/DIAG IV INF INIT: CPT | Performed by: PHYSICIAN ASSISTANT

## 2022-01-01 PROCEDURE — 43246 EGD PLACE GASTROSTOMY TUBE: CPT | Performed by: SURGERY

## 2022-01-01 PROCEDURE — 96376 TX/PRO/DX INJ SAME DRUG ADON: CPT | Performed by: PHYSICIAN ASSISTANT

## 2022-01-01 PROCEDURE — 83605 ASSAY OF LACTIC ACID: CPT | Mod: 91 | Performed by: EMERGENCY MEDICINE

## 2022-01-01 PROCEDURE — 85014 HEMATOCRIT: CPT | Performed by: STUDENT IN AN ORGANIZED HEALTH CARE EDUCATION/TRAINING PROGRAM

## 2022-01-01 PROCEDURE — 83935 ASSAY OF URINE OSMOLALITY: CPT | Performed by: INTERNAL MEDICINE

## 2022-01-01 PROCEDURE — C9803 HOPD COVID-19 SPEC COLLECT: HCPCS | Performed by: PHYSICIAN ASSISTANT

## 2022-01-01 PROCEDURE — 70498 CT ANGIOGRAPHY NECK: CPT

## 2022-01-01 PROCEDURE — 80048 BASIC METABOLIC PNL TOTAL CA: CPT | Performed by: STUDENT IN AN ORGANIZED HEALTH CARE EDUCATION/TRAINING PROGRAM

## 2022-01-01 PROCEDURE — 84484 ASSAY OF TROPONIN QUANT: CPT | Performed by: EMERGENCY MEDICINE

## 2022-01-01 PROCEDURE — 86901 BLOOD TYPING SEROLOGIC RH(D): CPT | Performed by: INTERNAL MEDICINE

## 2022-01-01 PROCEDURE — 99284 EMERGENCY DEPT VISIT MOD MDM: CPT | Performed by: INTERNAL MEDICINE

## 2022-01-01 PROCEDURE — 80053 COMPREHEN METABOLIC PANEL: CPT | Performed by: EMERGENCY MEDICINE

## 2022-01-01 PROCEDURE — 70551 MRI BRAIN STEM W/O DYE: CPT

## 2022-01-01 PROCEDURE — 82310 ASSAY OF CALCIUM: CPT | Performed by: NURSE PRACTITIONER

## 2022-01-01 PROCEDURE — 80048 BASIC METABOLIC PNL TOTAL CA: CPT | Performed by: NURSE PRACTITIONER

## 2022-01-01 PROCEDURE — 83930 ASSAY OF BLOOD OSMOLALITY: CPT | Performed by: INTERNAL MEDICINE

## 2022-01-01 PROCEDURE — 96372 THER/PROPH/DIAG INJ SC/IM: CPT | Performed by: INTERNAL MEDICINE

## 2022-01-01 PROCEDURE — 84300 ASSAY OF URINE SODIUM: CPT | Performed by: INTERNAL MEDICINE

## 2022-01-01 PROCEDURE — 83880 ASSAY OF NATRIURETIC PEPTIDE: CPT | Performed by: FAMILY MEDICINE

## 2022-01-01 PROCEDURE — 96375 TX/PRO/DX INJ NEW DRUG ADDON: CPT | Performed by: PHYSICIAN ASSISTANT

## 2022-01-01 PROCEDURE — 83735 ASSAY OF MAGNESIUM: CPT | Performed by: EMERGENCY MEDICINE

## 2022-01-01 PROCEDURE — 82248 BILIRUBIN DIRECT: CPT | Performed by: INTERNAL MEDICINE

## 2022-01-01 PROCEDURE — 76377 3D RENDER W/INTRP POSTPROCES: CPT

## 2022-01-01 PROCEDURE — G0463 HOSPITAL OUTPT CLINIC VISIT: HCPCS

## 2022-01-01 PROCEDURE — 0DH63UZ INSERTION OF FEEDING DEVICE INTO STOMACH, PERCUTANEOUS APPROACH: ICD-10-PCS | Performed by: SURGERY

## 2022-01-01 PROCEDURE — 250N000013 HC RX MED GY IP 250 OP 250 PS 637: Performed by: STUDENT IN AN ORGANIZED HEALTH CARE EDUCATION/TRAINING PROGRAM

## 2022-01-01 PROCEDURE — 258N000003 HC RX IP 258 OP 636: Performed by: NURSE ANESTHETIST, CERTIFIED REGISTERED

## 2022-01-01 PROCEDURE — 81001 URINALYSIS AUTO W/SCOPE: CPT | Performed by: EMERGENCY MEDICINE

## 2022-01-01 PROCEDURE — 93306 TTE W/DOPPLER COMPLETE: CPT | Mod: 26 | Performed by: STUDENT IN AN ORGANIZED HEALTH CARE EDUCATION/TRAINING PROGRAM

## 2022-01-01 PROCEDURE — 80061 LIPID PANEL: CPT | Performed by: NURSE PRACTITIONER

## 2022-01-01 PROCEDURE — 97530 THERAPEUTIC ACTIVITIES: CPT | Mod: GP,CQ

## 2022-01-01 PROCEDURE — 70496 CT ANGIOGRAPHY HEAD: CPT

## 2022-01-01 PROCEDURE — 99239 HOSP IP/OBS DSCHRG MGMT >30: CPT | Performed by: INTERNAL MEDICINE

## 2022-01-01 PROCEDURE — 99285 EMERGENCY DEPT VISIT HI MDM: CPT | Performed by: PHYSICIAN ASSISTANT

## 2022-01-01 PROCEDURE — 82570 ASSAY OF URINE CREATININE: CPT | Performed by: INTERNAL MEDICINE

## 2022-01-01 PROCEDURE — 87040 BLOOD CULTURE FOR BACTERIA: CPT | Performed by: PHYSICIAN ASSISTANT

## 2022-01-01 PROCEDURE — 83880 ASSAY OF NATRIURETIC PEPTIDE: CPT | Performed by: PHYSICIAN ASSISTANT

## 2022-01-01 PROCEDURE — 250N000011 HC RX IP 250 OP 636: Performed by: HOSPITALIST

## 2022-01-01 PROCEDURE — 81003 URINALYSIS AUTO W/O SCOPE: CPT | Performed by: STUDENT IN AN ORGANIZED HEALTH CARE EDUCATION/TRAINING PROGRAM

## 2022-01-01 PROCEDURE — 36415 COLL VENOUS BLD VENIPUNCTURE: CPT | Mod: ZL | Performed by: FAMILY MEDICINE

## 2022-01-01 PROCEDURE — 85730 THROMBOPLASTIN TIME PARTIAL: CPT | Mod: 91 | Performed by: EMERGENCY MEDICINE

## 2022-01-01 PROCEDURE — 84484 ASSAY OF TROPONIN QUANT: CPT | Performed by: FAMILY MEDICINE

## 2022-01-01 PROCEDURE — 83880 ASSAY OF NATRIURETIC PEPTIDE: CPT | Performed by: INTERNAL MEDICINE

## 2022-01-01 PROCEDURE — 80061 LIPID PANEL: CPT | Performed by: INTERNAL MEDICINE

## 2022-01-01 PROCEDURE — 272N000001 HC OR GENERAL SUPPLY STERILE: Performed by: SURGERY

## 2022-01-01 PROCEDURE — 86850 RBC ANTIBODY SCREEN: CPT | Performed by: INTERNAL MEDICINE

## 2022-01-01 PROCEDURE — 258N000001 HC RX 258: Performed by: INTERNAL MEDICINE

## 2022-01-01 PROCEDURE — 43246 EGD PLACE GASTROSTOMY TUBE: CPT | Performed by: NURSE ANESTHETIST, CERTIFIED REGISTERED

## 2022-01-01 PROCEDURE — 93306 TTE W/DOPPLER COMPLETE: CPT | Mod: 26 | Performed by: INTERNAL MEDICINE

## 2022-01-01 PROCEDURE — 97530 THERAPEUTIC ACTIVITIES: CPT | Mod: GO | Performed by: OCCUPATIONAL THERAPIST

## 2022-01-01 PROCEDURE — 80048 BASIC METABOLIC PNL TOTAL CA: CPT | Performed by: EMERGENCY MEDICINE

## 2022-01-01 PROCEDURE — 97165 OT EVAL LOW COMPLEX 30 MIN: CPT | Mod: GO | Performed by: OCCUPATIONAL THERAPIST

## 2022-01-01 PROCEDURE — 99223 1ST HOSP IP/OBS HIGH 75: CPT | Mod: AI | Performed by: INTERNAL MEDICINE

## 2022-01-01 PROCEDURE — 85025 COMPLETE CBC W/AUTO DIFF WBC: CPT | Performed by: FAMILY MEDICINE

## 2022-01-01 PROCEDURE — 250N000013 HC RX MED GY IP 250 OP 250 PS 637: Performed by: NURSE PRACTITIONER

## 2022-01-01 PROCEDURE — 85025 COMPLETE CBC W/AUTO DIFF WBC: CPT | Performed by: STUDENT IN AN ORGANIZED HEALTH CARE EDUCATION/TRAINING PROGRAM

## 2022-01-01 PROCEDURE — 250N000011 HC RX IP 250 OP 636: Performed by: EMERGENCY MEDICINE

## 2022-01-01 PROCEDURE — 74177 CT ABD & PELVIS W/CONTRAST: CPT

## 2022-01-01 PROCEDURE — U0005 INFEC AGEN DETEC AMPLI PROBE: HCPCS | Performed by: FAMILY MEDICINE

## 2022-01-01 RX ORDER — ENOXAPARIN SODIUM 100 MG/ML
30 INJECTION SUBCUTANEOUS EVERY 24 HOURS
Status: DISCONTINUED | OUTPATIENT
Start: 2022-01-01 | End: 2022-01-01

## 2022-01-01 RX ORDER — ATORVASTATIN CALCIUM 40 MG/1
40 TABLET, FILM COATED ORAL AT BEDTIME
Status: DISCONTINUED | OUTPATIENT
Start: 2022-01-01 | End: 2022-01-01 | Stop reason: HOSPADM

## 2022-01-01 RX ORDER — TAMSULOSIN HYDROCHLORIDE 0.4 MG/1
0.4 CAPSULE ORAL DAILY
Status: DISCONTINUED | OUTPATIENT
Start: 2022-01-01 | End: 2022-01-01 | Stop reason: HOSPADM

## 2022-01-01 RX ORDER — SODIUM CHLORIDE 9 MG/ML
INJECTION, SOLUTION INTRAVENOUS CONTINUOUS
Status: DISCONTINUED | OUTPATIENT
Start: 2022-01-01 | End: 2022-01-01 | Stop reason: HOSPADM

## 2022-01-01 RX ORDER — CEFTRIAXONE SODIUM 1 G/50ML
1 INJECTION, SOLUTION INTRAVENOUS EVERY 24 HOURS
Status: DISCONTINUED | OUTPATIENT
Start: 2022-01-01 | End: 2022-01-01

## 2022-01-01 RX ORDER — ACETAMINOPHEN 650 MG/1
650 SUPPOSITORY RECTAL EVERY 6 HOURS PRN
Status: DISCONTINUED | OUTPATIENT
Start: 2022-01-01 | End: 2022-01-01

## 2022-01-01 RX ORDER — ACETAMINOPHEN 650 MG/1
650 SUPPOSITORY RECTAL EVERY 6 HOURS PRN
Status: DISCONTINUED | OUTPATIENT
Start: 2022-01-01 | End: 2022-01-01 | Stop reason: HOSPADM

## 2022-01-01 RX ORDER — DOXYCYCLINE 100 MG/1
100 CAPSULE ORAL EVERY 12 HOURS SCHEDULED
Status: DISCONTINUED | OUTPATIENT
Start: 2022-01-01 | End: 2022-01-01

## 2022-01-01 RX ORDER — SODIUM CHLORIDE, SODIUM LACTATE, POTASSIUM CHLORIDE, CALCIUM CHLORIDE 600; 310; 30; 20 MG/100ML; MG/100ML; MG/100ML; MG/100ML
INJECTION, SOLUTION INTRAVENOUS CONTINUOUS PRN
Status: DISCONTINUED | OUTPATIENT
Start: 2022-01-01 | End: 2022-01-01

## 2022-01-01 RX ORDER — CARBOXYMETHYLCELLULOSE SODIUM 5 MG/ML
1-2 SOLUTION/ DROPS OPHTHALMIC
Status: DISCONTINUED | OUTPATIENT
Start: 2022-01-01 | End: 2022-01-01

## 2022-01-01 RX ORDER — HYDRALAZINE HYDROCHLORIDE 20 MG/ML
10-20 INJECTION INTRAMUSCULAR; INTRAVENOUS
Status: DISCONTINUED | OUTPATIENT
Start: 2022-01-01 | End: 2022-01-01 | Stop reason: HOSPADM

## 2022-01-01 RX ORDER — CLOPIDOGREL BISULFATE 75 MG/1
75 TABLET ORAL DAILY
Status: DISCONTINUED | OUTPATIENT
Start: 2022-01-01 | End: 2022-01-01

## 2022-01-01 RX ORDER — GADOBUTROL 604.72 MG/ML
2 INJECTION INTRAVENOUS ONCE
Status: COMPLETED | OUTPATIENT
Start: 2022-01-01 | End: 2022-01-01

## 2022-01-01 RX ORDER — MORPHINE SULFATE 10 MG/5ML
5 SOLUTION ORAL
Status: DISCONTINUED | OUTPATIENT
Start: 2022-01-01 | End: 2022-01-01

## 2022-01-01 RX ORDER — LIDOCAINE HYDROCHLORIDE 20 MG/ML
INJECTION, SOLUTION INFILTRATION; PERINEURAL PRN
Status: DISCONTINUED | OUTPATIENT
Start: 2022-01-01 | End: 2022-01-01

## 2022-01-01 RX ORDER — NALOXONE HYDROCHLORIDE 0.4 MG/ML
0.2 INJECTION, SOLUTION INTRAMUSCULAR; INTRAVENOUS; SUBCUTANEOUS
Status: DISCONTINUED | OUTPATIENT
Start: 2022-01-01 | End: 2022-01-01

## 2022-01-01 RX ORDER — NALOXONE HYDROCHLORIDE 0.4 MG/ML
0.2 INJECTION, SOLUTION INTRAMUSCULAR; INTRAVENOUS; SUBCUTANEOUS
Status: DISCONTINUED | OUTPATIENT
Start: 2022-01-01 | End: 2022-01-01 | Stop reason: HOSPADM

## 2022-01-01 RX ORDER — IPRATROPIUM BROMIDE AND ALBUTEROL SULFATE 2.5; .5 MG/3ML; MG/3ML
3 SOLUTION RESPIRATORY (INHALATION)
Status: DISCONTINUED | OUTPATIENT
Start: 2022-01-01 | End: 2022-01-01

## 2022-01-01 RX ORDER — FAMOTIDINE 20 MG/1
20 TABLET, FILM COATED ORAL DAILY
Status: DISCONTINUED | OUTPATIENT
Start: 2022-01-01 | End: 2022-01-01 | Stop reason: HOSPADM

## 2022-01-01 RX ORDER — LIDOCAINE 40 MG/G
CREAM TOPICAL
Status: DISCONTINUED | OUTPATIENT
Start: 2022-01-01 | End: 2022-01-01

## 2022-01-01 RX ORDER — SODIUM CHLORIDE 9 MG/ML
INJECTION, SOLUTION INTRAVENOUS CONTINUOUS
Status: DISCONTINUED | OUTPATIENT
Start: 2022-01-01 | End: 2022-01-01

## 2022-01-01 RX ORDER — SODIUM CHLORIDE 9 MG/ML
INJECTION, SOLUTION INTRAVENOUS CONTINUOUS PRN
Status: DISCONTINUED | OUTPATIENT
Start: 2022-01-01 | End: 2022-01-01 | Stop reason: HOSPADM

## 2022-01-01 RX ORDER — AMLODIPINE BESYLATE 5 MG/1
5 TABLET ORAL DAILY
Status: DISCONTINUED | OUTPATIENT
Start: 2022-01-01 | End: 2022-01-01 | Stop reason: HOSPADM

## 2022-01-01 RX ORDER — NALOXONE HYDROCHLORIDE 0.4 MG/ML
0.4 INJECTION, SOLUTION INTRAMUSCULAR; INTRAVENOUS; SUBCUTANEOUS
Status: DISCONTINUED | OUTPATIENT
Start: 2022-01-01 | End: 2022-01-01 | Stop reason: DRUGHIGH

## 2022-01-01 RX ORDER — IPRATROPIUM BROMIDE AND ALBUTEROL SULFATE 2.5; .5 MG/3ML; MG/3ML
3 SOLUTION RESPIRATORY (INHALATION) 4 TIMES DAILY PRN
Status: DISCONTINUED | OUTPATIENT
Start: 2022-01-01 | End: 2022-01-01 | Stop reason: HOSPADM

## 2022-01-01 RX ORDER — NITROGLYCERIN 0.4 MG/1
0.4 TABLET SUBLINGUAL EVERY 5 MIN PRN
COMMUNITY

## 2022-01-01 RX ORDER — ONDANSETRON 2 MG/ML
4 INJECTION INTRAMUSCULAR; INTRAVENOUS EVERY 6 HOURS PRN
Status: DISCONTINUED | OUTPATIENT
Start: 2022-01-01 | End: 2022-01-01 | Stop reason: HOSPADM

## 2022-01-01 RX ORDER — ASPIRIN 81 MG/1
324 TABLET, CHEWABLE ORAL DAILY
Status: DISCONTINUED | OUTPATIENT
Start: 2022-01-01 | End: 2022-01-01

## 2022-01-01 RX ORDER — ALBUTEROL SULFATE 0.83 MG/ML
2.5 SOLUTION RESPIRATORY (INHALATION) EVERY 4 HOURS PRN
Status: DISCONTINUED | OUTPATIENT
Start: 2022-01-01 | End: 2022-01-01 | Stop reason: HOSPADM

## 2022-01-01 RX ORDER — LISINOPRIL 2.5 MG/1
2.5 TABLET ORAL DAILY
Status: DISCONTINUED | OUTPATIENT
Start: 2022-01-01 | End: 2022-01-01

## 2022-01-01 RX ORDER — ASPIRIN 81 MG/1
81 TABLET, CHEWABLE ORAL DAILY
Status: DISCONTINUED | OUTPATIENT
Start: 2022-01-01 | End: 2022-01-01

## 2022-01-01 RX ORDER — CEFTRIAXONE SODIUM 1 G/50ML
1 INJECTION, SOLUTION INTRAVENOUS ONCE
Status: COMPLETED | OUTPATIENT
Start: 2022-01-01 | End: 2022-01-01

## 2022-01-01 RX ORDER — NALOXONE HYDROCHLORIDE 0.4 MG/ML
0.2 INJECTION, SOLUTION INTRAMUSCULAR; INTRAVENOUS; SUBCUTANEOUS
Status: DISCONTINUED | OUTPATIENT
Start: 2022-01-01 | End: 2022-01-01 | Stop reason: DRUGHIGH

## 2022-01-01 RX ORDER — ONDANSETRON 4 MG/1
4 TABLET, ORALLY DISINTEGRATING ORAL EVERY 30 MIN PRN
Status: DISCONTINUED | OUTPATIENT
Start: 2022-01-01 | End: 2022-01-01 | Stop reason: HOSPADM

## 2022-01-01 RX ORDER — ALBUTEROL SULFATE 90 UG/1
2 AEROSOL, METERED RESPIRATORY (INHALATION)
Status: DISCONTINUED | OUTPATIENT
Start: 2022-01-01 | End: 2022-01-01

## 2022-01-01 RX ORDER — METRONIDAZOLE 500 MG/100ML
500 INJECTION, SOLUTION INTRAVENOUS EVERY 12 HOURS
Status: DISCONTINUED | OUTPATIENT
Start: 2022-01-01 | End: 2022-01-01

## 2022-01-01 RX ORDER — ACETAMINOPHEN 325 MG/1
975 TABLET ORAL ONCE
Status: COMPLETED | OUTPATIENT
Start: 2022-01-01 | End: 2022-01-01

## 2022-01-01 RX ORDER — MORPHINE SULFATE 20 MG/ML
10 SOLUTION ORAL
Status: DISCONTINUED | OUTPATIENT
Start: 2022-01-01 | End: 2022-01-01 | Stop reason: HOSPADM

## 2022-01-01 RX ORDER — IOPAMIDOL 755 MG/ML
50 INJECTION, SOLUTION INTRAVASCULAR ONCE
Status: COMPLETED | OUTPATIENT
Start: 2022-01-01 | End: 2022-01-01

## 2022-01-01 RX ORDER — NICOTINE POLACRILEX 4 MG
15-30 LOZENGE BUCCAL
Status: DISCONTINUED | OUTPATIENT
Start: 2022-01-01 | End: 2022-01-01

## 2022-01-01 RX ORDER — PROPOFOL 10 MG/ML
INJECTION, EMULSION INTRAVENOUS PRN
Status: DISCONTINUED | OUTPATIENT
Start: 2022-01-01 | End: 2022-01-01

## 2022-01-01 RX ORDER — LORAZEPAM 2 MG/ML
1 INJECTION INTRAMUSCULAR
Status: DISCONTINUED | OUTPATIENT
Start: 2022-01-01 | End: 2022-01-01 | Stop reason: HOSPADM

## 2022-01-01 RX ORDER — DEXTROSE MONOHYDRATE 25 G/50ML
25-50 INJECTION, SOLUTION INTRAVENOUS
Status: DISCONTINUED | OUTPATIENT
Start: 2022-01-01 | End: 2022-01-01

## 2022-01-01 RX ORDER — GLYCOPYRROLATE 1 MG/1
2 TABLET ORAL EVERY 4 HOURS PRN
Status: DISCONTINUED | OUTPATIENT
Start: 2022-01-01 | End: 2022-01-01 | Stop reason: HOSPADM

## 2022-01-01 RX ORDER — ONDANSETRON 2 MG/ML
4 INJECTION INTRAMUSCULAR; INTRAVENOUS ONCE
Status: COMPLETED | OUTPATIENT
Start: 2022-01-01 | End: 2022-01-01

## 2022-01-01 RX ORDER — ASPIRIN 300 MG/1
300 SUPPOSITORY RECTAL DAILY
Status: DISCONTINUED | OUTPATIENT
Start: 2022-01-01 | End: 2022-01-01

## 2022-01-01 RX ORDER — ECHINACEA PURPUREA EXTRACT 125 MG
1 TABLET ORAL
Status: DISCONTINUED | OUTPATIENT
Start: 2022-01-01 | End: 2022-01-01 | Stop reason: HOSPADM

## 2022-01-01 RX ORDER — NALOXONE HYDROCHLORIDE 0.4 MG/ML
0.1 INJECTION, SOLUTION INTRAMUSCULAR; INTRAVENOUS; SUBCUTANEOUS
Status: DISCONTINUED | OUTPATIENT
Start: 2022-01-01 | End: 2022-01-01 | Stop reason: HOSPADM

## 2022-01-01 RX ORDER — BARIUM SULFATE 400 MG/ML
SUSPENSION ORAL ONCE
Status: COMPLETED | OUTPATIENT
Start: 2022-01-01 | End: 2022-01-01

## 2022-01-01 RX ORDER — ACETAMINOPHEN 325 MG/1
650 TABLET ORAL EVERY 6 HOURS PRN
Status: DISCONTINUED | OUTPATIENT
Start: 2022-01-01 | End: 2022-01-01 | Stop reason: HOSPADM

## 2022-01-01 RX ORDER — AZITHROMYCIN 250 MG/1
250 TABLET, FILM COATED ORAL DAILY
Qty: 3 TABLET | Refills: 0 | Status: SHIPPED | OUTPATIENT
Start: 2022-01-01 | End: 2022-01-01

## 2022-01-01 RX ORDER — MAGNESIUM HYDROXIDE/ALUMINUM HYDROXICE/SIMETHICONE 120; 1200; 1200 MG/30ML; MG/30ML; MG/30ML
30 SUSPENSION ORAL ONCE
Status: DISCONTINUED | OUTPATIENT
Start: 2022-01-01 | End: 2022-01-01

## 2022-01-01 RX ORDER — ASPIRIN 81 MG/1
81 TABLET, CHEWABLE ORAL DAILY
Status: DISCONTINUED | OUTPATIENT
Start: 2022-01-01 | End: 2022-01-01 | Stop reason: HOSPADM

## 2022-01-01 RX ORDER — SODIUM CHLORIDE, SODIUM LACTATE, POTASSIUM CHLORIDE, CALCIUM CHLORIDE 600; 310; 30; 20 MG/100ML; MG/100ML; MG/100ML; MG/100ML
INJECTION, SOLUTION INTRAVENOUS CONTINUOUS
Status: DISCONTINUED | OUTPATIENT
Start: 2022-01-01 | End: 2022-01-01 | Stop reason: HOSPADM

## 2022-01-01 RX ORDER — FEEDER CONTAINER WITH PUMP SET
1 EACH MISCELLANEOUS
Status: DISCONTINUED | OUTPATIENT
Start: 2022-01-01 | End: 2022-01-01

## 2022-01-01 RX ORDER — ATROPINE SULFATE 10 MG/ML
2 SOLUTION/ DROPS OPHTHALMIC EVERY 4 HOURS PRN
Status: DISCONTINUED | OUTPATIENT
Start: 2022-01-01 | End: 2022-01-01 | Stop reason: HOSPADM

## 2022-01-01 RX ORDER — MORPHINE SULFATE 10 MG/5ML
10 SOLUTION ORAL
Status: DISCONTINUED | OUTPATIENT
Start: 2022-01-01 | End: 2022-01-01 | Stop reason: HOSPADM

## 2022-01-01 RX ORDER — OLANZAPINE 5 MG/1
5 TABLET ORAL ONCE
Status: DISCONTINUED | OUTPATIENT
Start: 2022-01-01 | End: 2022-01-01

## 2022-01-01 RX ORDER — ATORVASTATIN CALCIUM 40 MG/1
40 TABLET, FILM COATED ORAL DAILY
COMMUNITY

## 2022-01-01 RX ORDER — ASPIRIN 325 MG
325 TABLET ORAL ONCE
Status: COMPLETED | OUTPATIENT
Start: 2022-01-01 | End: 2022-01-01

## 2022-01-01 RX ORDER — IOPAMIDOL 755 MG/ML
100 INJECTION, SOLUTION INTRAVASCULAR ONCE
Status: COMPLETED | OUTPATIENT
Start: 2022-01-01 | End: 2022-01-01

## 2022-01-01 RX ORDER — MORPHINE SULFATE 20 MG/ML
5 SOLUTION ORAL
Status: DISCONTINUED | OUTPATIENT
Start: 2022-01-01 | End: 2022-01-01

## 2022-01-01 RX ORDER — LABETALOL HYDROCHLORIDE 5 MG/ML
10-20 INJECTION, SOLUTION INTRAVENOUS EVERY 10 MIN PRN
Status: DISCONTINUED | OUTPATIENT
Start: 2022-01-01 | End: 2022-01-01 | Stop reason: HOSPADM

## 2022-01-01 RX ORDER — ASPIRIN 81 MG/1
81 TABLET, CHEWABLE ORAL DAILY
Qty: 90 TABLET | Refills: 3 | Status: SHIPPED | OUTPATIENT
Start: 2022-01-01 | End: 2022-01-01

## 2022-01-01 RX ORDER — HYDROMORPHONE HYDROCHLORIDE 1 MG/ML
0.2 INJECTION, SOLUTION INTRAMUSCULAR; INTRAVENOUS; SUBCUTANEOUS EVERY 5 MIN PRN
Status: DISCONTINUED | OUTPATIENT
Start: 2022-01-01 | End: 2022-01-01 | Stop reason: HOSPADM

## 2022-01-01 RX ORDER — LORAZEPAM 2 MG/ML
0.5 INJECTION INTRAMUSCULAR ONCE
Status: COMPLETED | OUTPATIENT
Start: 2022-01-01 | End: 2022-01-01

## 2022-01-01 RX ORDER — ATROPINE SULFATE 10 MG/ML
2 SOLUTION/ DROPS OPHTHALMIC EVERY 4 HOURS PRN
Status: DISCONTINUED | OUTPATIENT
Start: 2022-01-01 | End: 2022-01-01

## 2022-01-01 RX ORDER — DEXTROSE MONOHYDRATE 25 G/50ML
25-50 INJECTION, SOLUTION INTRAVENOUS
Status: DISCONTINUED | OUTPATIENT
Start: 2022-01-01 | End: 2022-01-01 | Stop reason: HOSPADM

## 2022-01-01 RX ORDER — MINERAL OIL/HYDROPHIL PETROLAT
OINTMENT (GRAM) TOPICAL
Status: DISCONTINUED | OUTPATIENT
Start: 2022-01-01 | End: 2022-01-01 | Stop reason: HOSPADM

## 2022-01-01 RX ORDER — ONDANSETRON 2 MG/ML
4 INJECTION INTRAMUSCULAR; INTRAVENOUS EVERY 30 MIN PRN
Status: DISCONTINUED | OUTPATIENT
Start: 2022-01-01 | End: 2022-01-01 | Stop reason: HOSPADM

## 2022-01-01 RX ORDER — HYDROMORPHONE HYDROCHLORIDE 1 MG/ML
0.4 INJECTION, SOLUTION INTRAMUSCULAR; INTRAVENOUS; SUBCUTANEOUS EVERY 5 MIN PRN
Status: DISCONTINUED | OUTPATIENT
Start: 2022-01-01 | End: 2022-01-01 | Stop reason: HOSPADM

## 2022-01-01 RX ORDER — CLOPIDOGREL BISULFATE 75 MG/1
75 TABLET ORAL DAILY
Qty: 19 TABLET | Refills: 0 | Status: SHIPPED | OUTPATIENT
Start: 2022-01-01 | End: 2022-01-01

## 2022-01-01 RX ORDER — AZITHROMYCIN 500 MG/5ML
500 INJECTION, POWDER, LYOPHILIZED, FOR SOLUTION INTRAVENOUS ONCE
Status: COMPLETED | OUTPATIENT
Start: 2022-01-01 | End: 2022-01-01

## 2022-01-01 RX ORDER — PROCHLORPERAZINE MALEATE 5 MG
5 TABLET ORAL EVERY 6 HOURS PRN
Status: DISCONTINUED | OUTPATIENT
Start: 2022-01-01 | End: 2022-01-01 | Stop reason: HOSPADM

## 2022-01-01 RX ORDER — MORPHINE SULFATE 2 MG/ML
1 INJECTION, SOLUTION INTRAMUSCULAR; INTRAVENOUS
Status: DISCONTINUED | OUTPATIENT
Start: 2022-01-01 | End: 2022-01-01 | Stop reason: HOSPADM

## 2022-01-01 RX ORDER — MORPHINE SULFATE 2 MG/ML
2 INJECTION, SOLUTION INTRAMUSCULAR; INTRAVENOUS
Status: DISCONTINUED | OUTPATIENT
Start: 2022-01-01 | End: 2022-01-01 | Stop reason: HOSPADM

## 2022-01-01 RX ORDER — FAMOTIDINE 20 MG/1
20 TABLET, FILM COATED ORAL 2 TIMES DAILY
Status: DISCONTINUED | OUTPATIENT
Start: 2022-01-01 | End: 2022-01-01 | Stop reason: DRUGHIGH

## 2022-01-01 RX ORDER — LISINOPRIL 2.5 MG/1
2.5 TABLET ORAL DAILY
COMMUNITY

## 2022-01-01 RX ORDER — POLYETHYLENE GLYCOL 3350 17 G/17G
17 POWDER, FOR SOLUTION ORAL 2 TIMES DAILY
Status: DISCONTINUED | OUTPATIENT
Start: 2022-01-01 | End: 2022-01-01

## 2022-01-01 RX ORDER — CARBOXYMETHYLCELLULOSE SODIUM 5 MG/ML
1-2 SOLUTION/ DROPS OPHTHALMIC
Status: DISCONTINUED | OUTPATIENT
Start: 2022-01-01 | End: 2022-01-01 | Stop reason: HOSPADM

## 2022-01-01 RX ORDER — LIDOCAINE HYDROCHLORIDE 20 MG/ML
5 JELLY TOPICAL
Status: DISCONTINUED | OUTPATIENT
Start: 2022-01-01 | End: 2022-01-01 | Stop reason: HOSPADM

## 2022-01-01 RX ORDER — LEVOFLOXACIN 5 MG/ML
500 INJECTION, SOLUTION INTRAVENOUS EVERY 24 HOURS
Status: DISCONTINUED | OUTPATIENT
Start: 2022-01-01 | End: 2022-01-01

## 2022-01-01 RX ORDER — CEFTRIAXONE SODIUM 1 G/50ML
1 INJECTION, SOLUTION INTRAVENOUS EVERY 24 HOURS
Status: DISCONTINUED | OUTPATIENT
Start: 2022-01-01 | End: 2022-01-01 | Stop reason: HOSPADM

## 2022-01-01 RX ORDER — AZITHROMYCIN 250 MG/1
250 TABLET, FILM COATED ORAL DAILY
Status: DISCONTINUED | OUTPATIENT
Start: 2022-01-01 | End: 2022-01-01 | Stop reason: HOSPADM

## 2022-01-01 RX ORDER — NALOXONE HYDROCHLORIDE 0.4 MG/ML
0.1 INJECTION, SOLUTION INTRAMUSCULAR; INTRAVENOUS; SUBCUTANEOUS
Status: DISCONTINUED | OUTPATIENT
Start: 2022-01-01 | End: 2022-01-01

## 2022-01-01 RX ORDER — IPRATROPIUM BROMIDE AND ALBUTEROL SULFATE 2.5; .5 MG/3ML; MG/3ML
3 SOLUTION RESPIRATORY (INHALATION) ONCE
Status: COMPLETED | OUTPATIENT
Start: 2022-01-01 | End: 2022-01-01

## 2022-01-01 RX ORDER — CEFUROXIME AXETIL 500 MG/1
500 TABLET ORAL 2 TIMES DAILY
Qty: 6 TABLET | Refills: 0 | Status: SHIPPED | OUTPATIENT
Start: 2022-01-01 | End: 2022-01-01

## 2022-01-01 RX ORDER — ONDANSETRON 4 MG/1
4 TABLET, ORALLY DISINTEGRATING ORAL EVERY 6 HOURS PRN
Status: DISCONTINUED | OUTPATIENT
Start: 2022-01-01 | End: 2022-01-01 | Stop reason: HOSPADM

## 2022-01-01 RX ORDER — CLOPIDOGREL BISULFATE 75 MG/1
75 TABLET ORAL DAILY
Status: DISCONTINUED | OUTPATIENT
Start: 2022-01-01 | End: 2022-01-01 | Stop reason: HOSPADM

## 2022-01-01 RX ORDER — MORPHINE SULFATE 4 MG/ML
2 INJECTION, SOLUTION INTRAMUSCULAR; INTRAVENOUS EVERY 4 HOURS PRN
Status: DISCONTINUED | OUTPATIENT
Start: 2022-01-01 | End: 2022-01-01 | Stop reason: DRUGHIGH

## 2022-01-01 RX ORDER — LIDOCAINE 40 MG/G
CREAM TOPICAL
Status: DISCONTINUED | OUTPATIENT
Start: 2022-01-01 | End: 2022-01-01 | Stop reason: HOSPADM

## 2022-01-01 RX ORDER — CLOPIDOGREL BISULFATE 75 MG/1
300 TABLET ORAL ONCE
Status: COMPLETED | OUTPATIENT
Start: 2022-01-01 | End: 2022-01-01

## 2022-01-01 RX ORDER — OLANZAPINE 5 MG/1
5 TABLET, ORALLY DISINTEGRATING ORAL AT BEDTIME
Status: DISCONTINUED | OUTPATIENT
Start: 2022-01-01 | End: 2022-01-01

## 2022-01-01 RX ORDER — PROCHLORPERAZINE 25 MG
12.5 SUPPOSITORY, RECTAL RECTAL EVERY 12 HOURS PRN
Status: DISCONTINUED | OUTPATIENT
Start: 2022-01-01 | End: 2022-01-01 | Stop reason: HOSPADM

## 2022-01-01 RX ORDER — OLANZAPINE 5 MG/1
5 TABLET, ORALLY DISINTEGRATING ORAL EVERY 6 HOURS PRN
Status: DISCONTINUED | OUTPATIENT
Start: 2022-01-01 | End: 2022-01-01 | Stop reason: HOSPADM

## 2022-01-01 RX ORDER — NICOTINE POLACRILEX 4 MG
15-30 LOZENGE BUCCAL
Status: DISCONTINUED | OUTPATIENT
Start: 2022-01-01 | End: 2022-01-01 | Stop reason: HOSPADM

## 2022-01-01 RX ORDER — LORAZEPAM 1 MG/1
1 TABLET ORAL
Status: DISCONTINUED | OUTPATIENT
Start: 2022-01-01 | End: 2022-01-01 | Stop reason: HOSPADM

## 2022-01-01 RX ORDER — IOPAMIDOL 755 MG/ML
84 INJECTION, SOLUTION INTRAVASCULAR ONCE
Status: COMPLETED | OUTPATIENT
Start: 2022-01-01 | End: 2022-01-01

## 2022-01-01 RX ORDER — ATORVASTATIN CALCIUM 40 MG/1
40 TABLET, FILM COATED ORAL AT BEDTIME
Qty: 90 TABLET | Refills: 3 | Status: SHIPPED | OUTPATIENT
Start: 2022-01-01 | End: 2022-01-01

## 2022-01-01 RX ORDER — FENTANYL CITRATE 50 UG/ML
25 INJECTION, SOLUTION INTRAMUSCULAR; INTRAVENOUS EVERY 5 MIN PRN
Status: DISCONTINUED | OUTPATIENT
Start: 2022-01-01 | End: 2022-01-01 | Stop reason: HOSPADM

## 2022-01-01 RX ORDER — SALIVA STIMULANT COMB. NO.3
2 SPRAY, NON-AEROSOL (ML) MUCOUS MEMBRANE
Status: DISCONTINUED | OUTPATIENT
Start: 2022-01-01 | End: 2022-01-01 | Stop reason: HOSPADM

## 2022-01-01 RX ORDER — FENTANYL CITRATE 50 UG/ML
50 INJECTION, SOLUTION INTRAMUSCULAR; INTRAVENOUS EVERY 5 MIN PRN
Status: DISCONTINUED | OUTPATIENT
Start: 2022-01-01 | End: 2022-01-01 | Stop reason: HOSPADM

## 2022-01-01 RX ORDER — ENOXAPARIN SODIUM 100 MG/ML
40 INJECTION SUBCUTANEOUS EVERY 24 HOURS
Status: DISCONTINUED | OUTPATIENT
Start: 2022-01-01 | End: 2022-01-01

## 2022-01-01 RX ORDER — DEXTROSE MONOHYDRATE 100 MG/ML
INJECTION, SOLUTION INTRAVENOUS CONTINUOUS PRN
Status: DISCONTINUED | OUTPATIENT
Start: 2022-01-01 | End: 2022-01-01

## 2022-01-01 RX ORDER — LABETALOL 20 MG/4 ML (5 MG/ML) INTRAVENOUS SYRINGE
10
Status: DISCONTINUED | OUTPATIENT
Start: 2022-01-01 | End: 2022-01-01 | Stop reason: HOSPADM

## 2022-01-01 RX ORDER — ATORVASTATIN CALCIUM 40 MG/1
40 TABLET, FILM COATED ORAL AT BEDTIME
Status: DISCONTINUED | OUTPATIENT
Start: 2022-01-01 | End: 2022-01-01

## 2022-01-01 RX ORDER — HEPARIN SODIUM 10000 [USP'U]/100ML
0-5000 INJECTION, SOLUTION INTRAVENOUS CONTINUOUS
Status: DISCONTINUED | OUTPATIENT
Start: 2022-01-01 | End: 2022-01-01 | Stop reason: HOSPADM

## 2022-01-01 RX ORDER — AMLODIPINE BESYLATE 2.5 MG/1
2.5 TABLET ORAL DAILY
Status: DISCONTINUED | OUTPATIENT
Start: 2022-01-01 | End: 2022-01-01

## 2022-01-01 RX ORDER — ASPIRIN 81 MG/1
81 TABLET ORAL DAILY
Status: DISCONTINUED | OUTPATIENT
Start: 2022-01-01 | End: 2022-01-01

## 2022-01-01 RX ORDER — ASPIRIN 81 MG/1
81 TABLET ORAL DAILY
COMMUNITY

## 2022-01-01 RX ADMIN — GADOBUTROL 2 ML: 604.72 INJECTION INTRAVENOUS at 12:21

## 2022-01-01 RX ADMIN — AMLODIPINE BESYLATE 2.5 MG: 2.5 TABLET ORAL at 08:58

## 2022-01-01 RX ADMIN — LIDOCAINE HYDROCHLORIDE 40 MG: 20 INJECTION, SOLUTION INFILTRATION; PERINEURAL at 10:41

## 2022-01-01 RX ADMIN — ATORVASTATIN CALCIUM 40 MG: 40 TABLET, FILM COATED ORAL at 22:20

## 2022-01-01 RX ADMIN — DICLOFENAC SODIUM 2 G: 10 GEL TOPICAL at 22:11

## 2022-01-01 RX ADMIN — LEVOFLOXACIN 500 MG: 5 INJECTION, SOLUTION INTRAVENOUS at 11:12

## 2022-01-01 RX ADMIN — SODIUM CHLORIDE: 9 INJECTION, SOLUTION INTRAVENOUS at 22:46

## 2022-01-01 RX ADMIN — CLOPIDOGREL BISULFATE 75 MG: 75 TABLET ORAL at 11:04

## 2022-01-01 RX ADMIN — AMLODIPINE BESYLATE 2.5 MG: 2.5 TABLET ORAL at 10:30

## 2022-01-01 RX ADMIN — ASPIRIN 81 MG: 81 TABLET, COATED ORAL at 09:05

## 2022-01-01 RX ADMIN — LISINOPRIL 2.5 MG: 2.5 TABLET ORAL at 10:06

## 2022-01-01 RX ADMIN — SODIUM CHLORIDE: 9 INJECTION, SOLUTION INTRAVENOUS at 15:23

## 2022-01-01 RX ADMIN — DICLOFENAC SODIUM 2 G: 10 GEL TOPICAL at 18:49

## 2022-01-01 RX ADMIN — IOPAMIDOL 100 ML: 755 INJECTION, SOLUTION INTRAVENOUS at 10:13

## 2022-01-01 RX ADMIN — IPRATROPIUM BROMIDE AND ALBUTEROL SULFATE 3 ML: 2.5; .5 SOLUTION RESPIRATORY (INHALATION) at 11:11

## 2022-01-01 RX ADMIN — ATORVASTATIN CALCIUM 40 MG: 40 TABLET, FILM COATED ORAL at 19:53

## 2022-01-01 RX ADMIN — DICLOFENAC SODIUM 2 G: 10 GEL TOPICAL at 22:34

## 2022-01-01 RX ADMIN — GLUCAGON 1 MG: KIT at 10:47

## 2022-01-01 RX ADMIN — LISINOPRIL 2.5 MG: 2.5 TABLET ORAL at 10:30

## 2022-01-01 RX ADMIN — ASPIRIN 81 MG: 81 TABLET, COATED ORAL at 08:44

## 2022-01-01 RX ADMIN — ONDANSETRON 4 MG: 2 INJECTION INTRAMUSCULAR; INTRAVENOUS at 18:37

## 2022-01-01 RX ADMIN — METFORMIN HYDROCHLORIDE 1000 MG: 1000 TABLET, FILM COATED ORAL at 14:16

## 2022-01-01 RX ADMIN — ATROPINE SULFATE 2 DROP: 10 SOLUTION/ DROPS OPHTHALMIC at 00:21

## 2022-01-01 RX ADMIN — IPRATROPIUM BROMIDE AND ALBUTEROL SULFATE 3 ML: 2.5; .5 SOLUTION RESPIRATORY (INHALATION) at 07:30

## 2022-01-01 RX ADMIN — ASPIRIN 81 MG: 81 TABLET, COATED ORAL at 12:06

## 2022-01-01 RX ADMIN — DICLOFENAC SODIUM 2 G: 10 GEL TOPICAL at 23:35

## 2022-01-01 RX ADMIN — IPRATROPIUM BROMIDE AND ALBUTEROL SULFATE 3 ML: 2.5; .5 SOLUTION RESPIRATORY (INHALATION) at 15:34

## 2022-01-01 RX ADMIN — ATORVASTATIN CALCIUM 40 MG: 40 TABLET, FILM COATED ORAL at 22:02

## 2022-01-01 RX ADMIN — CLOPIDOGREL BISULFATE 75 MG: 75 TABLET, FILM COATED ORAL at 11:29

## 2022-01-01 RX ADMIN — FAMOTIDINE 20 MG: 20 TABLET ORAL at 11:30

## 2022-01-01 RX ADMIN — IPRATROPIUM BROMIDE AND ALBUTEROL SULFATE 3 ML: 2.5; .5 SOLUTION RESPIRATORY (INHALATION) at 08:11

## 2022-01-01 RX ADMIN — DICLOFENAC SODIUM 2 G: 10 GEL TOPICAL at 14:23

## 2022-01-01 RX ADMIN — LISINOPRIL 2.5 MG: 2.5 TABLET ORAL at 08:45

## 2022-01-01 RX ADMIN — ENOXAPARIN SODIUM 30 MG: 30 INJECTION SUBCUTANEOUS at 10:31

## 2022-01-01 RX ADMIN — DICLOFENAC SODIUM 2 G: 10 GEL TOPICAL at 17:07

## 2022-01-01 RX ADMIN — LISINOPRIL 2.5 MG: 2.5 TABLET ORAL at 08:01

## 2022-01-01 RX ADMIN — FAMOTIDINE 20 MG: 20 TABLET ORAL at 19:55

## 2022-01-01 RX ADMIN — AMLODIPINE BESYLATE 2.5 MG: 2.5 TABLET ORAL at 09:02

## 2022-01-01 RX ADMIN — PROPOFOL 20 MG: 10 INJECTION, EMULSION INTRAVENOUS at 10:47

## 2022-01-01 RX ADMIN — ATORVASTATIN CALCIUM 40 MG: 40 TABLET, FILM COATED ORAL at 22:16

## 2022-01-01 RX ADMIN — ASPIRIN 81 MG: 81 TABLET, COATED ORAL at 09:11

## 2022-01-01 RX ADMIN — ACETAMINOPHEN 650 MG: 325 TABLET, FILM COATED ORAL at 10:11

## 2022-01-01 RX ADMIN — DICLOFENAC SODIUM 2 G: 10 GEL TOPICAL at 10:03

## 2022-01-01 RX ADMIN — ASPIRIN 81 MG: 81 TABLET, COATED ORAL at 11:19

## 2022-01-01 RX ADMIN — ASPIRIN 81 MG CHEWABLE TABLET 324 MG: 81 TABLET CHEWABLE at 20:05

## 2022-01-01 RX ADMIN — AMLODIPINE BESYLATE 2.5 MG: 2.5 TABLET ORAL at 09:11

## 2022-01-01 RX ADMIN — AZITHROMYCIN MONOHYDRATE 250 MG: 250 TABLET ORAL at 11:07

## 2022-01-01 RX ADMIN — AMLODIPINE BESYLATE 5 MG: 5 TABLET ORAL at 19:55

## 2022-01-01 RX ADMIN — AMLODIPINE BESYLATE 2.5 MG: 2.5 TABLET ORAL at 16:00

## 2022-01-01 RX ADMIN — CLOPIDOGREL BISULFATE 75 MG: 75 TABLET ORAL at 08:44

## 2022-01-01 RX ADMIN — LISINOPRIL 2.5 MG: 2.5 TABLET ORAL at 09:26

## 2022-01-01 RX ADMIN — ASPIRIN 81 MG: 81 TABLET, COATED ORAL at 09:34

## 2022-01-01 RX ADMIN — AMLODIPINE BESYLATE 2.5 MG: 2.5 TABLET ORAL at 09:05

## 2022-01-01 RX ADMIN — IPRATROPIUM BROMIDE AND ALBUTEROL SULFATE 3 ML: 2.5; .5 SOLUTION RESPIRATORY (INHALATION) at 11:19

## 2022-01-01 RX ADMIN — DICLOFENAC SODIUM 2 G: 10 GEL TOPICAL at 21:39

## 2022-01-01 RX ADMIN — AMLODIPINE BESYLATE 2.5 MG: 2.5 TABLET ORAL at 08:45

## 2022-01-01 RX ADMIN — DICLOFENAC SODIUM 2 G: 10 GEL TOPICAL at 22:23

## 2022-01-01 RX ADMIN — ASPIRIN 81 MG: 81 TABLET, COATED ORAL at 11:04

## 2022-01-01 RX ADMIN — OLANZAPINE 5 MG: 5 TABLET, ORALLY DISINTEGRATING ORAL at 22:04

## 2022-01-01 RX ADMIN — LISINOPRIL 2.5 MG: 2.5 TABLET ORAL at 08:54

## 2022-01-01 RX ADMIN — OLANZAPINE 5 MG: 5 TABLET, ORALLY DISINTEGRATING ORAL at 21:06

## 2022-01-01 RX ADMIN — ATORVASTATIN CALCIUM 40 MG: 40 TABLET, FILM COATED ORAL at 21:29

## 2022-01-01 RX ADMIN — MORPHINE SULFATE 10 MG: 20 SOLUTION ORAL at 04:26

## 2022-01-01 RX ADMIN — DICLOFENAC SODIUM 2 G: 10 GEL TOPICAL at 11:13

## 2022-01-01 RX ADMIN — DEXTROSE AND SODIUM CHLORIDE: 5; 900 INJECTION, SOLUTION INTRAVENOUS at 18:07

## 2022-01-01 RX ADMIN — LEVOFLOXACIN 500 MG: 5 INJECTION, SOLUTION INTRAVENOUS at 08:57

## 2022-01-01 RX ADMIN — CLOPIDOGREL BISULFATE 300 MG: 75 TABLET, FILM COATED ORAL at 17:29

## 2022-01-01 RX ADMIN — ENOXAPARIN SODIUM 30 MG: 30 INJECTION SUBCUTANEOUS at 09:04

## 2022-01-01 RX ADMIN — DICLOFENAC SODIUM 2 G: 10 GEL TOPICAL at 17:08

## 2022-01-01 RX ADMIN — ATORVASTATIN CALCIUM 40 MG: 40 TABLET, FILM COATED ORAL at 23:14

## 2022-01-01 RX ADMIN — ENOXAPARIN SODIUM 30 MG: 30 INJECTION SUBCUTANEOUS at 10:17

## 2022-01-01 RX ADMIN — DICLOFENAC SODIUM 2 G: 10 GEL TOPICAL at 16:21

## 2022-01-01 RX ADMIN — AMLODIPINE BESYLATE 5 MG: 5 TABLET ORAL at 11:04

## 2022-01-01 RX ADMIN — DICLOFENAC SODIUM 2 G: 10 GEL TOPICAL at 15:38

## 2022-01-01 RX ADMIN — ASPIRIN 81 MG: 81 TABLET, COATED ORAL at 08:54

## 2022-01-01 RX ADMIN — BARIUM SULFATE 30 ML: 400 SUSPENSION ORAL at 13:31

## 2022-01-01 RX ADMIN — INSULIN HUMAN 7 UNITS: 100 INJECTION, SOLUTION PARENTERAL at 12:08

## 2022-01-01 RX ADMIN — LISINOPRIL 2.5 MG: 2.5 TABLET ORAL at 11:19

## 2022-01-01 RX ADMIN — IPRATROPIUM BROMIDE AND ALBUTEROL SULFATE 3 ML: 2.5; .5 SOLUTION RESPIRATORY (INHALATION) at 11:00

## 2022-01-01 RX ADMIN — SODIUM CHLORIDE: 9 INJECTION, SOLUTION INTRAVENOUS at 15:45

## 2022-01-01 RX ADMIN — ASPIRIN 81 MG: 81 TABLET, COATED ORAL at 08:59

## 2022-01-01 RX ADMIN — CLOPIDOGREL BISULFATE 75 MG: 75 TABLET ORAL at 11:24

## 2022-01-01 RX ADMIN — ACETAMINOPHEN 650 MG: 325 TABLET, FILM COATED ORAL at 17:23

## 2022-01-01 RX ADMIN — ACETAMINOPHEN 975 MG: 325 TABLET, FILM COATED ORAL at 00:41

## 2022-01-01 RX ADMIN — DEXTROSE AND SODIUM CHLORIDE: 5; 900 INJECTION, SOLUTION INTRAVENOUS at 15:33

## 2022-01-01 RX ADMIN — AMLODIPINE BESYLATE 2.5 MG: 2.5 TABLET ORAL at 09:34

## 2022-01-01 RX ADMIN — IPRATROPIUM BROMIDE AND ALBUTEROL SULFATE 3 ML: 2.5; .5 SOLUTION RESPIRATORY (INHALATION) at 16:22

## 2022-01-01 RX ADMIN — DICLOFENAC SODIUM 2 G: 10 GEL TOPICAL at 18:41

## 2022-01-01 RX ADMIN — IOPAMIDOL 84 ML: 755 INJECTION, SOLUTION INTRAVENOUS at 12:27

## 2022-01-01 RX ADMIN — DICLOFENAC SODIUM 2 G: 10 GEL TOPICAL at 10:31

## 2022-01-01 RX ADMIN — INSULIN GLARGINE 20 UNITS: 100 INJECTION, SOLUTION SUBCUTANEOUS at 22:18

## 2022-01-01 RX ADMIN — CLOPIDOGREL BISULFATE 75 MG: 75 TABLET ORAL at 10:06

## 2022-01-01 RX ADMIN — IPRATROPIUM BROMIDE AND ALBUTEROL SULFATE 3 ML: 2.5; .5 SOLUTION RESPIRATORY (INHALATION) at 11:42

## 2022-01-01 RX ADMIN — INSULIN ASPART 2 UNITS: 100 INJECTION, SOLUTION INTRAVENOUS; SUBCUTANEOUS at 13:46

## 2022-01-01 RX ADMIN — DICLOFENAC SODIUM 2 G: 10 GEL TOPICAL at 12:56

## 2022-01-01 RX ADMIN — AMLODIPINE BESYLATE 2.5 MG: 2.5 TABLET ORAL at 09:26

## 2022-01-01 RX ADMIN — SODIUM CHLORIDE: 9 INJECTION, SOLUTION INTRAVENOUS at 04:29

## 2022-01-01 RX ADMIN — MORPHINE SULFATE 2 MG: 4 INJECTION, SOLUTION INTRAMUSCULAR; INTRAVENOUS at 17:14

## 2022-01-01 RX ADMIN — IPRATROPIUM BROMIDE AND ALBUTEROL SULFATE 3 ML: 2.5; .5 SOLUTION RESPIRATORY (INHALATION) at 20:11

## 2022-01-01 RX ADMIN — LEVOFLOXACIN 500 MG: 5 INJECTION, SOLUTION INTRAVENOUS at 09:03

## 2022-01-01 RX ADMIN — AMLODIPINE BESYLATE 2.5 MG: 2.5 TABLET ORAL at 08:01

## 2022-01-01 RX ADMIN — OLANZAPINE 5 MG: 5 TABLET, ORALLY DISINTEGRATING ORAL at 22:59

## 2022-01-01 RX ADMIN — DEXTROSE AND SODIUM CHLORIDE: 5; 900 INJECTION, SOLUTION INTRAVENOUS at 05:09

## 2022-01-01 RX ADMIN — ACETAMINOPHEN 650 MG: 325 TABLET, FILM COATED ORAL at 06:21

## 2022-01-01 RX ADMIN — ENOXAPARIN SODIUM 30 MG: 30 INJECTION SUBCUTANEOUS at 09:05

## 2022-01-01 RX ADMIN — SODIUM CHLORIDE: 9 INJECTION, SOLUTION INTRAVENOUS at 17:08

## 2022-01-01 RX ADMIN — TAMSULOSIN HYDROCHLORIDE 0.4 MG: 0.4 CAPSULE ORAL at 11:29

## 2022-01-01 RX ADMIN — ENOXAPARIN SODIUM 30 MG: 30 INJECTION SUBCUTANEOUS at 11:19

## 2022-01-01 RX ADMIN — ASPIRIN 81 MG: 81 TABLET, COATED ORAL at 08:01

## 2022-01-01 RX ADMIN — PROPOFOL 20 MG: 10 INJECTION, EMULSION INTRAVENOUS at 10:41

## 2022-01-01 RX ADMIN — LEVOFLOXACIN 500 MG: 5 INJECTION, SOLUTION INTRAVENOUS at 09:06

## 2022-01-01 RX ADMIN — GADOBUTROL 2 ML: 604.72 INJECTION INTRAVENOUS at 12:22

## 2022-01-01 RX ADMIN — TAMSULOSIN HYDROCHLORIDE 0.4 MG: 0.4 CAPSULE ORAL at 11:04

## 2022-01-01 RX ADMIN — DEXTROSE AND SODIUM CHLORIDE: 5; 900 INJECTION, SOLUTION INTRAVENOUS at 03:39

## 2022-01-01 RX ADMIN — POLYETHYLENE GLYCOL 3350 17 G: 17 POWDER, FOR SOLUTION ORAL at 08:42

## 2022-01-01 RX ADMIN — ASPIRIN 81 MG: 81 TABLET, COATED ORAL at 10:36

## 2022-01-01 RX ADMIN — ASPIRIN 81 MG CHEWABLE TABLET 81 MG: 81 TABLET CHEWABLE at 11:06

## 2022-01-01 RX ADMIN — DICLOFENAC SODIUM 2 G: 10 GEL TOPICAL at 09:33

## 2022-01-01 RX ADMIN — DOXYCYCLINE HYCLATE 100 MG: 100 CAPSULE ORAL at 09:12

## 2022-01-01 RX ADMIN — ENOXAPARIN SODIUM 30 MG: 30 INJECTION SUBCUTANEOUS at 11:24

## 2022-01-01 RX ADMIN — IPRATROPIUM BROMIDE AND ALBUTEROL SULFATE 3 ML: 2.5; .5 SOLUTION RESPIRATORY (INHALATION) at 19:20

## 2022-01-01 RX ADMIN — PROPOFOL 10 MG: 10 INJECTION, EMULSION INTRAVENOUS at 10:54

## 2022-01-01 RX ADMIN — INSULIN ASPART 3 UNITS: 100 INJECTION, SOLUTION INTRAVENOUS; SUBCUTANEOUS at 09:22

## 2022-01-01 RX ADMIN — DICLOFENAC SODIUM 2 G: 10 GEL TOPICAL at 17:00

## 2022-01-01 RX ADMIN — ASPIRIN 81 MG 81 MG: 81 TABLET ORAL at 11:24

## 2022-01-01 RX ADMIN — ATORVASTATIN CALCIUM 40 MG: 40 TABLET, FILM COATED ORAL at 22:41

## 2022-01-01 RX ADMIN — SODIUM CHLORIDE: 9 INJECTION, SOLUTION INTRAVENOUS at 19:52

## 2022-01-01 RX ADMIN — INSULIN GLARGINE 9 UNITS: 100 INJECTION, SOLUTION SUBCUTANEOUS at 23:12

## 2022-01-01 RX ADMIN — ASPIRIN 81 MG 81 MG: 81 TABLET ORAL at 10:30

## 2022-01-01 RX ADMIN — DEXTROSE AND SODIUM CHLORIDE: 5; 900 INJECTION, SOLUTION INTRAVENOUS at 04:01

## 2022-01-01 RX ADMIN — ATORVASTATIN CALCIUM 40 MG: 40 TABLET, FILM COATED ORAL at 20:21

## 2022-01-01 RX ADMIN — AMLODIPINE BESYLATE 5 MG: 5 TABLET ORAL at 11:06

## 2022-01-01 RX ADMIN — AMLODIPINE BESYLATE 2.5 MG: 2.5 TABLET ORAL at 11:19

## 2022-01-01 RX ADMIN — SODIUM CHLORIDE 1000 ML: 9 INJECTION, SOLUTION INTRAVENOUS at 12:07

## 2022-01-01 RX ADMIN — AZITHROMYCIN MONOHYDRATE 250 MG: 250 TABLET ORAL at 11:30

## 2022-01-01 RX ADMIN — LEVOFLOXACIN 500 MG: 5 INJECTION, SOLUTION INTRAVENOUS at 10:14

## 2022-01-01 RX ADMIN — ATORVASTATIN CALCIUM 40 MG: 40 TABLET, FILM COATED ORAL at 20:44

## 2022-01-01 RX ADMIN — DICLOFENAC SODIUM 2 G: 10 GEL TOPICAL at 15:27

## 2022-01-01 RX ADMIN — SODIUM CHLORIDE: 9 INJECTION, SOLUTION INTRAVENOUS at 02:15

## 2022-01-01 RX ADMIN — DICLOFENAC SODIUM 2 G: 10 GEL TOPICAL at 22:10

## 2022-01-01 RX ADMIN — AZITHROMYCIN 500 MG: 500 INJECTION, POWDER, LYOPHILIZED, FOR SOLUTION INTRAVENOUS at 12:00

## 2022-01-01 RX ADMIN — SODIUM CHLORIDE: 9 INJECTION, SOLUTION INTRAVENOUS at 05:58

## 2022-01-01 RX ADMIN — OLANZAPINE 5 MG: 5 TABLET, ORALLY DISINTEGRATING ORAL at 20:41

## 2022-01-01 RX ADMIN — DEXTROSE MONOHYDRATE 25 ML: 25 INJECTION, SOLUTION INTRAVENOUS at 13:28

## 2022-01-01 RX ADMIN — SODIUM CHLORIDE: 9 INJECTION, SOLUTION INTRAVENOUS at 17:07

## 2022-01-01 RX ADMIN — IOPAMIDOL 50 ML: 755 INJECTION, SOLUTION INTRAVENOUS at 15:49

## 2022-01-01 RX ADMIN — CLOPIDOGREL BISULFATE 75 MG: 75 TABLET ORAL at 09:00

## 2022-01-01 RX ADMIN — ENOXAPARIN SODIUM 30 MG: 30 INJECTION SUBCUTANEOUS at 08:00

## 2022-01-01 RX ADMIN — ASPIRIN 81 MG CHEWABLE TABLET 81 MG: 81 TABLET CHEWABLE at 11:30

## 2022-01-01 RX ADMIN — DICLOFENAC SODIUM 2 G: 10 GEL TOPICAL at 13:32

## 2022-01-01 RX ADMIN — ASPIRIN 81 MG: 81 TABLET, COATED ORAL at 09:33

## 2022-01-01 RX ADMIN — LISINOPRIL 2.5 MG: 2.5 TABLET ORAL at 09:11

## 2022-01-01 RX ADMIN — LORAZEPAM 1 MG: 2 INJECTION INTRAMUSCULAR; INTRAVENOUS at 04:34

## 2022-01-01 RX ADMIN — ASPIRIN 81 MG: 81 TABLET, COATED ORAL at 09:02

## 2022-01-01 RX ADMIN — AMLODIPINE BESYLATE 2.5 MG: 2.5 TABLET ORAL at 09:00

## 2022-01-01 RX ADMIN — ASPIRIN 81 MG: 81 TABLET, COATED ORAL at 08:42

## 2022-01-01 RX ADMIN — CEFTRIAXONE SODIUM 1 G: 1 INJECTION, SOLUTION INTRAVENOUS at 11:02

## 2022-01-01 RX ADMIN — DICLOFENAC SODIUM 2 G: 10 GEL TOPICAL at 14:38

## 2022-01-01 RX ADMIN — ATORVASTATIN CALCIUM 40 MG: 40 TABLET, FILM COATED ORAL at 21:53

## 2022-01-01 RX ADMIN — INSULIN ASPART 1 UNITS: 100 INJECTION, SOLUTION INTRAVENOUS; SUBCUTANEOUS at 18:23

## 2022-01-01 RX ADMIN — DICLOFENAC SODIUM 2 G: 10 GEL TOPICAL at 11:21

## 2022-01-01 RX ADMIN — AMLODIPINE BESYLATE 2.5 MG: 2.5 TABLET ORAL at 11:25

## 2022-01-01 RX ADMIN — SODIUM CHLORIDE: 9 INJECTION, SOLUTION INTRAVENOUS at 04:24

## 2022-01-01 RX ADMIN — LISINOPRIL 2.5 MG: 2.5 TABLET ORAL at 11:24

## 2022-01-01 RX ADMIN — SODIUM CHLORIDE: 9 INJECTION, SOLUTION INTRAVENOUS at 17:35

## 2022-01-01 RX ADMIN — DICLOFENAC SODIUM 2 G: 10 GEL TOPICAL at 09:04

## 2022-01-01 RX ADMIN — SODIUM CHLORIDE: 9 INJECTION, SOLUTION INTRAVENOUS at 14:06

## 2022-01-01 RX ADMIN — LISINOPRIL 2.5 MG: 2.5 TABLET ORAL at 12:06

## 2022-01-01 RX ADMIN — SODIUM CHLORIDE: 9 INJECTION, SOLUTION INTRAVENOUS at 04:31

## 2022-01-01 RX ADMIN — DICLOFENAC SODIUM 2 G: 10 GEL TOPICAL at 18:39

## 2022-01-01 RX ADMIN — DICLOFENAC SODIUM 2 G: 10 GEL TOPICAL at 21:26

## 2022-01-01 RX ADMIN — ATORVASTATIN CALCIUM 40 MG: 40 TABLET, FILM COATED ORAL at 22:59

## 2022-01-01 RX ADMIN — DEXTROSE AND SODIUM CHLORIDE: 5; 900 INJECTION, SOLUTION INTRAVENOUS at 17:01

## 2022-01-01 RX ADMIN — OLANZAPINE 5 MG: 5 TABLET, ORALLY DISINTEGRATING ORAL at 21:29

## 2022-01-01 RX ADMIN — ONDANSETRON 4 MG: 2 INJECTION INTRAMUSCULAR; INTRAVENOUS at 13:56

## 2022-01-01 RX ADMIN — TAMSULOSIN HYDROCHLORIDE 0.4 MG: 0.4 CAPSULE ORAL at 19:55

## 2022-01-01 RX ADMIN — IPRATROPIUM BROMIDE AND ALBUTEROL SULFATE 3 ML: 2.5; .5 SOLUTION RESPIRATORY (INHALATION) at 11:27

## 2022-01-01 RX ADMIN — SODIUM CHLORIDE: 9 INJECTION, SOLUTION INTRAVENOUS at 22:55

## 2022-01-01 RX ADMIN — IPRATROPIUM BROMIDE AND ALBUTEROL SULFATE 3 ML: 2.5; .5 SOLUTION RESPIRATORY (INHALATION) at 10:22

## 2022-01-01 RX ADMIN — FAMOTIDINE 20 MG: 20 TABLET ORAL at 11:06

## 2022-01-01 RX ADMIN — ACETAMINOPHEN 975 MG: 325 TABLET, FILM COATED ORAL at 21:54

## 2022-01-01 RX ADMIN — DICLOFENAC SODIUM 2 G: 10 GEL TOPICAL at 13:00

## 2022-01-01 RX ADMIN — SODIUM CHLORIDE, POTASSIUM CHLORIDE, SODIUM LACTATE AND CALCIUM CHLORIDE: 600; 310; 30; 20 INJECTION, SOLUTION INTRAVENOUS at 10:00

## 2022-01-01 RX ADMIN — IPRATROPIUM BROMIDE AND ALBUTEROL SULFATE 3 ML: 2.5; .5 SOLUTION RESPIRATORY (INHALATION) at 09:01

## 2022-01-01 RX ADMIN — ATORVASTATIN CALCIUM 40 MG: 40 TABLET, FILM COATED ORAL at 21:06

## 2022-01-01 RX ADMIN — HEPARIN SODIUM 600 UNITS/HR: 10000 INJECTION, SOLUTION INTRAVENOUS at 16:54

## 2022-01-01 RX ADMIN — DICLOFENAC SODIUM 2 G: 10 GEL TOPICAL at 13:56

## 2022-01-01 RX ADMIN — IPRATROPIUM BROMIDE AND ALBUTEROL SULFATE 3 ML: 2.5; .5 SOLUTION RESPIRATORY (INHALATION) at 16:17

## 2022-01-01 RX ADMIN — OLANZAPINE 5 MG: 5 TABLET, ORALLY DISINTEGRATING ORAL at 22:35

## 2022-01-01 RX ADMIN — ATORVASTATIN CALCIUM 40 MG: 40 TABLET, FILM COATED ORAL at 22:35

## 2022-01-01 RX ADMIN — CLOPIDOGREL BISULFATE 75 MG: 75 TABLET ORAL at 08:01

## 2022-01-01 RX ADMIN — ENOXAPARIN SODIUM 30 MG: 30 INJECTION SUBCUTANEOUS at 10:06

## 2022-01-01 RX ADMIN — INSULIN ASPART 10 UNITS: 100 INJECTION, SOLUTION INTRAVENOUS; SUBCUTANEOUS at 11:59

## 2022-01-01 RX ADMIN — DICLOFENAC SODIUM 2 G: 10 GEL TOPICAL at 10:49

## 2022-01-01 RX ADMIN — IPRATROPIUM BROMIDE AND ALBUTEROL SULFATE 3 ML: 2.5; .5 SOLUTION RESPIRATORY (INHALATION) at 19:33

## 2022-01-01 RX ADMIN — SODIUM CHLORIDE: 9 INJECTION, SOLUTION INTRAVENOUS at 18:24

## 2022-01-01 RX ADMIN — ACETAMINOPHEN 650 MG: 325 TABLET, FILM COATED ORAL at 22:11

## 2022-01-01 RX ADMIN — AMLODIPINE BESYLATE 2.5 MG: 2.5 TABLET ORAL at 08:41

## 2022-01-01 RX ADMIN — AMLODIPINE BESYLATE 2.5 MG: 2.5 TABLET ORAL at 08:44

## 2022-01-01 RX ADMIN — SODIUM CHLORIDE 1000 ML: 9 INJECTION, SOLUTION INTRAVENOUS at 16:28

## 2022-01-01 RX ADMIN — CEFTRIAXONE SODIUM 1 G: 1 INJECTION, SOLUTION INTRAVENOUS at 14:45

## 2022-01-01 RX ADMIN — ACETAMINOPHEN 650 MG: 325 TABLET, FILM COATED ORAL at 21:42

## 2022-01-01 RX ADMIN — DICLOFENAC SODIUM 2 G: 10 GEL TOPICAL at 11:52

## 2022-01-01 RX ADMIN — ATORVASTATIN CALCIUM 40 MG: 40 TABLET, FILM COATED ORAL at 20:33

## 2022-01-01 RX ADMIN — OLANZAPINE 5 MG: 5 TABLET, ORALLY DISINTEGRATING ORAL at 22:41

## 2022-01-01 RX ADMIN — DEXTROSE AND SODIUM CHLORIDE: 5; 900 INJECTION, SOLUTION INTRAVENOUS at 06:52

## 2022-01-01 RX ADMIN — ATORVASTATIN CALCIUM 40 MG: 40 TABLET, FILM COATED ORAL at 21:04

## 2022-01-01 RX ADMIN — DICLOFENAC SODIUM 2 G: 10 GEL TOPICAL at 09:48

## 2022-01-01 RX ADMIN — CEFTRIAXONE SODIUM 1 G: 1 INJECTION, SOLUTION INTRAVENOUS at 11:35

## 2022-01-01 RX ADMIN — ASPIRIN 81 MG: 81 TABLET, COATED ORAL at 08:45

## 2022-01-01 RX ADMIN — PROPOFOL 20 MG: 10 INJECTION, EMULSION INTRAVENOUS at 10:43

## 2022-01-01 RX ADMIN — CLOPIDOGREL BISULFATE 75 MG: 75 TABLET ORAL at 09:05

## 2022-01-01 RX ADMIN — AMLODIPINE BESYLATE 2.5 MG: 2.5 TABLET ORAL at 09:33

## 2022-01-01 RX ADMIN — LISINOPRIL 2.5 MG: 2.5 TABLET ORAL at 08:58

## 2022-01-01 RX ADMIN — SODIUM CHLORIDE 1000 ML: 9 INJECTION, SOLUTION INTRAVENOUS at 16:33

## 2022-01-01 RX ADMIN — ACETAMINOPHEN 650 MG: 325 TABLET, FILM COATED ORAL at 00:21

## 2022-01-01 RX ADMIN — LISINOPRIL 2.5 MG: 2.5 TABLET ORAL at 09:03

## 2022-01-01 RX ADMIN — INSULIN HUMAN 7 UNITS: 100 INJECTION, SOLUTION PARENTERAL at 13:55

## 2022-01-01 RX ADMIN — POLYETHYLENE GLYCOL 3350 17 G: 17 POWDER, FOR SOLUTION ORAL at 21:06

## 2022-01-01 RX ADMIN — IPRATROPIUM BROMIDE AND ALBUTEROL SULFATE 3 ML: 2.5; .5 SOLUTION RESPIRATORY (INHALATION) at 13:26

## 2022-01-01 RX ADMIN — AMLODIPINE BESYLATE 5 MG: 5 TABLET ORAL at 11:29

## 2022-01-01 RX ADMIN — ACETAMINOPHEN 650 MG: 325 TABLET, FILM COATED ORAL at 06:32

## 2022-01-01 RX ADMIN — LEVOFLOXACIN 500 MG: 5 INJECTION, SOLUTION INTRAVENOUS at 09:05

## 2022-01-01 RX ADMIN — ACETAMINOPHEN 650 MG: 325 TABLET, FILM COATED ORAL at 05:00

## 2022-01-01 RX ADMIN — ATORVASTATIN CALCIUM 40 MG: 40 TABLET, FILM COATED ORAL at 20:40

## 2022-01-01 RX ADMIN — FAMOTIDINE 20 MG: 20 TABLET ORAL at 11:04

## 2022-01-01 RX ADMIN — DOXYCYCLINE HYCLATE 100 MG: 100 CAPSULE ORAL at 22:59

## 2022-01-01 RX ADMIN — LISINOPRIL 2.5 MG: 2.5 TABLET ORAL at 11:20

## 2022-01-01 RX ADMIN — DICLOFENAC SODIUM 2 G: 10 GEL TOPICAL at 10:06

## 2022-01-01 RX ADMIN — ASPIRIN 81 MG 81 MG: 81 TABLET ORAL at 09:26

## 2022-01-01 RX ADMIN — LISINOPRIL 2.5 MG: 2.5 TABLET ORAL at 09:34

## 2022-01-01 RX ADMIN — IPRATROPIUM BROMIDE AND ALBUTEROL SULFATE 3 ML: 2.5; .5 SOLUTION RESPIRATORY (INHALATION) at 07:27

## 2022-01-01 RX ADMIN — ENOXAPARIN SODIUM 30 MG: 30 INJECTION SUBCUTANEOUS at 09:45

## 2022-01-01 RX ADMIN — AMLODIPINE BESYLATE 2.5 MG: 2.5 TABLET ORAL at 10:06

## 2022-01-01 RX ADMIN — ACETAMINOPHEN 650 MG: 325 TABLET, FILM COATED ORAL at 21:04

## 2022-01-01 RX ADMIN — SODIUM CHLORIDE 1000 ML: 9 INJECTION, SOLUTION INTRAVENOUS at 07:47

## 2022-01-01 RX ADMIN — AMLODIPINE BESYLATE 2.5 MG: 2.5 TABLET ORAL at 08:54

## 2022-01-01 RX ADMIN — LISINOPRIL 2.5 MG: 2.5 TABLET ORAL at 09:00

## 2022-01-01 RX ADMIN — OLANZAPINE 5 MG: 5 TABLET, ORALLY DISINTEGRATING ORAL at 02:54

## 2022-01-01 RX ADMIN — MORPHINE SULFATE 10 MG: 20 SOLUTION ORAL at 00:41

## 2022-01-01 RX ADMIN — IPRATROPIUM BROMIDE AND ALBUTEROL SULFATE 3 ML: .5; 3 SOLUTION RESPIRATORY (INHALATION) at 01:57

## 2022-01-01 RX ADMIN — ATORVASTATIN CALCIUM 40 MG: 40 TABLET, FILM COATED ORAL at 23:41

## 2022-01-01 RX ADMIN — PROPOFOL 10 MG: 10 INJECTION, EMULSION INTRAVENOUS at 10:51

## 2022-01-01 RX ADMIN — BARIUM SULFATE 5 ML: 400 SUSPENSION ORAL at 13:30

## 2022-01-01 RX ADMIN — ASPIRIN 325 MG ORAL TABLET 325 MG: 325 PILL ORAL at 16:54

## 2022-01-01 RX ADMIN — LORAZEPAM 0.5 MG: 2 INJECTION INTRAMUSCULAR; INTRAVENOUS at 01:25

## 2022-01-01 RX ADMIN — ATORVASTATIN CALCIUM 40 MG: 40 TABLET, FILM COATED ORAL at 20:36

## 2022-01-01 RX ADMIN — ENOXAPARIN SODIUM 30 MG: 30 INJECTION SUBCUTANEOUS at 09:00

## 2022-01-01 RX ADMIN — IPRATROPIUM BROMIDE AND ALBUTEROL SULFATE 3 ML: 2.5; .5 SOLUTION RESPIRATORY (INHALATION) at 22:28

## 2022-01-01 RX ADMIN — CLOPIDOGREL BISULFATE 75 MG: 75 TABLET ORAL at 09:26

## 2022-01-01 RX ADMIN — CLOPIDOGREL BISULFATE 75 MG: 75 TABLET ORAL at 11:19

## 2022-01-01 RX ADMIN — DICLOFENAC SODIUM 2 G: 10 GEL TOPICAL at 22:24

## 2022-01-01 RX ADMIN — IPRATROPIUM BROMIDE AND ALBUTEROL SULFATE 3 ML: 2.5; .5 SOLUTION RESPIRATORY (INHALATION) at 15:21

## 2022-01-01 RX ADMIN — INSULIN GLARGINE 8 UNITS: 100 INJECTION, SOLUTION SUBCUTANEOUS at 21:06

## 2022-01-01 RX ADMIN — IPRATROPIUM BROMIDE AND ALBUTEROL SULFATE 3 ML: 2.5; .5 SOLUTION RESPIRATORY (INHALATION) at 19:40

## 2022-01-01 RX ADMIN — OLANZAPINE 5 MG: 5 TABLET, ORALLY DISINTEGRATING ORAL at 21:38

## 2022-01-01 RX ADMIN — TAMSULOSIN HYDROCHLORIDE 0.4 MG: 0.4 CAPSULE ORAL at 11:05

## 2022-01-01 RX ADMIN — ATORVASTATIN CALCIUM 40 MG: 40 TABLET, FILM COATED ORAL at 21:38

## 2022-01-01 RX ADMIN — ASPIRIN 325 MG: 325 TABLET, COATED ORAL at 11:04

## 2022-01-01 RX ADMIN — CLOPIDOGREL BISULFATE 75 MG: 75 TABLET ORAL at 08:45

## 2022-01-01 RX ADMIN — SODIUM CHLORIDE: 9 INJECTION, SOLUTION INTRAVENOUS at 03:44

## 2022-01-01 RX ADMIN — POLYETHYLENE GLYCOL 3350 17 G: 17 POWDER, FOR SOLUTION ORAL at 12:49

## 2022-01-01 RX ADMIN — DICLOFENAC SODIUM 2 G: 10 GEL TOPICAL at 22:07

## 2022-01-01 RX ADMIN — INSULIN ASPART 4 UNITS: 100 INJECTION, SOLUTION INTRAVENOUS; SUBCUTANEOUS at 20:17

## 2022-01-01 RX ADMIN — LISINOPRIL 2.5 MG: 2.5 TABLET ORAL at 09:33

## 2022-01-01 RX ADMIN — SODIUM CHLORIDE: 9 INJECTION, SOLUTION INTRAVENOUS at 14:12

## 2022-01-01 RX ADMIN — CEFTRIAXONE SODIUM 1 G: 1 INJECTION, SOLUTION INTRAVENOUS at 22:48

## 2022-01-01 RX ADMIN — LISINOPRIL 2.5 MG: 2.5 TABLET ORAL at 08:44

## 2022-01-01 RX ADMIN — CEFTRIAXONE SODIUM 1 G: 1 INJECTION, SOLUTION INTRAVENOUS at 20:33

## 2022-01-01 RX ADMIN — AMLODIPINE BESYLATE 2.5 MG: 2.5 TABLET ORAL at 12:06

## 2022-01-01 RX ADMIN — CLOPIDOGREL BISULFATE 75 MG: 75 TABLET ORAL at 10:30

## 2022-01-01 RX ADMIN — CLOPIDOGREL BISULFATE 75 MG: 75 TABLET, FILM COATED ORAL at 11:06

## 2022-01-01 RX ADMIN — LISINOPRIL 2.5 MG: 2.5 TABLET ORAL at 09:05

## 2022-01-01 RX ADMIN — DICLOFENAC SODIUM 2 G: 10 GEL TOPICAL at 09:58

## 2022-01-01 RX ADMIN — ATORVASTATIN CALCIUM 40 MG: 40 TABLET, FILM COATED ORAL at 22:07

## 2022-01-01 RX ADMIN — ASPIRIN 81 MG 81 MG: 81 TABLET ORAL at 10:06

## 2022-01-01 RX ADMIN — DICLOFENAC SODIUM 2 G: 10 GEL TOPICAL at 21:51

## 2022-01-01 ASSESSMENT — ACTIVITIES OF DAILY LIVING (ADL)
ADLS_ACUITY_SCORE: 61
ADLS_ACUITY_SCORE: 59
ADLS_ACUITY_SCORE: 59
ADLS_ACUITY_SCORE: 14
ADLS_ACUITY_SCORE: 65
ADLS_ACUITY_SCORE: 14
ADLS_ACUITY_SCORE: 65
ADLS_ACUITY_SCORE: 63
TRANSFERRING: 2-->COMPLETELY DEPENDENT (NOT DEVELOPMENTALLY APPROPRIATE)
ADLS_ACUITY_SCORE: 37
ADLS_ACUITY_SCORE: 65
ADLS_ACUITY_SCORE: 54
ADLS_ACUITY_SCORE: 44
ADLS_ACUITY_SCORE: 65
ADLS_ACUITY_SCORE: 61
ADLS_ACUITY_SCORE: 43
ADLS_ACUITY_SCORE: 65
ADLS_ACUITY_SCORE: 44
CONCENTRATING,_REMEMBERING_OR_MAKING_DECISIONS_DIFFICULTY: YES
ADLS_ACUITY_SCORE: 10
ADLS_ACUITY_SCORE: 69
ADLS_ACUITY_SCORE: 61
ADLS_ACUITY_SCORE: 59
ADLS_ACUITY_SCORE: 14
ADLS_ACUITY_SCORE: 35
ADLS_ACUITY_SCORE: 65
ADLS_ACUITY_SCORE: 65
ADLS_ACUITY_SCORE: 59
ADLS_ACUITY_SCORE: 65
ADLS_ACUITY_SCORE: 59
ADLS_ACUITY_SCORE: 61
ADLS_ACUITY_SCORE: 65
ADLS_ACUITY_SCORE: 43
ADLS_ACUITY_SCORE: 14
ADLS_ACUITY_SCORE: 59
ADLS_ACUITY_SCORE: 43
ADLS_ACUITY_SCORE: 14
ADLS_ACUITY_SCORE: 65
ADLS_ACUITY_SCORE: 61
ADLS_ACUITY_SCORE: 35
ADLS_ACUITY_SCORE: 35
ADLS_ACUITY_SCORE: 14
ADLS_ACUITY_SCORE: 61
ADLS_ACUITY_SCORE: 59
ADLS_ACUITY_SCORE: 14
ADLS_ACUITY_SCORE: 63
ADLS_ACUITY_SCORE: 65
ADLS_ACUITY_SCORE: 65
ADLS_ACUITY_SCORE: 10
ADLS_ACUITY_SCORE: 59
ADLS_ACUITY_SCORE: 65
ADLS_ACUITY_SCORE: 65
ADLS_ACUITY_SCORE: 61
ADLS_ACUITY_SCORE: 35
ADLS_ACUITY_SCORE: 66
ADLS_ACUITY_SCORE: 57
ADLS_ACUITY_SCORE: 59
ADLS_ACUITY_SCORE: 69
ADLS_ACUITY_SCORE: 59
ADLS_ACUITY_SCORE: 61
ADLS_ACUITY_SCORE: 59
ADLS_ACUITY_SCORE: 65
ADLS_ACUITY_SCORE: 60
ADLS_ACUITY_SCORE: 35
ADLS_ACUITY_SCORE: 37
ADLS_ACUITY_SCORE: 7
ADLS_ACUITY_SCORE: 66
ADLS_ACUITY_SCORE: 65
ADLS_ACUITY_SCORE: 59
ADLS_ACUITY_SCORE: 10
ADLS_ACUITY_SCORE: 63
ADLS_ACUITY_SCORE: 63
ADLS_ACUITY_SCORE: 69
ADLS_ACUITY_SCORE: 14
ADLS_ACUITY_SCORE: 14
ADLS_ACUITY_SCORE: 65
ADLS_ACUITY_SCORE: 35
ADLS_ACUITY_SCORE: 65
ADLS_ACUITY_SCORE: 44
ADLS_ACUITY_SCORE: 65
ADLS_ACUITY_SCORE: 14
ADLS_ACUITY_SCORE: 14
ADLS_ACUITY_SCORE: 7
ADLS_ACUITY_SCORE: 65
ADLS_ACUITY_SCORE: 44
ADLS_ACUITY_SCORE: 61
ADLS_ACUITY_SCORE: 63
ADLS_ACUITY_SCORE: 63
ADLS_ACUITY_SCORE: 61
ADLS_ACUITY_SCORE: 66
ADLS_ACUITY_SCORE: 54
ADLS_ACUITY_SCORE: 61
ADLS_ACUITY_SCORE: 53
TOILETING_ISSUES: YES
ADLS_ACUITY_SCORE: 65
ADLS_ACUITY_SCORE: 59
ADLS_ACUITY_SCORE: 14
ADLS_ACUITY_SCORE: 63
ADLS_ACUITY_SCORE: 63
ADLS_ACUITY_SCORE: 65
ADLS_ACUITY_SCORE: 69
ADLS_ACUITY_SCORE: 57
ADLS_ACUITY_SCORE: 65
ADLS_ACUITY_SCORE: 65
ADLS_ACUITY_SCORE: 37
ADLS_ACUITY_SCORE: 69
ADLS_ACUITY_SCORE: 43
ADLS_ACUITY_SCORE: 14
ADLS_ACUITY_SCORE: 35
ADLS_ACUITY_SCORE: 59
ADLS_ACUITY_SCORE: 59
ADLS_ACUITY_SCORE: 65
ADLS_ACUITY_SCORE: 59
ADLS_ACUITY_SCORE: 65
ADLS_ACUITY_SCORE: 57
WALKING_OR_CLIMBING_STAIRS_DIFFICULTY: YES
ADLS_ACUITY_SCORE: 63
ADLS_ACUITY_SCORE: 61
ADLS_ACUITY_SCORE: 65
ADLS_ACUITY_SCORE: 59
ADLS_ACUITY_SCORE: 61
ADLS_ACUITY_SCORE: 14
ADLS_ACUITY_SCORE: 10
ADLS_ACUITY_SCORE: 59
ADLS_ACUITY_SCORE: 35
ADLS_ACUITY_SCORE: 59
ADLS_ACUITY_SCORE: 65
ADLS_ACUITY_SCORE: 69
ADLS_ACUITY_SCORE: 10
ADLS_ACUITY_SCORE: 14
ADLS_ACUITY_SCORE: 61
ADLS_ACUITY_SCORE: 59
ADLS_ACUITY_SCORE: 37
ADLS_ACUITY_SCORE: 35
ADLS_ACUITY_SCORE: 65
ADLS_ACUITY_SCORE: 37
ADLS_ACUITY_SCORE: 63
ADLS_ACUITY_SCORE: 63
ADLS_ACUITY_SCORE: 65
DRESSING/BATHING_DIFFICULTY: YES
ADLS_ACUITY_SCORE: 59
ADLS_ACUITY_SCORE: 65
ADLS_ACUITY_SCORE: 61
ADLS_ACUITY_SCORE: 65
ADLS_ACUITY_SCORE: 10
ADLS_ACUITY_SCORE: 59
ADLS_ACUITY_SCORE: 55
ADLS_ACUITY_SCORE: 61
ADLS_ACUITY_SCORE: 59
ADLS_ACUITY_SCORE: 10
ADLS_ACUITY_SCORE: 14
ADLS_ACUITY_SCORE: 59
ADLS_ACUITY_SCORE: 37
DRESS: 2-->COMPLETELY DEPENDENT (NOT DEVELOPMENTALLY APPROPRIATE)
ADLS_ACUITY_SCORE: 10
ADLS_ACUITY_SCORE: 35
ADLS_ACUITY_SCORE: 65
DIFFICULTY_EATING/SWALLOWING: NO
ADLS_ACUITY_SCORE: 66
ADLS_ACUITY_SCORE: 59
ADLS_ACUITY_SCORE: 37
ADLS_ACUITY_SCORE: 59
ADLS_ACUITY_SCORE: 65
DOING_ERRANDS_INDEPENDENTLY_DIFFICULTY: NO
ADLS_ACUITY_SCORE: 65
ADLS_ACUITY_SCORE: 35
ADLS_ACUITY_SCORE: 65
WEAR_GLASSES_OR_BLIND: NO
ADLS_ACUITY_SCORE: 55
ADLS_ACUITY_SCORE: 59
ADLS_ACUITY_SCORE: 57
ADLS_ACUITY_SCORE: 61
ADLS_ACUITY_SCORE: 61
ADLS_ACUITY_SCORE: 10
ADLS_ACUITY_SCORE: 37
ADLS_ACUITY_SCORE: 61
ADLS_ACUITY_SCORE: 10
BATHING: 2-->COMPLETELY DEPENDENT (NOT DEVELOPMENTALLY APPROPRIATE)
ADLS_ACUITY_SCORE: 65
PREVIOUS_RESPONSIBILITIES: MEAL PREP;HOUSEKEEPING;LAUNDRY;SHOPPING;YARDWORK;MEDICATION MANAGEMENT;FINANCES;DRIVING
ADLS_ACUITY_SCORE: 65
ADLS_ACUITY_SCORE: 59
ADLS_ACUITY_SCORE: 53
ADLS_ACUITY_SCORE: 65
ADLS_ACUITY_SCORE: 10
ADLS_ACUITY_SCORE: 44
ADLS_ACUITY_SCORE: 44
ADLS_ACUITY_SCORE: 61
ADLS_ACUITY_SCORE: 14
ADLS_ACUITY_SCORE: 65
ADLS_ACUITY_SCORE: 65
ADLS_ACUITY_SCORE: 61
ADLS_ACUITY_SCORE: 44
ADLS_ACUITY_SCORE: 35
ADLS_ACUITY_SCORE: 65
ADLS_ACUITY_SCORE: 63
ADLS_ACUITY_SCORE: 44
ADLS_ACUITY_SCORE: 61
ADLS_ACUITY_SCORE: 55
ADLS_ACUITY_SCORE: 61
ADLS_ACUITY_SCORE: 65
ADLS_ACUITY_SCORE: 69
ADLS_ACUITY_SCORE: 37
ADLS_ACUITY_SCORE: 37
ADLS_ACUITY_SCORE: 69
ADLS_ACUITY_SCORE: 14
ADLS_ACUITY_SCORE: 59
ADLS_ACUITY_SCORE: 8
ADLS_ACUITY_SCORE: 59
ADLS_ACUITY_SCORE: 65
ADLS_ACUITY_SCORE: 37
ADLS_ACUITY_SCORE: 7
ADLS_ACUITY_SCORE: 65
ADLS_ACUITY_SCORE: 59
ADLS_ACUITY_SCORE: 63
ADLS_ACUITY_SCORE: 37
ADLS_ACUITY_SCORE: 63
ADLS_ACUITY_SCORE: 59
ADLS_ACUITY_SCORE: 61
ADLS_ACUITY_SCORE: 43
ADLS_ACUITY_SCORE: 65
ADLS_ACUITY_SCORE: 14
ADLS_ACUITY_SCORE: 14
ADLS_ACUITY_SCORE: 65
ADLS_ACUITY_SCORE: 37
ADLS_ACUITY_SCORE: 59
ADLS_ACUITY_SCORE: 14
ADLS_ACUITY_SCORE: 65
ADLS_ACUITY_SCORE: 14
ADLS_ACUITY_SCORE: 59
ADLS_ACUITY_SCORE: 65
ADLS_ACUITY_SCORE: 65
ADLS_ACUITY_SCORE: 61
ADLS_ACUITY_SCORE: 57
ADLS_ACUITY_SCORE: 63
ADLS_ACUITY_SCORE: 44
ADLS_ACUITY_SCORE: 37
ADLS_ACUITY_SCORE: 59
ADLS_ACUITY_SCORE: 65
ADLS_ACUITY_SCORE: 63
ADLS_ACUITY_SCORE: 69
ADLS_ACUITY_SCORE: 69
ADLS_ACUITY_SCORE: 14
ADLS_ACUITY_SCORE: 65
ADLS_ACUITY_SCORE: 14
ADLS_ACUITY_SCORE: 8
ADLS_ACUITY_SCORE: 35
ADLS_ACUITY_SCORE: 14
ADLS_ACUITY_SCORE: 37
ADLS_ACUITY_SCORE: 57
ADLS_ACUITY_SCORE: 61
ADLS_ACUITY_SCORE: 65
ADLS_ACUITY_SCORE: 61
ADLS_ACUITY_SCORE: 53
ADLS_ACUITY_SCORE: 10
DRESS: 2-->COMPLETELY DEPENDENT
ADLS_ACUITY_SCORE: 14
ADLS_ACUITY_SCORE: 35
ADLS_ACUITY_SCORE: 14
ADLS_ACUITY_SCORE: 66
ADLS_ACUITY_SCORE: 10
ADLS_ACUITY_SCORE: 61
ADLS_ACUITY_SCORE: 59
ADLS_ACUITY_SCORE: 65
ADLS_ACUITY_SCORE: 63
ADLS_ACUITY_SCORE: 66
ADLS_ACUITY_SCORE: 65
ADLS_ACUITY_SCORE: 69
ADLS_ACUITY_SCORE: 63
ADLS_ACUITY_SCORE: 57
ADLS_ACUITY_SCORE: 69
ADLS_ACUITY_SCORE: 69
ADLS_ACUITY_SCORE: 35
TOILETING: 2-->COMPLETELY DEPENDENT
ADLS_ACUITY_SCORE: 61
ADLS_ACUITY_SCORE: 63
ADLS_ACUITY_SCORE: 69
ADLS_ACUITY_SCORE: 59
ADLS_ACUITY_SCORE: 61
NUMBER_OF_TIMES_PATIENT_HAS_FALLEN_WITHIN_LAST_SIX_MONTHS: 4
ADLS_ACUITY_SCORE: 10
ADLS_ACUITY_SCORE: 61
ADLS_ACUITY_SCORE: 63
ADLS_ACUITY_SCORE: 59
ADLS_ACUITY_SCORE: 63
ADLS_ACUITY_SCORE: 65
ADLS_ACUITY_SCORE: 61
ADLS_ACUITY_SCORE: 35
ADLS_ACUITY_SCORE: 59
ADLS_ACUITY_SCORE: 14
ADLS_ACUITY_SCORE: 43
ADLS_ACUITY_SCORE: 65
ADLS_ACUITY_SCORE: 10
ADLS_ACUITY_SCORE: 59
ADLS_ACUITY_SCORE: 35
ADLS_ACUITY_SCORE: 63
ADLS_ACUITY_SCORE: 65
ADLS_ACUITY_SCORE: 65
ADLS_ACUITY_SCORE: 69
ADLS_ACUITY_SCORE: 14
ADLS_ACUITY_SCORE: 61
ADLS_ACUITY_SCORE: 10
ADLS_ACUITY_SCORE: 63
ADLS_ACUITY_SCORE: 69
TOILETING: 1-->ASSISTANCE (EQUIPMENT/PERSON) NEEDED (NOT DEVELOPMENTALLY APPROPRIATE)
ADLS_ACUITY_SCORE: 66
ADLS_ACUITY_SCORE: 35
ADLS_ACUITY_SCORE: 43
ADLS_ACUITY_SCORE: 65
ADLS_ACUITY_SCORE: 65
ADLS_ACUITY_SCORE: 61
ADLS_ACUITY_SCORE: 14
ADLS_ACUITY_SCORE: 65
ADLS_ACUITY_SCORE: 61
ADLS_ACUITY_SCORE: 14
ADLS_ACUITY_SCORE: 59
ADLS_ACUITY_SCORE: 59
ADLS_ACUITY_SCORE: 35
ADLS_ACUITY_SCORE: 65
ADLS_ACUITY_SCORE: 14
ADLS_ACUITY_SCORE: 66
ADLS_ACUITY_SCORE: 65
ADLS_ACUITY_SCORE: 59
ADLS_ACUITY_SCORE: 37
DRESSING/BATHING: BATHING DIFFICULTY, DEPENDENT;DRESSING DIFFICULTY, DEPENDENT
ADLS_ACUITY_SCORE: 35
ADLS_ACUITY_SCORE: 61
ADLS_ACUITY_SCORE: 69
ADLS_ACUITY_SCORE: 67
ADLS_ACUITY_SCORE: 14
ADLS_ACUITY_SCORE: 10
ADLS_ACUITY_SCORE: 14
ADLS_ACUITY_SCORE: 55
ADLS_ACUITY_SCORE: 65
ADLS_ACUITY_SCORE: 59
ADLS_ACUITY_SCORE: 57
FALL_HISTORY_WITHIN_LAST_SIX_MONTHS: YES
ADLS_ACUITY_SCORE: 65
ADLS_ACUITY_SCORE: 61
ADLS_ACUITY_SCORE: 63
ADLS_ACUITY_SCORE: 35
ADLS_ACUITY_SCORE: 69
ADLS_ACUITY_SCORE: 59
ADLS_ACUITY_SCORE: 65
ADLS_ACUITY_SCORE: 55
ADLS_ACUITY_SCORE: 14
ADLS_ACUITY_SCORE: 65
ADLS_ACUITY_SCORE: 14
WALKING_OR_CLIMBING_STAIRS: AMBULATION DIFFICULTY, DEPENDENT;TRANSFERRING DIFFICULTY, DEPENDENT;STAIR CLIMBING DIFFICULTY, DEPENDENT
ADLS_ACUITY_SCORE: 4
ADLS_ACUITY_SCORE: 57
ADLS_ACUITY_SCORE: 14
ADLS_ACUITY_SCORE: 65
ADLS_ACUITY_SCORE: 35
ADLS_ACUITY_SCORE: 59
CHANGE_IN_FUNCTIONAL_STATUS_SINCE_ONSET_OF_CURRENT_ILLNESS/INJURY: NO
ADLS_ACUITY_SCORE: 63
ADLS_ACUITY_SCORE: 10
ADLS_ACUITY_SCORE: 14
ADLS_ACUITY_SCORE: 66
ADLS_ACUITY_SCORE: 44
ADLS_ACUITY_SCORE: 63
ADLS_ACUITY_SCORE: 59
ADLS_ACUITY_SCORE: 61
ADLS_ACUITY_SCORE: 61
ADLS_ACUITY_SCORE: 65
ADLS_ACUITY_SCORE: 61
ADLS_ACUITY_SCORE: 65
ADLS_ACUITY_SCORE: 65
TRANSFERRING: 2-->COMPLETELY DEPENDENT
ADLS_ACUITY_SCORE: 44
ADLS_ACUITY_SCORE: 43
ADLS_ACUITY_SCORE: 53
ADLS_ACUITY_SCORE: 59
ADLS_ACUITY_SCORE: 35
ADLS_ACUITY_SCORE: 69
ADLS_ACUITY_SCORE: 59
ADLS_ACUITY_SCORE: 61
ADLS_ACUITY_SCORE: 59
ADLS_ACUITY_SCORE: 59
ADLS_ACUITY_SCORE: 65
ADLS_ACUITY_SCORE: 69
ADLS_ACUITY_SCORE: 54
ADLS_ACUITY_SCORE: 14
ADLS_ACUITY_SCORE: 37
ADLS_ACUITY_SCORE: 37
ADLS_ACUITY_SCORE: 63
ADLS_ACUITY_SCORE: 63
ADLS_ACUITY_SCORE: 59
ADLS_ACUITY_SCORE: 65
ADLS_ACUITY_SCORE: 59
ADLS_ACUITY_SCORE: 65
ADLS_ACUITY_SCORE: 69
ADLS_ACUITY_SCORE: 65
ADLS_ACUITY_SCORE: 65
ADLS_ACUITY_SCORE: 57
ADLS_ACUITY_SCORE: 65
ADLS_ACUITY_SCORE: 55
ADLS_ACUITY_SCORE: 37
ADLS_ACUITY_SCORE: 14
ADLS_ACUITY_SCORE: 59
ADLS_ACUITY_SCORE: 14
ADLS_ACUITY_SCORE: 65
ADLS_ACUITY_SCORE: 55
ADLS_ACUITY_SCORE: 61
ADLS_ACUITY_SCORE: 10
ADLS_ACUITY_SCORE: 14
ADLS_ACUITY_SCORE: 69
ADLS_ACUITY_SCORE: 59
ADLS_ACUITY_SCORE: 65
ADLS_ACUITY_SCORE: 57
ADLS_ACUITY_SCORE: 35
ADLS_ACUITY_SCORE: 67
ADLS_ACUITY_SCORE: 37

## 2022-01-01 ASSESSMENT — ENCOUNTER SYMPTOMS
ENDOCRINE NEGATIVE: 1
CONFUSION: 0
FACIAL ASYMMETRY: 0
FACIAL SWELLING: 0
CONFUSION: 1
EYE PAIN: 0
LIGHT-HEADEDNESS: 0
COUGH: 0
NEUROLOGICAL NEGATIVE: 1
SLEEP DISTURBANCE: 0
FEVER: 0
UNEXPECTED WEIGHT CHANGE: 0
EYES NEGATIVE: 1
CARDIOVASCULAR NEGATIVE: 1
CHILLS: 0
APPETITE CHANGE: 1
ABDOMINAL PAIN: 0
PALPITATIONS: 0
VOMITING: 1
SEIZURES: 0
STRIDOR: 0
HEADACHES: 0
GASTROINTESTINAL NEGATIVE: 1
FATIGUE: 1
COUGH: 0
SHORTNESS OF BREATH: 0
FEVER: 0
RESPIRATORY NEGATIVE: 1
DIZZINESS: 0
BACK PAIN: 0
SHORTNESS OF BREATH: 0
AGITATION: 0
NAUSEA: 1
FLANK PAIN: 0
BACK PAIN: 0
ACTIVITY CHANGE: 0
MUSCULOSKELETAL NEGATIVE: 1
FATIGUE: 1
TREMORS: 0

## 2022-01-01 ASSESSMENT — MIFFLIN-ST. JEOR
SCORE: 1315.47
SCORE: 1218.85
SCORE: 1222.02
SCORE: 1217.49

## 2022-01-01 ASSESSMENT — COPD QUESTIONNAIRES
CAT_SEVERITY: MODERATE
COPD: 1

## 2022-01-01 ASSESSMENT — LIFESTYLE VARIABLES: TOBACCO_USE: 1

## 2022-01-01 ASSESSMENT — PAIN SCALES - GENERAL: PAINLEVEL: NO PAIN (0)

## 2022-01-03 PROBLEM — E87.20 ACIDOSIS: Status: RESOLVED | Noted: 2022-01-01 | Resolved: 2022-01-01

## 2022-01-03 PROBLEM — E87.20 LACTIC ACIDOSIS: Status: ACTIVE | Noted: 2022-01-01

## 2022-01-03 PROBLEM — E87.20 METABOLIC ACIDOSIS: Status: RESOLVED | Noted: 2021-05-11 | Resolved: 2022-01-01

## 2022-01-03 PROBLEM — E87.20 ACIDOSIS: Status: ACTIVE | Noted: 2022-01-01

## 2022-01-03 PROBLEM — R73.9 HYPERGLYCEMIA: Status: ACTIVE | Noted: 2022-01-01

## 2022-01-03 PROBLEM — R41.0 CONFUSION: Status: ACTIVE | Noted: 2022-01-01

## 2022-01-03 PROBLEM — R79.89 ELEVATED TROPONIN: Status: ACTIVE | Noted: 2022-01-01

## 2022-01-03 PROBLEM — G93.40 ENCEPHALOPATHY: Status: ACTIVE | Noted: 2022-01-01

## 2022-01-03 PROBLEM — E11.9 TYPE 2 DIABETES MELLITUS, WITH LONG-TERM CURRENT USE OF INSULIN (H): Status: ACTIVE | Noted: 2021-06-30

## 2022-01-03 PROBLEM — Z79.4 TYPE 2 DIABETES MELLITUS, WITH LONG-TERM CURRENT USE OF INSULIN (H): Status: ACTIVE | Noted: 2021-06-30

## 2022-01-03 PROBLEM — E87.1 HYPONATREMIA: Status: ACTIVE | Noted: 2020-09-21

## 2022-01-03 PROBLEM — R73.9 HYPERGLYCEMIA: Status: RESOLVED | Noted: 2022-01-01 | Resolved: 2022-01-01

## 2022-01-03 PROBLEM — R09.02 HYPOXIA: Status: RESOLVED | Noted: 2021-05-11 | Resolved: 2022-01-01

## 2022-01-03 PROBLEM — I63.89 CEREBROVASCULAR ACCIDENT (CVA) DUE TO OTHER MECHANISM (H): Status: ACTIVE | Noted: 2022-01-01

## 2022-01-03 PROBLEM — R41.0 CONFUSION: Status: RESOLVED | Noted: 2022-01-01 | Resolved: 2022-01-01

## 2022-01-03 NOTE — ED NOTES
Spoke to the patient's brother, Neel Valenzuela with an update. Patient provided consent prior to call being placed. Aware and will call back.     Reached at: [phone]

## 2022-01-03 NOTE — ED NOTES
Spoke to Provider regarding orders, give the second 7 units of insulin IV, reassess BG one hour later and reassess need for IV insulin drip. Hold on drip for this time. Provider aware.

## 2022-01-03 NOTE — ED NOTES
Writer spoke to provider Felipa regarding the recent blood glucose being 151 at 1500 check after last 7 units of insulin. Provider aware of BG levels and hold on the insulin drip for now. Passed onto receiving RN.

## 2022-01-03 NOTE — ED TRIAGE NOTES
"ED Nursing Triage Note (General)   ________________________________    Neel Kerr is a 81 year old Male that presents to triage private car  With history of  \"I don't feel good\" Pt reports to feel nauseated and has thrown up about 4x since yesterday when it all started. Pt denies abdominal or chest pain. When asked if he is SOB pt states \"Oh, maybe just a little\". Pt is drinking water in triage and when asked if the water is making him feel nauseated pt states \"no\". Pt denies eating anything new.     When going over MED REC pt states \"I don't know, I have not been taking my pills\". Pt says he does not know when he stopped. Pt is on lantus and metformin    /71   Pulse 98   Temp 96.8  F (36  C) (Tympanic)   Resp 20   Ht 1.676 m (5' 6\")   Wt 65.8 kg (145 lb)   SpO2 94%   BMI 23.40 kg/m  t  Patient appears alert  and oriented, in no acute distress., and cooperative and pleasant behavior.  GCS Total = 15  Airway: intact  Breathing noted as Normal  Circulation Normal  Skin:  Normal  Action taken:  Triage to critical care immediately      PRE HOSPITAL PRIOR LIVING SITUATION Alone  "
Is This A New Presentation, Or A Follow-Up?: Follow Up Nodular Acne
Additional History: ON 75MG SHYAM WITH TOTAL CLEARANCE OF ACNE. TRIED 100MG FOR 2 DAYS BUT HAD DIZZINESS SO REDUCED BACK TO 75MG WITH NO SIDE EFFECTS. LAST BMP 6/2017

## 2022-01-03 NOTE — ED NOTES
Pt's friend called to inform pt's nurse/MD that pt is diabetic, his house is cold, and he doesn't know if pt has been eating right.

## 2022-01-03 NOTE — ED PROVIDER NOTES
History     Chief Complaint   Patient presents with     Nausea     Vomiting     HPI  Neel Kerr is a 81 year old male who resides in his own home in [place].  He reports not feeling good that started yesterday.  He has had 4 episodes of emesis but has been able to keep some water down.  Denies any fever or chills.  He reports he is not been very consistent taking his medications.  He is on Metformin and Lantus and does not remember the last time he took these medications.  Does not appear to be in any distress denies any other issues at this time.  The patient has received his Covid series and recently received his booster on 12/23/2021.      Allergies:  Allergies   Allergen Reactions     Augmentin      Marked as an allergy at the [facility]       Problem List:    Patient Active Problem List    Diagnosis Date Noted     Hx of transurethral resection of prostate 09/10/2021     Priority: Medium     7/27/2021       Urinary retention due to benign prostatic hyperplasia 07/22/2021     Priority: Medium     Added automatically from request for surgery [id number]    Resolved with TURP performed on 7/27/21       Underweight 07/02/2021     Priority: Medium     Sepsis due to other etiology (H) 06/30/2021     Priority: Medium     Multifocal pneumonia 06/30/2021     Priority: Medium     Type 2 diabetes mellitus, with long-term current use of insulin (H) 06/30/2021     Priority: Medium     COPD (chronic obstructive pulmonary disease) (H) 05/18/2021     Priority: Medium     Oropharyngeal dysphagia 05/13/2021     Priority: Medium     Polycystic kidney disease 05/12/2021     Priority: Medium     Diabetic ketoacidosis without coma associated with type 2 diabetes mellitus (H) 05/11/2021     Priority: Medium     Metabolic acidosis 05/11/2021     Priority: Medium     Hypoxia 05/11/2021     Priority: Medium     Fall 05/11/2021     Priority: Medium     Acute on chronic renal failure (H) 05/11/2021     Priority: Medium     Hyponatremia 09/21/2020      Priority: Medium     Acute on chronic respiratory failure with hypoxia (H) 2020     Priority: Medium     Pneumonia due to 2019 novel coronavirus 2020     Priority: Medium     Psoriasis 2018     Priority: Medium     Non-ST elevation (NSTEMI) myocardial infarction (H) 2017     Priority: Medium     HTN (hypertension) 2015     Priority: Medium        Past Medical History:    Past Medical History:   Diagnosis Date     Essential (primary) hypertension      Hyperlipidemia      Non-ST elevation (NSTEMI) myocardial infarction (H)      Old myocardial infarction      Polyneuropathy      Type 2 diabetes mellitus without complications (H)        Past Surgical History:    Past Surgical History:   Procedure Laterality Date     CYSTOSCOPY, TRANSURETHRAL RESECTION (TUR) PROSTATE, COMBINED N/A 2021    Procedure: Transurethral resection of prostate;  Surgeon: Antonio August MD;  Location:  OR       Family History:    No family history on file.    Social History:  Marital Status:  Single [1]  Social History     Tobacco Use     Smoking status: Former Smoker     Quit date: 1992     Years since quittin.0     Smokeless tobacco: Never Used   Vaping Use     Vaping Use: Never used   Substance Use Topics     Alcohol use: No     Comment: Alcoholic Drinks/day: quit 1985     Drug use: No        Medications:    acetaminophen (TYLENOL) 325 MG tablet  albuterol (PROAIR HFA/PROVENTIL HFA/VENTOLIN HFA) 108 (90 Base) MCG/ACT inhaler  amLODIPine (NORVASC) 5 MG tablet  atorvastatin (LIPITOR) 40 MG tablet  insulin glargine (LANTUS PEN) 100 UNIT/ML pen  ipratropium-albuterol (COMBIVENT RESPIMAT)  MCG/ACT inhaler  metFORMIN (GLUCOPHAGE) 1000 MG tablet  tamsulosin (FLOMAX) 0.4 MG capsule          Review of Systems   Constitutional: Positive for appetite change. Negative for activity change, chills and fever.   HENT: Negative for drooling and facial swelling.    Eyes: Negative for pain and  "visual disturbance.   Respiratory: Negative for cough, shortness of breath and stridor.    Cardiovascular: Negative for chest pain.   Gastrointestinal: Positive for nausea and vomiting. Negative for abdominal pain.   Genitourinary: Negative for flank pain.   Musculoskeletal: Negative for back pain.   Skin: Negative for pallor.   Neurological: Negative for dizziness, tremors, seizures, facial asymmetry, light-headedness and headaches.   Psychiatric/Behavioral: Negative for agitation and confusion.   All other systems reviewed and are negative.      Physical Exam   BP: 129/71  Pulse: 98  Temp: 96.8  F (36  C)  Resp: 20  Height: 167.6 cm (5' 6\")  Weight: 65.8 kg (145 lb)  SpO2: 94 %      Physical Exam  Vitals and nursing note reviewed.   Constitutional:       General: He is not in acute distress.     Appearance: Normal appearance. He is not ill-appearing or toxic-appearing.   HENT:      Head: Normocephalic. No raccoon eyes, right periorbital erythema or left periorbital erythema.      Right Ear: No drainage or tenderness.      Left Ear: No drainage or tenderness.      Nose: Nose normal.   Eyes:      General: Lids are normal. Gaze aligned appropriately. No scleral icterus.     Extraocular Movements: Extraocular movements intact.   Neck:      Trachea: No tracheal deviation.   Cardiovascular:      Rate and Rhythm: Normal rate.   Pulmonary:      Effort: Pulmonary effort is normal. No respiratory distress.      Breath sounds: No stridor. No wheezing.      Comments: Lung sounds are clear.  SaO2 is 94% on room air.  He is noted to be coughing on occasion.  He does not appear to be in any respiratory distress.  No tachypnea.  Abdominal:      General: Bowel sounds are normal. There is no distension.      Tenderness: There is no abdominal tenderness. There is no right CVA tenderness or left CVA tenderness.      Comments: Abdomen is soft nontender no rebound or masses.  Bowel sounds are normal.   Musculoskeletal:         General: " No deformity or signs of injury. Normal range of motion.      Cervical back: Normal range of motion. No signs of trauma.   Skin:     General: Skin is warm and dry.      Coloration: Skin is not jaundiced or pale.   Neurological:      General: No focal deficit present.      Mental Status: He is alert and oriented to person, place, and time.      GCS: GCS eye subscore is 4. GCS verbal subscore is 5. GCS motor subscore is 6.      Motor: No tremor or seizure activity.   Psychiatric:         Attention and Perception: Attention normal.         Mood and Affect: Mood normal.         ED Course     EKG shows a sinus rhythm with P ACEs.  Left anterior fascicular block.  Minimal voltage criteria for LVH may be normal variant.  Septal infarct, age undetermined.  EKG is essentially unchanged from previous EKG on 7/23/2021  Results for orders placed or performed during the hospital encounter of 01/03/22 (from the past 24 hour(s))   EKG 12-lead, tracing only   Result Value Ref Range    Systolic Blood Pressure  mmHg    Diastolic Blood Pressure  mmHg    Ventricular Rate 83 BPM    Atrial Rate 83 BPM    AL Interval 164 ms    QRS Duration 92 ms     ms    QTc 474 ms    P Axis 36 degrees    R AXIS -64 degrees    T Axis 50 degrees    Interpretation ECG       Sinus rhythm with Premature supraventricular complexes  Left anterior fascicular block  Minimal voltage criteria for LVH, may be normal variant  Septal infarct (cited on or before 30-JUN-2021)  Abnormal ECG  When compared with ECG of 23-JUL-2021 11:12,  Premature supraventricular complexes are now Present  Questionable change in initial forces of Anteroseptal leads  Confirmed by MD TOI, JACQUELYN ([id number]) on 1/3/2022 4:12:51 PM     CBC with platelets differential    Narrative    The following orders were created for panel order CBC with platelets differential.  Procedure                               Abnormality         Status                     ---------                                -----------         ------                     CBC with platelets and d...[[id number]]  Abnormal            Final result                 Please view results for these tests on the individual orders.   INR   Result Value Ref Range    INR 0.98 0.85 - 1.15   Comprehensive metabolic panel   Result Value Ref Range    Sodium 130 (L) 134 - 144 mmol/L    Potassium 4.6 3.5 - 5.1 mmol/L    Chloride 88 (L) 98 - 107 mmol/L    Carbon Dioxide (CO2) 18 (L) 21 - 31 mmol/L    Anion Gap 24 (H) 3 - 14 mmol/L    Urea Nitrogen 38 (H) 7 - 25 mg/dL    Creatinine 1.27 0.70 - 1.30 mg/dL    Calcium 10.2 8.6 - 10.3 mg/dL    Glucose 397 (H) 70 - 105 mg/dL    Alkaline Phosphatase 86 34 - 104 U/L    AST 16 13 - 39 U/L    ALT 11 7 - 52 U/L    Protein Total 8.3 6.4 - 8.9 g/dL    Albumin 4.7 3.5 - 5.7 g/dL    Bilirubin Total 0.7 0.3 - 1.0 mg/dL    GFR Estimate 57 (L) >60 mL/min/1.73m2   Troponin I   Result Value Ref Range    Troponin I 103.3 (H) 0.0 - 34.0 pg/mL   Lactic acid whole blood   Result Value Ref Range    Lactic Acid 3.2 (H) 0.7 - 2.0 mmol/L   TSH Reflex GH   Result Value Ref Range    TSH 3.76 0.40 - 4.00 mU/L   CRP inflammation   Result Value Ref Range    CRP Inflammation 24.0 (H) <10.0 mg/L   Nt probnp inpatient (BNP)   Result Value Ref Range    N terminal Pro BNP Inpatient 189 (H) 0 - 100 pg/mL   Erythrocyte sedimentation rate auto   Result Value Ref Range    Erythrocyte Sedimentation Rate 7 0 - 20 mm/hr   Ketone Beta-Hydroxybutyrate Quantitative   Result Value Ref Range    Ketone (Beta-Hydroxybutyrate) Quantitative 7.0 (HH) 0.0 - 0.6 mmol/L   CBC with platelets and differential   Result Value Ref Range    WBC Count 18.4 (H) 4.0 - 11.0 10e3/uL    RBC Count 5.26 4.40 - 5.90 10e6/uL    Hemoglobin 16.1 13.3 - 17.7 g/dL    Hematocrit 47.8 40.0 - 53.0 %    MCV 91 78 - 100 fL    MCH 30.6 26.5 - 33.0 pg    MCHC 33.7 31.5 - 36.5 g/dL    RDW 13.7 10.0 - 15.0 %    Platelet Count 280 150 - 450 10e3/uL    % Neutrophils 88 %    % Lymphocytes 7 %     % Monocytes 4 %    % Eosinophils 0 %    % Basophils 0 %    % Immature Granulocytes 1 %    NRBCs per 100 WBC 0 <1 /100    Absolute Neutrophils 16.3 (H) 1.6 - 8.3 10e3/uL    Absolute Lymphocytes 1.3 0.8 - 5.3 10e3/uL    Absolute Monocytes 0.7 0.0 - 1.3 10e3/uL    Absolute Eosinophils 0.0 0.0 - 0.7 10e3/uL    Absolute Basophils 0.0 0.0 - 0.2 10e3/uL    Absolute Immature Granulocytes 0.1 <=0.4 10e3/uL    Absolute NRBCs 0.0 10e3/uL   Procalcitonin   Result Value Ref Range    Procalcitonin 0.66 <0.50 ng/mL ng/mL   XR Abdomen 2 Views    Narrative    PROCEDURE: XR ABDOMEN 2VIEWS 1/3/2022 11:26 AM    HISTORY: nausea and vomiting    COMPARISONS: None.    TECHNIQUE: Supine and upright views.    FINDINGS: No free intraperitoneal air is seen. Gas is seen within the  colon and in small bowel loops in the right lower quadrant. One of the  small bowel loops is mildly dilated measuring 3.2 cm. This is  nonspecific.    No mass is seen and there are no suspicious calcifications. Vascular  calcifications are seen. There is mild degenerative change in the  spine.         Impression    IMPRESSION: Nonspecific abdominal bowel gas pattern with mildly  dilated small bowel loops in the right lower quadrant.    ROGER SPEARS MD         SYSTEM ID:  [id number]   XR Chest 2 Views    Narrative    PROCEDURE: XR CHEST 2 VW 1/3/2022 11:26 AM    HISTORY: cough    COMPARISONS: 6/30/2021.    TECHNIQUE: 2 views.    FINDINGS: Heart is not enlarged. Lungs are relatively clear and no  pleural effusion is seen.         Impression    IMPRESSION: No acute infiltrate.    ROGER SPAERS MD         SYSTEM ID:  [id number]   CT Abdomen Pelvis w Contrast    Narrative    PROCEDURE:  CT ABDOMEN PELVIS W CONTRAST    HISTORY: Abdominal abscess/infection suspected; Nausea/vomiting    TECHNIQUE: Helical CT of the abdomen and pelvis was performed  following injection of intravenous contrast.    COMPARISON: None.    MEDS/CONTRAST: 84 ml isovue 370    FINDINGS:       Limited images through the lung bases demonstrate advanced pulmonary  fibrosis. There is thickening of the distal esophagus.    The liver demonstrates no mass or intrahepatic biliary ductal  dilatation. The gallbladder, spleen, pancreas and adrenal glands are  unremarkable. Symmetric nephrograms are present without  hydronephrosis. The aorta is normal in size with scattered  atherosclerotic calcifications.    The bowel is normal in caliber. The prostate is enlarged.    No free fluid, free air or adenopathy is present. No suspicious  osseous lesions are identified.      Impression    IMPRESSION:      No evidence of bowel obstruction. Thickening of the distal esophagus  likely reflects the history of emesis.    JONATAN CARTER MD         SYSTEM ID:  [id number]   CT Head w/o Contrast    Narrative    PROCEDURE: CT HEAD W/O CONTRAST     HISTORY: Mental status change, unknown cause; Elevated beta  hydroxybutyrate, DKA, increased confusion concerns for encephalopathy.    COMPARISON: 5/13/2021    TECHNIQUE:  Helical images of the head from the foramen magnum to the  vertex were obtained without contrast.    FINDINGS: The ventricles and sulci are prominent, compatible with  mild, generalized volume loss. No acute intracranial hemorrhage, mass  effect, midline shift, hydrocephalus or basilar cystern effacement are  present.    New focal hypoattenuation is present in the right caudate when  compared to the prior dated 5/13/2021.    The calvarium is intact.       Impression    IMPRESSION: Interval right caudate ischemic injury when compared to  5/13/2021. Consider MR for further assessment.    JONATAN CARTER MD         SYSTEM ID:  [id number]   Troponin I   Result Value Ref Range    Troponin I 105.4 (H) 0.0 - 34.0 pg/mL   Lactic acid whole blood   Result Value Ref Range    Lactic Acid 4.0 (HH) 0.7 - 2.0 mmol/L   UA with Microscopic reflex to Culture    Specimen: Urine, Clean Catch   Result Value Ref Range    Color Urine  Light Yellow Colorless, Straw, Light Yellow, Yellow    Appearance Urine Clear Clear    Glucose Urine >1000 (A) Negative mg/dL    Bilirubin Urine Negative Negative    Ketones Urine >150 (A) Negative mg/dL    Specific Gravity Urine 1.040 (H) 1.003 - 1.035    Blood Urine Trace (A) Negative    pH Urine 5.5 5.0 - 9.0    Protein Albumin Urine 200  (A) Negative mg/dL    Urobilinogen Urine Normal Normal, 2.0 mg/dL    Nitrite Urine Negative Negative    Leukocyte Esterase Urine Negative Negative    Mucus Urine Present (A) None Seen /LPF    RBC Urine 0 <=2 /HPF    WBC Urine <1 <=5 /HPF    Hyaline Casts Urine 1 <=2 /LPF    Narrative    Urine Culture not indicated   MR Brain w/o Contrast    Narrative    EXAM:  MR BRAIN W/O CONTRAST    HISTORY:  Mental status change, unknown cause. .    TECHNIQUE:  Sagittal T1, axial T1, T2, FLAIR, and diffusion  noncontrast imaging of the whole brain was performed.    COMPARISON:  CT 1/3/2022     FINDINGS:    Prominence of the ventricles and sulci is compatible with moderate,  generalized volume loss. No abnormal extra-axial collection,  hydrocephalus, mass effect or midline shift is seen. The basal  cisterns are preserved.    3 foci of diffusion restriction are present in the right corona  radiata. A lacunar injury of the right caudate head is redemonstrated,  without diffusion restriction, therefore chronic. The major  intracranial flow voids are grossly preserved. Moderate chronic  microvascular ischemic changes are noted.    No suspicious marrow lesion is identified.      Impression    IMPRESSION: 3 punctate acute to subacute infarcts of the right frontal  corona radiata. Chronic appearing right caudate head infarct.    JONATAN CARTER MD         SYSTEM ID:  [id number]   Asymptomatic Influenza A/B & SARS-CoV2 (COVID-19) Virus PCR Multiplex Nose    Specimen: Nose; Swab   Result Value Ref Range    Influenza A PCR Negative Negative    Influenza B PCR Negative Negative    RSV PCR Negative  Negative    SARS CoV2 PCR Negative Negative    Narrative    Testing was performed using the Xpert Xpress CoV2/Flu/RSV Assay on the [employer] GeneXpert Instrument. This test should be ordered for the detection of SARS-CoV-2 and influenza viruses in individuals who meet clinical and/or epidemiological criteria. Test performance is unknown in asymptomatic patients. This test is for in vitro diagnostic use under the FDA EUA for laboratories certified under CLIA to perform high or moderate complexity testing. This test has not been FDA cleared or approved. A negative result does not rule out the presence of PCR inhibitors in the specimen or target RNA in concentration below the limit of detection for the assay. If only one viral target is positive but coinfection with multiple targets is suspected, the sample should be re-tested with another FDA cleared, approved, or authorized test, if coinfection would change clinical management. This test was validated by the [facility]. These laboratories are certified under the Clinical  Laboratory Improvement Amendments of 1988 (CLIA-88) as qualified to perform high complexity laboratory testing.   Basic metabolic panel   Result Value Ref Range    Sodium 131 (L) 134 - 144 mmol/L    Potassium 4.2 3.5 - 5.1 mmol/L    Chloride 90 (L) 98 - 107 mmol/L    Carbon Dioxide (CO2) 20 (L) 21 - 31 mmol/L    Anion Gap 21 (H) 3 - 14 mmol/L    Urea Nitrogen 42 (H) 7 - 25 mg/dL    Creatinine 1.22 0.70 - 1.30 mg/dL    Calcium 9.4 8.6 - 10.3 mg/dL    Glucose 120 (H) 70 - 105 mg/dL    GFR Estimate 60 (L) >60 mL/min/1.73m2   Troponin I   Result Value Ref Range    Troponin I 107.9 (H) 0.0 - 34.0 pg/mL   Lactic acid whole blood   Result Value Ref Range    Lactic Acid 2.7 (H) 0.7 - 2.0 mmol/L       Medications   lidocaine 1 % 0.1-1 mL (has no administration in time range)   lidocaine (LMX4) cream (has no administration in time range)   sodium chloride (PF) 0.9% PF flush 3 mL (has no  administration in time range)   sodium chloride (PF) 0.9% PF flush 3 mL (has no administration in time range)   Medication Instructions - Avoid dextrose in IV solutions. (has no administration in time range)   medication instruction - No oral meds if patient didn't pass dysphagia screen (has no administration in time range)   glucose gel 15-30 g (has no administration in time range)     Or   dextrose 50 % injection 25-50 mL (has no administration in time range)     Or   glucagon injection 1 mg (has no administration in time range)   sodium chloride 0.9% infusion (has no administration in time range)   aspirin (ASA) EC tablet 325 mg (has no administration in time range)     Or   aspirin (ASA) chewable tablet 324 mg (has no administration in time range)     Or   aspirin (ASA) Suppository 300 mg (has no administration in time range)   labetalol (NORMODYNE/TRANDATE) injection 10-20 mg (has no administration in time range)     Or   hydrALAZINE (APRESOLINE) injection 10-20 mg (has no administration in time range)   prochlorperazine (COMPAZINE) injection 5 mg (has no administration in time range)     Or   prochlorperazine (COMPAZINE) tablet 5 mg (has no administration in time range)     Or   prochlorperazine (COMPAZINE) suppository 12.5 mg (has no administration in time range)   ondansetron (ZOFRAN-ODT) ODT tab 4 mg (has no administration in time range)     Or   ondansetron (ZOFRAN) injection 4 mg (has no administration in time range)   insulin aspart (NovoLOG) injection (RAPID ACTING) (has no administration in time range)   insulin aspart (NovoLOG) injection (RAPID ACTING) (has no administration in time range)   tamsulosin (FLOMAX) capsule 0.4 mg (has no administration in time range)   insulin glargine (LANTUS PEN) injection 9 Units (has no administration in time range)   atorvastatin (LIPITOR) tablet 40 mg (has no administration in time range)   amLODIPine (NORVASC) tablet 5 mg (has no administration in time range)    famotidine (PEPCID) tablet 20 mg (has no administration in time range)   0.9% sodium chloride BOLUS (0 mLs Intravenous Stopped 1/3/22 1626)   insulin (regular) (HumuLIN R/NovoLIN R) injection 7 Units (7 Units Intravenous Given 1/3/22 1208)   insulin (regular) (HumuLIN R/NovoLIN R) injection 7 Units (7 Units Intravenous Given 1/3/22 1355)   iopamidol (ISOVUE-370) solution 84 mL (84 mLs Intravenous Given 1/3/22 1227)   0.9% sodium chloride BOLUS (0 mLs Intravenous Stopped 1/3/22 1824)   ondansetron (ZOFRAN) injection 4 mg (4 mg Intravenous Given 1/3/22 1356)   cefTRIAXone in d5w (ROCEPHIN) intermittent infusion 1 g (0 g Intravenous Stopped 1/3/22 1522)   ondansetron (ZOFRAN) injection 4 mg (4 mg Intravenous Given 1/3/22 1837)       Assessments & Plan (with Medical Decision Making)     I have reviewed the nursing notes.    I have reviewed the findings, diagnosis, plan and need for follow up with the patient.       ED to Inpatient Handoff:    Discuss[facility] with Dr. Chris at [facility]  Patient accepted for Inpatient Stay  Pending studies include blood cultures,   Code Status: Not Addressed           New Prescriptions    No medications on file       Final diagnoses:   Type 2 diabetes mellitus without complication, with long-term current use of insulin (H)   Confusion   Cerebrovascular accident (CVA) due to other mechanism (H) - last well 2-3 days ago   Hyperglycemia   Acidosis   Elevated troponin     Afebrile.  Vital signs stable.  Patient reporting not feeling good over the past couple of days.  He reports emesis x4 that started yesterday.  He has been more confused than usual.  He has not been taking his medications as directed.  Unclear when his last wellness.  Patient initially refusing IV access.  EKG shows a sinus rhythm with P ACEs.  Left anterior fascicular block.  Minimal voltage criteria for LVH may be normal variant.  Septal infarct, age undetermined.  EKG is essentially unchanged from previous EKG on 7/23/2021.  His  troponin returns elevated at one 3.3.  Lactic acid returns elevated as well at 3.2.  At this point I had discussion with the patient and IV was established and he was given fluids.  CBC shows elevated white blood cells at 18.4, with a left shift.  Blood cultures are pending.  His CRP is elevated 24, BNP is elevated at 189 and it TSH is normal.  ESR normal.  Procalcitonin is slightly positive at 0.66.  His influenza test, RSV and Covid tests are negative.  CMP shows his glucose is elevated at 397.  Hyperglycemia.  He was given 7 units of insulin IV every hour as his blood glucose was monitored hourly.  His anion gap is elevated.  His sodium slightly decreased at 130.  His beta hydroxybutyrate returns elevated at 7.0.  Acidosis.  Chest x-ray is unremarkable.  Abdominal x-ray is unremarkable.  Given his confusion as well as elevated beta hydroxybutyrate concerns for diabetic encephalopathy.  A CT of his head shows Interval right caudate ischemic injury when compared to 5/13/2021. Consider MR for further assessment.  His 90-minute troponin returns elevated at 105.4.  MRI of his brain shows 3 punctate acute to subacute infarcts of the right frontal  corona radiata. Chronic appearing right caudate head infarct.  I discussed these findings with Dr. Chris and appears the patient most likely has had a CVA at some point.  Unclear when his last well was.  But this would be attributing to his confusion.  90-minute lactate returns as well and this actually gone up to 4.0.  Additional fluids were started and he was given Rocephin IV.  Gradually with time his blood glucose decreased to acceptable levels.  I discussed this case with Dr. Chris and the patient will be admitted at this time for further medical management and evaluation.    .  1/3/2022   [facility]     Damián Leo PA-C  01/03/22 1954

## 2022-01-03 NOTE — PROGRESS NOTES
IV Contrast- Discharge Instructions After Your CT Scan      The IV contrast you received today will be filtered from your bloodstream by your kidneys during the next 24 hours and pass from the body in urine.  You will not be aware of this process and your urine will not change in color.  To help this process you should drink at least 4 additional glasses of water or juice today.  This reduces stress on your kidneys.    Most contrast reactions are immediate.  Should you develop symptoms of concern after discharge, contact the department at the number below.  After hours you should contact your personal physician.  If you develop breathing distress or wheezing, call 911.      1.  Has the patient had a previous reaction to IV contrast? n    2.  Does the patient have kidney disease? n    3.  Is the patient on dialysis? n    If YES to any of these questions, exam will be reviewed with a Radiologist before administering contrast.    GFR 59 and with in range for contrast

## 2022-01-04 NOTE — PROGRESS NOTES
Patient lunch blood glucose 476, 10 units novolog given per sliding scale, recheck 1 hour later blood glucose is 393. MD updated. Order received for additional 10 units. Continuing to monitor.    Tamera Dean RN on 1/4/2022 at 1:12 PM

## 2022-01-04 NOTE — PROGRESS NOTES
01/04/22 0900   General Information   Onset of Illness/Injury or Date of Surgery 01/03/22   Referring Physician Dr. Dawit Chris   General Observations Pt sitting upright on edge of bed eating breakfast upon clinician arrival. Pt alert and oriented to person and place.   Type of Evaluation   Type of Evaluation Swallow Evaluation   Oral Motor   Oral Musculature generally intact   Structural Abnormalities none present   Mucosal Quality good   Dentition (Oral Motor)   Dentition (Oral Motor) adequate dentition   Facial Symmetry (Oral Motor)   Facial Symmetry (Oral Motor) WNL   Lip Function (Oral Motor)   Lip Range of Motion (Oral Motor) WNL   Lip Strength (Oral Motor) WNL   Tongue Function (Oral Motor)   Tongue ROM (Oral Motor) WNL   Jaw Function (Oral Motor)   Jaw Function (Oral Motor) WNL   Cough/Swallow/Gag Reflex (Oral Motor)   Soft Palate/Velum (Oral Motor) WNL   Vocal Quality/Secretion Management (Oral Motor)   Vocal Quality (Oral Motor) WNL   General Swallowing Observations   Current Diet/Method of Nutritional Intake (General Swallowing Observations, NIS) regular diet;thin liquids (level 0)   Respiratory Support (General Swallowing Observations) nasal cannula   Swallowing Evaluation Clinical swallow evaluation   Clinical Swallow Evaluation   Feeding Assistance no assistance needed   Clinical Swallow Evaluation Textures Trialed thin liquids;solid foods   Clinical Swallow Eval: Thin Liquid Texture Trial   Mode of Presentation, Thin Liquids self-fed;cup   Volume of Liquid or Food Presented 3oz   Oral Phase of Swallow WFL   Pharyngeal Phase of Swallow intact   Diagnostic Statement Pt independently taking appropriate sized sips, demonstrating with no overt s/s of aspiration/penetration.    Clinical Swallow Evaluation: Solid Food Texture Trial   Mode of Presentation spoon;self-fed   Volume Presented 4oz   Oral Phase WFL   Pharyngeal Phase intact   Diagnostic Statement Pt independently taking appropriate bite sizes,  and using liquid wash for breads. No overt s/s of aspiration/penetration observed.   Esophageal Phase of Swallow   Patient reports or presents with symptoms of esophageal dysphagia No   Swallowing Recommendations   Diet Consistency Recommendations regular diet;thin liquids (level 0)   Supervision Level for Intake patient independent   Recommended Feeding/Eating Techniques (Swallow Eval) patient is independent, no specific recommendations   Comment, Swallowing Recommendations Pt demonstrating with oropharyngeal swallow WFL. No overt s/s of aspiration/penetration were observed throuhgout PO trials of thin liquids and solid textures. Pt independently taking appropriate bite sizes and using liquid wash for residuals of breads.    SLP Therapy Assessment/Plan   Criteria for Skilled Therapeutic Interventions Met (SLP Eval) no problems identified which require skilled intervention   Therapy Frequency (SLP Eval) evaluation only   SLP Discharge Planning    SLP Discharge Recommendation (DC Rec) home   SLP Rationale for DC Rec Pt demonstrating with no overt s/s of aspiration/penetration with PO intake of thin liquids and solid textures.   SLP Brief overview of current status  Pt demonstrating with oropharyngeal swallow WFL. No overt s/s of aspiration/penetration were observed throuhgout PO trials of thin liquids and solid textures. Pt independently taking appropriate bite sizes and using liquid wash for residuals of breads.     Total Evaluation Time   Total Evaluation Time (Minutes) 15

## 2022-01-04 NOTE — PROGRESS NOTES
Patient oxygen saturations between 85-87% on RA. Fine crackles heard at the bases of the lungs bilaterally. Patient denies any SOB. Patient placed on 1.5 LMP via NC. Dr. Chris updated and new orders to saline lock and oxygen orders. Will continue to monitor. Marysol Berry RN on 1/3/2022 at 11:46 PM

## 2022-01-04 NOTE — PROGRESS NOTES
Chart accessed for transfer to [facility].  Report received from JUDD Mock, prior to transfer.  Lucy Reynaga RN............................ 1/3/2022 6:42 PM

## 2022-01-04 NOTE — PHARMACY-ADMISSION MEDICATION HISTORY
"Pharmacy -- Admission Medication Reconciliation IN PROGRESS    Prior to admission (PTA) medications were reviewed and the patient's PTA medication list was updated.    Sources Consulted: [facility] Scripts, Care Everywhere, patient on telephone, [facility]    The reliability of this Medication Reconciliation is: Reliability: Unreliable    The following significant changes were made:  Changed preferred pharmacy to [facility] per patient  [facility]        In addition, the patient's allergies were reviewed with the patient and left as follows:   Allergies: Augmentin    The pharmacist has reviewed with the patient that all personal medications should be removed from the building or locked in the belongings safe.  Patient shall only take medications ordered by the physician and administered by the nursing staff.       Medication barriers identified: patient had not taken most medications for over a month, \"did not need them\" per patient, unsure why not using the [facility] any longer   Medication adherence concerns: yes   Understanding of emergency medications: unable to assess    Edelmira Ray RPH, 1/3/2022,  7:18 PM   Edelmira Ray RPH on 1/4/2022 at 8:17 AM      "

## 2022-01-04 NOTE — PHARMACY
Pharmacy- Renal Dose Adjustment    Patient Active Problem List   Diagnosis     Non-ST elevation (NSTEMI) myocardial infarction (H)     Psoriasis     Hyponatremia     Acute on chronic respiratory failure with hypoxia (H)     Pneumonia due to 2019 novel coronavirus     Diabetic ketoacidosis without coma associated with type 2 diabetes mellitus (H)     Fall     Acute on chronic renal failure (H)     Polycystic kidney disease     HTN (hypertension)     Oropharyngeal dysphagia     COPD (chronic obstructive pulmonary disease) (H)     Sepsis due to other etiology (H)     Multifocal pneumonia     Type 2 diabetes mellitus, with long-term current use of insulin (H)     Underweight     Urinary retention due to benign prostatic hyperplasia     Hx of transurethral resection of prostate     Cerebrovascular accident (CVA) due to other mechanism (H)     Lactic acidosis     Encephalopathy     Elevated troponin        Relevant Labs:  Recent Labs   Lab Test 01/03/22  1040 07/23/21  1047   WBC 18.4* 11.7*   HGB 16.1 13.3    271        CrCl: 46 ml/min      Intake/Output Summary (Last 24 hours) at 1/3/2022 1855  Last data filed at 1/3/2022 1401  Gross per 24 hour   Intake --   Output 200 ml   Net -200 ml          Estimated Cr Cl is between 30 and 60 ml/min, per Renal Dose Adjustment Protocol, will adjust famotidine to 20 mg daily.    Will continue to follow and make adjustments accordingly. Thank You.    Edelmira Ray, [facility] ....................  1/3/2022   6:55 PM

## 2022-01-04 NOTE — PLAN OF CARE
Problem: Nausea and Vomiting  Goal: Fluid and Electrolyte Balance  Outcome: No Change  Patient verbalized that nausea has subsided and is very mild, no PRN meds given. Patient has had no episodes of emesis.     Vital signs:  Temp: 99.3  F (37.4  C) Temp src: Tympanic BP: 109/51 Pulse: 82   Resp: 18 SpO2: 91 % O2 Device: Nasal cannula Oxygen Delivery: 1.5 LPM     Patient alert to self, place, and year. Patient disorientated to date. He is up as an assist of 1. He denies any pain. LS have some fine crackles at the bases bilaterally. He denies any numbness and tingling in hand or feet. Neuros intact. Will continue to monitor. Marysol Berry RN on 1/4/2022 at 12:03 AM

## 2022-01-04 NOTE — H&P
[facility]    History and Physical  Hospitalist       Date of Admission:  1/3/2022    Assessment & Plan   Neel Kerr is a 81 year old male history of hypertension and type 2 diabetes presenting with acute to subacute right frontal corona radiata stroke.      Principal Problem:    Cerebrovascular accident (CVA) due to other mechanism (H)    Assessment: Initially presented with nonspecific symptoms of fatigue and not taking his medications for the last number of days, encephalopathic in the ER but mental status is returned to baseline at this point.  MRI with findings as reviewed.  Unknown last known well, right  strength asymmetric but no other clear focal deficits.    Plan:   -Inpatient routine stroke admit order set placed-   -Appreciate stroke neurology consult in a.m.  -defer CTA of head and neck versus MRA head until am  -Start aspirin daily  -Check lipid panel   -Echo and telemetry per routine    Active Problems:    Hyponatremia    Assessment: Likely from significant hypovolemia    Plan:   -IV fluids as above  -Trend BMP daily    Lactic acidosis    Assessment: Improving, initial lactate greater than 4, afebrile, no tachycardia, given his elevated ketones and hyperglycemia on admit more likely due to hypovolemia, no evidence of sepsis.    Plan:   -IV fluids  -Trend lactate  -Follow-up blood cultures  -Hold on further antibiotics (1 dose of ceftriaxone given in ER)      Encephalopathy    Assessment: Resolved and likely due to stroke and metabolic derangement such as hyponatremia dehydration etc.    Plan:   -Neurochecks every 4      Elevated troponin    Assessment: Stable with only slight increase in elevation no EKG changes are consistent cardiopulmonary symptoms more likely related to stroke    Plan:   -Trend  -Telemetry      HTN (hypertension)    Assessment: Stable    Plan:   -Continue home Norvasc and Flomax      Type 2 diabetes mellitus, with long-term current use of insulin (H)     "Assessment: Well controlled last hemoglobin A1c of 6.8    Plan:   -Continue home Lantus  -Hold Metformin    -iss achs    DVT Prophylaxis: Pneumatic Compression Devices  Code Status: Prior    Dawit Chris    Primary Care Physician   Norma Taylor    Chief Complaint   stroke    History is obtained from the patient and chart review.    History of Present Illness   Neel Kerr is a 81 year old male history of diabetes, hypertension and hyperlipidemia presenting with  acute to subacute CVA.  Patient was at [facility] for acute rehab after suffering from Covid and hypoxic respiratory failure, he then was then rehospitalized in June for presumed pneumonia underwent a TURP in July and has since been fully free with successful void trial after.  He returned home in early September has been followed by home care.    He he is generally vague but states over the last number of days he has felt increasingly poor, stop taking all of his medications been eating and drinking very little and today was brought into the ER the prompting of friends as he did not have any heat and was not eating well and he \"felt sick\".    In the ER he underwent an extensive work-up including an MRI and lab work etc., he was aggressively rehydrated, lab work stabilized and improved and he was subsequently admitted for further management.    Past Medical History    I have reviewed this patient's medical history and updated it with pertinent information if needed.   Past Medical History:   Diagnosis Date     Essential (primary) hypertension     No Comments Provided     Hyperlipidemia     No Comments Provided     Non-ST elevation (NSTEMI) myocardial infarction (H)     07/07/2017,[facility] PCI with stent circumflex     Old myocardial infarction     02/11/2015,[facility]  PCI with stent     Polyneuropathy     No Comments Provided     Type 2 diabetes mellitus without complications (H)     No Comments Provided       Past Surgical History   I have " reviewed this patient's surgical history and updated it with pertinent information if needed.  Past Surgical History:   Procedure Laterality Date     CYSTOSCOPY, TRANSURETHRAL RESECTION (TUR) PROSTATE, COMBINED N/A 7/27/2021    Procedure: Transurethral resection of prostate;  Surgeon: Antonio August MD;  Location:  OR       Prior to Admission Medications   Prior to Admission Medications   Prescriptions Last Dose Informant Patient Reported? Taking?   acetaminophen (TYLENOL) 325 MG tablet More than a month at Unknown time  Yes Yes   Sig: Take 2 tablets (650 mg) by mouth every 4 hours as needed for mild pain   albuterol (PROAIR HFA/PROVENTIL HFA/VENTOLIN HFA) 108 (90 Base) MCG/ACT inhaler More than a month at Unknown time  Yes Yes   Sig: Inhale 2 puffs into the lungs every 6 hours as needed for wheezing    amLODIPine (NORVASC) 5 MG tablet More than a month at Unknown time  Yes Yes   Sig: Take 1 tablet (5 mg) by mouth daily   atorvastatin (LIPITOR) 40 MG tablet More than a month at Unknown time  Yes Yes   Sig: Take 40 mg by mouth At Bedtime   insulin glargine (LANTUS PEN) 100 UNIT/ML pen More than a month at Unknown time  Yes Yes   Sig: Inject 9 Units Subcutaneous At Bedtime   ipratropium-albuterol (COMBIVENT RESPIMAT)  MCG/ACT inhaler More than a month at Unknown time  No Yes   Sig: Inhale 1 puff into the lungs 4 times daily   metFORMIN (GLUCOPHAGE) 1000 MG tablet More than a month at Unknown time  Yes Yes   Sig: Take 1,000 mg by mouth 2 times daily (with meals)    tamsulosin (FLOMAX) 0.4 MG capsule More than a month at Unknown time  No Yes   Sig: Take 1 capsule (0.4 mg) by mouth daily      Facility-Administered Medications: None     Allergies   Allergies   Allergen Reactions     Augmentin      Marked as an allergy at the [facility]       Social History   I have reviewed this patient's social history and updated it with pertinent information if needed. Neel Kerr  reports that he quit smoking about 30 years ago. He  has never used smokeless tobacco. He reports that he does not drink alcohol and does not use drugs.    Family History   I have reviewed this patient's family history and updated it with pertinent information if needed.   Family History   Problem Relation Age of Onset     No Known Problems Mother        Review of Systems     Constitutional: Generally poor energy and appetite, no recent sick contacts.  Eyes: no changes in vision or headaches.  Ears, nose, mouth, throat, and face: no mouth sores, dysphagia, or odynophagia  Respiratory: no shortness of breath, cough, or wheezing.   Cardiovascular: no chest pain, palpitations, orthopnea, increased lower extremity edema, or syncope.   Gastrointestinal: no constipation, diarrhea, no further nausea, vomiting, no abdominal pain.  Genitourinary: no dysuria, hematuria, urgency or frequency.   Hematologic/lymphatic: no unintentional weight loss or night sweats.  Musculoskeletal: no pain to extremities or falls.   Neurological: no new weakness, tingling, numbness.   Endocrine: He is not sure what his blood sugars have been doing    Physical Exam   Temp: 96.8  F (36  C) Temp src: Tympanic BP: 121/67 Pulse: 83   Resp: 8 SpO2: 93 % O2 Device: None (Room air)    Vital Signs with Ranges  Temp:  [96.8  F (36  C)] 96.8  F (36  C)  Pulse:  [] 83  Resp:  [7-28] 8  BP: ()/() 121/67  SpO2:  [89 %-96 %] 93 %  145 lbs 0 oz    Exam:  GENERAL: Talkative, laying in bed, poorly groomed, in no apparent distress.  Head: normocephalic and atraumatic  Eyes: anicteric and non-injected sclera  Nose: no rhinorrhea or epistaxis.   Throat: Dry mucous membranes with no active oral lesions.  NECK: Supple, jugular venous distension not present.  CARDIOVASCULAR: regular rate and rhythm, no murmurs, rubs, or gallops. Normal S1/S2. No lower extremity edema.   RESPIRATORY: clear to auscultation bilaterally, no wheezes, no crackles.    GI: soft, non-tender, non-distended, normoactive bowel  sounds.  No suprapubic pain with palpation.  MUSCULOSKELETAL: warm and well perfused, 2+ dorsalis pedis pulses.    SKIN: no pallor, jaundice or rashes.  NEUROLOGY: AAOx3, follows commands, speech and language without focal deficits.  Cranial nerves II through XII intact,  strength 4+ on the right 5+ on left, plantarflexion 5+ bilateral and symmetric.       Data   Data reviewed today:  I personally reviewed the EKG tracing showing Normal sinus rhythm, no new ST-T wave inversions elevations or depressions.  Recent Labs   Lab 01/03/22  1552 01/03/22  1040   WBC  --  18.4*   HGB  --  16.1   MCV  --  91   PLT  --  280   INR  --  0.98   * 130*   POTASSIUM 4.2 4.6   CHLORIDE 90* 88*   CO2 20* 18*   BUN 42* 38*   CR 1.22 1.27   ANIONGAP 21* 24*   CARLOS 9.4 10.2   * 397*   ALBUMIN  --  4.7   PROTTOTAL  --  8.3   BILITOTAL  --  0.7   ALKPHOS  --  86   ALT  --  11   AST  --  16       Recent Results (from the past 24 hour(s))   XR Abdomen 2 Views    Narrative    PROCEDURE: XR ABDOMEN 2VIEWS 1/3/2022 11:26 AM    HISTORY: nausea and vomiting    COMPARISONS: None.    TECHNIQUE: Supine and upright views.    FINDINGS: No free intraperitoneal air is seen. Gas is seen within the  colon and in small bowel loops in the right lower quadrant. One of the  small bowel loops is mildly dilated measuring 3.2 cm. This is  nonspecific.    No mass is seen and there are no suspicious calcifications. Vascular  calcifications are seen. There is mild degenerative change in the  spine.         Impression    IMPRESSION: Nonspecific abdominal bowel gas pattern with mildly  dilated small bowel loops in the right lower quadrant.    ROGER SPEARS MD         SYSTEM ID:  [id number]   XR Chest 2 Views    Narrative    PROCEDURE: XR CHEST 2 VW 1/3/2022 11:26 AM    HISTORY: cough    COMPARISONS: 6/30/2021.    TECHNIQUE: 2 views.    FINDINGS: Heart is not enlarged. Lungs are relatively clear and no  pleural effusion is seen.         Impression     IMPRESSION: No acute infiltrate.    ROGER SPEARS MD         SYSTEM ID:  [id number]   CT Abdomen Pelvis w Contrast    Narrative    PROCEDURE:  CT ABDOMEN PELVIS W CONTRAST    HISTORY: Abdominal abscess/infection suspected; Nausea/vomiting    TECHNIQUE: Helical CT of the abdomen and pelvis was performed  following injection of intravenous contrast.    COMPARISON: None.    MEDS/CONTRAST: 84 ml isovue 370    FINDINGS:      Limited images through the lung bases demonstrate advanced pulmonary  fibrosis. There is thickening of the distal esophagus.    The liver demonstrates no mass or intrahepatic biliary ductal  dilatation. The gallbladder, spleen, pancreas and adrenal glands are  unremarkable. Symmetric nephrograms are present without  hydronephrosis. The aorta is normal in size with scattered  atherosclerotic calcifications.    The bowel is normal in caliber. The prostate is enlarged.    No free fluid, free air or adenopathy is present. No suspicious  osseous lesions are identified.      Impression    IMPRESSION:      No evidence of bowel obstruction. Thickening of the distal esophagus  likely reflects the history of emesis.    JONATAN CARTER MD         SYSTEM ID:  [id number]   CT Head w/o Contrast    Narrative    PROCEDURE: CT HEAD W/O CONTRAST     HISTORY: Mental status change, unknown cause; Elevated beta  hydroxybutyrate, DKA, increased confusion concerns for encephalopathy.    COMPARISON: 5/13/2021    TECHNIQUE:  Helical images of the head from the foramen magnum to the  vertex were obtained without contrast.    FINDINGS: The ventricles and sulci are prominent, compatible with  mild, generalized volume loss. No acute intracranial hemorrhage, mass  effect, midline shift, hydrocephalus or basilar cystern effacement are  present.    New focal hypoattenuation is present in the right caudate when  compared to the prior dated 5/13/2021.    The calvarium is intact.       Impression    IMPRESSION: Interval right  caudate ischemic injury when compared to  5/13/2021. Consider MR for further assessment.    JONATAN CARTER MD         SYSTEM ID:  [id number]   MR Brain w/o Contrast    Narrative    EXAM:  MR BRAIN W/O CONTRAST    HISTORY:  Mental status change, unknown cause. .    TECHNIQUE:  Sagittal T1, axial T1, T2, FLAIR, and diffusion  noncontrast imaging of the whole brain was performed.    COMPARISON:  CT 1/3/2022     FINDINGS:    Prominence of the ventricles and sulci is compatible with moderate,  generalized volume loss. No abnormal extra-axial collection,  hydrocephalus, mass effect or midline shift is seen. The basal  cisterns are preserved.    3 foci of diffusion restriction are present in the right corona  radiata. A lacunar injury of the right caudate head is redemonstrated,  without diffusion restriction, therefore chronic. The major  intracranial flow voids are grossly preserved. Moderate chronic  microvascular ischemic changes are noted.    No suspicious marrow lesion is identified.      Impression    IMPRESSION: 3 punctate acute to subacute infarcts of the right frontal  corona radiata. Chronic appearing right caudate head infarct.    JONATAN CARTER MD         SYSTEM ID:  [id number]

## 2022-01-04 NOTE — PROGRESS NOTES
01/04/22 1000   Quick Adds   Type of Visit Initial PT Evaluation   Living Environment   People in home alone   Current Living Arrangements house   Home Accessibility no concerns   Transportation Anticipated family or friend will provide   Self-Care   Usual Activity Tolerance good   Current Activity Tolerance moderate   Equipment Currently Used at Home none   Disability/Function   Hearing Difficulty or Deaf no   Wear Glasses or Blind yes   Vision Management reading glasses   Concentrating, Remembering or Making Decisions Difficulty no   Difficulty Communicating no   Difficulty Eating/Swallowing no   Walking or Climbing Stairs Difficulty no   Dressing/Bathing Difficulty no   Toileting issues no   Doing Errands Independently Difficulty (such as shopping) no   Fall history within last six months no   General Information   Referring Physician Lick   Patient/Family Therapy Goals Statement (PT) return home   Weight-Bearing Status - LLE full weight-bearing   Weight-Bearing Status - RLE full weight-bearing   Cognition   Orientation Status (Cognition) oriented x 4   Affect/Mental Status (Cognition) WFL   Follows Commands (Cognition) WFL   Pain Assessment   Patient Currently in Pain No   Integumentary/Edema   Integumentary/Edema no deficits were identifed   Posture    Posture Forward head position   Range of Motion (ROM)   ROM Quick Adds ROM WFL   Strength   Manual Muscle Testing Quick Adds Strength WFL   Strength Comments however, patient fatigues with activity   Bed Mobility   Comment (Bed Mobility) SBA   Transfers   Impairments Contributing to Impaired Transfers decreased strength   Transfer Safety Comments CGA   Gait/Stairs (Locomotion)   [place] Level (Gait) contact guard   Assistive Device (Gait)   (no AD)   Distance in Feet (Required for LE Total Joints) 250   Pattern (Gait) 2-point   Balance   Balance Comments good without use of AD   Sensory Examination   Sensory Perception WFL   Coordination   Coordination no  deficits were identified   Muscle Tone   Muscle Tone no deficits were identified   Clinical Impression   Criteria for Skilled Therapeutic Intervention yes, treatment indicated   PT Diagnosis (PT) impaired mobility   Influenced by the following impairments fatigue   Functional limitations due to impairments activity/gait tolerance   Clinical Presentation Stable/Uncomplicated   Clinical Decision Making (Complexity) low complexity   Therapy Frequency (PT) Daily   Predicted Duration of Therapy Intervention (days/wks) 2-3 days   Planned Therapy Interventions (PT) gait training   Anticipated Equipment Needs at Discharge (PT)   (no assistive gait devices)   Risk & Benefits of therapy have been explained risks/benefits reviewed;patient   PT Discharge Planning    PT Discharge Recommendation (DC Rec) home   PT Rationale for DC Rec patient anticipated to be near/at baseline mobility at time of discharge   PT Brief overview of current status  SBA for bed mobilities; CGA for transfers and ambulation without use of assistive gait device   Total Evaluation Time   Total Evaluation Time (Minutes) 15

## 2022-01-04 NOTE — PROGRESS NOTES
Incentive Spirometry education completed.  Pt goal 2500 mls.  Pt achieved 500-1000 mls.  Pt instructed to perform 10/hr while awake with at least one deep breath and cough per hour until able to perform baseline activity.  RT will follow and re-assess as need.      Katharina Coleman, RT on 1/4/2022 at 11:21 AM

## 2022-01-04 NOTE — PROGRESS NOTES
[facility]    Hospitalist Progress Note      Assessment & Plan   Neel Kerr is a 81 year old male who was admitted on 1/3/2022.     Principal Problem:    Cerebrovascular accident (CVA) due to other mechanism (H)    Assessment: stable. Initially presented with nonspecific symptoms of fatigue and not taking his medications for the last number of days, encephalopathic in the ER but mental status is returned to baseline at this point.  MRI with findings as reviewed.    No symmetric  strength.    Plan:   -Inpatient routine stroke admit order set placed  -Appreciate stroke neurology consult   -defer CTA of head and neck versus MRA head until am  -Start aspirin daily  -Continue Lipitor 40 mg daily  -Echo and telemetry per routine    Bacterial pneumonia  Assessment: Given his objective supplemental oxygen requirement, right lower lobe infiltrate and elevated white count will treat.  No evidence of aspiration on bedside swallow eval  Plan:  -Ceftriaxone day 2  -Azithromycin day 1  -Follow-up blood cultures     Active Problems:    Hyponatremia    Assessment: Stable.    Plan:   -discontinue ivf   -Trend BMP daily     Lactic acidosis    Assessment: resolved. initial lactate greater than 4, afebrile, no tachycardia, given his elevated ketones and hyperglycemia on admit more likely due to hypovolemia, no evidence of sepsis.    Plan:   -Follow-up blood cultures  -Hold on further antibiotics (1 dose of ceftriaxone given in ER)       Encephalopathy    Assessment: Resolved and likely due to stroke and metabolic derangement such as hyponatremia dehydration etc.    Plan:   -Neurochecks every 4       Elevated troponin    Assessment: down trending. no EKG changes are consistent cardiopulmonary symptoms more likely related to stroke    Plan:   -Telemetry       HTN (hypertension)    Assessment: Stable    Plan:   -Continue home Norvasc and Flomax       Type 2 diabetes mellitus, with long-term current use of  insulin (H)    Assessment: uncontrolled with hemoglobin A1c of 12.5    Plan:   -Continue home Lantus  -Hold Metformin    -iss achs    Diet: Regular Diet Adult Thin Liquids (level 0)    DVT Prophylaxis: Pneumatic Compression Devices  Mata Catheter: Not present  Code Status: Full Code           Disposition Plan   Expected Discharge: pending  Entered: Dawit Chris MD 01/04/2022, 10:21 AM       The patient's care was discussed with the Bedside Nurse and Patient.    Dawit Chris MD  Hospitalist Service  [facility]  Contact information available via [facility] Paging/Directory    ______________________________________________________________________    Interval History   CC: Stroke    Overnight no acute events and afebrile, no headaches vision changes, patient feels like he is near his baseline, no shortness of breath cough new tingling or numbness, eager to eat breakfast, no other new complaints.    -Data reviewed today: I reviewed all new labs and imaging results over the last 24 hours.    Physical Exam   Temp: 98.8  F (37.1  C) Temp src: Tympanic BP: 135/61 Pulse: 80   Resp: 18 SpO2: 92 % O2 Device: Nasal cannula Oxygen Delivery: 1.5 LPM  Vitals:    01/03/22 1001 01/04/22 0513   Weight: 65.8 kg (145 lb) 56.1 kg (123 lb 11.2 oz)     Vital Signs with Ranges  Temp:  [98.7  F (37.1  C)-99.3  F (37.4  C)] 98.8  F (37.1  C)  Pulse:  [] 80  Resp:  [7-28] 18  BP: ()/() 135/61  SpO2:  [86 %-96 %] 92 %  I/O last 3 completed shifts:  In: 100 [P.O.:100]  Out: 200 [Urine:200]     Exam:   GENERAL: Talkative, laying in bed, in no apparent distress.  CARDIOVASCULAR: regular rate and rhythm, no murmurs, rubs, or gallops. Normal S1/S2. No lower extremity edema.   RESPIRATORY: clear to auscultation bilaterally, no wheezes, no crackles.   GI: soft, non-tender, non-distended, normoactive bowel sounds.  MUSCULOSKELETAL: warm and well perfused, 2+ dorsalis pedis pulses bilaterally.    SKIN: no  pallor,jaundice, or rashes  Neuro: Awake alert and oriented x3, follows commands, speech and language without focal deficits, cranial nerves II through XII intact,  strength 5+ bilaterally and symmetric.    Medications     - MEDICATION INSTRUCTIONS -       - MEDICATION INSTRUCTIONS -       sodium chloride         amLODIPine  5 mg Oral Daily     aspirin  325 mg Oral Daily    Or     aspirin  324 mg Oral or NG Tube Daily    Or     aspirin  300 mg Rectal Daily     atorvastatin  40 mg Oral At Bedtime     famotidine  20 mg Oral or NG Tube Daily     insulin aspart  1-10 Units Subcutaneous TID AC     insulin aspart  1-7 Units Subcutaneous At Bedtime     insulin glargine  9 Units Subcutaneous At Bedtime     sodium chloride (PF)  3 mL Intracatheter Q8H     tamsulosin  0.4 mg Oral Daily       Data   Recent Labs   Lab 01/04/22  0600 01/03/22  1552 01/03/22  1040   WBC 16.4*  --  18.4*   HGB 13.9  --  16.1   MCV 88  --  91     --  280   INR  --   --  0.98   * 131* 130*   POTASSIUM 3.8 4.2 4.6   CHLORIDE 97* 90* 88*   CO2 20* 20* 18*   BUN 35* 42* 38*   CR 0.99 1.22 1.27   ANIONGAP 14 21* 24*   CARLOS 8.1* 9.4 10.2   * 120* 397*   ALBUMIN  --   --  4.7   PROTTOTAL  --   --  8.3   BILITOTAL  --   --  0.7   ALKPHOS  --   --  86   ALT  --   --  11   AST  --   --  16       Recent Results (from the past 24 hour(s))   XR Abdomen 2 Views    Narrative    PROCEDURE: XR ABDOMEN 2VIEWS 1/3/2022 11:26 AM    HISTORY: nausea and vomiting    COMPARISONS: None.    TECHNIQUE: Supine and upright views.    FINDINGS: No free intraperitoneal air is seen. Gas is seen within the  colon and in small bowel loops in the right lower quadrant. One of the  small bowel loops is mildly dilated measuring 3.2 cm. This is  nonspecific.    No mass is seen and there are no suspicious calcifications. Vascular  calcifications are seen. There is mild degenerative change in the  spine.         Impression    IMPRESSION: Nonspecific abdominal bowel  gas pattern with mildly  dilated small bowel loops in the right lower quadrant.    ROGER SPEARS MD         SYSTEM ID:  [id number]   XR Chest 2 Views    Narrative    PROCEDURE: XR CHEST 2 VW 1/3/2022 11:26 AM    HISTORY: cough    COMPARISONS: 6/30/2021.    TECHNIQUE: 2 views.    FINDINGS: Heart is not enlarged. Lungs are relatively clear and no  pleural effusion is seen.         Impression    IMPRESSION: No acute infiltrate.    ROGER SPEARS MD         SYSTEM ID:  [id number]   CT Abdomen Pelvis w Contrast    Narrative    PROCEDURE:  CT ABDOMEN PELVIS W CONTRAST    HISTORY: Abdominal abscess/infection suspected; Nausea/vomiting    TECHNIQUE: Helical CT of the abdomen and pelvis was performed  following injection of intravenous contrast.    COMPARISON: None.    MEDS/CONTRAST: 84 ml isovue 370    FINDINGS:      Limited images through the lung bases demonstrate advanced pulmonary  fibrosis. There is thickening of the distal esophagus.    The liver demonstrates no mass or intrahepatic biliary ductal  dilatation. The gallbladder, spleen, pancreas and adrenal glands are  unremarkable. Symmetric nephrograms are present without  hydronephrosis. The aorta is normal in size with scattered  atherosclerotic calcifications.    The bowel is normal in caliber. The prostate is enlarged.    No free fluid, free air or adenopathy is present. No suspicious  osseous lesions are identified.      Impression    IMPRESSION:      No evidence of bowel obstruction. Thickening of the distal esophagus  likely reflects the history of emesis.    JONATAN CARTER MD         SYSTEM ID:  [id number]   CT Head w/o Contrast    Narrative    PROCEDURE: CT HEAD W/O CONTRAST     HISTORY: Mental status change, unknown cause; Elevated beta  hydroxybutyrate, DKA, increased confusion concerns for encephalopathy.    COMPARISON: 5/13/2021    TECHNIQUE:  Helical images of the head from the foramen magnum to the  vertex were obtained without  contrast.    FINDINGS: The ventricles and sulci are prominent, compatible with  mild, generalized volume loss. No acute intracranial hemorrhage, mass  effect, midline shift, hydrocephalus or basilar cystern effacement are  present.    New focal hypoattenuation is present in the right caudate when  compared to the prior dated 5/13/2021.    The calvarium is intact.       Impression    IMPRESSION: Interval right caudate ischemic injury when compared to  5/13/2021. Consider MR for further assessment.    JONATAN CARTER MD         SYSTEM ID:  [id number]   MR Brain w/o Contrast    Narrative    EXAM:  MR BRAIN W/O CONTRAST    HISTORY:  Mental status change, unknown cause. .    TECHNIQUE:  Sagittal T1, axial T1, T2, FLAIR, and diffusion  noncontrast imaging of the whole brain was performed.    COMPARISON:  CT 1/3/2022     FINDINGS:    Prominence of the ventricles and sulci is compatible with moderate,  generalized volume loss. No abnormal extra-axial collection,  hydrocephalus, mass effect or midline shift is seen. The basal  cisterns are preserved.    3 foci of diffusion restriction are present in the right corona  radiata. A lacunar injury of the right caudate head is redemonstrated,  without diffusion restriction, therefore chronic. The major  intracranial flow voids are grossly preserved. Moderate chronic  microvascular ischemic changes are noted.    No suspicious marrow lesion is identified.      Impression    IMPRESSION: 3 punctate acute to subacute infarcts of the right frontal  corona radiata. Chronic appearing right caudate head infarct.    JONATAN CARTER MD         SYSTEM ID:  [id number]   XR Chest Port 1 View    Narrative    XR CHEST PORT 1 VIEW    HISTORY: 81 yearsMale hypoxia    TECHNIQUE: A single view of the chest was performed    COMPARISON: 1/3/2022    FINDINGS: There is bilateral interstitial prominence. Lung volumes are  low. Pulmonary vascular margins are indistinct. Heart size is within  normal  limits.        Impression    IMPRESSION: Low lung volumes with interstitial prominence. Question  pulmonary edema or atypical pneumonia.    ROSANGELA PENALOZA MD         SYSTEM ID:  [id number]   Echocardiogram Complete - For age > 60 yrs   Result Value    LVEF  65-70%    Narrative    [id number]  [id number]  [id number]  [id number]^ANDREW^KRISTY^WEST     [facility]  [address] [place] [address]     Name: THELMA DODSON  MRN: [id number]  : 1940  Study Date: 2022 08:49 AM  Age: 81 yrs  Gender: Male  Patient Location: [facility]  Reason For Study: Cerebrovascular Incident  Ordering Physician: KRISTY MORALES  Performed By: Teresa Martínez RDCS, LAMAR     BSA: 1.6 m2  Height: 66 in  Weight: 123 lb  HR: 85  BP: 135/61 mmHg  ______________________________________________________________________________  Procedure  Complete Portable Echo Adult.  ______________________________________________________________________________  Interpretation Summary  Global and regional left ventricular function is hyperkinetic with an EF of  65-70%.  Global right ventricular function is normal. The right ventricle is normal  size.  No significant valvular abnormalities.  The estimated PA systolic pressure is 52 mmHg.  This study was compared with the study from 2021. There is no  significant change in the biventricular size and function upon direct visual  comparison.  ______________________________________________________________________________  Left Ventricle  Global and regional left ventricular function is hyperkinetic with an EF of  65-70%. The LV cavity is small. Thickening of the anterobasal septum is  present. Left ventricular diastolic function is normal.     Right Ventricle  Global right ventricular function is normal. The right ventricle is normal  size.     Atria  Both atria appear normal. The atrial septum is intact as assessed by agitated  dextrose bubble study .     Mitral Valve  The mitral valve is  normal. Trace mitral insufficiency is present.     Aortic Valve  The aortic valve is tricuspid. Mild aortic valve calcification is present.  Trace aortic insufficiency is present.     Tricuspid Valve  The tricuspid valve is normal. Trace tricuspid insufficiency is present. The  right ventricular systolic pressure is approximated at 49.4 mmHg plus the  right atrial pressure.     Pulmonic Valve  The valve leaflets are not well visualized. Trace pulmonic insufficiency is  present.     Vessels  Sinuses of Valsalva 3.0 cm. Ascending aorta 3.2 cm. IVC diameter <2.1 cm  collapsing >50% with sniff suggests a normal RA pressure of 3 mmHg.     Pericardium  No pericardial effusion is present.     Compared to Previous Study  This study was compared with the study from 2021 . There is no  significant change in the biventricular size and function upon direct visual  comparison.     ______________________________________________________________________________  MMode/2D Measurements & Calculations  IVSd: 1.2 cm  LVIDd: 3.8 cm  LVIDs: 1.8 cm  LVPWd: 0.86 cm  FS: 52.0 %     LV mass(C)d: 117.0 grams  LV mass(C)dI: 71.9 grams/m2  Ao root diam: 3.0 cm  asc Aorta Diam: 3.2 cm  LVOT diam: 2.2 cm  LVOT area: 3.8 cm2  LA Volume (BP): 34.1 ml  LA Volume Index (BP): 20.9 ml/m2  RWT: 0.46     Doppler Measurements & Calculations  MV E max ovidio: 79.3 cm/sec  MV A max ovidio: 77.1 cm/sec  MV E/A: 1.0     MV dec slope: 351.0 cm/sec2  MV dec time: 0.23 sec  Ao V2 max: 145.0 cm/sec  Ao max P.0 mmHg  Ao V2 mean: 106.0 cm/sec  Ao mean P.0 mmHg  Ao V2 VTI: 25.2 cm  AFSHAN(I,D): 3.5 cm2  AFSHAN(V,D): 3.1 cm2  LV V1 max P.5 mmHg  LV V1 max: 117.0 cm/sec  LV V1 VTI: 23.5 cm  SV(LVOT): 89.3 ml  SI(LVOT): 54.9 ml/m2  TR max ovidio: 351.5 cm/sec  TR max P.4 mmHg  AV Ovidio Ratio (DI): 0.81  AFSHAN Index (cm2/m2): 2.2  E/E' av.5  Lateral E/e': 8.1  Medial E/e': 8.9      ______________________________________________________________________________  Report approved by: Radha Melara 01/04/2022 10:17 AM

## 2022-01-04 NOTE — PROGRESS NOTES
SAFETY CHECKLIST  ID Bands and Risk clasps correct and in place (DNR, Fall risk, Allergy, Latex, Limb):  Yes  All Lines Reconciled and labeled correctly: Yes  Whiteboard updated:Yes  Environmental interventions (bed/chair alarm on, call light, side rails, restraints, sitter....): Yes  Verify Tele #: 4 Marysol Berry RN on 1/3/2022 at 8:21 PM

## 2022-01-04 NOTE — CONSULTS
"      [facility]    Stroke Consult Note    Reason for Consult:  \"Stroke\"    Chief Complaint: Nausea and Vomiting       HPI  Neel Kerr is a 81 year old male who presented to the hospital after not feeling well for an unspecified period of time. He told the ED that it was since the day prior but did not confirm this with me. Initial workup showed leukocytosis of 18, significant hyperglycemia, high lactate, elevated troponin without EKG changes.    Today he reports that he feels better but cannot state what exactly is keeping him from feeling normal. He has no L side symptoms that he knows of.    He reports he doesn't have any medical problems but per chart review, PMH of HTN, HLD, NSTEMI, DM2, former smoking at least 5 years ago.  He lives alone.  [facility] [place]     Stroke Evaluation Summarized    MRI/Head CT 3 punctate acute to subacute infarcts of the right frontal  corona radiata.    Intracranial Vasculature CTA head no occlusion or aneuysm. 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 Moderate to severe stenosis of the segments is possible.   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 He could " "have had a stroke as a result of a primary cardiac problem but this has not been demonstrated on EKG or echo  - I think he would benefit from social work consultation as he lives alone and has poor control of his risk factors-- could benefit from home health aide or something similar  - A1c <7.0      Thank you for this consult. No further stroke evaluation is recommended, so we will sign off. Please contact us with any additional questions.    Marcia Porras PA-C  Neurology  01/04/2022 4:55 PM  To page me or covering stroke neurology team member, click here: [email or web address]   Choose \"On Call\" tab at top, then search dropdown box for \"Neurology Adult\", select location, press Enter, then look for stroke/neuro ICU/telestroke.    _____________________________________________________    Clinically Significant Risk Factors Present on Admission         # Hyponatremia: Na = 131 mmol/L (Ref range: 134 - 144 mmol/L) on admission, will monitor as appropriate  # Hypocalcemia: Ca = 8.1 mg/dL (Ref range: 8.6 - 10.3 mg/dL) and/or iCa = N/A on admission, will replace as needed          Past Medical History   Past Medical History:   Diagnosis Date     Essential (primary) hypertension     No Comments Provided     Hyperlipidemia     No Comments Provided     Non-ST elevation (NSTEMI) myocardial infarction (H)     07/07/2017,[facility] PCI with stent circumflex     Old myocardial infarction     02/11/2015,[facility]  PCI with stent     Polyneuropathy     No Comments Provided     Type 2 diabetes mellitus without complications (H)     No Comments Provided     Past Surgical History   Past Surgical History:   Procedure Laterality Date     CYSTOSCOPY, TRANSURETHRAL RESECTION (TUR) PROSTATE, COMBINED N/A 7/27/2021    Procedure: Transurethral resection of prostate;  Surgeon: Antonio August MD;  Location:  OR     Medications   Home Meds  Prior to Admission medications    Medication Sig Start Date End Date Taking? Authorizing Provider "   acetaminophen (TYLENOL) 325 MG tablet Take 2 tablets (650 mg) by mouth every 4 hours as needed for mild pain 9/21/20  Yes Leo Mackey MD   albuterol (PROAIR HFA/PROVENTIL HFA/VENTOLIN HFA) 108 (90 Base) MCG/ACT inhaler Inhale 2 puffs into the lungs every 6 hours as needed for wheezing  9/21/20  Yes Leo Mackey MD   amLODIPine (NORVASC) 5 MG tablet Take 1 tablet (5 mg) by mouth daily 8/3/21  Yes Norma Taylor MD   atorvastatin (LIPITOR) 40 MG tablet Take 40 mg by mouth At Bedtime 7/21/21  Yes Reported, Patient   insulin glargine (LANTUS PEN) 100 UNIT/ML pen Inject 9 Units Subcutaneous At Bedtime 9/10/21  Yes Hermila Salas APRN CNP   ipratropium-albuterol (COMBIVENT RESPIMAT)  MCG/ACT inhaler Inhale 1 puff into the lungs 4 times daily 5/25/21  Yes Leo Mackey MD   metFORMIN (GLUCOPHAGE) 1000 MG tablet Take 1,000 mg by mouth 2 times daily (with meals)  9/21/20  Yes Leo Mackey MD   tamsulosin (FLOMAX) 0.4 MG capsule Take 1 capsule (0.4 mg) by mouth daily 5/25/21  Yes Leo Mackey MD       Scheduled Meds    amLODIPine  5 mg Oral Daily     [START ON 1/5/2022] aspirin  81 mg Oral Daily     atorvastatin  40 mg Oral At Bedtime     [START ON 1/5/2022] azithromycin  250 mg Oral Daily     cefTRIAXone  1 g Intravenous Q24H     clopidogrel  300 mg Oral Once     [START ON 1/5/2022] clopidogrel  75 mg Oral Daily     famotidine  20 mg Oral or NG Tube Daily     insulin aspart  1-22 Units Subcutaneous TID AC     insulin aspart  1-16 Units Subcutaneous At Bedtime     insulin glargine  9 Units Subcutaneous At Bedtime     sodium chloride (PF)  3 mL Intracatheter Q8H     tamsulosin  0.4 mg Oral Daily       Infusion Meds    - MEDICATION INSTRUCTIONS -       - MEDICATION INSTRUCTIONS -       sodium chloride         PRN Meds  glucose **OR** dextrose **OR** glucagon, labetalol **OR** hydrALAZINE, lidocaine 4%, lidocaine (buffered or not buffered), - MEDICATION INSTRUCTIONS -, - MEDICATION  INSTRUCTIONS -, ondansetron **OR** ondansetron, prochlorperazine **OR** prochlorperazine **OR** prochlorperazine, sodium chloride (PF), sodium chloride    Allergies   Allergies   Allergen Reactions     Augmentin      Marked as an allergy at the [facility]     Family History   Family History   Problem Relation Age of Onset     No Known Problems Mother      Social History   Social History     Tobacco Use     Smoking status: Former Smoker     Quit date: 1992     Years since quittin.0     Smokeless tobacco: Never Used   Vaping Use     Vaping Use: Never used   Substance Use Topics     Alcohol use: No     Comment: Alcoholic Drinks/day: quit drinking      Drug use: No       Review of Systems   The 5 point Review of Systems is negative other than noted in the HPI or here.        PHYSICAL EXAMINATION   Temp:  [98.6  F (37  C)-99.3  F (37.4  C)] 98.6  F (37  C)  Pulse:  [70-95] 70  Resp:  [13-20] 16  BP: (106-156)/(49-92) 106/49  SpO2:  [86 %-95 %] 90 %    General:  patient lying in bed without any acute distress    HEENT:  normocephalic/atraumatic  Pulmonary:  no respiratory distress    Neurologic  Mental Status:  alert, oriented x 3, follows commands, speech clear and fluent, naming and repetition normal  Cranial Nerves:  visual fields intact (tested by nurse), EOMI with normal smooth pursuit, facial sensation intact and symmetric (tested by nurse), facial movements symmetric, hearing not formally tested but intact to conversation, no dysarthria, shoulder shrug equal bilaterally, tongue protrusion midline  Motor:  no abnormal movements, able to move all limbs antigravity spontaneously with no signs of hemiparesis observed, no pronator drift  Reflexes:  unable to test (telestroke)  Sensory:  light touch sensation intact and symmetric throughout upper and lower extremities (assessed by nurse), no extinction on double simultaneous stimulation (assessed by nurse)  Coordination:  normal finger-to-nose and heel-to-shin  bilaterally without dysmetria, rapid alternating movements symmetric  Station/Gait:  unable to test due to telestroke      Dysphagia Screen  Per Nursing    Stroke Scales    NIHSS  Interval     Interval Comments     1a. Level of Consciousness     1b. LOC Questions     1c. LOC Commands     2.   Best Gaze     3.   Visual     4.   Facial Palsy     5a. Motor Arm, Left     5b. Motor Arm, Right     6a. Motor Leg, Left     6b. Motor Leg, right     7.   Limb Ataxia     8.   Sensory     9.   Best Language     10. Dysarthria     11. Extinction and Inattention      Total  zero           Imaging  I personally reviewed all imaging; relevant findings per HPI.    Labs Data   CBC  Recent Labs   Lab 01/04/22  0600 01/03/22  1040   WBC 16.4* 18.4*   RBC 4.48 5.26   HGB 13.9 16.1   HCT 39.6* 47.8    280     Basic Metabolic Panel   Recent Labs   Lab 01/04/22  0600 01/03/22  1552 01/03/22  1040   * 131* 130*   POTASSIUM 3.8 4.2 4.6   CHLORIDE 97* 90* 88*   CO2 20* 20* 18*   BUN 35* 42* 38*   CR 0.99 1.22 1.27   * 120* 397*   CARLOS 8.1* 9.4 10.2     Liver Panel  Recent Labs   Lab 01/03/22  1040   PROTTOTAL 8.3   ALBUMIN 4.7   BILITOTAL 0.7   ALKPHOS 86   AST 16   ALT 11     INR    Recent Labs   Lab Test 01/03/22  1040 06/30/21  1005 05/12/21  1846   INR 0.98 0.98 1.11      Lipid Profile    Recent Labs   Lab Test 01/04/22  0600 12/02/19  0000   CHOL 177 203*   HDL 41 27*   * 118*   TRIG 95 288*     A1C    Recent Labs   Lab Test 01/04/22  0600 07/28/21  0612 05/11/21  2325   A1C 12.5* 6.8* 13.4*     Troponin I    Recent Labs   Lab 01/04/22  0600 01/03/22  1552 01/03/22  1240   GHTROP 59.5* 107.9* 105.4*          Stroke Consult Data Data   Telestroke Service Details  (for non-emergent stroke consult with tele)  Video start time 01/04/22   1620   Video end time 01/04/22   1636   Type of service telemedicine diagnostic assessment of acute neurological changes   Reason telemedicine is appropriate patient requires  assessment with a specialist for diagnosis and treatment of neurological symptoms   Mode of transmission secure interactive audio and video communication per Alida   Originating site (patient location) [facility]    Distant site (provider location) [facility]     I have personally spent a total of 50 minutes providing care and consulting with this patient's medical providers today, with more than 50% of this time spent in consultation, coordination of care, and discussion with the patient and/or family regarding diagnostic results, prognosis, symptom management, risks and benefits of management options, and development of plan of care.

## 2022-01-04 NOTE — PROGRESS NOTES
01/04/22 1118   Quick Adds   Type of Visit Initial Occupational Therapy Evaluation   Living Environment   People in home alone   Current Living Arrangements house   Home Accessibility no concerns   Transportation Anticipated car, drives self   Self-Care   Usual Activity Tolerance good   Current Activity Tolerance moderate   Equipment Currently Used at Home none   Disability/Function   Hearing Difficulty or Deaf no   Wear Glasses or Blind yes   General Information   Patient/Family Therapy Goal Statement (OT) to return home as previous    Cognitive Status Examination   Orientation Status orientation to person, place and time   Affect/Mental Status (Cognitive) WFL   Follows Commands WFL   Visual Perception   Visual Impairment/Limitations WFL   Pain Assessment   Patient Currently in Pain No   Range of Motion Comprehensive   General Range of Motion no range of motion deficits identified   Coordination   Upper Extremity Coordination No deficits were identified   Bed Mobility   Bed Mobility supine-sit   Supine-Sit [place] (Bed Mobility) supervision   Transfers   Transfers sit-stand transfer;bed-chair transfer   Transfer Skill: Bed to Chair/Chair to Bed   Bed-Chair [place] (Transfers) supervision   Sit-Stand Transfer   Sit-Stand [place] (Transfers) supervision   Clinical Impression   Criteria for Skilled Therapeutic Interventions Met (OT) yes   OT Diagnosis CVA   OT Problem List-Impairments impacting ADL activity tolerance impaired   Assessment of Occupational Performance 1-3 Performance Deficits   Identified Performance Deficits mobility/self care    Planned Therapy Interventions (OT) ADL retraining;progressive activity/exercise   Clinical Decision Making Complexity (OT) low complexity   Therapy Frequency (OT) Daily   Predicted Duration of Therapy 1-2 days    Risk & Benefits of therapy have been explained risks/benefits reviewed   OT Discharge Planning    OT Discharge Recommendation (DC Rec) Home with  assist   OT Rationale for DC Rec Pt will likely have no OT needs upon d/c, will continue to assess   OT Brief overview of current status  Pt pleasant, denies any symptoms, feels at or near baseline, ambulated in room and hallway with CGA , pt was on RA for walk and dropped to 88%, returned to 1 liter of O2 and recovered quickly   Total Evaluation Time (Minutes)   Total Evaluation Time (Minutes) 15

## 2022-01-05 NOTE — PROGRESS NOTES
[facility]    Hospitalist Progress Note      Assessment & Plan   Neel Kerr is a 81 year old male who was admitted on 1/3/2022.     Principal Problem:    Cerebrovascular accident (CVA) due to other mechanism (H)    Assessment: Improved. Initially presented with nonspecific symptoms of fatigue and not taking his medications for the last number of days, encephalopathic in the ER but mental status is returned to baseline at this point.  MRI with findings as reviewed.    Neuro consult appreciated    Plan:   -Inpatient routine stroke admit order set placed  -Appreciate stroke neurology consult   -Start aspirin daily  -Plavix load completed and continue Plavix as ordered by stroke neurology  -Continue Lipitor 40 mg daily  -Consider home care    Bacterial pneumonia  Assessment: Stable.  Ongoing supplemental oxygen requirement.  Right lower lobe infiltrate and elevated white count poa.  No evidence of aspiration on bedside swallow eval.  Plan:  -Ceftriaxone day 3  -Azithromycin day 2  -Follow-up blood cultures     Active Problems:    Hyponatremia    Assessment: resolved with ivf    Plan:   -Trend BMP daily     Lactic acidosis    Assessment: resolved. initial lactate greater than 4, afebrile, no tachycardia, given his elevated ketones and hyperglycemia on admit more likely due to hypovolemia, no evidence of sepsis.    Plan:   -Follow-up blood cultures       Encephalopathy    Assessment: Resolved and likely due to stroke and metabolic derangement such as hyponatremia dehydration etc.    Plan:   -Neurochecks every 4       Elevated troponin    Assessment: down trending. no EKG changes are consistent cardiopulmonary symptoms more likely related to stroke    Plan:   -Telemetry  - consider outpatient stress test      HTN (hypertension)    Assessment: Stable    Plan:   -Continue home Norvasc and Flomax       Type 2 diabetes mellitus, with long-term current use of insulin (H)    Assessment: uncontrolled with  hemoglobin A1c of 12.5, increased sliding scale yesterday, then slightly hypoglycemic.    Plan:   -increase home Lantus to 20  -Hold Metformin    -Decrease iss achs back to moderate from high    Diet: Regular Diet Adult Thin Liquids (level 0)    DVT Prophylaxis: Pneumatic Compression Devices  Mata Catheter: Not present  Code Status: Full Code           Disposition Plan   Expected Discharge: pending  Entered: Dawit Chris MD 01/05/2022, 12:43 PM       The patient's care was discussed with the Bedside Nurse and Patient.    Dawit Chris MD  Hospitalist Service  [facility]  Contact information available via [facility] Paging/Directory    ______________________________________________________________________    Interval History   CC: Stroke    Overnight no acute events and afebrile, this morning feels about the same as yesterday, moving around better, he thinks he may be able to go home tomorrow, he is unsure if he would want to consider home care, no headaches vision changes new weakness, he states he does have he does home and uses fuel oil and he does not think he has any other needs at home and is very happy at home.  He does not want to go back to a skilled nursing facility.    -Data reviewed today: I reviewed all new labs and imaging results over the last 24 hours.    Physical Exam   Temp: 98.7  F (37.1  C) Temp src: Tympanic BP: 120/62 Pulse: 75   Resp: 16 SpO2: 93 % O2 Device: Nasal cannula Oxygen Delivery: 1 LPM  Vitals:    01/03/22 1001 01/04/22 0513 01/05/22 0710   Weight: 65.8 kg (145 lb) 56.1 kg (123 lb 11.2 oz) 56 kg (123 lb 6.4 oz)     Vital Signs with Ranges  Temp:  [97.7  F (36.5  C)-98.7  F (37.1  C)] 98.7  F (37.1  C)  Pulse:  [69-78] 75  Resp:  [16] 16  BP: (106-148)/(49-70) 120/62  SpO2:  [90 %-93 %] 93 %  No intake/output data recorded.     Exam:   GENERAL: Talkative, walking in the room with physical Occupational Therapy using a walker, in no apparent distress.  CARDIOVASCULAR:  regular rate and rhythm, no murmurs, rubs, or gallops. Normal S1/S2.   RESPIRATORY: clear to auscultation bilaterally, no wheezes, no crackles.   GI: soft, non-tender, non-distended, normoactive bowel sounds.  MUSCULOSKELETAL: warm and well perfused, 2+ dorsalis pedis pulses bilaterally.    SKIN: no pallor, jaundice, or rashes  Neuro: Awake alert and oriented x3, follows commands, speech and language without focal deficits, cranial nerves II through XII intact,  strength 5+ bilaterally and symmetric.    Medications     - MEDICATION INSTRUCTIONS -       - MEDICATION INSTRUCTIONS -       sodium chloride         amLODIPine  5 mg Oral Daily     aspirin  81 mg Oral Daily     atorvastatin  40 mg Oral At Bedtime     azithromycin  250 mg Oral Daily     cefTRIAXone  1 g Intravenous Q24H     clopidogrel  75 mg Oral Daily     famotidine  20 mg Oral or NG Tube Daily     insulin aspart  1-7 Units Subcutaneous TID AC     insulin aspart  1-5 Units Subcutaneous At Bedtime     insulin glargine  20 Units Subcutaneous At Bedtime     sodium chloride (PF)  3 mL Intracatheter Q8H     tamsulosin  0.4 mg Oral Daily       Data   Recent Labs   Lab 01/05/22  0553 01/04/22  0600 01/03/22  1552 01/03/22  1040   WBC 10.7 16.4*  --  18.4*   HGB 12.6* 13.9  --  16.1   MCV 89 88  --  91    214  --  280   INR  --   --   --  0.98    131* 131* 130*   POTASSIUM 3.8 3.8 4.2 4.6   CHLORIDE 101 97* 90* 88*   CO2 24 20* 20* 18*   BUN 26* 35* 42* 38*   CR 0.85 0.99 1.22 1.27   ANIONGAP 9 14 21* 24*   CARLOS 8.1* 8.1* 9.4 10.2   * 187* 120* 397*   ALBUMIN  --   --   --  4.7   PROTTOTAL  --   --   --  8.3   BILITOTAL  --   --   --  0.7   ALKPHOS  --   --   --  86   ALT  --   --   --  11   AST  --   --   --  16       No results found for this or any previous visit (from the past 24 hour(s)).

## 2022-01-05 NOTE — PROGRESS NOTES
01/05/22 1500   Signing Clinician's Name / Credentials   Signing clinician's name / credentials Edison Soni MPT   Quick Adds   Rehab Discipline PT   Functional Transfer Training   Symptoms Noted During/After Treatment fatigue   Treatment Detail CGA   Gait Training   Symptoms Noted During/After Treatment (Gait Training) fatigue   Distance in Feet (Required for LE Total Joints) 200   Treatment Detail ambulation in hallway   [place] Level (Gait Training) stand-by assist   Physical Assistance Level (Gait Training) supervision   Weight Bearing (Gait Training) full weight-bearing   Assistive Device (Gait Training)   (no AD)   PT Discharge Planning    PT Discharge Recommendation (DC Rec) home   PT Rationale for DC Rec anticipate patient to be near/at baseline mobility at time of discharge   PT Brief overview of current status  minimal assist to SBA for bed mobilities; CGA for transfers and ambulation    Additional Documentation   Rehab Comments trial of no supplemental O2 with ambulation; patient O2 sats decreased to 80% requiring 2 minutes of rest to rise above 90%   PT Plan continue PT   Total Session Time   Total Session Time (minutes) 30 minutes

## 2022-01-05 NOTE — PLAN OF CARE
"Patient disoriented to time, SBA. VSS. Denies pain. Neuros intact. Good intake. Increased blood glucose levels today, novolog given per mar. Makes needs known. IV SL. IV abx given per mar. Tele-stroke appointment completed.    /49 (BP Location: Right arm, Patient Position: Semi-Singletary's)   Pulse 70   Temp 98.6  F (37  C) (Tympanic)   Resp 16   Ht 1.676 m (5' 6\")   Wt 56.1 kg (123 lb 11.2 oz)   SpO2 90%   BMI 19.97 kg/m      Tamera Dean RN on 1/4/2022 at 6:11 PM    "

## 2022-01-05 NOTE — PROGRESS NOTES
"Patient's brother Nicolas called for update. Brother shares concerns about him going home d/t his short-term memory loss. States patient runs out of propane frequently and his house becomes cold, he doesn't feel as though patient should return to home alone \"at least for the winter.\" States patient also is forgetful and non-compliant with medications, says he does well with them for a while and then falls off course for a while.     SW updated with this info.    Tamera Dean RN on 1/5/2022 at 10:39 AM    "

## 2022-01-05 NOTE — PROGRESS NOTES
Weights reviewed per MST screen: pt has maintained his weight over past 6 months. He lives home alone. No intakes recorded so far, will monitor. Follow up weekly. If needed sooner, please order nutrition consult.   Wt Readings from Last 10 Encounters:   01/05/22 56 kg (123 lb 6.4 oz)   08/12/21 55.3 kg (122 lb)   08/03/21 55.7 kg (122 lb 12.8 oz)   08/03/21 55.8 kg (123 lb)   07/28/21 56.7 kg (125 lb 1.6 oz)   07/23/21 56.3 kg (124 lb 3.2 oz)   07/22/21 56.7 kg (125 lb)   07/08/21 54.5 kg (120 lb 3.2 oz)   07/03/21 54.1 kg (119 lb 3.2 oz)   06/14/21 54.1 kg (119 lb 3.2 oz)   Giovanna Luther RD on 1/5/2022 at 10:22 AM

## 2022-01-05 NOTE — PLAN OF CARE
"Patient is disoriented to time. Neuros are unchanged. Hand grasps and ankle strength is strong. Face is symmetrical. Finger-to-nose is smooth and accurate. Patient denies pain. Lung sounds are clear in upper lobes and diminished in the bases. O2 weaned to 1 LPM. SpO2 92-93%. HS blood sugar was 71. Apple juice given. Follow up blood sugar was 120. MD notified. Scheduled Lantus held per MD. 0200 blood sugar was 180. Patient ambulates with a stand-by assist. Incontinent of urine at times.    Vital signs:  Temp: 98.6  F (37  C) Temp src: Tympanic BP: (!) 148/65 Pulse: 69   Resp: 16 SpO2: 92 % O2 Device: Nasal cannula Oxygen Delivery: 1 LPM Height: 167.6 cm (5' 6\") Weight: 56.1 kg (123 lb 11.2 oz)  Estimated body mass index is 19.97 kg/m  as calculated from the following:    Height as of this encounter: 1.676 m (5' 6\").    Weight as of this encounter: 56.1 kg (123 lb 11.2 oz).      Muna Verduzco RN on 1/5/2022 at 6:31 AM        "

## 2022-01-05 NOTE — PLAN OF CARE
"Patient disoriented to time, short-term memory loss, needs reminder of why he is in the hospital. SBA. VSS. Denies pain. Neuros unchanged. Good intake. BG stable today, novolog given per mar. Makes needs known. IV SL. IV abx given per mar. Would like to go home tomorrow.    /65 (BP Location: Right arm, Patient Position: Semi-Singletary's)   Pulse 70   Temp 99.1  F (37.3  C) (Tympanic)   Resp 16   Ht 1.676 m (5' 6\")   Wt 56 kg (123 lb 6.4 oz)   SpO2 93%   BMI 19.92 kg/m      Tamera Dean RN on 1/5/2022 at 5:11 PM    "

## 2022-01-05 NOTE — PROGRESS NOTES
":    Met with patient to discuss discharge planning.  Patient lives alone in his own home.  His goal is to return home.  Discussed concerns that were mentioned that his house is very cold.  He stated \"who said that?\"  He denies that his house is cold and stated that he heats his home with fuel oil.  Denies difficulty affording fuel oil.  Inquired if he needs any support at home and he denies needing assistance.  At discharge he plans to call a friend/family for a ride.  He stated that his brother Nicolas lives in [place].     CLAUDIA Pisano on 1/5/2022 at 10:20 AM      "

## 2022-01-05 NOTE — PROGRESS NOTES
01/05/22 5863   Signing Clinician's Name / Credentials   Signing clinician's name / credentials Lucy Palma OTR/L    Quick Adds   Rehab Discipline OT   Functional Transfer Training   Symptoms Noted During/After Treatment fatigue   Treatment Detail ambulated in room with CGA using FWW    Gait Training   Symptoms Noted During/After Treatment (Gait Training) fatigue   [place] Level (Gait Training) contact guard   Physical Assistance Level (Gait Training) 1 person assist   Grooming Training   [place] Level (Grooming Training) minimum assist (75% patient effort)   Assistance (Grooming Training) 1 person assist   Bathing Training   [place] Level (Bathing Training) stand-by assist   Assistance (Bathing Training) supervision   OT Discharge Planning    OT Discharge Recommendation (DC Rec) home with home care occupational therapy   OT Rationale for DC Rec Pt would benefit from some home OT to maximize level of independence needed to live indpendently    OT Brief overview of current status  Pt very pleasant, mildly confused but oriented and follows directions well, repeats self at times but able to give accurate history, pt ambualted in room with SBA and completed grooming and upper body bathing with SBA while seated. Pt on 1 liter of O2, attempted to wean with walking but sats dropped to 79% taking 1 min to recover to 90% on 1 liter.    Additional Documentation   OT Plan cont OT    Total Session Time   Total Session Time (minutes) 30 minutes

## 2022-01-06 NOTE — PROGRESS NOTES
Neuros normal.  Slight confusion.  Forgetful at times. Room air.     Wants to go home.        SAFETY CHECKLIST  ID Bands and Risk clasps correct and in place (DNR, Fall risk, Allergy, Latex, Limb):  Yes  All Lines Reconciled and labeled correctly: Yes  Whiteboard updated:Yes  Environmental interventions (bed/chair alarm on, call light, side rails, restraints, sitter....): Yes     Verify Tele #:

## 2022-01-06 NOTE — PHARMACY
[facility]  Part of [address] [address]    January 6, 2022    Dear Pharmacist,    Your customer, Neel Kerr, born on 1940, was recently discharged from [facility].  We have updated his medication list and want to alert you to the following:       Review of your medicines      START taking      Dose / Directions   aspirin 81 MG chewable tablet  Commonly known as: ASA      Dose: 81 mg  Take 1 tablet (81 mg) by mouth daily  Quantity: 90 tablet  Refills: 3     azithromycin 250 MG tablet  Commonly known as: ZITHROMAX  Indication: Community Acquired Pneumonia  Used for: Multifocal pneumonia      Dose: 250 mg  Take 1 tablet (250 mg) by mouth daily for 3 days  Quantity: 3 tablet  Refills: 0     cefuroxime 500 MG tablet  Commonly known as: CEFTIN  Used for: Multifocal pneumonia      Dose: 500 mg  Take 1 tablet (500 mg) by mouth 2 times daily for 3 days  Quantity: 6 tablet  Refills: 0     clopidogrel 75 MG tablet  Commonly known as: PLAVIX      Dose: 75 mg  Take 1 tablet (75 mg) by mouth daily for 19 days  Quantity: 19 tablet  Refills: 0        CONTINUE these medicines which may have CHANGED, or have new prescriptions. If we are uncertain of the size of tablets/capsules you have at home, strength may be listed as something that might have changed.      Dose / Directions   insulin glargine 100 UNIT/ML pen  Commonly known as: LANTUS PEN  This may have changed: how much to take  Used for: Diabetic ketoacidosis without coma associated with type 2 diabetes mellitus (H), Type 2 diabetes mellitus without complication, with long-term current use of insulin (H)      Dose: 20 Units  Inject 20 Units Subcutaneous At Bedtime  Quantity: 15 mL  Refills: 3        CONTINUE these medicines which have NOT CHANGED      Dose / Directions   acetaminophen 325 MG tablet  Commonly known as: TYLENOL      Dose: 650 mg  Take 2 tablets (650 mg) by mouth every 4  hours as needed for mild pain  Quantity:    Refills: 0     albuterol 108 (90 Base) MCG/ACT inhaler  Commonly known as: PROAIR HFA/PROVENTIL HFA/VENTOLIN HFA      Dose: 2 puff  Inhale 2 puffs into the lungs every 6 hours as needed for wheezing  Quantity:    Refills: 0     amLODIPine 5 MG tablet  Commonly known as: NORVASC  Used for: Essential hypertension      Dose: 5 mg  Take 1 tablet (5 mg) by mouth daily  Quantity: 30 tablet  Refills: 0     atorvastatin 40 MG tablet  Commonly known as: LIPITOR  Used for: Hyperlipidemia, unspecified hyperlipidemia type      Dose: 40 mg  Take 1 tablet (40 mg) by mouth At Bedtime  Quantity: 90 tablet  Refills: 3     ipratropium-albuterol  MCG/ACT inhaler  Commonly known as: COMBIVENT RESPIMAT  Used for: Chronic obstructive pulmonary disease, unspecified COPD type (H)      Dose: 1 puff  Inhale 1 puff into the lungs 4 times daily  Refills: 0     metFORMIN 1000 MG tablet  Commonly known as: GLUCOPHAGE      Dose: 1,000 mg  Take 1,000 mg by mouth 2 times daily (with meals)  Refills: 0     tamsulosin 0.4 MG capsule  Commonly known as: FLOMAX  Used for: Urinary retention      Dose: 0.4 mg  Take 1 capsule (0.4 mg) by mouth daily  Quantity:    Refills: 0           Where to get your medicines      These medications were sent to [facility] [address] [address]    Phone: [phone]     aspirin 81 MG chewable tablet    atorvastatin 40 MG tablet    azithromycin 250 MG tablet    cefuroxime 500 MG tablet    clopidogrel 75 MG tablet    insulin glargine 100 UNIT/ML pen         We also reviewed Neel Kerr's allergy list and updated it as needed:  Allergies: Augmentin    Thank you for continuing to care for Neel Kerr.  We look forward to working together with you in the future.    Sincerely,  Stan Cabrera, [facility] and [facility]

## 2022-01-06 NOTE — PROGRESS NOTES
01/06/22 1200   Signing Clinician's Name / Credentials   Signing clinician's name / credentials Edison Soni MPT   Quick Adds   Rehab Discipline PT   Functional Transfer Training   Treatment Detail SBA   Gait Training   Symptoms Noted During/After Treatment (Gait Training) fatigue   [place] Level (Gait Training) stand-by assist   Physical Assistance Level (Gait Training) supervision   Weight Bearing (Gait Training) full weight-bearing   Assistive Device (Gait Training)   (no assistive gait device)   Therapeutic Activity   Treatment Detail bed mobilities with SBA   PT Discharge Planning    PT Discharge Recommendation (DC Rec) home   PT Rationale for DC Rec anticipate patient to be near/at baseline mobility at time of discharge   PT Brief overview of current status  SBA for all mobilities; fatigue remains to limit activity tolerance   Additional Documentation   Rehab Comments  patient will benefit from continued PT to promote increased activity/gait tolerance   PT Plan patient discharging home   Total Session Time   Total Session Time (minutes) 15 minutes

## 2022-01-06 NOTE — PHARMACY - DISCHARGE MEDICATION RECONCILIATION AND EDUCATION
Pharmacy:  Discharge Counseling and Medication Reconciliation    Neel JAKE Kerr  [address] [place] [address]  [phone] (home)   81 year old male  PCP: Norma Taylor    Allergies: Augmentin    Discharge Counseling:    Pharmacist met with patient today to review the medication portion of the After Visit Summary (with an emphasis on NEW medications) and to address patient's questions/concerns.    Summary of Education: Counseled patient on new medications of Aspirin, Clopidogrel, Cefuroxime, and Azithromycin, and reviewed dose change (dose increase of Lantus). Counseled patient on plan to take both Clopidogrel and Aspirin for the next 19 days, then Aspirin only thereafter.    Patient reports that he is forgetful about taking medications- reviewed different ways of assisting with remembering to take them such as setting up a weekly pill box, etc. Also reviewed importance of checking blood sugars on a regular basis, as well as reviewing signs/symptoms of hypoglycemia, such as fatigue, rapid heart rate, sweating, etc.      Materials Provided:  MedCounselor sheets printed from Clinical Pharmacology on: Azithromycin, Cefuroxime, Clopidogrel, and Aspirin    Discharge Medication Reconciliation:    It has been determined that the patient has an adequate supply of medications available or which can be obtained from the patient's preferred pharmacy, which he has confirmed as: [facility] ([place]) [An updated medication list will be faxed to the patient's pharmacy.]    Thank you for the consult.    Stan Cabrera RP........January 6, 2022 10:52 AM

## 2022-01-06 NOTE — PROGRESS NOTES
SAFETY CHECKLIST  ID Bands and Risk clasps correct and in place (DNR, Fall risk, Allergy, Latex, Limb):  Yes  All Lines Reconciled and labeled correctly: Yes  Whiteboard updated:Yes  Environmental interventions (bed/chair alarm on, call light, side rails, restraints, sitter....): Yes  Verify Tele #: 4    Muna Verduzco RN on 1/5/2022 at 7:44 PM

## 2022-01-06 NOTE — PROGRESS NOTES
Shift Summary  Saw Pt this morning for Incentive Spirometry follow-up. Pt did 1700 mL on a goal of 2500 mL. Pt alert and oriented during visit.    Alethea Bueno, RT

## 2022-01-06 NOTE — PLAN OF CARE
"Patient is alert and oriented x 4. Neuros are unchanged. Hand grasps and ankle strength is strong. Face is symmetrical. Finger-to-nose is smooth and accurate. Patient denies pain. Lung sounds are clear in upper lobes and diminished in the bases. O2 weaned to 1/2 LPM. SpO2 92-93%. HS blood sugar was 203. Novolog and Lantus administered per MAR. Follow up blood sugar was 146. Patient ambulates with a stand-by assist. Voids without difficulty.   Vital signs:  Temp: 98.1  F (36.7  C) Temp src: Tympanic BP: (!) 155/73 Pulse: 65   Resp: 16 SpO2: 92 % O2 Device: Nasal cannula Oxygen Delivery: 1/2 LPM Height: 167.6 cm (5' 6\") Weight: 56.4 kg (124 lb 6.4 oz)  Estimated body mass index is 20.08 kg/m  as calculated from the following:    Height as of this encounter: 1.676 m (5' 6\").    Weight as of this encounter: 56.4 kg (124 lb 6.4 oz).      Muna Verduzco RN on 1/6/2022 at 7:01 AM    "

## 2022-01-06 NOTE — PLAN OF CARE
Physical Therapy Discharge Summary    Reason for therapy discharge:    Discharged to home with home therapy.    Progress towards therapy goal(s). See goals on Care Plan in [facility] electronic health record for goal details.  Goals met    Therapy recommendation(s):    Continued therapy is recommended.  Rationale/Recommendations:  to promote increased functional activity/gait tolerance.

## 2022-01-06 NOTE — DISCHARGE SUMMARY
"[facility]    Discharge Summary  Hospitalist    Date of Admission:  1/3/2022  Date of Discharge:  1/6/2022 11:57 AM  Discharging Provider: Dawit Chris  Date of Service (when I saw the patient): 01/06/22    Discharge Diagnoses   Principal Problem:    Cerebrovascular accident (CVA) due to other mechanism (H) (1/3/2022)  Active Problems:    Hyponatremia (9/21/2020)    HTN (hypertension) (2/11/2015)    Type 2 diabetes mellitus, with long-term current use of insulin (H) (6/30/2021)    Lactic acidosis (1/3/2022)    Encephalopathy (1/3/2022)    Elevated troponin (1/3/2022)      History of Present Illness   Neel Kerr is an 81 year old male who presented with CVA and bacterial community-acquired pneumonia. History of diabetes, hypertension and hyperlipidemia presenting with  acute to subacute CVA.  Patient was previously at [facility] for acute rehab after suffering from Covid and hypoxic respiratory failure, he then was then rehospitalized in June for presumed pneumonia underwent a TURP in July and has since been catheter free with successful void trial after.  He returned home in early September and had been followed by home care.       He he is generally vague but states over the last number of days he has felt increasingly poor, stop taking all of his medications been eating and drinking very little and today was brought into the ER the prompting of friends as he did not have any heat and was not eating well and he \"felt sick\".     In the ER he underwent an extensive work-up including an MRI and lab work etc., he was aggressively rehydrated, lab work stabilized and improved and he was subsequently admitted for further management.    Hospital Course   Neel Kerr was admitted on 1/3/2022.  The following problems were addressed during his hospitalization:      Cerebrovascular accident (CVA) due to other mechanism (H): He initially presented with nonspecific symptoms of fatigue and not taking his " medications for the last number of days, encephalopathic in the ER likely due to pneumonia, hyponatremia, lactic acidosis and a stroke.  Mental status returned to baseline.  MRI with findings as reviewed.  He underwent neurology consult.  He had no deficits at time of discharge and was seen by physical and occupational therapy and he was excepting of resuming home care to help with med management and ongoing rehab needs.  He will start an aspirin 81 mg indefinitely, completed 21-day course of Plavix, resume his Lipitor and follow-up with his PCP for routine posthospital follow-up.  No evidence of A. fib on telemetry, TTE results reviewed.     Bacterial pneumonia: Present on admission with initial supplemental oxygen requirement.  Right lower lobe infiltrate and elevated white count poa.  No evidence of aspiration on bedside swallow eval. started on ceftriaxone and azithromycin, remained afebrile, supplemental oxygen requirement was weaned with resting sats greater than 90% on room air at rest and he will complete full course of Ceftin and azithromycin.      Type 2 diabetes mellitus, with long-term current use of insulin (H): Uncontrolled with hemoglobin A1c of 12.5, increased Lantus from 9u->20u and will continue with this.  Also will continue a [facility] blood sugar checks and able to restart metformin.      Hyponatremia: Initially resolved with IV fluids and stable.  Consider repeat BMP at time of PCP follow-up     Lactic acidosis: Resolved. Initial lactate greater than 4, afebrile, no tachycardia, given his elevated ketones and hyperglycemia on admit more likely due to hypovolemia, no evidence of sepsis.  Lactate returned to normal with IV fluids.       Elevated troponin: Down trending. no EKG changes are consistent cardiopulmonary symptoms more likely related to stroke, TTE without new wall motion abnormalities.  Could consider an outpatient stress test.       HTN (hypertension): Stable and continued on home Norvasc  and Flomax     Dawit Chris MD    Significant Results and Procedures   none    Pending Results   These results will be followed up by pcp  Unresulted Labs Ordered in the Past 30 Days of this Admission     Date and Time Order Name Status Description    1/3/2022 10:23 AM Blood Culture Arm, Left In process     1/3/2022 10:23 AM Blood Culture Arm, Left In process           Code Status   Full Code       Primary Care Physician   Norma Taylor    Physical Exam   Temp: 98.3  F (36.8  C) Temp src: Tympanic BP: (!) 159/58 Pulse: 76   Resp: 16 SpO2: 94 % O2 Device: None (Room air) Oxygen Delivery: 1/2 LPM  Vitals:    01/04/22 0513 01/05/22 0710 01/06/22 0626   Weight: 56.1 kg (123 lb 11.2 oz) 56 kg (123 lb 6.4 oz) 56.4 kg (124 lb 6.4 oz)     Vital Signs with Ranges  Temp:  [98.1  F (36.7  C)-99.1  F (37.3  C)] 98.3  F (36.8  C)  Pulse:  [65-76] 76  Resp:  [16] 16  BP: (137-161)/(58-82) 159/58  SpO2:  [92 %-94 %] 94 %  I/O last 3 completed shifts:  In: 480 [P.O.:480]  Out: 400 [Urine:400]    Exam on day of discharge:   GENERAL: Talkative,  laying in bed, in no apparent distress.  CARDIOVASCULAR: regular rate and rhythm, no murmurs, rubs, or gallops. Normal S1/S2.   RESPIRATORY: clear to auscultation bilaterally, no wheezes, no crackles.   GI: soft, non-tender, non-distended, normoactive bowel sounds.  MUSCULOSKELETAL: warm and well perfused, 2+ dorsalis pedis pulses bilaterally.    SKIN: no pallor, jaundice, or rashes  Neuro: Awake alert and oriented x3, follows commands, speech and language without focal deficits, cranial nerves II through XII intact,  strength 5+ bilaterally and symmetric.  Unchanged today.    Discharge Disposition   Discharged to home  Condition at discharge: Stable    Consultations This Hospital Stay   NEUROLOGY IP CONSULT  SPEECH LANGUAGE PATH ADULT IP CONSULT  PHARMACY IP CONSULT  PHARMACY IP CONSULT  PHARMACY IP CONSULT  PHYSICAL THERAPY ADULT IP CONSULT  OCCUPATIONAL THERAPY ADULT IP  CONSULT  REHAB ADMISSIONS LIAISON IP CONSULT  CARE MANAGEMENT / SOCIAL WORK IP CONSULT  SOCIAL WORK IP CONSULT  SMOKING CESSATION PROGRAM IP CONSULT    Time Spent on this Encounter   I, Dawit Chris MD, personally saw the patient today and spent greater than 30 minutes discharging this patient.    Discharge Orders      Reason for your hospital stay    Stroke and pneumonia     Follow-up and recommended labs and tests     Follow up with Dr. Johnson on 1/13 @ 0820 am     Activity    Your activity upon discharge: activity as tolerated     MD face to face encounter    Documentation of Face to Face and Certification for Home Health Services    I certify that patient: Neel Kerr is under my care and that I, or a nurse practitioner or physician's assistant working with me, had a face-to-face encounter that meets the physician face-to-face encounter requirements with this patient on: 1/6/2022.    This encounter with the patient was in whole, or in part, for the following medical condition, which is the primary reason for home health care: stroke.    I certify that, based on my findings, the following services are medically necessary home health services: Nursing, Occupational Therapy, and Physical Therapy.    My clinical findings support the need for the above services because: Nurse is needed: To assess vitals after changes in medications or other medical regimen.., Occupational Therapy Services are needed to assess and treat cognitive ability and address ADL safety due to impairment in deconditioning., and Physical Therapy Services are needed to assess and treat the following functional impairments: deconditioning.    Further, I certify that my clinical findings support that this patient is homebound (i.e. absences from home require considerable and taxing effort and are for medical reasons or [facility] services or infrequently or of short duration when for other reasons) because: Requires assistance of another person  or specialized equipment to access medical services because patient: Is unable to walk greater than 15 feet without rest...    Based on the above findings. I certify that this patient is confined to the home and needs intermittent skilled nursing care, physical therapy and/or speech therapy.  The patient is under my care, and I have initiated the establishment of the plan of care.  This patient will be followed by a physician who will periodically review the plan of care.  Physician/Provider to provide follow up care: Norma Taylor    Attending hospital physician (the Medicare certified [place] provider): Dawit Chris MD  Physician Signature: See electronic signature associated with these discharge orders.  Date: 1/6/2022     Monitor and record    blood glucose 4 times a day, before meals and at bedtime     When to contact your care team    Call your primary doctor if you have any of the following: temperature greater than 101,  increased shortness of breath, increased drainage, increased swelling, or increased pain.     Discharge Instructions    - start taking aspirin daily  - take the plavix until it is gone  - increase your Lantus to 20 units daily  - start taking your lipitor again  - take the antibiotics until they are gone to clear up your pneumonia     Diet    Follow this diet upon discharge: Orders Placed This Encounter      Regular Diet Adult Thin Liquids (level 0)     Discharge Medications   Discharge Medication List as of 1/6/2022 11:10 AM      START taking these medications    Details   aspirin (ASA) 81 MG chewable tablet Take 1 tablet (81 mg) by mouth daily, Disp-90 tablet, R-3, E-Prescribe      azithromycin (ZITHROMAX) 250 MG tablet Take 1 tablet (250 mg) by mouth daily for 3 days, Disp-3 tablet, R-0, E-Prescribe      cefuroxime (CEFTIN) 500 MG tablet Take 1 tablet (500 mg) by mouth 2 times daily for 3 days, Disp-6 tablet, R-0, E-Prescribe      clopidogrel (PLAVIX) 75 MG tablet Take 1 tablet  (75 mg) by mouth daily for 19 days, Disp-19 tablet, R-0, E-Prescribe         CONTINUE these medications which have CHANGED    Details   atorvastatin (LIPITOR) 40 MG tablet Take 1 tablet (40 mg) by mouth At Bedtime, Disp-90 tablet, R-3, E-Prescribe      insulin glargine (LANTUS PEN) 100 UNIT/ML pen Inject 20 Units Subcutaneous At Bedtime, Disp-15 mL, R-3, E-PrescribeIf Lantus is not covered by insurance, may substitute Basaglar at same dose and frequency.           CONTINUE these medications which have NOT CHANGED    Details   acetaminophen (TYLENOL) 325 MG tablet Take 2 tablets (650 mg) by mouth every 4 hours as needed for mild pain, Historical      albuterol (PROAIR HFA/PROVENTIL HFA/VENTOLIN HFA) 108 (90 Base) MCG/ACT inhaler Inhale 2 puffs into the lungs every 6 hours as needed for wheezing , HistoricalPharmacy may dispense brand covered by insurance (Proair, or proventil or ventolin or generic albuterol inhaler)      amLODIPine (NORVASC) 5 MG tablet Take 1 tablet (5 mg) by mouth daily, Disp-30 tablet, R-0, Historical      ipratropium-albuterol (COMBIVENT RESPIMAT)  MCG/ACT inhaler Inhale 1 puff into the lungs 4 times daily, Transitional      metFORMIN (GLUCOPHAGE) 1000 MG tablet Take 1,000 mg by mouth 2 times daily (with meals) , Historical      tamsulosin (FLOMAX) 0.4 MG capsule Take 1 capsule (0.4 mg) by mouth daily, Transitional           Allergies   Allergies   Allergen Reactions     Augmentin      Marked as an allergy at the [facility]     Data   Most Recent 3 CBC's:  Recent Labs   Lab Test 01/06/22 0633 01/05/22  0553 01/04/22  0600   WBC 8.9 10.7 16.4*   HGB 13.6 12.6* 13.9   MCV 90 89 88    182 214      Most Recent 3 BMP's:  Recent Labs   Lab Test 01/06/22  0633 01/05/22  0553 01/04/22  0600   * 134 131*   POTASSIUM 3.8 3.8 3.8   CHLORIDE 99 101 97*   CO2 26 24 20*   BUN 19 26* 35*   CR 0.74 0.85 0.99   ANIONGAP 7 9 14   CARLOS 8.5* 8.1* 8.1*   * 149* 187*     Most Recent 2  LFT's:  Recent Labs   Lab Test 01/03/22  1040 07/23/21  1047   AST 16 14   ALT 11 11   ALKPHOS 86 71   BILITOTAL 0.7 0.5     Most Recent INR's and Anticoagulation Dosing History:  Anticoagulation Dose History     Recent Dosing and Labs Latest Ref Rng & Units 11/27/2020 5/12/2021 6/30/2021 1/3/2022    INR 0.85 - 1.15 0.96 1.11 0.98 0.98        Most Recent 3 Troponin's:  Recent Labs   Lab Test 05/12/21  1846 05/11/21 2005 11/27/20  2249   TROPI <0.015 <0.015 <0.015     Most Recent Cholesterol Panel:  Recent Labs   Lab Test 01/04/22  0600   CHOL 177   *   HDL 41   TRIG 95     Most Recent 6 Bacteria Isolates From Any Culture (See EPIC Reports for Culture Details):  Recent Labs   Lab Test 07/02/21  0850 06/30/21  1118 06/30/21  1006 09/20/20  1627 09/20/20  1604 01/23/17  1450   CULT No growth after 6 days  No growth after 6 days Light growth  Klebsiella oxytoca  *  Moderate growth  Streptococcus anginosus group  * Escherichia coli*  Enterococcus faecalis*  Critical Value called to and read back by  [facility] 06/30/21 2208 KL    Gram positive cocci  Gram negative rods  *  4 OF 4 BOTTLES POSITIVE  Critical result, provider not notified due to previous critical result notification.  refer to culture  for ID and Sensitivity. No growth after 6 days No growth after 6 days No growth after 6 days     Most Recent TSH, T4 and A1c Labs:  Recent Labs   Lab Test 01/04/22  0600 01/03/22  1040   TSH  --  3.76   A1C 12.5*  --      Results for orders placed or performed during the hospital encounter of 01/03/22   XR Abdomen 2 Views    Narrative    PROCEDURE: XR ABDOMEN 2VIEWS 1/3/2022 11:26 AM    HISTORY: nausea and vomiting    COMPARISONS: None.    TECHNIQUE: Supine and upright views.    FINDINGS: No free intraperitoneal air is seen. Gas is seen within the  colon and in small bowel loops in the right lower quadrant. One of the  small bowel loops is mildly dilated measuring 3.2 cm. This  is  nonspecific.    No mass is seen and there are no suspicious calcifications. Vascular  calcifications are seen. There is mild degenerative change in the  spine.         Impression    IMPRESSION: Nonspecific abdominal bowel gas pattern with mildly  dilated small bowel loops in the right lower quadrant.    ROGER SPEARS MD         SYSTEM ID:  [id number]   XR Chest 2 Views    Narrative    PROCEDURE: XR CHEST 2 VW 1/3/2022 11:26 AM    HISTORY: cough    COMPARISONS: 6/30/2021.    TECHNIQUE: 2 views.    FINDINGS: Heart is not enlarged. Lungs are relatively clear and no  pleural effusion is seen.         Impression    IMPRESSION: No acute infiltrate.    ROGER SPEARS MD         SYSTEM ID:  [id number]   CT Abdomen Pelvis w Contrast    Narrative    PROCEDURE:  CT ABDOMEN PELVIS W CONTRAST    HISTORY: Abdominal abscess/infection suspected; Nausea/vomiting    TECHNIQUE: Helical CT of the abdomen and pelvis was performed  following injection of intravenous contrast.    COMPARISON: None.    MEDS/CONTRAST: 84 ml isovue 370    FINDINGS:      Limited images through the lung bases demonstrate advanced pulmonary  fibrosis. There is thickening of the distal esophagus.    The liver demonstrates no mass or intrahepatic biliary ductal  dilatation. The gallbladder, spleen, pancreas and adrenal glands are  unremarkable. Symmetric nephrograms are present without  hydronephrosis. The aorta is normal in size with scattered  atherosclerotic calcifications.    The bowel is normal in caliber. The prostate is enlarged.    No free fluid, free air or adenopathy is present. No suspicious  osseous lesions are identified.      Impression    IMPRESSION:      No evidence of bowel obstruction. Thickening of the distal esophagus  likely reflects the history of emesis.    JNOATAN CARTER MD         SYSTEM ID:  [id number]   CT Head w/o Contrast    Narrative    PROCEDURE: CT HEAD W/O CONTRAST     HISTORY: Mental status change, unknown cause;  Elevated beta  hydroxybutyrate, DKA, increased confusion concerns for encephalopathy.    COMPARISON: 5/13/2021    TECHNIQUE:  Helical images of the head from the foramen magnum to the  vertex were obtained without contrast.    FINDINGS: The ventricles and sulci are prominent, compatible with  mild, generalized volume loss. No acute intracranial hemorrhage, mass  effect, midline shift, hydrocephalus or basilar cystern effacement are  present.    New focal hypoattenuation is present in the right caudate when  compared to the prior dated 5/13/2021.    The calvarium is intact.       Impression    IMPRESSION: Interval right caudate ischemic injury when compared to  5/13/2021. Consider MR for further assessment.    JONATAN CARTER MD         SYSTEM ID:  [id number]   MR Brain w/o Contrast    Narrative    EXAM:  MR BRAIN W/O CONTRAST    HISTORY:  Mental status change, unknown cause. .    TECHNIQUE:  Sagittal T1, axial T1, T2, FLAIR, and diffusion  noncontrast imaging of the whole brain was performed.    COMPARISON:  CT 1/3/2022     FINDINGS:    Prominence of the ventricles and sulci is compatible with moderate,  generalized volume loss. No abnormal extra-axial collection,  hydrocephalus, mass effect or midline shift is seen. The basal  cisterns are preserved.    3 foci of diffusion restriction are present in the right corona  radiata. A lacunar injury of the right caudate head is redemonstrated,  without diffusion restriction, therefore chronic. The major  intracranial flow voids are grossly preserved. Moderate chronic  microvascular ischemic changes are noted.    No suspicious marrow lesion is identified.      Impression    IMPRESSION: 3 punctate acute to subacute infarcts of the right frontal  corona radiata. Chronic appearing right caudate head infarct.    JONATAN CARTER MD         SYSTEM ID:  [id number]   XR Chest Port 1 View    Narrative    XR CHEST PORT 1 VIEW    HISTORY: 81 yearsMale hypoxia    TECHNIQUE: A  single view of the chest was performed    COMPARISON: 1/3/2022    FINDINGS: There is bilateral interstitial prominence. Lung volumes are  low. Pulmonary vascular margins are indistinct. Heart size is within  normal limits.        Impression    IMPRESSION: Low lung volumes with interstitial prominence. Question  pulmonary edema or atypical pneumonia.    ROSANGELA PENALOZA MD         SYSTEM ID:  [id number]   CTA Head Neck with Contrast    Narrative    CTA  HEAD NECK WITH CONTRAST    HISTORY: 81 years Male Stroke/TIA, assess intracranial arteries;  Stroke/TIA, assess extracranial arteries    COMPARISON: None    TECHNIQUE: Contrast-enhanced CT angiography of the neck and head was  performed with intervenous contrast. Multiplanar reconstructions and  MIP, volume rendered and 3-D MIP reconstructions were obtained on a  3-D workstation..    FINDINGS:    Head CT:  Technique: Axial CT imaging of the head was performed Without  intervenous contrast.    FINDINGS:   Ventricles and sulci are symmetric. The gray-white matter  differentiation throughout the brain is well maintained. There is  moderate periventricular white matter change of chronic small vessel  ischemic disease. There is no evidence of intracranial mass or  hemorrhage. Visualized portions of the paranasal sinuses and mastoid  air cells are well aerated. There is no evidence of skull fracture.      Right neck:            Brachiocephalic artery: patent            Common carotid artery:  patent            Internal carotid artery: There is calcified and noncalcified  plaque at the carotid bulb. There is mild stenosis of approximately  50%.            Vertebral artery:Widely patent                Left neck:            Common carotid artery: Widely patent            Internal carotid artery: There is calcified and noncalcified  atherosclerotic plaque with mild stenosis of less than 50%.            Vertebral artery:Widely patent      Head:           Anatomy:Anatomy of the  [place] of [place] is normal           Anterior circulation: There is calcified atherosclerotic  disease of the cavernous and supraclinoid segments of the ICAs with  moderate or greater severity of stenosis bilaterally. There is  otherwise normal opacification of the middle cerebral arteries and  anterior cerebral arteries. There is no abrupt vessel cut off.           Posterior circulation:The vessels are patent. There is no  abrupt cut off. There is no evidence of aneurysm      Impression    IMPRESSION:No acute findings. There is no evidence of abrupt vessel  cut off or intracranial aneurysm.    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 Moderate to severe stenosis of the segments is possible.  There is mild stenosis of the proximal internal carotid arteries  bilaterally.    ROSANGELA PENALOZA MD         SYSTEM ID:  [id number]   Echocardiogram Complete - For age > 60 yrs     Value    LVEF  65-70%    Narrative    [id number]  [id number]  [id number]  [id number]^ANDREW^KRISTY^WEST     [facility]  [address] [place] [address]     Name: THELMA DODSON  MRN: [id number]  : 1940  Study Date: 2022 08:49 AM  Age: 81 yrs  Gender: Male  Patient Location: [facility]  Reason For Study: Cerebrovascular Incident  Ordering Physician: KRISTY MORALES  Performed By: Teresa Martínez RDCS, RVT     BSA: 1.6 m2  Height: 66 in  Weight: 123 lb  HR: 85  BP: 135/61 mmHg  ______________________________________________________________________________  Procedure  Complete Portable Echo Adult.  ______________________________________________________________________________  Interpretation Summary  Global and regional left ventricular function is hyperkinetic with an EF of  65-70%.  Global right ventricular function is normal. The right ventricle is normal  size.  No significant valvular abnormalities.  The estimated PA systolic pressure is 52 mmHg.  This study was compared with the study from  05/13/2021. There is no  significant change in the biventricular size and function upon direct visual  comparison.  ______________________________________________________________________________  Left Ventricle  Global and regional left ventricular function is hyperkinetic with an EF of  65-70%. The LV cavity is small. Thickening of the anterobasal septum is  present. Left ventricular diastolic function is normal.     Right Ventricle  Global right ventricular function is normal. The right ventricle is normal  size.     Atria  Both atria appear normal. The atrial septum is intact as assessed by agitated  dextrose bubble study .     Mitral Valve  The mitral valve is normal. Trace mitral insufficiency is present.     Aortic Valve  The aortic valve is tricuspid. Mild aortic valve calcification is present.  Trace aortic insufficiency is present.     Tricuspid Valve  The tricuspid valve is normal. Trace tricuspid insufficiency is present. The  right ventricular systolic pressure is approximated at 49.4 mmHg plus the  right atrial pressure.     Pulmonic Valve  The valve leaflets are not well visualized. Trace pulmonic insufficiency is  present.     Vessels  Sinuses of Valsalva 3.0 cm. Ascending aorta 3.2 cm. IVC diameter <2.1 cm  collapsing >50% with sniff suggests a normal RA pressure of 3 mmHg.     Pericardium  No pericardial effusion is present.     Compared to Previous Study  This study was compared with the study from 05/13/2021 . There is no  significant change in the biventricular size and function upon direct visual  comparison.     ______________________________________________________________________________  MMode/2D Measurements & Calculations  IVSd: 1.2 cm  LVIDd: 3.8 cm  LVIDs: 1.8 cm  LVPWd: 0.86 cm  FS: 52.0 %     LV mass(C)d: 117.0 grams  LV mass(C)dI: 71.9 grams/m2  Ao root diam: 3.0 cm  asc Aorta Diam: 3.2 cm  LVOT diam: 2.2 cm  LVOT area: 3.8 cm2  LA Volume (BP): 34.1 ml  LA Volume Index (BP): 20.9  ml/m2  RWT: 0.46     Doppler Measurements & Calculations  MV E max ovidio: 79.3 cm/sec  MV A max ovidio: 77.1 cm/sec  MV E/A: 1.0     MV dec slope: 351.0 cm/sec2  MV dec time: 0.23 sec  Ao V2 max: 145.0 cm/sec  Ao max P.0 mmHg  Ao V2 mean: 106.0 cm/sec  Ao mean P.0 mmHg  Ao V2 VTI: 25.2 cm  AFSHAN(I,D): 3.5 cm2  AFSHAN(V,D): 3.1 cm2  LV V1 max P.5 mmHg  LV V1 max: 117.0 cm/sec  LV V1 VTI: 23.5 cm  SV(LVOT): 89.3 ml  SI(LVOT): 54.9 ml/m2  TR max ovidio: 351.5 cm/sec  TR max P.4 mmHg  AV Ovidio Ratio (DI): 0.81  AFSHAN Index (cm2/m2): 2.2  E/E' av.5  Lateral E/e': 8.1  Medial E/e': 8.9     ______________________________________________________________________________  Report approved by: Radha Melara 2022 10:17 AM

## 2022-01-06 NOTE — PROGRESS NOTES
:     Attempted to see patient in his room to further discuss home care and options.  Patient had already been discharged.  A home care referral and orders will be sent to [facility].  Updated [facility] home care.     Demographic info and orders faxed.    CLAUDIA Pisano on 1/6/2022 at 2:05 PM

## 2022-01-06 NOTE — PROGRESS NOTES
Discharge to home after getting discharge instructions.  Wheelchair ride to brother's car at 1200.

## 2022-01-10 NOTE — PROGRESS NOTES
Clinic Care Coordination Contact  [facility]/Voicemail       Clinical Data: Care Coordinator Outreach  Outreach attempted x cell number on file is for brother, Nicolas. Nicolas deferred to call patient at home number.   Consent to communicate is on file for Nicolas.       Plan: Care Coordinator called home number on file, no answer. Care Coordinator will try to reach patient again in 1-2 business days.  Margy Gaines RN ,....................  1/10/2022   3:56 PM

## 2022-01-10 NOTE — PROGRESS NOTES
"Transitional Care Management Phone Call    Summary of hospitalization:  [facility] discharge summary reviewed    DISCHARGE DIAGNOSIS:  Principal Problem:    Cerebrovascular accident (CVA) due to other mechanism (H) (1/3/2022)  Active Problems:    Hyponatremia (9/21/2020)    HTN (hypertension) (2/11/2015)    Type 2 diabetes mellitus, with long-term current use of insulin (H) (6/30/2021)    Lactic acidosis (1/3/2022)    Encephalopathy (1/3/2022)    Elevated troponin (1/3/2022)  DATE OF DISCHARGE: 1/6/2022    Diagnostic Tests/Treatments reviewed.  Follow up: scheduled follow with [facility] 1/13/22.     Post Discharge Medication Reconciliation: unable to reconcile discharge medications due to pt unable to review medications, \"i picked up a box of meds\". .  Medications reviewed by: by myself    Problems taking medications regularly:  Unknown   Problems adhering to non-medication therapy:  Unknown.     Other Healthcare Providers Involved in Patient s Care:         Urology  Update since discharge: improved. Pt denies weakness, fevers, short of breath, chest pain. Is getting around fine. Good appetite, drinking fluids. Pt denies concerns at this time. Reviewed appointment with [facility]. Unable to review medications, pt states he doesn't remember what his medications are during this call, not able to review.  Picked up medications, but unable to confirm which prescriptions.     Plan of care communicated with patient  Just a friendly reminder that you appointment is   Next 5 appointments (look out 90 days)    Jan 13, 2022  8:20 AM  Office Visit with Rachel Brandt MD  [facility] ([facility] ) [address]  [place] [id number] [phone]      .  We encourage you to keep this appointment.  Please remember to bring all of your pills in their bottles (including any vitamins or over the counter pills) with you to your " appointment.   The patient indicates understanding of these issues and agrees with the plan of care.   Yes    Was the patient contacted within the 2 business days or other approved timeframe?  No  Was the Medication reconciliation and management done since the patient was discharged? will need to be completed at office visit.     Margy Gaines RN  1/10/2022 3:51 PM    Pt declined [facility] services.   Margy Gaines RN ,....................  1/10/2022   3:57 PM    TCM completed, 1/11/22. Margy Gaines RN ,....................  1/11/2022   9:37 AM

## 2022-01-13 PROBLEM — N17.9 ACUTE ON CHRONIC RENAL FAILURE (H): Status: RESOLVED | Noted: 2021-05-11 | Resolved: 2022-01-01

## 2022-01-13 PROBLEM — N18.9 ACUTE ON CHRONIC RENAL FAILURE (H): Status: RESOLVED | Noted: 2021-05-11 | Resolved: 2022-01-01

## 2022-01-13 PROBLEM — A41.89 SEPSIS DUE TO OTHER ETIOLOGY (H): Status: RESOLVED | Noted: 2021-06-30 | Resolved: 2022-01-01

## 2022-01-13 PROBLEM — J18.9 MULTIFOCAL PNEUMONIA: Status: RESOLVED | Noted: 2021-06-30 | Resolved: 2022-01-01

## 2022-01-13 PROBLEM — R79.89 ELEVATED TROPONIN: Status: RESOLVED | Noted: 2022-01-01 | Resolved: 2022-01-01

## 2022-01-13 NOTE — NURSING NOTE
Patient is here for a hospital follow up, states is feeling good. He was hospitalized for pneumonia and issues with diabetes.     Medication Reconciliation: complete    FOOD SECURITY SCREENING QUESTIONS  Hunger Vital Signs:  Within the past 12 months we worried whether our food would run out before we got money to buy more. Never  Within the past 12 months the food we bought just didn't last and we didn't have money to get more. Never  Blanquita Lewis LPN 1/13/2022 8:22 AM

## 2022-01-13 NOTE — PROGRESS NOTES
SUBJECTIVE:                                                      Patient presents for Hospital Followup Visit:    Hospital:  [facility]      Date of Admission: 1/3/2022  Date of Discharge: 1/6/2022  Transitional Care Management Phone Call: 1/10/2022  Reason(s) for Admission: CVA            Problems taking medications regularly: He has not been taking his insulin for unclear reasons       Medication changes since discharge: None       Problems adhering to non-medication therapy:  None    Summary of hospitalization:  [facility] discharge summary reviewed    81-year-old male is a new patient presents for hospital follow-up.  He was admitted to [facility] for a CVA.  He was also found to have multifocal pneumonia and was treated with ceftriaxone and azithromycin.  Previous prolonged hospitalization for COVID and short-term stay at acute rehab.    Patient lives alone and will Bovie.  Currently no home health care.    Diabetes has been under poor control.  A1c in the hospital was greater than 12  BG rarely checks, not taking insulin      Diagnostic Tests/Treatments reviewed.  Follow up needed: none  Other Healthcare Providers Involved in Patient s Care:         None  Update since discharge: improved.     Review of Systems   Constitutional: Positive for fatigue. Negative for fever and unexpected weight change.   Respiratory: Negative for cough and shortness of breath.    Cardiovascular: Negative for chest pain and palpitations.   Musculoskeletal: Negative for back pain.   Psychiatric/Behavioral: Negative for sleep disturbance.        OBJECTIVE:                                                      Vitals:    01/13/22 0820   BP: 132/68   Pulse: 98   Resp: 20   Temp: 96.8  F (36  C)   TempSrc: Tympanic   SpO2: 91%   Weight: 60.3 kg (133 lb)      Physical Exam  Cardiovascular:      Rate and Rhythm: Normal rate.      Heart sounds: No murmur heard.      Pulmonary:      Effort: Pulmonary  effort is normal. No respiratory distress.   Neurological:      Mental Status: He is alert.   Psychiatric:         Mood and Affect: Mood normal.         Thought Content: Thought content normal.         Judgment: Judgment normal.      Comments: No focal neurological findings.  Seems to be mentating normally.  Able to tell me details about his home and medication regimen          ASSESSMENT/PLAN:                                                      1. Cerebrovascular accident (CVA) due to other mechanism (H)  Patient is new to me but reports being back at his baseline.  He lives independently with no family involvement.  - Basic Metabolic Panel; Future  - Basic Metabolic Panel    2. Type 2 diabetes mellitus with hyperosmolarity without coma, with long-term current use of insulin (H)  Last A1c was greater than 12.  Discussed importance of resuming insulin.  He is given written instructions to resume Lantus pen 10 units at bedtime.  Will likely need to increase to 20 units.  Continue on metformin.    3. Hyponatremia  Repeat BMP pending      Post Discharge Medication Reconciliation: discharge medications reconciled, continue medications without change.  Issues to address: Issues identified were:   medication issues/barriers.  Plan of care communicated with patient    There are no Patient Instructions on file for this visit.       Type of Medical Decision Making Face-to-Face Visit within 7 Days Face-to-Face Visit within 14 days   Moderate Complexity [id number]   High Complexity 95029 [id number]

## 2022-05-24 NOTE — PHARMACY-MEDICATION REGIMEN REVIEW
Pharmacy Antimicrobial Stewardship/COVID Assessment     Current Antimicrobial Therapy:  Anti-infectives (From now, onward)    Start     Dose/Rate Route Frequency Ordered Stop    05/13/21 1700  remdesivir 100 mg in sodium chloride 0.9 % 250 mL intermittent infusion     Note to Pharmacy: Gently invert the bag 20 times to mix the solution. Lyophilized powder product must be used for pediatric patients LESS than 40 kg.      100 mg  125-500 mL/hr over  Minutes Intravenous EVERY 24 HOURS 05/12/21 1830 05/17/21 1659    05/13/21 1130  meropenem (MERREM) intermittent 1g in NS 50ML PREMIX      1 g  100 mL/hr over 30 Minutes Intravenous EVERY 8 HOURS 05/13/21 1057          Indication: COVID-19 (remdesivir), CAP/aspiration (meropenem)    Days of Therapy: 2 (meropenem), 3 (remdesivir)     Pertinent Labs:  WBC   Date Value Ref Range Status   05/14/2021 9.4 4.0 - 11.0 10e9/L Final   05/13/2021 10.3 4.0 - 11.0 10e9/L Final   05/12/2021 7.5 4.0 - 11.0 10e9/L Final     Procalcitonin   Date Value Ref Range Status   05/13/2021 1.66 ng/ml Final     Comment:     0.50-1.99 ng/ml  Moderate risk of systemic infection.  Recommendation:   Recommend antibiotics. Evaluate culture results and clinical features to   target antibacterial therapy. Obtain blood cultures and other relevant   cultures if not done.  If empiric antibiotics were started, recheck PCT in: 2 days to guide   antibiotic de-escalation.  Discontinue or de-escalate antibiotics when PCT   concentration is <80% of peak or abs PCT <0.5. If empiric antibiotics were NOT   started, recheck PCT in 6-24 hours to re-evaluate need for antibiotics.     05/12/2021 1.60 ng/ml Final     Comment:     0.50-1.99 ng/ml  Moderate risk of systemic infection.  Recommendation:   Recommend antibiotics. Evaluate culture results and clinical features to   target antibacterial therapy. Obtain blood cultures and other relevant   cultures if not done.  If empiric antibiotics were started, recheck PCT  in: 2 days to guide   antibiotic de-escalation.  Discontinue or de-escalate antibiotics when PCT   concentration is <80% of peak or abs PCT <0.5. If empiric antibiotics were NOT   started, recheck PCT in 6-24 hours to re-evaluate need for antibiotics.     11/10/2020 <0.05 ng/ml Final     Comment:     <0.05 ng/ml  Normal  Recommendation: Very low risk of bacterial infection.   Discourage antibiotics unless strong clinical suspicion for serious infection.       CRP Inflammation   Date Value Ref Range Status   05/14/2021 322.0 (H) 0.0 - 8.0 mg/L Final   05/13/2021 377.0 (H) 0.0 - 8.0 mg/L Final   05/12/2021 278.0 (H) 0.0 - 8.0 mg/L Final       Culture Results:   5/11/21: COVID positive     Baseline LFTs: wnl  Steroid: dexamethasone 6 mg IV daily  Anticoagulation: Lovenox 0.5 mg/kg BID due to D-dimer>5    Recommendations/Interventions:  1. Recommend adding stop date of 5/22 for dexamethasone to complete 10-day course.   2. Recommend switching meropenem to Zosyn if main concern is aspiration. Will need to clarify allergy to Augmentin (no reaction listed).   3. Provider not 100% sure this is an infection, might just be inflammatory- consider stopping meropenem once decided (restricted antibiotic)    Doreen Ramírez, [facility]  May 14, 2021     Detail Level: Simple Additional Notes: Patient consent was obtained to proceed with the visit and recommended plan of care after discussion of all risks and benefits, including the risks of COVID-19 exposure.

## 2022-11-21 NOTE — ED TRIAGE NOTES
"Patient presents with c/o weakness that started today. Brother reports that they he was called today, d/t patient being here in the hospital and confused. Patient reports that he came here because \"he thinks he fell down.\" Patient reports that he does not take his medications. Patient poor historian.       "

## 2022-11-21 NOTE — ED PROVIDER NOTES
"  History     Chief Complaint   Patient presents with     Altered Mental Status     HPI  Neel Kerr is a 82 year old male who comes to the emerge department with family members after having suffered some confusion.  The patient lives alone.  He came into town and was visiting with his brother and sister-in-law.  After having eaten he became confused.  His brother and sister-in-law concerned [place].  They do not believe he has been taking his medications.  Patient states that he did feel weak and was \"very hungry\".  He states he had not eaten prior to lunch.  He denies a headache.  He is not had any visual disturbance.  He denies pain in his chest and states has not been short of breath.  He denies any abdominal discomfort.  He has had no nausea or vomiting.  He has not had diarrhea or constipation.  He has not had hematuria or dysuria.    Allergies:  Allergies   Allergen Reactions     Augmentin      Marked as an allergy at the [facility]       Problem List:    Patient Active Problem List    Diagnosis Date Noted     Cerebrovascular accident (CVA) due to other mechanism (H) 01/03/2022     Priority: Medium     Lactic acidosis 01/03/2022     Priority: Medium     Encephalopathy 01/03/2022     Priority: Medium     Hx of transurethral resection of prostate 09/10/2021     Priority: Medium     7/27/2021       Urinary retention due to benign prostatic hyperplasia 07/22/2021     Priority: Medium     Added automatically from request for surgery [id number]    Resolved with TURP performed on 7/27/21       Underweight 07/02/2021     Priority: Medium     Type 2 diabetes mellitus, with long-term current use of insulin (H) 06/30/2021     Priority: Medium     COPD (chronic obstructive pulmonary disease) (H) 05/18/2021     Priority: Medium     Oropharyngeal dysphagia 05/13/2021     Priority: Medium     Polycystic kidney disease 05/12/2021     Priority: Medium     Diabetic ketoacidosis without coma " associated with type 2 diabetes mellitus (H) 2021     Priority: Medium     Fall 2021     Priority: Medium     Hyponatremia 2020     Priority: Medium     Acute on chronic respiratory failure with hypoxia (H) 2020     Priority: Medium     Pneumonia due to 2019 novel coronavirus 2020     Priority: Medium     Psoriasis 2018     Priority: Medium     Non-ST elevation (NSTEMI) myocardial infarction (H) 2017     Priority: Medium        Past Medical History:    Past Medical History:   Diagnosis Date     Essential (primary) hypertension      Hyperlipidemia      Non-ST elevation (NSTEMI) myocardial infarction (H)      Old myocardial infarction      Polyneuropathy      Type 2 diabetes mellitus without complications (H)        Past Surgical History:    Past Surgical History:   Procedure Laterality Date     CYSTOSCOPY, TRANSURETHRAL RESECTION (TUR) PROSTATE, COMBINED N/A 2021    Procedure: Transurethral resection of prostate;  Surgeon: Antonio August MD;  Location:  OR       Family History:    Family History   Problem Relation Age of Onset     No Known Problems Mother        Social History:  Marital Status:  Single [1]  Social History     Tobacco Use     Smoking status: Former     Types: Cigarettes     Quit date: 1992     Years since quittin.9     Smokeless tobacco: Never   Vaping Use     Vaping Use: Never used   Substance Use Topics     Alcohol use: No     Comment: Alcoholic Drinks/day: quit 1985     Drug use: No        Medications:    acetaminophen (TYLENOL) 325 MG tablet  albuterol (PROAIR HFA/PROVENTIL HFA/VENTOLIN HFA) 108 (90 Base) MCG/ACT inhaler  amLODIPine (NORVASC) 5 MG tablet  aspirin (ASA) 81 MG chewable tablet  atorvastatin (LIPITOR) 40 MG tablet  clopidogrel (PLAVIX) 75 MG tablet  insulin glargine (LANTUS PEN) 100 UNIT/ML pen  ipratropium-albuterol (COMBIVENT RESPIMAT)  MCG/ACT inhaler  metFORMIN (GLUCOPHAGE) 1000 MG tablet  tamsulosin (FLOMAX)  "0.4 MG capsule          Review of Systems   Constitutional: Positive for fatigue.   HENT: Negative.    Eyes: Negative.    Respiratory: Negative.    Cardiovascular: Negative.    Gastrointestinal: Negative.    Endocrine: Negative.    Genitourinary: Negative.    Musculoskeletal: Negative.    Neurological: Negative.    Psychiatric/Behavioral: Positive for confusion.   All other systems reviewed and found unremarkable    Physical Exam   BP: 115/70  Pulse: 107  Temp: 97.5  F (36.4  C)  Resp: 18  Height: 167.6 cm (5' 6\")  Weight: 51.2 kg (112 lb 14 oz)  SpO2: 97 %      Physical Exam 82-year-old gentleman who is awake alert oriented person place and time.  He is very pleasant and cooperative with my exam.  HEENT normocephalic extraocular muscles intact pupils equally round and reactive to light and oropharynx is clear.  Neck is supple is full range of motion without pain.  Lungs are clear bilaterally.  Heart maintains a regular rate and rhythm.  S1 and S2 sounds are appreciated.  The abdomen is soft is nontender no rebound or involuntary guarding.  Extremities have full range of motion 5/5 strength.  No edema.  Brisk peripheral pulses brisk capillary refill no sensory deficit is noted.  Neurologic exam no facial asymmetry no focal cranial nerve deficit.  No focal muscle weakness.    ED Course              ED Course as of 11/22/22 1013   Mon Nov 21, 2022   1605 Troponin T, High Sensitivity(!!): 220   1641 Contacted [facility] and discussed case with Dr. Alonso, intake EM physician, and Dr. Green, invasive cardiologist.  We are in agreement that there is inferior ST elevation inferiorly however Dr. Green points out that there are deep Q waves and his thought is this is indicative of a recent injury but not an acute STEMI.  We will start Mr. Villalta on aspirin and heparin.  He remains pain-free.  We will repeat troponin and will recontact [facility]   1814 Recontacted [facility] and spoke to Dr. Wright, EM intake " physician.  Mr. Kerr will be wait listed at [facility].   Tue Nov 22, 2022   1010 Discussed the case with Dr. Solis the intake physician at [facility] and he very graciously agreed to accept the patient in transfer.  He will be made a direct admission to the cardiac unit.              {EKG done and interpreted by myself.  Shows a sinus rhythm at 100 bpm.  Parables 172 ms.  Corrected QT is 479 ms.  There is evidence of left ventricular hypertrophy.  There are some slight upward coving of the ST segments in the inferior leads.  However there is no reciprocal change that would indicate ischemia.  Critical Care time:             Results for orders placed or performed during the hospital encounter of 11/21/22 (from the past 24 hour(s))   Glucose by meter   Result Value Ref Range    GLUCOSE BY METER POCT 231 (H) 70 - 99 mg/dL   [place] Draw    Narrative    The following orders were created for panel order [place] Draw.  Procedure                               Abnormality         Status                     ---------                               -----------         ------                     Extra Blood Culture Bottle[[id number]]                       Final result               Extra Blue Top Tube[[id number]]                              Final result               Extra Red Top Tube[[id number]]                               Final result               Extra Green Top (Lithium...[[id number]]                      Final result               Extra Green Top (Lithium...[[id number]]                      Final result               Extra Purple Top Tube[[id number]]                            Final result                 Please view results for these tests on the individual orders.   CBC with Platelets & Differential    Narrative    The following orders were created for panel order CBC with Platelets & Differential.  Procedure                               Abnormality         Status                     ---------                                -----------         ------                     CBC with platelets and d...[[id number]]  Abnormal            Final result                 Please view results for these tests on the individual orders.   Basic metabolic panel   Result Value Ref Range    Sodium 127 (L) 136 - 145 mmol/L    Potassium 5.0 3.4 - 5.3 mmol/L    Chloride 88 (L) 98 - 107 mmol/L    Carbon Dioxide (CO2) 24 22 - 29 mmol/L    Anion Gap 15 7 - 15 mmol/L    Urea Nitrogen 19.6 8.0 - 23.0 mg/dL    Creatinine 0.94 0.67 - 1.17 mg/dL    Calcium 9.0 8.8 - 10.2 mg/dL    Glucose 271 (H) 70 - 99 mg/dL    GFR Estimate 82 >60 mL/min/1.73m2   Troponin T, High Sensitivity   Result Value Ref Range    Troponin T, High Sensitivity 220 (HH) <=22 ng/L   Extra Blood Culture Bottle   Result Value Ref Range    Hold Specimen JIC    Extra Blue Top Tube   Result Value Ref Range    Hold Specimen JIC    Extra Red Top Tube   Result Value Ref Range    Hold Specimen JIC    Extra Green Top (Lithium Heparin) Tube   Result Value Ref Range    Hold Specimen JIC    Extra Green Top (Lithium Heparin) Tube   Result Value Ref Range    Hold Specimen JIC    Extra Purple Top Tube   Result Value Ref Range    Hold Specimen JIC    CBC with platelets and differential   Result Value Ref Range    WBC Count 18.5 (H) 4.0 - 11.0 10e3/uL    RBC Count 4.99 4.40 - 5.90 10e6/uL    Hemoglobin 14.5 13.3 - 17.7 g/dL    Hematocrit 44.5 40.0 - 53.0 %    MCV 89 78 - 100 fL    MCH 29.1 26.5 - 33.0 pg    MCHC 32.6 31.5 - 36.5 g/dL    RDW 13.2 10.0 - 15.0 %    Platelet Count 379 150 - 450 10e3/uL    % Neutrophils 80 %    % Lymphocytes 13 %    % Monocytes 6 %    % Eosinophils 0 %    % Basophils 0 %    % Immature Granulocytes 1 %    NRBCs per 100 WBC 0 <1 /100    Absolute Neutrophils 14.7 (H) 1.6 - 8.3 10e3/uL    Absolute Lymphocytes 2.4 0.8 - 5.3 10e3/uL    Absolute Monocytes 1.1 0.0 - 1.3 10e3/uL    Absolute Eosinophils 0.0 0.0 - 0.7 10e3/uL    Absolute Basophils 0.0 0.0 - 0.2 10e3/uL    Absolute Immature  Granulocytes 0.1 <=0.4 10e3/uL    Absolute NRBCs 0.0 10e3/uL   Magnesium   Result Value Ref Range    Magnesium 2.0 1.7 - 2.3 mg/dL   Hepatic panel   Result Value Ref Range    Protein Total 7.8 6.4 - 8.3 g/dL    Albumin 3.5 3.5 - 5.2 g/dL    Bilirubin Total 0.5 <=1.2 mg/dL    Alkaline Phosphatase 165 (H) 40 - 129 U/L    AST 21 10 - 50 U/L    ALT 8 (L) 10 - 50 U/L    Bilirubin Direct <0.20 0.00 - 0.30 mg/dL   Lactic Acid STAT   Result Value Ref Range    Lactic Acid 3.4 (H) 0.7 - 2.0 mmol/L   Partial thromboplastin time   Result Value Ref Range    aPTT 29 22 - 38 Seconds   Asymptomatic Influenza A/B & SARS-CoV2 (COVID-19) Virus PCR Multiplex Nasopharyngeal    Specimen: Nasopharyngeal; Swab   Result Value Ref Range    Influenza A PCR Negative Negative    Influenza B PCR Negative Negative    RSV PCR Negative Negative    SARS CoV2 PCR Negative Negative    Narrative    Testing was performed using the Xpert Xpress CoV2/Flu/RSV Assay on the Cepheid GeneXpert Instrument. This test should be ordered for the detection of SARS-CoV-2 and influenza viruses in individuals who meet clinical and/or epidemiological criteria. Test performance is unknown in asymptomatic patients. This test is for in vitro diagnostic use under the FDA EUA for laboratories certified under CLIA to perform high or moderate complexity testing. This test has not been FDA cleared or approved. A negative result does not rule out the presence of PCR inhibitors in the specimen or target RNA in concentration below the limit of detection for the assay. If only one viral target is positive but coinfection with multiple targets is suspected, the sample should be re-tested with another FDA cleared, approved, or authorized test, if coinfection would change clinical management. This test was validated by the [facility]. These laboratories are certified under the Clinical Laboratory Improvement Amendments of 1988 (CLIA-88) as qualified to perform high  complexity laboratory testing.   CT Head w/o Contrast    Narrative    PROCEDURE: CT HEAD W/O CONTRAST   11/21/2022 4:24 PM    HISTORY:Male, age,  82 years, , , confusion    COMPARISON: Head CT 1/3/2022 MRI brain 1/3/2022    TECHNIQUE: CT of the brain without contrast. Axial; sagittal and  coronal MIP images were reviewed.    FINDINGS: Ventricles and sulci are age-appropriate. Gray and white  matter demonstrate scattered areas of decreased density.    There is no evidence of mass, mass effect or midline shift. No  evidence of acute hemorrhage. Mucosal thickening of the left maxillary  sinus is unchanged    The bones are unremarkable. No fracture.       Impression    IMPRESSION:   No acute intracranial abnormality.  No acute fracture.     Nonspecific white matter changes are similar in appearance suggesting  small vessel disease and old lacunar infarcts.    This facility minimizes radiation dose by adjusting the mA and/or kV  according to each patient size.      This CT scan was performed using one or more the following dose  reduction techniques:    -Automated exposure control,  -Adjustment of the mA and/or kV according to patient's size, and/or,  -Use of iterative reconstruction technique.      KAZ BAH MD         SYSTEM ID:  [id number]   CT Chest Abdomen Pelvis w/o Contrast    Narrative    CT CHEST ABDOMEN PELVIS W/O CONTRAST    CLINICAL HISTORY: Male, age 82 years, cough/fever/weakness;    Comparison:  CT scan of chest 6/30/2021; CT scan of the abdomen and  pelvis 1/3/2022    TECHNIQUE:  CT was performed of the chest, abdomen and pelvis without  contrast.   Axial; sagittal and coronal MIP images were reviewed.    FINDINGS:  Chest CT:   Chronic changes within the lungs are appreciated including emphysema,  fibrosis and peripheral areas of scarring. There has been resolution  of the dense areas of consolidation seen on the study from 6/30/2021.    An 8.7 mm pleural-based nodule located posteriorly and medially  in the  right lower lobe is unchanged dating back to 1/3/2022. A 5 mm solid  appearing subpleural nodule located posteriorly and medially left  lower lobe may have been present previously however this area was  previously obscured by dense areas of consolidation.    Atherosclerotic calcifications of the arterial structures do not  appear to be significantly different. Normal size and mildly enlarged  lymph nodes of the mediastinum and hilar regions are similar.    There is circumferential wall thickening of the mid to distal aspects  of the esophagus suggesting esophagitis.    Calcified noncalcified pleural plaques are similar in appearance.    Bony structures of the chest demonstrate no acute abnormality.    Abdomen/Pelvis CT:  Stomach and duodenum: Moderate volume of ingested material within the  gastric lumen. No distinct evidence of acute inflammatory process.  Have liver: Unremarkable    Gallbladder: Unremarkable    Spleen: Splenic calcifications are similar in appearance without acute  abnormality.    Pancreas: No acute abnormality. Mild/moderate atrophy    Adrenal glands: 8 mm left adrenal gland adenoma.    Kidneys: Unremarkable.    Ureters: Unremarkable.    Urinary bladder: Unremarkable.    Prostate gland is enlarged and indents the floor the urinary bladder.    Large and small bowel: Diverticulosis of the colon. Large volume of  stool seen within the distal colon/rectum.    There are scattered after scattered calcifications throughout the  abdominal aorta and arterial structures the pelvis. There is no  evidence of pathologic lymph node enlargement of the retroperitoneal  and mesenteric nodes.     Bony structures: No acute abnormality.      Impression    IMPRESSION:   No distinct evidence of acute abnormality of the chest, abdomen or  pelvis.    Numerous nonacute findings as described above.    8.7 mm right lower lobe and 5 mm left lower lobe nodules have been  stable dating back to November 2020.      This  facility minimizes radiation dose by adjusting the mA and/or kV  according to each patient size.      This CT scan was performed using one or more the following dose  reduction techniques:    -Automated exposure control,  -Adjustment of the mA and/or kV according to patient's size, and/or,  -Use of iterative reconstruction technique.    KAZ BAH MD         SYSTEM ID:  [id number]   Troponin T, High Sensitivity   Result Value Ref Range    Troponin T, High Sensitivity 185 (HH) <=22 ng/L   Lactic acid whole blood   Result Value Ref Range    Lactic Acid 2.0 0.7 - 2.0 mmol/L   Partial thromboplastin time   Result Value Ref Range    aPTT 24 22 - 38 Seconds   Glucose by meter   Result Value Ref Range    GLUCOSE BY METER POCT 191 (H) 70 - 99 mg/dL   UA with Microscopic reflex to Culture    Specimen: Urine, Clean Catch   Result Value Ref Range    Color Urine Yellow Colorless, Straw, Light Yellow, Yellow    Appearance Urine Clear Clear    Glucose Urine 500 (A) Negative mg/dL    Bilirubin Urine Negative Negative    Ketones Urine 10 (A) Negative mg/dL    Specific Gravity Urine 1.023 1.003 - 1.035    Blood Urine Negative Negative    pH Urine 5.5 4.7 - 8.0    Protein Albumin Urine 50 (A) Negative mg/dL    Urobilinogen Urine Normal Normal, 2.0 mg/dL    Nitrite Urine Negative Negative    Leukocyte Esterase Urine Negative Negative    Mucus Urine Present (A) None Seen /LPF    RBC Urine <1 <=2 /HPF    WBC Urine 1 <=5 /HPF    Squamous Epithelials Urine 0 <=1 /HPF    Narrative    Urine Culture not indicated   Partial thromboplastin time   Result Value Ref Range    aPTT 131 (HH) 22 - 38 Seconds   CBC with platelets differential    Narrative    The following orders were created for panel order CBC with platelets differential.  Procedure                               Abnormality         Status                     ---------                               -----------         ------                     CBC with platelets and  nereyda..[[id number]]  Abnormal            Final result                 Please view results for these tests on the individual orders.   Basic metabolic panel   Result Value Ref Range    Sodium 130 (L) 136 - 145 mmol/L    Potassium 4.0 3.4 - 5.3 mmol/L    Chloride 98 98 - 107 mmol/L    Carbon Dioxide (CO2) 22 22 - 29 mmol/L    Anion Gap 10 7 - 15 mmol/L    Urea Nitrogen 15.6 8.0 - 23.0 mg/dL    Creatinine 0.70 0.67 - 1.17 mg/dL    Calcium 7.5 (L) 8.8 - 10.2 mg/dL    Glucose 130 (H) 70 - 99 mg/dL    GFR Estimate >90 >60 mL/min/1.73m2   Troponin T, High Sensitivity   Result Value Ref Range    Troponin T, High Sensitivity 192 (HH) <=22 ng/L   CBC with platelets and differential   Result Value Ref Range    WBC Count 8.9 4.0 - 11.0 10e3/uL    RBC Count 3.95 (L) 4.40 - 5.90 10e6/uL    Hemoglobin 11.7 (L) 13.3 - 17.7 g/dL    Hematocrit 35.1 (L) 40.0 - 53.0 %    MCV 89 78 - 100 fL    MCH 29.6 26.5 - 33.0 pg    MCHC 33.3 31.5 - 36.5 g/dL    RDW 13.0 10.0 - 15.0 %    Platelet Count 246 150 - 450 10e3/uL    % Neutrophils 65 %    % Lymphocytes 27 %    % Monocytes 7 %    % Eosinophils 1 %    % Basophils 0 %    % Immature Granulocytes 0 %    NRBCs per 100 WBC 0 <1 /100    Absolute Neutrophils 5.6 1.6 - 8.3 10e3/uL    Absolute Lymphocytes 2.4 0.8 - 5.3 10e3/uL    Absolute Monocytes 0.7 0.0 - 1.3 10e3/uL    Absolute Eosinophils 0.1 0.0 - 0.7 10e3/uL    Absolute Basophils 0.0 0.0 - 0.2 10e3/uL    Absolute Immature Granulocytes 0.0 <=0.4 10e3/uL    Absolute NRBCs 0.0 10e3/uL       Medications   0.9% sodium chloride BOLUS (0 mLs Intravenous Stopped 11/21/22 1912)     Followed by   sodium chloride 0.9% infusion ( Intravenous New Bag 11/21/22 5128)   heparin 25,000 units in 0.45% NaCl 250 mL ANTICOAGULANT infusion (600 Units/hr Intravenous Restarted 11/22/22 0823)   heparin ANTICOAGULANT loading dose for LOW INTENSITY TREATMENT * Give BEFORE starting heparin infusion (3,050 Units Intravenous Given 11/21/22 1659)   aspirin (ASA) tablet 325 mg  (325 mg Oral Given 11/21/22 6488)       Assessments & Plan (with Medical Decision Making)     I have reviewed the nursing notes.    I have reviewed the findings, diagnosis, plan and need for follow up with the patient.  The patient has been wait listed for admission to [facility] Prescriptions    No medications on file       Final diagnoses:   NSTEMI (non-ST elevated myocardial infarction) (H)       11/21/2022   [facility]     James Dunaway, DO  11/21/22 1927       James Dunaway, DO  11/22/22 1013

## 2022-11-22 NOTE — ED NOTES
Pt slept about 45  minutes tonight. Needing frequently redirection regarding IV safety and arm placement for IV patency for IVFs and infusions. Attempted to use arm board but this failed.  Pt remains consistently confused x3 at every interaction, only orientated to self.   Pt remains NSR in 60s bpm, no ectopy seen. Denies CP and has no c/o. Increased HEP GTT to 900 unit(s)/hr d/t subtherapeutic PTT draw at 0000. Awaiting recheck PTT this AM.

## 2022-11-22 NOTE — ED NOTES
Face to face report given with opportunity to observe patient.    Report given to Deena Ahuja RN   11/22/2022  7:01 AM

## 2022-11-22 NOTE — ED NOTES
BIBI.  Pt does know he is in hospital, does not know why, reorientated and updated on plan of care, pt is accepting. Made aware of needed UA and pending transfer to [facility].  Pt states he will call if he needs anything. Alarms on.

## 2022-11-22 NOTE — ED NOTES
In to see pt as alarms for IOV pumps sounding d/t occulusion from bent arm at IV site. Pt awake. Offered urinal, pt states he has he could void, urinal placed and given privacy.  Upon return, urinal is at bedside table. Pt states he got himself dressed in brief again, but pt is in fact not dressed. Pt assisted to be dressed fully.   Pt is not operated to time, place or situation. Accepts re orientation.  VSS, denies CP or complaints.   UA collected and sent.

## 2022-11-22 NOTE — ED NOTES
Changed patient out of soiled jeans. Applied gown, fresh brief, repositioned, and monitors. Call light within reach

## 2022-11-22 NOTE — ED NOTES
Pt still not sleeping, folding and unfolding with blankets and sat monitor. Attempted to encourage pt to use urinal. No results. Will continue to try to obtain UA.

## 2022-11-22 NOTE — ED NOTES
Ptt 131, heparin infusion stopped x 1 hour, restart 300 units/hr less at 600 units/hr at 0820, PTT recheck at 1420.

## 2022-11-22 NOTE — ED NOTES
Pt set bed alarm off, standing at edge of the bed.  Pt assist x 1 with urinal at bedside.  Pt educated on call light and the need to ask for assistance, pt verbalized understanding, but reinforcement is still needed.

## 2022-11-28 NOTE — ED NOTES
Pt ordering lunch. Pt reports he has not been taking his lantus at bedtime or his metformin. Provider updated that pt BG was 311 on arrival.

## 2022-11-28 NOTE — ED NOTES
Pt complaining of right arm aching 4/10, just started upon return to ED from CT. Pt denies chest pain, pt able to move arm. Provider updated.

## 2022-11-28 NOTE — ED NOTES
Care Transitions focused note:      Call to Rachael Guzman [facility] care coordinator.  She will fax discharge summary from his recent visit to [facility] where he refused suggested procedure.    Pt has a follow up appointment with [facility]'s Cardiology with Jamia Salas on December 9th at 3 pm    CLAUDIA De La Rosa

## 2022-11-28 NOTE — ED PROVIDER NOTES
History     Chief Complaint   Patient presents with     Generalized Weakness     Fall     HPI  Neel Kerr is a 82 year old male patient brought in by family from home for evaluation.  Patient was found on the floor by his son this morning.  Feeling very weak and slightly confused.  History collected from the patient and his son and his brother at the bedside.  His brother left him yesterday around 6 PM and he was doing okay but generalized weakness.  Patient was seen here last week and was transferred to [place] for NSTEMI.  MINNIE at [facility] and was found to have pseudoaneurysm of the left ventricle and it was recommended for the patient to be transferred to [facility] to be seen by cardiothoracic surgeon for pseudoaneurysm repair.  Patient refused transfer and was discharged home..  Patient denies hitting his head.  No loss of consciousness.  Denies any headache or visual changes.  Denies any focal weakness or numbness anywhere.  Denies any neck pain or back pain.  Denies any chest pain or shortness of breath.  Denies any cough sore throat runny nose or congestion.  Denies any fever or chills.  Denies any abdominal pain diarrhea or constipation.  Denies any urinary symptoms.    Allergies:  Allergies   Allergen Reactions     Augmentin Nausea     Marked as an allergy at the [facility]       Problem List:    Patient Active Problem List    Diagnosis Date Noted     Hyperglycemia 12/02/2022     Priority: Medium     Generalized muscle weakness 12/02/2022     Priority: Medium     Pseudoaneurysm of left ventricle of heart 12/02/2022     Priority: Medium     Elevated troponin 12/02/2022     Priority: Medium     Failure to thrive in adult 12/02/2022     Priority: Medium     Fall, initial encounter 12/02/2022     Priority: Medium     Cerebrovascular accident (CVA) due to other mechanism (H) 01/03/2022     Priority: Medium     Lactic acidosis 01/03/2022     Priority: Medium     Encephalopathy 01/03/2022     Priority:  Medium     Hx of transurethral resection of prostate 09/10/2021     Priority: Medium     7/27/2021       Urinary retention due to benign prostatic hyperplasia 07/22/2021     Priority: Medium     Added automatically from request for surgery [id number]    Resolved with TURP performed on 7/27/21       Underweight 07/02/2021     Priority: Medium     Type 2 diabetes mellitus, with long-term current use of insulin (H) 06/30/2021     Priority: Medium     COPD (chronic obstructive pulmonary disease) (H) 05/18/2021     Priority: Medium     Oropharyngeal dysphagia 05/13/2021     Priority: Medium     Polycystic kidney disease 05/12/2021     Priority: Medium     Diabetic ketoacidosis without coma associated with type 2 diabetes mellitus (H) 05/11/2021     Priority: Medium     Fall 05/11/2021     Priority: Medium     Hyponatremia 09/21/2020     Priority: Medium     Acute on chronic respiratory failure with hypoxia (H) 09/21/2020     Priority: Medium     Pneumonia due to 2019 novel coronavirus 09/21/2020     Priority: Medium     Psoriasis 02/01/2018     Priority: Medium     Non-ST elevation (NSTEMI) myocardial infarction (H) 07/02/2017     Priority: Medium        Past Medical History:    Past Medical History:   Diagnosis Date     Essential (primary) hypertension      Hyperlipidemia      Non-ST elevation (NSTEMI) myocardial infarction (H)      Old myocardial infarction      Polyneuropathy      Type 2 diabetes mellitus without complications (H)        Past Surgical History:    Past Surgical History:   Procedure Laterality Date     CYSTOSCOPY, TRANSURETHRAL RESECTION (TUR) PROSTATE, COMBINED N/A 7/27/2021    Procedure: Transurethral resection of prostate;  Surgeon: Antonio August MD;  Location:  OR       Family History:    Family History   Problem Relation Age of Onset     No Known Problems Mother        Social History:  Marital Status:  Single [1]  Social History     Tobacco Use     Smoking status: Former     Types: Cigarettes      Quit date: 1992     Years since quittin.9     Smokeless tobacco: Never   Vaping Use     Vaping Use: Never used   Substance Use Topics     Alcohol use: No     Comment: Alcoholic Drinks/day: quit drinking      Drug use: No        Medications:    amLODIPine (NORVASC) 2.5 MG tablet  aspirin 81 MG EC tablet  atorvastatin (LIPITOR) 40 MG tablet  insulin glargine (LANTUS PEN) 100 UNIT/ML pen  lisinopril (ZESTRIL) 2.5 MG tablet  nitroGLYcerin (NITROSTAT) 0.4 MG sublingual tablet  albuterol (PROAIR HFA/PROVENTIL HFA/VENTOLIN HFA) 108 (90 Base) MCG/ACT inhaler          Review of Systems   All other systems reviewed and are negative.      Physical Exam   BP: 104/71  Pulse: 85  Temp: 98  F (36.7  C)  Resp: 16  SpO2: 96 %      Physical Exam  Constitutional:       General: He is not in acute distress.     Appearance: He is ill-appearing. He is not toxic-appearing or diaphoretic.   HENT:      Head: Normocephalic and atraumatic.      Nose: Nose normal. No congestion or rhinorrhea.   Eyes:      General: No scleral icterus.        Right eye: No discharge.         Left eye: No discharge.      Extraocular Movements: Extraocular movements intact.      Conjunctiva/sclera: Conjunctivae normal.      Pupils: Pupils are equal, round, and reactive to light.   Neck:      Vascular: No carotid bruit.   Cardiovascular:      Rate and Rhythm: Normal rate and regular rhythm.      Heart sounds: Normal heart sounds. No murmur heard.  Pulmonary:      Effort: Pulmonary effort is normal. No respiratory distress.      Breath sounds: Normal breath sounds. No stridor. No wheezing, rhonchi or rales.   Chest:      Chest wall: No tenderness.   Abdominal:      General: Bowel sounds are normal. There is no distension.      Palpations: Abdomen is soft. There is no mass.      Tenderness: There is no abdominal tenderness. There is no right CVA tenderness, left CVA tenderness, guarding or rebound.      Hernia: No hernia is present.   Musculoskeletal:          General: No tenderness.      Cervical back: Normal range of motion and neck supple. No rigidity or tenderness.   Lymphadenopathy:      Cervical: No cervical adenopathy.   Skin:     General: Skin is warm.      Findings: No rash.   Neurological:      General: No focal deficit present.      Mental Status: He is alert. Mental status is at baseline.      Cranial Nerves: No cranial nerve deficit.      Sensory: No sensory deficit.      Motor: No weakness.      Coordination: Coordination normal.      Gait: Gait normal.      Deep Tendon Reflexes: Reflexes normal.      Comments: GCS 14.  Slightly confused.   Psychiatric:         Mood and Affect: Mood normal.         ED Course          Patient was seen and examined shortly after arrival.  Stable.  On cardiac monitor.  EKG, lab and imaging reviewed.  EKG shows no sign of acute ischemia or dysrhythmia.  CT head shows no acute process.  Troponin is elevated however it is lower than what it was before so it looks like it is trending down.  I did consulted with Dr. Davis ER physician at [facility] and he stated that it was recommended for the patient to be transferred to the [facility] to be seen by cardiothoracic surgeon for repair of the left ventricle pseudoaneurysm.  And he recommended transferring to the [facility] if the patient and his family desire to do so.  Patient and his family at the bedside, his son and his brother agree and would like to be transferred to the [facility] for definitive treatment.  I did contacted the [facility] unfortunately they are on divert.  Patient is on their waiting list.  Patient care transferred to . Ernesto has Yakathleendan in stable condition for further management and disposition.    ED Course as of 12/02/22 1838 Tue Nov 29, 2022   0021 Troponin T, High Sensitivity(!!): 137   0703 Sign out received 82M with recent NSTEMI on 11/21 and at that time found to have a pseudoaneurysm (cardiac) at [facility]. Pt found to be weak at home. Trops dropping. Taking ASA  "plavix. Per [facility] he needs to be at the [facility]. On the [facility] waiting list. (Consider repeat Echo)   0832 My staff reached out to [facility] and spoke with \"Esmer\" who refused to facilitate a provider to provider conversation to establish the patient on a wait list for the you despite being informed that the patient has been refused from other institutes because of his requirement for higher level of care, specifically at [facility]. He states there are 2 lists, one for patients who have had a provider to provider discussion and subsequent acceptance waiting for a bed, and another list for \"beds within the [facility] system\". He has reportedly been placed on the second list. Esmer stated that when the patient decompensates then, call again.   1004 I reached out again to [facility] for more clarification I spoke with Dr. Spence?  He states that he knows the patient and his case, that the patient has a pseudoaneurysm that needs to be repaired by CV surgery and that the CV surgeon at [facility] felt like this was complex enough that he rates to be done at a higher level of care.  The patient was going to be following up with Dr. Lizbeth Yen from CV search and [facility], but that CV surgery was hoping for him to improve his heart health over the next 2 weeks after his MI before proceeding onto cardiac surgery.  Per cardiology appropriate next step for this gentleman is either transfer to the [facility] or the [facility]   1409 Case was discussed with Dr. Perry from CT surgery at [facility] who recommends bringing the pt to [facility]. We discussed the case with Dr. Lizama who accepted the admission. Neither suggested waiting until the patient decompensated. Due to limited bed availablility, we were notified by bed placement there may be a 24 to 48h delay in placement. Pt accepted at [facility].    1819 Patient signed out pending ongoing plan for transfer to the  once a bed becomes available   Wed Nov 30, 2022   0707 There was an event yesterday where the " patient tried to get out of bed to go pee but ended up on the ground. He was not witnessed doing this. Staff came immediately. There were no signs of injury. He had no pain. He was neurovascularly intact. Given his age and the unwitnessed fall, a CTH was done and is normal.    1509 There was another event today where the patient fell out of his bed.  Again he is neurovascularly intact does not seem like he has gotten any injuries.  All of the bed rails were out of his bed alarm was on and he had taken his shirt off to climb out of bed.  His Mata remains in place.  Primary and secondary survey for trauma are both negative.  No signs of trauma.  No specific indication for advanced imaging at this time.  We will continue to monitor.   1510 Hospital team paged for consult given long emergency department stay anticipated IN particular to assist with inpatient diabetes control.  I did speak briefly with Dr. Mackey who asked me to have the night hospitalist take care of it   1907 Patient signed out pending follow-up on labs, hospital consult, ongoing efforts to transfer   Thu Dec 01, 2022   0700 I spoke to Dr Haji, regarding medical consult for adjusting his medication , he recommended cardiology consult instead of medical consult.    0731 Signout received, ceftriaxone one-time dose ordered.  We will continue ceftriaxone dosing for UTI.  Blood likely related to placement of the Mata, no CT done overnight.  Hospitalist consulted to help with diabetes management who recommended cardiology consult.  Cardiology has already been consulted and so has CV surgeon.  Perhaps there was some misunderstanding.  I will talk to the hospitalist again today for a third time   1432 Dr Jaleel Alston Armorel.  I had an extensive conversation with Dr. Alston he felt the patient was not a surgical candidate.  I also had a discussion with the CV surgeon from [facility] who felt the same.  I plan to discuss the case again with Dr. Feliz  and be much more explicit about how he has been behaving over the last 3 days what he physically looks like etc. and see if his sense of the appropriate neck step is consistent or inconsistent   Fri Dec 02, 2022   1829 Extensive discussions were had with Dr. Feliz, Dr. Alston, cardiologist Dr. Drake, an extensive discussion about the patient's current state of health, his the patient's children, yusuf and carlito, as well as the patient's brother Nicolas and his wife michelle.  We had confusion and inability to properly consent for the surgery at this time which I feel after watching him for a couple of days his degree of confusion is cachexia his repeated falls make him a poor surgical candidate.  After having a discussion with the surgeons they also feel he is likely a poor surgical candidate and the family also feels he is a poor surgical candidate and would like to allow him an opportunity to get stronger before he moves on toward surgery.  In the few days that I have been taking care of him and I have seen a significant improvement in his degree of confusion and strength but he still does not quite have the capacity to make a decision of this magnitude.  The decision was made at this time to hold off on immediate surgery and work towards placing the nursing home where he can get stronger and have him follow-up as an outpatient with the surgeons to work on the potential for the surgery at a later date.  I therefore had a conversation with Vijay Awad and Denisa and at this point we will plan on admitting the patient here.      Procedures              Critical Care time:  none               Results for orders placed or performed during the hospital encounter of 11/28/22 (from the past 24 hour(s))   Glucose by meter   Result Value Ref Range    GLUCOSE BY METER POCT 266 (H) 70 - 99 mg/dL   CBC with platelets differential    Narrative    The following orders were created for panel order CBC with platelets  differential.  Procedure                               Abnormality         Status                     ---------                               -----------         ------                     CBC with platelets and d...[[id number]]  Abnormal            Final result                 Please view results for these tests on the individual orders.   Basic metabolic panel   Result Value Ref Range    Sodium 134 (L) 136 - 145 mmol/L    Potassium 3.9 3.4 - 5.3 mmol/L    Chloride 100 98 - 107 mmol/L    Carbon Dioxide (CO2) 24 22 - 29 mmol/L    Anion Gap 10 7 - 15 mmol/L    Urea Nitrogen 20.2 8.0 - 23.0 mg/dL    Creatinine 0.68 0.67 - 1.17 mg/dL    Calcium 8.1 (L) 8.8 - 10.2 mg/dL    Glucose 136 (H) 70 - 99 mg/dL    GFR Estimate >90 >60 mL/min/1.73m2   CBC with platelets and differential   Result Value Ref Range    WBC Count 12.7 (H) 4.0 - 11.0 10e3/uL    RBC Count 4.46 4.40 - 5.90 10e6/uL    Hemoglobin 12.8 (L) 13.3 - 17.7 g/dL    Hematocrit 38.3 (L) 40.0 - 53.0 %    MCV 86 78 - 100 fL    MCH 28.7 26.5 - 33.0 pg    MCHC 33.4 31.5 - 36.5 g/dL    RDW 13.3 10.0 - 15.0 %    Platelet Count 314 150 - 450 10e3/uL    % Neutrophils 67 %    % Lymphocytes 24 %    % Monocytes 7 %    % Eosinophils 1 %    % Basophils 0 %    % Immature Granulocytes 1 %    NRBCs per 100 WBC 0 <1 /100    Absolute Neutrophils 8.5 (H) 1.6 - 8.3 10e3/uL    Absolute Lymphocytes 3.0 0.8 - 5.3 10e3/uL    Absolute Monocytes 0.9 0.0 - 1.3 10e3/uL    Absolute Eosinophils 0.2 0.0 - 0.7 10e3/uL    Absolute Basophils 0.0 0.0 - 0.2 10e3/uL    Absolute Immature Granulocytes 0.1 <=0.4 10e3/uL    Absolute NRBCs 0.0 10e3/uL   Glucose by meter   Result Value Ref Range    GLUCOSE BY METER POCT 136 (H) 70 - 99 mg/dL   Glucose by meter   Result Value Ref Range    GLUCOSE BY METER POCT 272 (H) 70 - 99 mg/dL   Glucose by meter   Result Value Ref Range    GLUCOSE BY METER POCT 197 (H) 70 - 99 mg/dL       Medications   atorvastatin (LIPITOR) tablet 40 mg (40 mg Oral Given 12/1/22 2106)    aspirin EC tablet 81 mg (81 mg Oral Given 12/2/22 0842)   sodium chloride 0.9% infusion (0 mLs Intravenous Stopped 12/1/22 0210)   OLANZapine zydis (zyPREXA) ODT tab 5 mg (5 mg Oral Given 12/1/22 2106)   polyethylene glycol (MIRALAX) Packet 17 g (17 g Oral Given 12/2/22 0842)   amLODIPine (NORVASC) tablet 2.5 mg (2.5 mg Oral Given 12/2/22 0841)   albuterol (PROVENTIL HFA/VENTOLIN HFA) inhaler (has no administration in time range)   glucose gel 15-30 g (has no administration in time range)     Or   dextrose 50 % injection 25-50 mL (has no administration in time range)     Or   glucagon injection 1 mg (has no administration in time range)   insulin aspart (NovoLOG) injection (RAPID ACTING) (2 Units Subcutaneous Given 12/2/22 1346)   insulin aspart (NovoLOG) injection (RAPID ACTING) (1 Units Subcutaneous Given 12/1/22 2106)   insulin glargine (LANTUS PEN) injection 8 Units (8 Units Subcutaneous Given 12/1/22 2106)   metFORMIN (GLUCOPHAGE) tablet 1,000 mg (1,000 mg Oral Given 11/28/22 1416)   acetaminophen (TYLENOL) tablet 975 mg (975 mg Oral Given 11/29/22 2154)   cefTRIAXone in d5w (ROCEPHIN) intermittent infusion 1 g (0 g Intravenous Stopped 11/30/22 2103)   acetaminophen (TYLENOL) tablet 975 mg (975 mg Oral Given 12/1/22 0041)   LORazepam (ATIVAN) injection 0.5 mg (0.5 mg Intravenous Given 12/1/22 0125)       Assessments & Plan (with Medical Decision Making)     I have reviewed the nursing notes.    I have reviewed the findings, diagnosis, plan and need for follow up with the patient.       New Prescriptions    No medications on file       Final diagnoses:   Fall, initial encounter   Generalized muscle weakness   Elevated troponin   Hyperglycemia   Hyponatremia   Pseudoaneurysm of left ventricle of heart   Failure to thrive in adult       11/28/2022   [facility]     Shae Marina MD  11/28/22 6862       Gregor Campos MD  11/30/22 2038       Gregor Campos MD  12/02/22 8444

## 2022-11-28 NOTE — ED NOTES
"Pt evaluated on 11/21 for an NSTEMI, sent to [facility] in [place]. Pt refused procedure and was discharged on Saturday. Pts son reports that the patient was unable to stand upon discharge and was weak. Pt lives at home alone. Some family has been staying with him and checking in on him regularly. Pt has been taking some medications over the past couple of days because family has been administering, but denies taking medications when alone. Pts son reports that he found patient on the floor this morning. Pt does not remember falling, unwitnessed, possible pt just rolled off couch. Pt has no evidence of trauma. Pts brother last saw patient at 2000 last night and reports no changes in patient mental cognition, pt only seems weaker. Pt assisted to bed, staff assist x2. EKG, IV, Labs, cardiac monitor, gown. Provider updated. Son and brother at bedside. When asked pt why he refused the procedure he replied \"I don't know.\" Pt is a full code.  "

## 2022-11-28 NOTE — ED TRIAGE NOTES
83 y/o male presents with reports of generalized weakness and a fall last night. Patient recently discharged from [facility] where a procedure to fix blockage was recommended, but patient declined. Patient denies chest pain.  Son states he found him this morning at 0700 on the ground between the coffee table and cough; patient does not remember falling.

## 2022-11-29 NOTE — ED NOTES
CV surgery Department was called and a message was left for this department to call ER back # called was . No call back from the Department as of yet the department was called with no answer other #s called  [phone] and  (Jalyn @ the [facility] said CV on call will be calling us back in 20 min to consult)

## 2022-11-29 NOTE — ED NOTES
"Patient was found on floor after a noise was heard sounding like a crash.  Patient was found lying on his left side with weathers catheter bag still connected to bed.  Patient was unable to stand without assistance but denied hitting head, denied pain to extremities. When asked when patient stood up without assistance or use of call light patient stated that he \"had to pee\".  Physician notied of fall and ct of head was ordered.    "

## 2022-11-29 NOTE — ED NOTES
Face to face report given with opportunity to observe patient.    Report given to JUDD Wahl RN   11/28/2022  9:28 PM

## 2022-11-29 NOTE — ED NOTES
[facility] was called sending over paper work and images regarding care 11-21 until dispo. Care team updated.

## 2022-11-29 NOTE — ED NOTES
[facility] was called. Pt is on the wait list for a bed without provider acceptance at this time. According to intake no provider to Provider consult can be done at this time to place pt on the actual wait list list for a bed due to number of pt on the the list at this time. Care team has been updated.

## 2022-11-29 NOTE — ED NOTES
Writer assisted patient in re-positioning to his right side. Used pillow to support his back and one between his knees. Writer assisted patient in being boosted in bed, straightened out draw sheet, chck pad, and brief. Checked catheter to ensure no kinks were present in tubing. Patient expressed he felt more comfortable. Turned out lights to let patient rest.

## 2022-11-29 NOTE — ED NOTES
Patient has been having difficulty swallowing, specifically thin beverages.  Patient had small emesis after eating toast today.

## 2022-11-29 NOTE — ED NOTES
Mata catheter inserted without difficulty. Education provided. Mepelex applied to nonblanchable area of the coccyx. Pt turned to left side. Heel boots donned to bilateral heals. Warm blanket given.

## 2022-11-29 NOTE — ED NOTES
[facility] was called to check for bed availably. Unable to tend to pts outside of the PCU status( [facility]) [facility] does not have Cardio) Care team has been notified and updated. Will call at a later time to check bed status.

## 2022-11-29 NOTE — ED NOTES
Care Transitions focused note:      Call to Rachael Guzman [facility]    She will fax [facility] Lab report asap    CLAUDIA De La Rosa

## 2022-11-29 NOTE — ED NOTES
[facility]) was call and put wait list /CV surg. consult awaits. Need more updated Information which will be requested from Mar[facility] concerning pt condition.

## 2022-11-29 NOTE — ED NOTES
Pt has had no output since arrival. Bladder scan revealed 194 ml and pt was found incontinent of a small amount of urine, bed was not wet. Pt cleaned up, sacral area is red, blanchable, and intact. Pt also had red abrasions to MIRZA knees. No open wounds found. Pt repositioned to right side, provider updated. Provider to order weathers catheter.

## 2022-11-30 NOTE — ED NOTES
Pt Placement was called at the [facility] to check on bed status for pt. No beds has opened up at this time. Facility will call when a bed does become available. [phone]. Care team has been updated.

## 2022-11-30 NOTE — ED NOTES
Spoke with [facility] pt placement we discussed the beds status will follow up as need. No real Change in bed status at this time. Care team notified.

## 2022-11-30 NOTE — CARE PLAN
"Prior to Admission Medication Reconciliation:     Medications added:   [x] None  [] As listed below:    Medications deleted:   [] None  [x] As listed below:    Metformin, empagliflozin - discontinued at [facility]    Combivent, plavix, tamsulosin- not continued on at [facility]- outdated with the [facility] (see fill dates below)    Medications marked for review/removal by attending:  [x] None  [] As listed below:    Changes made to existing medications:   [x] None  [] Updated time of day, strengths and frequencies to most current.     Pertinent notes/medications patient takes different than prescribed:     Per nurse- patient does not know his medications. Med list updated with discharge med list from [facility]. Pt received a 30 ds of all meds except for proair, at [facility] pharmacy upon discharge 11/25. Noted in [facility] charting that pt reported non-compliance with medications.     Recent med changes:    [facility] discontinued metformin and empagliflozin     Decreased the dose on amlodipine, lantus and lisinopril    Last times/dates taken verified with patient:  [] Yes- completed myself  [] Did not review with patient. Rx verification only. Review completed by nursing.    [] Nurse completed no changes made (double checked entries)  [x] Unable to review with patient at this time:    Allergy review:    [x]Did not review: reviewed by nursing  []Allergies reviewed and updated if needed.     Medication reconciliation sources:   []Patient  [x]Patient family member/emergency contact: Nicolas- brother- pt manages his own meds, \"but doesn't take his medications\".   [x][facility] Report Review:  Discharge Order  (Routine); Ordered 11/25/22     Ordered By:  Susanne Gomez      Discharge Date:  11/25/22    Discharge Medications:  New  [x]amlodipine 2.5 mg Tablet 2.5 mg PO DAILY Qty: 30 0RF  [x]aspirin [Adult Aspirin Regimen] 81 mg Tablet,Delayed Release (Dr/Ec) 81 mg PO DAILY Qty: 30 0RF  [x]nitroglycerin [Nitrostat] 0.4 mg " Tablet, Sublingual 0.4 mg sublingual Q5M PRN (Reason: Chest Pain) Qty: 30 0RF  [x]lisinopril 2.5 mg Tablet 2.5 mg PO DAILY Qty: 30 0RF  [x]insulin glargine-yfgn 100 unit/mL (3 mL) Insulin Pen 8 unit SUBCUT DAILY Qty: 15 1RF  [x]atorvastatin [Lipitor] 40 mg tablet 40 mg PO DAILY Qty: 30 0RF      Continued  [x]albuterol sulfate 90 mcg/actuation Hfa Aerosol Inhaler  2 puff INHALATION Q2H PRN (Reason: Wheezing)     Discontinued  nitroglycerin [Nitrostat] 0.3 mg tablet, sublingual 0.3 mg sublingual Q5M PRN (Reason: Chest Pain)   lisinopril 5 mg tablet 5 mg PO DAILY   hydrochlorothiazide 25 mg tablet         Novolin 70-30 FlexPen U-100 100 unit/mL (70-30) insulin pen SUBCUT   empagliflozin 25 mg Tablet 12.5 mg PO DAILY   amlodipine 10 mg tablet 10 mg PO DAILY   atorvastatin [Lipitor] 40 mg Tablet 40 mg PO BEDTIME Qty: 30 0RF  metformin 1,000 mg Tablet 1,000 mg PO BIDWM Qty: 60 0RF  aspirin 81 mg Tablet,Chewable 81 mg PO DAILY Qty: 30 0RF  insulin glargine [Lantus Solostar U-100 Insulin] 100 unit/mL (3 mL) Insulin Pen 15 unit SUBCUT QAM Qty: 450 0RF  []Epic Chart Review  [x]Care Everywhere review  .  Medication Details Status Last Dispense Date   ALBUTEROL 100MCG/IPRATROPIUM BR 20MCG/SPRAY INHALER,ORAL,4GM INHALE 1 PUFF BY INHALATION FOUR TIMES A DAY FOR BREATHING DISCONTINUE 2021   ALBUTEROL 100MCG/IPRATROPIUM BR 20MCG/SPRAY INHALER,ORAL,4GM INHALE 1 PUFF BY INHALATION FOUR TIMES A DAY FOR BREATHING  2022   AMLODIPINE BESYLATE 10MG TAB TAKE ONE-HALF TABLET BY MOUTH AT BEDTIME  2022   ASPIRIN 81MG TAB,EC TAKE ONE TABLET BY MOUTH EVERY DAY TO PREVENT STROKE **DO NOT CHEW** TO PREVENT STROKE **DO NOT CHEW** ACTIVE 2022   ATORVASTATIN CA 80MG TAB TAKE ONE-HALF TABLET BY MOUTH AT BEDTIME FOR CHOLESTEROL ACTIVE 2022   CLOPIDOGREL BISULFATE 75MG TAB TAKE ONE TABLET BY MOUTH EVERY DAY  2022   EMPAGLIFLOZIN 25MG TAB TAKE ONE-HALF TABLET BY MOUTH EVERY MORNING FOR DIABETES  ACTIVE 2022   INSULIN,GLARGINE,HUMAN 100 UNIT/ML INJ,SOLOSTAR,3ML INJECT 20 UNITS UNDER THE SKIN EVERY DAY DISCONTINUED 2022   LISINOPRIL 10MG TAB TAKE ONE-HALF TABLET BY MOUTH EVERY DAY FOR BLOOD PRESSURE ACTIVE 2022   METFORMIN HCL 1000MG TAB TAKE ONE TABLET BY MOUTH TWICE A DAY ACTIVE 2022   TAMSULOSIN HCL 0.4MG CAP TAKE ONE CAPSULE BY MOUTH DAILY  2022     [x]Pharmacy med list/phone call: [facility]: reviewed medications with [facility]- patient went home with a 30 ds of all medications started at [facility].   [x]Outside meds dispense report: none  []HomeCare medlist, Nursing home or Assisted Living MAR:  []Behavioral Health Provider:  []Other: **    Pharmacy desired at discharge: pt discharging to facility    Is patient on coumadin?   [x]No  []Yes      Fill dates and reported compliancy:  [] Patient reports compliancy on all scheduled medications.   [] Not applicable. Patient is not taking any maintenance medications at this time.   [] Fill dates do not coincide with compliancy, but patient reports compliancy.    [x] Did not review with patient. Cannot assess.     Historian accuracy:  [] Excellent- alert and oriented, understands why meds were prescribed and how to take, able to answer specifics  [] Good- alert and oriented, understands why meds were prescribed and how to take, some confusion   [] Fair- alert and oriented, doesn't know medications without list, cannot answer specifics about medications, but has a decent process for which to take at home  [] Poor- does not know medications, may not have a process to take at home, may be cognitively unable to review at this time  []Medication management done by family member or facility, no concerns about historian accuracy.   [x] Did not review with patient. Cannot assess.     Medication Management:  [x] Manages meds independently  [] Family member/ other party manages meds/assists:  [] Meds managed by staff at facility  []  Meds set up by home care, family/other party helps administer  [] Meds set up by home care, self administers  [] Did not review with patient. Cannot assess.     Other medications aside from PTA:  [] Denies taking any other medications aside from those listed in PTA meds, this includes over-the-counter vitamins, supplements and analgesics.       Mayra Chan on 11/30/2022 at 7:24 AM       Notifying appropriate party of changes/additions/discrepancies:  []No pertinent changes made, notification not necessary.   [] Notified attending provider via text page/phone call/sticky note or other:  [] Notified other:  [] Medications have not been reconciled by a provider yet, notification not necessary  [x] Pt is not admitted to floor yet or patient is boarding, PTA meds completed before admission.   Prior to Admission medications    Medication Sig Last Dose Taking? Auth Provider Long Term End Date   amLODIPine (NORVASC) 2.5 MG tablet Take 2.5 mg by mouth daily 11/28/2022 Yes Norma Taylor MD Yes    aspirin 81 MG EC tablet Take 81 mg by mouth daily 11/28/2022 Yes Reported, Patient     atorvastatin (LIPITOR) 40 MG tablet Take 40 mg by mouth daily  Yes Reported, Patient No    insulin glargine (LANTUS PEN) 100 UNIT/ML pen Inject 8 Units Subcutaneous daily  Yes Reported, Patient No    lisinopril (ZESTRIL) 2.5 MG tablet Take 2.5 mg by mouth daily 11/28/2022 Yes Reported, Patient No    nitroGLYcerin (NITROSTAT) 0.4 MG sublingual tablet Place 0.4 mg under the tongue every 5 minutes as needed for chest pain For chest pain place 1 tablet under the tongue every 5 minutes for 3 doses. If symptoms persist 5 minutes after 1st dose call 911.  Yes Reported, Patient     albuterol (PROAIR HFA/PROVENTIL HFA/VENTOLIN HFA) 108 (90 Base) MCG/ACT inhaler Inhale 2 puffs into the lungs every 2 hours as needed for wheezing   Leo Mackey MD Yes

## 2022-11-30 NOTE — ED NOTES
Pt was calling out for help. Writer went into room and found pt lying supine on the floor. Pts legs were under the bed. PCS and provider updated. Provider at bedside immediately to check pt. Pt had no obvious injuries. Pt did not use call light which was within reach, bed alarm was attached to pt which did not go off, side rails up x4. 1:1 requested for pt.

## 2022-11-30 NOTE — ED NOTES
Telephone call from staff at [facility] [place] - patient continues to be on wait list. No open beds at this time.

## 2022-12-01 NOTE — ED NOTES
Pt was leaning to the left pillows were readjusted. So pt could sit upright. Food was ordered: Chocolate Milk, Pepperoni Pizza, and cream of chicken soup.

## 2022-12-01 NOTE — ED NOTES
Lunch was reordered due to restrictions. Roast Beef, Mash potatoes and Gravy, Coffee (black). Pt then requested for booties to come off.

## 2022-12-01 NOTE — ED NOTES
Nurse was at pt bedside(assisted her in doing a skin assessment on pt.), pt was stood up to go to the bedside commode, pt denies the need to have a BM ( gpolyethylene glycol (MIRALAX) Packet given near 1250). Pt was placed in bed, head of bed is up. Hospitalist came top assess pt. pt is now resting in bed connected to the bed alarm water in reach with remote and kleenex. Pt is nodding off, RT Stated pt will have an inhaler treatment.

## 2022-12-01 NOTE — ED NOTES
Pt found on knees at side of bed when belt alarm started to sound. Pt reports he lowered himself to the ground because he wanted to get up to the bathroom to urinate. Pt reminded that he has a weathers catheter. Catheter intact and working properly. Pt denies any pain. Pt denies any injuries. Pt reports he did not hit his head. Pt assisted back into the bed. MD notified. MD at bedside to assess patient.

## 2022-12-01 NOTE — ED NOTES
Per chart, no documentation found of last BM since being in ED. Patient was encouraged to use commode earlier but refused. Provider updated, awaiting orders.

## 2022-12-01 NOTE — ED NOTES
Pt was given a snack, he was also given reminders to swallow and chew food well. Pt tolerated this well. Pt was able to reach for a drink as needed. Pt window and other door was opened as pt seem to get more frigidity.

## 2022-12-01 NOTE — ED NOTES
Called the [place] for transfer currently on diversion and cardiac diversion. No wait list. Told to call tomorrow there will be no movement today.

## 2022-12-01 NOTE — ED NOTES
Care Transitions focused note:      [facility].  Unsure if patient will be transferred or will need snf placement.  Just checking on benefits.    CLAUDIA De La Rosa

## 2022-12-01 NOTE — ED NOTES
Pt restless and trying to stand at bedside. Alarm set off. Pt asked to lay back down in bed and did willingly. MD and house supervisor notified. Asked for a 1:1 sitter as patient is a high risk for falls. HS reports there is no one available to sit a 1:1 at this time. MD ordered a medication to help patient sleep.

## 2022-12-01 NOTE — CONSULTS
[facility]    Hospitalist Consult    Date of Service (when I saw the patient): 12/01/2022    Assessment & Plan   Neel Kerr is a 82 year old male with history of insulin-dependent diabetes mellitus type 2, hypertension, who was in the ER waiting to be transferred to [facility] center for cardiothoracic surgery to repair pseudoaneurysm.    Insulin-dependent diabetes mellitus type 2: Patient is on 8 units of Lantus daily.  PTA med list without mealtime or sliding scale insulin.  Blood sugars here have been in the 200 range, with one reading up in 300.  -Continue home Lantus  -Slight scale coverage    Hypertension: We will have home meds reordered    Hyperlipidemia: Statin already reordered    Hyponatremia: Seems to be somewhat chronic.  No mental status changes nor symptoms to warrant urgent correction    Patient awaiting tertiary care bed for possible cardiothoracic procedure.  Given pseudoaneurysm, we will withhold anticoagulants for DVT prophylaxis at this time.    Clinically Significant Risk Factors Present on Admission         # Hyponatremia: Lowest Na = 126 mmol/L (Ref range: 136-145) in last 2 days, will monitor as appropriate      # Hypoalbuminemia: Lowest albumin = 2.9 g/dL (Ref range: 3.5-5.2) at 12/1/2022  5:43 AM, will monitor as appropriate     # Hypertension: home medication list includes antihypertensive(s)   # Acute Respiratory Failure: Documented O2 saturation < 91%.  Continue supplemental oxygen as needed            Billy Awad MD, MD        Interval History   Patient doing well.  Eating.  Denies chest pain, shortness of breath.    -Data reviewed today: I reviewed all new labs and imaging results over the last 24 hours.    Physical Exam   Temp: (!) 96  F (35.6  C) Temp src: Tympanic BP: 110/90 Pulse: 96   Resp: 23 SpO2: 96 % O2 Device: None (Room air)    There were no vitals filed for this visit.  Vital Signs with Ranges  Temp:  [96  F (35.6  C)-98.2  F (36.8  C)] 96  F (35.6   C)  Pulse:  [] 96  Resp:  [8-23] 23  BP: (110-168)/() 110/90  SpO2:  [91 %-97 %] 96 %    Intake/Output Summary (Last 24 hours) at 12/1/2022 1354  Last data filed at 12/1/2022 0500  Gross per 24 hour   Intake 850 ml   Output 710 ml   Net 140 ml       Peripheral IV 11/28/22 Anterior;Left;Medial Upper forearm (Active)   Site Assessment Other (Comment) 12/01/22 1300   Line Status Saline locked 12/01/22 1300   Number of days: 3       Urethral Catheter 11/28/22 Straight-tip 16 fr (Active)   Urine Output 360 mL 12/01/22 0500   Number of days: 3       Pressure Injury 09/20/20 Coccyx Stage 1 (Active)   Estimated Circumference ( if not measured tennis ball sized 11/28/22 2049   Number of days: 802     No line/device    Constitutional - AA, NAD  HEENT - atraumatic, normocephalic  Neck - supple, no masses, no JVD  CVS - S1 S2 RRR, no murmurs, rubs, gallops  Respiratory - CTA b/l  GI - soft, NT, ND, + bowel sounds, no organomegaly  Musculoskeletal - no LE edema, no lesions  Neuro - oriented x 3, no gross focal deficits    Medications     sodium chloride Stopped (12/01/22 0210)       amLODIPine  2.5 mg Oral Daily     aspirin  81 mg Oral Daily     atorvastatin  40 mg Oral At Bedtime     insulin aspart  1-3 Units Subcutaneous TID AC     insulin aspart  1-3 Units Subcutaneous At Bedtime     insulin glargine  8 Units Subcutaneous Daily     OLANZapine zydis  5 mg Oral At Bedtime     polyethylene glycol  17 g Oral BID       Data   Recent Labs   Lab 12/01/22  0543 11/30/22  0723 11/29/22  0536 11/28/22  1703 11/28/22  1024   WBC 16.3* 18.6* 15.6*  --  13.5*   HGB 13.0* 14.1 13.9  --  14.1   MCV 87 87 87  --  87    340 345  --  421   INR  --   --   --   --  1.08   * 126* 132*  --  129*   POTASSIUM 4.1 4.1 4.2  --  4.4   CHLORIDE 97* 90* 95*  --  92*   CO2 22 26 23  --  26   BUN 23.6* 27.4* 24.0*  --  24.4*   CR 0.66* 0.79 0.72  --  0.78   ANIONGAP 11 10 14  --  11   CARLOS 8.0* 8.6* 8.7*  --  9.1   * 192*  190*   < > 311*   ALBUMIN 2.9*  --   --   --   --    PROTTOTAL 6.3*  --   --   --   --    BILITOTAL 0.4  --   --   --   --    ALKPHOS 106  --   --   --   --    ALT 14  --   --   --   --    AST 17  --   --   --   --     < > = values in this interval not displayed.     Lactic Acid   Date Value Ref Range Status   11/21/2022 2.0 0.7 - 2.0 mmol/L Final   11/21/2022 3.4 (H) 0.7 - 2.0 mmol/L Final   01/05/2022 1.0 0.7 - 2.0 mmol/L Final   06/30/2021 1.7 0.7 - 2.0 mmol/L Final   06/30/2021 4.1 (HH) 0.7 - 2.0 mmol/L Final     Comment:     Critical result, provider not notified due to previous critical result   notification.  AJS 1113 6/30/21 06/30/2021 5.7 (HH) 0.7 - 2.0 mmol/L Final     Comment:     Critical Value called to and read back by  DR. MANNING @ 1015 AJS 6/30/21         Recent Results (from the past 24 hour(s))   XR Chest Port 1 View    Narrative    PROCEDURE:  XR CHEST PORT 1 VIEW    HISTORY: eval for PNA. .    COMPARISON:  11/29/2022    FINDINGS:    The cardiomediastinal contours are stable. There is calcific aortic  atherosclerosis.   Advanced emphysematous changes and scarring persists. No discrete new  focal consolidation. No effusion or pneumothorax.      Impression    IMPRESSION:  Advanced emphysema/COPD and scarring. No new discrete  consolidation.      JONATAN CARTER MD         SYSTEM ID:  [id number]       Billy Awad MD

## 2022-12-01 NOTE — ED NOTES
Pt appears to be asleep - his eye are closed, while he laying in bed obvious chest rise and fall observed. ER provider rounded on pt as well a ER staff both were updated on pot status.lights are still turn down as well a volume on TV, Pt door was pulled closed with slight opening to limit noise from work area.

## 2022-12-01 NOTE — ED NOTES
Occupational therapy was here assessing pt we got new sock on pt with , walker was used marcio a gait belt to get the pt back into the chair. Call light within reach. Pt did well. Assistance was needed X1

## 2022-12-01 NOTE — ED NOTES
Pt food came he was given many reminders to slow down while taking bites and to chew his food well. Pt tolerated the redirection well.

## 2022-12-01 NOTE — PROGRESS NOTES
12/01/22 1200   Appointment Info   Signing Clinician's Name / Credentials (SLP) Arabella Whalen MS CCC-SLP   General Information   Onset of Illness/Injury or Date of Surgery 11/28/22   Referring Physician Gregor Campos MD   Patient/Family Therapy Goal Statement (SLP) None stated.   Pertinent History of Current Problem Pt is a 82 year old male currently in Emergency Department. Orders received to assess swallow function. Per ED staff, pt was having difficulty swallowing breakfast and sounded like he might be choking. Per chart review pt has also experienced difficulty with swallowing thin liquids.   Type of Evaluation   Type of Evaluation Swallow Evaluation   Oral Motor   Oral Musculature generally intact   Structural Abnormalities none present   Dentition (Oral Motor)   Dentition (Oral Motor) adequate dentition;some missing teeth   Facial Symmetry (Oral Motor)   Facial Symmetry (Oral Motor) WNL   Lip Function (Oral Motor)   Lip Range of Motion (Oral Motor) WNL   Lip Strength (Oral Motor) WNL   Tongue Function (Oral Motor)   Tongue Strength (Oral Motor) WNL   Tongue Coordination/Speed (Oral Motor) WNL   Tongue ROM (Oral Motor) WNL   Jaw Function (Oral Motor)   Jaw Function (Oral Motor) WNL   Cough/Swallow/Gag Reflex (Oral Motor)   Volitional Throat Clear/Cough (Oral Motor) WNL;reduced strength   Volitional Swallow (Oral Motor) WNL   Vocal Quality/Secretion Management (Oral Motor)   Vocal Quality (Oral Motor) WNL   Secretion Management (Oral Motor) WNL   General Swallowing Observations   Past History of Dysphagia None reported.   Respiratory Support (General Swallowing Observations) none   Current Diet/Method of Nutritional Intake (General Swallowing Observations, NIS) thin liquids (level 0);regular diet   Swallowing Evaluation Clinical swallow evaluation   Clinical Swallow Evaluation   Feeding Assistance set up only required   Adaptive Eating Utensils N/A   Additional evaluation(s) completed today No   Rationale  for completing additional evaluation A clinical bedside swallow evaluation cannot rule out silent aspiration. If provider or other suspects that pt is silently aspirating a video swallow study may be warranted.   Clinical Swallow Evaluation Textures Trialed thin liquids;pureed;soft & bite-sized;solid foods   Clinical Swallow Eval: Thin Liquid Texture Trial   Mode of Presentation, Thin Liquids cup;self-fed   Volume of Liquid or Food Presented 6 oz.   Oral Phase of Swallow WFL   Pharyngeal Phase of Swallow intact   Diagnostic Statement Pt trialed 6 oz. of thin liquids without demonstrating any overt signs/symptoms of aspiration.   Clinical Swallow Evaluation: Puree Solid Texture Trial   Mode of Presentation, Puree spoon;self-fed   Volume of Puree Presented 4 oz.   Oral Phase, Puree WFL   Pharyngeal Phase, Puree intact   Diagnostic Statement Pt tolerated 4 oz. of pureed textures without demonstrating any overt signs/symptoms of aspiration. Pt demonstrated clearance of bolus from oral cavity during 100% of trials.   Clinical Swallow Eval: Soft & Bite Sized   Mode of Presentation spoon;self-fed   Volume Presented 2 oz.   Oral Phase WFL   Pharyngeal Phase intact   Diagnostic Statement Pt tolerated 4 oz. of soft & bite sized textures without demonstrating any overt signs/symptoms of aspiration. Pt demonstrated clearance of bolus from oral cavity during 100% of trials.   Clinical Swallow Evaluation: Solid Food Texture Trial   Mode of Presentation self-fed   Volume Presented 1 cracker   Oral Phase impaired mastication;residue in oral cavity   Oral Residue other (see comments)  (left pocketing)   Pharyngeal Phase coughing/choking   Diagnostic Statement Pt trialed a cracker for regular textures. During trial pt demonstrated prolonged mastication of bolus. Following initial swallow, left sided pocketing of bolus was observed. With cues pt was able to clear residue with continued mastication, liquid wash x 4, and cues from  clinician. Following a secondary swallow pt was noted to have an immediate cough.   Swallowing Recommendations   Diet Consistency Recommendations thin liquids (level 0);soft & bite-sized (level 6)   Supervision Level for Intake distant supervision needed   Mode of Delivery Recommendations bolus size, small;slow rate of intake   Postural Recommendations other (see comments)  (sitting upright in chair)   Swallowing Maneuver Recommendations alternate food and liquid intake   Monitoring/Assistance Required (Eating/Swallowing) check mouth frequently for oral residue/pocketing;stop eating activities when fatigue is present;monitor for cough or change in vocal quality with intake   Recommended Feeding/Eating Techniques (Swallow Eval) maintain upright sitting position for eating;maintain upright posture during/after eating for 30 minutes;minimize distractions during oral intake;set-up and prepare tray   Medication Administration Recommendations, Swallowing (SLP) Bury in applesauce if pt demonstrates increased difficulty with swallowing medications.   Instrumental Assessment Recommendations reassess via non-instrumental clinical swallow evaluation   Comment, Swallowing Recommendations Recommend IDDSI 6 (soft and bite sized) and IDDSI 0 (thin liquids) with distant supervision. Pt would benefit from verbal reminders prior to oral intake to take small single bites and to pace meal.   General Therapy Interventions   Planned Therapy Interventions Dysphagia Treatment   Dysphagia treatment Instruction of safe swallow strategies;Compensatory strategies for swallowing   Clinical Impression   Criteria for Skilled Therapeutic Interventions Met (SLP Eval) Yes, treatment indicated   SLP Diagnosis Dysphagia   Problem List (SLP) Swallowing   Functional Limitations Related to Problem List (SLP) Pt is unable to safely tolerate a regular diet which is his reported baseline diet.   Risks & Benefits of therapy have been explained  evaluation/treatment results reviewed;care plan/treatment goals reviewed;risks/benefits reviewed;current/potential barriers reviewed;participants voiced agreement with care plan;participants included;patient   Clinical Impression Comments Pt seen this AM for clinical swallow evaluation. Pt reports no prior difficulty with swallowing. He reported that he eats a variety of foods/textures at home and does not avoid any consistencies due to them being to difficult to swallow. Pt tolerated thin liquids, pureed textures, and soft & bite sized textures without demonstrating any overt signs/symptoms of aspiration. During regular texture pt demonstrated prolonged mastication of bolus. Following initial swallow, left sided pocketing of bolus was observed. With cues pt was able to clear residue with continued mastication, liquid wash x 4, and cues from clinician. Following a secondary swallow pt was noted to have an immediate cough. Due to pt's increased difficulty with minimal trials of regular textures, IDDSI 6 (soft and bite sized) and thin liquids are recommended. Pt would benefit from distant supervision during mealtimes as well as verbal reminders of compensatory strategies such as taking small bites/sips and pacing oral intake. Due to pt not being able to tolerate his reported baseline diet independently, skilled services are recommended during pt's stay.   SLP Total Evaluation Time   Eval: oral/pharyngeal swallow function, clinical swallow Minutes (79102) 20   SLP Goals   Therapy Frequency (SLP Eval) 5 times/wk   SLP Predicted Duration/Target Date for Goal Attainment 12/09/22   SLP Goals Swallow   SLP: Safely tolerate diet without signs/symptoms of aspiration Regular diet;Thin liquids;With use of compensatory swallow strategies   Interventions   Interventions Quick Adds Swallowing Dysfunction   SLP Discharge Planning   SLP Plan Continue to assess safest/least restrictive diet recommendation. Provide pt with education  regarding compensatory strategies during mealtimes.   SLP Discharge Recommendation   (Pt is on waiting list to transfer to [facility].)   Total Session Time   Total Session Time (sum of timed and untimed services) 20       Recommendations for Feeding:  - Patient requires distant supervision with eating and drinking  - Patient must sit in the chair at 90 degrees (may require pillows behind back)  - Eliminate all distractions; turn off the TV  - Patient should only eat/drink when they are fully alert  - Encourage small bites and small sips  - Alternate between a bite of food and a sip of liquid  - Check for oral pocketing  - Swallow bite of food/liquid before offering another bite/sip   - Patient must remain upright in chair at least 30-45 minutes after oral intake    NOTE: If patient becomes too tired, there are noted changes in their vocal quality/breathing (wet and gurgly), or they begin coughing frequently, stop feeding immediately and complete oral cares. SLP to re-assess swallowing

## 2022-12-01 NOTE — PROGRESS NOTES
"   12/01/22 8467   Appointment Info   Signing Clinician's Name / Credentials (PT) Letha Alfredo DPLAITH   Rehab Comments (PT) Per Dr. Campos \"work on agressive strengthening\"   Living Environment   People in Home spouse  (but told OT he lives alone)   Current Living Arrangements house   Home Accessibility stairs within home   Number of Stairs, Within Home, Primary greater than 10 stairs   Stair Railings, Within Home, Primary railings on both sides of stairs   Transportation Anticipated car, drives self   Living Environment Comments Pt reports he has a basement but uses minimally   Self-Care   Usual Activity Tolerance good   Current Activity Tolerance poor   Equipment Currently Used at Home none  (Pt reported using no AD to this writer)   Fall history within last six months yes   Number of times patient has fallen within last six months 4  (3 during current ED stay)   Activity/Exercise/Self-Care Comment Pt reports he was independent with ADLs.  Unclear if pt is accurate historian because he provided varying information to PT than he did to OT.   General Information   Onset of Illness/Injury or Date of Surgery 11/28/22   Referring Physician Dr. 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  Pt had had 3 falls during ED stay   Cognition   Affect/Mental Status (Cognition) WNL   Orientation Status (Cognition) oriented x 3   Cognitive Status Comments Pt oriented however provided different information to OT and PT so unclear what true cognitive level is.   Pain Assessment   Patient Currently in Pain No   Integumentary/Edema   Integumentary/Edema Comments scattered bruises   Posture    Posture Forward head " position;Protracted shoulders;Kyphosis   Range of Motion (ROM)   Range of Motion ROM is WFL   Strength (Manual Muscle Testing)   Strength (Manual Muscle Testing) Deficits observed during functional mobility   Strength Comments hip flex 2-/5, knee ext 4-/5, ankle DF 3-/5   Bed Mobility   Bed Mobility supine-sit   Supine-Sit [place] (Bed Mobility) moderate assist (50% patient effort)   Bed Mobility Limitations decreased ability to use arms for pushing/pulling;impaired ability to control trunk for mobility   Impairments Contributing to Impaired Bed Mobility decreased ROM;decreased strength   Assistive Device (Bed Mobility) bed rails   Comment, (Bed Mobility) Pt has significant limitations in shoulder ROM limiting his ability to get OOB without assistance although has been getting OOB on his own resulting in falls so unclear how much effort he was provided at this time   Transfers   Transfers sit-stand transfer   Sit-Stand Transfer   Sit-Stand [place] (Transfers) moderate assist (50% patient effort)   Assistive Device (Sit-Stand Transfers) walker, front-wheeled   Comment, (Sit-Stand Transfer) weakness and poor postural control noted in standing   Gait/Stairs (Locomotion)   [place] Level (Gait) moderate assist (50% patient effort);2 person assist   Assistive Device (Gait) walker, front-wheeled   Distance in Feet (Required for LE Total Joints) 30'   Deviations/Abnormal Patterns (Gait) ataxic   Comment, (Gait/Stairs) Pt demonstrates impaired NM control in both LEs and trunk with ambulation, requires significant assist to maintain upright balance   Balance   Balance Comments poor with support   Sensory Examination   Sensory Perception Comments unable to assess   Coordination   Coordination Comments not formally assessed but appears impaired   Clinical Impression   Criteria for Skilled Therapeutic Intervention Yes, treatment indicated   PT Diagnosis (PT) gait disturbance   Influenced by the following  impairments weakness, ataxia, impaired balance, poor NM control   Functional limitations due to impairments decreased safety and stability with all functional mobility and ADLs   Clinical Presentation (PT Evaluation Complexity) Unstable/Unpredictable   Clinical Presentation Rationale clinical judgement, pseudoaneurysm present requiring surgical interventions   Clinical Decision Making (Complexity) moderate complexity   Planned Therapy Interventions (PT) balance training;bed mobility training;gait training;neuromuscular re-education;patient/family education;strengthening;transfer training;progressive activity/exercise;risk factor education   Risk & Benefits of therapy have been explained evaluation/treatment results reviewed;care plan/treatment goals reviewed;risks/benefits reviewed;participants included;patient   Clinical Impression Comments PT evaluation completed.  Pt is somewhat of a poor historian but presumably lives at home alone, admitted with pseudoaneurysm requiring transfer to higher level of care for cardiac surgery however no beds available at this time.  Pt has had 3 falls in past 24 hours due to getting OOB on his own despite fall risk interventions being in place.  Pt demonstrates significant weakness and poor NM control with ataxic gait.  Pt is a high fall risk at this time and would not be safe to discharge home if transfer plans were to change.  Pt would then require short term rehab stay.  Will see for PT to work on strengthening while awaiting transfer to higher level of care or alternative plan by medical team.   PT Total Evaluation Time   PT Fatimahal, Moderate Complexity Minutes (00791) 17   Physical Therapy Goals   PT Frequency 6x/week   PT Predicted Duration/Target Date for Goal Attainment 12/08/22   PT Goals Bed Mobility;Transfers;Gait   PT: Bed Mobility Modified independent;Supine to/from sit   PT: Transfers Supervision/stand-by assist;Sit to/from stand;Bed to/from chair   PT: Gait  Supervision/stand-by assist;Rolling walker;100 feet   Interventions   Interventions Quick Adds Therapeutic Activity   Therapeutic Activity   Therapeutic Activities: dynamic activities to improve functional performance Minutes (04604) 9   Symptoms Noted During/After Treatment Fatigue   Treatment Detail/Skilled Intervention Pt required significant cues to sit after initial ambulation with significant deterioration in gait and pt unaware.  After seated rest break, pt transferred sit>stand mod A of 2 from transport chair up to FWW, needs cues and assist for hand placement.  Pt ambulated additional 15' into room with mod A of 2, continued ataxia noted and poor postural and NM control of LEs.  Pt transferred into chair.  Attempted to set pt up with breakfast however tray table too high for his limited shoulder ROM, found a different tray table.  Pt left sitting in chair with clip alarm on and close observation present.   PT Discharge Planning   PT Plan Progress mobility and strength as able   PT Discharge Recommendation (DC Rec) Transitional Care Facility  (vs transfer to medical facility for surgery - per medical team's decision)   PT Rationale for DC Rec PT evaluation completed.  Pt is somewhat of a poor historian but presumably lives at home alone, admitted with pseudoaneurysm requiring transfer to higher level of care for cardiac surgery however no beds available at this time.  Pt has had 3 falls in past 24 hours due to getting OOB on his own despite fall risk interventions being in place.  Pt demonstrates significant weakness and poor NM control with ataxic gait.  Pt is a high fall risk at this time and would not be safe to discharge home if transfer plans were to change.  Pt would then require short term rehab stay.  Will see for PT to work on strengthening while awaiting transfer to higher level of care or alternative plan by medical team.   PT Brief overview of current status sup>sit mod A, sit>stand mod A,  ambulated 30' with FWW mod A of 2 with ataxia and poor NM control of LEs   Total Session Time   Timed Code Treatment Minutes 9   Total Session Time (sum of timed and untimed services) 26

## 2022-12-01 NOTE — ED NOTES
[facility]'s wait list and Dr. Sterling consulting with Dr. Campos.

## 2022-12-01 NOTE — ED NOTES
Patient began vomiting while eating breakfast with lots of mucus. When asked if patient had choked, patient nodded. Patient able to cough and spit up remaining vomit in mouth, cleaned up by unit assistants. Provider aware, ordered speech and swallow study.

## 2022-12-01 NOTE — ED NOTES
Pt did finish eating and is now drinking his coffee while sitting in the chair watching tv and talking with staff. Call light still within reach.

## 2022-12-01 NOTE — PROGRESS NOTES
"   12/01/22 1200   Appointment Info   Signing Clinician's Name / Credentials (OT) Sabina Zaman, OTR/L   Living Environment   People in Home alone   Current Living Arrangements house   Home Accessibility stairs within home   Number of Stairs, Within Home, Primary greater than 10 stairs   Living Environment Comments Pt reports 0 [address]. He goes to the basement \"occasionally\". Laundry is down there. Bathroom has a walk in tub with grab bars.   Self-Care   Usual Activity Tolerance good   Current Activity Tolerance poor   Equipment Currently Used at Home walker, rolling   Fall history within last six months yes   Number of times patient has fallen within last six months 4   Activity/Exercise/Self-Care Comment Pt reports he is independent in ADLs and IADLs. He just recently started using a 4WW at home. He reports \"less than 5\" falls in the past 6 months, including the ones he had in the ED.   Instrumental Activities of Daily Living (IADL)   Previous Responsibilities meal prep;housekeeping;laundry;shopping;yardwork;medication management;finances;driving   IADL Comments Pt only has assistance with bigger snow removal (plowing) from \"a pitor\" but he can manage lighter shoveling.   General Information   Onset of Illness/Injury or Date of Surgery 11/28/22   Referring Physician Dr. 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 Pt also had elevated trop, fall, weakness, hyperglycemia, and hyponatremia.   Existing Precautions/Restrictions fall   General Observations and Info Pt in the bed upon OT arrival, pleasant and agreeable to eval.   Cognitive Status Examination   Orientation Status orientation to person, place and time   Affect/Mental Status (Cognitive) WFL   Follows Commands WFL "   Cognitive Status Comments Some differences in pt report of what he told PT compared to OT.   Visual Perception   Visual Impairment/Limitations WFL   Pain Assessment   Patient Currently in Pain No   Range of Motion Comprehensive   Comment, General Range of Motion Shoulder ROM significantly limited. Remaining joints WNL   Strength Comprehensive (MMT)   Comment, General Manual Muscle Testing (MMT) Assessment Generalized weakness observed during ADLs/functional mobility   Muscle Tone Assessment   Muscle Tone Quick Adds No deficits were identified   Bed Mobility   Bed Mobility supine-sit   Supine-Sit [place] (Bed Mobility) minimum assist (75% patient effort);moderate assist (50% patient effort);verbal cues   Assistive Device (Bed Mobility) bed rails   Transfers   Transfers bed-chair transfer   Transfer Skill: Bed to Chair/Chair to Bed   Bed-Chair [place] (Transfers) minimum assist (75% patient effort);2 person assist;verbal cues   Assistive Device (Bed-Chair Transfers) other (see comments)  (FWW)   Balance   Balance Comments Poor - pt required Oma to remain sitting upright at EOB   Activities of Daily Living   BADL Assessment/Intervention lower body dressing   Lower Body Dressing Assessment/Training   Position (Lower Body Dressing) supported sitting  (support needed to sit up as pt kept leaning backwards)   Comment, (Lower Body Dressing) Pt attempted to don his  socks but was unsuccessful.   [place] Level (Lower Body Dressing) dependent (less than 25% patient effort)   Clinical Impression   Criteria for Skilled Therapeutic Interventions Met (OT) Yes, treatment indicated   OT Diagnosis Impaired ADLs   OT Problem List-Impairments impacting ADL problems related to;activity tolerance impaired;balance;strength;range of motion (ROM);mobility;other (see comments)  (questionable cognition)   Assessment of Occupational Performance 3-5 Performance Deficits   Identified Performance Deficits dressing, bathing,  toileting, functional mobility, IADLs   Planned Therapy Interventions (OT) ADL retraining;risk factor education;progressive activity/exercise;strengthening;ROM;cognition   Clinical Decision Making Complexity (OT) moderate complexity   Risk & Benefits of therapy have been explained evaluation/treatment results reviewed;care plan/treatment goals reviewed;risks/benefits reviewed;current/potential barriers reviewed;participants voiced agreement with care plan;participants included;patient   Clinical Impression Comments OT evaluation completed for weakness. Pt is currently on a waitlist to be transferred to a higher level of care for a pseudoaneurysm of L ventricle of heart repair. Pt has been in the ED for a couple days now due to limited bed availability. He currently exhibits sigificant deficits in ADLs due to weakness, deconditioning, impaired balance, and questionable cognition. Pt will benefit from ongoing skilled OT intervention while he is waiting to transfer to progress his strength, activity tolerance, and ADLs.   OT Total Evaluation Time   OT Eval, Moderate Complexity Minutes (93419) 20   OT Goals   Therapy Frequency (OT) 5 times/wk   OT Predicted Duration/Target Date for Goal Attainment 12/08/22   OT Goals Hygiene/Grooming;Lower Body Dressing;Toilet Transfer/Toileting   OT: Hygiene/Grooming modified independent   OT: Lower Body Dressing Supervision/stand-by assist   OT: Toilet Transfer/Toileting Supervision/stand-by assist   OT Discharge Planning   OT Plan Progress strength, ADLs, and activity tolerance as able   OT Discharge Recommendation (DC Rec)   (pt on waiting list to transfer to higher level of care for cardiac procedure)   OT Rationale for DC Rec OT evaluation completed for weakness. Pt is currently on a waitlist to be transferred to a higher level of care for a pseudoaneurysm of L ventricle of heart repair. Pt has been in the ED for a couple days now due to limited bed availability. He currently  exhibits sigificant deficits in ADLs due to weakness, deconditioning, impaired balance, and questionable cognition. Pt will benefit from ongoing skilled OT intervention while he is waiting to transfer to progress his strength, activity tolerance, and ADLs.   OT Brief overview of current status min-modA supine>sit, minAx2 bed>chair, totalA LB dressing   Total Session Time   Total Session Time (sum of timed and untimed services) 20

## 2022-12-01 NOTE — ED NOTES
Patient is resting comfortably; Vitals stable; Monitoring  Blank Note: Pt chair linen was changed, Staff applied a pillow between pt and the side rail, he tends to lean to the left to prevent pt from injury.     1:1 hand off report has been given to new 1:1 staff

## 2022-12-02 PROBLEM — R62.7 FAILURE TO THRIVE IN ADULT: Status: ACTIVE | Noted: 2022-01-01

## 2022-12-02 PROBLEM — R73.9 HYPERGLYCEMIA: Status: ACTIVE | Noted: 2022-01-01

## 2022-12-02 PROBLEM — I25.3: Status: ACTIVE | Noted: 2022-01-01
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
## 2022-12-02 PROBLEM — R79.89 ELEVATED TROPONIN: Status: ACTIVE | Noted: 2022-01-01

## 2022-12-02 PROBLEM — M62.81 GENERALIZED MUSCLE WEAKNESS: Status: ACTIVE | Noted: 2022-01-01

## 2022-12-02 NOTE — PROGRESS NOTES
12/02/22 0900   Appointment Info   Signing Clinician's Name / Credentials (PT) MASON Muro   Student Supervision Direct supervision provided   Interventions   Interventions Quick Adds Therapeutic Activity   Therapeutic Activity   Therapeutic Activities: dynamic activities to improve functional performance Minutes (95778) 15   Symptoms Noted During/After Treatment Fatigue   Treatment Detail/Skilled Intervention Pt asleep in bed upon PT's arrival with nurse's aide present. Aid reports that he did not get good sleep last night. PT attempted to rouse patient verbally severally times before tapping him on the shoulder which he woke up to. Pt agreeable to therapy this morning. Aide adjusted the bed to better position pt for basic bed mobility. He required 1-person Mod A for supine>sit transfer to EOB. Pt required 2-person Mod A to perform sit>stand transfer to FWW and had significant difficulty with using his L side. Pt will swing his hips forward to complete standing. Upon initiating ambulation, pt immediately had ataxic gait pattern with poor motor coordination throughout his L LE in particular. Gait abnormalities observed include wide ERIS, lateral veering, scissoring, abrupt losses of balance and irregularity of steps.   Pt ambulated 30'x2 with the FWW and 2-person Mod A with significant verbal cues from PT. Pt is a high fall risk and frequently demonstrates LOB due to gait abnormalities. Pt has good UE and LE strength bilaterally which PT suspects points to a motor coordination issue. Pt required 2-person Mod A to return to chair in his room. Pt left with nurse's aide with call light within reach.   PT Discharge Planning   PT Plan Progress mobility and strength as able   PT Discharge Recommendation (DC Rec) Transitional Care Facility   PT Rationale for DC Rec Clinical Judgment.   PT Brief overview of current status Sup<sit Mod A; sit>stand 2-person Mod A; ambulated 30'x2 with FWW and 2-person Mod A with  ample verbal cues. Significant ataxic gait with scissoring, wide ERIS, lateral veering to L and frequent bouts of LOB.   Total Session Time   Timed Code Treatment Minutes 15   Total Session Time (sum of timed and untimed services) 15

## 2022-12-02 NOTE — ED NOTES
Cod, green beans, mashed potatoes and gravy, apple sauce, amanda. Pudding, peaches, apple juice and tea was ordered for pt lunch   Pt started off well drinking his apple juice while eating small reminders as he eats  Pt uses his right hand to eat mostly which make it difficult for him at time to eat.

## 2022-12-02 NOTE — ED NOTES
Pt awake at this time, weathers cath removed, pt tolerated well. Due to void at 1100. Pt turned and repositioned, mepilex on sacrum  CDI. Brief changed at this time as well. Pt denies any further needs. 1:1 remains at bedside.

## 2022-12-02 NOTE — ED NOTES
Speech Therapy awaken pt to work with pt. An update was given to staff from 1:1observer.     OT was called and notified that pt is awake and working with speech therapy plan is for her to come by near 1300

## 2022-12-02 NOTE — ED NOTES
Patient laying in bed, lab was just in to draw blood.   No c/o pain or discomfort.   Complete head to toe assessment completed.  Denies the urge to urinate or attempt.   -Mata catheter was pulled around 0300, will need to void by 1100.    Patient agreed to breakfast.     Currently has a 1:1 in his room.

## 2022-12-02 NOTE — ED NOTES
"Pt tolerated lab draw well.   Pt was encouraged to use the bathroom he declined the need to go and didnt want to try.   Pt then requested that the blankets be removed along with his foot Boots.   Nurse was at bedside. assessment was donePt nurse took breakfast order and will order for pt.   Pt is attempting to go to sleep.   Overnight 1:1 did state that pt \"didn't sleep all night\"   "

## 2022-12-02 NOTE — ED NOTES
Pt declined the need to go to the BR he was encouraged to go     Pt was offered more water to drink     Pt was turned to look out the window as he declined to want to watch TV

## 2022-12-02 NOTE — PROGRESS NOTES
12/02/22 1300   Appointment Info   Signing Clinician's Name / Credentials (OT) Sabina Zaman, OTR/L   Interventions   Interventions Quick Adds Therapeutic Procedures/Exercise   Therapeutic Procedures/Exercise   Therapeutic Procedure: strength, endurance, ROM, flexibillity minutes (98669) 15   Symptoms Noted During/After Treatment fatigue   Treatment Detail/Skilled Intervention Pt up in the chair upon OT arrival with the 1:1 present. He just finished eating his lunch. Pt wiped his face with set up after he was finished chewing. His RN came in to check his blood glucose. Then pt participated in UE exercises for 2 sets of 10 reps each including finger flex/ext, forearm pro/sup, and bicep curls. Pt required P/AAROM for shoulder flex 2x10 as he is able to very minimally raise/lift shoulders. OT also provided P/AAROM for L elbow flex/ext due to decreased ROM 2x10. Worked on cognition/orienation. Pt oriented to person and place but he required verbal cues/assist for time (stating 8/22/22 initially, then 12/22, and then 1922). Pt was left resting in the chair with a 1:1 present.   OT Discharge Planning   OT Plan Progress strength, ADLs, and activity tolerance as able   OT Discharge Recommendation (DC Rec) Transitional Care Facility  (vs transferring to a higher level of care - pt cannot return home at this time)   OT Rationale for DC Rec Clinical judgement   OT Brief overview of current status Pt up in the chair upon OT arrival with the 1:1 present. He just finished eating his lunch. Pt wiped his face with set up after he was finished chewing. His RN came in to check his blood glucose. Then pt participated in UE exercises for 2 sets of 10 reps each including finger flex/ext, forearm pro/sup, and bicep curls. Pt required P/AAROM for shoulder flex 2x10 as he is able to very minimally raise/lift shoulders. OT also provided P/AAROM for L elbow flex/ext due to decreased ROM 2x10. Worked on cognition/orienation. Pt oriented to  person and place but he required verbal cues/assist for time (stating 8/22/22 initially, then 12/22, and then 1922). Pt was left resting in the chair with a 1:1 present.   Total Session Time   Timed Code Treatment Minutes 15   Total Session Time (sum of timed and untimed services) 15

## 2022-12-02 NOTE — ED NOTES
Care Transitions focused note:      Family conference:  In attendance was:  Marco Kerr-son [phone] (via speaker phone)  Lucas KerrLusbr-[phone] (via speaker phone)  Brother-Nicolas (in person) and sister in law Kiran (in person)  Dr Lucas Campos and myself    Long discussion regarding patient's boarding status for cardio thoracic surgery at the [facility].  No beds have been available and it is felt that patient likely is not medically fit for surgery at this time.  Medical options were laid out by Dr Campos and family has opted to have patient admitted to a skilled nursing facility for rehabilitation and reevaluate for potential surgery at a later time to be determined by cardio thoracic surgery.    I am waiting to find out about patient's 's benefits, [facility] is aware of this patient.  Muna Basurto, RN Care Transitions has sent a referral for me to Vijaya at [facility], pt's PCP is Dr Taylor in [place].     Family is fine with wherever we can find placement for patient.  Both son's live in the [place] area and are able to visit him wherever we can place him.    He will likely board over the weekend. To await placement. But I will certainly attempt to place patient today.    Updated nsg supervisor. Pt currently remains a 1:1 staffing as he has had falls here in the ED (no injuries)    Will follow    CLAUDIA De La Rosa

## 2022-12-02 NOTE — ED NOTES
pt reclined in chair asleep with obvious snoring and chest rise and fall, 1:1 remains at chairside.

## 2022-12-02 NOTE — ED NOTES
Staff observed pt push chair back more so that he was more reclined in chair. pt reclined in chair asleep with obvious snoring and chest rise and fall, 1:1 remains at chairside.

## 2022-12-02 NOTE — ED NOTES
Cristopher Mares will look at screening on Monday    No calls back from [place] or [place]  No call from [facility]    Will follow    CLAUDIA De La Rosa

## 2022-12-02 NOTE — ED NOTES
Pt was able to look at clock and tell the time to staff while asking what he wants to get for lunch

## 2022-12-02 NOTE — PROGRESS NOTES
12/02/22 1100   Appointment Info   Signing Clinician's Name / Credentials (OT) Sabina Zaman, OTR/L   Appointment Canceled Reason (OT) Other (see Cancel Comments row)   Appointment Cancel Comments (OT) Pt was sleeping this morning when OT attempted and had just fallen asleep. Pt's 1:1 was present and updated OT that pt had a busy morning and did not sleep much last night. OT will attempt again this afternoon.

## 2022-12-02 NOTE — ED NOTES
Pt was up to the Bedside commode, he was unable to have a BM or Void. Pt was encouraged to push and try. Pt denies feeling like he has to go/the urge. Primary nurse was notified.    Pt was move from commode to bed with pillows under head. Both arm and feet, Head of bed is elevated as well as feet to pt comfort. Covers over pt up to his hip per his request.     Staff observe pt doing Occupational Therapy exercises as he lays in bed.    Bedside table within pt reach with water, tea and amanda. Pudding within pt reach to encourage liquid intake.

## 2022-12-02 NOTE — ED NOTES
A pillow was placed under pt left arm as pt tends to lean this direction   Pt. was positioned toward the window while conversation with ER staff.   Pt declined watching TV he was given water to drink.

## 2022-12-02 NOTE — ED NOTES
Face to face report given with opportunity to observe patient.    Report given to JUDD Mix RN   12/2/2022  7:05 AM

## 2022-12-02 NOTE — ED NOTES
Pt has now nodded off laying in bed with his eyes closed. He moves his head side to side trying to get a more comfrontable position. 1:1 sitter reamins with pt pt room is free of clutter and items place out of walking area for cares and sccess to pt. light adjusted as pt is trying to nods off to sleep.     Door is closed to decrease noise level

## 2022-12-02 NOTE — ED NOTES
1:1 hand off report given     Pt is asleep, 1:1 remains with pt, Obvious chest rise and fall noted

## 2022-12-02 NOTE — ED NOTES
Door was closed slightly and lights turned down as pt is nodding off to sleep. 1:1 remains with pt at his chair side.

## 2022-12-02 NOTE — ED NOTES
Pt was offered to sit on the bedside commode he declined saying I know I don't have to go there.   Urinal was placed and pt was encouraged to urinate. Pt was unable to go.

## 2022-12-02 NOTE — PROGRESS NOTES
"   12/02/22 1200   Appointment Info   Signing Clinician's Name / Credentials (SLP) Joan Stratton MA. CF-SLP   Interventions   Interventions Quick Adds Swallowing Dysfunction   Swallowing Dysfunction &/or Oral Function for Feeding   Treatment of Swallowing Dysfunction &/or Oral Function for Feeding Minutes (60903) 20   Symptoms Noted During/After Treatment None   Treatment Detail/Skilled Intervention Patient seen this AM for skilled PO trials and further assessment of safest/least restrictive diet. Patient asleep in chair upon SLP's arrival. Nursing assistant provided updates about patietn regarding active night and morning. SLP woke patient up and patient was agreeable to trials. Patient needed assistance answering orientation questions. When asked his name, patient responded with his birthday. He stated that he was currently \"at home in bed\" when asked if he knew where he was.  Patient trialed thin liquds, pureed textures, and regular textures. Patient with no overt signs/symptoms of aspiration/penetration oberved. With regular textures, patient had significant oral pocketing and residue. Patient had fast pace of eating with large bolus sizes with all trials. SLP provided directions throughout trials to have slow rate of eating, smaller bolus sizes, and added additional liquid washes due to oral pocketing and residue. Patient needed frequent reminders. SLP recommends continuation of current diet IDDSI 0 and 6 (thin and soft and bite sized) with direct supervision, assistance when necessary, small bites/sips, slow rate of intake, and continued reminders of liquid washes between bites. SLP recommends checking for oral pocketing after meals.   SLP Discharge Planning   SLP Plan Continue to assess safest/least restrictive diet recommendation. Provide pt wtih education regarding compensatory strategies during mealtimes.   SLP Discharge Recommendation Transitional Care Facility   SLP Rationale for DC Rec Patient is not " independent with current diet and requires frequent reminders on compensatory strategies. He currently is on a modified diet which differs from baselilne per patient report.   SLP Brief overview of current status  Patient seen this AM for skilled PO trials and continued assessment of safest/least restrictive diet. Patient trailed thin liquids, pureed textures, and regular textures. Continued significant oral pocketing and residue with fast pace of eating with large bolus sizes with all trials. Patient needed frequent reminders of compensatory strategies. SLP recommends continuation of current diet IDDSI 0 and 6 (thin and soft and bite sized) with direct supervision, assistance when necessary, small bites/sips, slow rate of intake, and continued reminders of liquid washes between bites. SLP recommends checking for oral pocketing after meals.   Total Session Time   Total Session Time (sum of timed and untimed services) 20       Recommendations for Feeding:  - Patient requires direct supervision with eating and drinking  - Patient must sit in the chair at 90 degrees (may require pillows behind back)  - Eliminate all distractions; turn off the TV  - Patient should only eat/drink when they are fully alert  - Encourage small bites and small sips  - Alternate between a bite of food and a sip of liquid  - Check for oral pocketing  - Swallow bite of food/liquid before offering another bite/sip   - Patient must remain upright in chair at least 30-45 minutes after oral intake    NOTE: If patient becomes too tired, there are noted changes in their vocal quality/breathing (wet and gurgly), or they begin coughing frequently, stop feeding immediately and complete oral cares. SLP to re-assess swallowing

## 2022-12-02 NOTE — PROGRESS NOTES
Care Transitions focused note:      Email sent to [facility] inquiring on service connection.  Referral sent to [facility].    Muna Basurto CM

## 2022-12-02 NOTE — ED NOTES
After pt was done eating we began morning grooming (face wash, hair comb, etc..) 0930 pt family is here visiting. Room was re arranged to accommodate sitting.

## 2022-12-02 NOTE — ED NOTES
Speech therapy would like pt to wash more in between bites as noted during her assessment. Smaller bites along with reminders to chew.

## 2022-12-02 NOTE — ED NOTES
Pt agreed to finish his grooming when his family exits.   Pt requested to lay back in bed once he was done with coffee. Due to conference care call pt is reclined back in the chair visiting with family

## 2022-12-02 NOTE — ED NOTES
Occupational Therapy came to work with pt, pt is sound asleep with light snoring. A quick report was given from 1:1 staff to OT about pt morning and morning activities and cares. OT will come back after lunch.

## 2022-12-03 PROBLEM — I25.10 ARTERIOSCLEROSIS OF CORONARY ARTERY: Status: ACTIVE | Noted: 2022-01-01

## 2022-12-03 PROBLEM — I21.4 ACUTE NON-ST ELEVATION MYOCARDIAL INFARCTION (NSTEMI) (H): Status: ACTIVE | Noted: 2022-01-01

## 2022-12-03 PROBLEM — I25.810 ARTERIOSCLEROSIS OF CORONARY ARTERY BYPASS GRAFT: Status: ACTIVE | Noted: 2022-01-01

## 2022-12-03 PROBLEM — E78.5 HYPERLIPIDEMIA: Status: ACTIVE | Noted: 2022-01-01

## 2022-12-03 PROBLEM — N40.1 LOWER URINARY TRACT SYMPTOMS DUE TO BENIGN PROSTATIC HYPERPLASIA: Status: ACTIVE | Noted: 2022-01-01

## 2022-12-03 PROBLEM — H53.002 AMBLYOPIA OF LEFT EYE: Status: ACTIVE | Noted: 2022-01-01

## 2022-12-03 PROBLEM — R41.9 NEUROCOGNITIVE DISORDER: Status: ACTIVE | Noted: 2022-01-01

## 2022-12-03 PROBLEM — E11.40 DIABETIC NEUROPATHY (H): Status: ACTIVE | Noted: 2022-01-01

## 2022-12-03 PROBLEM — R69 OTHER ILL-DEFINED AND UNKNOWN CAUSES OF MORBIDITY AND MORTALITY: Status: ACTIVE | Noted: 2022-01-01

## 2022-12-03 PROBLEM — Z86.73 HISTORY OF CEREBROVASCULAR ACCIDENT: Status: ACTIVE | Noted: 2022-01-01

## 2022-12-03 NOTE — H&P
[facility]    History and Physical - Hospitalist Service       Date of Admission:  11/28/2022    Assessment & Plan      Neel Kerr is a 82 year old male admitted on 11/28/2022. He was admitted to medical floor from ED to find placement. Admitted to ED 11.28.2022 and stayed till today waiting to be transferred. See below.     Hospital problems:   1. 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     2. Hypnatremia  - present on admission, almost resolved now  - monitor    3. Elevated troponin  - trending down. Was due to NSTEMI  - will not monitor unless clinically indicated    4. Leukocytosis  - will continue Rocephin and azithromycin    5. Advanced COPD, possible exacerbation.   - Rocephin and azithromycin  - nebulizer    6. LV pseudoaneurysm  - hold anticoagulation    7. Fall  - Pt will be asked to assess.     8.. DM-2  - diabetic diet  - sliding scale     Diet: Soft & Bite Sized Diet (level 6) Thin Liquids (level 0)  Snacks/Supplements Adult: Ensure Enlive; With Meals    DVT Prophylaxis: Pneumatic Compression Devices  Mata Catheter: Not present  Central Lines: None  Cardiac Monitoring: None  Code Status: Full Code      Clinically Significant Risk Factors Present on Admission         # Hyponatremia: Lowest Na = 130 mmol/L (Ref range: 136-145) in last 2 days, will monitor as appropriate      # Hypoalbuminemia: Lowest albumin = 2.9 g/dL (Ref range: 3.5-5.2) at 12/1/2022  5:43 AM, will monitor as appropriate     # Hypertension: home medication list includes antihypertensive(s)   # Acute Respiratory Failure: Documented O2 saturation < 91%.  Continue supplemental oxygen as needed            Disposition Plan      Expected Discharge Date: 12/04/2022                The patient's care was discussed with the ED  Team.    Tabitha Crawely MD  Hospitalist Service  [facility]  Securely message with the  [employer] Web Console (learn more here)  Text page via [facility] Paging/Directory         ______________________________________________________________________    Chief Complaint   Weakness, falling.     History is obtained from the patient, electronic health record and emergency department physician. Patient is poor historian and may have dementia.    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 He presented to ED with weakness, fall and confusion. His important recent cardiac history includes NSTEMI 11.21.2022, transfer to [facility], diagnosed with LV pseudoaneusym on MINNIE. The recommendation was to transfer to [place] for surgical repair of aneurysm. Patient refused and was discharged home. 11.28.2022 he was taken to ED with above mentioned complaints, but no dyspnea, fever, chest pain. Initial plan was to transfer him to [place], but family made decision not to proceed with surgery unless he will improve with rehab at SNF.   He was admitted to medical floor to find placement, get PT evaluation.   ED w/u: mild leukocytosis, trending simba troponin, chest XR shows advanced COPD.     Review of Systems    Limited due to cognitive impairment. See HPI for pertinent positives and negatives.     Past Medical History    I have reviewed this patient's medical history and updated it with pertinent information if needed.   Past Medical History:   Diagnosis Date     Essential (primary) hypertension     No Comments Provided     Hyperlipidemia     No Comments Provided     Non-ST elevation (NSTEMI) myocardial infarction (H)     07/07/2017,[facility] PCI with stent circumflex     Old myocardial infarction     02/11/2015,[facility]  PCI with stent     Polyneuropathy     No Comments  Provided     Type 2 diabetes mellitus without complications (H)     No Comments Provided       Past Surgical History   I have reviewed this patient's surgical history and updated it with pertinent information if needed.  Past Surgical History:   Procedure Laterality Date     CYSTOSCOPY, TRANSURETHRAL RESECTION (TUR) PROSTATE, COMBINED N/A 2021    Procedure: Transurethral resection of prostate;  Surgeon: Antonio August MD;  Location:  OR       Social History   I have reviewed this patient's social history and updated it with pertinent information if needed.  Social History     Tobacco Use     Smoking status: Former     Types: Cigarettes     Quit date: 1992     Years since quittin.9     Smokeless tobacco: Never   Vaping Use     Vaping Use: Never used   Substance Use Topics     Alcohol use: No     Comment: Alcoholic Drinks/day: quit drinking      Drug use: No       Family History   I have reviewed this patient's family history and updated it with pertinent information if needed.  Family History   Problem Relation Age of Onset     No Known Problems Mother        Prior to Admission Medications   Prior to Admission Medications   Prescriptions Last Dose Informant Patient Reported? Taking?   albuterol (PROAIR HFA/PROVENTIL HFA/VENTOLIN HFA) 108 (90 Base) MCG/ACT inhaler   Yes No   Sig: Inhale 2 puffs into the lungs every 2 hours as needed for wheezing   amLODIPine (NORVASC) 2.5 MG tablet 2022  Yes Yes   Sig: Take 2.5 mg by mouth daily   aspirin 81 MG EC tablet 2022  Yes Yes   Sig: Take 81 mg by mouth daily   atorvastatin (LIPITOR) 40 MG tablet   Yes Yes   Sig: Take 40 mg by mouth daily   insulin glargine (LANTUS PEN) 100 UNIT/ML pen   Yes Yes   Sig: Inject 8 Units Subcutaneous daily   lisinopril (ZESTRIL) 2.5 MG tablet 2022  Yes Yes   Sig: Take 2.5 mg by mouth daily   nitroGLYcerin (NITROSTAT) 0.4 MG sublingual tablet   Yes Yes   Sig: Place 0.4 mg under the tongue every 5 minutes as  needed for chest pain For chest pain place 1 tablet under the tongue every 5 minutes for 3 doses. If symptoms persist 5 minutes after 1st dose call 911.      Facility-Administered Medications: None     Allergies   Allergies   Allergen Reactions     Augmentin Nausea     Marked as an allergy at the [facility]       Physical Exam   Vital Signs: Temp: 97.6  F (36.4  C) Temp src: Tympanic BP: 129/77 Pulse: 88   Resp: 19 SpO2: 95 % O2 Device: None (Room air)    Weight: 0 lbs 0 oz    Physical exam:   General: not in distress, elderly, confused. Coughs and sounds congested.  NC/At. MMM  Neck; supple  Cardiac; bigeminy, no S3, systolic murmur  Lungs; prolonged expiration, scattered rales, no wheezing  Abd; soft, not tender, no HsM  : bladder not palpable.   MS: sarcopenia  Neurological: symmetrical weakness, no focal findings.   Skin; no rash. No Edema.   Psychiatric: confused.       Data   Data reviewed today: I reviewed all medications, new labs and imaging results over the last 24 hours. I personally reviewed the EKG tracing showing NsR, no ST elevation and the chest x-ray image(s) showing advanced COPD, fibrosis.    Recent Labs   Lab 12/02/22  2239 12/02/22  1801 12/02/22  1314 12/02/22  0845 12/02/22  0751 12/01/22  1818 12/01/22  0543 11/30/22  0723 11/28/22  1703 11/28/22  1024   WBC  --   --   --   --  12.7*  --  16.3* 18.6*   < > 13.5*   HGB  --   --   --   --  12.8*  --  13.0* 14.1   < > 14.1   MCV  --   --   --   --  86  --  87 87   < > 87   PLT  --   --   --   --  314  --  322 340   < > 421   INR  --   --   --   --   --   --   --   --   --  1.08   NA  --   --   --   --  134*  --  130* 126*   < > 129*   POTASSIUM  --   --   --   --  3.9  --  4.1 4.1   < > 4.4   CHLORIDE  --   --   --   --  100  --  97* 90*   < > 92*   CO2  --   --   --   --  24  --  22 26   < > 26   BUN  --   --   --   --  20.2  --  23.6* 27.4*   < > 24.4*   CR  --   --   --   --  0.68  --  0.66* 0.79   < > 0.78   ANIONGAP  --   --   --   --  10  --   11 10   < > 11   CARLOS  --   --   --   --  8.1*  --  8.0* 8.6*   < > 9.1   * 197* 272*   < > 136*   < > 195* 192*   < > 311*   ALBUMIN  --   --   --   --   --   --  2.9*  --   --   --    PROTTOTAL  --   --   --   --   --   --  6.3*  --   --   --    BILITOTAL  --   --   --   --   --   --  0.4  --   --   --    ALKPHOS  --   --   --   --   --   --  106  --   --   --    ALT  --   --   --   --   --   --  14  --   --   --    AST  --   --   --   --   --   --  17  --   --   --     < > = values in this interval not displayed.     12.7 (H)    \    12.8 (L)    /    314   N 67    L N/A    134 (L)    100    20.2 /   ------------------------------------ 292 (H)   ALT N/A   AST N/A   AP N/A   ALB N/A   Ca 8.1 (L)  3.9    24    0.68 \    % RETIC N/A    LDH N/A  Troponin N/A    BNP N/A    CK 26 (L)  INR N/A   PTT N/A    D-dimer N/A    Fibrinogen N/A    Antithrombin N/A  Ferritin N/A  CRP N/A    IL-6 N/A  No results found for this or any previous visit (from the past 24 hour(s)).

## 2022-12-03 NOTE — PLAN OF CARE
Reason for hospital stay:  Falls, failure to thrive  Living situation PTA:  LTC  Most recent vitals: Vitals: /80 (BP Location: Right arm, Patient Position: Supine, Cuff Size: Adult Small)   Pulse 93   Temp 97.5  F (36.4  C) (Tympanic)   Resp 16   SpO2 94%   Pain Management:  PO PRN Tylenol 650 x 1 and aquaK pad for shoulder pain  LOC:  Waxes and wanes between 1 and 3  Cardiac:  Tele on no calls, S1S2 irregular  Respiratory:  RA, lungs clear  GI:  Active bowel sounds all quadrants  :  Urinal voiding, incontinence  Skin Issues:  Pt has diffuse dry crusty lesions on bilateral arms.  Skin is very fragile.  Use adhesive remover to remove tape.     IVF:   mL/hr  ABX:  IV Rocephin PO doxycylcine    Nutrition: Soft bite sized level 6 thin liquids, Ensure for snacks  ADL's:  A1   Ambulation: Bed bound  Safety:  1:1, bed in low position, skid free socks on, Upper side rails up.      SCDs on, boots to bilateral feet.  Pt will discharge when placement has been found.       Face to face report given with opportunity to observe patient.    Report given to JUDD Ware RN   12/3/2022  7:54 AM

## 2022-12-03 NOTE — PROGRESS NOTES
"Received consult to assess for malnutrition.  82 yom admitted with failure to thrive.  Hx noted to include:  Pseudoaneurysm left ventricle, COPD, DM, CVA.  Pt is awaiting placement.     No weight/height noted this admission.  Last noted height/weight (11/21/22) - Ht-66\", Wt-113#.  IBWR is 128-156#.  BMI is 18.   Review of weight records notes 10# weight loss from documented weight January 2022.      Pt is receiving a regular diet with pureed texture and moderately thick liquids per speech rx recommendations.  He requires supervision at meals.  Intake not well documented and variable - 0-100%.  Ensure Enlive supplement is ordered 3x/day for additional nutrition.      Sliding scale Novolog ordered for dx of diabetes.  Glucose levels 185-292.  Last noted A1c 12.5%.  Pt has been seen by diabetes education in the past with noted concerns regarding taking diabetes medications as prescribed.      Estimated nutritional needs are 8857-0807 calories (30-35 kcal/kg), 61-77 gm protein (1.2-1.5 gm/kg).      Unable to assess for malnutrition without current height/weight.  Pt's weight is less than ideal and he is at high risk of nutritional decline with current less than optimal intake.  Current nutrition interventions in place are appropriate.  He will benefit from discharge to facility that can provide meals and medications on a consistent basis.        "

## 2022-12-03 NOTE — PLAN OF CARE
0715- Pt voided 125 mL with noted sediment and some small blood clots (grain of rice sized). Pt is very unsteady on his feet and can stand with assistance this morning for approx 2-3 seconds with assistance before his legs seem to give out. He requires assistance to use the urinal and get his pants down due to being unsteady and LOB. Pt does require a transfer assist of 2. Pt is able to make his needs known for the most part but does require occasional prompts. He has marked weakness and difficulty with both grabbing and gripping things with his right hand and is seen to have to guide objects with his left hand for assistance. He does follow prompts well and answers questions appropriately. Breakfast was ordered at this time as well. Pt declined ensure stating he really didn't want it.   0800- Pt moved to recliner in preparation for breakfast so he can sit fully upright.  0826- accu check 185  0830- Breakfast arrived of scrambled eggs, blueberry muffin, and coffee.Pt has not been able to swallow any thin liquids or regular foods at breakfast this morning and has, even with small sips, chewing for extended periods and taking small bites has ended up having some choking and aspiration of food and thin liquids with every bite and sip. It sounds as if he inhales with each bite or sip and even with prompts does not appear to be able to control this. Pt was admin morning meds with apple sauce and there was no aspiration of anything doing it this way. Pt did have some difficulty getting a very small pill to go down with the applesauce but was able to get it down with a few more sppons of applesauce. Pt was not able to hold down much of anything at all of the approx 25% of breakfast he tried to eat before deciding not to eat anything else due to the choking and coughing everything back up with large amounts of phlegm. PT came to see pt and he declined to do today as he stated he is feeling to weak. Pt verbalized having  extreme weakness in his BUE. He does have  strength but pushing off of, pulling, or trying to lift things he struggles greatly. To hold a cup he must use both hands to try to steady it and get it to the table or his mouth. Writer did help with this at mealtime.     0915- Pt voided 100 mL. Light yellow with no sediment or clots.  0945- Pt moved back to his bed per his request.  1000- Pt in bed with eyes closed and regular and unlabored respirations  1020- Pt appears to be sleeping with regular and unlabored respirations.

## 2022-12-03 NOTE — PROGRESS NOTES
[facility]    Hospitalist Progress Note    Date of Service (when I saw the patient): 12/03/2022    Assessment & Plan       Fall, initial encounter: Several falls at home. He does not recall the falls but family has needed to assist him. PT/OT evaluation for placement.       Oropharyngeal dysphagia: Previously diagnosed. He was seen by speech therapy on 12/2 with recommendations of soft/bite sized and thin liquids, however he did have evidence of significant aspiration this morning. Will place on pureed and moderate liquids (speech unavailable to re-eval x2 days) with 1:1 and upright in chair for all meals. Bedside staff did report he was eating/drinking very fast and impulsively.       Neurocognitive disorder: Chronic, making taking care of himself increasingly difficult, high risk for falls.       Hyponatremia: Chronic, long standing over the last year or so, stable at his baseline.       COPD (chronic obstructive pulmonary disease) (H): No signs of acute exacerbation. He has not had any hypoxia, persistent cough. He did have some leukocytosis on arrival, however this improved without intervention.      Type 2 diabetes mellitus, with long-term current use of insulin (H): On Lantus 8u daily, glucose running 190-200's. On restricted diet due to dysphagia will hold off on increasing at this time.       Generalized muscle weakness    Failure to thrive in adult      DVT Prophylaxis: Pneumatic Compression Devices  Code Status: Full Code    Disposition: Expected discharge in several days once placement found.    Suzy Carpio, CNP    Interval History   Denies dyspnea, chest pain, abdominal pain. Complains of right shoulder pain, heating pad in place. When asked about breakfast he states it went well. However, bedside attend stated he had severe coughing episode where he brought up large amounts of food and phlegm.     -Data reviewed today: I reviewed all new labs and imaging results over the last 24 hours.      Physical Exam   Temp: 97.3  F (36.3  C) Temp src: Tympanic BP: 154/67 Pulse: 93   Resp: 16 SpO2: 96 % O2 Device: None (Room air)    There were no vitals filed for this visit.  Vital Signs with Ranges  Temp:  [97.3  F (36.3  C)-97.5  F (36.4  C)] 97.3  F (36.3  C)  Pulse:  [79-93] 93  Resp:  [12-19] 16  BP: (111-154)/(67-96) 154/67  SpO2:  [90 %-100 %] 96 %  I/O last 3 completed shifts:  In: 1818 [P.O.:1040; I.V.:778]  Out: 1150 [Urine:1150]    Peripheral IV 12/03/22 Anterior;Left Lower forearm (Active)   Site Assessment WDL 12/03/22 1100   Line Status Infusing 12/03/22 1100   Phlebitis Scale 0-->no symptoms 12/03/22 1100   Infiltration Scale 0 12/03/22 1100   Number of days: 0       Pressure Injury 09/20/20 Coccyx Stage 1 (Active)   Estimated Circumference ( if not measured tennis ball sized 11/28/22 2049   Number of days: 804       Wound Arm Skin tear (Active)   Wound Bed Moist;Wet [place] 12/03/22 0949   Drainage Amount Scant 12/03/22 0949   Drainage Color/Characteristics Serosanguineous 12/03/22 0949   Dressing Dry gauze 12/03/22 0949   Dressing Status Clean, dry, intact 12/03/22 0949   Number of days: 0     Line/device assessment(s) completed for medical necessity    Constitutional: Awake,alert, no acute distress  Respiratory: Clear bilaterally but diminished throughout, no crackles, wheezes, rhonchi.  Cardiovascular: HRR, no murmurs, rubs,thrills. No peripheral edema.   GI: Soft,nontender, bowel sounds are hypoactive.   Skin/Integumen: No rashes or unusual bruising.         Medications     sodium chloride 125 mL/hr at 12/03/22 1523       amLODIPine  2.5 mg Oral Daily     aspirin  81 mg Oral Daily     atorvastatin  40 mg Oral At Bedtime     cefTRIAXone  1 g Intravenous Q24H     doxycycline hyclate  100 mg Oral Q12H [facility] (08/20)     insulin aspart  1-7 Units Subcutaneous TID AC     insulin aspart  1-5 Units Subcutaneous At Bedtime     ipratropium - albuterol 0.5 mg/2.5 mg/3 mL  3 mL Nebulization 4x daily      lisinopril  2.5 mg Oral Daily     OLANZapine zydis  5 mg Oral At Bedtime     sodium chloride (PF)  3 mL Intracatheter Q8H       Data   Recent Labs   Lab 12/03/22  1135 12/03/22  0826 12/03/22  0738 12/02/22  0845 12/02/22  0751 12/01/22  1818 12/01/22  0543 11/28/22  1703 11/28/22  1024   WBC  --   --  10.8  --  12.7*  --  16.3*   < > 13.5*   HGB  --   --  13.1*  --  12.8*  --  13.0*   < > 14.1   MCV  --   --  86  --  86  --  87   < > 87   PLT  --   --  322  --  314  --  322   < > 421   INR  --   --   --   --   --   --   --   --  1.08   NA  --   --  131*  --  134*  --  130*   < > 129*   POTASSIUM  --   --  4.2  --  3.9  --  4.1   < > 4.4   CHLORIDE  --   --  99  --  100  --  97*   < > 92*   CO2  --   --  23  --  24  --  22   < > 26   BUN  --   --  14.3  --  20.2  --  23.6*   < > 24.4*   CR  --   --  0.57*  --  0.68  --  0.66*   < > 0.78   ANIONGAP  --   --  9  --  10  --  11   < > 11   CARLOS  --   --  7.9*  --  8.1*  --  8.0*   < > 9.1   * 185* 196*   < > 136*   < > 195*   < > 311*   ALBUMIN  --   --   --   --   --   --  2.9*  --   --    PROTTOTAL  --   --   --   --   --   --  6.3*  --   --    BILITOTAL  --   --   --   --   --   --  0.4  --   --    ALKPHOS  --   --   --   --   --   --  106  --   --    ALT  --   --   --   --   --   --  14  --   --    AST  --   --   --   --   --   --  17  --   --     < > = values in this interval not displayed.       No results found for this or any previous visit (from the past 24 hour(s)).

## 2022-12-03 NOTE — ED NOTES
Face to face report given with opportunity to observe patient.    Report given to JUDD Wade RN   12/2/2022  7:16 PM

## 2022-12-03 NOTE — PHARMACY
[facility]    Pharmacy      Antimicrobial Stewardship Note     Current antimicrobial therapy:  Anti-infectives (From now, onward)    Start     Dose/Rate Route Frequency Ordered Stop    12/02/22 2230  doxycycline hyclate (VIBRAMYCIN) capsule 100 mg         100 mg Oral EVERY 12 HOURS SCHEDULED 12/02/22 2208 12/02/22 2230  cefTRIAXone in d5w (ROCEPHIN) intermittent infusion 1 g         1 g  over 30 Minutes Intravenous EVERY 24 HOURS 12/02/22 2208            Indication: CAP/COPD    Days of Therapy: 2 (doxy), 4 (Rocephin)     Pertinent labs:  Creatinine   Creatinine   Date Value Ref Range Status   12/03/2022 0.57 (L) 0.67 - 1.17 mg/dL Final   12/02/2022 0.68 0.67 - 1.17 mg/dL Final   12/01/2022 0.66 (L) 0.67 - 1.17 mg/dL Final   07/02/2021 0.69 (L) 0.70 - 1.30 mg/dL Final   07/01/2021 0.59 (L) 0.70 - 1.30 mg/dL Final   06/30/2021 0.80 0.70 - 1.30 mg/dL Final     WBC   WBC   Date Value Ref Range Status   07/03/2021 11.6 (H) 4.0 - 11.0 10e9/L Final   07/02/2021 12.0 (H) 4.0 - 11.0 10e9/L Final   07/01/2021 13.0 (H) 4.0 - 11.0 10e9/L Final     WBC Count   Date Value Ref Range Status   12/03/2022 10.8 4.0 - 11.0 10e3/uL Final   12/02/2022 12.7 (H) 4.0 - 11.0 10e3/uL Final   12/01/2022 16.3 (H) 4.0 - 11.0 10e3/uL Final     Procalcitonin   Procalcitonin   Date Value Ref Range Status   01/03/2022 0.66 <0.50 ng/mL ng/mL Final     Comment:     <0.5 ng/mL (Normal) - Low risk of severe sepsis and/or septic shock concentrations.  <0.5 ng/mL does not exclude local infection or a systematic infection in its initial stages (<6 hours).  It is recommended to retest procalcitonin within 6-24 hours if any concentration is <2.0 ng/mL are obtained.    0.5 to 1.99 ng/mL (Moderate risk of systematic infection) Values between 0.5 to 1.99 ng/mL should be interpreted considering patient's history, increased procalcitonin can occur without infection.  It is recommended to retest procalcitonin within 6-24 hours if any concentration  is <2.0 ng/mL are obtained.    >2.0 ng/mL (High risk of severe sepsis)   07/03/2021 3.10 ng/ml Final     Comment:     >2.0 ng/mL (High risk of severe sepsis)   07/02/2021 4.77 ng/ml Final     Comment:     >2.0 ng/mL (High risk of severe sepsis)   06/30/2021 1.59 ng/ml Final     Comment:     0.5 to 1.99 ng/mL (Moderate risk of systematic infection) Values between 0.5   to 1.99 ng/mL should be interpreted considering patient's history, increased   procalcitonin can occur without infection.  It is recommended to retest   procalcitonin within 6-24 hours if any concentration is <2.0 ng/mL are   obtained.       CRP   CRP Inflammation   Date Value Ref Range Status   01/03/2022 24.0 (H) <10.0 mg/L Final   05/20/2021 15.1 (H) 0.0 - 8.0 mg/L Final   05/17/2021 52.2 (H) 0.0 - 8.0 mg/L Final   05/16/2021 96.7 (H) 0.0 - 8.0 mg/L Final       Culture Results:      Recommendations/Interventions:  1. None    Damián Louis RPH  December 3, 2022

## 2022-12-03 NOTE — PLAN OF CARE
[facility] Inpatient Admission Note:    Patient admitted to 3108/3108-1 at approximately 2030 via bed accompanied by transport tech from emergency room . Report received from Mauro WHALEY in SBAR format at 2015 via telephone. Patient transferred to bed via slide board.. Patient is alert and oriented X 1, denies pain; rates at 0 on 0-10 scale.  Patient oriented to room, unit, hourly rounding, and plan of care. Explained admission packet and patient handbook with patient bill of rights brochure. Will continue to monitor and document as needed.     Inpatient Nursing criteria listed below was met:    Health care directives status obtained and documented: Yes    Patient identifies a surrogate decision maker: No      If initial lactic acid greater than 2.0, repeat lactic acid drawn within one hour of arrival to unit: NA.    Clergy visit ordered if patient requests: No    Skin issues/needs documented: Yes    Isolation Patient: no Education given, correct sign in place and documentation row added to PCS:  No    Fall Prevention Yes: Care plan updated, education given and documented, sticker and magnet in place: Yes    Care Plan initiated: Yes    Education Documented (including assessment): Yes    Patient has discharge needs : Yes If yes, please explain: Placement

## 2022-12-03 NOTE — PROGRESS NOTES
Neel tolerated 100% of lunch (mashed potatoes, pudding and cream of chicken soup) with no episodes of choking or vomiting. Assist of 2 with gait belt and walker and to get in and out of bed for lunch.

## 2022-12-03 NOTE — UTILIZATION REVIEW
"      Admission Status; Secondary Review Determination         Under the authority of the Utilization Management Committee, the utilization review process indicated a secondary review on the above patient.  The review outcome is based on review of the medical records, discussions with staff, and applying clinical experience noted on the date of the review.          (x) Observation Status Appropriate - This patient does not meet hospital inpatient criteria and is placed in observation status. If this patient's primary payer is Medicare and was admitted as an inpatient, Condition Code 44 should be used and patient status changed to \"observation\".     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  The patient waited for placement to be seen by the cardiothoracic surgeon for up pseudoaneurysm repair.  Because of chronic debility, the surgery will be postponed or reconsidered.  His vital signs have been stable.  He does not require oxygen supplement.  His leukocytosis improved with antibiotics.  Patient is waiting for placement    The severity of illness, intensity of service provided, expected LOS and risk for adverse outcome make the care appropriate for further observation; however, doesn't meet criteria for hospital inpatient admission. Suzy Carpio, NP notified of this determination.    This document was produced using voice recognition software.      The information on this document is developed by the utilization review team in order for the business office to ensure compliance.  This only denotes the appropriateness of proper admission status and does not reflect the quality of care rendered.         The definitions of Inpatient Status and Observation Status used in making the determination above are those provided in the CMS Coverage Manual, Chapter 1 and Chapter 6, section 70.4.    "   Sincerely,     OSCAR CONTRERAS MD   Utilization Review  Physician Advisor  [facility]

## 2022-12-03 NOTE — PROGRESS NOTES
12/03/22 1100   Appointment Info   Signing Clinician's Name / Credentials (PT) Atul Liu DPT       Present no   Therapeutic Activity   Therapeutic Activities: dynamic activities to improve functional performance Minutes (06859) 10   Symptoms Noted During/After Treatment Fatigue;Shortness of breath   Treatment Detail/Skilled Intervention Pt. upright in chair with bedside nurse when therapist arrived. Pt. unwilling to walk today due to faitgue and weakness. He reports having a bad day. Pt. transferred with MOD A x2 and FWW from sit to stand. Walked 5 feet with MOD A x2 to bed and performed stand pivot transfer/stand to sit to bed. Pt. left with bedside nurse supine in bed at conclusion of session.   PT Discharge Planning   PT Plan Progress mobillity and activity per pt. tolerance.   PT Discharge Recommendation (DC Rec) Transitional Care Facility   PT Rationale for DC Rec Pt. requiring MOD A x 2 for all mobillity. He would be unsafe at home by himself at this time.   PT Brief overview of current status MOD A x2 for all mobillity tasks at this time.   Total Session Time   Timed Code Treatment Minutes 10   Total Session Time (sum of timed and untimed services) 10

## 2022-12-04 NOTE — PROGRESS NOTES
[facility]    Hospitalist Progress Note    Date of Service (when I saw the patient): 12/04/2022    Assessment & Plan       Fall, initial encounter: Several falls at home. He does not recall the falls but family has needed to assist him. PT/OT evaluation for placement.       Oropharyngeal dysphagia: Previously diagnosed. He was seen by speech therapy on 12/2 with recommendations of soft/bite sized and thin liquids, however he did have evidence of significant aspiration this morning. Will place on pureed and moderate liquids (speech unavailable to re-eval x2 days) with 1:1 and upright in chair for all meals. Bedside staff did report he was eating/drinking very fast and impulsively.   -12/4: NO coughing or signs of aspiration on pureed and mildly thick liquids.       Neurocognitive disorder: Chronic, making taking care of himself increasingly difficult, high risk for falls.       Hyponatremia: Chronic, long standing over the last year or so, stable at his baseline.       COPD (chronic obstructive pulmonary disease) (H): No signs of acute exacerbation. He has not had any hypoxia, persistent cough. He did have some leukocytosis on arrival, however this improved without intervention.      Type 2 diabetes mellitus, with long-term current use of insulin (H): On Lantus 8u daily, glucose running 190-200's. On restricted diet due to dysphagia will hold off on increasing at this time.   -12/4: Switch supplement to Glucerna      Generalized muscle weakness    Failure to thrive in adult      DVT Prophylaxis: Pneumatic Compression Devices  Code Status: Full Code    Disposition: Expected discharge in several days once placement found.    Suzy Carpio, CNP    Interval History   Denies dyspnea, chest pain, abdominal pain. Right shoulder no longer painful but states left should is bothering him today.      -Data reviewed today: I reviewed all new labs and imaging results over the last 24 hours.     Physical Exam    Temp: 98  F (36.7  C) Temp src: Tympanic BP: 165/91 Pulse: 85   Resp: 16 SpO2: 94 % O2 Device: None (Room air)    Vitals:    12/04/22 0500   Weight: 51 kg (112 lb 7 oz)     Vital Signs with Ranges  Temp:  [98  F (36.7  C)-99.2  F (37.3  C)] 98  F (36.7  C)  Pulse:  [] 85  Resp:  [16-18] 16  BP: (109-165)/(62-96) 165/91  SpO2:  [92 %-100 %] 94 %  I/O last 3 completed shifts:  In: 2864 [P.O.:680; I.V.:2184]  Out: 1570 [Urine:1570]    Peripheral IV 12/04/22 Anterior;Right Upper forearm (Active)   Number of days: 0       Pressure Injury 09/20/20 Coccyx Stage 1 (Active)   Estimated Circumference ( if not measured tennis ball sized 11/28/22 2049   Number of days: 805       Wound Arm Skin tear (Active)   Wound Bed Moist;Skene 12/03/22 0949   Drainage Amount Scant 12/03/22 0949   Drainage Color/Characteristics Serosanguineous 12/03/22 0949   Dressing Dry gauze 12/03/22 0949   Dressing Status Clean, dry, intact 12/03/22 0949   Number of days: 1     Line/device assessment(s) completed for medical necessity    Constitutional: Awake,alert, no acute distress  Respiratory: Clear bilaterally but diminished throughout, no crackles, wheezes, rhonchi.  Cardiovascular: HRR, no murmurs, rubs,thrills. No peripheral edema.   GI: Soft,nontender, bowel sounds are hypoactive.   Skin/Integumen: No rashes or unusual bruising.         Medications     sodium chloride 75 mL/hr at 12/04/22 0344       amLODIPine  2.5 mg Oral Daily     aspirin  81 mg Oral Daily     atorvastatin  40 mg Oral At Bedtime     insulin aspart  1-7 Units Subcutaneous TID AC     insulin aspart  1-5 Units Subcutaneous At Bedtime     ipratropium - albuterol 0.5 mg/2.5 mg/3 mL  3 mL Nebulization 4x daily     lisinopril  2.5 mg Oral Daily     OLANZapine zydis  5 mg Oral At Bedtime     sodium chloride (PF)  3 mL Intracatheter Q8H       Data   Recent Labs   Lab 12/04/22  1221 12/04/22  0736 12/03/22  2234 12/03/22  0826 12/03/22  0738 12/02/22  0845 12/02/22  0751  12/01/22  1818 12/01/22  0543 11/28/22  1703 11/28/22  1024   WBC  --   --   --   --  10.8  --  12.7*  --  16.3*   < > 13.5*   HGB  --   --   --   --  13.1*  --  12.8*  --  13.0*   < > 14.1   MCV  --   --   --   --  86  --  86  --  87   < > 87   PLT  --   --   --   --  322  --  314  --  322   < > 421   INR  --   --   --   --   --   --   --   --   --   --  1.08   NA  --   --   --   --  131*  --  134*  --  130*   < > 129*   POTASSIUM  --   --   --   --  4.2  --  3.9  --  4.1   < > 4.4   CHLORIDE  --   --   --   --  99  --  100  --  97*   < > 92*   CO2  --   --   --   --  23  --  24  --  22   < > 26   BUN  --   --   --   --  14.3  --  20.2  --  23.6*   < > 24.4*   CR  --   --   --   --  0.57*  --  0.68  --  0.66*   < > 0.78   ANIONGAP  --   --   --   --  9  --  10  --  11   < > 11   CARLOS  --   --   --   --  7.9*  --  8.1*  --  8.0*   < > 9.1   * 238* 354*   < > 196*   < > 136*   < > 195*   < > 311*   ALBUMIN  --   --   --   --   --   --   --   --  2.9*  --   --    PROTTOTAL  --   --   --   --   --   --   --   --  6.3*  --   --    BILITOTAL  --   --   --   --   --   --   --   --  0.4  --   --    ALKPHOS  --   --   --   --   --   --   --   --  106  --   --    ALT  --   --   --   --   --   --   --   --  14  --   --    AST  --   --   --   --   --   --   --   --  17  --   --     < > = values in this interval not displayed.       No results found for this or any previous visit (from the past 24 hour(s)).

## 2022-12-04 NOTE — PLAN OF CARE
Most recent vitals: /84 (BP Location: Left arm)   Pulse 81   Temp 98.1  F (36.7  C) (Tympanic)   Resp 18   SpO2 95%     Pt A&O but forgetful at times, vss, afebrile, assessments as charted. Remains with 1:1 attendant this shift. Pt had a coughing episode during breakfast on thin liquids and bite sized bits. MD updated and modified diet to puree and moderately thick. No further incidents noted. Pt to be up in chair for all meals. Incontinent x1 this shift. IV infusing at 75 mL/hr. No abx ordered. A2 with transfers. Bed is locked and low with alarms on and sitter present.     Face to face report given with opportunity to observe patient.    Report given to JUDD Meek RN   12/3/2022  7:19 PM

## 2022-12-04 NOTE — PLAN OF CARE
"Vitals: /96 (BP Location: Left arm)   Pulse 103   Temp 98.8  F (37.1  C) (Tympanic)   Resp 16   Ht 1.753 m (5' 9\")   Wt 51 kg (112 lb 7 oz)   SpO2 93%   BMI 16.60 kg/m    BMI= Body mass index is 16.6 kg/m .     Neel is pleasant, A/O x 3 and confused, follows directions.  Denies pain this shift.  Weight obtained 51kg this morning.  PIV patent, infusing 75 mL/hr NS.  1:1 sitter.  A2, walker, GB to stand.  Pt is a heavy A2 to walk, he is quite weak.  Abdominal sounds positive all extremities.  Lungs clear bilaterally.  Pt was tachy in the 120's at times, it was not sustained and his neuros were baseline.  He did have a pudding snack overnight and takes his medication whole in applesauce.     His diet consists of a Combination Diet Regular Diet; Pureed Diet (level 4); Mildly Thick (level 2).      Boots to feet bilaterally.  SCDs on throughout shift.  .    Bed in low position, upper side rails up, skid free socks on, call light within reach.    Pt will discharge when medically ready.     Face to face report given with opportunity to observe patient.    Report given to JUDD Flanagan RN   12/4/2022  7:22 AM                                "

## 2022-12-04 NOTE — PLAN OF CARE
"Most recent vitals: /91 (BP Location: Left arm, Patient Position: Semi-Singletary's, Cuff Size: Adult Small)   Pulse 85   Temp 98  F (36.7  C) (Tympanic)   Resp 16   Ht 1.753 m (5' 9\")   Wt 51 kg (112 lb 7 oz)   SpO2 100%   BMI 16.60 kg/m      Pt is A&O, vss, afebrile, reported pain at 7/10 to L shoulder prn tylenol given with relief. Assessments as charted. Up in chair for meals. No incidence of coughing on foods this shift. IV infusing NS @ 75 mL/hr. A2 with transfers. Taking meds with pudding. B, 309, 338. Bed is locked and low, call light is in reach, room door open with 1:1 sitter present.       Face to face report given with opportunity to observe patient.    Report given to JUDD Meek RN   2022  7:14 PM          "

## 2022-12-05 NOTE — PROGRESS NOTES
12/05/22 1600   Appointment Info   Signing Clinician's Name / Credentials (OT) Jyothi Centeno OTR/L,CLT   Appointment Canceled Reason (OT) Other (see Cancel Comments row)   Appointment Cancel Comments (OT) OT attempted to see pt in the afternoon and he was in bed sleeping. Pt has 1:1 sitting outside door. will attempt tomorrow.

## 2022-12-05 NOTE — PLAN OF CARE
"/95 (BP Location: Left arm, Patient Position: Supine)   Pulse 95   Temp 97.4  F (36.3  C) (Tympanic)   Resp 20   Ht 1.753 m (5' 9\")   Wt 51 kg (112 lb 7 oz)   SpO2 (!) 91%   BMI 16.60 kg/m        Pt a/o x 3 this shift.  Pleasant and cooperative.  Neel does not use the call light.  Tylenol 650 mg for R shoulder pain.  Pt is tachy with exertion. He does seem to have some sleep apnea.  HOB 30 degrees due to aspiration risk.   this shift.  SCD's applied along with boots bilaterally.  NS 75 mL/hr.  1:1.  Heavy A2 for ambulation and standing.  Pt cannot sit up without being braced on his back.  Oral cares performed.  Mepilex to sacral area as a preventative measure.   Lidocaine patch would be helpful for R shoulder pain, will communicate to day shift RN.  Please see note regarding U and them holding a bed for Neel.  Care Management needs to call and update them on the decision to pursue or not pursue cardiothoracic surgery at the .  Oncoming RN informed and sticky not left for Care Management.    His diet consists of a Combination Diet Regular Diet; Pureed Diet (level 4); Mildly Thick (level 2).      Bed in low position, upper side rails up, call light within reach, skid free socks on.      Face to face report given with opportunity to observe patient.    Report given to JUDD Flanagan RN   12/5/2022  0654                  "

## 2022-12-05 NOTE — PROGRESS NOTES
"   12/05/22 8551   Appointment Info   Signing Clinician's Name / Credentials (PT) Letha Alfredo DPT   Interventions   Interventions Quick Adds Therapeutic Activity   Therapeutic Activity   Therapeutic Activities: dynamic activities to improve functional performance Minutes (22726) 17   Symptoms Noted During/After Treatment Fatigue   Treatment Detail/Skilled Intervention Pt resting in bed, agreeable to PT.  Pt transferred sup>sit mod A to EOB.  Once seated at EOB, pt required min A at times to maintain sitting balance, would lose balance posteriorly and require cues to correct.  Pt noted to be incontinent of bowel.  Pt transferred sit>stand mod A of 2 up to FWW with cues for hand placement.  Pt transferred bed>chair with FWW mod A of 2, significant ataxia and weakness noted, pt having difficulty taking steps to turn towards commode, needed total assist to eventually pivot.  Pt transferred sit>stand from commode with mod A of 2.  Pt able to maintain standing with mod A of 1 while up to FWW  with significant amount of retropulsion and pt noted to be bearing weight through heels despite cues to lean forward, while toileting completed by second staff.  Attempted to initial ambulation from commode and pt unable to coordinate taking steps with LEs, pt would attempt to advance R LE without ability advance L LE, pt eventually required total A to lower down onto EOB, unsafe to ambulate.  Pt was able to voice \"I don't think walking is going to happen today\".  Pt required max A to transfer sit>sup and total A to boost in bed.  Pt left resting in bed with bed alarm on call light in reach.   PT Discharge Planning   PT Plan Progress mobillity and activity per pt. tolerance.   PT Discharge Recommendation (DC Rec) Transitional Care Facility   PT Rationale for DC Rec Pt's functional mobility declining, unable to ambulate, not safe to return home.  Recommend short term rehab.   PT Brief overview of current status Pt resting in bed, " "agreeable to PT.  Pt transferred sup>sit mod A to EOB.  Once seated at EOB, pt required min A at times to maintain sitting balance, would lose balance posteriorly and require cues to correct.  Pt noted to be incontinent of bowel.  Pt transferred sit>stand mod A of 2 up to FWW with cues for hand placement.  Pt transferred bed>chair with FWW mod A of 2, significant ataxia and weakness noted, pt having difficulty taking steps to turn towards commode, needed total assist to eventually pivot.  Pt transferred sit>stand from commode with mod A of 2.  Pt able to maintain standing with mod A of 1 while up to FWW  with significant amount of retropulsion and pt noted to be bearing weight through heels despite cues to lean forward, while toileting completed by second staff.  Attempted to initial ambulation from commode and pt unable to coordinate taking steps with LEs, pt would attempt to advance R LE without ability advance L LE, pt eventually required total A to lower down onto EOB, unsafe to ambulate.  Pt was able to voice \"I don't think walking is going to happen today\".  Pt required max A to transfer sit>sup and total A to boost in bed.  Pt left resting in bed with bed alarm on call light in reach.   Total Session Time   Timed Code Treatment Minutes 17   Total Session Time (sum of timed and untimed services) 17     "

## 2022-12-05 NOTE — PROGRESS NOTES
Assessment completed with pedro Zavala.    LOC:  Neel not seen    Dx: fall  Chronic Disease Management: neurocognitive disorder, hyponatremia, COPD, DM2, FTT    Lives with:  alone  Living at:  [place]  Transportation:  Family provides    Primary PCP: Norma Taylor  Insurance:  Medicare and VA  Medicare KO letter reviewed with pedro Zavala.    Support System:  family  Homecare/PCA: no  /County Services:   no  [place]: YES     How was the VA notification completed: via message    Health Care Directive: POLST on file  Guardian: no  POA: no    Ph[facility]: [facility]   Meds management: manages own    Adequate Resources for needs (housing, utilities, food/med): YES  Household chores: family  Work/community/social activity: YES, was getting out as desired    ADLs: needs assiatance  Ambulation:was independent  Falls: multiple  Nutrition: son does not think he has been eating  Sleep: unknown    Equipment used: might have O2 at home      Oxygen supplier: unknown      Does the supplier have valid oxygen orders: unknown    Mental health: denies  Substance abuse: denies  Exposure to violence/abuse: not asked  Stressors: not asked    Able to Return to Prior Living Arrangements: NO    Choice of Vendor: n/a    Barriers: has only Medicare as a payor source    ROM: low    Plan: STR/LTC    Muna Basurto CM

## 2022-12-05 NOTE — PLAN OF CARE
Patient not requiring 1:1 bedside sitter. Fall precautions remain in place. Room near nursing station. Increased observation/rounding for patient safety.

## 2022-12-05 NOTE — PROGRESS NOTES
Switched patient's bed per Charge RN. Tolerated transfer w/o issues. Bed alarms initiated now, alarms on and audible.

## 2022-12-05 NOTE — PROGRESS NOTES
[facility]    Hospitalist Progress Note    Date of Service (when I saw the patient): 12/05/2022    Assessment & Plan       Fall, initial encounter: Several falls at home. He does not recall the falls but family has needed to assist him. PT/OT evaluation for placement.       Oropharyngeal dysphagia: Previously diagnosed. He was seen by speech therapy on 12/2 with recommendations of soft/bite sized and thin liquids, however he did have evidence of significant aspiration this morning. Will place on pureed and moderate liquids (speech unavailable to re-eval x2 days) with 1:1 and upright in chair for all meals. Bedside staff did report he was eating/drinking very fast and impulsively.   -12/4: No coughing or signs of aspiration on pureed and mildly thick liquids.   -12/5: Speech re-eval today       Neurocognitive disorder: Chronic, making taking care of himself increasingly difficult, high risk for falls.       Hyponatremia: Chronic, long standing over the last year or so, stable at his baseline.       COPD (chronic obstructive pulmonary disease) (H): No signs of acute exacerbation. He has not had any hypoxia, persistent cough. He did have some leukocytosis on arrival, however this improved without intervention.      Type 2 diabetes mellitus, with long-term current use of insulin (H): On Lantus 8u daily, glucose running 190-200's. On restricted diet due to dysphagia will hold off on increasing at this time.   -12/4: Switch supplement to Glucerna      Bilateral shoulder pain: Mild, heat not helping, no prior history. Will try diclofenac gel.       Generalized muscle weakness    Failure to thrive in adult      DVT Prophylaxis: Pneumatic Compression Devices  Code Status: Full Code    Disposition: Expected discharge in several days once placement found.    Suzy Carpio, CNP    Interval History   Denies dyspnea, chest pain, abdominal pain.C/O pain in both shoulders today, denies any prior history of same.      -Data reviewed today: I reviewed all new labs and imaging results over the last 24 hours.     Physical Exam   Temp: 97  F (36.1  C) Temp src: Tympanic BP: 151/84 Pulse: 94   Resp: 16 SpO2: 94 % O2 Device: None (Room air)    Vitals:    12/04/22 0500   Weight: 51 kg (112 lb 7 oz)     Vital Signs with Ranges  Temp:  [97  F (36.1  C)-99.4  F (37.4  C)] 97  F (36.1  C)  Pulse:  [75-99] 94  Resp:  [16-20] 16  BP: (131-159)/(65-95) 151/84  SpO2:  [91 %-94 %] 94 %  I/O last 3 completed shifts:  In: 3597 [P.O.:1930; I.V.:1667]  Out: 2150 [Urine:2150]    Peripheral IV 12/04/22 Anterior;Right Upper forearm (Active)   Site Assessment WDL 12/05/22 0852   Line Status Infusing 12/05/22 0852   Phlebitis Scale 0-->no symptoms 12/05/22 0852   Infiltration Scale 0 12/05/22 0852   Number of days: 1       Pressure Injury 09/20/20 Coccyx Stage 1 (Active)   Estimated Circumference ( if not measured tennis ball sized 11/28/22 2049   Number of days: 806       Wound Arm Skin tear (Active)   Wound Bed Moist;Causey 12/03/22 0949   Drainage Amount Scant 12/03/22 0949   Drainage Color/Characteristics Serosanguineous 12/03/22 0949   Dressing Dry gauze 12/03/22 0949   Dressing Status Clean, dry, intact 12/03/22 0949   Number of days: 2     Line/device assessment(s) completed for medical necessity    Constitutional: Awake,alert, no acute distress  Respiratory: Fine crackles right base, otherwise notably improved air movement throughout.   Cardiovascular: HRR, no murmurs, rubs,thrills. No peripheral edema.   GI: Soft,nontender, bowel sounds are hypoactive.   Skin/Integumen: No rashes or unusual bruising.         Medications       amLODIPine  2.5 mg Oral Daily     aspirin  81 mg Oral Daily     atorvastatin  40 mg Oral At Bedtime     diclofenac  2 g Topical 4x Daily     insulin aspart  1-7 Units Subcutaneous TID AC     insulin aspart  1-5 Units Subcutaneous At Bedtime     ipratropium - albuterol 0.5 mg/2.5 mg/3 mL  3 mL Nebulization 4x daily      lisinopril  2.5 mg Oral Daily     OLANZapine zydis  5 mg Oral At Bedtime     sodium chloride (PF)  3 mL Intracatheter Q8H       Data   Recent Labs   Lab 12/05/22  1214 12/05/22  0840 12/04/22  2256 12/03/22  0826 12/03/22  0738 12/02/22  0845 12/02/22  0751 12/01/22  1818 12/01/22  0543   WBC  --   --   --   --  10.8  --  12.7*  --  16.3*   HGB  --   --   --   --  13.1*  --  12.8*  --  13.0*   MCV  --   --   --   --  86  --  86  --  87   PLT  --   --   --   --  322  --  314  --  322   NA  --   --   --   --  131*  --  134*  --  130*   POTASSIUM  --   --   --   --  4.2  --  3.9  --  4.1   CHLORIDE  --   --   --   --  99  --  100  --  97*   CO2  --   --   --   --  23  --  24  --  22   BUN  --   --   --   --  14.3  --  20.2  --  23.6*   CR  --   --   --   --  0.57*  --  0.68  --  0.66*   ANIONGAP  --   --   --   --  9  --  10  --  11   CARLOS  --   --   --   --  7.9*  --  8.1*  --  8.0*   * 262* 247*   < > 196*   < > 136*   < > 195*   ALBUMIN  --   --   --   --   --   --   --   --  2.9*   PROTTOTAL  --   --   --   --   --   --   --   --  6.3*   BILITOTAL  --   --   --   --   --   --   --   --  0.4   ALKPHOS  --   --   --   --   --   --   --   --  106   ALT  --   --   --   --   --   --   --   --  14   AST  --   --   --   --   --   --   --   --  17    < > = values in this interval not displayed.       No results found for this or any previous visit (from the past 24 hour(s)).

## 2022-12-05 NOTE — PROGRESS NOTES
Pt has been awake on and off t/o this shift. No impulsive episodes but does get anxious when needing to void. Remains continent of bladder. Has been voiding in urinal approx q hr. T&R's q2.     Safety: Bed remains in lowest position, call button within reach. Bed alarms on and audible. Room door open, room across from RN Station. Remains on close observation.

## 2022-12-05 NOTE — PROGRESS NOTES
Accessed pt's chart for 1:1 observation this shift. Pt awake upon entry into room. Explained plan of care at this time. Pt denies questions or concerns and needs or wants at this time.

## 2022-12-05 NOTE — PLAN OF CARE
"Most recent vitals: /84 (BP Location: Left arm, Patient Position: Sitting, Cuff Size: Adult Small)   Pulse 94   Temp 97  F (36.1  C) (Tympanic)   Resp 16   Ht 1.753 m (5' 9\")   Wt 51 kg (112 lb 7 oz)   SpO2 94%   BMI 16.60 kg/m      Pt is A&O, vss, afebrile, assessments as charted, reported pain at 4/10 this shift to R shoulder. Voltaren gel ordered, will monitor for effectiveness. IV SL this shift. No ABX currently ordered. Pt has been incontinent of both bowel and bladder. No episodes of aspiration on current diet. Up in chair for all meals. BG's have been 262, 255, and 259. Bed is locked and low, call light remains within reach, frequent rounding and observation this shift.       Face to face report given with opportunity to observe patient.    Report given to JUDD Bonner RN   12/5/2022  7:17 PM      "

## 2022-12-05 NOTE — PROGRESS NOTES
12/05/22 0800   Appointment Info   Signing Clinician's Name / Credentials (SLP) Arabella Whalen MS CCC-SLP   Interventions   Interventions Quick Adds Swallowing Dysfunction   Swallowing Dysfunction &/or Oral Function for Feeding   Treatment of Swallowing Dysfunction &/or Oral Function for Feeding Minutes (37359) 15   Symptoms Noted During/After Treatment None   Treatment Detail/Skilled Intervention Pt seen this AM for skilled PO trials. Per nursing staff and chart review, pt demonstrated increased difficulty with swallowing (coughing/choking) over the weekend and was downgraded to IDDSI 4 (pureed) and IDDSI 2 (mildly/nectar thickened liquids). Pt tolerated 4 oz. of mildly/nectar thickened liquids via cup without demonstrating any overt signs/symptoms of aspiration. Pt tolerated 3 oz. of pureed textures without demonstrating any overt signs/symptoms of aspiration and adequate clearance of bolus from oral cavity during 100% of trials. Trialed thin liquids via cup and pt demonstrated delayed throat clearing during +2/3 trials. Trialed minced and moist textures (IDDSI 5) and pt demonstrated prolonged mastication with right sided oral residue. Provided pt with education regarding compensatory strategies including; taking small bites/sips and pacing meals. Recommend IDDSI 4 (pureed) and IDDSI 2 (mildly/nectar thickened liquids) with close supervision. During session pt reported that he preferred drinking out of a cup vs.using a straw, however he did require assistance with brining the cup to his mouth. Aspiration precautions should continue to be followed.   SLP Discharge Planning   SLP Plan Continue to assess safest/least restrictive diet recommendation. Provide pt with education regarding compensatory strategies during mealtimes.   SLP Discharge Recommendation Transitional Care Facility   SLP Rationale for DC Rec Pt is unable to safely tolerate a regular diet at this time. Current recommendations are IDDSI 4 (pureed)  and IDDSI 2 (mildly/nectar thickened liquids) with close supervision.   SLP Brief overview of current status  Recommend IDDSI 4 (pureed) and IDDSI 2 (mildly/nectar thickened liquids) with close supervision. Pr demonstrated delayed throat clearing during thin liquid trials and prolonged mastication with oral residue during advanced textures trials. Aspiration precautions should continue to be followed.   Total Session Time   Total Session Time (sum of timed and untimed services) 15       Recommendations for Feeding:  - Patient requires close supervision with eating and drinking  - Patient must sit in the chair at 90 degrees (may require pillows behind back)  - Eliminate all distractions; turn off the TV  - Patient should only eat/drink when they are fully alert  - Encourage small bites and small sips  - Alternate between a bite of food and a sip of liquid  - Check for oral pocketing  - Swallow bite of food/liquid before offering another bite/sip   - Patient must remain upright in chair at least 30-45 minutes after oral intake    NOTE: If patient becomes too tired, there are noted changes in their vocal quality/breathing (wet and gurgly), or they begin coughing frequently, stop feeding immediately and complete oral cares. SLP to re-assess swallowing

## 2022-12-06 NOTE — PLAN OF CARE
"Goal Outcome Evaluation:       Reason for hospital stay:  fall  Living situation PTA: home   Most recent vitals: /86 (BP Location: Left arm, Patient Position: Supine, Cuff Size: Adult Small)   Pulse 92   Temp 97.7  F (36.5  C) (Tympanic)   Resp 16   Ht 1.753 m (5' 9\")   Wt 51 kg (112 lb 7 oz)   SpO2 93%   BMI 16.60 kg/m      Pain Management:  tylenol 1x and ice packs for shoulder pain bilateral   LOC:  alert and orientated x4  Cardiac:  Apical irregular   Respiratory:  occasional cough   GI:  Fecal incontinence   :  Urinary incontinence   Skin Issues:  Please see charting     IVF:  Saline locked   ABX:  none    Nutrition: pureed diet with level 2 thickened liquids   ADL's:  assist 1  Ambulation:assist 2 with gait belt and walker   Safety:  Call light in place, bed in low position, room near nurses station with door open, bed alarm on - no attempts to climb out of bed, frequent rounding and observation       Comments: afebrile, VSS       Face to face report given with opportunity to observe patient.    Report given to Tam Hagan RN   12/6/2022  7:33 AM                       "

## 2022-12-06 NOTE — PROGRESS NOTES
12/06/22 1300   Appointment Info   Signing Clinician's Name / Credentials (PT) MASON Muro   Student Supervision Direct supervision provided   Interventions   Interventions Quick Adds Therapeutic Procedure   Therapeutic Procedure/Exercise   Ther. Procedure: strength, endurance, ROM, flexibillity Minutes (38854) 10   Symptoms Noted During/After Treatment fatigue   Treatment Detail/Skilled Intervention Seated therex: hip raises 2 x 10e, LAQs 2 x 10e, Heel/Toe raises 1 x 10e, difficulty with heel/toe raises on L foot in particular with significant tactile and verbal cues needed for task initiation and completion. Very low muscle tone in legs bilaterally most likely from disuse atrophy.   PT Discharge Planning   PT Plan Progress mobillity and activity per pt. tolerance.   PT Discharge Recommendation (DC Rec) Transitional Care Facility   PT Rationale for DC Rec Pt's functional mobility declining, unable to ambulate, not safe to return home.  Recommend short term rehab.   PT Brief overview of current status Pt sitting up in chair upon PT's arrival, agreeable to therapy today. Perfomed seated therex (see notes) due to progressive decline in his functional mobility per previous notes. Pt demonstrated fair performance with seated therex however had significant difficulty with L LE DF/PF motor control. Pt left sitting up in chair with call light within reach and chair alarm on.   Total Session Time   Timed Code Treatment Minutes 10   Total Session Time (sum of timed and untimed services) 10

## 2022-12-06 NOTE — PROGRESS NOTES
Care Transitions focused note:      Referral sent to [facility] system for any facility.    Muna Basurto CM

## 2022-12-06 NOTE — PROGRESS NOTES
12/06/22 1100   Appointment Info   Signing Clinician's Name / Credentials (OT) Sabina Zaman, OTR/L   Interventions   Interventions Quick Adds Therapeutic Activity   Therapeutic Activities   Therapeutic Activity Minutes (91112) 15   Symptoms noted during/after treatment fatigue   Treatment Detail/Skilled Intervention Pt in bed upon OT arrival, with a friend present. He was agreeable to tx. He transferred supine>sit EOB with min-modA. While setting up chair and walker, pt required modA to remain sitting upright due to leaving posteriorly. Pt then transferred bed>chair with maxA x 1-2. Pt then participated in UE exercises including B fist pumps, wrist flex/ext, forearm pro/sup, and bicep curls for 2x10. Pt has difficulites moving his L UE. OT provided PROM for elbow flex/ext on L UE x10. Pt provided with totalA to comb his hair. He was positioned with pillows in the chair, and was left resting with the call light within reach and clip alert attached.   OT Discharge Planning   OT Plan Progress strength, ADLs, and activity tolerance as able   OT Discharge Recommendation (DC Rec) Transitional Care Facility   OT Rationale for DC Rec Pt currently requiring significant assist for ADLs and would not be safe to return home at this time.   OT Brief overview of current status Pt in bed upon OT arrival, with a friend present. He was agreeable to tx. He transferred supine>sit EOB with min-modA. While setting up chair and walker, pt required modA to remain sitting upright due to leaving posteriorly. Pt then transferred bed>chair with maxA x 1-2. Pt then participated in UE exercises including B fist pumps, wrist flex/ext, forearm pro/sup, and bicep curls for 2x10. Pt has difficulites moving his L UE. OT provided PROM for elbow flex/ext on L UE x10. Pt provided with totalA to comb his hair. He was positioned with pillows in the chair, and was left resting with the call light within reach and clip alert attached.   Total Session  Time   Timed Code Treatment Minutes 15   Total Session Time (sum of timed and untimed services) 15

## 2022-12-06 NOTE — PLAN OF CARE
"Reason for hospital stay:  Fall   Most recent vitals: BP (!) 114/44 (BP Location: Right arm, Patient Position: Sitting, Cuff Size: Adult Small)   Pulse 50   Temp 98  F (36.7  C) (Tympanic)   Resp 18   Ht 1.753 m (5' 9\")   Wt 51 kg (112 lb 7 oz)   SpO2 92%   BMI 16.60 kg/m      Pt has been awake on and off t/o this shift. C/o aches to bilat shoulders, administered topical Volteran Gel. Up to chair and bed with heavy A 2, pivots to chair. Sacral dressing in place, CDI.     IVF:  SL  Safety:  Bed in lowest position, call button within reach. Room door open and room across from RN station.     Discharge care conference held.   Attendees: Nursing, Physical Therapy, Occupational Therapy, Pharmacy, Physician, and NP  Comments:    Face to face report given with opportunity to observe patient.    Report given to JUDD Nunez RN   12/6/2022  3:53 PM      "

## 2022-12-06 NOTE — PROGRESS NOTES
12/06/22 1200   Appointment Info   Signing Clinician's Name / Credentials (SLP) Arabella Whalen MS CCC-SLP   Interventions   Interventions Quick Adds Swallowing Dysfunction   Swallowing Dysfunction &/or Oral Function for Feeding   Treatment of Swallowing Dysfunction &/or Oral Function for Feeding Minutes (73085) 15   Symptoms Noted During/After Treatment None   Treatment Detail/Skilled Intervention Pt seen this AM for skilled PO trials. Unit staff reported that pt ate ~50% of breakfast and did not experience any coughing. However, pt did demonstrate increased difficulty with bringing the cup and spoon to his mouth independently. This was also noted during today's session and pt will likely require a 1:1 feeder to assist with mealtimes. Pt tolerated 3 oz. of mildly/nectar thickened liquids without demonstrating any overt signs/symptoms of aspiration. Pt tolerated pureed textures without difficulties. Trialed IDDSI 5 (minced and moist) and pt demonstrated adequate mastication and clearance of bolus from oral cavity during 90% of presentations. Trialed thin liquids and pt demonstrated immediate or delayed throat clearing following 50% of trials. Recommend upgrade in texture to IDDSI 5 (minced and moist) and continuation of IDDSI 2 (mildly/nectar thickened liquids). Pt will likely require 1:1 support to assist in feeding due to increased difficulty with feeding himself.   SLP Discharge Planning   SLP Plan Continue to assess safest/least restrictive diet recommendation. Provide pt with education regarding compensatory strategies during mealtimes.   SLP Discharge Recommendation Transitional Care Facility   SLP Rationale for DC Rec Pt is unable to safely tolerate a regular diet at this time. Current recommendations are IDDSI 5 (minced and moist) and IDDSI 2 (mildly/nectar thickened liquids) with close supervision/1:1 support as needed.   SLP Brief overview of current status  Recommend upgrade in texture to IDDSI 5  (minced and moist) and continuation of IDDSI 2 (mildly/nectar thickened liquids). Pt will likely require 1:1 support to assist in feeding due to increased difficulty with feeding himself. Aspiration precautions should continue to be followed.   Total Session Time   Total Session Time (sum of timed and untimed services) 15       Recommendations for Feeding:  - Patient requires close supervision with eating and drinking/1:1 support as needed  - Patient must sit in the chair at 90 degrees (may require pillows behind back)  - Eliminate all distractions; turn off the TV  - Patient should only eat/drink when they are fully alert  - Encourage small bites and small sips  - Alternate between a bite of food and a sip of liquid  - Check for oral pocketing  - Swallow bite of food/liquid before offering another bite/sip   - Patient must remain upright in chair at least 30-45 minutes after oral intake    NOTE: If patient becomes too tired, there are noted changes in their vocal quality/breathing (wet and gurgly), or they begin coughing frequently, stop feeding immediately and complete oral cares. SLP to re-assess swallowing

## 2022-12-06 NOTE — PROGRESS NOTES
[facility]    Hospitalist Progress Note    Date of Service (when I saw the patient): 12/06/2022    Assessment & Plan       Fall, initial encounter: Several falls at home. He does not recall the falls but family has needed to assist him. PT/OT evaluation for placement.       Oropharyngeal dysphagia: Previously diagnosed. He was seen by speech therapy on 12/2 with recommendations of soft/bite sized and thin liquids, however he did have evidence of significant aspiration this morning. Will place on pureed and moderate liquids (speech unavailable to re-eval x2 days) with 1:1 and upright in chair for all meals. Bedside staff did report he was eating/drinking very fast and impulsively.   -12/4: No coughing or signs of aspiration on pureed and mildly thick liquids.   -12/5: Speech re-eval today   -12/6: Speech increased diet to soft/bite sized today. He has been very awake and alert over the last 2-3 days.       Neurocognitive disorder: Chronic, making taking care of himself increasingly difficult, high risk for falls.       Hyponatremia: Chronic, long standing over the last year or so, stable at his baseline.       COPD (chronic obstructive pulmonary disease) (H): No signs of acute exacerbation. He has not had any hypoxia, persistent cough. He did have some leukocytosis on arrival, however this improved without intervention.      Type 2 diabetes mellitus, with long-term current use of insulin (H): On Lantus 8u daily, glucose running 190-200's. On restricted diet due to dysphagia will hold off on increasing at this time.   -12/4: Switch supplement to Glucerna      Bilateral shoulder pain: Mild, heat not helping, no prior history. Will try diclofenac gel.       Generalized muscle weakness    Failure to thrive in adult      DVT Prophylaxis: Pneumatic Compression Devices  Code Status: Full Code    Disposition: Expected discharge in several days once placement found.    Suzy Carpio, CNP    Interval History    Denies dyspnea, chest pain, abdominal pain. Denies pain in shoulders today.     -Data reviewed today: I reviewed all new labs and imaging results over the last 24 hours.     Physical Exam   Temp: 97.5  F (36.4  C) Temp src: Tympanic BP: 110/76 Pulse: 90   Resp: 18 SpO2: 96 % O2 Device: None (Room air)    Vitals:    12/04/22 0500   Weight: 51 kg (112 lb 7 oz)     Vital Signs with Ranges  Temp:  [97.5  F (36.4  C)-98.4  F (36.9  C)] 97.5  F (36.4  C)  Pulse:  [51-92] 90  Resp:  [16-18] 18  BP: (110-141)/(75-86) 110/76  SpO2:  [91 %-96 %] 96 %  I/O last 3 completed shifts:  In: 1416 [P.O.:912; I.V.:504]  Out: 1385 [Urine:1385]    Peripheral IV 12/04/22 Anterior;Right Upper forearm (Active)   Site Assessment WDL 12/05/22 2206   Line Status Saline locked 12/05/22 2206   Phlebitis Scale 0-->no symptoms 12/05/22 2206   Infiltration Scale 0 12/05/22 2206   Number of days: 2       Pressure Injury 09/20/20 Coccyx Stage 1 (Active)   Estimated Circumference ( if not measured tennis ball sized 11/28/22 2049   Number of days: 807       Wound Arm Skin tear (Active)   Wound Bed Pink 12/06/22 0003   Drainage Amount Scant 12/06/22 0003   Drainage Color/Characteristics Sanguinous 12/06/22 0003   Dressing Foam 12/06/22 0003   Dressing Status Clean, dry, intact;Changed 12/06/22 0003   Number of days: 3     Line/device assessment(s) completed for medical necessity    Constitutional: Awake,alert, no acute distress  Respiratory: Fine crackles right base, otherwise notably improved air movement throughout.   Cardiovascular: HRR, no murmurs, rubs,thrills. No peripheral edema.   GI: Soft,nontender, bowel sounds are hypoactive.   Skin/Integumen: No rashes or unusual bruising.         Medications       amLODIPine  2.5 mg Oral Daily     aspirin  81 mg Oral Daily     atorvastatin  40 mg Oral At Bedtime     diclofenac  2 g Topical 4x Daily     insulin aspart  1-7 Units Subcutaneous TID AC     insulin aspart  1-5 Units Subcutaneous At Bedtime      ipratropium - albuterol 0.5 mg/2.5 mg/3 mL  3 mL Nebulization 4x daily     lisinopril  2.5 mg Oral Daily     OLANZapine zydis  5 mg Oral At Bedtime     sodium chloride (PF)  3 mL Intracatheter Q8H       Data   Recent Labs   Lab 12/06/22  1136 12/06/22  0746 12/06/22  0530 12/03/22  0826 12/03/22  0738 12/02/22  0845 12/02/22  0751 12/01/22  1818 12/01/22  0543   WBC  --   --  11.1*  --  10.8  --  12.7*  --  16.3*   HGB  --   --  12.7*  --  13.1*  --  12.8*  --  13.0*   MCV  --   --  87  --  86  --  86  --  87   PLT  --   --  299  --  322  --  314  --  322   NA  --   --  131*  --  131*  --  134*  --  130*   POTASSIUM  --   --  4.3  --  4.2  --  3.9  --  4.1   CHLORIDE  --   --  99  --  99  --  100  --  97*   CO2  --   --  24  --  23  --  24  --  22   BUN  --   --  14.7  --  14.3  --  20.2  --  23.6*   CR  --   --  0.54*  --  0.57*  --  0.68  --  0.66*   ANIONGAP  --   --  8  --  9  --  10  --  11   CARLOS  --   --  8.1*  --  7.9*  --  8.1*  --  8.0*   * 216* 243*   < > 196*   < > 136*   < > 195*   ALBUMIN  --   --   --   --   --   --   --   --  2.9*   PROTTOTAL  --   --   --   --   --   --   --   --  6.3*   BILITOTAL  --   --   --   --   --   --   --   --  0.4   ALKPHOS  --   --   --   --   --   --   --   --  106   ALT  --   --   --   --   --   --   --   --  14   AST  --   --   --   --   --   --   --   --  17    < > = values in this interval not displayed.       No results found for this or any previous visit (from the past 24 hour(s)).

## 2022-12-07 NOTE — PROGRESS NOTES
12/07/22 1400   Appointment Info   Signing Clinician's Name / Credentials (OT) Sabina Zaman, OTR/L   Rehab Comments (OT) Pt's RN stopped OT afterwards and reports pt was reporting decreased sensation in his feet. Attempted to locate Dr. Mackey to update him but was unable to find him.   Therapeutic Procedures/Exercise   Therapeutic Procedure: strength, endurance, ROM, flexibillity minutes (97030) 12   Symptoms Noted During/After Treatment fatigue   Treatment Detail/Skilled Intervention Pt up in the chair upon OT arrival, agreeable to tx. Pt participated in UE exercises for 2 sets of 10 reps each. He was able to complete B finger flex/ext, wrist flex/ext, forearm pro/sup, and bicep curls. Pt has difficulties with his L arm, especially flexing his elbow (minor difficulties with pro/sup of forearm), which seem slightly worse/more challenging for pt now compared to last week. He is unable to actively flex his shoulders, and has increased discomfort with P/AAROM. Pt asked for a toothpick. Informed we have floss picks which he was agreeable to. Pt attempted to complete on his own but ultimately required assist. Pt was left resting in the chair with the clip alert attached and call light within reach and a nurse present.   OT Discharge Planning   OT Plan Progress strength, ADLs, and activity tolerance as able   OT Discharge Recommendation (DC Rec) Transitional Care Facility   OT Rationale for DC Rec Pt currently requiring significant assist for ADLs and would not be safe to return home at this time.   OT Brief overview of current status Pt up in the chair upon OT arrival, agreeable to tx. Pt participated in UE exercises for 2 sets of 10 reps each. He was able to complete B finger flex/ext, wrist flex/ext, forearm pro/sup, and bicep curls. Pt has difficulties with his L arm, especially flexing his elbow (minor difficulties with pro/sup of forearm), which seem slightly worse/more challenging for pt now compared to  last week. He is unable to actively flex his shoulders, and has increased discomfort with P/AAROM. Pt asked for a toothpick. Informed we have floss picks which he was agreeable to. Pt attempted to complete on his own but ultimately required assist. Pt was left resting in the chair with the clip alert attached and call light within reach and a nurse present.   Total Session Time   Timed Code Treatment Minutes 12   Total Session Time (sum of timed and untimed services) 12

## 2022-12-07 NOTE — PLAN OF CARE
Goal Outcome Evaluation:pressure reducing cushion placed on the chair in patients room.

## 2022-12-07 NOTE — PROGRESS NOTES
12/07/22 1400   Appointment Info   Signing Clinician's Name / Credentials (SLP) Joan Stratton MA, CF-SLP   Interventions   Interventions Quick Adds Swallowing Dysfunction   Swallowing Dysfunction &/or Oral Function for Feeding   Treatment of Swallowing Dysfunction &/or Oral Function for Feeding Minutes (61078) 15   Symptoms Noted During/After Treatment None   Treatment Detail/Skilled Intervention Pt seen this AM for skilled PO trials. Patient was laying in bed upon SLP's arrival. Patient very fatigued during trials and needed assistance to stay awake during trials. Patient increased alertness with lights on. SLP instructed patient to drink thin liquids from straw. Patient was unable to complete this instruction. Patient stated that he did drink but did not. Patient trialed thin liquids via cup fed from SLP. Patient with overt, immediate coughing with suspected aspiration/penetration of liquids. Patient trialed mildly thick liquids with SLP with no overt signs/symtpoms of aspiration/penetration. Patient trialed minced/moist (IDDSI 5) with SLP via spoon from SLP. Patient perseverative with mastication and needed additional cues to swallow. Slight oral residue in oral cavity.   SLP Discharge Planning   SLP Plan Continue to assess safest/least restrictive diet recommendation. Provide pt wtih education regarding compensatory strategies during mealtimes.   SLP Discharge Recommendation Transitional Care Facility   SLP Rationale for DC Rec Pt is unable to safely tolerate a regular diet at this time. Current recommendations are IDDSI 5 (minced and moist) and IDDSI 2 (mildly/nectar thickened liquids) with close supervision/1:1 support as needed.   SLP Brief overview of current status  Recommend continuation of IDDSI 5 (minced and moist) and  IDDSI 2 (mildly/nectar thickened liquids). Pt will likely require 1:1 support to assist in feeding due to increased difficulty with feeding himself. Aspiration precutions should  continue to be followed. Patient benefits from continued reminders to swallow food down, as he is perseverative with mastication.   Total Session Time   Total Session Time (sum of timed and untimed services) 15     Recommendations for Feeding:  - Patient requires 1:1 feeder with eating and drinking  - Patient must sit in the chair at 90 degrees (may require pillows behind back)  - Eliminate all distractions; turn off the TV  - Patient should only eat/drink when they are fully alert  - Encourage small bites and small sips  - Alternate between a bite of food and a sip of liquid  - Check for oral pocketing  - Swallow bite of food/liquid before offering another bite/sip   - Patient must remain upright in chair at least 30-45 minutes after oral intake    NOTE: If patient becomes too tired, there are noted changes in their vocal quality/breathing (wet and gurgly), or they begin coughing frequently, stop feeding immediately and complete oral cares. SLP to re-assess swallowing

## 2022-12-07 NOTE — PLAN OF CARE
Goal Outcome Evaluation:Face to face report given with opportunity to observe patient.    Report given to Mayra Marino RN   12/7/2022  3:45 PM

## 2022-12-07 NOTE — PROGRESS NOTES
[facility]    Hospitalist Progress Note      Assessment & Plan         Fall, initial encounter: Several falls at home. He does not recall the falls but family has needed to assist him. PT/OT evaluation for placement.  He has had a fall in the ER and is just getting weaker and weaker basically PT has seen him and he has a lot of just truncal issues unable to sit up straight etc. she has had really high risk for further falls.  Seemingly is getting somewhat worse over time.  He did have a CT scan of his head on the fourth.  We will consider repeat CT scan and order an MRI scan to just assess his issues further.  But at this point there is no way that he would be able to return to independent living status.  Examining him today he is able to lift his legs up off the bed he has equal but decreased hand grasps can not really lift his arms above the bed very well at all.  This is definitely different issues and has had in the past.  I will check a CK level.  Also because of his numerous falls do an MRI of his brain and C-spine and actually thoracic spine also.       Oropharyngeal dysphagia: Previously diagnosed. He was seen by speech therapy on 12/2 with recommendations of soft/bite sized and thin liquids, however he did have evidence of significant aspiration this morning. Will place on pureed and moderate liquids (speech unavailable to re-eval x2 days) with 1:1 and upright in chair for all meals. Bedside staff did report he was eating/drinking very fast and impulsively.   -12/4: No coughing or signs of aspiration on pureed and mildly thick liquids.   -12/5: Speech re-eval today   -12/6: Speech increased diet to soft/bite sized today. He has been very awake and alert over the last 2-3 days.   -12/7: Tolerating his diet without much issue per nursing staff.        Neurocognitive disorder: Chronic, making taking care of himself increasingly difficult, high risk for falls.        Hyponatremia: Chronic, long  standing over the last year or so, stable at his baseline.  Was 131 today we will recheck tomorrow.       COPD (chronic obstructive pulmonary disease) (H): No signs of acute exacerbation. He has not had any hypoxia, persistent cough. He did have some leukocytosis on arrival, however this improved without intervention.  O2 sat stable on room air.  Lungs are clear       Type 2 diabetes mellitus, with long-term current use of insulin (H): On Lantus 8u daily, glucose running 190-200's. On restricted diet due to dysphagia will hold off on increasing at this time.   -12/4: Switch supplement to Glucerna  -12/6: Sugars 190 mid 200s will increase Lantus somewhat.  No episodes of hypoglycemia.       Bilateral shoulder pain: Mild, heat not helping, no prior history. Will try diclofenac gel.  Not sure this is pain he just states this week when I ask him if they hurt he said no.  We will work-up his weakness with a MRI scan.       Generalized muscle weakness    Failure to thrive in adult]    Cardiac: Patient recently had issues had a non-STEMI evaluation showed a pseudoaneurysm inferoseptal area along with  ischemic cardiomyopathy EF from 40 to 45%.  He had a heart catheterization performed which showed three-vessel coronary disease with an occluded distal RCA D1 stenosis and circumflex with prior stent without significant residual stenosis.  Did not feel any interventions were warranted.  The initially recommended transfer the emergency [place] for surgical repair of this.  However the patient refused discharged home and came back in with multiple complaints of just weakness unable to care for himself.  They were trying to transfer him down [facility] however no beds became available.  And at this time this is put on hold after the family is discussion and processes that unless he physically improves they would not want to intervene and put him through a large surgical procedure.      Diet: Snacks/Supplements  Adult: Glucerna; With Meals  Minced & Moist Diet (level 5) Mildly Thick (level 2)  Fluids: None    DVT Prophylaxis: Compression  Code Status: Full Code    Disposition: Expected discharge when appropriate disposition facility is found.  Leo Mackey MD    Interval History   Patient is alert pleasant denies any pain or shortness of breath currently.  Been eating well.  Did asked about his strength he says he is just very weak.  Did not go so well he is not able to really raise his arms above the bed very well legs he can raise up but they do seem mildly weak.  Physical therapy felt that he has no real good control of his trunk and and is still at risk of falls.  Will image things a little closely.  Check his CK level.    -Data reviewed today: I reviewed all new labs and imaging results over the last 24 hours. I personally reviewed no images or EKG's today.    Physical Exam   Temp: 97  F (36.1  C) Temp src: Tympanic BP: 124/73 Pulse: 96   Resp: 16 SpO2: 94 % O2 Device: None (Room air)    Vitals:    12/04/22 0500   Weight: 51 kg (112 lb 7 oz)     Vital Signs with Ranges  Temp:  [97  F (36.1  C)-98.4  F (36.9  C)] 97  F (36.1  C)  Pulse:  [50-96] 96  Resp:  [16-18] 16  BP: (114-151)/(44-76) 124/73  SpO2:  [92 %-96 %] 94 %  I/O last 3 completed shifts:  In: 1080 [P.O.:1080]  Out: 275 [Urine:275]    Peripheral IV 12/04/22 Anterior;Right Upper forearm (Active)   Site Assessment WDL 12/07/22 0118   Line Status Saline locked 12/07/22 0118   Phlebitis Scale 0-->no symptoms 12/07/22 0118   Infiltration Scale 0 12/07/22 0118   Number of days: 3       Pressure Injury 09/20/20 Coccyx Stage 1 (Active)   Estimated Circumference ( if not measured tennis ball sized 11/28/22 2049   Number of days: 808       Wound Arm Skin tear (Active)   Wound Bed Other (Comment) 12/06/22 2325   Dorie-wound Assessment Other (Comment) 12/06/22 2325   Drainage Amount UTV 12/06/22 2325   Drainage Color/Characteristics [facility] 12/06/22 2325   Dressing Foam  12/06/22 2325   Dressing Status Clean, dry, intact 12/06/22 2325   Number of days: 4     No line/device    Constitutional: Alert and oriented x3. No distress    HEENT: Normocephalic/atraumatic, PERRL, EOMI, mouth moist, neck supple, no LN.     Cardiovascular: RRR.  No murmur, no  rubs, or gallops.  JVD no.  Bruits no.  Pulses 2    Respiratory: Slightly decreased but basically clear to auscultation bilaterally    Abdomen: Soft, nontender, nondistended, no organomegaly. Bowel sounds present    Extremities: Warm/dry.  No edema    Neuro: Cranial nerves are intact.  He is able to move all extremities.  Lower extremities he is able to lift up off the bed generally decreased about 4 out of 5 in terms of fracture abduction of his ankles.  Hand strength is 4 out of 5 equal but really cannot raise his hands up very well almost some dysmetria with both of them.   Medications       amLODIPine  2.5 mg Oral Daily     aspirin  81 mg Oral Daily     atorvastatin  40 mg Oral At Bedtime     diclofenac  2 g Topical 4x Daily     insulin aspart  1-7 Units Subcutaneous TID AC     insulin aspart  1-5 Units Subcutaneous At Bedtime     ipratropium - albuterol 0.5 mg/2.5 mg/3 mL  3 mL Nebulization 4x daily     lisinopril  2.5 mg Oral Daily     OLANZapine zydis  5 mg Oral At Bedtime     sodium chloride (PF)  3 mL Intracatheter Q8H       Data   Recent Labs   Lab 12/07/22  0853 12/07/22  0200 12/06/22  2123 12/06/22  0746 12/06/22  0530 12/03/22  0826 12/03/22  0738 12/02/22  0845 12/02/22  0751 12/01/22  1818 12/01/22  0543   WBC  --   --   --   --  11.1*  --  10.8  --  12.7*  --  16.3*   HGB  --   --   --   --  12.7*  --  13.1*  --  12.8*  --  13.0*   MCV  --   --   --   --  87  --  86  --  86  --  87   PLT  --   --   --   --  299  --  322  --  314  --  322   NA  --   --   --   --  131*  --  131*  --  134*  --  130*   POTASSIUM  --   --   --   --  4.3  --  4.2  --  3.9  --  4.1   CHLORIDE  --   --   --   --  99  --  99  --  100  --  97*   CO2   --   --   --   --  24  --  23  --  24  --  22   BUN  --   --   --   --  14.7  --  14.3  --  20.2  --  23.6*   CR  --   --   --   --  0.54*  --  0.57*  --  0.68  --  0.66*   ANIONGAP  --   --   --   --  8  --  9  --  10  --  11   CARLOS  --   --   --   --  8.1*  --  7.9*  --  8.1*  --  8.0*   * 292* 255*   < > 243*   < > 196*   < > 136*   < > 195*   ALBUMIN  --   --   --   --   --   --   --   --   --   --  2.9*   PROTTOTAL  --   --   --   --   --   --   --   --   --   --  6.3*   BILITOTAL  --   --   --   --   --   --   --   --   --   --  0.4   ALKPHOS  --   --   --   --   --   --   --   --   --   --  106   ALT  --   --   --   --   --   --   --   --   --   --  14   AST  --   --   --   --   --   --   --   --   --   --  17    < > = values in this interval not displayed.       No results found for this or any previous visit (from the past 24 hour(s)).

## 2022-12-07 NOTE — PLAN OF CARE
Goal Outcome Evaluation:  Patient is very weak today, patient was transferred from the bed to the chair with assist of two staff using the transfer belt, and transferred fairly well this morning, however as the shift has progressed, patient was again transferred from the bed to the chair with assist of staff, and at this time, patient was unable to walk or bear weight, as staff assisted patient up from the bed, his legs did not bear his weight, and he was unable to take a step, staff did a heavy assist of two to get him into the chair, patient is alert and does make his needs known well, accu check and vitals as charted. Patient does have reddened buttocks and coccyx, there is a foam dressing in place. Patient was incontinent of urine today.

## 2022-12-07 NOTE — PROGRESS NOTES
12/07/22 0957   Appointment Info   Signing Clinician's Name / Credentials (PT) Letha Alfredo DPT   Physical Therapy Goals   PT Frequency 3x/week   PT Predicted Duration/Target Date for Goal Attainment 12/21/22   Interventions   Interventions Quick Adds Therapeutic Activity   Therapeutic Activity   Therapeutic Activities: dynamic activities to improve functional performance Minutes (54902) 14   Symptoms Noted During/After Treatment Fatigue   Treatment Detail/Skilled Intervention Pt resting in bed, agreeable to get up to chair.  Pt transferred sup>sit max A to EOB.  At EOB pt able to maintain sitting balance at times with SBA but other times will begin to lose balance and make no efforts to correct requiring max A to maintain.  Pt transferred sit>stand mod A up to FWW.  Pt completed pivot transfer from bed>chair with FWW and mod A of 2, continues to have difficulty coordinating steps with poor trunk and proximal muscle control, at times legs appear to want to buckle as well.  Pt participated in seated therex once in chair x20 each: hip flex with poor NM control to slowly lower, at times feet will slam uncontrollably back onto floor, LAQ with same poor control noted, heel/toe raises.   PT Discharge Planning   PT Plan Decrease to 3x/week due to decline in functional progress and observation status   PT Discharge Recommendation (DC Rec) Long term care facility;Transitional Care Facility   PT Rationale for DC Rec Pt continues to decline funtionally with PT despite near daily interventions.  Pt demonstrating ataxia and poor NM control with attempts to transfer.  Recommend long term care placement with possible trial of short term rehab however pt is declining with PT interventions during hosptalization.   PT Brief overview of current status Pt resting in bed, agreeable to get up to chair.  Pt transferred sup>sit max A to EOB.  At EOB pt able to maintain sitting balance at times with SBA but other times will begin to  lose balance and make no efforts to correct requiring max A to maintain.  Pt transferred sit>stand mod A up to FWW.  Pt completed pivot transfer from bed>chair with FWW and mod A of 2, continues to have difficulty coordinating steps with poor trunk and proximal muscle control, at times legs appear to want to buckle as well.  Pt participated in seated therex once in chair x20 each: hip flex with poor NM control to slowly lower, at times feet will slam uncontrollably back onto floor, LAQ with same poor control noted, heel/toe raises.   Total Session Time   Timed Code Treatment Minutes 14   Total Session Time (sum of timed and untimed services) 14

## 2022-12-07 NOTE — PLAN OF CARE
"/65 (BP Location: Left arm, Patient Position: Supine, Cuff Size: Adult Small)   Pulse 96   Temp 98.4  F (36.9  C) (Tympanic)   Resp 18   Ht 1.753 m (5' 9\")   Wt 51 kg (112 lb 7 oz)   SpO2 92%   BMI 16.60 kg/m      Pt A&O, afebrile, VS and assessment as charted, heavy A2 as we pivoted him to the commode, gb and walker, free of falls or injury this shift. LS clear but dim to the bases, sats in the 90s on RA. 2 AM glucose 292. At times voiding small amount into the urinal (75 ml, 100 ml) but also has had urinary incontinence and stool incontinence in his brief. Close observation visualization overnight with frequent rounding and observation. Room near unit station, bed alarm on. Minced and moist diet with mildly thickened liquids. Denied pain.    Face to face report given with opportunity to observe patient.    Report given to JUDD Kamara RN   12/7/2022  7:33 AM      "

## 2022-12-07 NOTE — PROVIDER NOTIFICATION
Provider notified that pt was sitting on the edge of the bed preparing to use the urinal - he went to adjust his gown and leaned to far forward and fell to the floor - he stated he hit the side of his face (cheek) denies any pain/discomfort.

## 2022-12-07 NOTE — PLAN OF CARE
Staff observing closely this shift. Assess as charted. Has refused Voltaren oint to shoulder. Glucose : 308 & 255. Sacral dressing in place. SL intact. Pleasant and cooperative.     Face to face report given with opportunity to observe patient.    Report given to Juanita Wesley RN   12/6/2022  11:53 PM

## 2022-12-08 PROBLEM — I63.9 STROKE (H): Status: ACTIVE | Noted: 2022-01-01

## 2022-12-08 NOTE — PROGRESS NOTES
12/08/22 1400   Appointment Info   Signing Clinician's Name / Credentials (OT) Sabina Zaman, OTR/L   Therapeutic Activities   Therapeutic Activity Minutes (66287) 15   Symptoms noted during/after treatment fatigue   Treatment Detail/Skilled Intervention Pt up in the chair upon OT arrival, agreeable to tx. He participated in UE exercises. Pt completed B finger flex/ext, wrist flex/ext, forearm pro/sup, and bicep curls. Pt exhibits difficulties extending fingers, mostly B ring, due to what appears to be Dupuytrens contracture. Pt also exhibits impaired coordination of R UE, especially during functional tasks- he was instructed to comb his hair and touch his mouth with his R hand (pt able to touch his mouth but ultimately needed mod-maxA to comb his hair). he also demonstrates impaired motor control/coordination? of L UE, mostly notible with L elbow flex/ext and forearm pro/sup. Pt is unable to flex/lift, raise his shoulders. Pt participated in PROM x5 reps each but is unable to hold arms up on his own. Attempted to have pt comb his hair/touch his mouth with his L hand and pt unable to bring hand up past neck. Pt wanted to lie down. He required max to totalA x2 bed>chair positioned directly next to each other. Pt unable to move feet during transfer to help. Pt required maxA x2 sit>supine. He is unable to remain sitting up at the EOB either without tipping backwards. Pt was left with LPN and unit assistant changing a dressing on his butt.   OT Discharge Planning   OT Plan Progress strength, ADLs, and activity tolerance as able   OT Discharge Recommendation (DC Rec) Transitional Care Facility   OT Rationale for DC Rec Pt currently requiring significant assist for ADLs and would not be safe to return home at this time.   OT Brief overview of current status Pt up in the chair upon OT arrival, agreeable to tx. He participated in UE exercises. Pt completed B finger flex/ext, wrist flex/ext, forearm pro/sup, and bicep  curls. Pt exhibits difficulties extending fingers, mostly B ring, due to what appears to be Dupuytrens contracture. Pt also exhibits impaired coordination of R UE, especially during functional tasks- he was instructed to comb his hair and touch his mouth with his R hand (pt able to touch his mouth but ultimately needed mod-maxA to comb his hair). he also demonstrates impaired motor control/coordination? of L UE, mostly notible with L elbow flex/ext and forearm pro/sup. Pt is unable to flex/lift, raise his shoulders. Pt participated in PROM x5 reps each but is unable to hold arms up on his own. Attempted to have pt comb his hair/touch his mouth with his L hand and pt unable to bring hand up past neck. Pt wanted to lie down. He required max to totalA x2 bed>chair positioned directly next to each other. Pt unable to move feet during transfer to help. Pt required maxA x2 sit>supine. He is unable to remain sitting up at the EOB either without tipping backwards. Pt was left with LPN and unit assistant changing a dressing on his butt.   Total Session Time   Timed Code Treatment Minutes 15   Total Session Time (sum of timed and untimed services) 15

## 2022-12-08 NOTE — PLAN OF CARE
Free from falls/injuries this shift. Assess as charted. Close observation by staff. Up in chair at start of shift. Transferred to bed with assist of 2 and Gaitbelt. Order rec'd for MRI in am. Metals checklist completed with pt by LPN. Denies pain. Refuses Voltaren gel for pain in shoulders. Accuchecks as charted.     Face to face report given with opportunity to observe patient.    Report given to Yahir Wesley RN   12/7/2022  10:36 PM

## 2022-12-08 NOTE — UTILIZATION REVIEW
Admission Status; Secondary Review Determination    Under the authority of the Utilization Management Committee, the utilization review process indicated a secondary review on the above patient. The review outcome is based on review of the medical records, discussions with staff, and applying clinical experience noted on the date of the review.    (x) Inpatient Status Appropriate - This patient's medical care is consistent with medical management for inpatient care and reasonable inpatient medical practice.    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  Patient refused transfer and decided to go home.  Patient has become progressively weaker with confusion, anorexia, recurrent elevated troponin level, ongoing hyponatremia with associated new moderately severe hypoalbuminemia.  Patient's albumin prior to transfer to [place] was 3.5 and on December 8 its down to 2.8.  This is complicated by patient's new leukocytosis that progressively worsened over the first couple days in the hospital from 13,500-18,600, marked pyuria requiring initiation of IV antibiotics as well as oral doxycycline for associated bronchitis symptoms.  Furthermore patient's acute decline includes new oropharyngeal dysphagia with speech therapy noted significant aspiration on the morning of 12/3/2022.  While in the hospital patient has become progressively weaker with now unable to sit up.  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  Thus patient's presentation with significant acute on chronic decline  after recent hospitalization with left ventricular pseudoaneurysm may be related to the acute strokes in addition to the significant UTI with leukocytosis.  Therefore inpatient management appropriate for this prolonged hospital stay.    At the time of admission with the information available to the attending physician more than 2 nights Hospital complex care was anticipated, based on patient risk of adverse outcome if treated as outpatient and complex care required. Inpatient admission is appropriate based on the Medicare guidelines.    This document was produced using voice recognition software    The information on this document is developed by the utilization review team in order for the business office to ensure compliance. This only denotes the appropriateness of proper admission status and does not reflect the quality of care rendered.    The definitions of Inpatient Status and Observation Status used in making the determination above are those provided in the CMS Coverage Manual, Chapter 1 and Chapter 6, section 70.4.    Sincerely,    Neel Miller MD  Utilization Review  Physician Advisor  [facility].

## 2022-12-08 NOTE — PLAN OF CARE
Goal Outcome Evaluation:       head to toe as charted, VSS, afebrile, room air, MRI check list completed with pt and son Lucas on the phone. Did notify MRI of metal in leg - Mri stated understanding. Free of falls this shoft. Frequent visualization of pt through 45 hours of caring for pt.     Pt did complain of chest pain during MRI, Pt returned from MRI and nurse did vitals and let provider know, chest pain relieved by the time pt got back from MRI       Face to face report given with opportunity to observe patient.    Report given to Mayra Hagan RN   12/8/2022  11:30 AM

## 2022-12-08 NOTE — CARE PLAN
Pt. A/O, calm, cooperative throughout night.  Slept majority of the night used urinal x 2 w/ 1 episode of incontinence.  Up to chair @ apx 0600.  C/o generalized pain all over 6/10, Nurse notified.  Breakfast ordered.     Chelsey Kaur RN on 12/8/2022 at 6:33 AM

## 2022-12-08 NOTE — PLAN OF CARE
"Reason for hospital stay:  Frequent falls  Living situation PTA: Home alone  Most recent vitals: /70 (BP Location: Left arm, Patient Position: Semi-Singletary's, Cuff Size: Adult Small)   Pulse 96   Temp 99.5  F (37.5  C) (Tympanic)   Resp 18   Ht 1.753 m (5' 9\")   Wt 51 kg (112 lb 7 oz)   SpO2 (!) 91%   BMI 16.60 kg/m      Pain Management:  Denies pain this shift  LOC:  A&O x 4 this shift  Cardiac:  HRR regular  Respiratory:  Lungs diminished throughout. 93% on room air  GI:  Incontinent of bowels, brief and incontinence pad in place  :  Incontinent of urine, brief and incontinence pad in place. Uses urinal intermittently.  Skin Issues:  Scattered bruising from previous falls in his home. Skin tear on the left forearm covered with foam dressing-clean, dry, and intact.     IVF:  SL  ABX:  N/A    Nutrition: Aspiration cautions are in place  Activity: Assist x 2 w/pivot to commode  Safety:  Bed in lowest position, wheels locked, and alarm in place. Call light and personal items within reach. Room near the nurses station with the door open.   Face to face report given with opportunity to observe patient.    Report given to JUDD Bonner RN   12/8/2022  7:31 AM          "

## 2022-12-08 NOTE — PROVIDER NOTIFICATION
"text paged provider \"pt complained of chest pain during MRI, vitals now are 111/65 pulse 80 and 02 93% - no current chest pain\"    "

## 2022-12-08 NOTE — PLAN OF CARE
Goal Outcome Evaluation:       went over MRI sign off list with pedro Zavala, was double verified by Chelsey WHALEY as well

## 2022-12-08 NOTE — PLAN OF CARE
Goal Outcome Evaluation:       spoke with MRI staff that pt did report a small piece of metal in Leg from working in the garage. MRI staff stated understanding

## 2022-12-08 NOTE — PROGRESS NOTES
12/08/22 1000   Appointment Info   Signing Clinician's Name / Credentials (SLP) Arabella Whalen MS CCC-SLP   Interventions   Interventions Quick Adds Swallowing Dysfunction   Swallowing Dysfunction &/or Oral Function for Feeding   Treatment of Swallowing Dysfunction &/or Oral Function for Feeding Minutes (24604) 15   Symptoms Noted During/After Treatment None   Treatment Detail/Skilled Intervention Pt seen this AM for skilled PO trials. Pt reported that he had not yet had breakfast however unit assistant reported that pt did have breakfast this AM. She reported that he required 1:1 feeder however he did not demonstrate difficulty with swallowing. Pt trialed 3 oz. of mildly/nectar thickened liquids without demonstrating any overt signs/symptoms of aspiration. During thin liquid trials pt demonstrated immediate and delayed throat clearing after the swallow. Pt tolerated pureed and minced & moist textures without demonstrating any overt signs/symptoms of aspiration. During soft & bite sized trials pt demonstrated prolonged/perseverative mastication. Pt required cue to swallow and following the swallow he demonstrated right sided pocketing. Recommend continuation of current diet recommendations, IDDSI 5 (minced and moist) and IDDSI 2 (mildly/nectar thickened liquids).Pt will likely require 1:1 support to assist in feeding due to increased difficulty with feeding himself.   SLP Discharge Planning   SLP Discharge Recommendation Transitional Care Facility   SLP Rationale for DC Rec Pt is unable to safely tolerate a regular diet at this time. Current recommendations are IDDSI 5 (minced and moist) and IDDSI 2 (mildly/nectar thickened liquids) with close supervision/1:1 support as needed.   SLP Brief overview of current status  Recommend continuation of IDDSI 5 (minced and moist) and  IDDSI 2 (mildly/nectar thickened liquids). Pt will likely require 1:1 support to assist in feeding due to increased difficulty with feeding  himself. Aspiration precautions should continue to be followed. Patient benefits from continued reminders to swallow food down, as he is perseverative with mastication.   Total Session Time   Total Session Time (sum of timed and untimed services) 15       Recommendations for Feeding:  - Patient requires close supervision with eating and drinking  - Patient must sit in the chair at 90 degrees (may require pillows behind back)  - Eliminate all distractions; turn off the TV  - Patient should only eat/drink when they are fully alert  - Encourage small bites and small sips  - Alternate between a bite of food and a sip of liquid  - Check for oral pocketing  - Swallow bite of food/liquid before offering another bite/sip   - Patient must remain upright in chair at least 30-45 minutes after oral intake    NOTE: If patient becomes too tired, there are noted changes in their vocal quality/breathing (wet and gurgly), or they begin coughing frequently, stop feeding immediately and complete oral cares. SLP to re-assess swallowing

## 2022-12-08 NOTE — PROGRESS NOTES
Patient apparently complained of chest pain during MRI scan.  It was not finished.  Back up on floor he is alert oriented vital signs are stable.  No chest pain.  No evidence of ischemia he needs to have his MRI brain done he is cleared to have this attempted again.

## 2022-12-08 NOTE — PROGRESS NOTES
[facility]    Hospitalist Progress Note  Fall, initial encounter: Several falls at home. He does not recall the falls but family has needed to assist him. PT/OT evaluation for placement.  He has had a fall in the ER and is just getting weaker and weaker basically PT has seen him and he has a lot of just truncal issues unable to sit up straight etc. she has had really high risk for further falls.  Seemingly is getting somewhat worse over time.  He did have a CT scan of his head on the fourth.  We will consider repeat CT scan and order an MRI scan to just assess his issues further.  But at this point there is no way that he would be able to return to independent living status.  Examining him today he is able to lift his legs up off the bed he has equal but decreased hand grasps can not really lift his arms above the bed very well at all.  This is definitely different issues and has had in the past.  I will check a CK level.  Also because of his numerous falls do an MRI of his brain and C-spine and actually thoracic spine also.  -12/8: CK was low at 25.  MRI scan of C-spine does show some areas of compression mild to moderate but not severe enough to be causing his current symptoms.  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  Also couple punctate enhancing foci in the frontal lobes likely representing some subacute infarct.  Not really sure these lesions have anything to do with his current status and symptoms.  He does not seem to have any focal neurological deficits.        Oropharyngeal dysphagia: Previously diagnosed. He was seen by speech therapy on 12/2 with recommendations of soft/bite sized and thin liquids, however he did have evidence of significant aspiration this morning. Will place on pureed and moderate liquids (speech unavailable to re-eval x2 days) with 1:1 and upright in chair for all meals. Bedside staff did report  he was eating/drinking very fast and impulsively.   -12/4: No coughing or signs of aspiration on pureed and mildly thick liquids.   -12/5: Speech re-eval today   -12/6: Speech increased diet to soft/bite sized today. He has been very awake and alert over the last 2-3 days.   -12/7: Tolerating his diet without much issue per nursing staff.          Neurocognitive disorder: Chronic, making taking care of himself increasingly difficult, high risk for falls.        Hyponatremia: Chronic, long standing over the last year or so, stable at his baseline.  Was 131 today we will recheck tomorrow.       COPD (chronic obstructive pulmonary disease) (H): No signs of acute exacerbation. He has not had any hypoxia, persistent cough. He did have some leukocytosis on arrival, however this improved without intervention.  O2 sat stable on room air.  Lungs are clear  Room air sats stable 92%.       Type 2 diabetes mellitus, with long-term current use of insulin (H): On Lantus 8u daily, glucose running 190-200's. On restricted diet due to dysphagia will hold off on increasing at this time.   -12/4: Switch supplement to Glucerna  -12/6: Sugars 190 mid 200s will increase Lantus somewhat.  No episodes of hypoglycemia.  -12/8: Sugars still 180-280.  Apparently he is not on Lantus at this point it was discontinued at some point we will restart him on 6 units tonight.       Bilateral shoulder pain: Mild, heat not helping, no prior history. Will try diclofenac gel.  Not sure this is pain he just states this week when I ask him if they hurt he said no.  We will work-up his weakness with a MRI scan.       Generalized muscle weakness    Failure to thrive in adult]     Cardiac: Patient recently had issues had a non-STEMI evaluation showed a pseudoaneurysm inferoseptal area along with  ischemic cardiomyopathy EF from 40 to 45%.  He had a heart catheterization performed which showed three-vessel coronary disease with an occluded distal RCA D1  stenosis and circumflex with prior stent without significant residual stenosis.  Did not feel any interventions were warranted.  The initially recommended transfer the emergency [place] for surgical repair of this.  However the patient refused discharged home and came back in with multiple complaints of just weakness unable to care for himself.  They were trying to transfer him down [facility] however no beds became available.  And at this time this is put on hold after the family is discussion and processes that unless he physically improves they would not want to intervene and put him through a large surgical procedure.         Diet: Snacks/Supplements Adult: Glucerna; With Meals  Minced & Moist Diet (level 5) Mildly Thick (level 2)  Fluids: None    DVT Prophylaxis: Pneumatic Compression Devices  Code Status: Full Code    Disposition: Expected discharge when accepting faciity  Leo Mackey MD    Interval History   Patient still is rather weak.  No clear-cut etiology is MRI brain shows a couple of acute/subacute small infarcts not sure that that is enough to give him his current status but feasible.  C-spine does not show any major stenosis.  His sed rate and CRP are normal also.  Not sure how else to explain his current current progressive weakness.  We will continue to work with him as best we can.  Mental status seems to be doing better.  He will need placement.   is working on that now.    -Data reviewed today: I reviewed all new labs and imaging results over the last 24 hours. I personally reviewed the brain MRI image(s) showing Corpus callosum possible acute infarct and a couple of subacute infarct in the frontal lobe..    Physical Exam   Temp: 97.7  F (36.5  C) Temp src: Tympanic BP: 114/70 Pulse: 89   Resp: 18 SpO2: (!) 91 % O2 Device: None (Room air)    Vitals:    12/04/22 0500 12/08/22 0610   Weight: 51 kg (112 lb 7 oz) 53.5 kg (117 lb 15.1 oz)     Vital Signs with  Ranges  Temp:  [97  F (36.1  C)-99.5  F (37.5  C)] 97.7  F (36.5  C)  Pulse:  [89] 89  Resp:  [16-18] 18  BP: (111-124)/(59-73) 114/70  SpO2:  [91 %-94 %] 91 %  I/O last 3 completed shifts:  In: 1200 [P.O.:1200]  Out: 875 [Urine:875]    Peripheral IV 12/04/22 Anterior;Right Upper forearm (Active)   Site Assessment WDL 12/08/22 0105   Line Status Saline locked 12/08/22 0105   Phlebitis Scale 0-->no symptoms 12/08/22 0105   Infiltration Scale 0 12/08/22 0105   Infiltration Site Treatment Method  None 12/07/22 0900   If infiltrated, was a vesicant infusing? No 12/07/22 0900   Number of days: 4       Pressure Injury 09/20/20 Coccyx Stage 1 (Active)   Estimated Circumference ( if not measured tennis ball sized 11/28/22 2049   Number of days: 809       Wound Arm Skin tear (Active)   Wound Bed Other (Comment) 12/08/22 0105   Dorie-wound Assessment Other (Comment) 12/08/22 0105   Drainage Amount UTV 12/08/22 0105   Drainage Color/Characteristics UTV 12/08/22 0105   Dressing Foam 12/08/22 0105   Dressing Status Clean, dry, intact 12/08/22 0105   Number of days: 5     No line/device    Constitutional: Alert and oriented x2. No distress    HEENT: Normocephalic/atraumatic, PERRL, EOMI, mouth moist, neck supple, no LN.     Cardiovascular: RRR.  No murmur, no  rubs, or gallops.  JVD flat.  Bruits no.  Pulses  2     Respiratory: Clear clear to auscultation bilaterally    Abdomen: Soft, nontender, nondistended, no organomegaly. Bowel sounds present    Extremities: Warm/dry.  No edema    Neuro: Generalized weakness non focal, cranial nerves intact, Moves all extremities.  Medications       amLODIPine  2.5 mg Oral Daily     aspirin  81 mg Oral Daily     atorvastatin  40 mg Oral At Bedtime     diclofenac  2 g Topical 4x Daily     insulin aspart  1-7 Units Subcutaneous TID AC     insulin aspart  1-5 Units Subcutaneous At Bedtime     ipratropium - albuterol 0.5 mg/2.5 mg/3 mL  3 mL Nebulization 4x daily     lisinopril  2.5 mg Oral Daily      OLANZapine zydis  5 mg Oral At Bedtime     sodium chloride (PF)  3 mL Intracatheter Q8H       Data   Recent Labs   Lab 12/08/22  0634 12/08/22  0112 12/07/22 2052 12/06/22  0746 12/06/22  0530 12/03/22  0826 12/03/22  0738 12/02/22  0845 12/02/22  0751   WBC  --   --   --   --  11.1*  --  10.8  --  12.7*   HGB  --   --   --   --  12.7*  --  13.1*  --  12.8*   MCV  --   --   --   --  87  --  86  --  86   PLT  --   --   --   --  299  --  322  --  314   NA  --   --   --   --  131*  --  131*  --  134*   POTASSIUM  --   --   --   --  4.3  --  4.2  --  3.9   CHLORIDE  --   --   --   --  99  --  99  --  100   CO2  --   --   --   --  24  --  23  --  24   BUN  --   --   --   --  14.7  --  14.3  --  20.2   CR  --   --   --   --  0.54*  --  0.57*  --  0.68   ANIONGAP  --   --   --   --  8  --  9  --  10   CARLOS  --   --   --   --  8.1*  --  7.9*  --  8.1*   * 281* 194*   < > 243*   < > 196*   < > 136*    < > = values in this interval not displayed.       No results found for this or any previous visit (from the past 24 hour(s)).

## 2022-12-09 NOTE — PLAN OF CARE
"Pt is alert, intermittently forgetful/confused to time and situation. Reoriented to environment. Pain to bilateral arms treated with  Tylenol. Neuros intact.  A2 to commode, pt is weak and is a heavy assist. . Incontinent of smears of stool, using the urinal with incontinent cares provided.  Diet remains minced and moist with thickened liquids, remains a 1:1 for feeding and aspiration precautions maintained. Remains free of injury, alarms on, and call light within reach.    /84 (BP Location: Left arm, Patient Position: Supine)   Pulse 90   Temp 97.9  F (36.6  C) (Temporal)   Resp 18   Ht 1.753 m (5' 9\")   Wt 53.8 kg (118 lb 9.7 oz)   SpO2 92%   BMI 17.52 kg/m        Face to face report given with opportunity to observe patient.    Report given to JUDD Benitez RN   12/9/2022  0745 AM                   "

## 2022-12-09 NOTE — PLAN OF CARE
Goal Outcome Evaluation:  Patient has been able to sit up in his chair today, and is tolerating the chair well, patient is alert oriented and has denied pain, patient continues to be very weak, patient is requiring assist of two staff using the transfer belt to assist with transfers, patient has been on thickened liquids today for aspiration precautions, patient did have a swallow study done today and it was recommended that patient be NPO,  accu checks are as charted, patient was able to have a large BM today, patient did refuse the Voltaren gel to his shoulders today, vitals as charted.

## 2022-12-09 NOTE — PLAN OF CARE
Goal Outcome Evaluation:MD updated on low heart rate and blood pressure, this writer did ask if we should hold the lisinopril and norvasc, awaiting his return call.

## 2022-12-09 NOTE — PLAN OF CARE
Goal Outcome Evaluation:Face to face report given with opportunity to observe patient.    Report given to Renetta Marino RN   12/9/2022  3:06 PM

## 2022-12-09 NOTE — PROGRESS NOTES
12/09/22 0900   Appointment Info   Signing Clinician's Name / Credentials (OT) Sabinadamari Zaman, OTR/L   Therapeutic Activities   Therapeutic Activity Minutes (15878) 15   Symptoms noted during/after treatment fatigue   Treatment Detail/Skilled Intervention Pt in the chair upon OT arrival, agreeable to tx. He brushed his teeth with his R hand in sitting with set up. Pt required assist to apply toothpaste and hold the spitter cup. His nursing aide reported she already helped him with the remaining ADLs this morning. Pt then participated in UE exercises. He completed active fist pumps, wrist flex/ext, forearm pro/sup, and bicep curls x 10 reps each. Pt had difficulties moving through end range with bicep curls and mild coordination deficits during exercises, but slightly improved compared to yesterday. He also participated in AAROM for shoulder flex to ~* 2x10 and tolerated better today. Pt instructed to hold shoulders at end range but pt unable to without physical assistance. Pt was left resting in the chair with the call light within reach, clip alert attached. Nursing aide close observation.   OT Discharge Planning   OT Plan Progress strength, ADLs, and activity tolerance as able   OT Discharge Recommendation (DC Rec) Transitional Care Facility   OT Rationale for DC Rec Pt currently requiring significant assist for ADLs and would not be safe to return home at this time.   OT Brief overview of current status Pt in the chair upon OT arrival, agreeable to tx. He brushed his teeth with his R hand in sitting with set up. Pt required assist to apply toothpaste and hold the spitter cup. His nursing aide reported she already helped him with the remaining ADLs this morning. Pt then participated in UE exercises. He completed active fist pumps, wrist flex/ext, forearm pro/sup, and bicep curls x 10 reps each. Pt had difficulties moving through end range with bicep curls and mild coordination deficits during exercises,  but slightly improved compared to yesterday. He also participated in AAROM for shoulder flex to ~* 2x10 and tolerated better today. Pt instructed to hold shoulders at end range but pt unable to without physical assistance. Pt was left resting in the chair with the call light within reach, clip alert attached. Nursing aide close observation.   Total Session Time   Timed Code Treatment Minutes 15   Total Session Time (sum of timed and untimed services) 15

## 2022-12-09 NOTE — PROGRESS NOTES
12/09/22 1057   Appointment Info   Signing Clinician's Name / Credentials (PT) Letha Alfredo DPT   Physical Therapy Goals   PT Frequency 6x/week   PT Predicted Duration/Target Date for Goal Attainment 12/21/22   Interventions   Interventions Quick Adds Therapeutic Activity;Therapeutic Procedure   Therapeutic Procedure/Exercise   Ther. Procedure: strength, endurance, ROM, flexibillity Minutes (54590) 10   Symptoms Noted During/After Treatment fatigue   Treatment Detail/Skilled Intervention Pt participated in bilateral LE seated therex: hip flex x20 with cues for slow eccentric control and impaired coordination noted, LAQ with cues to work through full end range knee extension x20, again noted difficulty with coordination and NM control, toe tapping with significant difficulty coordinating tapping bilaterally, iso hip add x20, iso hip abd x20 with significant cues needed to get proper technique and muscle firing started.   Therapeutic Activity   Therapeutic Activities: dynamic activities to improve functional performance Minutes (96617) 13   Symptoms Noted During/After Treatment Fatigue   Treatment Detail/Skilled Intervention Pt up in chair upon PT arrival.  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  Pt cued to lean forward to place belt, pt requires mod A to complete due to weakness.  Pt transferred sit>stand with mod A, once in standing pt demonstrates poor trunk and hip control and coordination to maintain standing, hips rock ant/post and circular at times, places feet in scissor stance and provided cues to correct.  Decreased coordination for placement of feet.  Worked on 2-3 small steps forward/backward with mod A of 2, at times LEs will begin to buckle and needs cues to stand up tall.  Decreased trunk control present.  Pt tolerated standing approx 1-2 minutes then required rest break.  Repeated this sequence 2 additional times with pt tolerating around 1-2 minutes with mod A of 2 while working on standing.  Pt  left sitting up in chair with clip alarm on and call light in reach.   PT Discharge Planning   PT Plan Increase back to 6x/week due to new CVA findings and inpatient status change.  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  Pt continues to have significant NM control and coordination deficits with trunk and LEs limiting his ability to complete functional mobility.  Requires mod A of 2 for standing and simple pivot, not safe for ambulation yet at this time.  Will increase PT services back up to 6x/week.  Pt would benefit from rehab however likely will need long term care placements based on the limited progress that has been made with PT in this setting.   Total Session Time   Timed Code Treatment Minutes 23   Total Session Time (sum of timed and untimed services) 23

## 2022-12-09 NOTE — PROGRESS NOTES
Care Transitions focused note:      [facility]:  No beds available    [facility]:  No LTC beds available but are screening    [facility]: LVM    [facility]: [facility]:  [facility]: No beds available    [facility]: referral Faxed    Muna Basurto CM

## 2022-12-09 NOTE — PROGRESS NOTES
[facility]    Hospitalist Progress Note      Fall, initial encounter: Several falls at home. He does not recall the falls but family has needed to assist him. PT/OT evaluation for placement.  He has had a fall in the ER and is just getting weaker and weaker basically PT has seen him and he has a lot of just truncal issues unable to sit up straight etc. she has had really high risk for further falls.  Seemingly is getting somewhat worse over time.  He did have a CT scan of his head on the fourth.  We will consider repeat CT scan and order an MRI scan to just assess his issues further.  But at this point there is no way that he would be able to return to independent living status.  Examining him today he is able to lift his legs up off the bed he has equal but decreased hand grasps can not really lift his arms above the bed very well at all.  This is definitely different issues and has had in the past.  I will check a CK level.  Also because of his numerous falls do an MRI of his brain and C-spine and actually thoracic spine also.  -12/8: CK was low at 25.  MRI scan of C-spine does show some areas of compression mild to moderate but not severe enough to be causing his current symptoms.  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  Also couple punctate enhancing foci in the frontal lobes likely representing some subacute infarct.  Not really sure these lesions have anything to do with his current status and symptoms.  He does not seem to have any focal neurological deficits.  -12/9: Weakness is unclear etiology.  The findings on his MRI of his brain potentially could have some thing to do with this.  He is on a baby aspirin daily we will start some Plavix for now.  We will at minimum do some carotid ultrasounds.  Continue to work with physical therapy occupational therapy.  Will need placement.  They are keeping a close eye on him.   At times he does attempt to get out of chair.  Has not any recent falls.         Oropharyngeal dysphagia: Previously diagnosed. He was seen by speech therapy on 12/2 with recommendations of soft/bite sized and thin liquids, however he did have evidence of significant aspiration this morning. Will place on pureed and moderate liquids (speech unavailable to re-eval x2 days) with 1:1 and upright in chair for all meals. Bedside staff did report he was eating/drinking very fast and impulsively.   -12/4: No coughing or signs of aspiration on pureed and mildly thick liquids.   -12/5: Speech re-eval today   -12/6: Speech increased diet to soft/bite sized today. He has been very awake and alert over the last 2-3 days.   -12/7: Tolerating his diet without much issue per nursing staff.  -12/9: No big issues in terms of diet no evidence of aspiration.  However speech path did come by and said they now have some concerns regarding thin liquids.  They are attempting at doing a video swallow today to see if there are issues.  SLP evaluation showed that he had significant aspiration on the video swallow.  He is now NPO.  We will need to think about further feeding we will need to start some IV fluids.  Also trying to figure out why this is all occurring.  Whether not this is related to some sort of demyelinating process etc.          Neurocognitive disorder: Chronic, making taking care of himself increasingly difficult, high risk for falls.        Hyponatremia: Chronic, long standing over the last year or so, stable at his baseline.  Was 131 today we will recheck tomorrow.  -12/9: His serum sodium has been stable 132.  Normal renal function.  Rest of electrolytes are stable also.       COPD (chronic obstructive pulmonary disease) (H): No signs of acute exacerbation. He has not had any hypoxia, persistent cough. He did have some leukocytosis on arrival, however this improved without intervention.  O2 sat stable on room air.  Lungs  are clear  Room air sats stable 92%.       Type 2 diabetes mellitus, with long-term current use of insulin (H): On Lantus 8u daily, glucose running 190-200's. On restricted diet due to dysphagia will hold off on increasing at this time.   -12/4: Switch supplement to Glucerna  -12/6: Sugars 190 mid 200s will increase Lantus somewhat.  No episodes of hypoglycemia.  -12/8: Sugars still 180-280.  Apparently he is not on Lantus at this point it was discontinued at some point we will restart him on 6 units tonight.    -12/9: Sugars still in the 200 range this morning 209 we did start his Lantus last evening.  May require increased dose we will see how he does today.        Bilateral shoulder pain: Mild, heat not helping, no prior history. Will try diclofenac gel.  Not sure this is pain he just states this week when I ask him if they hurt he said no.  We will work-up his weakness with a MRI scan.          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  He had a heart catheterization performed which showed three-vessel coronary disease with an occluded distal RCA D1 stenosis and circumflex with prior stent without significant residual stenosis.  Did not feel any interventions were warranted.  The initially recommended transfer the emergency [place] for surgical repair of this.  However the patient refused discharged home and came back in with multiple complaints of just weakness unable to care for himself.  They were trying to transfer him down [facility] however no beds became available.  And at this time this is put on hold after the family is discussion and processes that unless he physically improves they would not want to intervene and put him through a large surgical procedure.    Diet: Snacks/Supplements Adult: Glucerna; With Meals  NPO for Medical/Clinical Reasons Except for: No  Exceptions  Fluids: None    DVT Prophylaxis: Pneumatic Compression Devices  Code Status: Full Code    Disposition: Expected discharge when accepting facility is found  Leo Mackey MD    Interval History   Patient was alert and appropriate had a good conversation with him denies any current shortness of breath nausea vomiting or chest pain.  Shoulders are sore he states in always has been very weak and seemingly has gotten worse and worse with time.  He is not sure that is really going to improve at all.  Started him on some Plavix.  He has has had a couple small strokes I did inform him of that he says he has had multiple small ones in the past also.  Realizes he cannot return home.  They were searching for potential place for rehab.    -Data reviewed today: I reviewed all new labs and imaging results over the last 24 hours. I personally reviewed no images or EKG's today.    Physical Exam   Temp: 99  F (37.2  C) Temp src: Temporal BP: 111/52 Pulse: (!) 49   Resp: 20 SpO2: 93 % O2 Device: None (Room air)    Vitals:    12/04/22 0500 12/08/22 0610 12/09/22 0406   Weight: 51 kg (112 lb 7 oz) 53.5 kg (117 lb 15.1 oz) 53.8 kg (118 lb 9.7 oz)     Vital Signs with Ranges  Temp:  [97.5  F (36.4  C)-99  F (37.2  C)] 99  F (37.2  C)  Pulse:  [49-90] 49  Resp:  [16-20] 20  BP: (110-149)/(52-84) 111/52  SpO2:  [92 %-94 %] 93 %  I/O last 3 completed shifts:  In: 1410 [P.O.:1410]  Out: 875 [Urine:875]    Peripheral IV 12/04/22 Anterior;Right Upper forearm (Active)   Site Assessment WDL 12/09/22 0000   Line Status Saline locked 12/09/22 0000   Phlebitis Scale 0-->no symptoms 12/09/22 0000   Infiltration Scale 0 12/09/22 0000   Infiltration Site Treatment Method  None 12/07/22 0900   If infiltrated, was a vesicant infusing? No 12/07/22 0900   Number of days: 5       Pressure Injury 09/20/20 Coccyx Stage 1 (Active)   Estimated Circumference ( if not measured tennis ball sized 11/28/22 2049   Number of days: 810       Wound Arm  Skin tear (Active)   Wound Bed Pink;Red 12/08/22 1630   Dorie-wound Assessment Ecchymosis 12/08/22 1630   Drainage Amount None 12/08/22 1630   Drainage Color/Characteristics Serous 12/08/22 0858   Dressing Other (Comment) 12/08/22 1630   Dressing Status Clean, dry, intact 12/08/22 1630   Number of days: 6     No line/device    Constitutional: Alert and oriented x3. No distress    HEENT: Normocephalic/atraumatic, PERRL, EOMI, mouth moist, neck supple, no LN.     Cardiovascular: RRR. no Murmur, no  rubs, or gallops.  JVD no.  Bruits no.  Pulses 2    Respiratory: Clear to auscultation bilaterally    Abdomen: Soft, nontender, nondistended, no organomegaly. Bowel sounds present    Extremities: Warm/dry. No edema    Neuro:   Generalized weakness throughout.  Non focal, cranial nerves intact, Moves all extremities.  Medications       amLODIPine  2.5 mg Oral Daily     aspirin  81 mg Oral Daily     atorvastatin  40 mg Oral At Bedtime     clopidogrel  75 mg Oral Daily     diclofenac  2 g Topical 4x Daily     insulin aspart  1-7 Units Subcutaneous TID AC     insulin aspart  1-5 Units Subcutaneous At Bedtime     lisinopril  2.5 mg Oral Daily     OLANZapine zydis  5 mg Oral At Bedtime     sodium chloride (PF)  3 mL Intracatheter Q8H     sodium chloride (PF)  5 mL Intravenous Q8H       Data   Recent Labs   Lab 12/09/22  1222 12/09/22  0743 12/08/22  2144 12/08/22  0634 12/08/22  0522 12/06/22  0746 12/06/22  0530 12/03/22  0826 12/03/22  0738   WBC  --   --   --   --  9.3  --  11.1*  --  10.8   HGB  --   --   --   --  12.4*  --  12.7*  --  13.1*   MCV  --   --   --   --  88  --  87  --  86   PLT  --   --   --   --  281  --  299  --  322   NA  --   --   --   --  132*  --  131*  --  131*   POTASSIUM  --   --   --   --  4.3  --  4.3  --  4.2   CHLORIDE  --   --   --   --  97*  --  99  --  99   CO2  --   --   --   --  22  --  24  --  23   BUN  --   --   --   --  19.5  --  14.7  --  14.3   CR  --   --   --   --  0.63*  --  0.54*  --   0.57*   ANIONGAP  --   --   --   --  13  --  8  --  9   CARLOS  --   --   --   --  8.0*  --  8.1*  --  7.9*   * 191* 209*   < > 196*   < > 243*   < > 196*   ALBUMIN  --   --   --   --  2.8*  --   --   --   --    PROTTOTAL  --   --   --   --  6.1*  --   --   --   --    BILITOTAL  --   --   --   --  0.3  --   --   --   --    ALKPHOS  --   --   --   --  100  --   --   --   --    ALT  --   --   --   --  18  --   --   --   --    AST  --   --   --   --  24  --   --   --   --     < > = values in this interval not displayed.       No results found for this or any previous visit (from the past 24 hour(s)).

## 2022-12-09 NOTE — PROGRESS NOTES
12/09/22 1300   Appointment Info   Signing Clinician's Name / Credentials (SLP) Joan Stratton MA, CF-SLP   General Information   Onset of Illness/Injury or Date of Surgery 11/28/22   Referring Physician Gregor Campos MD   Patient/Family Therapy Goal Statement (SLP) None stated.   Pertinent History of Current Problem Pt is a 82 year old male who has been in med surg for few days. Patient was seen by SLP first for clinical swallow treatment but was scheduled for video swallow study due to concerns of silent aspiration.   General Observations Alert, awake and able to participate in conversation appropriately.       Present no   Type of Evaluation   Type of Evaluation Swallow Evaluation   General Swallowing Observations   Past History of Dysphagia Patient states no past history of dysphagia. He has been seen by SLP since he was brought to the ED due to aspiration concerns   Respiratory Support (General Swallowing Observations) none   Current Diet/Method of Nutritional Intake (General Swallowing Observations, NIS) mildly thick liquids (level 2);minced & moist (level 5)   Swallowing Evaluation Videofluoroscopic swallow study (VFSS)   Clinical Swallow Evaluation   Feeding Assistance dependent   VFSS Evaluation   Radiologist Dr. Ruiz   Views Taken right lateral   Physical Location of Procedure Diagnostic Imaging   VFSS Textures Trialed thin liquids;pureed   VFSS Eval: Thin Liquid Texture Trial   Mode of Presentation, Thin Liquid straw;fed by clinician   Order of Presentation 1   Preparatory Phase poor bolus control   Oral Phase, Thin Liquid residue in oral cavity;premature pharyngeal entry   Bolus Location When Swallow Triggered valleculae;posterior laryngeal surface of epiglottis   Pharyngeal Phase, Thin Liquid impaired hyolaryngeal excursion;impaired epiglottic movement;impaired tongue base retraction;pharyngeal wall coating;residue in vallecula;residue in pyriform sinus   Dane  Penetration Aspiration Scale: Thin Liquid Trial Results 8 - contrast passes glottis, visible subglottic residue remains, absent patient response (aspiration)   Response to Aspiration absent response   Strategies and Compensations hard swallow   Diagnostic Statement Patient trialed 1 oz of thin liquids. Patient with poor bolus control in oral phase of mouth. Oral residue and premature spillage into pharynx noted. Bolus in vallecula and posterior laryngeal surface of epiglottis when swallow triggered. Incomplete epiglottic inversion and reduced hyolaryngeal elevation and excursion observed, resulting in over ~80% of the bolus remaining in the vallecula, along the posterior pharyngeal wall, and pyriform sinuses. SILENT aspiration occurred with no resposne. SLP cued cough which was unable to clear bolus from airway. SLP cued hard swallows to attempt to clear bolus, which was unsuccessful.   VFSS Evaluation: Puree Solid Texture Trial   Mode of Presentation, Puree spoon;fed by clinician   Order of Presentation 2   Preparatory Phase prolonged bolus preparation;poor bolus control   Oral Phase, Puree residue in oral cavity;premature pharyngeal entry   Bolus Location When Swallow Triggered valleculae   Pharyngeal Phase, Puree impaired hyolaryngel excursion;impaired epiglottic movement;impaired tongue base retraction;pharyngeal wall coating;residue in vallecula;residue in pyriform sinus   Rosenbek's Penetration Aspiration Scale: Puree Food Trial Results 1 - no aspiration, contrast does not enter airway   Strategies and Compensations double swallow;hard swallow   Diagnostic Statement Patient trialed 1 bite of pureed textures. Patient with poor bolus control in oral phase of mouth. Oral residue and premature spillage into pharynx noted. Bolus in vallecula and posterior laryngeal surface of epiglottis when swallow triggered. Incomplete epiglottic inversion and reduced hyolaryngeal elevation and excursion observed, resulting in  over ~80% of the bolus remaining in the vallecula, along the posterior pharyngeal wall, and pyriform sinuses. No aspiration/penetration observed from pureed texutre, however significant amount of residue noted throughout pharynx.   SLP cued hard swallows to attempt to clear bolus, which was unsuccessful.   Esophageal Phase of Swallow   Patient reports or presents with symptoms of esophageal dysphagia No   Esophageal sweep performed during today s vidofluoroscopic exam  No   Swallowing Recommendations   Diet Consistency Recommendations NPO;consider alternative means of nutrition   Medication Administration Recommendations, Swallowing (SLP) Alternative means of medication.   General Therapy Interventions   Planned Therapy Interventions Dysphagia Treatment   Dysphagia treatment Oropharyngeal exercise training   Clinical Impression   Criteria for Skilled Therapeutic Interventions Met (SLP Eval) Yes, treatment indicated   SLP Diagnosis Dysphagia   Functional Limitations Related to Problem List (SLP) Patient currently cannot safely tolerate any PO at this time, which is not baseline.   Risks & Benefits of therapy have been explained evaluation/treatment results reviewed;care plan/treatment goals reviewed;risks/benefits reviewed;current/potential barriers reviewed;participants voiced agreement with care plan;participants included;patient   Clinical Impression Comments Patient seen this PM for video swallow study. Pt trailed thin liquids and pureed textures. Oral residue and premature spillage into pharynx noted. Bolus in vallecula and posterior laryngeal surface of epiglottis when swallow triggered. Incomplete epiglottic inversion and reduced hyolaryngeal elevation and excursion observed, resulting in over ~80% of the bolus remaining in the vallecula, along the posterior pharyngeal wall, and pyriform sinuses. SILENT aspiration occurred in thin liquids with no repsonse. Patient was unable to clear aspirated materials from  airway when SLP cued patient to cough. No aspiration/penetration occurred with pureed textures, howver majority of bolus remained in pharynx after swallowing occurred. Effortful swallows and double swallows did not clear bolus. This places patient at risk of aspiration. Due to silent aspiration and patient's inability to clear bolus, SLP recommends NPO diet with alternative means of hydration, nutrition, and medication.   SLP Total Evaluation Time   Evaluation, videofluoroscopic eval of swallow function Minutes (21233) 10   SLP Goals   Therapy Frequency (SLP Eval) 5 times/wk   SLP Predicted Duration/Target Date for Goal Attainment 12/16/22   SLP Goals Swallow   SLP: Safely tolerate diet without signs/symptoms of aspiration One P.O. texture   SLP Discharge Planning   SLP Plan Implement oropharyngeal exercises to increase tolerance of one PO texture/viscosity.   SLP Discharge Recommendation Transitional Care Facility   SLP Rationale for DC Rec Patient is currently unable to tolerate any PO at this time and SLP recommends diet of NPO with alternative means of hydration, nutrition, and medication.   SLP Brief overview of current status  Patient seen this PM for video swallow study. SLP recommends NPO diet due to silent aspiration occurance and patient's inability to clear bolus from pharynx.   Total Session Time   Total Session Time (sum of timed and untimed services) 10

## 2022-12-09 NOTE — PROGRESS NOTES
Care Transitions focused note:      LTC application emailed to son Lucas who is also the POA and HCA. His uncle Nicolas will bring in those documents on Monday.    Muna Basurto CM

## 2022-12-09 NOTE — PLAN OF CARE
Pt is alert, intermittently forgetful/confused to time and situation. Reoriented to environment. Neuros intact except for increasing generalized weakness. Pt is able to pivot to the chair/commode but is very uncoordinated on his feet, assist of 2 with walker. Rest of assessment as charted. MRI of brain completed this am, see labs, and no new orders placed following MRI. Incontinent of smears of stool, using the urinal and up to the commode this shift. Diet remains minced and moist with thickened liquids, remains a 1:1 for feeding and aspiration precautions maintained. Foam dressing dressing changed to skin tear on arm. Remains free of injury, alarms on, and call light within reach. Face to face report given with opportunity to observe patient.    Report given to JUDD Guadalupe RN   12/8/2022  6:53 PM

## 2022-12-09 NOTE — PLAN OF CARE
Goal Outcome Evaluation:MD updated on B/P and H/R ok to give the Norvasc and lisinopril.

## 2022-12-09 NOTE — PLAN OF CARE
"Goal Outcome Evaluation: Not Progressing     Reason for hospital stay:  Fall, Initial encounter   Living situation PTA: Lives at home by himself.   Most recent vitals: /55 (BP Location: Right arm, Patient Position: Chair, Cuff Size: Adult Small)   Pulse 68   Temp 97.8  F (36.6  C) (Tympanic)   Resp 18   Ht 1.753 m (5' 9\")   Wt 53.8 kg (118 lb 9.7 oz)   SpO2 97%   BMI 17.52 kg/m      Pain Management:  Pt did say he had some pain in his left arm/outer shoulder area. Voltaren gel was applied. Pt did have relief from this cream.   LOC:  A&O x 4.   Cardiac:  Apical pulse regular.   Respiratory:  Room air. All fields clear, diminished. Infrequent, loose, productive cough.   GI:  All quadrants audible and normoactive.   :  Incontinent of urine and stool   Skin Issues:  Skin is warm, dry, and pale. Scattered bruises and scabs.     IVF: NS @ 75 mL/hr. IV access: right lower forearm. IV infusing well.   ABX:  None at this time.     Nutrition: NPO except for ice chips and medication.   ADL's:  Up in the chair on and off throughout the shift.   Ambulation: Heavy assist x 2 with use of gait belt.   Safety:  Bed in the low position, call light within reach, ID band in place, personal items within reach, free from falls, use of call light appropriately, and makes needs known.     Comments: VSS. Afebrile. Face to face report given with opportunity to observe patient.    Report given to JUDD Lane RN   12/9/2022  8:05 PM                             "

## 2022-12-09 NOTE — PROGRESS NOTES
"CLINICAL NUTRITION SERVICES  -  REASSESSMENT NOTE    Neel Kerr      82 yom admitted with failure to thrive.  Hx noted to include:  Pseudoaneurysm left ventricle, COPD, T2DM, CVA. Last A1C noted  Was 12.5 1/4/22. BG around 200. Underweight. Weight loss of 12lbs in the last 11 months. Usual weight around 120-125lb in 2021. Difficulty swallowing- assessed by SLP and is on an altered textured diet with thickened liquids. Pt needing assistance with feeding. Intake has been sub-optimal- small meals ordered. Glucerna ordered.     Diet Order: Dysphagia minced and moist (level 5), Mildly thick liquids (level 2)  Intake: 17 meals with % intake    Height: 5' 9\"  Weight: 118 lbs 9.72 oz  Body mass index is 17.52 kg/m .  Weight Status:  Underweight BMI <18.5  Weight History:   Wt Readings from Last 10 Encounters:   12/09/22 53.8 kg (118 lb 9.7 oz)   11/21/22 51.2 kg (112 lb 14 oz)   01/13/22 60.3 kg (133 lb)   01/06/22 56.4 kg (124 lb 6.4 oz)   08/12/21 55.3 kg (122 lb)   08/03/21 55.7 kg (122 lb 12.8 oz)   08/03/21 55.8 kg (123 lb)   07/28/21 56.7 kg (125 lb 1.6 oz)   07/23/21 56.3 kg (124 lb 3.2 oz)   07/22/21 56.7 kg (125 lb)        Weight used to calculate needs:  Estimated nutritional needs are 6242-4238 calories (30-35 kcal/kg), 61-77 gm protein (1.2-1.5 gm/kg).      MALNUTRITION:  % Weight Loss:  Weight loss does not meet criteria for malnutrition  % Intake:  <75% for >/= 1 month (moderate malnutrition)  Muscle and Subcutaneous Fat Loss: mild,     Malnutrition Diagnosis: Moderate malnutrition  In Context of:  Chronic illness or disease    NUTRITION DIAGNOSIS:  Malnutrition related to reduced intake, swallowing difficulties as evidenced by weight trending down, underweight, some loss of muscle, subcutaneous fat    NUTRITION RECOMMENDATIONS  - Continue to assist and encourage intake    MONITORING AND EVALUATION:  RD will monitor intake, weight, labs      "

## 2022-12-09 NOTE — PROGRESS NOTES
Patient seen this AM for skilled PO trials. During trials, no overt signs/symptoms of aspiration/penetration observed until end of trails when wet voicing occurred. SLP recommends completing video swallow study to rule out silent aspiration and assess safest/least restrictive diet. SLP updated hospitalist on plan. Video swallow scheduled for 1300 today (12/9/22).

## 2022-12-10 NOTE — PROGRESS NOTES
12/10/22 1140   Appointment Info   Signing Clinician's Name / Credentials (PT) Ryanne Alston PTA   PT Assistant Visit Number 1   Interventions   Interventions Quick Adds Therapeutic Activity;Therapeutic Procedure   Therapeutic Procedure/Exercise   Ther. Procedure: strength, endurance, ROM, flexibillity Minutes (72768) 10   Symptoms Noted During/After Treatment fatigue   Treatment Detail/Skilled Intervention Seated bilateral L/E exercises 2x10 reps: LAQ'S, marching, heel toe raises, Iso abd, Iso add, glut sets.   Therapeutic Activity   Therapeutic Activities: dynamic activities to improve functional performance Minutes (77312) 15   Symptoms Noted During/After Treatment Fatigue   Treatment Detail/Skilled Intervention Pt was 1;1 was up in the chair upon arrival. Pt did attempt to stand 3 times walked 6 feet and 3 feet with FWW mod A2 NBOS scissor gait pattern. sit to stand was mod A2 very shakey unsteady weakness on the right side.   PT Discharge Planning   PT Plan Increase back to 6x/week due to new CVA findings and inpatient status change.  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  Pt continues to have significant NM control and coordination deficits with trunk and LEs limiting his ability to complete functional mobility.  Requires mod A of 2 for standing and simple pivot, not safe for ambulation yet at this time.  Will increase PT services back up to 6x/week.  Pt would benefit from rehab however likely will need long term care placements based on the limited progress that has been made with PT in this setting.   PT Brief overview of current status see as above

## 2022-12-10 NOTE — PLAN OF CARE
Pt alert, disorientated to time, and date. Pt afebrile. Pulse lacho 40's-50's this shift.  Pt remains on room air, O2 in low 90's. Lung sounds dim. Infrequent congested cough.  Pt NPO this shift. Oral medications not given this shift as pt NPO with no expections. Provider notified medications not given.   NS running 75 mL/hr via peripheral IV.   Foam dressing remains in place to L fore arm, clean dry and intact.   Pt heavy assist of 2 to pivot transfer from bed and/or chair this shift with gait belt and walker. Pt free from falls and/or injuries this shift. Alarms remain set, as pt attempts several times to get out of bed by self.     Face to face report given with opportunity to observe patient.    Report given to JUDD Nunez.    Maris Luna RN   12/10/2022  7:47 AM

## 2022-12-10 NOTE — PROGRESS NOTES
[facility]    Hospitalist Progress Note      Assessment & Plan       Fall, initial encounter/weakness: Several falls at home. He does not recall the falls but family has needed to assist him. PT/OT evaluation for placement.  He has had a fall in the ER and is just getting weaker and weaker basically PT has seen him and he has a lot of just truncal issues unable to sit up straight etc. she has had really high risk for further falls.  Seemingly is getting somewhat worse over time.  He did have a CT scan of his head on the fourth.  We will consider repeat CT scan and order an MRI scan to just assess his issues further.  But at this point there is no way that he would be able to return to independent living status.  Examining him today he is able to lift his legs up off the bed he has equal but decreased hand grasps can not really lift his arms above the bed very well at all.  This is definitely different issues and has had in the past.  I will check a CK level.  Also because of his numerous falls do an MRI of his brain and C-spine and actually thoracic spine also.  -12/8: CK was low at 25.  MRI scan of C-spine does show some areas of compression mild to moderate but not severe enough to be causing his current symptoms.  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  Also couple punctate enhancing foci in the frontal lobes likely representing some subacute infarct.  Not really sure these lesions have anything to do with his current status and symptoms.  He does not seem to have any focal neurological deficits.  -12/9: Weakness is unclear etiology.  The findings on his MRI of his brain potentially could have some thing to do with this.  He is on a baby aspirin daily we will start some Plavix for now.  We will at minimum do some carotid ultrasounds.  Continue to work with physical therapy occupational therapy.  Will need placement.  They  are keeping a close eye on him.  At times he does attempt to get out of chair.  Has not any recent falls.  -12/10: Still remains really weak.  Able to get him up in the chair.  Minimal help.  His strength is decreased upper and lower but equal.  Shoulders are very weak.  Seems to have more sensory issues with his right lower leg.          Oropharyngeal dysphagia: Previously diagnosed. He was seen by speech therapy on 12/2 with recommendations of soft/bite sized and thin liquids, however he did have evidence of significant aspiration this morning. Will place on pureed and moderate liquids (speech unavailable to re-eval x2 days) with 1:1 and upright in chair for all meals. Bedside staff did report he was eating/drinking very fast and impulsively.   -12/4: No coughing or signs of aspiration on pureed and mildly thick liquids.   -12/5: Speech re-eval today   -12/6: Speech increased diet to soft/bite sized today. He has been very awake and alert over the last 2-3 days.   -12/7: Tolerating his diet without much issue per nursing staff.  -12/9: No big issues in terms of diet no evidence of aspiration.  However speech path did come by and said they now have some concerns regarding thin liquids.  They are attempting at doing a video swallow today to see if there are issues.  SLP evaluation showed that he had significant aspiration on the video swallow.  He is now NPO.  We will need to think about further feeding we will need to start some IV fluids.  Also trying to figure out why this is all occurring.  Whether not this is related to some sort of demyelinating process etc.  -12/10: Not quite sure what to make of his no difficulty swallowing.  We will reevaluate Monday with speech if no improvement then need to strongly consider PEG tube.  Did discuss with the patient.  All of his medications be on hold orally currently is pressures and heart rate are fine.       Neurocognitive disorder: Chronic, making taking care of himself  increasingly difficult, high risk for falls.        Hyponatremia: Chronic, long standing over the last year or so, stable at his baseline.  Was 131 today we will recheck tomorrow.  -12/9: His serum sodium has been stable 132.  Normal renal function.  Rest of electrolytes are stable also.       COPD (chronic obstructive pulmonary disease) (H): No signs of acute exacerbation. He has not had any hypoxia, persistent cough. He did have some leukocytosis on arrival, however this improved without intervention.  O2 sat stable on room air.  Lungs are clear  Room air sats stable 92%.       Type 2 diabetes mellitus, with long-term current use of insulin (H): On Lantus 8u daily, glucose running 190-200's. On restricted diet due to dysphagia will hold off on increasing at this time.   -12/4: Switch supplement to Glucerna  -12/6: Sugars 190 mid 200s will increase Lantus somewhat.  No episodes of hypoglycemia.  -12/8: Sugars still 180-280.  Apparently he is not on Lantus at this point it was discontinued at some point we will restart him on 6 units tonight.     -12/9: Sugars still in the 200 range this morning 209 we did start his Lantus last evening.  May require increased dose we will see how he does today.          Bilateral shoulder pain: Mild, heat not helping, no prior history. Will try diclofenac gel.  Not sure this is pain he just states this week when I ask him if they hurt he said no.  We will work-up his weakness with a MRI scan.          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  He had a heart catheterization performed which showed three-vessel coronary disease with an occluded distal RCA D1 stenosis and circumflex with prior stent without significant residual stenosis.  Did not feel any interventions were warranted.  The initially recommended transfer the emergency [place] for surgical repair of  this.  However the patient refused discharged home and came back in with multiple complaints of just weakness unable to care for himself.  They were trying to transfer him down [facility] however no beds became available.  And at this time this is put on hold after the family is discussion and processes that unless he physically improves they would not want to intervene and put him through a large surgical procedure.         Diet: Snacks/Supplements Adult: Glucerna; With Meals  NPO for Medical/Clinical Reasons Except for: No Exceptions  Fluids: 75 cc an hour    DVT Prophylaxis: Pneumatic Compression Devices  Code Status: Full Code     Disposition: Expected discharge unclear  Leo Mackey MD    Interval History   Patient alert pleasant no real issues denies any chest pain shortness of breath nausea vomiting abdominal pain.  She is extremely weak failed the swallowing study yesterday is now NPO.  All of his medications are on hold.  Discussed with him he states he is not really had any issues in the past.  We discussed possible PEG tube depending on how he does over the next couple of days.    -Data reviewed today: I reviewed all new labs and imaging results over the last 24 hours. I personally reviewed no images or EKG's today.    Physical Exam   Temp: 98.6  F (37  C) Temp src: Temporal BP: 109/54 Pulse: (!) 47   Resp: 24 SpO2: 95 % O2 Device: None (Room air)    Vitals:    12/08/22 0610 12/09/22 0406 12/10/22 0453   Weight: 53.5 kg (117 lb 15.1 oz) 53.8 kg (118 lb 9.7 oz) 54 kg (119 lb)     Vital Signs with Ranges  Temp:  [97.8  F (36.6  C)-98.6  F (37  C)] 98.6  F (37  C)  Pulse:  [47-68] 47  Resp:  [18-24] 24  BP: (109-149)/(54-72) 109/54  SpO2:  [90 %-97 %] 95 %  I/O last 3 completed shifts:  In: 712 [I.V.:712]  Out: 900 [Urine:900]    Peripheral IV 12/04/22 Anterior;Right Upper forearm (Active)   Site Assessment WDL 12/10/22 1100   Line Status Infusing;Checked every 1-2 hour 12/10/22 1100    Phlebitis Scale 0-->no symptoms 12/10/22 1100   Infiltration Scale 0 12/10/22 1100   Infiltration Site Treatment Method  None 12/09/22 1630   If infiltrated, was a vesicant infusing? No 12/09/22 1630   Number of days: 6       Pressure Injury 09/20/20 Coccyx Stage 1 (Active)   Estimated Circumference ( if not measured tennis ball sized 11/28/22 2049   Number of days: 811       Wound Arm Skin tear (Active)   Wound Bed Other (Comment) 12/10/22 0724   Dorie-wound Assessment Ecchymosis 12/08/22 1630   Drainage Amount UTV 12/10/22 0724   Drainage Color/Characteristics Serous 12/08/22 0858   Dressing Foam 12/10/22 0724   Dressing Status Clean, dry, intact 12/10/22 0724   Number of days: 7     No line/device    Constitutional: Alert and oriented x3. No distress    HEENT: Normocephalic/atraumatic, PERRL, EOMI, mouth dry, neck supple, no LN.     Cardiovascular: RRR.   Murmur, no  rubs, or gallops.  JVD no.  Bruits no.  Pulses 2    Respiratory: Clear to auscultation bilaterally    Abdomen: Soft, nontender, nondistended, no organomegaly. Bowel sounds present    Extremities: Warm/dry.  No edema    Neuro: Generalized weakness for out of 5 upper and lower.  Decreased sensation seemingly right lower extremity below the knee.  Non focal, cranial nerves intact, Moves all extremities.  Medications     sodium chloride 75 mL/hr at 12/10/22 0429       [Held by provider] amLODIPine  2.5 mg Oral Daily     [Held by provider] aspirin  81 mg Oral Daily     [Held by provider] atorvastatin  40 mg Oral At Bedtime     [Held by provider] clopidogrel  75 mg Oral Daily     diclofenac  2 g Topical 4x Daily     insulin aspart  1-7 Units Subcutaneous TID AC     insulin aspart  1-5 Units Subcutaneous At Bedtime     [Held by provider] lisinopril  2.5 mg Oral Daily     [Held by provider] OLANZapine zydis  5 mg Oral At Bedtime     sodium chloride (PF)  3 mL Intracatheter Q8H     sodium chloride (PF)  5 mL Intravenous Q8H       Data   Recent Labs   Lab  12/10/22  1146 12/10/22  0732 12/10/22  0119 12/08/22  0634 12/08/22  0522 12/06/22  0746 12/06/22  0530   WBC  --   --   --   --  9.3  --  11.1*   HGB  --   --   --   --  12.4*  --  12.7*   MCV  --   --   --   --  88  --  87   PLT  --   --   --   --  281  --  299   NA  --   --   --   --  132*  --  131*   POTASSIUM  --   --   --   --  4.3  --  4.3   CHLORIDE  --   --   --   --  97*  --  99   CO2  --   --   --   --  22  --  24   BUN  --   --   --   --  19.5  --  14.7   CR  --   --   --   --  0.63*  --  0.54*   ANIONGAP  --   --   --   --  13  --  8   CARLOS  --   --   --   --  8.0*  --  8.1*   * 157* 139*   < > 196*   < > 243*   ALBUMIN  --   --   --   --  2.8*  --   --    PROTTOTAL  --   --   --   --  6.1*  --   --    BILITOTAL  --   --   --   --  0.3  --   --    ALKPHOS  --   --   --   --  100  --   --    ALT  --   --   --   --  18  --   --    AST  --   --   --   --  24  --   --     < > = values in this interval not displayed.       No results found for this or any previous visit (from the past 24 hour(s)).

## 2022-12-11 NOTE — PLAN OF CARE
"Most recent vitals: /83 (BP Location: Left arm)   Pulse 76   Temp 97.2  F (36.2  C) (Tympanic)   Resp 22   Ht 1.753 m (5' 9\")   Wt 53.8 kg (118 lb 9.7 oz)   SpO2 92%   BMI 17.52 kg/m       Pt remains disoriented to time/situation. VSS, afebrile. Denies pain. Pt incontinent of urine this shift, no BM. Foam dressing remains in place to coccyx. IV infusing NS at 75 mL/hr. Remains NPO. , 109. Call light within reach, bed alarm active. Free from falls.     Face to face report given with opportunity to observe patient.    Report given to Mayra Hinojosa RN   12/11/2022  8:38 AM        "

## 2022-12-11 NOTE — PROGRESS NOTES
[facility]    Hospitalist Progress Note      Assessment & Plan    Fall, initial encounter/weakness: Several falls at home. He does not recall the falls but family has needed to assist him. PT/OT evaluation for placement.  He has had a fall in the ER and is just getting weaker and weaker basically PT has seen him and he has a lot of just truncal issues unable to sit up straight etc. she has had really high risk for further falls.  Seemingly is getting somewhat worse over time.  He did have a CT scan of his head on the fourth.  We will consider repeat CT scan and order an MRI scan to just assess his issues further.  But at this point there is no way that he would be able to return to independent living status.  Examining him today he is able to lift his legs up off the bed he has equal but decreased hand grasps can not really lift his arms above the bed very well at all.  This is definitely different issues and has had in the past.  I will check a CK level.  Also because of his numerous falls do an MRI of his brain and C-spine and actually thoracic spine also.  -12/8: CK was low at 25.  MRI scan of C-spine does show some areas of compression mild to moderate but not severe enough to be causing his current symptoms.  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  Also couple punctate enhancing foci in the frontal lobes likely representing some subacute infarct.  Not really sure these lesions have anything to do with his current status and symptoms.  He does not seem to have any focal neurological deficits.  -12/9: Weakness is unclear etiology.  The findings on his MRI of his brain potentially could have some thing to do with this.  He is on a baby aspirin daily we will start some Plavix for now.  We will at minimum do some carotid ultrasounds.  Continue to work with physical therapy occupational therapy.  Will need placement.  They are  keeping a close eye on him.  At times he does attempt to get out of chair.  Has not any recent falls.  -12/10: Still remains really weak.  Able to get him up in the chair.  Minimal help.  His strength is decreased upper and lower but equal.  Shoulders are very weak.  Seems to have more sensory issues with his right lower leg.  -12/11: Really unable to ascertain why he has had this but this has been a progressive issue for the last month or so or longer.  Is getting the point where he is unable to ambulate any significant distance.  Now is having the dysphagia with aspiration on the swallowing study.  Whether or not this is related to cerebrovascular disease is really unclear.  MRI/MRA C-spine brain did not show any obvious abnormalities.  We will continue with PT continue with speech path and hopefully we can get him back to a safe point of swallowing.          Oropharyngeal dysphagia: Previously diagnosed. He was seen by speech therapy on 12/2 with recommendations of soft/bite sized and thin liquids, however he did have evidence of significant aspiration this morning. Will place on pureed and moderate liquids (speech unavailable to re-eval x2 days) with 1:1 and upright in chair for all meals. Bedside staff did report he was eating/drinking very fast and impulsively.   -12/4: No coughing or signs of aspiration on pureed and mildly thick liquids.   -12/5: Speech re-eval today   -12/6: Speech increased diet to soft/bite sized today. He has been very awake and alert over the last 2-3 days.   -12/7: Tolerating his diet without much issue per nursing staff.  -12/9: No big issues in terms of diet no evidence of aspiration.  However speech path did come by and said they now have some concerns regarding thin liquids.  They are attempting at doing a video swallow today to see if there are issues.  SLP evaluation showed that he had significant aspiration on the video swallow.  He is now NPO.  We will need to think about  further feeding we will need to start some IV fluids.  Also trying to figure out why this is all occurring.  Whether not this is related to some sort of demyelinating process etc.  -12/10: Not quite sure what to make of his no difficulty swallowing.  We will reevaluate Monday with speech if no improvement then need to strongly consider PEG tube.  Did discuss with the patient.  All of his medications be on hold orally currently is pressures and heart rate are fine.  -12/11: We will have patient reevaluated tomorrow by speech path her hope is that he will be able to start taking in orals otherwise we will need to strongly consider PEG tube.       Neurocognitive disorder: Chronic, making taking care of himself increasingly difficult, high risk for falls.        Hyponatremia: Chronic, long standing over the last year or so, stable at his baseline.  Was 131 today we will recheck tomorrow.  -12/9: His serum sodium has been stable 132.  Normal renal function.  Rest of electrolytes are stable also.       COPD (chronic obstructive pulmonary disease) (H): No signs of acute exacerbation. He has not had any hypoxia, persistent cough. He did have some leukocytosis on arrival, however this improved without intervention.  O2 sat stable on room air.  Lungs are clear  Room air sats stable 92%.       Type 2 diabetes mellitus, with long-term current use of insulin (H): On Lantus 8u daily, glucose running 190-200's. On restricted diet due to dysphagia will hold off on increasing at this time.   -12/4: Switch supplement to Glucerna  -12/6: Sugars 190 mid 200s will increase Lantus somewhat.  No episodes of hypoglycemia.  -12/8: Sugars still 180-280.  Apparently he is not on Lantus at this point it was discontinued at some point we will restart him on 6 units tonight.   -12/9: Sugars still in the 200 range this morning 209 we did start his Lantus last evening.  May require increased dose we will see how he does today.  -12/11: Patient's  not on any insulin at all.  He is getting just some gentle IV fluid.  Do not anticipate any hypoglycemia.      Bilateral shoulder pain: Mild, heat not helping, no prior history. Will try diclofenac gel.  Not sure this is pain he just states this week when I ask him if they hurt he said no.  We will work-up his weakness with a MRI scan.          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  He had a heart catheterization performed which showed three-vessel coronary disease with an occluded distal RCA D1 stenosis and circumflex with prior stent without significant residual stenosis.  Did not feel any interventions were warranted.  The initially recommended transfer the emergency [place] for surgical repair of this.  However the patient refused discharged home and came back in with multiple complaints of just weakness unable to care for himself.  They were trying to transfer him down [facility] however no beds became available.  And at this time this is put on hold after the family is discussion and processes that unless he physically improves they would not want to intervene and put him through a large surgical procedure.       Diet: NPO for Medical/Clinical Reasons Except for: Ice Chips  Fluids: 75 cc an hour salinePneumatic Compression Devices    DVT Prophylaxis: Pneumatic Compression Devices  Code Status: Full Code    Disposition: Expected discharge when accepting facility is found and issues regarding nutrition are finalized.    Leo Mackey MD    Interval History   No big issues patient is pleasant denies any current chest pain shortness of breath just feels weak.  Currently n.p.o. based on our video swallowing study.  We will reevaluate tomorrow if continues to have issues we will need to discuss PEG tube placement.    -Data reviewed today: I reviewed all new labs and imaging  results over the last 24 hours. I personally reviewed no images or EKG's today.    Physical Exam   Temp: 96.9  F (36.1  C) Temp src: Tympanic BP: 120/74 Pulse: 77   Resp: 20 SpO2: 94 % O2 Device: None (Room air)    Vitals:    12/09/22 0406 12/10/22 0453 12/11/22 0219   Weight: 53.8 kg (118 lb 9.7 oz) 54 kg (119 lb) 53.8 kg (118 lb 9.7 oz)     Vital Signs with Ranges  Temp:  [96.9  F (36.1  C)-97.5  F (36.4  C)] 96.9  F (36.1  C)  Pulse:  [76-77] 77  Resp:  [20-22] 20  BP: (120-157)/(74-95) 120/74  SpO2:  [92 %-94 %] 94 %  I/O last 3 completed shifts:  In: 1258 [I.V.:1258]  Out: 600 [Urine:600]    Peripheral IV 12/04/22 Anterior;Right Upper forearm (Active)   Site Assessment WDL 12/11/22 0947   Line Status Infusing;Checked every 1-2 hour 12/11/22 0947   Phlebitis Scale 0-->no symptoms 12/11/22 0947   Infiltration Scale 0 12/11/22 0947   Infiltration Site Treatment Method  None 12/09/22 1630   If infiltrated, was a vesicant infusing? No 12/09/22 1630   Number of days: 7       Pressure Injury 09/20/20 Coccyx Stage 1 (Active)   Estimated Circumference ( if not measured tennis ball sized 11/28/22 2049   Number of days: 812       Wound Arm Skin tear (Active)   Wound Bed Other (Comment) 12/11/22 0932   Dorie-wound Assessment Ecchymosis 12/08/22 1630   Drainage Amount UTV 12/11/22 0210   Drainage Color/Characteristics UTV 12/11/22 0210   Dressing Foam 12/11/22 0210   Dressing Status Clean, dry, intact 12/11/22 0932   Number of days: 8     No line/device    Constitutional: Alert and oriented x3. No distress    HEENT: Normocephalic/atraumatic, PERRL, EOMI, mouth moist, neck supple, no LN.     Cardiovascular: RRR.  No murmur, no  rubs, or gallops.  JVD flat .  Bruits no.  Pulses 2    Respiratory: Decreased but clear to auscultation bilaterally    Abdomen: Soft, nontender, nondistended, no organomegaly. Bowel sounds present    Extremities: Warm/dry.  No edema    Neuro:   Non focal, cranial nerves intact, Moves all  extremities.  Medications     sodium chloride 75 mL/hr at 12/10/22 1735       [Held by provider] amLODIPine  2.5 mg Oral Daily     [Held by provider] aspirin  81 mg Oral Daily     [Held by provider] atorvastatin  40 mg Oral At Bedtime     [Held by provider] clopidogrel  75 mg Oral Daily     diclofenac  2 g Topical 4x Daily     insulin aspart  1-7 Units Subcutaneous TID AC     insulin aspart  1-5 Units Subcutaneous At Bedtime     [Held by provider] lisinopril  2.5 mg Oral Daily     [Held by provider] OLANZapine zydis  5 mg Oral At Bedtime     sodium chloride (PF)  3 mL Intracatheter Q8H     sodium chloride (PF)  5 mL Intravenous Q8H       Data   Recent Labs   Lab 12/11/22  1139 12/11/22  0533 12/11/22  0216 12/08/22  0634 12/08/22  0522 12/06/22  0746 12/06/22  0530   WBC  --  10.2  --   --  9.3  --  11.1*   HGB  --  12.8*  --   --  12.4*  --  12.7*   MCV  --  86  --   --  88  --  87   PLT  --  275  --   --  281  --  299   NA  --  131*  --   --  132*  --  131*   POTASSIUM  --  4.0  --   --  4.3  --  4.3   CHLORIDE  --  98  --   --  97*  --  99   CO2  --  22  --   --  22  --  24   BUN  --  12.8  --   --  19.5  --  14.7   CR  --  0.54*  --   --  0.63*  --  0.54*   ANIONGAP  --  11  --   --  13  --  8   CARLOS  --  7.8*  --   --  8.0*  --  8.1*   GLC 87 109* 116*   < > 196*   < > 243*   ALBUMIN  --  3.0*  --   --  2.8*  --   --    PROTTOTAL  --  6.3*  --   --  6.1*  --   --    BILITOTAL  --  0.5  --   --  0.3  --   --    ALKPHOS  --  110  --   --  100  --   --    ALT  --  20  --   --  18  --   --    AST  --  29  --   --  24  --   --     < > = values in this interval not displayed.       No results found for this or any previous visit (from the past 24 hour(s)).

## 2022-12-11 NOTE — PLAN OF CARE
Free from falls/injuries this shift. IVF @ 75/hr. Remains NPO. Does feel hungry. Having more loose NPC. Enc to C & DB. Remains on room air. Did work with PT on the day shift. Foam sacral dressing applied to coccyx. Very thin and candi on tailbone. Does have foam specialty cushion in place when sits in chair. Requires assist of 2 to transfer. Uses urinal and is incontinent. No stools this shift.         Face to face report given with opportunity to observe patient.    Report given to Deena Wesley RN   12/10/2022  11:23 PM

## 2022-12-12 NOTE — PLAN OF CARE
"Most recent vitals: /58 (BP Location: Right arm, Patient Position: Chair)   Pulse 51   Temp 97.3  F (36.3  C) (Tympanic)   Resp 18   Ht 1.753 m (5' 9\")   Wt 53.3 kg (117 lb 8.1 oz)   SpO2 93%   BMI 17.35 kg/m      Pt remains confused, VSS, afebrile. Blood sugars 80, 67, 130, 122. IV dextrose 25 mL given after 67 blood sugar. Did come up to 130 following administration. IV dextrose/NS infusing at 75 mL/hr. He remains assist x2 w/ gait belt. Incontinent of urine. Up in chair today. Alarms active/audible. Call light within reach.     Face to face report given with opportunity to observe patient.    Report given to Tiara Hinojosa RN   12/12/2022  7:14 PM      "

## 2022-12-12 NOTE — PROGRESS NOTES
Discussed repeating video swallow with hospitalist to determine if compensatory strategies can be used to increase safety of swallow. Repeat video swallow  is recommended due to pt's history of silent aspiration. Hospitalist in agreement and new orders have been placed. Video swallow is scheduled for 1300.

## 2022-12-12 NOTE — PROGRESS NOTES
12/12/22 1600   Appointment Info   Signing Clinician's Name / Credentials (OT) Saibna Zaman, OTR/L   Appointment Canceled Reason (OT) With other staff/provider   Appointment Cancel Comments (OT) Will attempt again tomorrow.

## 2022-12-12 NOTE — PROGRESS NOTES
"   12/12/22 1352   Appointment Info   Signing Clinician's Name / Credentials (PT) Letha Alfredo DPT   Interventions   Interventions Quick Adds Therapeutic Activity   Therapeutic Activity   Therapeutic Activities: dynamic activities to improve functional performance Minutes (30618) 24   Symptoms Noted During/After Treatment Fatigue   Treatment Detail/Skilled Intervention Pt up in chair upon PT arrival.  Pt agreeable to PT, states \"I'm weak today, but I think I'll be better tomorrow morning\".  Pt requires min A to lean trunk forward for placement of transfer belt.  Pt transferred sit>stand mod A of 2, once up in standing has poor NM control of trunk and LEs.  Attempted to take 3-4 steps forward with mod A of 2, pt had significant difficulty coordinating LEs to take steps, LEs at times buckling, knees noted to hyperextend at times as well, unable to take steps backwards to chair, total A to bring back to chair.  Pt provided with seated rest break then transferred sit>stand mod A of 2 up to FWW.  In standing worked on marching in place, LEs hyperextend against chair for support, completed marching x10 with mod A of 2 to maintain balance and stability in standing.  Provided seated rest break and then completed an additional time with same level of assist required.  Pt returned to sitting.  While in sitting had pt work on trunk control ant/post reaching out for therapists hand, has greater difficulty reaching wtih L UE compared to R UE.  Repeated this 10 times with rest breakds due to fatigue, but was able to complete unassisted.  Pt left sitting in chair with clip alarm on and call light in reach.   PT Discharge Planning   PT Plan Progress trunk control and functional mobility   PT Discharge Recommendation (DC Rec) Transitional Care Facility;Long term care facility   PT Rationale for DC Rec Pt requires heavy assist of 2, not safe to return home.  Limited progress has been made with PT intervention, anticipate will " "require long term placement.   PT Brief overview of current status Pt up in chair upon PT arrival.  Pt agreeable to PT, states \"I'm weak today, but I think I'll be better tomorrow morning\".  Pt requires min A to lean trunk forward for placement of transfer belt.  Pt transferred sit>stand mod A of 2, once up in standing has poor NM control of trunk and LEs.  Attempted to take 3-4 steps forward with mod A of 2, pt had significant difficulty coordinating LEs to take steps, LEs at times buckling, knees noted to hyperextend at times as well, unable to take steps backwards to chair, total A to bring back to chair.  Pt provided with seated rest break then transferred sit>stand mod A of 2 up to FWW.  In standing worked on marching in place, LEs hyperextend against chair for support, completed marching x10 with mod A of 2 to maintain balance and stability in standing.  Provided seated rest break and then completed an additional time with same level of assist required.  Pt returned to sitting.  While in sitting had pt work on trunk control ant/post reaching out for therapists hand, has greater difficulty reaching wtih L UE compared to R UE.  Repeated this 10 times with rest breakds due to fatigue, but was able to complete unassisted.  Pt left sitting in chair with clip alarm on and call light in reach.   Total Session Time   Timed Code Treatment Minutes 24   Total Session Time (sum of timed and untimed services) 24     "

## 2022-12-12 NOTE — PLAN OF CARE
"Reason for hospital stay: multiple falls @ home needing SNF placement    Most recent vitals: /92 (BP Location: Left arm, Patient Position: Supine, Cuff Size: Adult Regular)   Pulse 50   Temp 97.5  F (36.4  C) (Tympanic)   Resp 20   Ht 1.753 m (5' 9\")   Wt 53.3 kg (117 lb 8.1 oz)   SpO2 (!) 91%   BMI 17.35 kg/m      Pain Management: denies pain, no PRN's given  LOC: A&O to self, disoriented to place, time & situation  Cardiac: HRR  Respiratory: LS diminished bilaterally. Expiratory wheezes heard on R side. O2 maintained on RA. Denies any SOB or cough  GI: Flat in appearance. BS active x4. Stool incontinence  : Incontinent of urine  Skin Issues: Levon in color. Dry & fragile. Redness blanchable on sacrum w/ foam dressing in place CDI. SCD's in place BLE. Diffuse bruising & scabbing. L forearm skin tear foam dressing CDI. Skin barrier applied as needed. Repositioned q2H     IVF: 22G right forearm infusing 0.9% NaCl @ 75 mL/hr  ABX: N/A    Nutrition: NPO d/t high aspiration risk  ADL's: Ax1 bed mobility & repositioning  Ambulation: heavy Ax2 to recliner   Safety: Pt confused & does not utilize call light; close observation video monitoring in place. Bed alarm in place for pt safety. Free from falls this shift. Aspiration precautions in place. Hourly checks by nurse complete    Comments: VSS & afebrile overnight. 0200 blood sugar: 75. Pt confused & disoriented throughout the night; frequently yelling out to staff in the hallway. Denies having pain. SLP to re-evaluate & complete swallow eval today 12/12. SNF referrals in place for placement.    Face to face report given with opportunity to observe patient.    Report given to JUDD Ngo RN   12/12/2022  7:02 AM              "

## 2022-12-12 NOTE — PLAN OF CARE
Preliminary results from video swallow indicate aspiration of mildly/nectar thickened liquids. Pt is at heightened risk for aspiration after the swallow due to increased pharyngeal residue during moderately/honey thickened liquids and pureed textures. Recommend continuation of NPO at this time. Updated hospitalist and plan is to discuss with pt & family considerations between alternate means of nutrition vs. accepting heightened risk of aspiration for PO intake. Please see complete evaluation report for additional details.

## 2022-12-12 NOTE — PLAN OF CARE
Free from falls/injuries this shift. Close monitoring by staff at all times. Assess as charted. IVF @ 75/hr. . Remains NPO. Glucose: 136, 75 79 @ HS Assist of 2 to transfer. Forgetful. More confused as evening went on.       Face to face report given with opportunity to observe patient.    Report given to Alessia Wesley RN   12/11/2022  10:40 PM

## 2022-12-12 NOTE — PROGRESS NOTES
[facility]    Hospitalist Progress Note       Fall, initial encounter/weakness: Several falls at home. He does not recall the falls but family has needed to assist him. PT/OT evaluation for placement.  He has had a fall in the ER and is just getting weaker and weaker basically PT has seen him and he has a lot of just truncal issues unable to sit up straight etc. she has had really high risk for further falls.  Seemingly is getting somewhat worse over time.  He did have a CT scan of his head on the fourth.  We will consider repeat CT scan and order an MRI scan to just assess his issues further.  But at this point there is no way that he would be able to return to independent living status.  Examining him today he is able to lift his legs up off the bed he has equal but decreased hand grasps can not really lift his arms above the bed very well at all.  This is definitely different issues and has had in the past.  I will check a CK level.  Also because of his numerous falls do an MRI of his brain and C-spine and actually thoracic spine also.  -12/8: CK was low at 25.  MRI scan of C-spine does show some areas of compression mild to moderate but not severe enough to be causing his current symptoms.  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  Also couple punctate enhancing foci in the frontal lobes likely representing some subacute infarct.  Not really sure these lesions have anything to do with his current status and symptoms.  He does not seem to have any focal neurological deficits.  -12/9: Weakness is unclear etiology.  The findings on his MRI of his brain potentially could have some thing to do with this.  He is on a baby aspirin daily we will start some Plavix for now.  We will at minimum do some carotid ultrasounds.  Continue to work with physical therapy occupational therapy.  Will need placement.  They are keeping a close eye  on him.  At times he does attempt to get out of chair.  Has not any recent falls.  -12/10: Still remains really weak.  Able to get him up in the chair.  Minimal help.  His strength is decreased upper and lower but equal.  Shoulders are very weak.  Seems to have more sensory issues with his right lower leg.  -12/11: Really unable to ascertain why he has had this but this has been a progressive issue for the last month or so or longer.  Is getting the point where he is unable to ambulate any significant distance.  Now is having the dysphagia with aspiration on the swallowing study.  Whether or not this is related to cerebrovascular disease is really unclear.  MRI/MRA C-spine brain did not show any obvious abnormalities.  We will continue with PT continue with speech path and hopefully we can get him back to a safe point of swallowing.  -12/12: Weakness remains the biggest issue.  Physical therapy try to get him up his balance is extremely poor at this point.  Etiology is not really clear.  Maybe he has multiple small strokes.  Unable to return home.          Oropharyngeal dysphagia: Previously diagnosed. He was seen by speech therapy on 12/2 with recommendations of soft/bite sized and thin liquids, however he did have evidence of significant aspiration this morning. Will place on pureed and moderate liquids (speech unavailable to re-eval x2 days) with 1:1 and upright in chair for all meals. Bedside staff did report he was eating/drinking very fast and impulsively.   -12/4: No coughing or signs of aspiration on pureed and mildly thick liquids.   -12/5: Speech re-eval today   -12/6: Speech increased diet to soft/bite sized today. He has been very awake and alert over the last 2-3 days.   -12/7: Tolerating his diet without much issue per nursing staff.  -12/9: No big issues in terms of diet no evidence of aspiration.  However speech path did come by and said they now have some concerns regarding thin liquids.  They are  attempting at doing a video swallow today to see if there are issues.  SLP evaluation showed that he had significant aspiration on the video swallow.  He is now NPO.  We will need to think about further feeding we will need to start some IV fluids.  Also trying to figure out why this is all occurring.  Whether not this is related to some sort of demyelinating process etc.  -12/10: Not quite sure what to make of his no difficulty swallowing.  We will reevaluate Monday with speech if no improvement then need to strongly consider PEG tube.  Did discuss with the patient.  All of his medications be on hold orally currently is pressures and heart rate are fine.  -12/11: We will have patient reevaluated tomorrow by speech path her hope is that he will be able to start taking in orals otherwise we will need to strongly consider PEG tube.  -12/12: Had repeat swallowing study today still with evidence of aspiration.  They would recommend n.p.o. status.  Discussed with the patient at length and also with his son Lucas he has ability to make his own decisions at this point and would rather go with the PEG tube rather than continue n.p.o. status or risk of aspiration.  Discussed with Dr. Griffiths he will place the PEG tube either Wednesday or Thursday.       Neurocognitive disorder: Chronic, making taking care of himself increasingly difficult, high risk for falls.        Hyponatremia: Chronic, long standing over the last year or so, stable at his baseline.  Was 131 today we will recheck tomorrow.  -12/9: His serum sodium has been stable 132.  Normal renal function.  Rest of electrolytes are stable also.       COPD (chronic obstructive pulmonary disease) (H): No signs of acute exacerbation. He has not had any hypoxia, persistent cough. He did have some leukocytosis on arrival, however this improved without intervention.  O2 sat stable on room air.  Lungs are clear  Room air sats stable 92%.       Type 2 diabetes mellitus, with  long-term current use of insulin (H): On Lantus 8u daily, glucose running 190-200's. On restricted diet due to dysphagia will hold off on increasing at this time.   -12/4: Switch supplement to Glucerna  -12/6: Sugars 190 mid 200s will increase Lantus somewhat.  No episodes of hypoglycemia.  -12/8: Sugars still 180-280.  Apparently he is not on Lantus at this point it was discontinued at some point we will restart him on 6 units tonight.   -12/9: Sugars still in the 200 range this morning 209 we did start his Lantus last evening.  May require increased dose we will see how he does today.  -12/11: Patient's not on any insulin at all.  He is getting just some gentle IV fluid.  Do not anticipate any hypoglycemia.  -12/12: Sugars have been fine lower side he is not in any insulin at all.  We will add some D5 to his IV fluids.       Bilateral shoulder pain: Mild, heat not helping, no prior history. Will try diclofenac gel.  Not sure this is pain he just states this week when I ask him if they hurt he said no.  We will work-up his weakness with a MRI scan.          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  He had a heart catheterization performed which showed three-vessel coronary disease with an occluded distal RCA D1 stenosis and circumflex with prior stent without significant residual stenosis.  Did not feel any interventions were warranted.  The initially recommended transfer the emergency [place] for surgical repair of this.  However the patient refused discharged home and came back in with multiple complaints of just weakness unable to care for himself.  They were trying to transfer him down [facility] however no beds became available.  And at this time this is put on hold after the family is discussion and processes that unless he physically improves they would not want to intervene  and put him through a large surgical procedure.      Diet: NPO for Medical/Clinical Reasons Except for: Ice Chips  Fluids: D5 normal saline 75 cc an hour    DVT Prophylaxis: Pneumatic Compression Devices  Code Status: Full Code    Disposition: Expected discharge unclear  Leo Mackey MD    Interval History   Patient alert pleasant no real distress just complains of weakness.  Everything actually is going well except now he cannot swallow due to the video swallowing discussed with Dr. Griffiths he will place a PEG tube after long discussion with the patient and his son Lucas.  He is able to make his own decisions.    -Data reviewed today: I reviewed all new labs and imaging results over the last 24 hours. I personally reviewed no images or EKG's today.    Physical Exam   Temp: 97.5  F (36.4  C) Temp src: Tympanic BP: 176/92 Pulse: 50   Resp: 20 SpO2: (!) 91 % O2 Device: None (Room air)    Vitals:    12/10/22 0453 12/11/22 0219 12/12/22 0012   Weight: 54 kg (119 lb) 53.8 kg (118 lb 9.7 oz) 53.3 kg (117 lb 8.1 oz)     Vital Signs with Ranges  Temp:  [96.9  F (36.1  C)-97.5  F (36.4  C)] 97.5  F (36.4  C)  Pulse:  [50] 50  Resp:  [20] 20  BP: (120-176)/(74-92) 176/92  SpO2:  [91 %-94 %] 91 %  I/O last 3 completed shifts:  In: 1760 [I.V.:1760]  Out: 350 [Urine:350]    Peripheral IV 12/04/22 Anterior;Right Upper forearm (Active)   Site Assessment WDL 12/12/22 0000   Line Status Infusing;Checked every 1-2 hour 12/12/22 0000   Phlebitis Scale 0-->no symptoms 12/12/22 0000   Infiltration Scale 0 12/12/22 0000   Infiltration Site Treatment Method  None 12/09/22 1630   If infiltrated, was a vesicant infusing? No 12/09/22 1630   Number of days: 8       Pressure Injury 09/20/20 Coccyx Stage 1 (Active)   Estimated Circumference ( if not measured tennis ball sized 11/28/22 2049   Number of days: 813       Wound Arm Skin tear (Active)   Wound Bed Other (Comment) 12/11/22 8847   Dorie-wound Assessment Other (Comment) 12/11/22 4204    Drainage Amount [facility] 12/11/22 2355   Drainage Color/Characteristics UTV 12/11/22 2355   Dressing Foam 12/11/22 2355   Dressing Status Clean, dry, intact 12/11/22 2355   Number of days: 9     No line/device    Constitutional: Alert and oriented x3. No distress    HEENT: Normocephalic/atraumatic, PERRL, EOMI, mouth slightly dry, neck supple, no LN.     Cardiovascular: RRR.  No no no murmur, no  rubs, or gallops.  JVD no.  Bruits no .  Pulses 2    Respiratory: Clear to auscultation bilaterally    Abdomen: Soft, nontender, nondistended, no organomegaly. Bowel sounds present    Extremities: Warm/dry.  No edema    Neuro:   Non focal, cranial nerves intact, Moves all extremities.  Medications     sodium chloride 75 mL/hr at 12/12/22 0558       [Held by provider] amLODIPine  2.5 mg Oral Daily     [Held by provider] aspirin  81 mg Oral Daily     [Held by provider] atorvastatin  40 mg Oral At Bedtime     [Held by provider] clopidogrel  75 mg Oral Daily     diclofenac  2 g Topical 4x Daily     insulin aspart  1-7 Units Subcutaneous TID AC     insulin aspart  1-5 Units Subcutaneous At Bedtime     [Held by provider] lisinopril  2.5 mg Oral Daily     [Held by provider] OLANZapine zydis  5 mg Oral At Bedtime     sodium chloride (PF)  3 mL Intracatheter Q8H     sodium chloride (PF)  5 mL Intravenous Q8H       Data   Recent Labs   Lab 12/12/22  0148 12/11/22  2021 12/11/22  1713 12/11/22  1139 12/11/22  0533 12/08/22  0634 12/08/22  0522 12/06/22  0746 12/06/22  0530   WBC  --   --   --   --  10.2  --  9.3  --  11.1*   HGB  --   --   --   --  12.8*  --  12.4*  --  12.7*   MCV  --   --   --   --  86  --  88  --  87   PLT  --   --   --   --  275  --  281  --  299   NA  --   --   --   --  131*  --  132*  --  131*   POTASSIUM  --   --   --   --  4.0  --  4.3  --  4.3   CHLORIDE  --   --   --   --  98  --  97*  --  99   CO2  --   --   --   --  22  --  22  --  24   BUN  --   --   --   --  12.8  --  19.5  --  14.7   CR  --   --   --   --   0.54*  --  0.63*  --  0.54*   ANIONGAP  --   --   --   --  11  --  13  --  8   CARLOS  --   --   --   --  7.8*  --  8.0*  --  8.1*   GLC 75 79 75   < > 109*   < > 196*   < > 243*   ALBUMIN  --   --   --   --  3.0*  --  2.8*  --   --    PROTTOTAL  --   --   --   --  6.3*  --  6.1*  --   --    BILITOTAL  --   --   --   --  0.5  --  0.3  --   --    ALKPHOS  --   --   --   --  110  --  100  --   --    ALT  --   --   --   --  20  --  18  --   --    AST  --   --   --   --  29  --  24  --   --     < > = values in this interval not displayed.       No results found for this or any previous visit (from the past 24 hour(s)).

## 2022-12-13 NOTE — PROGRESS NOTES
"CLINICAL NUTRITION SERVICES  -  REASSESSMENT NOTE    Neel Kerr    82 yom admitted  with failure to thrive  Hx noted to include:  Pseudoaneurysm left ventricle, COPD, T2DM, CVA. Last A1C noted  Was 12.5 on 1/4/22. BG has been trending down, currently around . Video swallow evaluation completed yesterday, SLP recommends NPO diet and alternate means of nutrition. Noted plan for PEG placement this week. Weight stable.    Diet Order: NPO    Height: 5' 9\"  Weight: 117 lbs 8.08 oz  Body mass index is 17.35 kg/m .  Weight Status:  Underweight BMI <18.5  Weight History:   Wt Readings from Last 10 Encounters:   12/12/22 53.3 kg (117 lb 8.1 oz)   11/21/22 51.2 kg (112 lb 14 oz)   01/13/22 60.3 kg (133 lb)   01/06/22 56.4 kg (124 lb 6.4 oz)   08/12/21 55.3 kg (122 lb)   08/03/21 55.7 kg (122 lb 12.8 oz)   08/03/21 55.8 kg (123 lb)   07/28/21 56.7 kg (125 lb 1.6 oz)   07/23/21 56.3 kg (124 lb 3.2 oz)   07/22/21 56.7 kg (125 lb)        Weight used to calculate needs:  Estimated Energy Needs: 6518-5762 kcals (30-35 Kcal/Kg)   Estimated Protein Needs: 61-77 grams protein (1.2-1.5 g pro/Kg)  Estimated Fluid Needs: 2513-3274  mL (1 mL/Kcal)    Malnutrition Diagnosis: Moderate malnutrition. Declining with poor intake/dysphagia.  In Context of:  Chronic illness or disease    NUTRITION RECOMMENDATIONS  - feeding tube placement    MONITORING AND EVALUATION:  RD will monitor weight, labs, need for TF recs                "

## 2022-12-13 NOTE — PROGRESS NOTES
12/13/22 1100   Appointment Info   Signing Clinician's Name / Credentials (OT) Sabina Zaman, OTR/L   Therapeutic Activities   Therapeutic Activity Minutes (52081) 14   Symptoms noted during/after treatment fatigue   Treatment Detail/Skilled Intervention Pt sleeping in the chair upon OT arrival, agreeable to tx. He had slide down in the chair and needed assist to reposition for safety and improved comfort. Pt required totalA x2 to boost up with the back reclined and foot rest up, first. He then required maxA to lean forward in the chair to apply gait belt. He also required mod-maxA to move feet back. Pt then was max A x2 to transfer sit<>stand. He stood for about 30 sec with maxAx2 while the cushion and blankets were fixed on his chair by a 3rd staff. Pt was then positioned with pillows under his arms, knees, and head. Pt also participated in UE exercises x 10 reps each including fist pumps, wrist flex/ext, forearm pro/sup, and elbow flex/ext. Pt continues to exhibit deficits with coordination, motor control? while performing exercises. He is unable to raise arms up and required PROM for shoulder flex x10 reps each.   OT Discharge Planning   OT Plan Progress strength, ADLs, and activity tolerance as able   OT Discharge Recommendation (DC Rec) Transitional Care Facility;Long term care facility   OT Rationale for DC Rec Pt currently requiring significant assist for ADLs x1-2 staff and would not be safe to return home at this time. Pt has made limited progress with OT. He will likely require long term placement.   OT Brief overview of current status Pt sleeping in the chair upon OT arrival, agreeable to tx. He had slide down in the chair and needed assist to reposition for safety and improved comfort. Pt required totalA x2 to boost up with the back reclined and foot rest up, first. He then required maxA to lean forward in the chair to apply gait belt. He also required mod-maxA to move feet back. Pt then was max A x2  to transfer sit<>stand. He stood for about 30 sec with maxAx2 while the cushion and blankets were fixed on his chair by a 3rd staff. Pt was then positioned with pillows under his arms, knees, and head. Pt also participated in UE exercises x 10 reps each including fist pumps, wrist flex/ext, forearm pro/sup, and elbow flex/ext. Pt continues to exhibit deficits with coordination, motor control? while performing exercises. He is unable to raise arms up and required PROM for shoulder flex x10 reps each.   Total Session Time   Timed Code Treatment Minutes 14   Total Session Time (sum of timed and untimed services) 14

## 2022-12-13 NOTE — PLAN OF CARE
"Reason for Hospital Stay: Fall  Isolation: None  IV Fluids: D5 NS @ 75 mL/hr  Antibiotics: None  LDAs: IV to left hand (infusing)    Most recent vitals: /78 (BP Location: Left arm, Patient Position: Semi-Singletary's, Cuff Size: Adult Small)   Pulse 75   Temp 96.9  F (36.1  C) (Tympanic)   Resp 16   Ht 1.753 m (5' 9\")   Wt 53.3 kg (117 lb 8.1 oz)   SpO2 92%   BMI 17.35 kg/m    Pain: Patient denied pain, PAINAD scores 0  Pain Interventions: N/A  B & 129    Orientation & LOC: Alert & oriented to self only  Respiratory: Lung sounds are diminished; CAPONE observed but no SOB reported; cough is infrequent, congested, and nonproductive  - O2: On room air  Cardiac: WDL - bradycardic at times  -Telemetry: Not present  PNV:  WDL  GI: Incontinent of stool at times  : Incontinent of urine  -Mata: Not present  Skin:  As charted; foam dressings CDI     Diet: NPO ex ice chips  I/O: As charted    Assistance: Ax2 with gait belt  Safety: Call light is within reach, alarms are on, bed is low, and non-slip footwear are on when patient is OOB. Close observation by staff in place.    Face to face report given with opportunity to observe patient.    Report given to JUDD Zamarripa RN   2022  "

## 2022-12-13 NOTE — PROGRESS NOTES
"   12/13/22 1200   Appointment Info   Signing Clinician's Name / Credentials (SLP) Arabella Whalen MS CCC-SLP   Interventions   Interventions Quick Adds Swallowing Dysfunction   Swallowing Dysfunction &/or Oral Function for Feeding   Treatment of Swallowing Dysfunction &/or Oral Function for Feeding Minutes (25220) 10   Symptoms Noted During/After Treatment Fatigue   Treatment Detail/Skilled Intervention Pt seen this AM for skilled services. Reviewed imaging and results from video swallow that was completed yesterday. Provided education regarding rationale for current NPO recommendations and pt verbalized understanding, stating that it \"would be bad\" if he aspirated. Per chart review, plan is to move forward with alternate means of nutrition/feeding tube placement. Provided education and demonstration to introduction of oropharyngeal exercise programming. Attempted effortful swallow 3x however pt was unable to complete.   SLP Discharge Planning   SLP Plan Continue to assess and provide education regarding status of current swallow function and recommendations. Implement oropharyngeal exercises to increase tolerance of one PO texture/viscosity.   SLP Discharge Recommendation Transitional Care Facility   SLP Rationale for DC Rec Pt is currently unable to safely tolerate PO intake. NPO continues to be recommended at this time.   SLP Brief overview of current status  Recommend continuation of NPO status at this time. Per chart review plan is to move forward with PEG tube placement for alternate means of nutrition. Attempted oropharyngeal exercising however pt was unable to complete.   Total Session Time   Total Session Time (sum of timed and untimed services) 10     "

## 2022-12-13 NOTE — PROGRESS NOTES
[facility]  Hospitalist Progress Note          Assessment and Plan:      Fall, initial encounter/weakness: Several falls at home. He does not recall the falls but family has needed to assist him. PT/OT evaluation for placement.  He has had a fall in the ER and is just getting weaker and weaker basically PT has seen him and he has a lot of just truncal issues unable to sit up straight etc. she has had really high risk for further falls.  Seemingly is getting somewhat worse over time.  He did have a CT scan of his head on the fourth.  We will consider repeat CT scan and order an MRI scan to just assess his issues further.  But at this point there is no way that he would be able to return to independent living status.  Examining him today he is able to lift his legs up off the bed he has equal but decreased hand grasps can not really lift his arms above the bed very well at all.  This is definitely different issues and has had in the past.  I will check a CK level.  Also because of his numerous falls do an MRI of his brain and C-spine and actually thoracic spine also.  -12/8: CK was low at 25.  MRI scan of C-spine does show some areas of compression mild to moderate but not severe enough to be causing his current symptoms.  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  Also couple punctate enhancing foci in the frontal lobes likely representing some subacute infarct.  Not really sure these lesions have anything to do with his current status and symptoms.  He does not seem to have any focal neurological deficits.  -12/9: Weakness is unclear etiology.  The findings on his MRI of his brain potentially could have some thing to do with this.  He is on a baby aspirin daily we will start some Plavix for now.  We will at minimum do some carotid ultrasounds.  Continue to work with physical therapy occupational therapy.  Will need placement.   They are keeping a close eye on him.  At times he does attempt to get out of chair.  Has not any recent falls.  -12/10: Still remains really weak.  Able to get him up in the chair.  Minimal help.  His strength is decreased upper and lower but equal.  Shoulders are very weak.  Seems to have more sensory issues with his right lower leg.  -12/11: Really unable to ascertain why he has had this but this has been a progressive issue for the last month or so or longer.  Is getting the point where he is unable to ambulate any significant distance.  Now is having the dysphagia with aspiration on the swallowing study.  Whether or not this is related to cerebrovascular disease is really unclear.  MRI/MRA C-spine brain did not show any obvious abnormalities.  We will continue with PT continue with speech path and hopefully we can get him back to a safe point of swallowing.  -12/12: Weakness remains the biggest issue.  Physical therapy try to get him up his balance is extremely poor at this point.  Etiology is not really clear.  Maybe he has multiple small strokes.  Unable to return home.  -12/13: Multiple small CVAas identified but unclear whether or not these are contributing to weakness.  Continue PT OT          Oropharyngeal dysphagia: Previously diagnosed. He was seen by speech therapy on 12/2 with recommendations of soft/bite sized and thin liquids, however he did have evidence of significant aspiration this morning. Will place on pureed and moderate liquids (speech unavailable to re-eval x2 days) with 1:1 and upright in chair for all meals. Bedside staff did report he was eating/drinking very fast and impulsively.   -12/4: No coughing or signs of aspiration on pureed and mildly thick liquids.   -12/5: Speech re-eval today   -12/6: Speech increased diet to soft/bite sized today. He has been very awake and alert over the last 2-3 days.   -12/7: Tolerating his diet without much issue per nursing staff.  -12/9: No big issues  in terms of diet no evidence of aspiration.  However speech path did come by and said they now have some concerns regarding thin liquids.  They are attempting at doing a video swallow today to see if there are issues.  SLP evaluation showed that he had significant aspiration on the video swallow.  He is now NPO.  We will need to think about further feeding we will need to start some IV fluids.  Also trying to figure out why this is all occurring.  Whether not this is related to some sort of demyelinating process etc.  -12/10: Not quite sure what to make of his no difficulty swallowing.  We will reevaluate Monday with speech if no improvement then need to strongly consider PEG tube.  Did discuss with the patient.  All of his medications be on hold orally currently is pressures and heart rate are fine.  -12/11: We will have patient reevaluated tomorrow by [facility] her hope is that he will be able to start taking in orals otherwise we will need to strongly consider PEG tube.  -12/12: Had repeat swallowing study today still with evidence of aspiration.  They would recommend n.p.o. status.  Discussed with the patient at length and also with his son Lucas he has ability to make his own decisions at this point and would rather go with the PEG tube rather than continue n.p.o. status or risk of aspiration.  Discussed with Dr. Griffiths he will place the PEG tube either Wednesday or Thursday.  -12/13: High aspiration risk.  PEG today.         Neurocognitive disorder: Chronic, making taking care of himself increasingly difficult, high risk for falls.        Hyponatremia: Chronic, long standing over the last year or so, stable at his baseline.  Was 131 today we will recheck tomorrow.  -12/9: His serum sodium has been stable 132.  Normal renal function.  Rest of electrolytes are stable also.  -12/13: 133(Mild hyponatremia)       COPD (chronic obstructive pulmonary disease) (H): No signs of acute exacerbation. He has not had any  hypoxia, persistent cough. He did have some leukocytosis on arrival, however this improved without intervention.  O2 sat stable on room air.  Lungs are clear  Room air sats stable 92%.  -12/13: RA 93%       Type 2 diabetes mellitus, with long-term current use of insulin (H): On Lantus 8u daily, glucose running 190-200's. On restricted diet due to dysphagia will hold off on increasing at this time.   -12/4: Switch supplement to Glucerna  -12/6: Sugars 190 mid 200s will increase Lantus somewhat.  No episodes of hypoglycemia.  -12/8: Sugars still 180-280.  Apparently he is not on Lantus at this point it was discontinued at some point we will restart him on 6 units tonight.   -12/9: Sugars still in the 200 range this morning 209 we did start his Lantus last evening.  May require increased dose we will see how he does today.  -12/11: Patient's not on any insulin at all.  He is getting just some gentle IV fluid.  Do not anticipate any hypoglycemia.  -12/12: Sugars have been fine lower side he is not in any insulin at all.  We will add some D5 to his IV fluids.  -12/13: Sugars are up a bit with D5.  Monitoring.      Bilateral shoulder pain: Mild, heat not helping, no prior history. Will try diclofenac gel.  Not sure this is pain he just states this week when I ask him if they hurt he said no.  We will work-up his weakness with a MRI scan.    Moderate malnutrition, POA with BMI < 19      Cardiac: Patient recently had issues had a non-STEMI evaluation showed a pseudoaneurysm inferoseptal area along with  ischemic cardiomyopathy EF from 40 to 45%.  He had a heart catheterization performed which showed three-vessel coronary disease with an occluded distal RCA D1 stenosis and circumflex with prior stent without significant residual stenosis.  Did not feel any interventions were warranted.  The initially recommended transfer the emergency [place] for surgical repair of this.  However the patient refused discharged home and  "came back in with multiple complaints of just weakness unable to care for himself.  They were trying to transfer him down [facility] however no beds became available.  And at this time this is put on hold after the family is discussion and processes that unless he physically improves they would not want to intervene and put him through a large surgical procedure.        Diet: NPO for Medical/Clinical Reasons Except for: Ice Chips.  TFs after PEG.    Fluids: D5 normal saline 75 cc an hour     DVT Prophylaxis: Pneumatic Compression Devices  Code Status: Full Code     Disposition: Expected discharge unclear     Clinically Significant Risk Factors         # Hyponatremia: Lowest Na = 133 mmol/L in last 2 days, will monitor as appropriate      # Hypoalbuminemia: Lowest albumin = 2.8 g/dL at 12/8/2022  5:22 AM, will monitor as appropriate           # Cachexia: Estimated body mass index is 17.35 kg/m  as calculated from the following:    Height as of this encounter: 1.753 m (5' 9\").    Weight as of this encounter: 53.3 kg (117 lb 8.1 oz).   # Moderate Malnutrition: based on nutrition assessment              Interval History:     Plan today to place a PEG.  Vitals stable.                Medications:       [Held by provider] amLODIPine  2.5 mg Oral Daily     [Held by provider] aspirin  81 mg Oral Daily     [Held by provider] atorvastatin  40 mg Oral At Bedtime     [Held by provider] clopidogrel  75 mg Oral Daily     diclofenac  2 g Topical 4x Daily     insulin aspart  1-7 Units Subcutaneous TID AC     insulin aspart  1-5 Units Subcutaneous At Bedtime     [Held by provider] lisinopril  2.5 mg Oral Daily     [Held by provider] OLANZapine zydis  5 mg Oral At Bedtime     sodium chloride (PF)  3 mL Intracatheter Q8H     sodium chloride (PF)  5 mL Intravenous Q8H                  Physical Exam:     Vitals:    12/12/22 1525 12/12/22 2029 12/13/22 0615 12/13/22 0854   BP: 109/58 117/54 156/78 147/82   BP Location: Right " arm Right arm Left arm    Patient Position: Chair Chair Semi-Singletary's    Cuff Size:  Adult Small Adult Small    Pulse: 51 54 75 61   Resp: 18 17 16 16   Temp: 97.3  F (36.3  C) 98.5  F (36.9  C) 96.9  F (36.1  C) 97.3  F (36.3  C)   TempSrc: Tympanic Tympanic Tympanic Tympanic   SpO2: 93% (!) 91% 92% 93%   Weight:       Height:             Vital Sign Ranges  Temperature Temp  Av.5  F (36.4  C)  Min: 96.9  F (36.1  C)  Max: 98.5  F (36.9  C)   Blood pressure Systolic (24hrs), Av , Min:109 , Max:156        Diastolic (24hrs), Av, Min:54, Max:82      Pulse Pulse  Av.3  Min: 51  Max: 75   Respirations Resp  Av.8  Min: 16  Max: 18   Pulse oximetry SpO2  Av.3 %  Min: 91 %  Max: 93 %         Intake/Output Summary (Last 24 hours) at 2022 1107  Last data filed at 2022 0339  Gross per 24 hour   Intake 2074 ml   Output 120 ml   Net 1954 ml       General:  Alert and Orientated. NAD. Appears frail  ENT: Oropharynx and lips dry  Heart:  RRR, S1 S2, No murmurs, no rubs, no gallops.  Resp: CTA bilaterally.  No wheezes, no rhonchi, no crackles.  Abd: Soft/NT/ND/Positve BS.  Ext: No edema noted in either lower extremity  Neuro:  No focal Neurologic deficits noted.    Peripheral IV 22 Anterior;Left Hand (Active)   Site Assessment WDL 22   Line Status Infusing 22   Phlebitis Scale 0-->no symptoms 22   Infiltration Scale 0 22   Number of days: 1       Pressure Injury 20 Coccyx Stage 1 (Active)   Estimated Circumference ( if not measured tennis ball sized 22 2049   Number of days: 814       Wound Arm Skin tear (Active)   Wound Bed Other (Comment) 22   Dorie-wound Assessment Other (Comment) 12/13/22 0222   Drainage Amount    Drainage Color/Characteristics UTV 22   Dressing Foam 22   Dressing Status Clean, dry, intact 22   Number of days: 10     No line/device             Data:      Lab Results   Component Value Date    WBC 8.3 12/13/2022    HGB 12.0 (L) 12/13/2022    HCT 35.8 (L) 12/13/2022     12/13/2022     (L) 12/13/2022    POTASSIUM 3.6 12/13/2022    CHLORIDE 104 12/13/2022    CO2 19 (L) 12/13/2022    BUN 15.4 12/13/2022    CR 0.54 (L) 12/13/2022     (H) 12/13/2022    SED 17 12/08/2022    DD 9.9 (H) 06/30/2021    NTBNPI 4,226 (H) 12/02/2022    TROPI <0.015 05/12/2021    AST 29 12/11/2022    ALT 20 12/11/2022    ALKPHOS 110 12/11/2022    BILITOTAL 0.5 12/11/2022    INR 1.08 11/28/2022     Lactic Acid   Date Value Ref Range Status   11/21/2022 2.0 0.7 - 2.0 mmol/L Final   11/21/2022 3.4 (H) 0.7 - 2.0 mmol/L Final   01/05/2022 1.0 0.7 - 2.0 mmol/L Final   06/30/2021 1.7 0.7 - 2.0 mmol/L Final   06/30/2021 4.1 (HH) 0.7 - 2.0 mmol/L Final     Comment:     Critical result, provider not notified due to previous critical result   notification.  AJS 1113 6/30/21 06/30/2021 5.7 (HH) 0.7 - 2.0 mmol/L Final     Comment:     Critical Value called to and read back by  DR. MANNING @ 1015 AJS 6/30/21         Dante Edward DO

## 2022-12-13 NOTE — PROGRESS NOTES
12/12/22 1600   Appointment Info   Signing Clinician's Name / Credentials (SLP) Arabella Whalen MS CCC-SLP   General Information   Onset of Illness/Injury or Date of Surgery 11/28/22   Referring Physician Leo Mackey MD   Patient/Family Therapy Goal Statement (SLP) None stated.   Pertinent History of Current Problem Pt is a 82 year old male who was admitted on 11/28/22. Video swallow completed on 12/9/22 with recommendations for NPO. Repeat video swallow recommended to assess modified textures and assess swallow function with compensatory strategies as pt/family consider alternate means of nutrition. Video swallow recommended as re-evaluation due to pt's history of silent aspiration.   General Observations Pt appears alert during evaluation.   Type of Evaluation   Type of Evaluation Swallow Evaluation   Oral Motor   Oral Musculature generally intact   Structural Abnormalities none present   Mucosal Quality good   Dentition (Oral Motor)   Dentition (Oral Motor) adequate dentition;some missing teeth   Facial Symmetry (Oral Motor)   Facial Symmetry (Oral Motor) WNL   Lip Function (Oral Motor)   Lip Range of Motion (Oral Motor) WNL   Tongue Function (Oral Motor)   Tongue Coordination/Speed (Oral Motor) WNL   Jaw Function (Oral Motor)   Jaw Function (Oral Motor) WNL   Cough/Swallow/Gag Reflex (Oral Motor)   Volitional Throat Clear/Cough (Oral Motor) WNL;reduced strength   Volitional Swallow (Oral Motor) WNL   Vocal Quality/Secretion Management (Oral Motor)   Vocal Quality (Oral Motor) WNL   Secretion Management (Oral Motor) WNL   General Swallowing Observations   Past History of Dysphagia Pt reports no past history of dysphagia. Pt presented to the ED on 11/28/22 due to falls and weakness. Throughout pt's stay he has demonstrated increased difficulty with swallowing.   Respiratory Support (General Swallowing Observations) none   Current Diet/Method of Nutritional Intake (General Swallowing Observations, NIS) NPO    Swallowing Evaluation Videofluoroscopic swallow study (VFSS)   Clinical Swallow Evaluation   Feeding Assistance dependent   Adaptive Eating Utensils N/A   Additional evaluation(s) completed today No   VFSS Evaluation   Radiologist Dr. Ruiz   Views Taken left lateral;A/P   Physical Location of Procedure DI   VFSS Textures Trialed mildly thick liquids;moderately thick liquids/liquidized;pureed   VFSS Eval: Mildly Thick Liquids   Mode of Presentation spoon;fed by clinician   Order of Presentation 1   Preparatory Phase poor bolus control   Oral Phase premature pharyngeal entry   Bolus Location When Swallow Triggered valleculae   Pharyngeal Phase impaired epiglottic movement;pharyngeal wall coating;residue in vallecula   Rosenbek's Penetration Aspiration Scale 5 - contrast contacts vocal cords, visible residue remains (penetration)   Response to Aspiration unproductive reflexive cough  (Cough then cued however pt's cough remained unproductive.)   Strategies and Compensations hard swallow;double swallow;chin tuck;reduce bolus size   Diagnostic Statement Pt trialed 1 spoonful of mildly/nectar thickened liquids. Premature pharyngeal entry was observed and pt's swallow was not triggered until bolus was in valleculae. Significant residue remained in valleculae and posterior pharyngeal wall after the swallow. Penetration to vocal folds witnessed. Pt produced a non productive cough independently however it was weak. Cued pt to continue coughing however it remained unproductive. Attempted double swallow and effortful swallow however pharyngeal residue did not clear. Chin tuck was used during trials and bolus was reduced via spoon fed trial.   VFSS Eval: Moderately Thick Liquids   Mode of Presentation spoon;fed by clinician   Order of Presentation 4,5,7,8 (AP view)   Preparatory Phase poor bolus control   Oral Phase premature pharyngeal entry   Bolus Location When Swallow Triggered valleculae   Pharyngeal Phase residue in  vallecula;impaired epiglottic movement  (Epiglottic inversion witnessed during initial swallow. Double swallow/hard swallow compensatory trials did not produce epiglottic inversion.)   Rosenbek's Penetration Aspiration Scale 1 - no aspiration, contrast does not enter airway   Strategies and Compensations chin tuck;hard swallow;double swallow   Diagnostic Statement No aspiration or penetration was witnessed during moderately/honey thickened liquids trials. Premature pharyngeal entry and delayed initiation of the swallow were observed. Significant residue witnessed in valleculae after the swallow. Epiglottic inversion witnessed during initial swallow. Double swallow/hard swallow compensatory trials did not produce epiglottic inversion and did not assist in clearing/reducing pharyngeal residue. Chin tuck was used during all trials and bolus was reduced via spoon fed trial.   VFSS Evaluation: Puree Solid Texture Trial   Mode of Presentation, Puree spoon;fed by clinician   Order of Presentation 3,6   Preparatory Phase prolonged bolus preparation;poor bolus control   Oral Phase, Puree premature pharyngeal entry   Bolus Location When Swallow Triggered valleculae   Pharyngeal Phase, Puree residue in vallecula;impaired epiglottic movement  (Epiglottic inversion witnessed during initial swallow. Double swallow/hard swallow compensatory trials did not produce epiglottic inversion.)   Rosenbek's Penetration Aspiration Scale: Puree Food Trial Results 1 - no aspiration, contrast does not enter airway   Strategies and Compensations hard swallow;double swallow;reduce bolus size   Diagnostic Statement No direct aspiration or penetration was witnessed during pureed texture trials. Premature pharyngeal entry and delayed initiation of the swallow were observed. Significant residue witnessed in valleculae after the swallow. Trace penetration witnessed after trial 6 after the swallow however unclear if this occurred due to pureed texture  or valleculae residue that remained after the swallow. Epiglottic inversion witnessed during initial swallow. Double swallow/hard swallow compensatory trials did not produce epiglottic inversion and did not assist in clearing/reducing pharyngeal residue.. Chin tuck was used during all trials and bolus was reduced via spoon fed trial.   Esophageal Phase of Swallow   Patient reports or presents with symptoms of esophageal dysphagia No   Esophageal sweep performed during today s vidofluoroscopic exam  No   Swallowing Recommendations   Diet Consistency Recommendations NPO;consider alternative means of nutrition   Medication Administration Recommendations, Swallowing (SLP) Recommend alternate means for medication administration.   Comment, Swallowing Recommendations Recommend continuation of NPO status at this time. While no aspiration was witnessed during IDDSI 4 (pureed) and IDDSI 3 (moderately/honey thickened liquid) trials, pt demonstrated significant residue in the valleculae. This places pt at heightened risk for aspiration after the swallow. Discussed recommendations with hospitalist. Plan is to discuss results with patient and family to determine if they would like to pursue alternate nutrition vs. accepting heightened risk of aspiration for IDDSI 4/3 diet with compensatory strategies. Will continue NPO recommendations at this time.   General Therapy Interventions   Planned Therapy Interventions Dysphagia Treatment   Dysphagia treatment Oropharyngeal exercise training   Clinical Impression   Criteria for Skilled Therapeutic Interventions Met (SLP Eval) Yes, treatment indicated   SLP Diagnosis Oropharyngeal dysphagia   Problem List (SLP) Swallowing   Functional Limitations Related to Problem List (SLP) Pt is currently unable to safely tolerate PO intake. This is a decline in function from his baseline.   Risks & Benefits of therapy have been explained evaluation/treatment results reviewed;care plan/treatment goals  reviewed;risks/benefits reviewed;current/potential barriers reviewed;participants voiced agreement with care plan;participants included;patient   Clinical Impression Comments Pt seen this afternoon for repeat video fluoroscopic swallow study evaluation. All trials that were assessed were provided via spoon to reduce bolus size amount. Pt was also cued to use a chin tuck during all trials, which pt was able to follow prompts for with consistent cueing. Across trials, pt demonstrated premature pharyngeal entry, delayed trigger of the swallow (did not trigger until bolus entered the valleculae), and significant residue in the valleculae. During mildly/nectar trial, pt demonstrate incomplete epiglottic inversion, posterior pharyngeal wall residue, and penetration to the level of the vocal folds. Non productive cough produced and remained non production with cues. During moderately/honey thickened liquid trials, no aspiration or penetration was witnessed however significant pharyngeal residue remained. During pureed texture trials, trace penetration was witnessed after the swallow however unclear if this occurred due to pureed texture or from pharyngeal residue. Epiglottic inversion witnessed during initial swallow for both moderately/honey thickened liquids and pureed textures. Double swallow/hard swallow compensatory trials did not produce epiglottic inversion and did not assist in clearing/reducing pharyngeal residue. Recommend continuation of NPO status at this time. While no aspiration was witnessed during IDDSI 4 (pureed) and IDDSI 3 (moderately/honey thickened liquid) trials, pt demonstrated significant residue in the valleculae. This places pt at heightened risk for aspiration after the swallow. Due to a VFSS not being representative of a full meal, suspect that patients pharyngeal residue would continue to increase and aspiration would become more probable as he attempted to eat more. Discussed recommendations  with hospitalist. Plan is to discuss results with patient and family to determine if they would like to pursue alternate nutrition vs. accepting heightened risk of aspiration for IDDSI 4/3 diet with compensatory strategies. Will continue NPO recommendations at this time.   SLP Total Evaluation Time   Evaluation, videofluoroscopic eval of swallow function Minutes (98620) 20   SLP Goals   Therapy Frequency (SLP Eval) 5 times/wk   SLP Predicted Duration/Target Date for Goal Attainment 12/23/22   SLP Goals Swallow   SLP: Safely tolerate diet without signs/symptoms of aspiration One P.O. texture   SLP Discharge Planning   SLP Plan Continue to assess and provide education regarding status of current swallow function and recommendations. Implement oropharyngeal exercises to increase tolerance of one PO texture/viscosity.   SLP Discharge Recommendation Transitional Care Facility   SLP Rationale for DC Rec Pt is currently unable to safely tolerate PO intake. NPO continues to be recommended at this time.   SLP Brief overview of current status  Recommend continuation of NPO status at this time. While no aspiration was witnessed during IDDSI 4 (pureed) and IDDSI 3 (moderately/honey thickened liquid) trials, pt demonstrated significant residue in the valleculae. This places pt at heightened risk for aspiration after the swallow. Due to a VFSS not being representative of a full meal, suspect that patients pharyngeal residue would continue to increase and aspiration would become more probable as he attempted to eat more. Discussed recommendations with hospitalist. Plan is to discuss results with patient and family to determine if they would like to pursue alternate nutrition vs. accepting heightened risk of aspiration for IDDSI 4/3 diet with compensatory strategies. Will continue NPO recommendations at this time.   Total Session Time   Total Session Time (sum of timed and untimed services) 20

## 2022-12-13 NOTE — PROGRESS NOTES
12/13/22 0900   Appointment Info   Signing Clinician's Name / Credentials (PT) MASON Muro   Student Supervision Direct supervision provided   Interventions   Interventions Quick Adds Therapeutic Activity;Therapeutic Procedure   Therapeutic Procedure/Exercise   Ther. Procedure: strength, endurance, ROM, flexibillity Minutes (06495) 10   Symptoms Noted During/After Treatment fatigue   Treatment Detail/Skilled Intervention Seated therex: LAQs 1x10e, hip raises 1x10e, Heel/toe raises 1x10e, improved L LE use in plantarflexion and dorsiflexion; pt reports he feels like he is doing them better as well. Isometrics of adductors/abductors in sitting x 10s each x 5 each.   Therapeutic Activity   Therapeutic Activities: dynamic activities to improve functional performance Minutes (42211) 13   Symptoms Noted During/After Treatment Fatigue   Treatment Detail/Skilled Intervention Pt sitting up in chair upon PT's arrival, agreeable to therapy this morning. Nurse told PT upon arrival he is currently Max A of 2. Pt willing to try standing to the FWW but said he needed help to scoot forward. Pt has poor sitting balance and trunk control and required tactile cues and Min A for scooting forward to edge of chair without losing balance. Pt required 2-person Mod A to perform sit>stand transfer to FWW and stood for 1.5 minutes with 2-person CGA for safety. Pt reported he felt good. Pt then performed stand>sit transfer to chair with 2-person Mod A for proper eccentric control. Pt practiced sitting balance x 1 minute in chair with both hands holding armrests and tactile/verbal cues from PT. Seated therex performed (see notes). Pt left with chair alarm on and call light within reach.   PT Discharge Planning   PT Plan Progress trunk control and functional mobility   PT Discharge Recommendation (DC Rec) Transitional Care Facility;Long term care facility   PT Rationale for DC Rec Pt requires heavy assist of 2, not safe to return  home.  Limited progress has been made with PT intervention, anticipate will require long term placement.   PT Brief overview of current status see as above.   Total Session Time   Timed Code Treatment Minutes 23   Total Session Time (sum of timed and untimed services) 23

## 2022-12-13 NOTE — PLAN OF CARE
No acute changes. Alert but confused. Vitals remain stable. Lungs dim throughout. Remains NPO. IV infusing. Up heavy assist of 2 with gait belt and walker. Turn and repo q2 hours. Up in  most of the day. BGs 150s; insulin given as documented. Alarms on for safety. Call light remains within reach and makes needs known.     Face to face report given with opportunity to observe patient.    Report given to JUDD Shaffer RN   12/13/2022  2:56 PM

## 2022-12-14 NOTE — PROGRESS NOTES
12/14/22 6392   Appointment Info   Signing Clinician's Name / Credentials (PT) Letha Alfredo DPT   Interventions   Interventions Quick Adds Therapeutic Activity   Therapeutic Activity   Therapeutic Activities: dynamic activities to improve functional performance Minutes (01768) 14   Symptoms Noted During/After Treatment Fatigue   Treatment Detail/Skilled Intervention Pt resting in bed, agreeable to PT.  Pt required mod A to roll onto left side, encouraged pt to assist with UEs and siderail but unable.  Pt transferred sidelying to sit with max A, once positioned was able to maintain sitting at EOB with SBA.  Pt transferred sit>stand max A of 2, pt having difficulty getting to full standing.  Provided cues to take small steps but pt unable to coordinate or moves LEs.  Pt was max A of 2 to pivot into chair.  At this time pt has declined to point that mary is required.  Did discuss this with pt and nursing staff.  Pt left siting in chair with clip alarm on and call light in reach.   PT Discharge Planning   PT Plan Work on standing with PT and generalized strengthening   PT Discharge Recommendation (DC Rec) Transitional Care Facility;Long term care facility   PT Rationale for DC Rec Pt now requiring mary for pt and staff safety.  Pt will require long term care placement   PT Brief overview of current status mary   Total Session Time   Timed Code Treatment Minutes 14   Total Session Time (sum of timed and untimed services) 14

## 2022-12-14 NOTE — PLAN OF CARE
Care assumed at 1500. VSS, afebrile, denies pain. Up in chair. D5 1/2 NS infusing at 75mL. Remains NPO. Blood sugar 126 at supper time. Chair alarm active. Free from falls.     Face to face report given with opportunity to observe patient.    Report given to Deena Hinojosa RN   12/13/2022  6:59 PM

## 2022-12-14 NOTE — ANESTHESIA PREPROCEDURE EVALUATION
Anesthesia Pre-Procedure Evaluation    Patient: Neel Kerr   MRN: [id number] : 1940        Procedure : Procedure(s):  ESOPHAGOGASTRODUODENOSCOPY, WITH PEG TUBE INSERTION          Past Medical History:   Diagnosis Date     Essential (primary) hypertension     No Comments Provided     Hyperlipidemia     No Comments Provided     Non-ST elevation (NSTEMI) myocardial infarction (H)     2017,[facility] PCI with stent circumflex     Old myocardial infarction     2015,[facility]  PCI with stent     Polyneuropathy     No Comments Provided     Type 2 diabetes mellitus without complications (H)     No Comments Provided      Past Surgical History:   Procedure Laterality Date     CYSTOSCOPY, TRANSURETHRAL RESECTION (TUR) PROSTATE, COMBINED N/A 2021    Procedure: Transurethral resection of prostate;  Surgeon: Antonio August MD;  Location:  OR      Allergies   Allergen Reactions     Augmentin Nausea     Marked as an allergy at the [facility]      Social History     Tobacco Use     Smoking status: Former     Types: Cigarettes     Quit date: 1992     Years since quittin.9     Smokeless tobacco: Never   Substance Use Topics     Alcohol use: No     Comment: Alcoholic Drinks/day: quit drinking       Wt Readings from Last 1 Encounters:   22 53.4 kg (117 lb 11.6 oz)        Anesthesia Evaluation   Pt has had prior anesthetic.     No history of anesthetic complications       ROS/MED HX  ENT/Pulmonary: Comment: Acute and chronic respiratory failure    (+) tobacco use, Past use, moderate,  COPD,     Neurologic: Comment: Dysphagia  Dementia  Neurocognitive disorder    (+) CVA, date: , without deficits,     Cardiovascular:     (+) hypertension--CAD -past MI -stent-CAPONE. Previous cardiac testing   Echo: Date:  Results:  EF 40-45  Stress Test: Date: Results:    ECG Reviewed: Date:  Results:  A fib with septal and inferior infarct  Cath: Date: Results:      METS/Exercise Tolerance: 1 -  Eating, dressing    Hematologic:  - neg hematologic  ROS     Musculoskeletal:   (+) arthritis,     GI/Hepatic:  - neg GI/hepatic ROS     Renal/Genitourinary:     (+) renal disease, type: CRI, Pt does not require dialysis, BPH,     Endo:     (+) type II DM, Not using insulin, - not using insulin pump. Normal glucose range: 170,     Psychiatric/Substance Use:  - neg psychiatric ROS     Infectious Disease: Comment: Hx Covid 19 infection      Malignancy:  - neg malignancy ROS     Other:  - neg other ROS          Physical Exam    Airway        Mallampati: III   TM distance: > 3 FB   Neck ROM: limited   Mouth opening: > 3 cm    Respiratory Devices and Support         Dental       (+) missing      Cardiovascular          Rhythm and rate: irregular and normal     Pulmonary           (+) rales           OUTSIDE LABS:  CBC:   Lab Results   Component Value Date    WBC 9.0 12/14/2022    WBC 8.3 12/13/2022    HGB 12.2 (L) 12/14/2022    HGB 12.0 (L) 12/13/2022    HCT 36.5 (L) 12/14/2022    HCT 35.8 (L) 12/13/2022     12/14/2022     12/13/2022     BMP:   Lab Results   Component Value Date     (L) 12/14/2022     (L) 12/13/2022    POTASSIUM 3.4 12/14/2022    POTASSIUM 3.6 12/13/2022    CHLORIDE 102 12/14/2022    CHLORIDE 104 12/13/2022    CO2 21 (L) 12/14/2022    CO2 19 (L) 12/13/2022    BUN 6.7 (L) 12/14/2022    BUN 15.4 12/13/2022    CR 0.47 (L) 12/14/2022    CR 0.54 (L) 12/13/2022     (H) 12/14/2022     (H) 12/14/2022     COAGS:   Lab Results   Component Value Date     (HH) 11/22/2022    INR 1.08 11/28/2022    FIBR 400 05/17/2021     POC:   Lab Results   Component Value Date     (H) 05/25/2021     HEPATIC:   Lab Results   Component Value Date    ALBUMIN 3.0 (L) 12/11/2022    PROTTOTAL 6.3 (L) 12/11/2022    ALT 20 12/11/2022    AST 29 12/11/2022    ALKPHOS 110 12/11/2022    BILITOTAL 0.5 12/11/2022     OTHER:   Lab Results   Component Value Date    PH 7.21 (L) 05/11/2021     LACT 2.0 11/21/2022    A1C 12.5 (H) 01/04/2022    CARLOS 7.5 (L) 12/14/2022    PHOS 2.9 05/25/2021    MAG 1.9 12/02/2022    LIPASE 160 07/06/2017    AMYLASE 47 07/06/2017    TSH 10.14 (H) 12/02/2022    T4 0.96 12/02/2022    CRP 24.98 (H) 12/08/2022    SED 17 12/08/2022       Anesthesia Plan    ASA Status:  4, emergent    NPO Status:  NPO Appropriate    Anesthesia Type: MAC.     - Reason for MAC: straight local not clinically adequate, chronic cardiopulmonary disease   Induction: Intravenous, Propofol.   Maintenance: Balanced.        Consents    Anesthesia Plan(s) and associated risks, benefits, and realistic alternatives discussed. Questions answered and patient/representative(s) expressed understanding.     - Discussed: Risks, Benefits and Alternatives for BOTH SEDATION and the PROCEDURE were discussed     - Discussed with:  Patient      - Extended Intubation/Ventilatory Support Discussed: Yes.      - Patient is DNR/DNI Status: No    Use of blood products discussed: No .     Postoperative Care            Comments:                GRIS Galvez CRNA

## 2022-12-14 NOTE — PLAN OF CARE
Pt slept throughout the night, VSS. Denied pain. Repositioned throughout the night. IVF continued. Glucose 150-170. Alarms on, call light within reach.     Face to face report given with opportunity to observe patient.    Report given to JUDD Nunez RN   12/14/2022  7:11 AM

## 2022-12-14 NOTE — PLAN OF CARE
Patient off unit to surgery department at this time.     Face to face report given with opportunity to observe patient.    Report given to JUDD Sinclair RN   12/14/2022  9:40 AM

## 2022-12-14 NOTE — PROGRESS NOTES
"CLINICAL NUTRITION SERVICES  -  REASSESSMENT NOTE    Neel Kerr : Interdisciplinary rounds - TF recs    NUTRITION RECOMMENDATIONS  TF recommendations-   - Glucerna 1.5 - 4 cartons per day, bolus feeds. 1.5 cartons (355ml) 3x per day. 30ml water before and after each bolus.   -To meet daily fluid needs once IV fluids are discontinued- 250ml water flush between meals 3x per day or as tolerated (750ml total).   -This will provide 1602kcal, 88g protein, and 1740ml free water.  - Initiate feeds with a bolus volume of 120ml. Advance to goal of 355ml as tolerated  - Monitor electrolytes, BG, TF tolerance, bowel movements    ** Waiting for 8oz cartons of Glucerna 1.5 to be delivered. Start tube feeds using Glucerna 1.2 in the Liter bottles until cartons available.        82 yom admitted  with failure to thrive  Hx noted to include:  Pseudoaneurysm left ventricle, COPD, T2DM, CVA. Last A1C noted  Was 12.5 on 1/4/22. BG ranging from about . Video swallow evaluation completed, SLP recommends NPO diet and alternate means of nutrition. Plan for PEG placement today.    Diet Order: NPO    Height: 5' 9\"  Weight: 117 lbs 11.61 oz  Body mass index is 17.39 kg/m .  Weight Status:  Underweight BMI <18.5  Weight History:   Wt Readings from Last 10 Encounters:   12/14/22 53.4 kg (117 lb 11.6 oz)   11/21/22 51.2 kg (112 lb 14 oz)   01/13/22 60.3 kg (133 lb)   01/06/22 56.4 kg (124 lb 6.4 oz)   08/12/21 55.3 kg (122 lb)   08/03/21 55.7 kg (122 lb 12.8 oz)   08/03/21 55.8 kg (123 lb)   07/28/21 56.7 kg (125 lb 1.6 oz)   07/23/21 56.3 kg (124 lb 3.2 oz)   07/22/21 56.7 kg (125 lb)      Weight used to calculate needs: 51kg- admit weight  Estimated Energy Needs: 5469-9730 kcals (30-35 Kcal/Kg)   Estimated Protein Needs: 61-77 grams protein (1.2-1.5 g pro/Kg)  Estimated Fluid Needs: 5082-3186  mL (1 mL/Kcal)    Malnutrition Diagnosis: Moderate malnutrition. Declining with poor intake/dysphagia.  In Context of:  Chronic illness or " disease      MONITORING AND EVALUATION:  RD will monitor weight, labs, need for TF recs

## 2022-12-14 NOTE — OP NOTE
REPORT OF OPERATION  DATE OF PROCEDURE: 12/14/2022    PATIENT: Neel Kerr    SURGERY PERFORMED: Esophagogastroduodenoscopy with placement of percutaneous endoscopic gastrostomy tube (PEG)     PREOPERATIVE DIAGNOSIS: Inability to eat secondary to dysphagia    POSTOPERATIVE DIAGNOSIS:    Normal anatomy   Gastrostomy tube placed along the greater curvature of the stomach     SURGEON: Angel Griffiths MD    ASSISTANTS: None    ANESTHESIA: Monitored Anesthesia Care    COMPLICATIONS: None apparent    TRANSFUSIONS: None    TISSUE TO PATHOLOGY: None    FINDINGS: Normal-appearing anatomy.  Gastrostomy tube placed along the greater curvature of the stomach.      INDICATIONS: This is a 82 year old male in need of a PEG tube because of inability to eat secondary to dysphagia.  The patient will be taken to the endoscopy suite for that procedure.    DESCRIPTIONS OF PROCEDURE IN DETAIL: After consent was obtained the patient was taken to the operative suite and narciso in the left lateral decubitus position.  The patient was identified and the correct patient was confirmed. Monitored Anesthesia Care was given by anesthesia. A time out was performed verifying the correct patient and the correct procedure.  The entire operative team was in agreement.  All necessary equipment and supplies were in the room.    The endoscope was inserted into the mouth and passed without difficulty to the stomach.  The stomach was inflated and a point of the abdominal wall was identified by transillumination.  The area on the stomach was sterilely prepped and draped in the usual fashion.  The skin was anesthetized with local anesthetic.  The needle was then inserted through the abdominal wall and into the stomach.  The wire was passed through the needle and grasped using the snare through the endoscope.  The wire was then brought through the stomach, through the esophagus, and out the mouth.  The gastrostomy tube was then passed over the wire through  the mouth, down the esophagus, into the stomach, and through the abdominal wall.  The gastrostomy tube was then pulled until the end was seated snuggly against the abdominal wall.  The length of the tube was noted to be at approximately 4 cm.  The endoscope was then passed back into the stomach and the position of the tube position was checked with the endoscope and was found while snuggly seated along the greater curvature the stomach.  The tube was secured to the skin.   The endoscope was withdrawn from the patient the patient was then taken to the recovery room in stable condition tolerated the procedure well.

## 2022-12-14 NOTE — PROGRESS NOTES
12/14/22 1000   Appointment Info   Signing Clinician's Name / Credentials (OT) Sabina Zaman, OTR/L   Appointment Canceled Reason (OT) At procedure/test   Appointment Cancel Comments (OT) Attempted to see patient today for OT but he was at his procedure for a PEG tube placement. Will attempt again tomorrow.

## 2022-12-14 NOTE — PROGRESS NOTES
[facility]  Hospitalist Progress Note          Assessment and Plan:            Assessment and Plan:       Fall, initial encounter/weakness: Several falls at home. He does not recall the falls but family has needed to assist him. PT/OT evaluation for placement.  He has had a fall in the ER and is just getting weaker and weaker basically PT has seen him and he has a lot of just truncal issues unable to sit up straight etc. she has had really high risk for further falls.  Seemingly is getting somewhat worse over time.  He did have a CT scan of his head on the fourth.  We will consider repeat CT scan and order an MRI scan to just assess his issues further.  But at this point there is no way that he would be able to return to independent living status.  Examining him today he is able to lift his legs up off the bed he has equal but decreased hand grasps can not really lift his arms above the bed very well at all.  This is definitely different issues and has had in the past.  I will check a CK level.  Also because of his numerous falls do an MRI of his brain and C-spine and actually thoracic spine also.  -12/8: CK was low at 25.  MRI scan of C-spine does show some areas of compression mild to moderate but not severe enough to be causing his current symptoms.  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  Also couple punctate enhancing foci in the frontal lobes likely representing some subacute infarct.  Not really sure these lesions have anything to do with his current status and symptoms.  He does not seem to have any focal neurological deficits.  -12/9: Weakness is unclear etiology.  The findings on his MRI of his brain potentially could have some thing to do with this.  He is on a baby aspirin daily we will start some Plavix for now.  We will at minimum do some carotid ultrasounds.  Continue to work with physical therapy occupational  therapy.  Will need placement.  They are keeping a close eye on him.  At times he does attempt to get out of chair.  Has not any recent falls.  -12/10: Still remains really weak.  Able to get him up in the chair.  Minimal help.  His strength is decreased upper and lower but equal.  Shoulders are very weak.  Seems to have more sensory issues with his right lower leg.  -12/11: Really unable to ascertain why he has had this but this has been a progressive issue for the last month or so or longer.  Is getting the point where he is unable to ambulate any significant distance.  Now is having the dysphagia with aspiration on the swallowing study.  Whether or not this is related to cerebrovascular disease is really unclear.  MRI/MRA C-spine brain did not show any obvious abnormalities.  We will continue with PT continue with speech path and hopefully we can get him back to a safe point of swallowing.  -12/12: Weakness remains the biggest issue.  Physical therapy try to get him up his balance is extremely poor at this point.  Etiology is not really clear.  Maybe he has multiple small strokes.  Unable to return home.  -12/13: Multiple small CVAas identified but unclear whether or not these are contributing to weakness.  Continue PT OT  -12/14: As above, PT/OT.        Oropharyngeal dysphagia: Previously diagnosed. He was seen by speech therapy on 12/2 with recommendations of soft/bite sized and thin liquids, however he did have evidence of significant aspiration this morning. Will place on pureed and moderate liquids (speech unavailable to re-eval x2 days) with 1:1 and upright in chair for all meals. Bedside staff did report he was eating/drinking very fast and impulsively.   -12/4: No coughing or signs of aspiration on pureed and mildly thick liquids.   -12/5: Speech re-eval today   -12/6: Speech increased diet to soft/bite sized today. He has been very awake and alert over the last 2-3 days.   -12/7: Tolerating his diet  without much issue per nursing staff.  -12/9: No big issues in terms of diet no evidence of aspiration.  However speech path did come by and said they now have some concerns regarding thin liquids.  They are attempting at doing a video swallow today to see if there are issues.  SLP evaluation showed that he had significant aspiration on the video swallow.  He is now NPO.  We will need to think about further feeding we will need to start some IV fluids.  Also trying to figure out why this is all occurring.  Whether not this is related to some sort of demyelinating process etc.  -12/10: Not quite sure what to make of his no difficulty swallowing.  We will reevaluate Monday with speech if no improvement then need to strongly consider PEG tube.  Did discuss with the patient.  All of his medications be on hold orally currently is pressures and heart rate are fine.  -12/11: We will have patient reevaluated tomorrow by speech path her hope is that he will be able to start taking in orals otherwise we will need to strongly consider PEG tube.  -12/12: Had repeat swallowing study today still with evidence of aspiration.  They would recommend n.p.o. status.  Discussed with the patient at length and also with his son Lucas he has ability to make his own decisions at this point and would rather go with the PEG tube rather than continue n.p.o. status or risk of aspiration.  Discussed with Dr. Griffiths he will place the PEG tube either Wednesday or Thursday.  -12/13: High aspiration risk.   -12/14: PEG placed today, start TFs tomorrow      Neurocognitive disorder: Chronic, making taking care of himself increasingly difficult, high risk for falls.        Hyponatremia: Chronic, long standing over the last year or so, stable at his baseline.  Was 131 today we will recheck tomorrow.  -12/9: His serum sodium has been stable 132.  Normal renal function.  Rest of electrolytes are stable also.  -12/13: 133(Mild hyponatremia)  -12/14: Mild,  Na 134 today        COPD (chronic obstructive pulmonary disease) (H): No signs of acute exacerbation. He has not had any hypoxia, persistent cough. He did have some leukocytosis on arrival, however this improved without intervention.  O2 sat stable on room air.  Lungs are clear  Room air sats stable 92%.  -12/13: RA 93%  -12/13: Stable       Type 2 diabetes mellitus, with long-term current use of insulin (H): On Lantus 8u daily, glucose running 190-200's. On restricted diet due to dysphagia will hold off on increasing at this time.   -12/4: Switch supplement to Glucerna  -12/6: Sugars 190 mid 200s will increase Lantus somewhat.  No episodes of hypoglycemia.  -12/8: Sugars still 180-280.  Apparently he is not on Lantus at this point it was discontinued at some point we will restart him on 6 units tonight.   -12/9: Sugars still in the 200 range this morning 209 we did start his Lantus last evening.  May require increased dose we will see how he does today.  -12/11: Patient's not on any insulin at all.  He is getting just some gentle IV fluid.  Do not anticipate any hypoglycemia.  -12/12: Sugars have been fine lower side he is not in any insulin at all.  We will add some D5 to his IV fluids.  -12/13: Sugars are up a bit with D5.  Monitoring.   -12/14: Stable     Bilateral shoulder pain: Mild, heat not helping, no prior history. Will try diclofenac gel.  Not sure this is pain he just states this week when I ask him if they hurt he said no.  We will work-up his weakness with a MRI scan.     Moderate malnutrition, POA with BMI < 19.    Hypocalcemia: Corrected Calcium due to low albumin is 8.3      Cardiac: Patient recently had issues had a non-STEMI evaluation showed a pseudoaneurysm inferoseptal area along with  ischemic cardiomyopathy EF from 40 to 45%.  He had a heart catheterization performed which showed three-vessel coronary disease with an occluded distal RCA D1 stenosis and circumflex with prior stent without  "significant residual stenosis.  Did not feel any interventions were warranted.  The initially recommended transfer the emergency [place] for surgical repair of this.  However the patient refused discharged home and came back in with multiple complaints of just weakness unable to care for himself.  They were trying to transfer him down [facility] however no beds became available.  And at this time this is put on hold after the family is discussion and processes that unless he physically improves they would not want to intervene and put him through a large surgical procedure.        Diet: NPO for Medical/Clinical Reasons Except for: Ice Chips.  TFs to start tomorrow.   Fluids: D5 normal saline 75 cc an hour     DVT Prophylaxis: Pneumatic Compression Devices  Code Status: Full Code     Disposition: Expected discharge in 1-3 days, awaiting placement          Clinically Significant Risk Factors         # Hyponatremia: Lowest Na = 133 mmol/L in last 2 days, will monitor as appropriate      # Hypoalbuminemia: Lowest albumin = 2.8 g/dL at 12/8/2022  5:22 AM, will monitor as appropriate           # Cachexia: Estimated body mass index is 17.39 kg/m  as calculated from the following:    Height as of this encounter: 1.753 m (5' 9\").    Weight as of this encounter: 53.4 kg (117 lb 11.6 oz).   # Moderate Malnutrition: based on nutrition assessment              Interval History:     Patient remains stable.  PEG to be placed today               Medications:       [Held by provider] amLODIPine  2.5 mg Oral Daily     [Held by provider] aspirin  81 mg Oral Daily     [Held by provider] atorvastatin  40 mg Oral At Bedtime     [Held by provider] clopidogrel  75 mg Oral Daily     [Auto Hold] diclofenac  2 g Topical 4x Daily     [Auto Hold] insulin aspart  1-7 Units Subcutaneous TID AC     [Auto Hold] insulin aspart  1-5 Units Subcutaneous At Bedtime     [Held by provider] lisinopril  2.5 mg Oral Daily     [Held by provider] " OLANZapine zydis  5 mg Oral At Bedtime     [Auto Hold] sodium chloride (PF)  3 mL Intracatheter Q8H                  Physical Exam:     Vitals:    22 1115 22 1120 22 1125 22 1130   BP: 91/59 91/60 96/63 109/62   BP Location:       Pulse: 70 65 64 64   Resp: 20 14 18 19   Temp:   97  F (36.1  C)    TempSrc:   Temporal    SpO2: 94% 93% 93% 94%   Weight:       Height:             Vital Sign Ranges  Temperature Temp  Av.9  F (36.1  C)  Min: 96.7  F (35.9  C)  Max: 97.4  F (36.3  C)   Blood pressure Systolic (24hrs), Av , Min:88 , Max:162        Diastolic (24hrs), Av, Min:57, Max:88      Pulse Pulse  Av.9  Min: 64  Max: 84   Respirations Resp  Av.8  Min: 14  Max: 25   Pulse oximetry SpO2  Av.3 %  Min: 90 %  Max: 99 %         Intake/Output Summary (Last 24 hours) at 2022 1145  Last data filed at 2022 1102  Gross per 24 hour   Intake 2013 ml   Output 350 ml   Net 1663 ml     General:  Alert and Orientated. NAD. Appears frail  ENT: Oropharynx and lips dry  Heart:  RRR, S1 S2, No murmurs, no rubs, no gallops.  Resp: CTA bilaterally.  No wheezes, no rhonchi, no crackles.  Abd: Soft/NT/ND/Positve BS.  Ext: No edema noted in either lower extremity  Neuro:  No focal Neurologic deficits noted.    Peripheral IV 22 Anterior;Left Hand (Active)   Site Assessment WDL 22 1105   Line Status Infusing 22 1105   Phlebitis Scale 0-->no symptoms 22 1105   Infiltration Scale 0 22 1105   Number of days: 2       Gastrostomy/Enterostomy Percutaneous endoscopic gastrostomy (PEG) LUQ 1 20 fr (Active)   Site Description WDL 22 1105   Number of days: 0       Pressure Injury 20 Coccyx Stage 1 (Active)   Estimated Circumference ( if not measured tennis ball sized 22 2049   Number of days: 815       Wound Arm Skin tear (Active)   Wound Bed Dry scab 22 1516   Dorie-wound Assessment Other (Comment) 22 0222   Drainage Amount None  12/13/22 1516   Drainage Color/Characteristics UTV 12/13/22 0222   Dressing Open to air 12/13/22 1516   Dressing Status Clean, dry, intact 12/13/22 0854   Number of days: 11     No line/device             Data:     Lab Results   Component Value Date    WBC 9.0 12/14/2022    HGB 12.2 (L) 12/14/2022    HCT 36.5 (L) 12/14/2022     12/14/2022     (L) 12/14/2022    POTASSIUM 3.4 12/14/2022    CHLORIDE 102 12/14/2022    CO2 21 (L) 12/14/2022    BUN 6.7 (L) 12/14/2022    CR 0.47 (L) 12/14/2022     (H) 12/14/2022    SED 17 12/08/2022    DD 9.9 (H) 06/30/2021    NTBNPI 4,226 (H) 12/02/2022    TROPI <0.015 05/12/2021    AST 29 12/11/2022    ALT 20 12/11/2022    ALKPHOS 110 12/11/2022    BILITOTAL 0.5 12/11/2022    INR 1.08 11/28/2022     Lactic Acid   Date Value Ref Range Status   11/21/2022 2.0 0.7 - 2.0 mmol/L Final   11/21/2022 3.4 (H) 0.7 - 2.0 mmol/L Final   01/05/2022 1.0 0.7 - 2.0 mmol/L Final   06/30/2021 1.7 0.7 - 2.0 mmol/L Final   06/30/2021 4.1 (HH) 0.7 - 2.0 mmol/L Final     Comment:     Critical result, provider not notified due to previous critical result   notification.  AJS 1113 6/30/21 06/30/2021 5.7 (HH) 0.7 - 2.0 mmol/L Final     Comment:     Critical Value called to and read back by  DR. MANNING @ 1015 AJS 6/30/21         Dante Edward DO

## 2022-12-14 NOTE — PROGRESS NOTES
12/14/22 1000   Appointment Info   Signing Clinician's Name / Credentials (SLP) Joan Stratton MA, CF-SLP   Appointment Canceled Reason (SLP) At procedure/test   Appointment Cancel Comments (SLP) SLP attempted to see patient today, patient currently at procedure. SLP will attempt again tomorrow.

## 2022-12-14 NOTE — ANESTHESIA CARE TRANSFER NOTE
Patient: Neel Kerr    Procedure: Procedure(s):  ESOPHAGOGASTRODUODENOSCOPY, WITH PEG TUBE INSERTION       Diagnosis: Failure to thrive in adult [R62.7]  Diagnosis Additional Information: No value filed.    Anesthesia Type:   MAC     Note:    Oropharynx: oropharynx clear of all foreign objects  Level of Consciousness: drowsy  Oxygen Supplementation: nasal cannula  Level of Supplemental Oxygen (L/min / FiO2): 3  Independent Airway: airway patency satisfactory and stable  Dentition: dentition unchanged  Vital Signs Stable: post-procedure vital signs reviewed and stable  Report to RN Given: handoff report given  Patient transferred to: PACU    Handoff Report: Identifed the Patient, Identified the Reponsible Provider, Reviewed the pertinent medical history, Discussed the surgical course, Reviewed Intra-OP anesthesia mangement and issues during anesthesia, Set expectations for post-procedure period and Allowed opportunity for questions and acknowledgement of understanding      Vitals:  Vitals Value Taken Time   BP     Temp     Pulse     Resp     SpO2         Electronically Signed By: GRIS Barrera CRNA  December 14, 2022  11:04 AM

## 2022-12-14 NOTE — OR NURSING
Son, Lucas, was called. He is the POA. Permission given by son to have procedure. Pt agrees to having procedure. Verified by surgeon, 2 RNs, and Marco from anesthesia. Consent complete.

## 2022-12-15 NOTE — PLAN OF CARE
"Reason for Hospital Stay: Fall  Isolation: None  IV Fluids: D5 NS @ 75 mL/hr  Antibiotics: None  LDAs: IV to left hand (infusing)     Most recent vitals: /66 (BP Location: Left arm, Patient Position: Semi-Singletary's, Cuff Size: Adult Small)   Pulse 83   Temp 98.4  F (36.9  C) (Tympanic)   Resp 16   Ht 1.753 m (5' 9\")   Wt 53 kg (116 lb 13.5 oz)   SpO2 95%   BMI 17.25 kg/m    Pain: Patient denied pain, PAINAD scores 0  Pain Interventions: N/A  B & 188     Orientation & LOC: Alert & disoriented to time & situation  Respiratory: Lung sounds are diminished; CAPONE/SOB reported, respirations are shallow; cough is weak, infrequent, congested, and nonproductive  - O2: On 5 L via NC  Cardiac: WDL  -Telemetry: Not present  PNV:  Extremities are cool & pale; pt reported numbness in lower extremities; radial & pedal pulses +2  - VTE prevention: Pt refused SCD's  GI: Bowel sounds are audible & hypoactive x4; incontinent of stool  - Gastrostomy: PEG tube clamped; dressing has a minimal amount of dried bloody/bright red drainage - tube feedings & PEG tube use to start 12/15  : Incontinent of urine  -Mata: Not present  Skin:  As charted     Diet: Tube feedings to start 12/15 - pt remains NPO ex ice chips this shift  I/O: Pt had 1 episode of incontinence this shift. Bladder scan volume 155 mL. IV intake 1530 mL.     Assistance: Suzie  Safety: Call light is within reach, alarms are on, bed is low, and non-slip footwear are on when patient is OOB. Close observation by staff in place.    Face to face report given with opportunity to observe patient.    Report given to JUDD Nunez RN   12/15/2022  "

## 2022-12-15 NOTE — PROGRESS NOTES
12/15/22 1300   Appointment Info   Signing Clinician's Name / Credentials (OT) Sabina Zaman, OTR/L   Appointment Canceled Reason (OT) Medical status;Hold (see Cancel Comments row)   Appointment Cancel Comments (OT) Holding OT today due to increased O2 needs.

## 2022-12-15 NOTE — PROGRESS NOTES
Attempted to call son to discuss code status, no answer.  Will revisit on 12/16/22.    Dante Edward, DO

## 2022-12-15 NOTE — PROGRESS NOTES
[facility]  Hospitalist Progress Note          Assessment and Plan:            Assessment and Plan:       Fall, initial encounter/weakness: Several falls at home. He does not recall the falls but family has needed to assist him. PT/OT evaluation for placement.  He has had a fall in the ER and is just getting weaker and weaker basically PT has seen him and he has a lot of just truncal issues unable to sit up straight etc. she has had really high risk for further falls.  Seemingly is getting somewhat worse over time.  He did have a CT scan of his head on the fourth.  We will consider repeat CT scan and order an MRI scan to just assess his issues further.  But at this point there is no way that he would be able to return to independent living status.  Examining him today he is able to lift his legs up off the bed he has equal but decreased hand grasps can not really lift his arms above the bed very well at all.  This is definitely different issues and has had in the past.  I will check a CK level.  Also because of his numerous falls do an MRI of his brain and C-spine and actually thoracic spine also.  -12/8: CK was low at 25.  MRI scan of C-spine does show some areas of compression mild to moderate but not severe enough to be causing his current symptoms.  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  Also couple punctate enhancing foci in the frontal lobes likely representing some subacute infarct.  Not really sure these lesions have anything to do with his current status and symptoms.  He does not seem to have any focal neurological deficits.  -12/9: Weakness is unclear etiology.  The findings on his MRI of his brain potentially could have some thing to do with this.  He is on a baby aspirin daily we will start some Plavix for now.  We will at minimum do some carotid ultrasounds.  Continue to work with physical therapy occupational  therapy.  Will need placement.  They are keeping a close eye on him.  At times he does attempt to get out of chair.  Has not any recent falls.  -12/10: Still remains really weak.  Able to get him up in the chair.  Minimal help.  His strength is decreased upper and lower but equal.  Shoulders are very weak.  Seems to have more sensory issues with his right lower leg.  -12/11: Really unable to ascertain why he has had this but this has been a progressive issue for the last month or so or longer.  Is getting the point where he is unable to ambulate any significant distance.  Now is having the dysphagia with aspiration on the swallowing study.  Whether or not this is related to cerebrovascular disease is really unclear.  MRI/MRA C-spine brain did not show any obvious abnormalities.  We will continue with PT continue with speech path and hopefully we can get him back to a safe point of swallowing.  -12/12: Weakness remains the biggest issue.  Physical therapy try to get him up his balance is extremely poor at this point.  Etiology is not really clear.  Maybe he has multiple small strokes.  Unable to return home.  -12/13: Multiple small CVAas identified but unclear whether or not these are contributing to weakness.  Continue PT OT  -12/14: As above, PT/OT.  -12/15: Decondition, ataxia actually worsening despite PT.  Imaging unclear if MRI findings are resulting in these symptoms.         Oropharyngeal dysphagia: Previously diagnosed. He was seen by speech therapy on 12/2 with recommendations of soft/bite sized and thin liquids, however he did have evidence of significant aspiration this morning. Will place on pureed and moderate liquids (speech unavailable to re-eval x2 days) with 1:1 and upright in chair for all meals. Bedside staff did report he was eating/drinking very fast and impulsively.   -12/4: No coughing or signs of aspiration on pureed and mildly thick liquids.   -12/5: Speech re-eval today   -12/6: Speech  increased diet to soft/bite sized today. He has been very awake and alert over the last 2-3 days.   -12/7: Tolerating his diet without much issue per nursing staff.  -12/9: No big issues in terms of diet no evidence of aspiration.  However speech path did come by and said they now have some concerns regarding thin liquids.  They are attempting at doing a video swallow today to see if there are issues.  SLP evaluation showed that he had significant aspiration on the video swallow.  He is now NPO.  We will need to think about further feeding we will need to start some IV fluids.  Also trying to figure out why this is all occurring.  Whether not this is related to some sort of demyelinating process etc.  -12/10: Not quite sure what to make of his no difficulty swallowing.  We will reevaluate Monday with speech if no improvement then need to strongly consider PEG tube.  Did discuss with the patient.  All of his medications be on hold orally currently is pressures and heart rate are fine.  -12/11: We will have patient reevaluated tomorrow by speech path her hope is that he will be able to start taking in orals otherwise we will need to strongly consider PEG tube.  -12/12: Had repeat swallowing study today still with evidence of aspiration.  They would recommend n.p.o. status.  Discussed with the patient at length and also with his son Lucas he has ability to make his own decisions at this point and would rather go with the PEG tube rather than continue n.p.o. status or risk of aspiration.  Discussed with Dr. Griffiths he will place the PEG tube either Wednesday or Thursday.  -12/13: High aspiration risk.   -12/14: PEG placed today, start TFs tomorrow  -12/15: TFs held due to respiratory decomensation      Neurocognitive disorder: Chronic, making taking care of himself increasingly difficult, high risk for falls.        Hyponatremia: Chronic, long standing over the last year or so, stable at his baseline.  Was 131 today we  will recheck tomorrow.  -12/9: His serum sodium has been stable 132.  Normal renal function.  Rest of electrolytes are stable also.  -12/13: 133(Mild hyponatremia)  -12/14: Mild, Na 134 today        COPD (chronic obstructive pulmonary disease) (H): No signs of acute exacerbation. He has not had any hypoxia, persistent cough. He did have some leukocytosis on arrival, however this improved without intervention.  O2 sat stable on room air.  Lungs are clear  Room air sats stable 92%.  -12/13: RA 93%  -12/13: Stable  12/15: O2 requirements increased overnight to 5L.  Question aspiration versus HAP.  Started Levaquin.       Type 2 diabetes mellitus, with long-term current use of insulin (H): On Lantus 8u daily, glucose running 190-200's. On restricted diet due to dysphagia will hold off on increasing at this time.   -12/4: Switch supplement to Glucerna  -12/6: Sugars 190 mid 200s will increase Lantus somewhat.  No episodes of hypoglycemia.  -12/8: Sugars still 180-280.  Apparently he is not on Lantus at this point it was discontinued at some point we will restart him on 6 units tonight.   -12/9: Sugars still in the 200 range this morning 209 we did start his Lantus last evening.  May require increased dose we will see how he does today.  -12/11: Patient's not on any insulin at all.  He is getting just some gentle IV fluid.  Do not anticipate any hypoglycemia.  -12/12: Sugars have been fine lower side he is not in any insulin at all.  We will add some D5 to his IV fluids.  -12/13: Sugars are up a bit with D5.  Monitoring.   -12/14: Stable  -12/15: Will monitor with Levaquin.      Bilateral shoulder pain: Mild, heat not helping, no prior history. Will try diclofenac gel.  Not sure this is pain he just states this week when I ask him if they hurt he said no.  We will work-up his weakness with a MRI scan.     Moderate malnutrition, POA with BMI < 19.     Hypocalcemia: Corrected Calcium due to low albumin is 8.3     "  Cardiac: Patient recently had issues had a non-STEMI evaluation showed a pseudoaneurysm inferoseptal area along with  ischemic cardiomyopathy EF from 40 to 45%.  He had a heart catheterization performed which showed three-vessel coronary disease with an occluded distal RCA D1 stenosis and circumflex with prior stent without significant residual stenosis.  Did not feel any interventions were warranted.  The initially recommended transfer the emergency [place] for surgical repair of this.  However the patient refused discharged home and came back in with multiple complaints of just weakness unable to care for himself.  They were trying to transfer him down [facility] however no beds became available.  And at this time this is put on hold after the family is discussion and processes that unless he physically improves they would not want to intervene and put him through a large surgical procedure.        Diet: NPO for Medical/Clinical Reasons Except for: Ice Chips.  TFs to hold for now due to respiratory status.  .   Fluids: D5 normal saline 75 cc an hour     DVT Prophylaxis: Lovenox  Code Status: Full Code, we will need to further discuss this as he continues to worsen clinically.       Disposition: Expected discharge in 1-3 days, awaiting placement          Clinically Significant Risk Factors         # Hyponatremia: Lowest Na = 134 mmol/L in last 2 days, will monitor as appropriate      # Hypoalbuminemia: Lowest albumin = 2.8 g/dL at 12/14/2022  5:08 AM, will monitor as appropriate           # Cachexia: Estimated body mass index is 17.25 kg/m  as calculated from the following:    Height as of this encounter: 1.753 m (5' 9\").    Weight as of this encounter: 53 kg (116 lb 13.5 oz).   # Moderate Malnutrition: based on nutrition assessment              Interval History:     Patient had a PEG placed yesterday and overnight developed hypoxia requiring 5 L NC.  CXr indicates pulmonary edema versus " infiltrate.              Medications:       amLODIPine  2.5 mg Oral Daily     aspirin  81 mg Oral Daily     atorvastatin  40 mg Oral At Bedtime     [Held by provider] clopidogrel  75 mg Oral Daily     diclofenac  2 g Topical 4x Daily     enoxaparin ANTICOAGULANT  30 mg Subcutaneous Q24H     insulin aspart  1-7 Units Subcutaneous TID AC     insulin aspart  1-5 Units Subcutaneous At Bedtime     levofloxacin  500 mg Intravenous Q24H     lisinopril  2.5 mg Oral Daily     [Held by provider] OLANZapine zydis  5 mg Oral At Bedtime     sodium chloride (PF)  3 mL Intracatheter Q8H                  Physical Exam:     Vitals:    12/15/22 0504 12/15/22 0537 12/15/22 0627 12/15/22 0730   BP:    125/61   BP Location:    Right arm   Patient Position:    Semi-Singletary's   Cuff Size:    Adult Small   Pulse:       Resp:    20   Temp:    98.4  F (36.9  C)   TempSrc:    Tympanic   SpO2: (!) 90% 92% 95% (!) 91%   Weight:       Height:             Vital Sign Ranges  Temperature Temp  Av.8  F (36.6  C)  Min: 96.7  F (35.9  C)  Max: 99.7  F (37.6  C)   Blood pressure Systolic (24hrs), Av , Min:88 , Max:170        Diastolic (24hrs), Av, Min:57, Max:87      Pulse Pulse  Av.1  Min: 59  Max: 87   Respirations Resp  Av.5  Min: 14  Max: 25   Pulse oximetry SpO2  Av.7 %  Min: 86 %  Max: 99 %         Intake/Output Summary (Last 24 hours) at 12/15/2022 0903  Last data filed at 12/15/2022 0619  Gross per 24 hour   Intake 1630 ml   Output --   Net 1630 ml     General:  Alert and Orientated. NAD. Appears frail and lethargic.  ENT: Oropharynx and lips dry  Heart:  RRR, S1 S2, No murmurs, no rubs, no gallops.  Resp: Dim in bases.   Abd: Soft/NT/ND/Positve BS.  Ext: No edema noted in either lower extremity  Neuro:  No focal Neurologic deficits noted but lethargic.       Peripheral IV 22 Anterior;Left Hand (Active)   Site Assessment WDL 12/15/22 0442   Line Status Infusing 12/15/22 0442   Phlebitis Scale 0-->no symptoms  12/15/22 0442   Infiltration Scale 0 12/15/22 0442   Number of days: 3       Gastrostomy/Enterostomy Percutaneous endoscopic gastrostomy (PEG) LUQ 1 20 fr (Active)   Site Description WDL 12/15/22 0737   Drainage Appearance Bloody/Bright Red 12/15/22 0442   Status - Gastrostomy Clamped 12/15/22 0737   Dressing Status Drainage - Minimal;Drainage - Dried 12/15/22 0442   Number of days: 1       Pressure Injury 09/20/20 Coccyx Stage 1 (Active)   Estimated Circumference ( if not measured tennis ball sized 11/28/22 2049   Number of days: 816       Wound Arm Skin tear (Active)   Wound Bed Dry scab 12/15/22 0737   Dorie-wound Assessment Other (Comment) 12/13/22 0222   Drainage Amount None 12/15/22 0105   Drainage Color/Characteristics UTV 12/14/22 1150   Dressing Open to air 12/15/22 0105   Dressing Status Clean, dry, intact 12/13/22 0854   Number of days: 12     PEG             Data:     Lab Results   Component Value Date    WBC 9.0 12/14/2022    HGB 12.2 (L) 12/14/2022    HCT 36.5 (L) 12/14/2022     12/14/2022     (L) 12/14/2022    POTASSIUM 3.4 12/14/2022    CHLORIDE 102 12/14/2022    CO2 21 (L) 12/14/2022    BUN 6.7 (L) 12/14/2022    CR 0.47 (L) 12/14/2022     (H) 12/15/2022    SED 17 12/08/2022    DD 9.9 (H) 06/30/2021    NTBNPI 4,226 (H) 12/02/2022    TROPI <0.015 05/12/2021    AST 31 12/14/2022    ALT 21 12/14/2022    ALKPHOS 98 12/14/2022    BILITOTAL 0.3 12/14/2022    INR 1.08 11/28/2022     Lactic Acid   Date Value Ref Range Status   11/21/2022 2.0 0.7 - 2.0 mmol/L Final   11/21/2022 3.4 (H) 0.7 - 2.0 mmol/L Final   01/05/2022 1.0 0.7 - 2.0 mmol/L Final   06/30/2021 1.7 0.7 - 2.0 mmol/L Final   06/30/2021 4.1 (HH) 0.7 - 2.0 mmol/L Final     Comment:     Critical result, provider not notified due to previous critical result   notification.  AJS 1113 6/30/21 06/30/2021 5.7 (HH) 0.7 - 2.0 mmol/L Final     Comment:     Critical Value called to and read back by  DR. MANNING @ [facility] 6/30/21          Dante Edward, DO

## 2022-12-15 NOTE — PROGRESS NOTES
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]  Elevated troponin [R77.8]  Failure to thrive in adult [R62.7]  Fall, initial encounter [W19.XXXA]  Stroke (H) [I63.9]    Procedure(s):  ESOPHAGOGASTRODUODENOSCOPY, WITH PEG TUBE INSERTION     POD: 1 Day Post-Op    Current Diet: NPO    CURRENT MEDICATIONS:  Continuous Medications:  Current Facility-Administered Medications   Medication Last Rate     dextrose 5% and 0.9% NaCl 50 mL/hr at 12/15/22 0839       Scheduled Medications:  Current Facility-Administered Medications   Medication Dose Route Frequency     amLODIPine  2.5 mg Oral Daily     aspirin  81 mg Oral Daily     atorvastatin  40 mg Oral At Bedtime     [Held by provider] clopidogrel  75 mg Oral Daily     diclofenac  2 g Topical 4x Daily     enoxaparin ANTICOAGULANT  30 mg Subcutaneous Q24H     insulin aspart  1-7 Units Subcutaneous TID AC     insulin aspart  1-5 Units Subcutaneous At Bedtime     levofloxacin  500 mg Intravenous Q24H     lisinopril  2.5 mg Oral Daily     [Held by provider] OLANZapine zydis  5 mg Oral At Bedtime     sodium chloride (PF)  3 mL Intracatheter Q8H       PRN Medications:  Current Facility-Administered Medications   Medication Dose Route Frequency     acetaminophen  650 mg Oral Q6H PRN    Or     acetaminophen  650 mg Rectal Q6H PRN     glucose  15-30 g Oral Q15 Min PRN    Or     dextrose  25-50 mL Intravenous Q15 Min PRN    Or     glucagon  1 mg Subcutaneous Q15 Min PRN     ipratropium - albuterol 0.5 mg/2.5 mg/3 mL  3 mL Nebulization 4x Daily PRN     lidocaine 4%   Topical Q1H PRN     lidocaine (buffered or not buffered)  0.1-1 mL Other Q1H PRN     morphine (PF)  2 mg Intravenous Q4H PRN     naloxone  0.2 mg Intravenous Q2 Min PRN    Or     naloxone  0.4 mg Intravenous Q2  "Min PRN    Or     naloxone  0.2 mg Intramuscular Q2 Min PRN    Or     naloxone  0.4 mg Intramuscular Q2 Min PRN     ondansetron  4 mg Oral Q6H PRN    Or     ondansetron  4 mg Intravenous Q6H PRN     sodium chloride (PF)  3 mL Intracatheter q1 min prn       SUBJECTIVE:   Nausea: No. Vomiting: No. Fever: No. Chills: No.    PHYSICAL EXAM:   Vital signs: /61 (BP Location: Right arm, Patient Position: Semi-Singletary's, Cuff Size: Adult Small)   Pulse 83   Temp 98.4  F (36.9  C) (Tympanic)   Resp 20   Ht 1.753 m (5' 9\")   Wt 53 kg (116 lb 13.5 oz)   SpO2 (!) 91%   BMI 17.25 kg/m     BMI: Body mass index is 17.25 kg/m .   General: healthy, cooperative, alert   Lungs: respirations are non-labored   Abdominal: non-distended; G tube intact   Neurological: without deficit    INPUT/OUTPUT:      Intake/Output Summary (Last 24 hours) at 12/15/2022 0940  Last data filed at 12/15/2022 0619  Gross per 24 hour   Intake 1630 ml   Output --   Net 1630 ml       I/O last 3 completed shifts:  In: 1630 [I.V.:1630]  Out: -     LABS:    Last CBC Rrsults:   Recent Labs   Lab Test 12/14/22  0508 12/13/22  0524 12/11/22  0533   WBC 9.0 8.3 10.2   RBC 4.23* 4.12* 4.37*   HGB 12.2* 12.0* 12.8*   HCT 36.5* 35.8* 37.6*   MCV 86 87 86   MCH 28.8 29.1 29.3   MCHC 33.4 33.5 34.0   RDW 14.6 14.6 14.0    259 275       Last Comprehensive Metabolic panel:  Recent Labs   Lab Test 12/15/22  0835 12/15/22  0136 12/14/22 2218 12/14/22  1016 12/14/22  0508 12/13/22  0853 12/13/22  0524 12/11/22  1139 12/11/22  0533 12/08/22  0634 12/08/22  0522   NA  --   --   --   --  134*  --  133*  --  131*  --  132*   POTASSIUM  --   --   --   --  3.4  --  3.6  --  4.0  --  4.3   CHLORIDE  --   --   --   --  102  --  104  --  98  --  97*   CO2  --   --   --   --  21*  --  19*  --  22  --  22   ANIONGAP  --   --   --   --  11  --  10  --  11  --  13   * 188* 163*   < > 170*   < > 163*   < > 109*   < > 196*   BUN  --   --   --   --  6.7*  --  15.4  " --  12.8  --  19.5   CR  --   --   --   --  0.47*  --  0.54*  --  0.54*  --  0.63*   GFRESTIMATED  --   --   --   --  >90  --  >90  --  >90  --  >90   CARLOS  --   --   --   --  7.5*  --  7.6*  --  7.8*  --  8.0*   BILITOTAL  --   --   --   --  0.3  --   --   --  0.5  --  0.3   ALKPHOS  --   --   --   --  98  --   --   --  110  --  100   ALT  --   --   --   --  21  --   --   --  20  --  18   AST  --   --   --   --  31  --   --   --  29  --  24    < > = values in this interval not displayed.       Recent Labs   Lab Test 12/14/22  0508 12/11/22  0533 12/08/22  0522 12/02/22  0751 12/01/22  0543 11/29/22  0536 11/28/22  1024 06/08/21  1536 05/25/21  0536 05/24/21  0528 05/23/21  0539   MAG  --   --   --  1.9  --  2.0 1.9   < > 1.7 1.6 1.7   ALBUMIN 2.8* 3.0* 2.8*  --    < >  --   --    < > 1.8* 1.9* 1.8*   PHOS  --   --   --   --   --   --   --   --  2.9 3.1 3.6    < > = values in this interval not displayed.       ASSESSMENT:    1 Day Post-Op from Procedure(s):  ESOPHAGOGASTRODUODENOSCOPY, WITH PEG TUBE INSERTION.      PLAN:   May go ahead and use the G-tube today.  Limit instillation of water/feeds to 100 cc every 4 hours.  Starting tomorrow, 12/16/2022, may use the G-tube ad alan. for feeds/medications/hydration.

## 2022-12-15 NOTE — PLAN OF CARE
No acute changes. Remains lethargic but alert after returning from surgery. Alert to self, intermittently place. Afebrile. BPs elevated post procedure; -170s. Dr. Edward notified and home BP meds unheld,starting tomorrow. Apical rate irregular; rate controlled. Lungs coarse post procedure. Intermittent weak, ineffective congested cough. O2 sats adequate on room air. G-tube placed today, scant amount of serosanguinous drainage noted on dressing. Incontinent of bowel and bladder; incontinent cares performed every 2 hours and as needed. IV fluids infusing. Blood sugars as documented. Intermittently complains of abdominal pain but has refused pain medications several times. Turn and repo q2 hours.      PEG tube can be used starting 12/15.     Remains in close observation with alarms on. Call light remains within reach.

## 2022-12-15 NOTE — PLAN OF CARE
Face to face report given with opportunity to observe patient.    Report given to JUDD Menjivar RN   12/14/2022  6:59 PM

## 2022-12-15 NOTE — PROGRESS NOTES
12/15/22 1003   Appointment Info   Signing Clinician's Name / Credentials (PT) Letha Alfredo DPT   Appointment Canceled Reason (PT) Medical status   Appointment Cancel Comments (PT) Pt with increased O2 requirements today, per Dr. Edward okay to hold PT.   Rehab Comments (PT) With pt's continuous decline in functional status despite daily PT interventions, may consider discharge from PT services in coming days if no improvement noted.

## 2022-12-15 NOTE — PROGRESS NOTES
12/15/22 1300   Appointment Info   Signing Clinician's Name / Credentials (SLP) Arabella Whalen MS CCC-SLP   Appointment Canceled Reason (SLP) Hold (see Cancel Comments row)   Appointment Cancel Comments (SLP) Discussed plan of care with Dr. Edward. Per provider ok to hold therapy today as decisions are made for goals of care.

## 2022-12-16 NOTE — PROGRESS NOTES
12/16/22 1100   Appointment Info   Signing Clinician's Name / Credentials (SLP) Joan Stratton MA, CF-SLP   Appointment Canceled Reason (SLP) Hold (see Cancel Comments row)   Appointment Cancel Comments (SLP) Discussed plan with Dr. Edward. Per provider ok to hold therpay today as tube feedings started today and provider wished for therapy to hold.

## 2022-12-16 NOTE — PHARMACY-MEDICATION REGIMEN REVIEW
Pharmacy Antimicrobial Stewardship Assessment     Current Antimicrobial Therapy:  Anti-infectives (From now, onward)    Start     Dose/Rate Route Frequency Ordered Stop    12/15/22 0930  levofloxacin (LEVAQUIN) infusion 500 mg         500 mg  100 mL/hr over 60 Minutes Intravenous EVERY 24 HOURS 12/15/22 0902            Indication: Aspiration Pneumonia    Days of Therapy: 2     Pertinent Labs:  Recent Labs   Lab Test 22  0519 22  0508 22  0524   WBC 16.6* 9.0 8.3     Recent Labs   Lab Test 22  0631 22  0522 22  1731 LACT 1.0  --  2.0 CRP  --  24.98*  --  SED  --  17  --  PCAL  --   --   --   < > = values in this interval not displayed.        Temperature:  Temp (24hrs), Av.7  F (36.5  C), Min:96.4  F (35.8  C), Max:99.4  F (37.4  C)    Culture Results:   (22) COVID = negative    Recommendations/Interventions:  1. None    Kuldeep Skelton, LTAC, located within [facility]  2022

## 2022-12-16 NOTE — PLAN OF CARE
Goal Outcome Evaluation: Face to face report given with opportunity to observe patient.    Report given to Mayra Marino RN   12/16/2022  3:38 PM

## 2022-12-16 NOTE — PROGRESS NOTES
12/16/22 1100   Appointment Info   Signing Clinician's Name / Credentials (PT) Radha Perales DPT   Therapeutic Activity   Therapeutic Activities: dynamic activities to improve functional performance Minutes (39395) 20   Symptoms Noted During/After Treatment Fatigue   Treatment Detail/Skilled Intervention Pt seated in recliner at start of session. Pt needed to use commode but was unable to maintain sitting balance with nursing staff. Used mary lift c Ax2 to transport pt from recliner to bed. Once in bed pt had been incontinent. Required max A for rolling for pericares in bed. Total A x2 for scooting up in bed. Pt resting comfortably in bed at end of session.   PT Discharge Planning   PT Plan Progress mobility as tolerate. Pt's mobility has been declining throughout stay   PT Discharge Recommendation (DC Rec) Long term care facility   PT Rationale for DC Rec Pt now requiring mary for pt and staff safety.  Pt will require long term care placement   PT Brief overview of current status mary lift transfer   Total Session Time   Timed Code Treatment Minutes 20   Total Session Time (sum of timed and untimed services) 20

## 2022-12-16 NOTE — PLAN OF CARE
Free from falls/injuries this shift.  Assess as charted.  Up in chair with assist of 2, gait belt, walker. Seated on Specialty foam cushion. Returned to bed after 2 hours. Encouraged to cough up phlegm- large amt sputum. No IV access. Remains on IVF@ 75/hr. PEG tube intact. Clamped. Had Norvasc, ASA & lisinopril-crushed and given thru PEG.Inc at times. Turned Q2H thru shift

## 2022-12-16 NOTE — PROGRESS NOTES
12/16/22 1300   Appointment Info   Signing Clinician's Name / Credentials (OT) Sabina Zaman, OTR/L   Appointment Canceled Reason (OT) Patient declined   Appointment Cancel Comments (OT) Attempted OT but pt declined. Will attempt again Monday.

## 2022-12-16 NOTE — PROGRESS NOTES
"CLINICAL NUTRITION SERVICES  -  ASSESSMENT NOTE    Neel Kerr :    NUTRITION RECOMMENDATIONS  TF recommendations-   - Glucerna 1.5 - 4.5 cartons per day, bolus feeds. 1.5 cartons (355ml) 3x per day. 30ml water before and after each bolus.   -To meet daily fluid needs once IV fluids are discontinued- 250ml water flush between meals 3x per day or as tolerated (750ml total).   -This will provide 1602kcal, 88g protein, and 1740ml free water.  - Initiate feeds with a bolus volume of 120ml. Advance to goal of 355ml as tolerated  - Monitor electrolytes, BG, TF tolerance, bowel movements     ** Waiting for 8oz cartons of Glucerna 1.5 to be delivered. Start tube feeds using Glucerna 1.2 in the Liter bottles until cartons available.       82 yom admitted  with failure to thrive  Hx noted to include:  Pseudoaneurysm left ventricle, COPD, T2DM, CVA. Last A1C noted  Was 12.5 on 1/4/22. BG has increased a little with D5. Starting TFs to day. Tolerated first bolus of 355ml. Pt denied any stomach pain, bloating, reflux.     Diet Order: Glucerna 1.5, 355ml 3x per day (1.5 cartons)  Intake: 355ml this morning    Height: 5' 9\"  Weight: 120 lbs 9.47 oz  Body mass index is 17.81 kg/m .  Weight Status:  Underweight BMI <18.5  Weight History:   Wt Readings from Last 10 Encounters:   12/14/22 53.4 kg (117 lb 11.6 oz)   11/21/22 51.2 kg (112 lb 14 oz)   01/13/22 60.3 kg (133 lb)   01/06/22 56.4 kg (124 lb 6.4 oz)   08/12/21 55.3 kg (122 lb)   08/03/21 55.7 kg (122 lb 12.8 oz)   08/03/21 55.8 kg (123 lb)   07/28/21 56.7 kg (125 lb 1.6 oz)   07/23/21 56.3 kg (124 lb 3.2 oz)   07/22/21 56.7 kg (125 lb)      Weight used to calculate needs: 51kg- admit weight  Estimated Energy Needs: 1351-1723 kcals (30-35 Kcal/Kg)   Estimated Protein Needs: 61-77 grams protein (1.2-1.5 g pro/Kg)  Estimated Fluid Needs: 1574-5333  mL (1 mL/Kcal)     Malnutrition Diagnosis: Moderate malnutrition. Nutrition status expected to improve with tube feeds.  In Context " of:  Chronic illness or disease      MONITORING AND EVALUATION:  RD will monitor weight, labs, need for TF recs

## 2022-12-16 NOTE — PLAN OF CARE
Goal Outcome Evaluation:  Patient is now a DNR DNI, patient has tolerated his tube feedings well, and has had minimal residual, the Peg tube insertion site is clean dry intact with no leaking, there is a dressing in place, patient has been transferred from the bed to the chair and vice versa with the mary, patient has been turned and repositioned frequently, is adequately making his needs known. Patient is forgetful and very pleasant and compliant, vitals as charted.

## 2022-12-16 NOTE — PROGRESS NOTES
[facility]  Hospitalist Progress Note          Assessment and Plan:         Fall, initial encounter/weakness: Several falls at home. He does not recall the falls but family has needed to assist him. PT/OT evaluation for placement.  He has had a fall in the ER and is just getting weaker and weaker basically PT has seen him and he has a lot of just truncal issues unable to sit up straight etc. she has had really high risk for further falls.  Seemingly is getting somewhat worse over time.  He did have a CT scan of his head on the fourth.  We will consider repeat CT scan and order an MRI scan to just assess his issues further.  But at this point there is no way that he would be able to return to independent living status.  Examining him today he is able to lift his legs up off the bed he has equal but decreased hand grasps can not really lift his arms above the bed very well at all.  This is definitely different issues and has had in the past.  I will check a CK level.  Also because of his numerous falls do an MRI of his brain and C-spine and actually thoracic spine also.  -12/8: CK was low at 25.  MRI scan of C-spine does show some areas of compression mild to moderate but not severe enough to be causing his current symptoms.  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  Also couple punctate enhancing foci in the frontal lobes likely representing some subacute infarct.  Not really sure these lesions have anything to do with his current status and symptoms.  He does not seem to have any focal neurological deficits.  -12/9: Weakness is unclear etiology.  The findings on his MRI of his brain potentially could have some thing to do with this.  He is on a baby aspirin daily we will start some Plavix for now.  We will at minimum do some carotid ultrasounds.  Continue to work with physical therapy occupational therapy.  Will need placement.   They are keeping a close eye on him.  At times he does attempt to get out of chair.  Has not any recent falls.  -12/10: Still remains really weak.  Able to get him up in the chair.  Minimal help.  His strength is decreased upper and lower but equal.  Shoulders are very weak.  Seems to have more sensory issues with his right lower leg.  -12/11: Really unable to ascertain why he has had this but this has been a progressive issue for the last month or so or longer.  Is getting the point where he is unable to ambulate any significant distance.  Now is having the dysphagia with aspiration on the swallowing study.  Whether or not this is related to cerebrovascular disease is really unclear.  MRI/MRA C-spine brain did not show any obvious abnormalities.  We will continue with PT continue with speech path and hopefully we can get him back to a safe point of swallowing.  -12/12: Weakness remains the biggest issue.  Physical therapy try to get him up his balance is extremely poor at this point.  Etiology is not really clear.  Maybe he has multiple small strokes.  Unable to return home.  -12/13: Multiple small CVAas identified but unclear whether or not these are contributing to weakness.  Continue PT OT  -12/14: As above, PT/OT.  -12/15: Decondition, ataxia actually worsening despite PT.  Imaging unclear if MRI findings are resulting in these symptoms.   -12/16: Remains weak. Patient getting worse, now requiring Uszie lift. Trial TFs today to see if improves strength.          Oropharyngeal dysphagia: Previously diagnosed. He was seen by speech therapy on 12/2 with recommendations of soft/bite sized and thin liquids, however he did have evidence of significant aspiration this morning. Will place on pureed and moderate liquids (speech unavailable to re-eval x2 days) with 1:1 and upright in chair for all meals. Bedside staff did report he was eating/drinking very fast and impulsively.   -12/4: No coughing or signs of  aspiration on pureed and mildly thick liquids.   -12/5: Speech re-eval today   -12/6: Speech increased diet to soft/bite sized today. He has been very awake and alert over the last 2-3 days.   -12/7: Tolerating his diet without much issue per nursing staff.  -12/9: No big issues in terms of diet no evidence of aspiration.  However speech path did come by and said they now have some concerns regarding thin liquids.  They are attempting at doing a video swallow today to see if there are issues.  SLP evaluation showed that he had significant aspiration on the video swallow.  He is now NPO.  We will need to think about further feeding we will need to start some IV fluids.  Also trying to figure out why this is all occurring.  Whether not this is related to some sort of demyelinating process etc.  -12/10: Not quite sure what to make of his no difficulty swallowing.  We will reevaluate Monday with speech if no improvement then need to strongly consider PEG tube.  Did discuss with the patient.  All of his medications be on hold orally currently is pressures and heart rate are fine.  -12/11: We will have patient reevaluated tomorrow by speech path her hope is that he will be able to start taking in orals otherwise we will need to strongly consider PEG tube.  -12/12: Had repeat swallowing study today still with evidence of aspiration.  They would recommend n.p.o. status.  Discussed with the patient at length and also with his son Lucas he has ability to make his own decisions at this point and would rather go with the PEG tube rather than continue n.p.o. status or risk of aspiration.  Discussed with Dr. Griffiths he will place the PEG tube either Wednesday or Thursday.  -12/13: High aspiration risk.   -12/14: PEG placed today, start TFs tomorrow  -12/15: TFs held due to respiratory decompensation.  -12/16: We did elect to start TFs today despite concern for aspiration and O2 requirments.        Neurocognitive disorder: Chronic,  making taking care of himself increasingly difficult, high risk for falls.      Hyponatremia: Chronic, long standing over the last year or so, stable at his baseline.  Was 131 today we will recheck tomorrow.  -12/9: His serum sodium has been stable 132.  Normal renal function.  Rest of electrolytes are stable also.  -12/13: 133(Mild hyponatremia)  -12/14: Mild, Na 134 today   -12/16: 137       COPD (chronic obstructive pulmonary disease) (H): No signs of acute exacerbation. He has not had any hypoxia, persistent cough. He did have some leukocytosis on arrival, however this improved without intervention.  O2 sat stable on room air.  Lungs are clear  Room air sats stable 92%.  -12/13: RA 93%  -12/13: Stable  12/15: O2 requirements increased overnight to 5L.  Question aspiration versus HAP.  Started Levaquin.  -12/16: O2 requirements remains same, remains on 5L. Concerned about aspiration pneumonia despite previous to today being NPO.   WBC 16.6.        Type 2 diabetes mellitus, with long-term current use of insulin (H): On Lantus 8u daily, glucose running 190-200's. On restricted diet due to dysphagia will hold off on increasing at this time.   -12/4: Switch supplement to Glucerna  -12/6: Sugars 190 mid 200s will increase Lantus somewhat.  No episodes of hypoglycemia.  -12/8: Sugars still 180-280.  Apparently he is not on Lantus at this point it was discontinued at some point we will restart him on 6 units tonight.   -12/9: Sugars still in the 200 range this morning 209 we did start his Lantus last evening.  May require increased dose we will see how he does today.  -12/11: Patient's not on any insulin at all.  He is getting just some gentle IV fluid.  Do not anticipate any hypoglycemia.  -12/12: Sugars have been fine lower side he is not in any insulin at all.  We will add some D5 to his IV fluids.  -12/13: Sugars are up a bit with D5.  Monitoring.   -12/14: Stable  -12/15: Will monitor with Levaquin.   -12/16:  Monitoring with Levaquin     Bilateral shoulder pain: Mild, heat not helping, no prior history. Will try diclofenac gel.  Not sure this is pain he just states this week when I ask him if they hurt he said no.  We will work-up his weakness with a MRI scan.     Moderate malnutrition, POA with BMI < 19.     Hypocalcemia: Corrected Calcium due to low albumin is 8.3      Cardiac: Patient recently had issues had a non-STEMI evaluation showed a pseudoaneurysm inferoseptal area along with  ischemic cardiomyopathy EF from 40 to 45%.  He had a heart catheterization performed which showed three-vessel coronary disease with an occluded distal RCA D1 stenosis and circumflex with prior stent without significant residual stenosis.  Did not feel any interventions were warranted.  The initially recommended transfer the emergency [place] for surgical repair of this.  However the patient refused discharged home and came back in with multiple complaints of just weakness unable to care for himself.  They were trying to transfer him down [facility] however no beds became available.  And at this time this is put on hold after the family is discussion and processes that unless he physically improves they would not want to intervene and put him through a large surgical procedure.        Diet: NPO for Medical/Clinical Reasons Except for: Ice Chips.  TFs to start today.  He is still at risk of aspiration but needs the nutrition.  respiratory status.  .   Fluids: D5 normal saline 75 cc an hour     DVT Prophylaxis: Lovenox  Code Status: Full Code, we will need to further discuss this as he continues to worsen clinically.  Tried to contact son yesterday and will again today.              Clinically Significant Risk Factors            # Hypomagnesemia: Lowest Mg = 1.6 mg/dL in last 2 days, will replace as needed   # Hypoalbuminemia: Lowest albumin = 2.3 g/dL at 12/16/2022  5:19 AM, will monitor as appropriate           # Cachexia:  "Estimated body mass index is 17.81 kg/m  as calculated from the following:    Height as of this encounter: 1.753 m (5' 9\").    Weight as of this encounter: 54.7 kg (120 lb 9.5 oz).   # Moderate Malnutrition: based on nutrition assessment TFs today.             Interval History:     Patient stable but appears frail.  WBC up to 16.6. No fever.  Will trial TFs today to see if improves strength.                Medications:       amLODIPine  2.5 mg Oral Daily     aspirin  81 mg Oral Daily     atorvastatin  40 mg Oral At Bedtime     clopidogrel  75 mg Oral Daily     diclofenac  2 g Topical 4x Daily     enoxaparin ANTICOAGULANT  30 mg Subcutaneous Q24H     insulin aspart  1-7 Units Subcutaneous TID AC     insulin aspart  1-5 Units Subcutaneous At Bedtime     levofloxacin  500 mg Intravenous Q24H     lisinopril  2.5 mg Oral Daily     [Held by provider] OLANZapine zydis  5 mg Oral At Bedtime     sodium chloride (PF)  3 mL Intracatheter Q8H                  Physical Exam:     Vitals:    22 0323 22 0454 22 0548 22 0836   BP:    143/78   BP Location:    Left arm   Patient Position:    Chair   Cuff Size:    Adult Small   Pulse:    78   Resp:    22   Temp:    97.4  F (36.3  C)   TempSrc:    Tympanic   SpO2: (!) 88% 93%  95%   Weight:   54.7 kg (120 lb 9.5 oz)    Height:             Vital Sign Ranges  Temperature Temp  Av.7  F (36.5  C)  Min: 96.4  F (35.8  C)  Max: 99.4  F (37.4  C)   Blood pressure Systolic (24hrs), Av , Min:123 , Max:150        Diastolic (24hrs), Av, Min:49, Max:79      Pulse Pulse  Av.3  Min: 54  Max: 78   Respirations Resp  Av  Min: 20  Max: 22   Pulse oximetry SpO2  Av.8 %  Min: 87 %  Max: 99 %         Intake/Output Summary (Last 24 hours) at 2022 1309  Last data filed at 2022 0836  Gross per 24 hour   Intake 1459 ml   Output --   Net 1459 ml       General:  Alert and Orientated. NAD. Appears frail and lethargic.  ENT: Oropharynx and lips " dry  Heart:  RRR, S1 S2, No murmurs, no rubs, no gallops.  Resp: Dim in bases.   Abd: Soft/NT/ND/Positve BS.  Ext: No edema noted in either lower extremity  Neuro:  No focal Neurologic deficits noted but lethargic.    Peripheral IV 12/16/22 Anterior;Right Lower forearm (Active)   Site Assessment WDL 12/16/22 0836   Line Status Infusing 12/16/22 0836   Dressing Intervention New dressing  12/16/22 0700   Phlebitis Scale 0-->no symptoms 12/16/22 0836   Infiltration Scale 0 12/16/22 0836   Infiltration Site Treatment Method  None 12/16/22 0836   Number of days: 0       Gastrostomy/Enterostomy Percutaneous endoscopic gastrostomy (PEG) LUQ 1 20 fr (Active)   Site Description WDL 12/16/22 0836   Drainage Appearance Other (Comment) 12/16/22 0014   Status - Gastrostomy Clamped 12/16/22 0836   Dressing Status Drainage - Minimal 12/16/22 0836   Intake (ml) 355 ml 12/16/22 0836   Flush/Free Water (mL) 180 mL 12/16/22 0836   Residual (mL) 0 mL 12/16/22 0836   Number of days: 2       Pressure Injury 09/20/20 Coccyx Stage 1 (Active)   Estimated Circumference ( if not measured tennis ball sized 11/28/22 2049   Number of days: 817       Wound Arm Skin tear (Active)   Wound Bed Dry scab 12/16/22 0836   Dorie-wound Assessment Other (Comment) 12/13/22 0222   Drainage Amount None 12/16/22 0836   Drainage Color/Characteristics UTV 12/16/22 0836   Dressing Open to air 12/16/22 0836   Dressing Status Clean, dry, intact 12/16/22 0836   Number of days: 13     No line/device             Data:     Lab Results   Component Value Date    WBC 16.6 (H) 12/16/2022    WBC 9.0 12/14/2022    WBC 8.3 12/13/2022    HGB 12.9 (L) 12/16/2022    HGB 12.2 (L) 12/14/2022    HGB 12.0 (L) 12/13/2022    HCT 38.3 (L) 12/16/2022    HCT 36.5 (L) 12/14/2022    HCT 35.8 (L) 12/13/2022     12/16/2022     12/14/2022     12/13/2022     12/16/2022     (L) 12/14/2022     (L) 12/13/2022    POTASSIUM 3.5 12/16/2022    POTASSIUM 3.4  12/14/2022    POTASSIUM 3.6 12/13/2022    CHLORIDE 107 12/16/2022    CHLORIDE 102 12/14/2022    CHLORIDE 104 12/13/2022    CO2 21 (L) 12/16/2022    CO2 21 (L) 12/14/2022    CO2 19 (L) 12/13/2022    BUN 16.5 12/16/2022    BUN 6.7 (L) 12/14/2022    BUN 15.4 12/13/2022    CR 0.51 (L) 12/16/2022    CR 0.47 (L) 12/14/2022    CR 0.54 (L) 12/13/2022     (H) 12/16/2022     (H) 12/16/2022     (H) 12/16/2022    SED 17 12/08/2022    SED 7 01/03/2022    SED 9 01/23/2017    DD 9.9 (H) 06/30/2021    DD 2.2 (H) 05/19/2021    DD 4.2 (H) 05/16/2021    NTBNPI 4,226 (H) 12/02/2022    NTBNPI 5,894 (H) 11/28/2022    NTBNPI 189 (H) 01/03/2022    TROPI <0.015 05/12/2021    TROPI <0.015 05/11/2021    TROPI <0.015 11/27/2020    AST 16 12/16/2022    AST 31 12/14/2022    AST 29 12/11/2022    ALT 15 12/16/2022    ALT 21 12/14/2022    ALT 20 12/11/2022    ALKPHOS 98 12/16/2022    ALKPHOS 98 12/14/2022    ALKPHOS 110 12/11/2022    BILITOTAL 0.4 12/16/2022    BILITOTAL 0.3 12/14/2022    BILITOTAL 0.5 12/11/2022    INR 1.08 11/28/2022    INR 0.98 01/03/2022    INR 0.98 06/30/2021     Lactic Acid   Date Value Ref Range Status   12/16/2022 1.0 0.7 - 2.0 mmol/L Final   11/21/2022 2.0 0.7 - 2.0 mmol/L Final   11/21/2022 3.4 (H) 0.7 - 2.0 mmol/L Final   06/30/2021 1.7 0.7 - 2.0 mmol/L Final   06/30/2021 4.1 (HH) 0.7 - 2.0 mmol/L Final     Comment:     Critical result, provider not notified due to previous critical result   notification.  AJS 1113 6/30/21 06/30/2021 5.7 (HH) 0.7 - 2.0 mmol/L Final     Comment:     Critical Value called to and read back by  DR. MANNING @ 1015 AJS 6/30/21         Dante Edward, DO

## 2022-12-16 NOTE — PLAN OF CARE
"Reason for Hospital Stay: Fall  Isolation: None  IV Fluids: D5 NS @ 50 mL/hr  Antibiotics: None  LDAs: IV to right lower arm - infusing; PEG tube - clamped     Most recent vitals: /79 (BP Location: Left arm, Patient Position: Supine, Cuff Size: Adult Small)   Pulse 76   Temp 99.4  F (37.4  C) (Tympanic)   Resp 21   Ht 1.753 m (5' 9\")   Wt 53 kg (116 lb 13.5 oz)   SpO2 93%   BMI 17.25 kg/m    Pain: Patient denied pain, PAINAD score 0  Pain Interventions: N/A  B & 176     Orientation & LOC: Alert & disoriented to time & situation  Respiratory: Lung sounds are diminished with fine crackles in the bases; no SOB reported but CAPONE observed, respirations are shallow; cough is weak, infrequent, loose/congested, and productive  - O2: On 5 L via NC  Cardiac: WDL  -Telemetry: Not present  PNV:  WDL  - VTE prevention: Pt refused SCD's  GI: Bowel sounds are audible & hypoactive x4  - Gastrostomy: PEG tube clamped; dressing has a minimal amount of dried bloody red drainage  : Incontinent of urine  -Mata: Not present  Skin:  As charted     Diet: Pt remains NPO ex ice chips  I/O: As charted     Assistance: Suzie  Safety: Call light is within reach, alarms are on, bed is low, and non-slip footwear are on when patient is OOB. Close observation by staff in place.    Face to face report given with opportunity to observe patient.    Report given to JUDD Benitez RN   2022  "

## 2022-12-17 NOTE — PHARMACY
[facility]    Pharmacy      Antimicrobial Stewardship Note     Current antimicrobial therapy:  Anti-infectives (From now, onward)    Start     Dose/Rate Route Frequency Ordered Stop    12/15/22 0930  levofloxacin (LEVAQUIN) infusion 500 mg         500 mg  100 mL/hr over 60 Minutes Intravenous EVERY 24 HOURS 12/15/22 0902            Indication: aspiration PNA    Days of Therapy: 3     Pertinent labs:  Creatinine   Creatinine   Date Value Ref Range Status   12/17/2022 0.54 (L) 0.67 - 1.17 mg/dL Final   12/16/2022 0.51 (L) 0.67 - 1.17 mg/dL Final   12/14/2022 0.47 (L) 0.67 - 1.17 mg/dL Final   07/02/2021 0.69 (L) 0.70 - 1.30 mg/dL Final   07/01/2021 0.59 (L) 0.70 - 1.30 mg/dL Final   06/30/2021 0.80 0.70 - 1.30 mg/dL Final     WBC   WBC   Date Value Ref Range Status   07/03/2021 11.6 (H) 4.0 - 11.0 10e9/L Final   07/02/2021 12.0 (H) 4.0 - 11.0 10e9/L Final   07/01/2021 13.0 (H) 4.0 - 11.0 10e9/L Final     WBC Count   Date Value Ref Range Status   12/16/2022 16.6 (H) 4.0 - 11.0 10e3/uL Final   12/14/2022 9.0 4.0 - 11.0 10e3/uL Final   12/13/2022 8.3 4.0 - 11.0 10e3/uL Final     Procalcitonin   Procalcitonin   Date Value Ref Range Status   01/03/2022 0.66 <0.50 ng/mL ng/mL Final     Comment:     <0.5 ng/mL (Normal) - Low risk of severe sepsis and/or septic shock concentrations.  <0.5 ng/mL does not exclude local infection or a systematic infection in its initial stages (<6 hours).  It is recommended to retest procalcitonin within 6-24 hours if any concentration is <2.0 ng/mL are obtained.    0.5 to 1.99 ng/mL (Moderate risk of systematic infection) Values between 0.5 to 1.99 ng/mL should be interpreted considering patient's history, increased procalcitonin can occur without infection.  It is recommended to retest procalcitonin within 6-24 hours if any concentration is <2.0 ng/mL are obtained.    >2.0 ng/mL (High risk of severe sepsis)   07/03/2021 3.10 ng/ml Final     Comment:     >2.0 ng/mL (High risk of  severe sepsis)   07/02/2021 4.77 ng/ml Final     Comment:     >2.0 ng/mL (High risk of severe sepsis)   06/30/2021 1.59 ng/ml Final     Comment:     0.5 to 1.99 ng/mL (Moderate risk of systematic infection) Values between 0.5   to 1.99 ng/mL should be interpreted considering patient's history, increased   procalcitonin can occur without infection.  It is recommended to retest   procalcitonin within 6-24 hours if any concentration is <2.0 ng/mL are   obtained.       CRP   CRP Inflammation   Date Value Ref Range Status   12/08/2022 24.98 (H) <5.00 mg/L Final   01/03/2022 24.0 (H) <10.0 mg/L Final   05/20/2021 15.1 (H) 0.0 - 8.0 mg/L Final   05/17/2021 52.2 (H) 0.0 - 8.0 mg/L Final   05/16/2021 96.7 (H) 0.0 - 8.0 mg/L Final       Culture Results:      Recommendations/Interventions:  1. None.    Damián Louis RPH  December 17, 2022

## 2022-12-17 NOTE — PLAN OF CARE
Free from  falls/injuries this shift. Assess as charterd. Turned Q2H. HOB elevated for 1 hr after tube feeding. Tolerated feeding at 1800.-355 ml.  Denies pain. Denies nausea. Has had 3 inc stools this shift. O2 at 3L    Face to face report given with opportunity to observe patient.    Report given to Eli Wesley RN   12/16/2022  10:39 PM

## 2022-12-17 NOTE — PROGRESS NOTES
[facility]  Hospitalist Progress Note          Assessment and Plan:         Fall, initial encounter/weakness: Several falls at home.   MRI scan of C-spine does show some areas of compression mild to moderate but not severe enough to be causing his current symptoms.  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  Also couple punctate enhancing foci in the frontal lobes likely representing some subacute infarct.  Not really sure these lesions have anything to do with his current status and symptoms.  He does not seem to have any focal neurological deficits.  -12/9: Weakness is unclear etiology.  The findings on his MRI of his brain potentially could have some thing to do with this.  He is on a baby aspirin daily we will start some Plavix for now.  We will at minimum do some carotid ultrasounds.  Continue to work with physical therapy occupational therapy.  Will need placement.  They are keeping a close eye on him.  At times he does attempt to get out of chair.  Has not any recent falls.  -12/10: Still remains really weak.  Able to get him up in the chair.  Minimal help.  His strength is decreased upper and lower but equal.  Shoulders are very weak.  Seems to have more sensory issues with his right lower leg.  -12/11: Really unable to ascertain why he has had this but this has been a progressive issue for the last month or so or longer.  Is getting the point where he is unable to ambulate any significant distance.  Now is having the dysphagia with aspiration on the swallowing study.  Whether or not this is related to cerebrovascular disease is really unclear.  MRI/MRA C-spine brain did not show any obvious abnormalities.  We will continue with PT continue with speech path and hopefully we can get him back to a safe point of swallowing.  -12/12: Weakness remains the biggest issue.  Physical therapy try to get him up his balance is  extremely poor at this point.  Etiology is not really clear.  Maybe he has multiple small strokes.  Unable to return home.  -12/13: Multiple small CVAas identified but unclear whether or not these are contributing to weakness.  Continue PT OT  -12/14: As above, PT/OT.  -12/15: Decondition, ataxia actually worsening despite PT.  Imaging unclear if MRI findings are resulting in these symptoms.   -12/16: Remains weak. Patient getting worse, now requiring Suzie lift. Trial TFs today to see if improves strength.    -12/17 he looks quite weak.  Hemodynamics stable.  For recent stroke, he is on Plavix.  His blood pressure is stable  does have severe dysphagia.  Now he is on tube feeding        Oropharyngeal dysphagia: Patient is on tube feeding.    Possible aspiration pneumonia-he is on Levaquin.  Day 3       Hyponatremia: Chronic, long standing over the last year or so, stable at his baseline.  Was 131 today we will recheck tomorrow.  -12/9: His serum sodium has been stable 132.  Normal renal function.  Rest of electrolytes are stable also.  -12/13: 133(Mild hyponatremia)  -12/14: Mild, Na 134 today   -12/16: 137       COPD (chronic obstructive pulmonary disease) (H): No signs of acute exacerbation. -Continue his nebulizations.      Type 2 diabetes mellitus, with long-term current use of insulin (H): -Continue sliding scale     Bilateral shoulder pain:  MRI- Multilevel cervical nerve root and cervical cord  compressions as described above     Moderate malnutrition, POA with BMI < 19.     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  He had a heart catheterization performed which showed three-vessel coronary disease with an occluded distal RCA D1 stenosis and circumflex with prior stent without significant residual stenosis.  Did not feel any interventions were  "warranted.  The initially recommended transfer the emergency [place] for surgical repair of this.  However the patient refused discharged home and came back in with multiple complaints of just weakness unable to care for himself.  They were trying to transfer him down [facility] however no beds became available.  And at this time this is put on hold after the family is discussion and processes that unless he physically improves they would not want to intervene and put him through a large surgical procedure.        Diet:   TFs .  He is still at risk of aspiration but needs the nutrition.  respiratory status.  .   Fluids: D5 normal saline 75 cc an hour     DVT Prophylaxis: Lovenox  Code Status: DNR              Clinically Significant Risk Factors            # Hypomagnesemia: Lowest Mg = 1.6 mg/dL in last 2 days, will replace as needed   # Hypoalbuminemia: Lowest albumin = 2.3 g/dL at 12/16/2022  5:19 AM, will monitor as appropriate           # Cachexia: Estimated body mass index is 17.81 kg/m  as calculated from the following:    Height as of this encounter: 1.753 m (5' 9\").    Weight as of this encounter: 54.7 kg (120 lb 9.5 oz).   # Moderate Malnutrition: based on nutrition assessment TFs today.             Interval History:     No significant events.  Tube feeding has been initiated yesterday.  CODE STATUS has been changed to DNR              Medications:       amLODIPine  2.5 mg Oral Daily     aspirin  81 mg Oral Daily     atorvastatin  40 mg Oral At Bedtime     clopidogrel  75 mg Oral Daily     diclofenac  2 g Topical 4x Daily     enoxaparin ANTICOAGULANT  30 mg Subcutaneous Q24H     insulin aspart  1-7 Units Subcutaneous TID AC     insulin aspart  1-5 Units Subcutaneous At Bedtime     levofloxacin  500 mg Intravenous Q24H     lisinopril  2.5 mg Oral Daily     [Held by provider] OLANZapine zydis  5 mg Oral At Bedtime     sodium chloride (PF)  3 mL Intracatheter Q8H                  Physical Exam: "     Vitals:    22 2332 22 0100 22 0300 22 0500   BP: 133/76      BP Location: Left arm      Patient Position: Supine      Cuff Size: Adult Small      Pulse: 75      Resp: 20      Temp: 97.8  F (36.6  C)      TempSrc: Tympanic      SpO2: 96% 92% 96% 96%   Weight:       Height:             Vital Sign Ranges  Temperature Temp  Av.7  F (36.5  C)  Min: 96.4  F (35.8  C)  Max: 99.4  F (37.4  C)   Blood pressure Systolic (24hrs), Av , Min:123 , Max:150        Diastolic (24hrs), Av, Min:49, Max:79      Pulse Pulse  Av.3  Min: 54  Max: 78   Respirations Resp  Av  Min: 20  Max: 22   Pulse oximetry SpO2  Av.8 %  Min: 87 %  Max: 99 %         Intake/Output Summary (Last 24 hours) at 2022 1309  Last data filed at 2022 0836  Gross per 24 hour   Intake 1459 ml   Output --   Net 1459 ml       General: Fairly frail looking man, alert and oriented  ENT: Oropharynx and lips dry  Heart:  RRR, S1 S2,   Resp: Dim in bases.   Abd: Soft/NT/ND/Positve BS.  PEG tube  Ext: No edema noted in either lower extremity  Neuro: Generalized weakness.    Peripheral IV 22 Anterior;Right Lower forearm (Active)   Site Assessment WD 22   Line Status Infusing 22 08   Dressing Intervention New dressing  22 0700   Phlebitis Scale 0-->no symptoms 22   Infiltration Scale 0 22   Infiltration Site Treatment Method  None 22   If infiltrated, was a vesicant infusing? No 22   Number of days: 1       Gastrostomy/Enterostomy Percutaneous endoscopic gastrostomy (PEG) LUQ 1 20 fr (Active)   Site Description WDL 22   Drainage Appearance Other (Comment) 22 0014   Status - Gastrostomy Clamped 22   Dressing Status Drainage - Minimal 2236   Intake (ml) 355 ml 22   Flush/Free Water (mL) 100 mL 22   Residual (mL) 20 mL 22 182   Number of days: 3       Pressure Injury  09/20/20 Coccyx Stage 1 (Active)   Estimated Circumference ( if not measured tennis ball sized 11/28/22 2049   Number of days: 818       Wound Arm Skin tear (Active)   Wound Bed Dry scab 12/16/22 1815   Dorie-wound Assessment Other (Comment) 12/13/22 0222   Drainage Amount None 12/16/22 1815   Drainage Color/Characteristics UTV 12/16/22 0836   Dressing Open to air 12/16/22 2332   Dressing Status Clean, dry, intact 12/16/22 2332   Number of days: 14     No line/device             Data:     Lab Results   Component Value Date    WBC 16.6 (H) 12/16/2022    WBC 9.0 12/14/2022    WBC 8.3 12/13/2022    HGB 12.9 (L) 12/16/2022    HGB 12.2 (L) 12/14/2022    HGB 12.0 (L) 12/13/2022    HCT 38.3 (L) 12/16/2022    HCT 36.5 (L) 12/14/2022    HCT 35.8 (L) 12/13/2022     12/16/2022     12/14/2022     12/13/2022     12/16/2022     (L) 12/14/2022     (L) 12/13/2022    POTASSIUM 3.5 12/16/2022    POTASSIUM 3.4 12/14/2022    POTASSIUM 3.6 12/13/2022    CHLORIDE 107 12/16/2022    CHLORIDE 102 12/14/2022    CHLORIDE 104 12/13/2022    CO2 21 (L) 12/16/2022    CO2 21 (L) 12/14/2022    CO2 19 (L) 12/13/2022    BUN 16.5 12/16/2022    BUN 6.7 (L) 12/14/2022    BUN 15.4 12/13/2022    CR 0.51 (L) 12/16/2022    CR 0.47 (L) 12/14/2022    CR 0.54 (L) 12/13/2022     (H) 12/17/2022     (H) 12/16/2022     (H) 12/16/2022    SED 17 12/08/2022    SED 7 01/03/2022    SED 9 01/23/2017    DD 9.9 (H) 06/30/2021    DD 2.2 (H) 05/19/2021    DD 4.2 (H) 05/16/2021    NTBNPI 4,226 (H) 12/02/2022    NTBNPI 5,894 (H) 11/28/2022    NTBNPI 189 (H) 01/03/2022    TROPI <0.015 05/12/2021    TROPI <0.015 05/11/2021    TROPI <0.015 11/27/2020    AST 16 12/16/2022    AST 31 12/14/2022    AST 29 12/11/2022    ALT 15 12/16/2022    ALT 21 12/14/2022    ALT 20 12/11/2022    ALKPHOS 98 12/16/2022    ALKPHOS 98 12/14/2022    ALKPHOS 110 12/11/2022    BILITOTAL 0.4 12/16/2022    BILITOTAL 0.3 12/14/2022    BILITOTAL 0.5  12/11/2022    INR 1.08 11/28/2022    INR 0.98 01/03/2022    INR 0.98 06/30/2021     Lactic Acid   Date Value Ref Range Status   12/16/2022 1.0 0.7 - 2.0 mmol/L Final   11/21/2022 2.0 0.7 - 2.0 mmol/L Final   11/21/2022 3.4 (H) 0.7 - 2.0 mmol/L Final   06/30/2021 1.7 0.7 - 2.0 mmol/L Final   06/30/2021 4.1 (HH) 0.7 - 2.0 mmol/L Final     Comment:     Critical result, provider not notified due to previous critical result   notification.  AJS 1113 6/30/21 06/30/2021 5.7 (HH) 0.7 - 2.0 mmol/L Final     Comment:     Critical Value called to and read back by  DR. MANNING @ 1015 AJS 6/30/21         Cathi Luna MD

## 2022-12-17 NOTE — PLAN OF CARE
Goal Outcome Evaluation: Face to face report given with opportunity to observe patient.    Report given to Ha Marino RN   12/17/2022  7:39 AM

## 2022-12-17 NOTE — PLAN OF CARE
Goal Outcome Evaluation: upon general assessment, patient alert and does make his need known well, patient Is weak, does need assistance with turning and repositioning, patient has been incontinent of bowel and bladder this evening, patient did ask for a sip of water, this writer did give patient an ice chip to suck on but unfortunately patient did choke on the small amount if water from the ice chip, patient does have an occasional harsh cough, patient has been mostly awake throughout the night, patient has denied pain and discomfort, accu check was 198, IV flushes well. Vitals as charted.

## 2022-12-18 NOTE — PLAN OF CARE
Goal Outcome Evaluation:Face to face report given with opportunity to observe patient.    Report given to Renetta Marino RN   12/18/2022  3:39 PM

## 2022-12-18 NOTE — PROGRESS NOTES
[facility]  Hospitalist Progress Note          Assessment and Plan:         Fall, initial encounter/weakness: Several falls at home.   MRI scan of C-spine does show some areas of compression mild to moderate but not severe enough to be causing his current symptoms.  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  Also couple punctate enhancing foci in the frontal lobes likely representing some subacute infarct.  Not really sure these lesions have anything to do with his current status and symptoms.  He does not seem to have any focal neurological deficits.  -12/9: Weakness is unclear etiology.  The findings on his MRI of his brain potentially could have some thing to do with this.  He is on a baby aspirin daily we will start some Plavix for now.  We will at minimum do some carotid ultrasounds.  Continue to work with physical therapy occupational therapy.  Will need placement.  They are keeping a close eye on him.  At times he does attempt to get out of chair.  Has not any recent falls.  -12/10: Still remains really weak.  Able to get him up in the chair.  Minimal help.  His strength is decreased upper and lower but equal.  Shoulders are very weak.  Seems to have more sensory issues with his right lower leg.  -12/11: Really unable to ascertain why he has had this but this has been a progressive issue for the last month or so or longer.  Is getting the point where he is unable to ambulate any significant distance.  Now is having the dysphagia with aspiration on the swallowing study.  Whether or not this is related to cerebrovascular disease is really unclear.  MRI/MRA C-spine brain did not show any obvious abnormalities.  We will continue with PT continue with speech path and hopefully we can get him back to a safe point of swallowing.  -12/12: Weakness remains the biggest issue.  Physical therapy try to get him up his balance is  extremely poor at this point.  Etiology is not really clear.  Maybe he has multiple small strokes.  Unable to return home.  -12/13: Multiple small CVAas identified but unclear whether or not these are contributing to weakness.  Continue PT OT  -12/14: As above, PT/OT.  -12/15: Decondition, ataxia actually worsening despite PT.  Imaging unclear if MRI findings are resulting in these symptoms.   -12/16: Remains weak. Patient getting worse, now requiring Suzie lift. Trial TFs today to see if improves strength.    -12/17 he looks quite weak.  Hemodynamics stable.  For recent stroke, he is on Plavix.  His blood pressure is stable  does have severe dysphagia.  Now he is on tube feeding    12/18 no significant events overnight.  Awaiting for placement.  Notable for sodium is down to 131  Cut back  on free water through PEG tube        Oropharyngeal dysphagia: Patient is on tube feeding.    Possible aspiration pneumonia-he is on Levaquin.  Day 4       Hyponatremia: -Sodium is better.  Continue free water through PEG tube       COPD . -Continue his nebulizations.      Type 2 diabetes mellitus, with long-term current use of insulin (H): -Continue sliding scale     Bilateral shoulder pain:  MRI- Multilevel cervical nerve root and cervical cord  compressions as described above     Moderate malnutrition, POA with BMI < 19.     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  He had a heart catheterization performed which showed three-vessel coronary disease with an occluded distal RCA D1 stenosis and circumflex with prior stent without significant residual stenosis.  Did not feel any interventions were warranted.  The initially recommended transfer the emergency [place] for surgical repair of this.  However the patient refused discharged home and came back in with multiple  "complaints of just weakness unable to care for himself.  They were trying to transfer him down [facility] however no beds became available.  And at this time this is put on hold after the family is discussion and processes that unless he physically improves they would not want to intervene and put him through a large surgical procedure.        Diet:   TFs .  He is still at risk of aspiration but needs the nutrition.  respiratory status.  .        DVT Prophylaxis: Lovenox  Code Status: DNR              Clinically Significant Risk Factors              # Hypoalbuminemia: Lowest albumin = 2.3 g/dL at 12/16/2022  5:19 AM, will monitor as appropriate           # Cachexia: Estimated body mass index is 17.81 kg/m  as calculated from the following:    Height as of this encounter: 1.753 m (5' 9\").    Weight as of this encounter: 54.7 kg (120 lb 9.5 oz).   # Moderate Malnutrition: based on nutrition assessment TFs today.             Interval History:     No significant events.  He is tolerating tube feeding              Medications:       amLODIPine  2.5 mg Oral Daily     aspirin  81 mg Oral Daily     atorvastatin  40 mg Oral At Bedtime     clopidogrel  75 mg Oral Daily     diclofenac  2 g Topical 4x Daily     enoxaparin ANTICOAGULANT  30 mg Subcutaneous Q24H     insulin aspart  1-7 Units Subcutaneous TID AC     insulin aspart  1-5 Units Subcutaneous At Bedtime     levofloxacin  500 mg Intravenous Q24H     lisinopril  2.5 mg Oral Daily     [Held by provider] OLANZapine zydis  5 mg Oral At Bedtime     sodium chloride (PF)  3 mL Intracatheter Q8H                  Physical Exam:     Vitals:    12/17/22 1159 12/17/22 1726 12/17/22 1954 12/18/22 0420   BP:  142/72 128/69 147/72   BP Location:  Left arm Left arm Left arm   Patient Position:   Supine Supine   Cuff Size:   Adult Small Adult Small   Pulse:   81 74   Resp: 22 20 18 18   Temp:  98.4  F (36.9  C) 98.9  F (37.2  C) (!) 96.7  F (35.9  C)   TempSrc:  Tympanic " Tympanic Tympanic   SpO2:  95% 97% 93%   Weight:       Height:             Vital Sign Ranges  Temperature Temp  Av.7  F (36.5  C)  Min: 96.4  F (35.8  C)  Max: 99.4  F (37.4  C)   Blood pressure Systolic (24hrs), Av , Min:123 , Max:150        Diastolic (24hrs), Av, Min:49, Max:79      Pulse Pulse  Av.3  Min: 54  Max: 78   Respirations Resp  Av  Min: 20  Max: 22   Pulse oximetry SpO2  Av.8 %  Min: 87 %  Max: 99 %         Intake/Output Summary (Last 24 hours) at 2022 1309  Last data filed at 2022 0836  Gross per 24 hour   Intake 1459 ml   Output --   Net 1459 ml       General: Fairly frail looking man,lying in bed  ENT: Oropharynx and lips dry  Heart:  RRR, S1 S2,   Resp: Dim in bases.   Abd: Soft/NT/ND/Positve BS.  PEG tube  Ext: No edema   Neuro: Generalized weakness.    Peripheral IV 22 Anterior;Right Lower forearm (Active)   Site Assessment WDL 22   Line Status Saline locked 22   Dressing Intervention New dressing  22 0700   Phlebitis Scale 0-->no symptoms 22   Infiltration Scale 0 22   Infiltration Site Treatment Method  None 22 230   If infiltrated, was a vesicant infusing? No 22   Number of days: 2       Gastrostomy/Enterostomy Percutaneous endoscopic gastrostomy (PEG) LUQ 1 20 fr (Active)   Site Description WDL 22   Site care cleansed with soap and water 22   Drainage Appearance Other (Comment) 22 0014   Status - Gastrostomy Clamped 22   Dressing Status Normal: Clean, Dry & Intact 22   Intake (ml) 355 ml 22   Flush/Free Water (mL) 100 mL 22   Residual (mL) 17 mL 22   Number of days: 4       Pressure Injury 20 Coccyx Stage 1 (Active)   Estimated Circumference ( if not measured tennis ball sized 22 2049   Number of days: 819       Wound Arm Skin tear (Active)   Wound Bed Dry scab 22 0420   Dorie-wound  Assessment Other (Comment) 12/13/22 0222   Drainage Amount None 12/18/22 0420   Drainage Color/Characteristics UTV 12/18/22 0420   Dressing Open to air 12/18/22 0420   Dressing Status Clean, dry, intact 12/16/22 2332   Number of days: 15     No line/device             Data:     Lab Results   Component Value Date    WBC 9.5 12/18/2022    WBC 16.6 (H) 12/16/2022    WBC 9.0 12/14/2022    HGB 11.9 (L) 12/18/2022    HGB 12.9 (L) 12/16/2022    HGB 12.2 (L) 12/14/2022    HCT 35.7 (L) 12/18/2022    HCT 38.3 (L) 12/16/2022    HCT 36.5 (L) 12/14/2022     12/18/2022     12/16/2022     12/14/2022     12/17/2022     12/16/2022     (L) 12/14/2022    POTASSIUM 3.6 12/17/2022    POTASSIUM 3.5 12/16/2022    POTASSIUM 3.4 12/14/2022    CHLORIDE 104 12/17/2022    CHLORIDE 107 12/16/2022    CHLORIDE 102 12/14/2022    CO2 25 12/17/2022    CO2 21 (L) 12/16/2022    CO2 21 (L) 12/14/2022    BUN 20.1 12/17/2022    BUN 16.5 12/16/2022    BUN 6.7 (L) 12/14/2022    CR 0.54 (L) 12/17/2022    CR 0.51 (L) 12/16/2022    CR 0.47 (L) 12/14/2022     (H) 12/18/2022     (H) 12/17/2022     (H) 12/17/2022    SED 17 12/08/2022    SED 7 01/03/2022    SED 9 01/23/2017    DD 9.9 (H) 06/30/2021    DD 2.2 (H) 05/19/2021    DD 4.2 (H) 05/16/2021    NTBNPI 4,226 (H) 12/02/2022    NTBNPI 5,894 (H) 11/28/2022    NTBNPI 189 (H) 01/03/2022    TROPI <0.015 05/12/2021    TROPI <0.015 05/11/2021    TROPI <0.015 11/27/2020    AST 19 12/17/2022    AST 16 12/16/2022    AST 31 12/14/2022    ALT 16 12/17/2022    ALT 15 12/16/2022    ALT 21 12/14/2022    ALKPHOS 109 12/17/2022    ALKPHOS 98 12/16/2022    ALKPHOS 98 12/14/2022    BILITOTAL 0.3 12/17/2022    BILITOTAL 0.4 12/16/2022    BILITOTAL 0.3 12/14/2022    INR 1.08 11/28/2022    INR 0.98 01/03/2022    INR 0.98 06/30/2021     Lactic Acid   Date Value Ref Range Status   12/16/2022 1.0 0.7 - 2.0 mmol/L Final   11/21/2022 2.0 0.7 - 2.0 mmol/L Final   11/21/2022 3.4  (H) 0.7 - 2.0 mmol/L Final   06/30/2021 1.7 0.7 - 2.0 mmol/L Final   06/30/2021 4.1 (HH) 0.7 - 2.0 mmol/L Final     Comment:     Critical result, provider not notified due to previous critical result   notification.  AJS 1113 6/30/21 06/30/2021 5.7 (HH) 0.7 - 2.0 mmol/L Final     Comment:     Critical Value called to and read back by  DR. MANNING @ 1015 [facility] 6/30/21         Cathi Luna MD

## 2022-12-18 NOTE — PLAN OF CARE
Goal Outcome Evaluation:patient has been able to get from the bed to the chair with assist of staff using the mary, patient tolerated well, patient has tolerated his tube feeding, and did have some residual before he had his afternoon feeding, patient denies nausea  and discomfort, patients sons were here to receive some paperwork but were only here a short while, patient does make his needs known well, is intermittently confused, vitals as charted.

## 2022-12-18 NOTE — PROGRESS NOTES
12/17/22 0297   Appointment Info   Signing Clinician's Name / Credentials (PT) Tia Valencia DPT (Tom)   Therapeutic Activity   Therapeutic Activities: dynamic activities to improve functional performance Minutes (70387) 15   Symptoms Noted During/After Treatment Fatigue   Treatment Detail/Skilled Intervention Pt seated in recliner when PT arrived to room for session. Pt agreeable to work with PT while seated. Progressed through LE manually resisted exercises (2x10 reps each BL) of horizontal hip ER/IR and hip flexion. Pt unable to perform SLR or LAQ independently (PT assisted roughly 50% to target position and pt lowered eccentrically. Pt endorses fatigue as limiting factor today. Pt repositioned for comfort in chair at end of session. Call light, phone, remote within reach. Fall alarm attached.   PT Discharge Planning   PT Plan Progress mobility as tolerate. Pt's mobility has been declining throughout stay   PT Discharge Recommendation (DC Rec) Long term care facility   PT Rationale for DC Rec Pt now requiring mary for pt and staff safety.  Pt will require long term care placement   PT Brief overview of current status mary lift transfer   Total Session Time   Timed Code Treatment Minutes 15   Total Session Time (sum of timed and untimed services) 15

## 2022-12-18 NOTE — PHARMACY
Pharmacy Antimicrobial Stewardship Assessment     Current Antimicrobial Therapy:  Anti-infectives (From now, onward)    Start     Dose/Rate Route Frequency Ordered Stop    12/15/22 09  levofloxacin (LEVAQUIN) infusion 500 mg         500 mg  100 mL/hr over 60 Minutes Intravenous EVERY 24 HOURS 12/15/22 09          Indication: Aspiration Pneumonia    Days of Therapy: 4     Pertinent Labs:  Recent Labs   Lab Test 22  0621 22  0519 22  0508   WBC 9.5 16.6* 9.0     Recent Labs   Lab Test 22  0631 22  0522 22  1731 22  1240 22  1040 21  0520 21  1005 21  1520 21  0611 21  0623 05/15/21  0454 21  0518 21  0503 20  1604 17  1442   LACT 1.0  --  2.0   < > 3.2*  --    < >  --   --   --   --   --   --    < > 0.9   CRP  --  24.98*  --   --  24.0*  --   --  15.1* 52.2* 96.7* 214.0* 322.0* 377.0*   < > 5.6   SED  --  17  --   --  7  --   --   --   --   --   --   --   --   --  9   PCAL  --   --   --   --  0.66 3.10   < >  --   --   --   --   --  1.66   < >  --     < > = values in this interval not displayed.        Temperature:  Temp (24hrs), Av.1  F (36.7  C), Min:96.7  F (35.9  C), Max:98.9  F (37.2  C)    Culture Results:       Recommendations/Interventions:  None at this time    Doreen Ramírez, [facility]  2022

## 2022-12-18 NOTE — PLAN OF CARE
Goal Outcome Evaluation:                      VSS, afebrile. Assessments as charted. Pt is on 3L O2 via NC. Pt received 2 feedings today, with free water, and tolerated both very well, denied any nausea or bloating. Pt up in chair much of the day. Denies pain.    Face to face report given with opportunity to observe patient.    Report given to Jose Enrique Grant RN   12/17/2022  7:39 PM

## 2022-12-18 NOTE — PLAN OF CARE
"/72 (BP Location: Left arm, Patient Position: Supine, Cuff Size: Adult Small)   Pulse 74   Temp (!) 96.7  F (35.9  C) (Tympanic)   Resp 18   Ht 1.753 m (5' 9\")   Wt 54.7 kg (120 lb 9.5 oz)   SpO2 93%   BMI 17.81 kg/m      Pt alert, disoriented to place and situation earlier in the shift, oriented completely this morning, mary lift, turn and repo with checks for incontinence every 2 hrs. Turned down to 2 LPM, sats remained in the 90s. Weak, loose, congested cough where he just cannot seem to cough anything up. Last feeding for 12/17 given around 8 PM, 355 ml formula. 17 ml residual at the time. 450 ml free water given over this shift. Gauze around tube changed. LS dim to the bases. Pt refused voltaren gel. HS glucose 171 and 2  - no coverage given. Inc of bowel and bladder. IV SL.     Face to face report given with opportunity to observe patient.    Report given to JUDD Kamara RN   12/18/2022  7:03 AM      "

## 2022-12-19 NOTE — PROGRESS NOTES
"CLINICAL NUTRITION SERVICES  -  ASSESSMENT NOTE    Neel Kerr     Glucerna 1.5, 8oz cartons delivered today. A case was brought up to the nursing desk, nursing notified. Continue with 355ml (1.5 cartons) bolus TID.    NUTRITION RECOMMENDATIONS  TF recommendations-   - Glucerna 1.5 - 4.5 cartons per day, bolus feeds. 1.5 cartons (355ml) 3x per day while awake. 30ml water before and after each bolus.   - Continue current free water at 100ml TID.  -This will provide 1602kcal, 88g protein, and about 1470 ml free water.  - Monitor electrolytes, BG, TF tolerance, bowel movements    82 yom admitted with failure to thrive  Hx noted to include:  Pseudoaneurysm left ventricle, COPD, T2DM, CVA. Last A1C noted  Was 12.5 on 1/4/22. BG ranging from about 135-170. Tolerating tube feeds well. Weight trending up. Water flushes reduced due to low sodium levels- today sodium was 130. Last BM documented was today.       Diet Order: Glucerna 1.5- 355ml bolus TID. 100ml water flush TID. This will provide 1602kcal, 88g protein, and about 147ml free water.    Height: 5' 9\"  Weight: 121 lbs 11.1 oz  Body mass index is 17.97 kg/m .  Weight Status:  Underweight BMI <18.5  Weight History:   Wt Readings from Last 10 Encounters:   12/14/22 53.4 kg (117 lb 11.6 oz)   11/21/22 51.2 kg (112 lb 14 oz)   01/13/22 60.3 kg (133 lb)   01/06/22 56.4 kg (124 lb 6.4 oz)   08/12/21 55.3 kg (122 lb)   08/03/21 55.7 kg (122 lb 12.8 oz)   08/03/21 55.8 kg (123 lb)   07/28/21 56.7 kg (125 lb 1.6 oz)   07/23/21 56.3 kg (124 lb 3.2 oz)   07/22/21 56.7 kg (125 lb)      Weight used to calculate needs: 51kg- admit weight  Estimated Energy Needs: 7162-6664 kcals (30-35 Kcal/Kg)   Estimated Protein Needs: 61-77 grams protein (1.2-1.5 g pro/Kg)  Estimated Fluid Needs: 7007-7260  mL (1 mL/Kcal)     Malnutrition Diagnosis: Moderate malnutrition. Nutrition status expected to improve with tube feeds.  In Context of:  Chronic illness or disease      MONITORING AND " EVALUATION:  RD will monitor weight, labs, need for TF recs

## 2022-12-19 NOTE — PROGRESS NOTES
[facility]  Hospitalist Progress Note          Assessment and Plan:         Fall, initial encounter/weakness: Several falls at home.   MRI scan of C-spine does show some areas of compression mild to moderate but not severe enough to be causing his current symptoms.  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  Also couple punctate enhancing foci in the frontal lobes likely representing some subacute infarct.  Not really sure these lesions have anything to do with his current status and symptoms.  He does not seem to have any focal neurological deficits.  -12/9: Weakness is unclear etiology.  The findings on his MRI of his brain potentially could have some thing to do with this.  He is on a baby aspirin daily we will start some Plavix for now.  We will at minimum do some carotid ultrasounds.  Continue to work with physical therapy occupational therapy.  Will need placement.  They are keeping a close eye on him.  At times he does attempt to get out of chair.  Has not any recent falls.  -12/10: Still remains really weak.  Able to get him up in the chair.  Minimal help.  His strength is decreased upper and lower but equal.  Shoulders are very weak.  Seems to have more sensory issues with his right lower leg.  -12/11: Really unable to ascertain why he has had this but this has been a progressive issue for the last month or so or longer.  Is getting the point where he is unable to ambulate any significant distance.  Now is having the dysphagia with aspiration on the swallowing study.  Whether or not this is related to cerebrovascular disease is really unclear.  MRI/MRA C-spine brain did not show any obvious abnormalities.  We will continue with PT continue with speech path and hopefully we can get him back to a safe point of swallowing.  -12/12: Weakness remains the biggest issue.  Physical therapy try to get him up his balance is  extremely poor at this point.  Etiology is not really clear.  Maybe he has multiple small strokes.  Unable to return home.  -12/13: Multiple small CVAas identified but unclear whether or not these are contributing to weakness.  Continue PT OT  -12/14: As above, PT/OT.  -12/15: Decondition, ataxia actually worsening despite PT.  Imaging unclear if MRI findings are resulting in these symptoms.   -12/16: Remains weak. Patient getting worse, now requiring Suize lift. Trial TFs today to see if improves strength.    -12/17 he looks quite weak.  Hemodynamics stable.  For recent stroke, he is on Plavix.  His blood pressure is stable  does have severe dysphagia.  Now he is on tube feeding    12/18 no significant events overnight.  Awaiting for placement.  Notable for sodium is down to 131  Cut back  on free water through PEG tube    12/19 no significant events overnight.  He is tolerating tube feeding.  Day 5 of Levaquin  Discussed during huddle.  Cut down the free water to 100 cc every 8 hours  Waiting for SNF  Placement       Oropharyngeal dysphagia: Patient is on tube feeding.    Possible aspiration pneumonia-he is on Levaquin.  Day 5       Hyponatremia: -Sodium is better.  Continue free water through PEG tube.  Down the free water 200 cc every 8 hours     COPD . -Continue his nebulizations.    Type 2 diabetes mellitus, with long-term current use of insulin (H): -Continue sliding scale     Bilateral shoulder pain:  MRI- Multilevel cervical nerve root and cervical cord  compressions as described above     Moderate malnutrition, POA with BMI < 19.     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  He had a heart catheterization performed which showed three-vessel coronary disease with an occluded distal RCA D1 stenosis and circumflex with prior stent without  "significant residual stenosis.  Did not feel any interventions were warranted.  The initially recommended transfer the emergency [place] for surgical repair of this.  However the patient refused discharged home and came back in with multiple complaints of just weakness unable to care for himself.  They were trying to transfer him down [facility] however no beds became available.  And at this time this is put on hold after the family is discussion and processes that unless he physically improves they would not want to intervene and put him through a large surgical procedure.        Diet:   TFs .   .        DVT Prophylaxis: Lovenox  Code Status: DNR              Clinically Significant Risk Factors         # Hyponatremia: Lowest Na = 131 mmol/L in last 2 days, will monitor as appropriate      # Hypoalbuminemia: Lowest albumin = 2.3 g/dL at 12/16/2022  5:19 AM, will monitor as appropriate           # Cachexia: Estimated body mass index is 17.81 kg/m  as calculated from the following:    Height as of this encounter: 1.753 m (5' 9\").    Weight as of this encounter: 54.7 kg (120 lb 9.5 oz).   # Moderate Malnutrition: based on nutrition assessment TFs today.             Interval History:     No significant events.   Tolerating TF              Medications:       amLODIPine  2.5 mg Oral Daily     aspirin  81 mg Oral Daily     atorvastatin  40 mg Oral At Bedtime     clopidogrel  75 mg Oral Daily     diclofenac  2 g Topical 4x Daily     enoxaparin ANTICOAGULANT  30 mg Subcutaneous Q24H     insulin aspart  1-7 Units Subcutaneous TID AC     insulin aspart  1-5 Units Subcutaneous At Bedtime     levofloxacin  500 mg Intravenous Q24H     lisinopril  2.5 mg Oral Daily     [Held by provider] OLANZapine zydis  5 mg Oral At Bedtime     sodium chloride (PF)  3 mL Intracatheter Q8H                  Physical Exam:     Vitals:    12/18/22 0420 12/18/22 0840 12/18/22 1712 12/19/22 0051   BP: 147/72 152/80 143/77 147/83   BP " Location: Left arm Left arm Left arm Left arm   Patient Position: Supine Supine Supine Semi-Singletary's   Cuff Size: Adult Small Adult Small Adult Small Adult Small   Pulse: 74 76 87 91   Resp: 18  18 18   Temp: (!) 96.7  F (35.9  C) 98.3  F (36.8  C)  98.6  F (37  C)   TempSrc: Tympanic Tympanic Tympanic Tympanic   SpO2: 93% 93% 97% 94%   Weight:       Height:             Vital Sign Ranges  Temperature Temp  Av.7  F (36.5  C)  Min: 96.4  F (35.8  C)  Max: 99.4  F (37.4  C)   Blood pressure Systolic (24hrs), Av , Min:123 , Max:150        Diastolic (24hrs), Av, Min:49, Max:79      Pulse Pulse  Av.3  Min: 54  Max: 78   Respirations Resp  Av  Min: 20  Max: 22   Pulse oximetry SpO2  Av.8 %  Min: 87 %  Max: 99 %         Intake/Output Summary (Last 24 hours) at 2022 1309  Last data filed at 2022 0836  Gross per 24 hour   Intake 1459 ml   Output --   Net 1459 ml       General: Fairly frail looking man, in not in distress. A and o * 2  ENT: Oropharynx -some secretions.   Heart:  RRR, S1 S2,   Resp: clear ant   Abd: PEG tube. + BS.   Ext: No edema   Neuro: Generalized weakness. No focal deficit    Peripheral IV 22 Anterior;Right Lower forearm (Active)   Site Assessment WDL 22   Line Status Saline locked 22   Dressing Intervention New dressing  22 1115   Phlebitis Scale 0-->no symptoms 22   Infiltration Scale 0 22   Infiltration Site Treatment Method  None 22   If infiltrated, was a vesicant infusing? No 22   Number of days: 1       Gastrostomy/Enterostomy Percutaneous endoscopic gastrostomy (PEG) LUQ 1 20 fr (Active)   Site Description WDL 22   Site care cleansed with soap and water 22 175   Drainage Appearance Clear 12/19/22 0051   Status - Gastrostomy Clamped 22 005   Dressing Status Drainage - Minimal 22 005   Dressing Change Due 22 1754   Intake (ml) 355 ml  12/18/22 1754   Flush/Free Water (mL) 80 mL 12/18/22 2051   Residual (mL) 30 mL 12/18/22 1754   Number of days: 5       Pressure Injury 09/20/20 Coccyx Stage 1 (Active)   Estimated Circumference ( if not measured tennis ball sized 11/28/22 2049   Number of days: 820       Wound Arm Skin tear (Active)   Wound Bed Dry scab 12/19/22 0051   Dorie-wound Assessment Other (Comment) 12/13/22 0222   Drainage Amount None 12/19/22 0051   Drainage Color/Characteristics UTV 12/18/22 1712   Dressing Open to air 12/19/22 0051   Dressing Status Clean, dry, intact 12/18/22 0840   Number of days: 16     No line/device             Data:     Lab Results   Component Value Date    WBC 9.5 12/18/2022    WBC 16.6 (H) 12/16/2022    WBC 9.0 12/14/2022    HGB 11.9 (L) 12/18/2022    HGB 12.9 (L) 12/16/2022    HGB 12.2 (L) 12/14/2022    HCT 35.7 (L) 12/18/2022    HCT 38.3 (L) 12/16/2022    HCT 36.5 (L) 12/14/2022     12/18/2022     12/16/2022     12/14/2022     (L) 12/18/2022     12/17/2022     12/16/2022    POTASSIUM 3.8 12/18/2022    POTASSIUM 3.6 12/17/2022    POTASSIUM 3.5 12/16/2022    CHLORIDE 99 12/18/2022    CHLORIDE 104 12/17/2022    CHLORIDE 107 12/16/2022    CO2 27 12/18/2022    CO2 25 12/17/2022    CO2 21 (L) 12/16/2022    BUN 17.7 12/18/2022    BUN 20.1 12/17/2022    BUN 16.5 12/16/2022    CR 0.45 (L) 12/18/2022    CR 0.54 (L) 12/17/2022    CR 0.51 (L) 12/16/2022     (H) 12/18/2022     (H) 12/18/2022     (H) 12/18/2022    SED 17 12/08/2022    SED 7 01/03/2022    SED 9 01/23/2017    DD 9.9 (H) 06/30/2021    DD 2.2 (H) 05/19/2021    DD 4.2 (H) 05/16/2021    NTBNPI 4,226 (H) 12/02/2022    NTBNPI 5,894 (H) 11/28/2022    NTBNPI 189 (H) 01/03/2022    TROPI <0.015 05/12/2021    TROPI <0.015 05/11/2021    TROPI <0.015 11/27/2020    AST 19 12/17/2022    AST 16 12/16/2022    AST 31 12/14/2022    ALT 16 12/17/2022    ALT 15 12/16/2022    ALT 21 12/14/2022    ALKPHOS 109 12/17/2022     ALKPHOS 98 12/16/2022    ALKPHOS 98 12/14/2022    BILITOTAL 0.3 12/17/2022    BILITOTAL 0.4 12/16/2022    BILITOTAL 0.3 12/14/2022    INR 1.08 11/28/2022    INR 0.98 01/03/2022    INR 0.98 06/30/2021     Lactic Acid   Date Value Ref Range Status   12/16/2022 1.0 0.7 - 2.0 mmol/L Final   11/21/2022 2.0 0.7 - 2.0 mmol/L Final   11/21/2022 3.4 (H) 0.7 - 2.0 mmol/L Final   06/30/2021 1.7 0.7 - 2.0 mmol/L Final   06/30/2021 4.1 (HH) 0.7 - 2.0 mmol/L Final     Comment:     Critical result, provider not notified due to previous critical result   notification.  AJS 1113 6/30/21 06/30/2021 5.7 (HH) 0.7 - 2.0 mmol/L Final     Comment:     Critical Value called to and read back by  DR. MANNING @ 1015 AJS 6/30/21         Cathi Luna MD

## 2022-12-19 NOTE — PHARMACY
Pharmacy Antimicrobial Stewardship Assessment     Current Antimicrobial Therapy:  Anti-infectives (From now, onward)    Start     Dose/Rate Route Frequency Ordered Stop    12/15/22 0930  levofloxacin (LEVAQUIN) infusion 500 mg         500 mg  100 mL/hr over 60 Minutes Intravenous EVERY 24 HOURS 12/15/22 0902            Indication: aspiration pneumonia    Days of Therapy: 5     Pertinent Labs:    Recent labs: (last 7 days)     22  0524 22  0508 22  0519 22  0631 22  0621   WBC 8.3 9.0 16.6*  --  9.5   LACT  --   --   --  1.0  --        Temperature:  Temp (24hrs), Av  F (36.7  C), Min:97.4  F (36.3  C), Max:98.6  F (37  C)      Culture Results:   none    Recommendations/Interventions:  1. None at this time.    Laly Stringer, [facility]  2022

## 2022-12-19 NOTE — PLAN OF CARE
"Goal Outcome Evaluation: Progressing     Reason for hospital stay: Fall/Failure to thrive   Living situation PTA: Lived at home by himself.   Most recent vitals: /83 (BP Location: Left arm, Patient Position: Semi-Singletary's, Cuff Size: Adult Small)   Pulse 91   Temp 98.6  F (37  C) (Tympanic)   Resp 18   Ht 1.753 m (5' 9\")   Wt 54.7 kg (120 lb 9.5 oz)   SpO2 94%   BMI 17.81 kg/m      Pain Management:  Pt denies having pain.   LOC:  A&O to self. Disorientated to time, place, and situation. Pt kept calling out for Ema. I kindly re-orientated him to his surroundings and told him Ema is not here and she has not been here to see him. Pt would say ok and then 5-10 minutes later he would call for Ema again.   Cardiac:  Apical pulse regular.   Respiratory:  O2: NC 2 LPM. Sats in the mid 90's. Lung sounds: fine crackles throughout.   GI:  LUQ Peg tube. Drain: clamped and dressing ryan d due to minimal drainage. Garland fed tube feeding. Pt tolerated well. No nausea. No abdominal discomfort.   :  Incontinent of urine and stool   Skin Issues:  Skin is cool, dry, and pale. Redness blanchable: Buttock/IT/Sacrum/CoccyxScattered bruises and scabs. Skin tear on arm: Dry scab, No drainage, Open to air. Skin barrier applied if needed.     IV access: left lower forearm. IV SL.   ABX:  None at this time.     Nutrition: Adequate. 355 mL of tube feeding for supper and 250 mL free water.   Ambulation: Suzie lift.   Safety:  Bed in the low position, call light within reach, ID band in place, personal items within reach, and makes needs known.     Comments: VSS. Afebrile. Face to face report given with opportunity to observe patient.    Report given to Caleb JOSEPH RN.    Alyse Espinal RN   12/19/2022  7:24 AM                          "

## 2022-12-19 NOTE — PROGRESS NOTES
12/19/22 7170   Appointment Info   Signing Clinician's Name / Credentials (SLP) Arabella Whalen MS CCC-SLP   Interventions   Interventions Quick Adds Swallowing Dysfunction   Swallowing Dysfunction &/or Oral Function for Feeding   Treatment of Swallowing Dysfunction &/or Oral Function for Feeding Minutes (28313) 10   Symptoms Noted During/After Treatment Fatigue   Treatment Detail/Skilled Intervention Pt seen this AM for skilled services. Continued to provide education to pt regarding status of his current swallow function and rationale behind NPO recommendations. Pt expressed understanding. Provided education regarding oropharyngeal exercises and expressed agreement to participating. With modeling and verbal cues, pt completed the effortful swallow 10x. Pt then began to fatigue.    SLP Discharge Planning   SLP Plan Continue to assess and provide education regarding status of current swallow function and recommendations. Implement oropharyngeal exercises to increase tolerance of one PO texture/viscosity however progress is guarded due to pt's overall status.   SLP Discharge Recommendation Transitional Care Facility;Long term care facility   SLP Rationale for DC Rec Pt is currently unable to safely tolerate PO intake. NPO continues to be recommended at this time.   SLP Brief overview of current status  Pt completed preliminary oropharyngeal exercise programming with modeling and verbal cues. Potential progress for strengthening swallow function remains guarded due to pt's overall status.   Total Session Time   Total Session Time (sum of timed and untimed services) 10

## 2022-12-20 NOTE — PLAN OF CARE
"Most recent vitals: /67 (BP Location: Left arm, Patient Position: Semi-Singletary's)   Pulse 81   Temp 98  F (36.7  C) (Tympanic)   Resp 16   Ht 1.753 m (5' 9\")   Wt 55 kg (121 lb 4.1 oz)   SpO2 97%   BMI 17.91 kg/m      Patient A&O but confused, is able to make needs known but is forgetful. Remains on 2L of O2 via NC. Peg tube in place, dressing CDI. Incontinent of urine and stool. Blanchable redness to buttocks/sacrum. Scattered bruises throughout. Slept off and on in between cares. Call light within reach, able to make needs known at times. Does not use the call light, will holler out if help is needed. Bed alarms on for safety.     " QTc: [id number]: || ||00\01||False;

## 2022-12-20 NOTE — PROGRESS NOTES
12/20/22 1200   Appointment Info   Signing Clinician's Name / Credentials (OT) Sabina Zaman OTR/L   Appointment Canceled Reason (OT) Patient declined   Appointment Cancel Comments (OT) Will decrease OT to 2x/week due to lack of functional progress.   OT Goals   Therapy Frequency (OT) 2 times/wk  (Decreasing to to 2x/week due to lack of progress)   OT Predicted Duration/Target Date for Goal Attainment 12/28/22

## 2022-12-20 NOTE — PROGRESS NOTES
Care Transitions focused note:      Referral sent to [facility] and updated notes to Annemarie Lo.    Muna Basurto CM

## 2022-12-20 NOTE — PLAN OF CARE
"Reason for hospital stay:  Weakness, Falls, Hyponatremia, PEG tube placement    Most recent vitals: /65   Pulse 87   Temp 97.9  F (36.6  C) (Tympanic)   Resp 16   Ht 1.753 m (5' 9\")   Wt 55.2 kg (121 lb 11.1 oz)   SpO2 96%   BMI 17.97 kg/m      Pain Management:  Denied any pain.    LOC:  Alert but confused - makes needs known. Forgetful and doesn't use call light. Pleasant and cooperative. No attempts to get out of chair or bed this shift.     Cardiac:  WDL    Respiratory:  Lungs dim with scattered crackles at times. Frequent weak congested cough. Maintaining sats 96% on 2 LPM.     GI: Remains NPO - Continues on PEG tube feedings 3x daily with 100mL free water with each feeding. 30-40 mL residuals prior to feedings.     :  Incontinent     IVF:  SL    ABX:  Continues on IV Levaquin    Activity: Suzie lift - up in chair for tube feedings     Safety:  Alarms on - room close to desk with increased staff visualization.       Face to face report given with opportunity to observe patient.    Report given to Luisa Price RN   12/19/2022  11:12 PM        "

## 2022-12-20 NOTE — PROGRESS NOTES
12/20/22 1200   Appointment Info   Signing Clinician's Name / Credentials (SLP) Arabella Whalen MS CCC-SLP   Interventions   Interventions Quick Adds Swallowing Dysfunction   Swallowing Dysfunction &/or Oral Function for Feeding   Treatment of Swallowing Dysfunction &/or Oral Function for Feeding Minutes (14994) 10   Symptoms Noted During/After Treatment Fatigue   Treatment Detail/Skilled Intervention Pt seen this AM for skilled services. Provided continued education regarding current swallow function, risk for aspiration, and oropharyngeal exercise programming. Pt expressed understanding however then requested to have something to drink. Targeted effortful swallow exercise which pt completed 10x with modeling and verbal cues. Attempted additional trials however pt began to demonstrate poor activity tolerance and additional oropharyngeal exercising was discontinued.   SLP Discharge Planning   SLP Plan Continue to assess and provide education regarding status of current swallow function and recommendations. Implement oropharyngeal exercises to increase tolerance of one PO texture/viscosity however progress is guarded due to pt's overall status.   SLP Discharge Recommendation Transitional Care Facility;Long term care facility   SLP Rationale for DC Rec Pt is currently unable to safely tolerate PO intake. NPO continues to be recommended at this time.   SLP Brief overview of current status  Continued attempts for oropharyngeal exercise programming with modeling and verbal cues. Potential progress for strengthening swallow function remains guarded due to pt's overall status.   Total Session Time   Total Session Time (sum of timed and untimed services) 10

## 2022-12-20 NOTE — PROGRESS NOTES
12/19/22 1000   Appointment Info   Signing Clinician's Name / Credentials (PT) Minna Salas PT   Appointment Canceled Reason (PT) Patient declined   Rehab Comments (PT) Due to patient's lack of progress with PT intervention, PT frequency will decrease to 2x/week   Physical Therapy Goals   PT Frequency 2x/week  (Due to patient's lack of progress with PT, will decrease PT frequency to 2x/week.)

## 2022-12-20 NOTE — PROGRESS NOTES
"   12/20/22 1000   Appointment Info   Signing Clinician's Name / Credentials (PT) Dana Reece, SPT   Student Supervision Direct supervision provided   Therapeutic Procedure/Exercise   Ther. Procedure: strength, endurance, ROM, flexibillity Minutes (40982) 8   Symptoms Noted During/After Treatment fatigue   Treatment Detail/Skilled Intervention Pt sitting upright in chair upon PT's arrival, initially hesitant to do PT today but agreed to do seated therex with encouragement. Seated therex: LAQs 1x10e, hip raises 1x10e, Heel/toe raises 1x10e, improved L LE use in plantarflexion and dorsiflexion; pt reports he feels like he is doing \"okay\". Pt having difficulty with both feet coordination and control requiring ample verbal/tactile cues throughout therex to complete. Pt left with call light within reach and chair alarm on.   PT Discharge Planning   PT Plan Progress mobility as tolerate. Pt's mobility has been declining throughout stay   PT Discharge Recommendation (DC Rec) Long term care facility   PT Rationale for DC Rec Pt now requiring [facility] for pt and staff safety.  Pt will require long term care placement   PT Brief overview of current status mary lift transfer   Total Session Time   Timed Code Treatment Minutes 8   Total Session Time (sum of timed and untimed services) 8     "

## 2022-12-20 NOTE — PROGRESS NOTES
[facility]    Hospitalist Progress Note      Assessment & Plan   Neel Kerr is a 82 year old male who was admitted on 11/28/2022.            Fall, initial encounter/weakness: Several falls at home.   MRI scan of C-spine does show some areas of compression mild to moderate but not severe enough to be causing his current symptoms.  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  Also couple punctate enhancing foci in the frontal lobes likely representing some subacute infarct.  Not really sure these lesions have anything to do with his current status and symptoms.  He does not seem to have any focal neurological deficits.  -12/9: Weakness is unclear etiology.  The findings on his MRI of his brain potentially could have some thing to do with this.  He is on a baby aspirin daily we will start some Plavix for now.  We will at minimum do some carotid ultrasounds.  Continue to work with physical therapy occupational therapy.  Will need placement.  They are keeping a close eye on him.  At times he does attempt to get out of chair.  Has not any recent falls.  -12/10: Still remains really weak.  Able to get him up in the chair.  Minimal help.  His strength is decreased upper and lower but equal.  Shoulders are very weak.  Seems to have more sensory issues with his right lower leg.  -12/11: Really unable to ascertain why he has had this but this has been a progressive issue for the last month or so or longer.  Is getting the point where he is unable to ambulate any significant distance.  Now is having the dysphagia with aspiration on the swallowing study.  Whether or not this is related to cerebrovascular disease is really unclear.  MRI/MRA C-spine brain did not show any obvious abnormalities.  We will continue with PT continue with speech path and hopefully we can get him back to a safe point of swallowing.  -12/12: Weakness remains the  biggest issue.  Physical therapy try to get him up his balance is extremely poor at this point.  Etiology is not really clear.  Maybe he has multiple small strokes.  Unable to return home.  -12/13: Multiple small CVAas identified but unclear whether or not these are contributing to weakness.  Continue PT OT  -12/14: As above, PT/OT.  -12/15: Decondition, ataxia actually worsening despite PT.  Imaging unclear if MRI findings are resulting in these symptoms.   -12/16: Remains weak. Patient getting worse, now requiring Suzie lift. Trial TFs today to see if improves strength.    -12/17 he looks quite weak.  Hemodynamics stable.  For recent stroke, he is on Plavix.  His blood pressure is stable  does have severe dysphagia.  Now he is on tube feeding     12/18 no significant events overnight.  Awaiting for placement.  Notable for sodium is down to 131  Cut back  on free water through PEG tube     12/19 no significant events overnight.  He is tolerating tube feeding.  Day 5 of Levaquin  Discussed during huddle.  Cut down the free water to 100 cc every 8 hours  Waiting for SNF  Placement  -12/20: Weakness continues.  Patient requires placement.  Requiring Suzie lift.  Is on Plavix aspirin.        Oropharyngeal dysphagia: Patient is on tube feeding.  -12/20: Patient had PEG tube placed.  Tolerating tube feedings.  Stooling.  No nausea vomiting.     Possible aspiration pneumonia-he is on Levaquin.  Day 5  -12/20: Would discontinue    No fevers white count within normal range.  O2 sats stable on room air 97%.  Lungs are basically clear to auscultation.        Hyponatremia: -Sodium is better.  Continue free water through PEG tube.  Down the free water 200 cc every 8 hours  -12/20: Serum sodium 130 stable from yesterday.  His levels did drop down after initiation of his tube feedings.     COPD . -Continue his nebulizations.  -12/20: No real issues.  No wheezing.     Type 2 diabetes mellitus, with long-term current use of insulin  (H): -Continue sliding scale  -12/20: With his tube feedings and sliding scale sugars are adequate.      Bilateral shoulder pain:  MRI- Multilevel cervical nerve root and cervical cord  compressions as described above     Moderate malnutrition, POA with BMI < 19.     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  He had a heart catheterization performed which showed three-vessel coronary disease with an occluded distal RCA D1 stenosis and circumflex with prior stent without significant residual stenosis.  Did not feel any interventions were warranted.  The initially recommended transfer the emergency [place] for surgical repair of this.  However the patient refused discharged home and came back in with multiple complaints of just weakness unable to care for himself.  They were trying to transfer him down [facility] however no beds became available.  And at this time this is put on hold after the family is discussion and processes that unless he physically improves they would not want to intervene and put him through a large surgical procedure.              Diet: NPO for Medical/Clinical Reasons Except for: Ice Chips  Adult Formula Bolus Feeding: Other; Glucerna; Route: Gastrostomy; 3 times daily; Volume per Bolus: 355; mL(s); Additional free water (mL): 100 ml free water three times daily; Start with free water 2 x before starting TFs  Fluids: No IV fluids    DVT Prophylaxis: Enoxaparin (Lovenox) SQ  Code Status: No CPR- Do NOT Intubate    Disposition: Expected discharge when accepting facility is found    Leo Mackey MD    Interval History   Patient alert pleasant at times little confused no agitation at all.  Does have a PEG tube in place he is tolerating the tube feedings without difficulty.  States he is very hungry.  However is unable  to eat due to his dysphagia.  Hemodynamically stable.  We will stop his IV antibiotics.  We are looking towards disposition at some point.  When a facility is available.    -Data reviewed today: I reviewed all new labs and imaging results over the last 24 hours. I personally reviewed no images or EKG's today.    Physical Exam   Temp: 98  F (36.7  C) Temp src: Tympanic BP: 122/67 Pulse: 81   Resp: 16 SpO2: 97 % O2 Device: Nasal cannula Oxygen Delivery: 2 LPM  Vitals:    12/16/22 0548 12/19/22 0610 12/20/22 0015   Weight: 54.7 kg (120 lb 9.5 oz) 55.2 kg (121 lb 11.1 oz) 55 kg (121 lb 4.1 oz)     Vital Signs with Ranges  Temp:  [97.4  F (36.3  C)-98  F (36.7  C)] 98  F (36.7  C)  Pulse:  [75-87] 81  Resp:  [16] 16  BP: (122-143)/(65-78) 122/67  SpO2:  [96 %-97 %] 97 %  I/O last 3 completed shifts:  In: 1922 [I.V.:377; NG/GT:480]  Out: 100 [Urine:100]    Peripheral IV 12/18/22 Anterior;Right Lower forearm (Active)   Site Assessment WDL 12/20/22 0100   Line Status Saline locked 12/20/22 0100   Dressing Intervention New dressing  12/18/22 1115   Phlebitis Scale 0-->no symptoms 12/20/22 0100   Infiltration Scale 0 12/20/22 0100   Infiltration Site Treatment Method  None 12/19/22 0054   If infiltrated, was a vesicant infusing? No 12/19/22 0054   Number of days: 2       Gastrostomy/Enterostomy Percutaneous endoscopic gastrostomy (PEG) LUQ 1 20 fr (Active)   Site Description WDL 12/20/22 0100   Site care cleansed with soap and water 12/19/22 2210   Drainage Appearance Bloody/Bright Red 12/19/22 2210   Status - Gastrostomy Clamped 12/20/22 0100   Dressing Status Normal: Clean, Dry & Intact 12/20/22 0100   Dressing Change Due 12/19/22 12/18/22 1754   Intake (ml) 60 ml 12/19/22 2210   Flush/Free Water (mL) 100 mL 12/19/22 2210   Residual (mL) 30 mL 12/19/22 2210   Number of days: 6       Pressure Injury 09/20/20 Coccyx Stage 1 (Active)   Estimated Circumference ( if not measured tennis ball sized 11/28/22 2049   Number of days:  821       Wound Arm Skin tear (Active)   Wound Bed Dry scab 12/19/22 0900   Dorie-wound Assessment Other (Comment) 12/13/22 0222   Drainage Amount None 12/19/22 0900   Drainage Color/Characteristics UTV 12/18/22 1712   Dressing Open to air 12/19/22 0900   Dressing Status Clean, dry, intact 12/18/22 0840   Number of days: 17     No line/device    Constitutional: Alert and oriented x2 No distress    HEENT: Normocephalic/atraumatic, PERRL, EOMI, mouth slightly dry, neck supple, no LN.     Cardiovascular: RRR.  No murmur, no  rubs, or gallops.  JVD flat.  Bruits no.  Pulses 2    Respiratory: Basically clear to auscultation bilaterally    Abdomen: Soft, nontender, nondistended, no organomegaly. Bowel sounds present, PEG tube in place secured no drainage    Extremities: Warm/dry.  edema    Neuro:   Non focal, cranial nerves intact, Moves all extremities.  Medications     dextrose         amLODIPine  2.5 mg Oral Daily     aspirin  81 mg Oral Daily     atorvastatin  40 mg Oral At Bedtime     clopidogrel  75 mg Oral Daily     diclofenac  2 g Topical 4x Daily     enoxaparin ANTICOAGULANT  30 mg Subcutaneous Q24H     insulin aspart  1-7 Units Subcutaneous TID AC     insulin aspart  1-5 Units Subcutaneous At Bedtime     levofloxacin  500 mg Intravenous Q24H     lisinopril  2.5 mg Oral Daily     [Held by provider] OLANZapine zydis  5 mg Oral At Bedtime     sodium chloride (PF)  3 mL Intracatheter Q8H       Data   Recent Labs   Lab 12/20/22  0520 12/19/22  2211 12/19/22  1622 12/19/22  0858 12/19/22  0647 12/18/22  0853 12/18/22  0621 12/17/22  0902 12/17/22  0741 12/16/22  0900 12/16/22  0519   WBC 9.3  --   --   --   --   --  9.5  --   --   --  16.6*   HGB 12.1*  --   --   --   --   --  11.9*  --   --   --  12.9*   MCV 87  --   --   --   --   --  88  --   --   --  89     --   --   --   --   --  236  --   --   --  214   *  --   --   --  130*  --  131*  --  136  --  137   POTASSIUM 4.4  --   --   --  4.1  --  3.8  --   3.6  --  3.5   CHLORIDE 95*  --   --   --  95*  --  99  --  104  --  107   CO2 28  --   --   --  28  --  27  --  25  --  21*   BUN 18.3  --   --   --  12.2  --  17.7  --  20.1  --  16.5   CR 0.46*  --   --   --  0.48*  --  0.45*  --  0.54*  --  0.51*   ANIONGAP 7  --   --   --  7  --  5*  --  7  --  9   CARLOS 8.0*  --   --   --  7.7*  --  7.7*  --  7.6*  --  7.5*   * 173* 144*   < > 154*   < > 151*   < > 170*   < > 192*   ALBUMIN  --   --   --   --   --   --   --   --  2.6*  --  2.3*   PROTTOTAL  --   --   --   --   --   --   --   --  5.6*  --  5.3*   BILITOTAL  --   --   --   --   --   --   --   --  0.3  --  0.4   ALKPHOS  --   --   --   --   --   --   --   --  109  --  98   ALT  --   --   --   --   --   --   --   --  16  --  15   AST  --   --   --   --   --   --   --   --  19  --  16    < > = values in this interval not displayed.       No results found for this or any previous visit (from the past 24 hour(s)).

## 2022-12-20 NOTE — PLAN OF CARE
Goal Outcome Evaluation:      Plan of Care Reviewed With: patient      Alert.  Confused to time and situation.  Denies pain.  Frequently asks for food and drink.  Tolerating tube feedings x2 without difficulties.  Denies nausea.  Transfers with use of Suzie.  Generalized weakness. Oxygen sats 94%+ on oxygen, weaned to room air.  Denies dyspnea.  Lungs clear.  Remains NPO per speech therapy.         Face to face report given with opportunity to observe patient.    Report given to Luisa Lundy RN   12/20/2022  7:39 PM

## 2022-12-21 NOTE — PROGRESS NOTES
Care Transitions focused note:      [facility] no LTC beds but will check with admin on Tues to see if they can take this patient.  [facility]   [facility]  [facility]: no appropriate bed due to heavy cares and 2 assist.  [facility]: referral sent  [facility] : referral sent  [facility]: no beds due to staffing  [facility]: Referral sent  [facility]: does not have LTC  [facility]: [facility]: [facility]: faxed referral All admissions on hold due to staffing  [facility]: No LTC bed  [facility]: referral sent  [facility]: Not accepting admissions due to facility for sale.  [facility]: referral sent, no MA pending  [facility]: [facility]: [facility]ed [facility]  [facility]: no VM available  [facility]: emailed referral    Muna Basurto CM

## 2022-12-21 NOTE — PROGRESS NOTES
[facility]    Hospitalist Progress Note      Assessment & Plan      Fall, initial encounter/weakness: Several falls at home.   MRI scan of C-spine does show some areas of compression mild to moderate but not severe enough to be causing his current symptoms.  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  Also couple punctate enhancing foci in the frontal lobes likely representing some subacute infarct.  Not really sure these lesions have anything to do with his current status and symptoms.  He does not seem to have any focal neurological deficits.  -12/9: Weakness is unclear etiology.  The findings on his MRI of his brain potentially could have some thing to do with this.  He is on a baby aspirin daily we will start some Plavix for now.  We will at minimum do some carotid ultrasounds.  Continue to work with physical therapy occupational therapy.  Will need placement.  They are keeping a close eye on him.  At times he does attempt to get out of chair.  Has not any recent falls.  -12/10: Still remains really weak.  Able to get him up in the chair.  Minimal help.  His strength is decreased upper and lower but equal.  Shoulders are very weak.  Seems to have more sensory issues with his right lower leg.  -12/11: Really unable to ascertain why he has had this but this has been a progressive issue for the last month or so or longer.  Is getting the point where he is unable to ambulate any significant distance.  Now is having the dysphagia with aspiration on the swallowing study.  Whether or not this is related to cerebrovascular disease is really unclear.  MRI/MRA C-spine brain did not show any obvious abnormalities.  We will continue with PT continue with speech path and hopefully we can get him back to a safe point of swallowing.  -12/12: Weakness remains the biggest issue.  Physical therapy try to get him up his balance is extremely poor  at this point.  Etiology is not really clear.  Maybe he has multiple small strokes.  Unable to return home.  -12/13: Multiple small CVAas identified but unclear whether or not these are contributing to weakness.  Continue PT OT  -12/14: As above, PT/OT.  -12/15: Decondition, ataxia actually worsening despite PT.  Imaging unclear if MRI findings are resulting in these symptoms.   -12/16: Remains weak. Patient getting worse, now requiring Suzie lift. Trial TFs today to see if improves strength.    -12/17 he looks quite weak.  Hemodynamics stable.  For recent stroke, he is on Plavix.  His blood pressure is stable  does have severe dysphagia.  Now he is on tube feeding   12/18 no significant events overnight.  Awaiting for placement.  Notable for sodium is down to 131  Cut back  on free water through PEG tube   12/19 no significant events overnight.  He is tolerating tube feeding.  Day 5 of Levaquin  Discussed during huddle.  Cut down the free water to 100 cc every 8 hours  Waiting for SNF  Placement  -12/20: Weakness continues.  Patient requires placement.  Requiring Suzie lift.  Is on Plavix aspirin.  -12/21: No big changes.        Oropharyngeal dysphagia: Patient is on tube feeding.  -12/20: Patient had PEG tube placed.  Tolerating tube feedings.  Stooling.  No nausea vomiting.  -12/21: Still is a major issue unable to swallow safely per speech pathology.  Tolerating tube feedings without issue.  We will continue same.      Possible aspiration pneumonia-he is on Levaquin.  Day 5  -12/20: Would discontinue    No fevers white count within normal range.  O2 sats stable on room air 97%.  Lungs are basically clear to auscultation.  -12/21: No issues overnight sats fine antibiotics discontinued.     Hyponatremia: -Sodium is better.  Continue free water through PEG tube.  Down the free water 200 cc every 8 hours  -12/20: Serum sodium 130 stable from yesterday.  His levels did drop down after initiation of his tube  feedings.-12/21: We will recheck tomorrow.     COPD . -Continue his nebulizations.  -12/20: No real issues.  No wheezing.  -12/21: O2 sat stable room air 92% no dyspnea lungs distant but clear     Type 2 diabetes mellitus, with long-term current use of insulin (H): -Continue sliding scale  -12/20: With his tube feedings and sliding scale sugars are adequate.  -12/21: Sugars fine 140-160 range.     Bilateral shoulder pain:  MRI- Multilevel cervical nerve root and cervical cord  compressions as described above     Moderate malnutrition, POA with BMI < 19.     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  He had a heart catheterization performed which showed three-vessel coronary disease with an occluded distal RCA D1 stenosis and circumflex with prior stent without significant residual stenosis.  Did not feel any interventions were warranted.  The initially recommended transfer the emergency [place] for surgical repair of this.  However the patient refused discharged home and came back in with multiple complaints of just weakness unable to care for himself.  They were trying to transfer him down [facility] however no beds became available.  And at this time this is put on hold after the family is discussion and processes that unless he physically improves they would not want to intervene and put him through a large surgical procedure.  -12/21: No cardiac issues.           Diet: NPO for Medical/Clinical Reasons Except for: Ice Chips  Adult Formula Bolus Feeding: Other; Glucerna; Route: Gastrostomy; 3 times daily; Volume per Bolus: 355; mL(s); Additional free water (mL): 100 ml free water three times daily; Start with free water 2 x before starting TFs  Fluids: None    DVT Prophylaxis: Enoxaparin (Lovenox) SQ  Code Status: No CPR- Do NOT  Intubate    Disposition: Expected discharge when accepting facility is found he is medically stable  Leo Mackey MD    Interval History   No new issues tolerating tube feedings without a problem hemodynamically stable.  No dyspnea no complaints sats fine awaiting placement.    -Data reviewed today: I reviewed all new labs and imaging results over the last 24 hours. I personally reviewed no images or EKG's today.    Physical Exam   Temp: 98  F (36.7  C) Temp src: Tympanic BP: 130/68 Pulse: 72   Resp: 16 SpO2: 95 % O2 Device: None (Room air) Oxygen Delivery: 2 LPM  Vitals:    12/19/22 0610 12/20/22 0015 12/21/22 0602   Weight: 55.2 kg (121 lb 11.1 oz) 55 kg (121 lb 4.1 oz) 54.9 kg (121 lb)     Vital Signs with Ranges  Temp:  [97.6  F (36.4  C)-98.1  F (36.7  C)] 98  F (36.7  C)  Pulse:  [72-88] 72  Resp:  [16] 16  BP: (105-148)/(62-76) 130/68  SpO2:  [94 %-98 %] 95 %  I/O last 3 completed shifts:  In: 1365 [NG/GT:655]  Out: 125 [Urine:125]    Peripheral IV 12/18/22 Anterior;Right Lower forearm (Active)   Site Assessment WDL except;Leaking 12/21/22 0100   Line Status Saline locked 12/21/22 0100   Dressing Intervention New dressing  12/18/22 1115   Phlebitis Scale 0-->no symptoms 12/21/22 0100   Infiltration Scale 0 12/21/22 0100   Infiltration Site Treatment Method  None 12/19/22 0054   If infiltrated, was a vesicant infusing? No 12/19/22 0054   Number of days: 3       Gastrostomy/Enterostomy Percutaneous endoscopic gastrostomy (PEG) LUQ 1 20 fr (Active)   Site Description WDL 12/21/22 0100   Site care cleansed with soap and water 12/20/22 2200   Drainage Appearance Bloody/Bright Red 12/20/22 2200   Status - Gastrostomy Clamped 12/21/22 0100   Dressing Status Normal: Clean, Dry & Intact 12/21/22 0100   Dressing Change Due 12/21/22 12/21/22 0100   Intake (ml) 355 ml 12/20/22 2200   Flush/Free Water (mL) 100 mL 12/20/22 2200   Residual (mL) 10 mL 12/20/22 2200   Number of days: 7       Pressure Injury 09/20/20  Coccyx Stage 1 (Active)   Estimated Circumference ( if not measured tennis ball sized 11/28/22 2049   Number of days: 822       Wound Arm Skin tear (Active)   Wound Bed Dry scab 12/21/22 0100   Dorie-wound Assessment Other (Comment) 12/13/22 0222   Drainage Amount None 12/19/22 0900   Drainage Color/Characteristics UTV 12/18/22 1712   Dressing Open to air 12/21/22 0100   Dressing Status Clean, dry, intact 12/18/22 0840   Number of days: 18     No line/device    Constitutional: Alert and oriented at times a bit confused but in no distress  HEENT: Normocephalic/atraumatic, PERRL, EOMI, mouth slightly dry, neck supple, no LN.     Cardiovascular: RRR.  No murmur, no  rubs, or gallops.  JVD flat.  Bruits no.  Pulses 2    Respiratory: clear to auscultation bilaterally    Abdomen: Soft, nontender, nondistended, G-tube site fine.  No organomegaly. Bowel sounds present    Extremities: Warm/dry.  No edema    Neuro:   Non focal, cranial nerves intact, Moves all extremities.  Medications     dextrose         amLODIPine  2.5 mg Oral Daily     aspirin  81 mg Oral Daily     atorvastatin  40 mg Oral At Bedtime     clopidogrel  75 mg Oral Daily     diclofenac  2 g Topical 4x Daily     enoxaparin ANTICOAGULANT  30 mg Subcutaneous Q24H     insulin aspart  1-7 Units Subcutaneous TID AC     insulin aspart  1-5 Units Subcutaneous At Bedtime     lisinopril  2.5 mg Oral Daily     [Held by provider] OLANZapine zydis  5 mg Oral At Bedtime     sodium chloride (PF)  3 mL Intracatheter Q8H       Data   Recent Labs   Lab 12/21/22  0656 12/20/22  2200 12/20/22  1706 12/20/22  0658 12/20/22  0520 12/19/22  0858 12/19/22  0647 12/18/22  0853 12/18/22  0621 12/17/22  0902 12/17/22  0741 12/16/22  0900 12/16/22  0519   WBC  --   --   --   --  9.3  --   --   --  9.5  --   --   --  16.6*   HGB  --   --   --   --  12.1*  --   --   --  11.9*  --   --   --  12.9*   MCV  --   --   --   --  87  --   --   --  88  --   --   --  89   PLT  --   --   --   --  242   --   --   --  236  --   --   --  214   NA  --   --   --   --  130*  --  130*  --  131*  --  136  --  137   POTASSIUM  --   --   --   --  4.4  --  4.1  --  3.8  --  3.6  --  3.5   CHLORIDE  --   --   --   --  95*  --  95*  --  99  --  104  --  107   CO2  --   --   --   --  28  --  28  --  27  --  25  --  21*   BUN  --   --   --   --  18.3  --  12.2  --  17.7  --  20.1  --  16.5   CR  --   --   --   --  0.46*  --  0.48*  --  0.45*  --  0.54*  --  0.51*   ANIONGAP  --   --   --   --  7  --  7  --  5*  --  7  --  9   CARLOS  --   --   --   --  8.0*  --  7.7*  --  7.7*  --  7.6*  --  7.5*   * 142* 152*   < > 198*   < > 154*   < > 151*   < > 170*   < > 192*   ALBUMIN  --   --   --   --   --   --   --   --   --   --  2.6*  --  2.3*   PROTTOTAL  --   --   --   --   --   --   --   --   --   --  5.6*  --  5.3*   BILITOTAL  --   --   --   --   --   --   --   --   --   --  0.3  --  0.4   ALKPHOS  --   --   --   --   --   --   --   --   --   --  109  --  98   ALT  --   --   --   --   --   --   --   --   --   --  16  --  15   AST  --   --   --   --   --   --   --   --   --   --  19  --  16    < > = values in this interval not displayed.       No results found for this or any previous visit (from the past 24 hour(s)).

## 2022-12-21 NOTE — PROGRESS NOTES
12/21/22 1000   Appointment Info   Signing Clinician's Name / Credentials (SLP) Joan Stratton MA, CF-SLP   Interventions   Interventions Quick Adds Swallowing Dysfunction   Swallowing Dysfunction &/or Oral Function for Feeding   Treatment of Swallowing Dysfunction &/or Oral Function for Feeding Minutes (40010) 10   Symptoms Noted During/After Treatment Fatigue   Treatment Detail/Skilled Intervention Pt seen this AM for skilled services. Provided continued education regarding current swallow function, risk for aspiration, and oropharyngeal exercise programming. Targeted effortful swallows and Masaako maneuver with patient. Patient unable to complete Masaako maneuver despite max cues from SLP. Patient completed x 20 effortful swallows with increased breathing and fatigue throughout. SLP discussed completing swallow exercises x3 a day during each tube feeding to increase strength. Patient verbalized understanding.   SLP Discharge Planning   SLP Plan Continue to assess and provide education regarding status of current swallow function and recommendations. Implement oropharyngeal exercises to increase tolerance of one PO texture/viscosity however progress is guarded due to pt's overall status.   SLP Discharge Recommendation Transitional Care Facility;Long term care facility   SLP Rationale for DC Rec Pt is currently unable to safely tolerate PO intake. NPO continues to be recommended at this time.   SLP Brief overview of current status  Continued attemtps for oropharyngeal exercise programming with modeling and verbal cues. Potential progress for strengthening swallow function remains guarded due to pt's overall status.   Total Session Time   Total Session Time (sum of timed and untimed services) 10

## 2022-12-21 NOTE — PLAN OF CARE
"Most recent vitals: /68 (BP Location: Left arm, Patient Position: Semi-Singletary's)   Pulse 72   Temp 98  F (36.7  C) (Tympanic)   Resp 16   Ht 1.753 m (5' 9\")   Wt 55 kg (121 lb 4.1 oz)   SpO2 95%   BMI 17.91 kg/m      Patient alert, disoriented to situation, pleasantly confused at times, is able to make needs known but is forgetful. Has remained on RA, maintaining O2 sats mid to high 90's. Peg tube in place, dressing removed and changed/new, scant serosanguinous drainage around site and dressing, site was cleaned with soap and water. Tolerated last feed of the day well. Denies nausea and SOB. Incontinent of urine, no stools overnight. When asked by writer if patient was in any discomfort, he stated yes. Was unable to describe the pain or specific area in detail, stated \"achey all over\". PRN tylenol given with good relief, scheduled Voltaren cream applied to shoulders.  Blanchable redness to buttocks/sacrum. Scattered bruises throughout. Slept off and on in between cares. Call light within reach, able to make needs known at times. Bed alarms on for safety.     Face to face report given with opportunity to observe patient.    Report given to Shawnee Dean RN   12/21/2022  7:33 AM    "

## 2022-12-21 NOTE — PROGRESS NOTES
12/21/22 1100   Appointment Info   Signing Clinician's Name / Credentials (OT) Sabina Zaman, OTR/L   Therapeutic Procedures/Exercise   Therapeutic Procedure: strength, endurance, ROM, flexibillity minutes (69710) 10   Symptoms Noted During/After Treatment fatigue   Treatment Detail/Skilled Intervention Pt in the chair upon OT arrival, agreeble to tx. Pt informed this writer that he just got to the chair recently but did not use the mary. OT asked the aide who stated they did use the mary lift to transfer him but pt did not recall. Pt participated in UE exercises 2x10 including finger flex/ext, wrist flex/ext, forearm pro/sup, and elbow flex/ext. Pt has difficulties flexing L elbow and was provided with AAROM to achieve full ROM. Pt unable to raise shoulders so was provided with PROM to ~90* of shoulder flexion and pt tolerated well. Instructed pt to hold shoulders flexed to 90* but he was unable to. Pt instructed to comb hair but due to difficulties with shoulder ROM, he bends his neck forward to try completing and is only able to comb the very front therefore required mostly maxA. SpO2 remained low 90s on RA during and after OT. Pt left resting in the chair with the call light within reach and clip alert attached.   OT Discharge Planning   OT Plan Progress strength, ADLs, and activity tolerance as able   OT Discharge Recommendation (DC Rec) Long term care facility   OT Rationale for DC Rec Pt currently requiring max to totalA for ADLs and a mary lift for functioning mobility. He is not be safe to return home. Pt has made limited progress with OT. He will require long term placement.   OT Brief overview of current status Max to TotalA for ADLs   Total Session Time   Timed Code Treatment Minutes 10   Total Session Time (sum of timed and untimed services) 10

## 2022-12-21 NOTE — PLAN OF CARE
Goal Outcome Evaluation:      Plan of Care Reviewed With: patient    Alert.  Confused to time and situation.  Denies pain.  Transfers with use of Suzie.  Tolerating tube feeding of Glucerna x2.  No nausea or abdominal discomfort.  Bowel sounds active.  Incontinent of B&B.      Face to face report given with opportunity to observe patient.    Report given to Juanita Lundy RN   12/21/2022  7:22 PM

## 2022-12-22 NOTE — PROGRESS NOTES
12/22/22 1100   Appointment Info   Signing Clinician's Name / Credentials (OT) Sabina Zaman, OTR/L   Therapeutic Procedures/Exercise   Therapeutic Procedure: strength, endurance, ROM, flexibillity minutes (84481) 10   Symptoms Noted During/After Treatment fatigue   Treatment Detail/Skilled Intervention Pt in the chair upon OT arrival, agreeble to tx. Pt participated in BUE exercises 2x10 including finger flex/ext, wrist flex/ext, forearm pro/sup, and elbow flex/ext. Pt unable to actively raise his shoulders so was provided with PROM to ~90* of shoulder flexion and pt tolerated well. Pt was provided with totalA to comb his hair. Pt left resting in the chair with the call light within reach and clip alert attached. RN entering.   OT Discharge Planning   OT Plan Progress strength, ADLs, and activity tolerance as able   OT Discharge Recommendation (DC Rec) Long term care facility   OT Rationale for DC Rec Pt currently requiring max to totalA for ADLs and a mary lift for functioning mobility. He is not be safe to return home. Pt has made limited progress with OT. He will require long term placement.   OT Brief overview of current status Pt in the chair upon OT arrival, agreeble to tx. Pt participated in BUE exercises 2x10 including finger flex/ext, wrist flex/ext, forearm pro/sup, and elbow flex/ext. Pt unable to actively raise his shoulders so was provided with PROM to ~90* of shoulder flexion and pt tolerated well. Pt was provided with totalA to comb his hair. Pt left resting in the chair with the call light within reach and clip alert attached. RN entering.   Total Session Time   Timed Code Treatment Minutes 10   Total Session Time (sum of timed and untimed services) 10

## 2022-12-22 NOTE — PLAN OF CARE
Awake. Assess as charted. IVF-bolus started. Attempted to straight cath for urine sample. No urine obtained at this time.       1150  Still no void-brief dry

## 2022-12-22 NOTE — PROGRESS NOTES
12/22/22 114   Appointment Info   Signing Clinician's Name / Credentials (PT) Letha Alfredo DPT   Interventions   Interventions Quick Adds Therapeutic Activity   Therapeutic Activity   Therapeutic Activities: dynamic activities to improve functional performance Minutes (28768) 16   Symptoms Noted During/After Treatment Fatigue   Treatment Detail/Skilled Intervention Pt resting in bed upon PT arrival.  Pt agreeable to PT but reports he is feeling tired.  Pt was total A for sup>sit to EOB.  Once seated at EOB was able to maintain sitting balance with SBA if not completing exercises, while completing exercises required mod-max A for sitting balance.  Pt completed marching and LAQ x15-20.  Trialed sit>stand with max A of 2, requires max A to maintain standing balance for 10 seconds then returned to sitting.  Attempted ant/post trunk stability but pt unable to complete and requested to lie back down due to being exhausted.  Pt returned to supine with total A of 2.  IV noted to be leaking so RN notified.  Pt left resting in bed with call light in reach and bed alarm on.   PT Discharge Planning   PT Plan Continue 2x/week   PT Discharge Recommendation (DC Rec) Long term care facility   PT Rationale for DC Rec No notable improvements made, not safe to return home.  Recommend long term care   PT Brief overview of current status mary lift, total A for sup<>sit   Total Session Time   Timed Code Treatment Minutes 16   Total Session Time (sum of timed and untimed services) 16

## 2022-12-22 NOTE — PROGRESS NOTES
[facility]    Hospitalist Progress Note      Assessment & Plan   Neel Kerr is a 82 year old male who was admitted on 11/28/2022.          Fall, initial encounter/weakness: Several falls at home.   MRI scan of C-spine does show some areas of compression mild to moderate but not severe enough to be causing his current symptoms.  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  Also couple punctate enhancing foci in the frontal lobes likely representing some subacute infarct.  Not really sure these lesions have anything to do with his current status and symptoms.  He does not seem to have any focal neurological deficits.  -12/9: Weakness is unclear etiology.  The findings on his MRI of his brain potentially could have some thing to do with this.  He is on a baby aspirin daily we will start some Plavix for now.  We will at minimum do some carotid ultrasounds.  Continue to work with physical therapy occupational therapy.  Will need placement.  They are keeping a close eye on him.  At times he does attempt to get out of chair.  Has not any recent falls.  -12/10: Still remains really weak.  Able to get him up in the chair.  Minimal help.  His strength is decreased upper and lower but equal.  Shoulders are very weak.  Seems to have more sensory issues with his right lower leg.  -12/11: Really unable to ascertain why he has had this but this has been a progressive issue for the last month or so or longer.  Is getting the point where he is unable to ambulate any significant distance.  Now is having the dysphagia with aspiration on the swallowing study.  Whether or not this is related to cerebrovascular disease is really unclear.  MRI/MRA C-spine brain did not show any obvious abnormalities.  We will continue with PT continue with speech path and hopefully we can get him back to a safe point of swallowing.  -12/12: Weakness remains the biggest  issue.  Physical therapy try to get him up his balance is extremely poor at this point.  Etiology is not really clear.  Maybe he has multiple small strokes.  Unable to return home.  -12/13: Multiple small CVAas identified but unclear whether or not these are contributing to weakness.  Continue PT OT  -12/14: As above, PT/OT.  -12/15: Decondition, ataxia actually worsening despite PT.  Imaging unclear if MRI findings are resulting in these symptoms.   -12/16: Remains weak. Patient getting worse, now requiring Suzie lift. Trial TFs today to see if improves strength.    -12/17 he looks quite weak.  Hemodynamics stable.  For recent stroke, he is on Plavix.  His blood pressure is stable  does have severe dysphagia.  Now he is on tube feeding   12/18 no significant events overnight.  Awaiting for placement.  Notable for sodium is down to 131  Cut back  on free water through PEG tube   12/19 no significant events overnight.  He is tolerating tube feeding.  Day 5 of Levaquin  Discussed during huddle.  Cut down the free water to 100 cc every 8 hours  Waiting for SNF  Placement  -12/20: Weakness continues.  Patient requires placement.  Requiring Suzie lift.  Is on Plavix aspirin.  -12/21: No big changes.  -12/22: Remains stable at this point extremely weak.  Remains Suzie lift     Oropharyngeal dysphagia: Patient is on tube feeding.  -12/20: Patient had PEG tube placed.  Tolerating tube feedings.  Stooling.  No nausea vomiting.  -12/21: Still is a major issue unable to swallow safely per speech pathology.  Tolerating tube feedings without issue.  We will continue same.  -12/22: Tolerating tube feedings without difficulty.        Possible aspiration pneumonia-he is on Levaquin.  Day 5  -12/20: Would discontinue    No fevers white count within normal range.  O2 sats stable on room air 97%.  Lungs are basically clear to auscultation.  -12/21: No issues overnight sats fine antibiotics discontinued.  -12/22: Did require some O2  last evening.  Lungs sound basically clear however today.     Hyponatremia: -Sodium is better.  Continue free water through PEG tube.  Down the free water 200 cc every 8 hours  -12/20: Serum sodium 130 stable from yesterday.  His levels did drop down after initiation of his tube feedings.-12/21: We will recheck tomorrow.  -12/22: Some electrolyte changes today on his BMP.  Serum sodium down slightly 129 potassium is up to 5 BUN has jumped up to 34 creatinine stable at 0.5.  Pressures were around 100 systolic.  I think he needs a little bit more fluid volume so we will give him a liter of saline, check his urine studies sodium creatinine osmole's and reassess.  He is renal function based on Riya does not make a lot of sense and figured he would probably was more volume deplete but his Riya calculates greater than 2% suggesting more ATN.  We will repeat the IV fluid as he just definitely clinically looks dry and repeat his labs tomorrow.     COPD . -Continue his nebulizations.  -12/20: No real issues.  No wheezing.  -12/21: O2 sat stable room air 92% no dyspnea lungs distant but clear  -12/22: Did require 1 L of O2.     Type 2 diabetes mellitus, with long-term current use of insulin (H): -Continue sliding scale  -12/20: With his tube feedings and sliding scale sugars are adequate.  -12/21: Sugars fine 140-160 range.     Bilateral shoulder pain:  MRI- Multilevel cervical nerve root and cervical cord  compressions as described above     Moderate malnutrition, POA with BMI < 19.     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  He had a heart catheterization performed which showed three-vessel coronary disease with an occluded distal RCA D1 stenosis and circumflex with prior stent without significant residual stenosis.  Did not feel any interventions were  warranted.  The initially recommended transfer the emergency [place] for surgical repair of this.  However the patient refused discharged home and came back in with multiple complaints of just weakness unable to care for himself.  They were trying to transfer him [facility] however no beds became available.  And at this time this is put on hold after the family is discussion and processes that unless he physically improves they would not want to intervene and put him through a large surgical procedure.  -12/21: No cardiac issues.               Diet: NPO for Medical/Clinical Reasons Except for: Ice Chips  Adult Formula Bolus Feeding: Other; Glucerna; Route: Gastrostomy; 3 times daily; Volume per Bolus: 355; mL(s); Additional free water (mL): 100 ml free water three times daily; Start with free water 2 x before starting TFs  Fluids: 1 L bolus of saline    DVT Prophylaxis: Enoxaparin (Lovenox) SQ  Code Status: No CPR- Do NOT Intubate    Disposition: Expected discharge when accepting facility is found    Leo Mackey MD    Interval History   Patient is alert pleasant confused at times but easily reoriented.  Has no complaints.  He has been hemodynamically stable.  Afebrile is on room air currently.  Labs showed that he did have a bump in his BUN.  This seems more Prerenal and dry.  Did give him a liter of fluid his urine outputs been, minimal here last 24 hours finally did get a urine but showed a Riya greater than 2% which suggest more ATN we will continue some IV fluid here overnight and recheck his electrolytes tomorrow.  And renal function    -Data reviewed today: I reviewed all new labs and imaging results over the last 24 hours. I personally reviewed no images or EKG's today.    Physical Exam   Temp: 98  F (36.7  C) Temp src: Tympanic BP: 103/70 Pulse: 87   Resp: 16 SpO2: 92 % O2 Device: Nasal cannula Oxygen Delivery: 1 LPM  Vitals:    12/20/22 0015 12/21/22 0602 12/22/22 0350   Weight: 55  kg (121 lb 4.1 oz) 54.9 kg (121 lb) 54.9 kg (121 lb 0.5 oz)     Vital Signs with Ranges  Temp:  [97.6  F (36.4  C)-98.2  F (36.8  C)] 98  F (36.7  C)  Pulse:  [65-87] 87  Resp:  [16] 16  BP: (103-140)/(56-70) 103/70  SpO2:  [86 %-93 %] 92 %  I/O last 3 completed shifts:  In: 1820 [NG/GT:755]  Out: -     Peripheral IV 12/18/22 Anterior;Right Lower forearm (Active)   Site Assessment WDL 12/22/22 0409   Line Status Saline locked 12/22/22 0409   Dressing Intervention New dressing  12/18/22 1115   Phlebitis Scale 0-->no symptoms 12/22/22 0409   Infiltration Scale 0 12/22/22 0409   Infiltration Site Treatment Method  None 12/19/22 0054   If infiltrated, was a vesicant infusing? No 12/19/22 0054   Number of days: 4       Gastrostomy/Enterostomy Percutaneous endoscopic gastrostomy (PEG) LUQ 1 20 fr (Active)   Site Description [facility] 12/22/22 0409   Site care cleansed with soap and water 12/21/22 2024   Drainage Appearance Other (Comment) 12/21/22 2024   Status - Gastrostomy Clamped 12/22/22 0409   Dressing Status Normal: Clean, Dry & Intact 12/22/22 0409   Dressing Change Due 12/21/22 12/21/22 0100   Intake (ml) 355 ml 12/21/22 2024   Flush/Free Water (mL) 200 mL 12/21/22 2024   Residual (mL) 33 mL 12/21/22 2024   Number of days: 8       Pressure Injury 09/20/20 Coccyx Stage 1 (Active)   Estimated Circumference ( if not measured tennis ball sized 11/28/22 2049   Number of days: 823       Wound Arm Skin tear (Active)   Wound Bed Dry scab 12/21/22 2016   Dorie-wound Assessment Other (Comment) 12/13/22 0222   Drainage Amount None 12/21/22 2016   Drainage Color/Characteristics UTV 12/18/22 1712   Dressing Open to air 12/21/22 2016   Dressing Status Clean, dry, intact 12/21/22 0744   Number of days: 19     No line/device    Constitutional: Alert and in no distress  HEENT: Normocephalic/atraumatic, PERRL, EOMI, mouth slightly dry, neck supple, no LN.     Cardiovascular: RRR.  No murmur, no  rubs, or gallops.  JVD flat.  Bruits no.   Pulses 2    Respiratory: Occasional scattered rhonchi but basically clear to auscultation bilaterally    Abdomen: Soft, nontender, nondistended, no organomegaly. Bowel sounds present    Extremities: Warm/dry.  No edema    Neuro:   Non focal, cranial nerves intact, Moves all extremities.  Medications     dextrose         amLODIPine  2.5 mg Oral Daily     aspirin  81 mg Per G Tube Daily     atorvastatin  40 mg Oral At Bedtime     clopidogrel  75 mg Oral Daily     diclofenac  2 g Topical 4x Daily     enoxaparin ANTICOAGULANT  30 mg Subcutaneous Q24H     insulin aspart  1-7 Units Subcutaneous TID AC     insulin aspart  1-5 Units Subcutaneous At Bedtime     lisinopril  2.5 mg Oral Daily     [Held by provider] OLANZapine zydis  5 mg Oral At Bedtime     sodium chloride (PF)  3 mL Intracatheter Q8H       Data   Recent Labs   Lab 12/22/22  0519 12/22/22  0243 12/21/22  2217 12/20/22  0658 12/20/22  0520 12/19/22  0858 12/19/22  0647 12/18/22  0853 12/18/22  0621 12/17/22  0902 12/17/22  0741 12/16/22  0900 12/16/22  0519   WBC  --   --   --   --  9.3  --   --   --  9.5  --   --   --  16.6*   HGB  --   --   --   --  12.1*  --   --   --  11.9*  --   --   --  12.9*   MCV  --   --   --   --  87  --   --   --  88  --   --   --  89   PLT  --   --   --   --  242  --   --   --  236  --   --   --  214   *  --   --   --  130*  --  130*  --  131*  --  136  --  137   POTASSIUM 5.0  --   --   --  4.4  --  4.1  --  3.8  --  3.6  --  3.5   CHLORIDE 94*  --   --   --  95*  --  95*  --  99  --  104  --  107   CO2 28  --   --   --  28  --  28  --  27  --  25  --  21*   BUN 34.1*  --   --   --  18.3  --  12.2  --  17.7  --  20.1  --  16.5   CR 0.50*  --   --   --  0.46*  --  0.48*  --  0.45*  --  0.54*  --  0.51*   ANIONGAP 7  --   --   --  7  --  7  --  5*  --  7  --  9   CARLOS 8.2*  --   --   --  8.0*  --  7.7*  --  7.7*  --  7.6*  --  7.5*   * 209* 181*   < > 198*   < > 154*   < > 151*   < > 170*   < > 192*   ALBUMIN  --   --    --   --   --   --   --   --   --   --  2.6*  --  2.3*   PROTTOTAL  --   --   --   --   --   --   --   --   --   --  5.6*  --  5.3*   BILITOTAL  --   --   --   --   --   --   --   --   --   --  0.3  --  0.4   ALKPHOS  --   --   --   --   --   --   --   --   --   --  109  --  98   ALT  --   --   --   --   --   --   --   --   --   --  16  --  15   AST  --   --   --   --   --   --   --   --   --   --  19  --  16    < > = values in this interval not displayed.       No results found for this or any previous visit (from the past 24 hour(s)).

## 2022-12-22 NOTE — PLAN OF CARE
"/70 (BP Location: Left arm, Patient Position: Supine, Cuff Size: Adult Small)   Pulse 87   Temp 98  F (36.7  C) (Tympanic)   Resp 16   Ht 1.753 m (5' 9\")   Wt 54.9 kg (121 lb 0.5 oz)   SpO2 92%   BMI 17.87 kg/m      Pt alert, disoriented to time and situation, afebrile, VS and assessment as charted, mary lift, free of falls or injury this shift. Bed alarm on. Call light within reach. Room door open with room near nurses station. Pt was 86% on RA, placed on 1 LPM and has now been 92-93%. Received last feeding of 355 ml glucerna 1.5 along with 200 ml free water. Inc of B&B, turn and repo every 2 hours. Shaved the pt's face, used a shampoo cap, and washed and lotioned his face. HS glucose 181 and 2 AM was 207, no coverage. Pt did have a bout for about a 15 min to a half hour where he was saying he was going home, had to get up, needed his shoes to go home, etc. At this time the pt also independently had his legs swung off the side of the bed. The bout was resolved with just talking and reassuring/redirecting.      Face to face report given with opportunity to observe patient.    Report given to JUDD Salmon RN   12/22/2022  7:36 AM      "

## 2022-12-22 NOTE — PROGRESS NOTES
"   12/22/22 1300   Appointment Info   Signing Clinician's Name / Credentials (SLP) Joan Stratton MA, CF-SLP   Interventions   Interventions Quick Adds Swallowing Dysfunction   Swallowing Dysfunction &/or Oral Function for Feeding   Treatment of Swallowing Dysfunction &/or Oral Function for Feeding Minutes (90805) 15   Symptoms Noted During/After Treatment Fatigue   Treatment Detail/Skilled Intervention Pt seen this AM for skilled services. Provided continued education regarding current swallow function, risk for aspiration, and oropharyngeal exercise programming. Targeted effortful swallows and Masaako maneuver with patient. Patient was able to complete Masaako maneuver with max cues which is an increase from previous session. Patient completed x 10 effortful swallows and x 10 Masaako maneuvers. Patient was unable to answer when SLP asked if SLP could touch patient's throat to feel swallow function. Patient touched SLP's hand and was confused by the question. After x 3 attempts to ask questions, patient then stated \"yeah that's fine\".   SLP Discharge Planning   SLP Plan Continue to assess and provide education regarding status of current swallow function and recommendations. Implement oropharyngeal exercises to increase tolerance of one PO texture/viscosity however progress is guarded due to pt's overall status.   SLP Discharge Recommendation Transitional Care Facility;Long term care facility   SLP Rationale for DC Rec Pt is currently unable to safely tolerate PO intake. NPO continues to be recommended at this time.   SLP Brief overview of current status  Continued attemtps for oropharyngeal exercise programming with modeling and verbal cues. Patient completed effortful swallows and Masaako maneuvers. Potential progress for strengthening swallow function remains guarded due to pt's overall status.   Total Session Time   Total Session Time (sum of timed and untimed services) 15       "

## 2022-12-22 NOTE — PROGRESS NOTES
Care Transitions focused note:  12-22-22    [facility]: LVM  [facility]: Referral sent No LTC beds  [facility] [place]: LVM NO STR or LTC available  [facility]: referral sent  STR available but no LTC.  [facility] [place]: L[place]  [facility]  [facility]: referral emailed  Only STR, No LTC  [facility] [place]  Referral emailed  [facility]:  No LTC available  [facility]: No LTC beds available and 83 on waitlist.  [facility]: No answer, no VM        Care Transitions focused note:  12-21-22     [facility] [place] no LTC beds but will check with admin on Tues to see if they can take this patient.  [facility]   LVM  [facility]: no appropriate bed due to heavy cares and 2 assist.  [facility] [place]: [facility] sent  [facility] : referral sent  [facility]: no beds due to staffing  [facility]: Referral sent  [facility]: does not have LTC  [facility]: LVM  [facility]: LVM  [facility]: faxed referral All admissions on hold due to staffing  [facility]: No LTC bed  [facility]: referral sent  [facility]: Not accepting admissions due to facility for sale.  [facility]: referral sent, no MA pending  [facility] [place]: LVM  [facility]: faxed referral  [facility]: no VM available  [facility] [place]: emailed referral      Muna Basurto CM

## 2022-12-23 NOTE — PROGRESS NOTES
"CLINICAL NUTRITION SERVICES  -  REASSESSMENT NOTE    Neel Kerr :     NUTRITION RECOMMENDATIONS  TF recommendations-   - Glucerna 1.5 - 4.5 cartons per day, bolus feeds. 1.5 cartons (355ml) 3x per day while awake. 30ml water before and after each bolus.   - Continue current free water at 100ml TID.  -This will provide 1602kcal, 88g protein, and about 1470 ml free water.  - Monitor electrolytes, BG, TF tolerance, bowel movements  - RD will continue to follow, may need to increase volume to help support additional weight gain      82 yom admitted with failure to thrive  Hx noted to include:  Pseudoaneurysm left ventricle, COPD, T2DM, CVA. Last A1C noted  Was 12.5 on 1/4/22. BG ranging from about 140-220 the past couple days. Pt tolerating TFs well. Last BM documented was overnight. Weight stable per bed scale.       Diet Order: Glucerna 1.5- (1.5 cartons) 355ml bolus TID. 100ml water flush TID. This will provide 1602kcal, 88g protein, and about 1470ml free water.    Height: 5' 9\"  Weight: 121 lbs .52 oz  Body mass index is 17.87 kg/m .  Weight Status:  Underweight BMI <18.5  Weight History:   Wt Readings from Last 10 Encounters:   12/22/22 54.9 kg (121 lb 0.5 oz)   11/21/22 51.2 kg (112 lb 14 oz)   01/13/22 60.3 kg (133 lb)   01/06/22 56.4 kg (124 lb 6.4 oz)   08/12/21 55.3 kg (122 lb)   08/03/21 55.7 kg (122 lb 12.8 oz)   08/03/21 55.8 kg (123 lb)   07/28/21 56.7 kg (125 lb 1.6 oz)   07/23/21 56.3 kg (124 lb 3.2 oz)   07/22/21 56.7 kg (125 lb)      Weight used to calculate needs: 51kg- admit weight  Estimated Energy Needs: 4410-9049 kcals (30-35 Kcal/Kg)   Estimated Protein Needs: 61-77 grams protein (1.2-1.5 g pro/Kg)  Estimated Fluid Needs: 9669-5215  mL (1 mL/Kcal)     Malnutrition Diagnosis: Moderate malnutrition. Nutrition status expected to improve with tube feeds.  In Context of:  Chronic illness or disease      MONITORING AND EVALUATION:  RD will monitor weight, labs, need for TF recs        "

## 2022-12-23 NOTE — PLAN OF CARE
"Most recent vitals: /67 (BP Location: Right arm, Patient Position: Semi-Singletary's, Cuff Size: Adult Small)   Pulse 93   Temp 97.8  F (36.6  C) (Tympanic)   Resp 16   Ht 1.753 m (5' 9\")   Wt 54.9 kg (121 lb 0.5 oz)   SpO2 92%   BMI 17.87 kg/m      Pain Management:  Denied pain, Paind assessments 0 through shift  LOC:  Confused, alert but redirectable   Cardiac:  WNL  Respiratory: Productive congested cough noted, remains on room air  GI:  G tube to L quadrant, new dressing placed this shift   :  Incontinent of bowel and bladder   Skin Issues:  Scattered scabbing and bruises noted, butt blanchable red. Barrier cream applied & Q 2 turn     IVF:  NS@ 75  ABX:  None     Nutrition: NPO, J tube feedings   ADL's:  Suzie  Ambulation:Up in chair for feeding and 1 hr after, back to bed afterwards  Safety:  Call light in reach, bed in low position, alarms utilized, door open and room near nurses station, and patient remained free from injury this shift     Face to face report given with opportunity to observe patient.    Report given to Kari Hatfield RN   12/23/2022  7:13 AM                            "

## 2022-12-23 NOTE — PROGRESS NOTES
Care Transitions focused note:  12/23/2022    Called-[facility] [place]-No LTC beds available  [facility]-No L[facility] beds available  [facility] [place]- referral sent  [facility]- faxed Medicare info  [facility]-referral sent-will review  [facility] [place]- left   [facility] [place]-No due to no available beds        Care Transitions focused note:  12-22-22     [facility]: LVM  [facility]: Referral sent No LTC beds  [facility] [place]: LVM NO STR or LTC available  [facility]: referral sent  STR available but no LTC.  [facility] [place]: M  [facility] [place]: referral emailed  [facility]: referral emailed  Only STR, No LTC  [facility] [place]  Referral emailed  [facility]:  No LTC available  [facility]: No LTC beds available and 83 on waitlist.  [facility]: No answer, no VM           Care Transitions focused note:  12-21-22     [facility] [place] no LTC beds but will check with admin on Tues to see if they can take this patient.  [facility]   [facility]: no appropriate bed due to heavy cares and 2 assist.  [facility] [place]: referral sent  [facility] : referral sent  [facility] [facility]: no beds due to staffing  [facility]: Referral sent  [facility]: does not have LTC  [facility]: [facility]: [facility]: faxed referral All admissions on hold due to staffing   [facility]: No LTC bed  [facility]: referral sent  [facility]: Not accepting admissions due to facility for sale.  [facility]: referral sent, no MA pending  [facility] [place]: LVM  [facility]: faxed referral  [facility]: no VM available  [facility] [place]: emailed referral

## 2022-12-23 NOTE — PROGRESS NOTES
"   12/23/22 1100   Appointment Info   Signing Clinician's Name / Credentials (SLP) Joan Stratton MA, CF-SLP   Interventions   Interventions Quick Adds Swallowing Dysfunction   Swallowing Dysfunction &/or Oral Function for Feeding   Treatment of Swallowing Dysfunction &/or Oral Function for Feeding Minutes (56993) 10   Symptoms Noted During/After Treatment Fatigue   Treatment Detail/Skilled Intervention Pt seen this AM for skilled services. Provided continued education regarding current swallow function, risk for aspiration, and oropharyngeal exercise programming. Targeted effortful swallows with patient. Patient first did not understand instructions and began repeating instructions when SLP stated \"swallow\" patient stated \"swallow\". SLP provided continued examples of task and patient then completed task. Patient completed x 15 effortful swallows with SLP. SLP attempted [place] maneuvers with patient but patient was unable to follow directions for this task despite SLP's maximum cues.   SLP Discharge Planning   SLP Plan Continue to assess and provide education regarding status of current swallow function and recommendations. Implement oropharyngeal exercises to increase tolerance of one PO texture/viscosity however progress is guarded due to pt's overall status.   SLP Discharge Recommendation Transitional Care Facility;Long term care facility   SLP Rationale for DC Rec Pt is currently unable to safely tolerate PO intake. NPO continues to be recommended at this time.   SLP Brief overview of current status  Continued attemtps for oropharyngeal exercise programming with modeling and verbal cues. Patient completed effortful swallows. Potential progress for strengthening swallow function remains guarded due to pt's overall status.   Total Session Time   Total Session Time (sum of timed and untimed services) 10       "

## 2022-12-23 NOTE — CONSULTS
[facility]    Stroke Consult Note    Reason for Consult:  stroke    Chief Complaint: Generalized Weakness and Fall       HPI  Neel Kerr is a 82 year old male with PMH of DM2, HTN and recent NSTEMI (Nov 2022 @ [facility]; recommended transfer to [facility] for repair of LV pseudoaneurysm but patient declined and was discharged home). He presented to the [place] ED 11/28 after being found down with generalized weakness and mild confusion; last seen well the night prior. Concern for possible COPD exacerbation. Plan was for transfer to tertiary facility for previously recommended LV pseudoaneurysm repair but was ultimately admitted to [place] 12/2/22 as he was not felt medically stable for surgical intervention and transfer bed was not available; plan to place for rehabilitation and re-evaluate stability for CV surgery at a later time.  PT/OT felt functional abilities were declining overtime so MRI brain/cervical was ordered 12/7 to look for contributing factors; cervical MRI with multilevel mild to moderate central and foraminal compressions and brain MRI with bilateral frontal and R corpus callosum lesions concerning for subacute infarct-Plavix was added to PTA ASA. Having issues with dysphagia and ongoing significant weakness and impaired mobility, no other clearly focal deficits. PEG tube placed 12/14.     On exam, NIH= 13. Patient has left facial droop, L>R upper and BLE weakness and left sided sensory loss. Neel is not able to provide much history, noted to have some baseline cognitive impairment. Continues working with PT/OT/SLP but has not had significant improvement and rather has had gradual decline over past month while inpatient. No clearly acute development of focal deficits reported.     Stroke Evaluation Summarized    MRI/Head CT MRI 12/8:subacute infarcts of the bilateral frontal and R corpus colossal regions; chronic infarcts of R basal ganglia and bilateral centrum semiovale, moderate to  advanced chronic small vessel disease    Intracranial Vasculature CTA: pending   Cervical Vasculature CTA: pending     Echocardiogram LVEF 40-45%, inferior basalar area of dyskinesis consistent with pseudoaneurysm, moderate LV hypertrophy, atria of normal size bilaterally   EKG/Telemetry 11/28: atrial fibrillation   Other Testing Blood cultures: pending      LDL  32   A1C  8.5%       Impression  1. Subacute infarcts of bilateral frontal and R corpus colossal regions, suspect secondary to cardioembolic source either from newly identified atrial fibrillation vs untreated LV pseudoaneurysm, less likely endocarditis but will obtain blood cultures to rule out    2. Progressive generalized weakness and dysphagia; not fully explained by infarcts on MRI. Need to consider alternative pathology such as neuromuscular disease vs manifestation of multifocal spinal disease vs other    3. Newly captured atrial fibrillation (per EKG 11/28)    4. Known LV pseudoaneurysm, repair recommended but initially declined by patient and now patient not felt medically stable enough for intervention    Recommendations  -CTA head/neck, pending  -blood cultures x2 to rule out endocarditis in patient with bilateral infarcts, pending   -continue ASA + Plavix for secondary stroke prevention at this time;anticipate that if CTA without significant abnormality and blood cultures negative will recommend discontinuation of ASA/Plavix and transition to anticoagulation (Eliquis preferred) for secondary prevention in setting of afib/cardioembolic source  -Goal BP is <130/80 with tighter control associated with improved vascular outcome  -LDL 32 (goal 40-70); continue PTA Lipitor  with ongoing outpatient titration to achieve LDL goal   -Blood glucose monitoring, Hgb A1c 8.5%, (goal <7% for secondary stroke prevention), follow-up with PCP  - Telemetry, EKG  - Bedside Glucose Monitoring   - PT/OT/SLP  - Stroke Education  - Euthermia, Euglycemia   - management  "of known LV pseudoaneurysm per primary team/cardiology   -if aligns with patient goals of care consider transfer for general neurology consultation to consider broader neurological differential for weakness/dysphagia such as neuromuscular disease, spinal pathology etc.     Patient Follow-up    1-2 weeks PCP  6-8 weeks outpatient neurologist of patient's choosing    Thank you for this consult. We will follow peripherally for results of the blood cultures and CTA and will update final recommendations. Please contact us with any additional questions.    Jinny LANDRY, CNP  Vascular Neurology  To page me or covering stroke neurology team member, click here: [facility]COM   Choose \"On Call\" tab at top, then search dropdown box for \"Neurology Adult\", select location, press Enter, then look for stroke/neuro ICU/telestroke.  _____________________________________________________    Clinically Significant Risk Factors         # Hyponatremia: Lowest Na = 129 mmol/L in last 2 days, will monitor as appropriate      # Hypoalbuminemia: Lowest albumin = 2.3 g/dL at 12/16/2022  5:19 AM, will monitor as appropriate           # Cachexia: Estimated body mass index is 17.87 kg/m  as calculated from the following:    Height as of this encounter: 1.753 m (5' 9\").    Weight as of this encounter: 54.9 kg (121 lb 0.5 oz).   # Moderate Malnutrition: based on nutrition assessment          Past Medical History   Past Medical History:   Diagnosis Date     Essential (primary) hypertension     No Comments Provided     Hyperlipidemia     No Comments Provided     Non-ST elevation (NSTEMI) myocardial infarction (H)     07/07/2017,[facility] PCI with stent circumflex     Old myocardial infarction     02/11/2015,[facility]  PCI with stent     Polyneuropathy     No Comments Provided     Type 2 diabetes mellitus without complications (H)     No Comments Provided     Past Surgical History   Past Surgical History:   Procedure Laterality Date     " CYSTOSCOPY, TRANSURETHRAL RESECTION (TUR) PROSTATE, COMBINED N/A 7/27/2021    Procedure: Transurethral resection of prostate;  Surgeon: Antonio August MD;  Location:  OR     Medications   Home Meds  Prior to Admission medications    Medication Sig Start Date End Date Taking? Authorizing Provider   amLODIPine (NORVASC) 2.5 MG tablet Take 2.5 mg by mouth daily 8/3/21  Yes Norma Taylor MD   aspirin 81 MG EC tablet Take 81 mg by mouth daily   Yes Reported, Patient   atorvastatin (LIPITOR) 40 MG tablet Take 40 mg by mouth daily   Yes Reported, Patient   insulin glargine (LANTUS PEN) 100 UNIT/ML pen Inject 8 Units Subcutaneous daily   Yes Reported, Patient   lisinopril (ZESTRIL) 2.5 MG tablet Take 2.5 mg by mouth daily   Yes Reported, Patient   nitroGLYcerin (NITROSTAT) 0.4 MG sublingual tablet Place 0.4 mg under the tongue every 5 minutes as needed for chest pain For chest pain place 1 tablet under the tongue every 5 minutes for 3 doses. If symptoms persist 5 minutes after 1st dose call 911.   Yes Reported, Patient   albuterol (PROAIR HFA/PROVENTIL HFA/VENTOLIN HFA) 108 (90 Base) MCG/ACT inhaler Inhale 2 puffs into the lungs every 2 hours as needed for wheezing 9/21/20   Leo Mackey MD       Scheduled Meds    amLODIPine  2.5 mg Oral Daily     aspirin  81 mg Per G Tube Daily     atorvastatin  40 mg Oral At Bedtime     clopidogrel  75 mg Oral Daily     diclofenac  2 g Topical 4x Daily     enoxaparin ANTICOAGULANT  30 mg Subcutaneous Q24H     insulin aspart  1-7 Units Subcutaneous TID AC     insulin aspart  1-5 Units Subcutaneous At Bedtime     iopamidol (ISOVUE-370)  50 mL Intravenous Once     lisinopril  2.5 mg Oral Daily     [Held by provider] OLANZapine zydis  5 mg Oral At Bedtime     sodium chloride (PF)  100 mL Intravenous Once     sodium chloride (PF)  3 mL Intracatheter Q8H       Infusion Meds    dextrose       sodium chloride 75 mL/hr at 12/23/22 1406       PRN Meds  acetaminophen **OR**  "acetaminophen, dextrose, glucose **OR** dextrose **OR** glucagon, ipratropium - albuterol 0.5 mg/2.5 mg/3 mL, lidocaine 4%, lidocaine (buffered or not buffered), morphine (PF), naloxone **OR** naloxone **OR** naloxone **OR** naloxone, ondansetron **OR** ondansetron, sodium chloride (PF)    Allergies   Allergies   Allergen Reactions     Augmentin Nausea     Marked as an allergy at the [facility]     Family History   Family History   Problem Relation Age of Onset     No Known Problems Mother      Social History   Social History     Tobacco Use     Smoking status: Former     Types: Cigarettes     Quit date: 1992     Years since quittin.9     Smokeless tobacco: Never   Vaping Use     Vaping Use: Never used   Substance Use Topics     Alcohol use: No     Comment: Alcoholic Drinks/day: quit drinking      Drug use: No       Review of Systems   The 10 point Review of Systems is negative other than noted in the HPI or here.        PHYSICAL EXAMINATION   Temp:  [97.8  F (36.6  C)-98.3  F (36.8  C)] 98.3  F (36.8  C)  Pulse:  [83-93] 90  Resp:  [16-18] 18  BP: (113-132)/(58-67) 113/58  SpO2:  [91 %-95 %] 91 %    General Exam  General:  patient lying in bed without any acute distress    HEENT:  normocephalic/atraumatic  Pulmonary:  no respiratory distress    Neuro Exam  Mental Status:  Lethargic but quickly responds to verbal or tactile stimulation; limited engagement in visit and requires repetitive questioning to keep engaged/quickly loses engagement; oriented to person, place and situation; able to state month but not age (\"24,\" \"26\"); follows all simple commands but cannot follow multi step commands, loss of fluency but naming and repetition inact  Cranial Nerves:  visual fields intact (tested by nurse), EOMI with normal smooth pursuit, facial sensation intact and symmetric (tested by nurse), Left lower facial weakness, hearing not formally tested but mildly reduced to conversation, mild dysarthria, tongue protrusion " midline  Motor:  no abnormal movements, moves all limbs spontaneously; RUE able to achieve 90 degree but drifts to bed, LUE with movement against gravity but unable to achieve 90 degrees and quickly drifts to bed, BLE with movement but none against gravity  Sensory:  Able to feel light touch in left arm/leg but is reduced compared to right (assessed by nurse), no extinction on double simultaneous stimulation (assessed by nurse)  Coordination: difficulty with FNF bilaterally but appears proportionate to weakness, unable to attempt heel shin due to wekaness  Station/Gait:  unable to test due to telestroke    Dysphagia Screen  SLP following, PEG tube in place    Stroke Scales    NIHSS  1a. Level of Consciousness 1-->Not alert, but arousable by minor stimulation to obey, answer, or respond   1b. LOC Questions 1-->Answers one question correctly   1c. LOC Commands 0-->Performs both tasks correctly   2.   Best Gaze 0-->Normal   3.   Visual 0-->No visual loss   4.   Facial Palsy 1-->Minor paralysis (flattened nasolabial fold, asymmetry on smiling)   5a. Motor Arm, Left 2-->Some effort against gravity, limb cannot get to or maintain (if cued) 90 (or 45) degrees, drifts down to bed, but has some effort against gravity   5b. Motor Arm, Right 1-->Drift, limb holds 90 (or 45) degrees, but drifts down before full 10 secs, does not hit bed or other support   6a. Motor Leg, Left 2-->Some effort against gravity, leg falls to bed by 5 secs, but has some effort against gravity   6b. Motor Leg, right 2-->Some effort against gravity, leg falls to bed by 5 secs, but has some effort against gravity   7.   Limb Ataxia 0-->Absent   8.   Sensory 1-->Mild-to-moderate sensory loss, patient feels pinprick is less sharp or is dull on the affected side, or there is a loss of superficial pain with pinprick, but patient is aware of being touched   9.   Best Language 1-->Mild-to-moderate aphasia, some obvious loss of fluency or facility of  comprehension, without significant limitation on ideas expressed or form of expression. Reduction of speech and/or comprehension, however, makes conversation. . . (see row details)   10. Dysarthria 1-->Mild-to-moderate dysarthria, patient slurs at least some words and, at worst, can be understood with some difficulty   11. Extinction and Inattention  0-->No abnormality   Total 13 (12/23/22 1500)       Imaging  I personally reviewed all imaging; relevant findings per HPI.    Labs Data   CBC  Recent Labs   Lab 12/20/22  0520 12/18/22  0621   WBC 9.3 9.5   RBC 4.12* 4.04*   HGB 12.1* 11.9*   HCT 35.9* 35.7*    236     Basic Metabolic Panel   Recent Labs   Lab 12/23/22  0853 12/23/22  0515 12/23/22  0213 12/22/22  0910 12/22/22  0519 12/20/22  0658 12/20/22  0520   NA  --  131*  --   --  129*  --  130*   POTASSIUM  --  5.0  --   --  5.0  --  4.4   CHLORIDE  --  97*  --   --  94*  --  95*   CO2  --  26  --   --  28  --  28   BUN  --  41.0*  --   --  34.1*  --  18.3   CR  --  0.52*  --   --  0.50*  --  0.46*   * 208* 190*   < > 193*   < > 198*   CARLOS  --  7.9*  --   --  8.2*  --  8.0*    < > = values in this interval not displayed.     Liver Panel  Recent Labs   Lab 12/17/22  0741   PROTTOTAL 5.6*   ALBUMIN 2.6*   BILITOTAL 0.3   ALKPHOS 109   AST 19   ALT 16     INR    Recent Labs   Lab Test 11/28/22  1024 01/03/22  1040 06/30/21  1005   INR 1.08 0.98 0.98      Lipid Profile    Recent Labs   Lab Test 12/23/22  0515 01/04/22  0600 12/02/19  0000   CHOL 88 177 203*   HDL 29* 41 27*   LDL 32 117* 118*   TRIG 133 95 288*     A1C    Recent Labs   Lab Test 12/20/22  0520 01/04/22  0600 07/28/21  0612   A1C 8.5* 12.5* 6.8*     Troponin  No results for input(s): CTROPT, TROPONINIS, TROPONINI, GHTROP in the last 168 hours.       Stroke Consult Data Data   Telestroke Service Details  (for non-emergent stroke consult with tele)  Video start time 12/23/22   1501   Video end time 12/23/22   1520   Type of service  telemedicine diagnostic assessment of acute neurological changes   Reason telemedicine is appropriate patient requires assessment with a specialist for diagnosis and treatment of neurological symptoms   Mode of transmission secure interactive audio and video communication per Alida   Originating site (patient location) [facility]    Distant site (provider location) [facility]     I have personally spent a total of 85 minutes providing care today, time spent in reviewing medical records and reviewing tests, examining the patient and obtaining history, coordination of care, and discussion with the patient and/or family regarding diagnostic results, prognosis, symptom management, risks and benefits of management options, and development of plan of care. Greater than 50% was spent in counseling and coordination of care.

## 2022-12-23 NOTE — PROGRESS NOTES
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  Patient was admitted to the hospital with chronic debility, hyponatremia, elevated troponin, leukocytosis, COPD with possible exacerbation, found to have left ventricular pseudoaneurysm patient/family declined intervention as patient not a candidate at this time for procedure due to his debility and weakness.  Patient did have extensive work-up in the hospital include CT head no acute findings, CT of the chest abdomen pelvis without contrast showing 8.7 mm right lower lobe and 5 mm left lower lobe lung nodule, no acute findings, MRI of the cervical spine without contrast showing moderate compression of the right C5 nerve root mild compression of the left C5 nerve root, moderate compression of C6 nerve root, moderate compression of C7 nerve roots  Sent did have MRI of the brain done showing right corpus callosal lesion that was consistent with acute infarct involvement of the corpus callosum however was slightly atypical for vascular infarct recommendation was for MRI 1 to 2 weeks to confirm expected evolution versus CSF sampling or MR spectroscopy.  2 puncture would enhancing foci in the frontal lobes likely representing subacute infarcts, moderate presumed small vessel ischemic changes.  She has been on statin aspirin and Plavix, echocardiogram was done showing ejection fraction of 40 to 45% is mildly reduced with inferior basal area dyskinesis consider with pseudoaneurysm.  There was an EKG that was done 11/28 with questionable A. fib but P waves are  present.  Patient did have aspiration pneumonia in the hospital and was treated with Levaquin , he also did have dysphagia and was seen by speech therapy and general surgery and after discussion with family patient did have a PEG tube placement and has been receiving tube feeds through that.  Patient has been NPO.  Discussed patient with stroke neurology and they did recommend getting a CTA of his head and neck which was ordered and pending. Patient has been awaiting placement for rehab. ON 12/23 I did call brother to update he asked me to call patients son to update.      Fall, initial encounter/weakness:   PT and OT  Needs rehab  Fall risk     Oropharyngeal dysphagia:   S/P Peg placement buy Dr Griffiths on 12/20/22  Continue current tube feeds      Stroke  MRI brain done showing   Right corpus callosal lesion with imaging features that are most  consistent with acute infarct. Involvement of the corpus callosum,  however, is slightly atypical for a vascular infarct. Recommend  follow-up MRI in 1-2 weeks to confirm expected evolution. Depending on  level of clinical suspicion, CSF sampling and/or MR spectroscopy could  be useful in further evaluating.  2.  2 punctate enhancing foci in the frontal lobes, likely  representing subacute infarcts.  3.  Moderate presumed small vessel ischemic disease changes.  Stroke neurology consulted  Statin plavix and aspiring  Echo done    Suspected aspiration pneumonia Treated  Finished Levaquin     Hyponatremia:  Component      Latest Ref Rng & Units 12/16/2022 12/17/2022 12/18/2022 12/19/2022                Sodium      136 - 145 mmol/L 137 136 131 (L) 130 (L)     Component      Latest Ref Rng & Units 12/20/2022 12/22/2022 12/23/2022               Sodium      136 - 145 mmol/L 130 (L) 129 (L) 131 (L)     He was hydrated with IV fluids with some improvement in sodium.   Will continue to monitor     COPD .   continue home needs  Was reported he require 1-2 liters, on RA now      Type 2  diabetes mellitus, with long-term current use of insulin (H):  ISS  Diabetic diet     Bilateral shoulder pain:  PT and ot     Moderate malnutrition, POA with BMI < 19.     Hypocalcemia: Corrected Calcium is NL      Recent non-STEMI   Patient was found to have  pseudoaneurysm inferoseptal area along with  ischemic cardiomyopathy EF from 40 to 45%.    He has heart cath showing  three-vessel coronary disease with an occluded distal RCA D1 stenosis and circumflex with prior stent without significant residual stenosis. Patient was discussed with cardiology in ER and initially recs was to tranfer to higher level of care but after further discussion between ER and cardiology and family it was recommended patient is too high risk for procedure and plan was on rehab then out patient follow up as he was asymptomatic without chest pain  Out patient cardiology follow up  Asa   Statin  plavix        Diet: NPO for Medical/Clinical Reasons Except for: Ice Chips  Adult Formula Bolus Feeding: Other; Glucerna; Route: Gastrostomy; 3 times daily; Volume per Bolus: 355; mL(s); Additional free water (mL): 100 ml free water three times daily; Start with free water 2 x before starting TFs    DVT Prophylaxis: Enoxaparin (Lovenox) SQ  Mata Catheter: Not present  Central Lines: None  Cardiac Monitoring: None  Code Status: No CPR- Do NOT Intubate      Disposition Plan    Pending clinical improvement      The patient's care was discussed with the Bedside Nurse and Patient.    Luis Dow,   Hospitalist Service  [facility]  Securely message with the Vocera Web Console (learn more here)  Text page via [employer] Paging/Directory     Clinically Significant Risk Factors         # Hyponatremia: Lowest Na = 129 mmol/L in last 2 days, will monitor as appropriate      # Hypoalbuminemia: Lowest albumin = 2.3 g/dL at 12/16/2022  5:19 AM, will monitor as appropriate           # Cachexia: Estimated body mass index is 17.87 kg/m  as  "calculated from the following:    Height as of this encounter: 1.753 m (5' 9\").    Weight as of this encounter: 54.9 kg (121 lb 0.5 oz).   # Moderate Malnutrition: based on nutrition assessment      _______________________________________________________________    Interval History   Patient was seen this morning for medical rounds. He denied chest pain or shortness breath. He did know the year but did not know why he was in the hospital. He did have some numbness in his left leg. Discussed with stroke neurology they plan on seeing him. I did call his brother by phone he asked me to call his son after 3:30pm to update.       Data reviewed today: I reviewed all medications, new labs and imaging results over the last 24 hours. I personally reviewed no images or EKG's today.    Physical Exam   Vital Signs: Temp: 98.3  F (36.8  C) Temp src: Tympanic BP: 113/58 Pulse: 90   Resp: 18 SpO2: (!) 91 % O2 Device: None (Room air)    Weight: 121 lbs .52 oz  Physical Exam  Constitutional:       Comments: Frail appearing stated age male    HENT:      Right Ear: External ear normal.      Left Ear: External ear normal.      Mouth/Throat:      Pharynx: Oropharynx is clear.   Cardiovascular:      Rate and Rhythm: Normal rate.   Pulmonary:      Effort: Pulmonary effort is normal.   Abdominal:      General: Abdomen is flat.      Comments: PEG present    Musculoskeletal:         General: No swelling.   Skin:     Coloration: Skin is not jaundiced.   Neurological:      Comments: Slow to respond to some questions.   Decreased sensation in left lower ext compared to right. Overall has general weakness.         Data   Recent Labs   Lab 12/23/22  0853 12/23/22  0515 12/23/22  0213 12/22/22  0910 12/22/22  0519 12/20/22  0658 12/20/22  0520 12/18/22  0853 12/18/22  0621 12/17/22  0902 12/17/22  0741   WBC  --   --   --   --   --   --  9.3  --  9.5  --   --    HGB  --   --   --   --   --   --  12.1*  --  11.9*  --   --    MCV  --   --   --   --  "  --   --  87  --  88  --   --    PLT  --   --   --   --   --   --  242  --  236  --   --    NA  --  131*  --   --  129*  --  130*   < > 131*  --  136   POTASSIUM  --  5.0  --   --  5.0  --  4.4   < > 3.8  --  3.6   CHLORIDE  --  97*  --   --  94*  --  95*   < > 99  --  104   CO2  --  26  --   --  28  --  28   < > 27  --  25   BUN  --  41.0*  --   --  34.1*  --  18.3   < > 17.7  --  20.1   CR  --  0.52*  --   --  0.50*  --  0.46*   < > 0.45*  --  0.54*   ANIONGAP  --  8  --   --  7  --  7   < > 5*  --  7   CARLOS  --  7.9*  --   --  8.2*  --  8.0*   < > 7.7*  --  7.6*   * 208* 190*   < > 193*   < > 198*   < > 151*   < > 170*   ALBUMIN  --   --   --   --   --   --   --   --   --   --  2.6*   PROTTOTAL  --   --   --   --   --   --   --   --   --   --  5.6*   BILITOTAL  --   --   --   --   --   --   --   --   --   --  0.3   ALKPHOS  --   --   --   --   --   --   --   --   --   --  109   ALT  --   --   --   --   --   --   --   --   --   --  16   AST  --   --   --   --   --   --   --   --   --   --  19    < > = values in this interval not displayed.     No results found for this or any previous visit (from the past 24 hour(s)).  Medications     dextrose       sodium chloride 75 mL/hr at 12/23/22 0215       amLODIPine  2.5 mg Oral Daily     aspirin  81 mg Per G Tube Daily     atorvastatin  40 mg Oral At Bedtime     clopidogrel  75 mg Oral Daily     diclofenac  2 g Topical 4x Daily     enoxaparin ANTICOAGULANT  30 mg Subcutaneous Q24H     insulin aspart  1-7 Units Subcutaneous TID AC     insulin aspart  1-5 Units Subcutaneous At Bedtime     lisinopril  2.5 mg Oral Daily     [Held by provider] OLANZapine zydis  5 mg Oral At Bedtime     sodium chloride (PF)  3 mL Intracatheter Q8H

## 2022-12-23 NOTE — PROVIDER NOTIFICATION
Call placed to CT to confirm patient has 18G placed now and that patient is back in bed. CT states they need an order for CT head without contrast as well and haven't been able to contact provider yet. Page sent to Dr. Dow with CT call back number provided for any questions. Awaiting additional orders and time for CT scan.

## 2022-12-23 NOTE — PLAN OF CARE
Free from falls/injuries this shift. Assess as charted.  Turned Q2H while in bed and weight shifted while up in chair. Barrier cream to coccyx with turns/brief changes.  Feeding via PEG tube at 1030 and 1630 . Upright in chair for feedings and stays upright for 1 hr after feeding.  Still needs 1 more feeding today. Denies nausea after feedings.  Confused at times. IVF: NS @ 75/hr. Enc to cough up moderate amt clear phlegm. Remains on room air.     Face to face report given with opportunity to observe patient.    Report given to Chapin Wesley RN   12/22/2022  7:28 PM

## 2022-12-24 NOTE — PROGRESS NOTES
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  Patient was admitted to the hospital with chronic debility, hyponatremia, elevated troponin, leukocytosis, COPD with possible exacerbation, found to have left ventricular pseudoaneurysm patient/family declined intervention as patient not a candidate at this time for procedure due to his debility and weakness.  Patient did have extensive work-up in the hospital include CT head no acute findings, CT of the chest abdomen pelvis without contrast showing 8.7 mm right lower lobe and 5 mm left lower lobe lung nodule, no acute findings, MRI of the cervical spine without contrast showing moderate compression of the right C5 nerve root mild compression of the left C5 nerve root, moderate compression of C6 nerve root, moderate compression of C7 nerve roots  Sent did have MRI of the brain done showing right corpus callosal lesion that was consistent with acute infarct involvement of the corpus callosum however was slightly atypical for vascular infarct recommendation was for MRI 1 to 2 weeks to confirm expected evolution versus CSF sampling or MR spectroscopy.  2 puncture would enhancing foci in the frontal lobes likely representing subacute infarcts, moderate presumed small vessel ischemic changes.  She has been on statin aspirin and Plavix, echocardiogram was done showing ejection fraction of 40 to 45% is mildly reduced with inferior basal area dyskinesis consider with pseudoaneurysm.  There was an EKG that was done 11/28 with questionable A. fib but P waves are  present.  Patient did have aspiration pneumonia in the hospital and was treated with Levaquin , he also did have dysphagia and was seen by speech therapy and general surgery and after discussion with family patient did have a PEG tube placement and has been receiving tube feeds through that.  Patient has been NPO.  Discussed patient with stroke neurology and they did recommend getting a CTA of his head and neck which was ordered and pending. Patient has been awaiting placement for rehab. ON 12/23 I did call brother to update he asked me to call patients son to update. On 12/24 will touch base with son. May benefit from palliative care consult out patient regarding goals of care.  I called son and updated him.     Fall, initial encounter/weakness:   PT and OT  Needs rehab  Fall risk     Oropharyngeal dysphagia:   S/P Peg placement buy Dr Griffiths on 12/20/22  Continue current tube feeds      Stroke  MRI brain done showing   Right corpus callosal lesion with imaging features that are most  consistent with acute infarct. Involvement of the corpus callosum,  however, is slightly atypical for a vascular infarct. Recommend  follow-up MRI in 1-2 weeks to confirm expected evolution. Depending on  level of clinical suspicion, CSF sampling and/or MR spectroscopy could  be useful in further evaluating.  2.  2 punctate enhancing foci in the frontal lobes, likely  representing subacute infarcts.  3.  Moderate presumed small vessel ischemic disease changes.  Stroke neurology consulted, follow recs LIKELY WILL need AOC as one EKG showed possible Afib on 11/28  Statin plavix and aspirin  Echo done  Blood cultures ordered per stroke neurology, follow recs     Suspected aspiration pneumonia Treated  Finished Levaquin     Hyponatremia:  Component      Latest Ref Rng & Units 12/16/2022 12/17/2022 12/18/2022 12/19/2022                Sodium      136 - 145 mmol/L 137 136 131 (L) 130 (L)     Component      Latest Ref Rng & Units 12/20/2022  12/22/2022 12/23/2022               Sodium      136 - 145 mmol/L 130 (L) 129 (L) 131 (L)     He was hydrated with IV fluids with some improvement in sodium.   Will continue to monitor     COPD    Continue home needs  Was reported he require 1-2 liters, on RA now      Type 2 diabetes mellitus, with long-term current use of insulin (H):  ISS  Diabetic diet     Bilateral shoulder pain:  PT and OT     Moderate malnutrition, POA with BMI < 19.     Hypocalcemia: Corrected Calcium is NL      Recent non-STEMI   Patient was found to have  pseudoaneurysm inferoseptal area along with  ischemic cardiomyopathy EF from 40 to 45%.    He has heart cath showing  three-vessel coronary disease with an occluded distal RCA D1 stenosis and circumflex with prior stent without significant residual stenosis. Patient was discussed with cardiology in ER and initially recs was to tranfer to higher level of care but after further discussion between ER and cardiology and family it was recommended patient is too high risk for procedure and plan was on rehab then out patient follow up as he was asymptomatic without chest pain  Out patient cardiology follow up  Asa   Statin  Plavix       Diet: NPO for Medical/Clinical Reasons Except for: Ice Chips  Adult Formula Bolus Feeding: Other; Glucerna; Route: Gastrostomy; 3 times daily; Volume per Bolus: 355; mL(s); Additional free water (mL): 100 ml free water three times daily; Start with free water 2 x before starting TFs    DVT Prophylaxis: Enoxaparin (Lovenox) SQ  Mata Catheter: Not present  Central Lines: None  Cardiac Monitoring: None  Code Status: No CPR- Do NOT Intubate      Disposition Plan    pending clinical improvement and likely need for rehab      The patient's care was discussed with the Bedside Nurse and Patient.    Luis Dow DO  Hospitalist Service  [facility]  Securely message with the Vocera Web Console (learn more here)  Text page via [employer] Paging/Directory  "        Clinically Significant Risk Factors         # Hyponatremia: Lowest Na = 130 mmol/L in last 2 days, will monitor as appropriate      # Hypoalbuminemia: Lowest albumin = 2.3 g/dL at 12/16/2022  5:19 AM, will monitor as appropriate           # Cachexia: Estimated body mass index is 17.87 kg/m  as calculated from the following:    Height as of this encounter: 1.753 m (5' 9\").    Weight as of this encounter: 54.9 kg (121 lb 0.5 oz).   # Moderate Malnutrition: based on nutrition assessment        ______________________________________________________________________    Interval History   Patient was seen this morning, attempted to contact family for update yesterday, plan on calling son today.   Stroke neurology is seeing patient     Data reviewed today: I reviewed all medications, new labs and imaging results over the last 24 hours. I personally reviewed no images or EKG's today.    Physical Exam   Vital Signs: Temp: 97.6  F (36.4  C) Temp src: Tympanic BP: 113/63 Pulse: 81   Resp: 18 SpO2: (!) 90 % O2 Device: None (Room air) (Pt took off nasal cannula) Oxygen Delivery: 1 LPM  Weight: 121 lbs .52 oz   Physical Exam  Constitutional:       Comments: Frail appearing stated age male    HENT:      Right Ear: External ear normal.      Left Ear: External ear normal.      Mouth/Throat:      Pharynx: Oropharynx is clear.   Cardiovascular:      Rate and Rhythm: Normal rate.   Pulmonary:      Effort: Pulmonary effort is normal.   Abdominal:      General: Abdomen is flat.   Musculoskeletal:         General: No swelling.   Skin:     Coloration: Skin is not jaundiced.   Neurological:      Mental Status: Mental status is at baseline.           Data   Recent Labs   Lab 12/24/22  0517 12/24/22  0212 12/24/22  0043 12/23/22  0853 12/23/22  0515 12/22/22  0910 12/22/22  0519 12/20/22  0658 12/20/22  0520 12/18/22  0853 12/18/22  0621 12/17/22 0902 12/17/22  0741   WBC 9.5  --   --   --   --   --   --   --  9.3  --  9.5  --   --  "   HGB 7.4*  --   --   --   --   --   --   --  12.1*  --  11.9*  --   --    MCV 90  --   --   --   --   --   --   --  87  --  88  --   --      --   --   --   --   --   --   --  242  --  236  --   --    *  --   --   --  131*  --  129*  --  130*   < > 131*  --  136   POTASSIUM 5.0  --   --   --  5.0  --  5.0  --  4.4   < > 3.8  --  3.6   CHLORIDE 99  --   --   --  97*  --  94*  --  95*   < > 99  --  104   CO2 24  --   --   --  26  --  28  --  28   < > 27  --  25   BUN 34.5*  --   --   --  41.0*  --  34.1*  --  18.3   < > 17.7  --  20.1   CR 0.51*  --   --   --  0.52*  --  0.50*  --  0.46*   < > 0.45*  --  0.54*   ANIONGAP 7  --   --   --  8  --  7  --  7   < > 5*  --  7   CARLOS 7.6*  --   --   --  7.9*  --  8.2*  --  8.0*   < > 7.7*  --  7.6*   * 157* 162*   < > 208*   < > 193*   < > 198*   < > 151*   < > 170*   ALBUMIN  --   --   --   --   --   --   --   --   --   --   --   --  2.6*   PROTTOTAL  --   --   --   --   --   --   --   --   --   --   --   --  5.6*   BILITOTAL  --   --   --   --   --   --   --   --   --   --   --   --  0.3   ALKPHOS  --   --   --   --   --   --   --   --   --   --   --   --  109   ALT  --   --   --   --   --   --   --   --   --   --   --   --  16   AST  --   --   --   --   --   --   --   --   --   --   --   --  19    < > = values in this interval not displayed.     Recent Results (from the past 24 hour(s))   CT Head w/o Contrast    Narrative    EXAM: CT HEAD W/O CONTRAST 12/23/2022 3:51 PM    PROVIDED HISTORY: stroke    COMPARISON: MRI 12/8/2022    TECHNIQUE:   Imaging protocol: Multiplanar CT images of the head without  intravenous contrast.   Acquisition: This CT exam was performed using one or more the  following dose reduction techniques: automated exposure control,  adjustment of the mA and/or kV according to patient size, and/or  iterative reconstruction technique.    FINDINGS:  No intracranial hemorrhage, mass effect, or midline shift. The  ventricles are  proportionate to the cerebral sulci. Scattered patchy  foci of hypodensity in the periventricular and supraventricular white  matter, which is nonspecific, likely related to chronic small vessel  ischemic disease given the patient's age. Chronic bilateral lacunar  type infarcts affecting the right caudate head, internal capsule and  putamen and bilateral centrum semiovale white matter. Mild to moderate  general parenchymal volume loss. No acute loss of gray-white matter  differentiation.    No acute osseous abnormality. No paranasal sinus mucosal thickening.  Mastoid air cells are clear. Bilateral pseudophakia.       Impression    IMPRESSION:  1. No acute intracranial abnormality.  2. Moderate presumed small vessel ischemic disease changes and mild to  moderate volume loss.    KARLEY NERI MD         SYSTEM ID:  [id number]   CTA Head Neck with Contrast    Narrative    EXAM: CTA HEAD NECK W CONTRAST, 12/23/2022 3:52 PM     HISTORY: bilat frontal and R corpus lesions concerning for infarct;  assess vasculature to aid in secondary prevention planning.    TECHNIQUE:   Imaging protocol: Following the administration of intravenous  contrast, thin helical CT angiography images of the brain were  obtained.  Postcontrast helical thin CT angiography images of the neck  were obtained.  NASCET criteria were applied. Source, multiplanar and  MIP reformatted images were reviewed.    Meds given: 50 mL of Isovue-370 intravenous.  Acquisition: This CT exam was performed using one or more the  following dose reduction techniques: automated exposure control,  adjustment of the mA and/or kV according to patient size, and/or  iterative reconstruction technique.  Processing: 3D rendering on independent workstation using Maximum  Intensity Projection (MIP) was performed and archived to PACS. 3D  reconstructions are interpreted and reported by supervising  radiologist.    COMPARISON: CT head 12/23/2022, 11/29/2022; MR head 12/8/2022;  CTA  head and neck 1/3/2022    FINDINGS:    CTA Brain:    Head CTA demonstrates no aneurysm or occlusion of the major  intracranial arteries. No vascular malformation. Moderate  atherosclerotic narrowing of the right distal cavernous internal  carotid artery. Mild to moderate atherosclerotic narrowing of the  proximal left cavernous internal carotid artery. Moderate to severe  atherosclerotic narrowing of the terminal left internal carotid  artery.    The anterior communicating artery is within normal limits. Short  segment fenestration of right anterior cerebral artery at the A1/A2  junction (series 3 images [id number]). Normal variant azygos right  anterior cerebral artery with distal bifurcation at the A3/A4 region.  Anterior cerebral arteries are patent. The left and right middle  cerebral arteries are patent and demonstrate no aneurysm or  significant focal stenosis.     Posterior communicating arteries are not well seen.    The basilar and vertebral arteries are patent. Mild atherosclerotic  narrowing of the proximal left V4 vertebral artery. Moderate to severe  atherosclerotic narrowing of the left P1 PCA. Moderate arthritic  narrowing of the distal right P2 PCA. The proximal SCA branches  demonstrate no focal abnormalities.     CTA Neck:    A 3 vessel aortic arch is present. The origins of the great vessels  from the aortic arch are patent. Moderate atherosclerotic  calcification of the aorta and its major branches without evidence of  dissection or aneurysm.    The common carotid arteries demonstrate mild atherosclerotic  narrowing. Mild calcified atherosclerotic plaque at the carotid  bifurcations and proximal internal carotid arteries with less than 10%  narrowing on the left and 20-30% narrowing on the right. The distal  bilateral internal carotid arteries demonstrate no evidence of  flow-limiting stenosis or occlusion.    The left vertebral artery is dominant. Severe atherosclerotic  narrowing of the  origin of the left vertebral artery. Mild  atherosclerotic narrowing of the left mid V2 segment vertebral artery.  Severe atherosclerotic narrowing of the origin of right vertebral  artery. Mild atherosclerotic narrowing of the distal V3 vertebral  artery.    No mass or pneumothorax is seen at the apices. Moderate to advanced  biapical centrilobular emphysematous changes. Multilevel degenerative  changes are seen in the cervical spine.      Impression    IMPRESSION:     1.  Multifocal atherosclerotic narrowing of the major intracranial  vessels with no long segment occlusion of the major intracranial  vasculature. Moderate to severe atherosclerotic narrowing of the  terminal left internal carotid artery.  2.  Multifocal atherosclerotic narrowing of the major cervical  vasculature with no long segment occlusion. Moderate to severe  atherosclerotic narrowing of the bilateral vertebral artery origins.   3.  Fenestrated right A1/A2 right NAHEED. Recommend follow-up CTA in one  year to evaluate for interval change.    KARLEY NERI MD         SYSTEM ID:  [id number]     Medications     dextrose       sodium chloride 75 mL/hr at 12/24/22 0431       amLODIPine  2.5 mg Oral Daily     aspirin  81 mg Per G Tube Daily     atorvastatin  40 mg Oral At Bedtime     clopidogrel  75 mg Oral Daily     diclofenac  2 g Topical 4x Daily     enoxaparin ANTICOAGULANT  30 mg Subcutaneous Q24H     insulin aspart  1-7 Units Subcutaneous TID AC     insulin aspart  1-5 Units Subcutaneous At Bedtime     lisinopril  2.5 mg Oral Daily     [Held by provider] OLANZapine zydis  5 mg Oral At Bedtime     sodium chloride (PF)  3 mL Intracatheter Q8H

## 2022-12-24 NOTE — PLAN OF CARE
"Most recent vitals: /63 (BP Location: Left arm, Patient Position: Semi-Singletary's)   Pulse 87   Temp 98.7  F (37.1  C) (Tympanic)   Resp 18   Ht 1.753 m (5' 9\")   Wt 54.9 kg (121 lb 0.5 oz)   SpO2 92%   BMI 17.87 kg/m       Pt disoriented to place, time, and situation. Pt pleasant. Drowsy this shift. VSS, afebrile. Did find pt with O2 sats at 86%. Placed pt on 1L O2 w/ sats increasing to 92%. LS clear. Pt using more accessory muscles. Peg tube dressing CDI. Residual checked this evening, 250 mL was pulled. Did not give patient feeding this evening. See flowsheets for blood sugars. IV infusing NS at 75 mL/hr. Remains NPO. Bed alarm active. Call light within reach.     Face to face report given with opportunity to observe patient.    Report given to Caleb Hinojosa RN   12/24/2022  7:21 AM    "

## 2022-12-24 NOTE — PROGRESS NOTES
Brief Stroke Note:  CTA with multiple areas of at least moderate stenosis, however none that are felt to be symptomatic atherosclerosis as etiology of infarct. Continue to feel stroke is cardioembolic in nature from either new afib and/or LV pseudoaneurysm. Blood cultures with no growth to date.     Stroke Evaluation Summarized     MRI/Head CT MRI 12/8:subacute infarcts of the bilateral frontal and R corpus colossal regions; chronic infarcts of R basal ganglia and bilateral centrum semiovale, moderate to advanced chronic small vessel disease    Intracranial Vasculature CTA: moderate stenosis of distal ICA, moderate to severe stenosis of terminal L ICA, short segment fenestration of R A1/2, moderate stenosis R PCA   Cervical Vasculature CTA: moderate to severe stenosis of bilateral vertebral artery origins      Echocardiogram LVEF 40-45%, inferior basalar area of dyskinesis consistent with pseudoaneurysm, moderate LV hypertrophy, atria of normal size bilaterally   EKG/Telemetry 11/28: atrial fibrillation   Other Testing Blood cultures: no growth to date       LDL  32   A1C  8.5%         Impression  1. Subacute infarcts of bilateral frontal and R corpus colossal regions, suspect secondary to cardioembolic source either from newly identified atrial fibrillation vs untreated LV pseudoaneurysm; blood cultures with no growth to date to indicate endocarditis      2. Progressive generalized weakness and dysphagia; not fully explained by infarcts on MRI. Need to consider alternative pathology such as neuromuscular disease vs manifestation of multifocal spinal disease vs other     3. Newly captured atrial fibrillation (per EKG 11/28)     4. Known LV pseudoaneurysm, repair recommended but initially declined by patient and now patient not felt medically stable enough for intervention     Recommendations  - from stroke perspective recommend discontinuation of Plavix/ASA and initiation of Eliquis 2.5mg BID (dosage adjustment  "based on patient age and weight) for secondary stroke prevention; consider review with cardiology to assess for cardiac indication for ongoing antiplatelet therapy   -Goal BP is <130/80 with tighter control associated with improved vascular outcome  -LDL 32 (goal 40-70); continue PTA Lipitor  with ongoing outpatient titration to achieve LDL goal   -Blood glucose monitoring, Hgb A1c 8.5%, (goal <7% for secondary stroke prevention), follow-up with PCP  - Telemetry, EKG  - Bedside Glucose Monitoring   - PT/OT/SLP  - Stroke Education  - Euthermia, Euglycemia   - management of known LV pseudoaneurysm per primary team/cardiology   -if aligns with patient goals of care consider transfer for general neurology consultation to consider broader neurological differential for weakness/dysphagia such as neuromuscular disease, spinal pathology etc.   -consider palliative care consultation to assist with discussion and goals of care planning      Patient Follow-up    1-2 weeks PCP  6-8 weeks outpatient neurologist of patient's choosing    Thank you for this consult. We will follow peripherally for results of the blood cultures and if no concerns then will sign off. Please contact us with any additional questions.    Jinny LANDRY, CNP  Vascular Neurology  To page me or covering stroke neurology team member, click here: [facility]   Choose \"On Call\" tab at top, then search dropdown box for \"Neurology Adult\", select location, press Enter, then look for stroke/neuro ICU/telestroke.      "

## 2022-12-24 NOTE — PLAN OF CARE
"Reason for hospital stay:  Weakness, Falls, CVA, PEG Tube placement    Most recent vitals: /52 (BP Location: Left arm)   Pulse 86   Temp 98.3  F (36.8  C) (Tympanic)   Resp 24   Ht 1.753 m (5' 9\")   Wt 54.9 kg (121 lb 0.5 oz)   SpO2 92%   BMI 17.87 kg/m      Pain Management:  Denied any pain this shift. Continues to receive scheduled voltaren cream - refuses at times.    LOC:  Alert but confused - able to make needs known. Pleasant and cooperative with cares but has been quiet and not as talkative today. No attempts to get out of bed or chair today.     Cardiac:  WDL    Respiratory:  Lungs clear throughout. Occasional congested coughing. O2 sats down to 85-86% on RA this afternoon - placed on 1 LPM O2 to maintain sats greater than 90%.     GI:  NPO - tolerating PEG tube feedings. Aspiration precautions in place.    :  Incontinent     IVF:  NS infusing at 75 mL/hr    ABX: N/A    Activity: Suzie lift for transfers. Up in chair this morning.    Safety:  Alarms on       Face to face report given with opportunity to observe patient.    Report given to Laly Price RN   12/24/2022  7:05 PM        "

## 2022-12-24 NOTE — PLAN OF CARE
Patient alert, disoriented to place, time, situation. Calm and cooperative, pleasant. Vital signs and assessment as charted. PEG tube feedings received x2 per orders, tolerated well. All meds crushed and given via PEG tube. Incontinent of bowel and bladder. Suzie lift for transfers. Upright in chair during and after tube feedings. 0-5 ml residuals. Patient had CT scan today and was seen remotely by Neurology. Alarms on, call light within reach.     Face to face report given with opportunity to observe patient.    Report given to Laly Salas RN   12/23/2022  7:10 PM

## 2022-12-25 NOTE — PROGRESS NOTES
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  Patient was admitted to the hospital with chronic debility, hyponatremia, elevated troponin, leukocytosis, COPD with possible exacerbation, found to have left ventricular pseudoaneurysm patient/family declined intervention as patient not a candidate at this time for procedure due to his debility and weakness.  Patient did have extensive work-up in the hospital include CT head no acute findings, CT of the chest abdomen pelvis without contrast showing 8.7 mm right lower lobe and 5 mm left lower lobe lung nodule, no acute findings, MRI of the cervical spine without contrast showing moderate compression of the right C5 nerve root mild compression of the left C5 nerve root, moderate compression of C6 nerve root, moderate compression of C7 nerve roots  Sent did have MRI of the brain done showing right corpus callosal lesion that was consistent with acute infarct involvement of the corpus callosum however was slightly atypical for vascular infarct recommendation was for MRI 1 to 2 weeks to confirm expected evolution versus CSF sampling or MR spectroscopy.  2 puncture would enhancing foci in the frontal lobes likely representing subacute infarcts, moderate presumed small vessel ischemic changes.  She has been on statin aspirin and Plavix, echocardiogram was done showing ejection fraction of 40 to 45% is mildly reduced with inferior basal area dyskinesis consider with pseudoaneurysm.  There was an EKG that was done 11/28 with questionable A. fib but P waves are  present.  Patient did have aspiration pneumonia in the hospital and was treated with Levaquin , he also did have dysphagia and was seen by speech therapy and general surgery and after discussion with family patient did have a PEG tube placement and has been receiving tube feeds through that.  Patient has been NPO.  Discussed patient with stroke neurology and they did recommend getting a CTA of his head and neck which was ordered and pending. Patient has been awaiting placement for rehab. ON 12/23 I did call brother to update he asked me to call patients son to update. On 12/24 will touch base with son. May benefit from palliative care consult out patient regarding goals of care.  I called son and updated him.      Acute respiratory failure with hypoxia  Oropharyngeal dysphagia:   S/P Peg placement buy Dr Griffiths on 12/20/22  Patient's episode last night and throughout today appear to be most consistent with episodic aspiration.  Patient's increased postfeeding residual volume also supports this speculation.  As patient's residual volume is still at 40 cc/h at 4 hours after feeding, we will hold the feeding today and resume tomorrow at lower volume.      Fall, initial encounter/weakness:   PT and OT  Needs rehab  Fall risk    Stroke  MRI brain done showing   Right corpus callosal lesion with imaging features that are most  consistent with acute infarct. Involvement of the corpus callosum,  however, is slightly atypical for a vascular infarct. Recommend  follow-up MRI in 1-2 weeks to confirm expected evolution. Depending on  level of clinical suspicion, CSF sampling and/or MR spectroscopy could  be useful in further evaluating.  2.  2 punctate enhancing foci in the frontal lobes, likely  representing subacute infarcts.  3.  Moderate presumed small vessel ischemic disease changes.  Stroke neurology consulted, follow recs LIKELY WILL need AOC as one EKG showed possible Afib on 11/28  Statin plavix and aspirin  Echo showed  mildly decreased left ventricular systolic function with ejection fraction of 40 to 45% inferior basal area of dyskinesis consistent with a pseudoaneurysm.  Blood cultures ordered per stroke neurology, follow recs  --> so far negative to date      Suspected aspiration pneumonia Treated  Finished Levaquin     Hyponatremia:  He was hydrated with IV fluids with some improvement in sodium.   Will continue to monitor     COPD    Continue home needs  Was reported he require 1-2 liters, on RA now      Type 2 diabetes mellitus, with long-term current use of insulin (H):  ISS  Diabetic diet     Bilateral shoulder pain:  PT and OT     Moderate malnutrition, POA with BMI < 19.     Hypocalcemia: Corrected Calcium is NL      Recent non-STEMI   Patient was found to have pseudoaneurysm inferoseptal area along with  ischemic cardiomyopathy EF from 40 to 45%.    He has heart cath showing  three-vessel coronary disease with an occluded distal RCA D1 stenosis and circumflex with prior stent without significant residual stenosis. Patient was discussed with cardiology in ER and initially recs was to tranfer to higher level of care but after further discussion between ER and cardiology and family it was recommended patient is too high risk for procedure and plan was on rehab then out patient follow up as he was asymptomatic without chest pain  Outpatient cardiology follow up  Asa   Statin  Plavix    Severe anemia  -hgb drop to 7; will repeat  -possible GI bleed; type and cross.       Diet: NPO for Medical/Clinical Reasons Except for: Ice Chips  Adult Formula Bolus Feeding: Other; Glucerna; Route: Gastrostomy; 3 times daily; Volume per Bolus: 355; mL(s); Additional free water (mL): 100 ml free water three times daily; Start with free water 2 x before starting TFs    DVT Prophylaxis: Enoxaparin (Lovenox) SQ  Mata Catheter: Not present  Central Lines: None  Cardiac Monitoring: None  Code Status: No CPR- Do NOT Intubate      Disposition  Plan         The patient's care was discussed with the Patient.    Wilder Johnson MD  Hospitalist Service  [facility]  Securely message with the Vocera Web Console (learn more here)  Text page via [employer] Paging/Directory         Clinically Significant Risk Factors         # Hyponatremia: Lowest Na = 130 mmol/L in last 2 days, will monitor as appropriate      # Hypoalbuminemia: Lowest albumin = 2.3 g/dL at 12/16/2022  5:19 AM, will monitor as appropriate            # Moderate Malnutrition: based on nutrition assessment        ______________________________________________________________________    Interval History   Patient had an episode of tachypnea and decreased mental status last night.  He is postfeeding residual volume is elevated and he appeared to have aspiration episode.    Data reviewed today: I reviewed all medications, new labs and imaging results over the last 24 hours. I personally reviewed no images or EKG's today.    Physical Exam   Vital Signs: Temp: 97.9  F (36.6  C) Temp src: Tympanic BP: 112/54 Pulse: 81   Resp: 24 SpO2: 95 % O2 Device: Nasal cannula Oxygen Delivery: 1 LPM  Weight: 122 lbs 9.21 oz  General Appearance: Lying in bed in mild respiratory distress with tachypnea  Respiratory: Mild crackles at right base  Cardiovascular: S1-S2, regular rhythm and rate  GI: Soft, nontender, nondistended  Skin: Intact  Other: Neuro: Lethargic but arousable and answers verbally to some questions.  Pulm movements in all 4 extremities    Data   Recent Labs   Lab 12/25/22  1009 12/25/22  0613 12/25/22  0019 12/24/22  1039 12/24/22  0517 12/23/22  0853 12/23/22  0515 12/20/22  0658 12/20/22  0520   WBC  --   --   --   --  9.5  --   --   --  9.3   HGB  --   --   --   --  7.4*  --   --   --  12.1*   MCV  --   --   --   --  90  --   --   --  87   PLT  --   --   --   --  265  --   --   --  242   NA  --  131*  --   --  130*  --  131*   < > 130*   POTASSIUM  --  4.6  --   --  5.0  --  5.0   < > 4.4    CHLORIDE  --  102  --   --  99  --  97*   < > 95*   CO2  --  23  --   --  24  --  26   < > 28   BUN  --  37.0*  --   --  34.5*  --  41.0*   < > 18.3   CR  --  0.51*  --   --  0.51*  --  0.52*   < > 0.46*   ANIONGAP  --  6*  --   --  7  --  8   < > 7   CARLOS  --  7.5*  --   --  7.6*  --  7.9*   < > 8.0*   * 196* 197*   < > 180*   < > 208*   < > 198*    < > = values in this interval not displayed.     Recent Results (from the past 24 hour(s))   XR Chest Port 1 View    Narrative    PROCEDURE:  XR CHEST PORT 1 VIEW    HISTORY: status change with tachypnea; on tube feeding,; s/p CVA    COMPARISON:  Radiograph 12/15/2022, 12/1/2022; CT chest 11/21/2022    FINDINGS: Single view chest radiograph  Cardiomediastinal silhouette is within normal limits. There is  calcific aortic atherosclerosis. Coronary stents.  Stable coarse interstitial opacities and retrocardiac opacities.  Stable blunting of left costophrenic angle. No right effusion. No  pneumothorax.    No suspicious osseous lesion or subdiaphragmatic free air. PEG tube in  place.      Impression    IMPRESSION:   Stable exam with unchanged retrocardiac and chronic interstitial  opacities.      KARLEY NERI MD         SYSTEM ID:  [id number]     Medications     dextrose       sodium chloride 75 mL/hr at 12/24/22 1545       amLODIPine  2.5 mg Oral Daily     aspirin  81 mg Per G Tube Daily     atorvastatin  40 mg Oral At Bedtime     clopidogrel  75 mg Oral Daily     diclofenac  2 g Topical 4x Daily     enoxaparin ANTICOAGULANT  30 mg Subcutaneous Q24H     insulin aspart  1-7 Units Subcutaneous TID AC     insulin aspart  1-5 Units Subcutaneous At Bedtime     lisinopril  2.5 mg Oral Daily     [Held by provider] OLANZapine zydis  5 mg Oral At Bedtime     sodium chloride (PF)  3 mL Intracatheter Q8H

## 2022-12-25 NOTE — PLAN OF CARE
"Reason for hospital stay:  Weakness, Falls, CVA, PEG Tube placement, comfort cares    Most recent vitals: BP (!) 95/49   Pulse 83   Temp 98.4  F (36.9  C) (Temporal)   Resp (!) 28   Ht 1.753 m (5' 9\")   Wt 55.6 kg (122 lb 9.2 oz)   SpO2 98%   BMI 18.10 kg/m      Patient lethargic this morning but opens eyes and able to answer questions but then immediately would close eyes after answering. Frequent loose, congested cough. VS as charted in flow sheets. Became increasingly more lethargic this shift and now unresponsive and having 15 second periods of apnea. Dr. Johnson in to see and stated will contact family - new orders for comfort cares received. Tube Feeding residuals as charted in flow sheets - Tube feedings held per orders. Morphine given for comfort / dyspnea with good relief. IV fluids discontinued. Repositioning every 2 hours.        Face to face report given with opportunity to observe patient.    Report given to Laly Price RN   12/25/2022  7:25 PM      "

## 2022-12-25 NOTE — PLAN OF CARE
"Most recent vitals: /75 (BP Location: Left arm, Patient Position: Semi-Singletary's)   Pulse 81   Temp 98.8  F (37.1  C) (Tympanic)   Resp (!) 28   Ht 1.753 m (5' 9\")   Wt 54.9 kg (121 lb 0.5 oz)   SpO2 92%   BMI 17.87 kg/m      Pt disoriented to place, time, and situation. Remains on 1L O2 w/ sats low 90's. LS clear w/ crackles in the bases. Continued tube feedings. See charting. BS as charted. Early in the night pt was found to be lethargic and not responding to staff commands. He had a fixed gaze to the left. He was tachypnic. Sats were 92% on RA, rest of vitals stable. Provider was notified of change in pt status. Pt did become more responsive late into the shift and returned more to baseline. IV continues to infuse NS at 75mL/hr. Bed alarm active.     Face to face report given with opportunity to observe patient.    Report given to Caleb Hinojosa RN   12/25/2022  7:13 AM      "

## 2022-12-25 NOTE — SIGNIFICANT EVENT
Significant Event Note    Time of event: 4:52 PM December 25, 2022    Description of event:  Patient is unresponsive and hypoxic.  He is hemoglobin also appears to be decreasing which might be due to antiplatelet therapy with aspirin and Plavix.  I spoke with patient's son Lucas Villalta regarding poor prognosis in light of multiple issues including stroke, aspiration pneumonia despite feeding tube placement, and possible blood loss due to stroke treatment.  He states that comfort care is what he would have wanted and request that the palliative care to start.    Plan:  Will start comfort care    Discussed with: patient's family/emergency contact and bedside nurse    Wilder Johnson MD

## 2022-12-26 NOTE — PLAN OF CARE
Pt family called, they are unsure of  home at this time. Son Lucas states they have no money to pay for a . They are working on a plan, will call hospital later this morning with an update.

## 2022-12-26 NOTE — DEATH PRONOUNCEMENT
MD DEATH PRONOUNCEMENT    Called to pronounce Neel Kerr dead.    Physical Exam: Spontaneous respirations absent, Heart sounds absent and Pupillary light reflex absent    Patient was pronounced dead at 0443: AM, 2022.    Preliminary Cause of Death: CVA, GI bleed, NSTEMI / Left ventricular Pseudoaneurysm      Principal Problem:    Fall, initial encounter  Active Problems:    Hyponatremia    Oropharyngeal dysphagia    COPD (chronic obstructive pulmonary disease) (H)    Type 2 diabetes mellitus, with long-term current use of insulin (H)    Generalized muscle weakness    Failure to thrive in adult    Neurocognitive disorder    Stroke (H)   NSTEMI / LV Pseudoaneurysm   Systolic heart failure   GI bleed   Respiratory failure    Infectious disease present?: NO    Communicable disease present? (examples: HIV, chicken pox, TB, Ebola, CJD) :  NO    Multi-drug resistant organism present? (example: MRSA): NO    Please consider an autopsy if any of the following exist:  NO Unexpected or unexplained death during or following any dental, medical, or surgical diagnostic treatment procedures.   NO Death of mother at or up to seven days after delivery.     NO All  and pediatric deaths.     NO Death where the cause is sufficiently obscure to delay completion of the death certificate.   NO Deaths in which autopsy would confirm a suspected illness/condition that would affect surviving family members or recipients of transplanted organs.     The following deaths must be reported to the 's Office:  NO A death that may be due entirely or in part to any factors other than natural disease (recent surgery, recent trauma, suspected abuse/neglect).   NO A death that may be an accident, suicide, or homicide.     NO Any sudden, unexpected death in which there is no prior history of significant heart disease or any other condition associated with sudden death.   NO A death under suspicious, unusual, or unexpected  circumstances.    NO Any death which is apparently due to natural causes but in which the  does not have a personal physician familiar with the patient s medical history, social, or environmental situation or the circumstances of the terminal event.   NO Any death apparently due to Sudden Infant Death Syndrome.     NO Deaths that occur during, in association with, or as consequences of a diagnostic, therapeutic, or anesthetic procedure.   NO Any death in which a fracture of a major bone has occurred within the past (6) six months.   NO A death of persons note seen by their physician within 120 days of demise.     NO Any death in which the  was an inmate of a public institution or was in the custody of Law Enforcement personnel.   NO  All unexpected deaths of children   NO Solid organ donors   NO Unidentified bodies   NO Deaths of persons whose bodies are to be cremated or otherwise disposed of so that the bodies will later be unavailable for examination;   NO Deaths unattended by a physician outside of a licensed healthcare facility or licensed residential hospice program   NO Deaths occurring within 24 hours of arrival to a health care facility if death is unexpected.    NO Deaths associated with the decedent s employment.   NO Deaths attributed to acts of terrorism.   NO Any death in which there is uncertainty as to whether it is a medical examiner s care should be discussed with the medical investigator.        Body disposition: Body released to the  home.

## 2022-12-26 NOTE — DISCHARGE SUMMARY
[facility]    Death Summary - Hospitalist Service     Date of Admission:  11/28/2022  Date of Death: 12/26/2022  Discharging Provider: Magaly Ya MD    Discharge Diagnoses   Principal Problem:    Fall, initial encounter      Hyponatremia      Oropharyngeal dysphagia, s/p PEG      COPD (chronic obstructive pulmonary disease) (H)       Type 2 diabetes mellitus, with long-term current use of insulin (H)        Generalized muscle weakness       Failure to thrive in adult        Neurocognitive disorder       Stroke (H)      NSTEMI/ Pseudoaneurysm left ventricle       GI bleed      Aspiration pneumonia      Acute respiratory failure    Cause of death: CVA, GI bleed, NSTEMI/LV psuedoaneurysm, Acute respiratory failure    Hospital Course   Patient is a 82-year-old male who has past medical history of type 2 diabetes, COPD, chronic respiratory failure with hypoxia, prior CVA, history of TURP, BPH, falls, 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  Patient was admitted to the hospital with chronic debility, hyponatremia, elevated troponin, leukocytosis, COPD with possible exacerbation, found to have left ventricular pseudoaneurysm patient/family declined intervention as patient not a candidate at this time for procedure due to his debility and weakness.  Patient did have extensive work-up in the hospital include CT head no acute findings, CT of the chest abdomen pelvis without contrast showing 8.7 mm right lower lobe and 5 mm left lower lobe lung nodule, no acute findings, MRI of the cervical spine without contrast showing moderate compression of the right C5 nerve root mild compression of the left C5 nerve root, moderate compression of C6 nerve root, moderate compression of C7 nerve roots  Sent did have MRI of the brain done showing right corpus callosal lesion that was consistent with acute infarct  involvement of the corpus callosum however was slightly atypical for vascular infarct recommendation was for MRI 1 to 2 weeks to confirm expected evolution versus CSF sampling or MR spectroscopy.  2 puncture would enhancing foci in the frontal lobes likely representing subacute infarcts, moderate presumed small vessel ischemic changes.  She has been on statin aspirin and Plavix, echocardiogram was done showing ejection fraction of 40 to 45% is mildly reduced with inferior basal area dyskinesis consider with pseudoaneurysm.  There was an EKG that was done 11/28 with questionable A. fib but P waves are present.  Patient did have aspiration pneumonia in the hospital and was treated with Levaquin , he also did have dysphagia and was seen by speech therapy and general surgery and after discussion with family patient did have a PEG tube placement and has been receiving tube feeds through that.  Patient has been NPO.  Discussed patient with stroke neurology and they did recommend getting a CTA of his head and neck which was ordered and pending. Patient has been awaiting placement for rehab. ON 12/23 I did call brother to update he asked me to call patients son to update. On 12/24 will touch base with son. May benefit from palliative care consult out patient regarding goals of care.  I called son and updated him.   12/25, discussion for comfort cares.  12/26, patient passed away peacefully at 0443.     Acute respiratory failure with hypoxia  Oropharyngeal dysphagia:   S/P Peg placement buy Dr Griffiths on 12/20/22  Patient's episode last night and throughout today appear to be most consistent with episodic aspiration.  Patient's increased postfeeding residual volume also supports this speculation.  As patient's residual volume is still at 40 cc/h at 4 hours after feeding, we will hold the feeding today and resume tomorrow at lower volume.      Fall, initial encounter/weakness:   PT and OT  Needs rehab  Fall risk     Stroke  MRI  brain done showing   Right corpus callosal lesion with imaging features that are most consistent with acute infarct. Involvement of the corpus callosum, however, is slightly atypical for a vascular infarct. Recommend follow-up MRI in 1-2 weeks to confirm expected evolution. Depending onlevel of clinical suspicion, CSF sampling and/or MR spectroscopy could be useful in further evaluating.  2.  2 punctate enhancing foci in the frontal lobes, likely  representing subacute infarcts.  3.  Moderate presumed small vessel ischemic disease changes.  Stroke neurology consulted, follow recs LIKELY WILL need AOC as one EKG showed possible Afib on 11/28  Statin plavix and aspirin  Echo showed mildly decreased left ventricular systolic function with ejection fraction of 40 to 45% inferior basal area of dyskinesis consistent with a pseudoaneurysm.  Blood cultures ordered per stroke neurology, follow recs  --> so far negative to date        Suspected aspiration pneumonia Treated  Finished Levaquin     Hyponatremia:  He was hydrated with IV fluids with some improvement in sodium.   Will continue to monitor     COPD    Continue home needs  Was reported he require 1-2 liters, on RA now      Type 2 diabetes mellitus, with long-term current use of insulin (H):  ISS  Diabetic diet     Bilateral shoulder pain:  PT and OT     Moderate malnutrition, POA with BMI < 19.     Hypocalcemia: Corrected Calcium is NL      Recent non-STEMI   Patient was found to have pseudoaneurysm inferoseptal area along with  ischemic cardiomyopathy EF from 40 to 45%.    He has heart cath showing  three-vessel coronary disease with an occluded distal RCA D1 stenosis and circumflex with prior stent without significant residual stenosis. Patient was discussed with cardiology in ER and initially recs was to tranfer to higher level of care but after further discussion between ER and cardiology and family it was recommended patient is too high risk for procedure and  plan was on rehab then out patient follow up as he was asymptomatic without chest pain  Outpatient cardiology follow up  Asa   Statin  Plavix     Severe anemia  -hgb drop to 7; will repeat further drop > 5.1  -possible GI bleed; type and cross.            Magaly Ya MD  [facility]  ______________________________________________________________________      Significant Results and Procedures   S/P PEG placement    Consultations This Hospital Stay   PHYSICAL THERAPY ADULT IP CONSULT  OCCUPATIONAL THERAPY ADULT IP CONSULT  SPEECH LANGUAGE PATH ADULT IP CONSULT  NUTRITION SERVICES ADULT IP CONSULT  PHYSICAL THERAPY ADULT IP CONSULT  OCCUPATIONAL THERAPY ADULT IP CONSULT  CARE MANAGEMENT / SOCIAL WORK IP CONSULT  PHARMACY IP CONSULT  NEUROLOGY IP STROKE CONSULT  SPIRITUAL HEALTH SERVICES IP CONSULT  SPIRITUAL HEALTH SERVICES IP CONSULT    Primary Care Physician   Norma Taylor    Time Spent on this Encounter   IMagaly MD, personally saw the patient today and spent less than or equal to 30 minutes discharging this patient.

## 2022-12-26 NOTE — PLAN OF CARE
Provider has been notified of pt death. Time of death 0443. POA (Nicolas-Brother) notified, declines autopsy. Awaiting call back regarding choice of  home.

## 2022-12-27 ENCOUNTER — TELEPHONE (OUTPATIENT)
Dept: FAMILY MEDICINE | Facility: OTHER | Age: 82
End: 2022-12-27

## 2022-12-27 NOTE — TELEPHONE ENCOUNTER
[facility] is requesting a cremation authorization for this  patient.     Dilcia Pate on 2022 at 1:50 PM

## 2022-12-28 NOTE — TELEPHONE ENCOUNTER
Talked with Dylon to let him know that Norma Taylor MD did sign off on this yesterday and it was sent to our HIM department to be filed.  Nathaly Schafer LPN   12/28/2022  9:12 AM

## 2022-12-29 LAB
BACTERIA BLD CULT: NO GROWTH
BACTERIA BLD CULT: NO GROWTH

## 2024-10-07 NOTE — PROGRESS NOTES
SAFETY CHECKLIST  ID Bands and Risk clasps correct and in place (DNR, Fall risk, Allergy, Latex, Limb):  Yes  All Lines Reconciled and labeled correctly: Yes  Whiteboard updated:Yes  Environmental interventions (bed/chair alarm on, call light, side rails, restraints, sitter....): Yes  Verify Tele #: n/a       show

## 2024-11-19 NOTE — ED AVS SNAPSHOT
[facility]    [address]    [place] [address]    Phone:  [phone]                                       Neel Kerr   MRN: [id number]    Department:  [facility]   Date of Visit:  1/23/2017           Patient Information     Date Of Birth          1940        Your diagnoses for this visit were:     Lymphadenopathy     Edema of right lower extremity     Erythematous condition RLL    Uncontrolled type 2 diabetes mellitus with complication, without long-term current use of insulin ([facility])        You were seen by Karen Trivedi NP.      Follow-up Information     Please follow up.    Why:  For evaluation for biopsy as previously advised        Please follow up.    Why:  [facility]        Discharge Instructions       Follow up with PCP for elevated blood sugars. You should start metformin as discussed.       Leg Swelling (1 Leg Only)  Swelling of the arms, feet, ankles, and legs is called edema. It is caused by extra fluid collecting in the tissues. Because of gravity, extra fluid in the body settles to the lowest part. That is why the legs and feet are most affected. You have swelling in only 1 leg.  Some of the causes for swelling in only one leg include:    Infection in the foot or leg    Long-term problem with a vein not working well (venous insufficiency)    Swollen, twisted vein in the leg (varicose veins)    Insect bite or sting on the foot or leg    Injury or recent surgery on the foot or leg    Blood clot in a deep vein of the leg (deep vein thrombosis or DVT)  Medical treatment will depend on what is causing your swelling.  Home care  Follow these guidelines when caring for yourself at home:    Don t wear tight clothing.    Keep your legs up while lying or sitting.    Take any medicines as directed.    If infection, injury, or recent surgery is the cause of your swelling, stay off your legs as much as possible until your symptoms get  better.    If you have venous insufficiency or varicose veins, don t sit or  one place for long periods of time. Take breaks and walk around every few hours. Talk with your healthcare provider about wearing support stockings to help lessen swelling during the day.  Follow-up care  Follow up with your health care provider as advised.  Call 911  Call 911 if any of these occur:    Shortness of breath or trouble breathing    Chest pain    Coughing up blood    Fainting or loss of consciousness   When to seek medical advice  Call your healthcare provider right away if any of these occur:    Increased pain, swelling, warmth, or redness of the leg, ankle, or foot    Fever of 100.4 F (38 C) or higher, or as directed by your healthcare provider    Weakness or dizziness    6913-0784 [employer]. [address] [place] [address], [place] [address]. All rights reserved. This information is not intended as a substitute for professional medical care. Always follow your healthcare professional's instructions.          Lymphadenopathy  Lymphadenopathy is swelling of the lymph nodes. Lymph nodes are small, bean-shaped glands around the body.  What are lymph nodes?  Lymph nodes are part of your immune system. The glands are found in your neck, armpits, groin, chest, and abdomen. They act as filters for lymph fluid as it flows through your body. Lymph fluid contains white blood cells and other things that fight infection.  Why lymph nodes swell  Lymphadenopathy is very common. The glands often enlarge during a viral or bacterial infection. It can happen during a cold, the flu, or strep throat. The nodes may swell in just one area of the body, such as the neck (localized). Or nodes may swell all over the body (generalized). The neck (cervical) lymph nodes are the most common site of lymphadenopathy.  What causes lymphadenopathy?  Dead cells and fluid build up in the lymph nodes as they help fight infection or disease. This  causes them to swell in size. Enlarged lymph nodes are often near the source of infection. This can help to find the cause of an infection. For example, swollen lymph nodes around the jaw may be because of an infection in the teeth or mouth. But lymphadenopathy may also be generalized. This is common in some viral illnesses such as mononucleosis or chickenpox (varicella).  Lymphadenopathy can also be caused by:    Infection of a lymph node or small group of nodes (lymphadenitis)    Cancer    Reactions to medicines such as antibiotics and some seizure medicines    Other health conditions, such as lupus  Symptoms of lymphadenopathy  Lymphadenopathy can cause symptoms such as:    Lumps under the jaw, on the sides or back of the neck, in the armpits, in the groin, or in the chest or belly (abdomen)    Pain or tenderness in any of these areas    Redness or warmth in any of these areas  You may also have symptoms from an infection causing the swollen glands. These symptoms may include fever, sore throat, body aches, or cough.  Diagnosing lymphadenopathy  Your health care provider will ask about your health history and symptoms. He or she will give you a physical exam and check the areas where lymph nodes are enlarged. Your health care provider will check the size and location of the nodes, and ask how long they have been swollen and if they are painful. Diagnostic tests and referral to specialists may be recommended. They may include:    Blood tests. These are done to check for signs of infection and other problems.    Urine test. This is also done to check for infection and other problems.    Chest X-ray. This test can show enlarged lymph nodes or other problems.    Lymph node biopsy. If lymph nodes are swollen for 3 to 4 weeks, they may be checked with a biopsy. Small samples of lymph node tissue are taken and checked in a lab for signs of cancer. You may be referred to a specialist in blood disorders and cancer  (hematologist and oncologist).  Treatment for lymphadenopathy  The treatment of enlarged lymph nodes depends on the cause. Enlarged lymph nodes are often harmless and go away without any treatment. Treatment is most often done on the cause of the enlarged nodes and may include:    Antibiotic medicine to treat a bacterial infection    Incision and drainage (I & D) of a lymph node for lymphadenitis    Other medicines or procedures to treat the cause of the enlarged nodes  You may need follow-up exam in 3 to 4 weeks to recheck enlarged nodes.     When to call your health care provider  Call your health care provider if you have lymph nodes that are still swollen after 3 to 4 weeks.           Diabetes and Heart Disease  If you have diabetes, you are two to four times more likely to have heart disease than someone without diabetes. This higher risk is due to diabetes, but it is also due to other risk factors for heart disease that occur in people with diabetes. But there s good news. You can help control your health risks by making some changes in your life. You can take steps to reduce your risk of heart disease by half--similar to the risk in people who don't have diabetes.        Your health care team will develop a treatment plan that works best for you.     Your main risk factors  Three major risk factors for heart disease are high blood sugar, high blood pressure, and high levels of lipids. By keeping risk factors under control, you can help keep your heart and arteries healthy. This may reduce your chances of a heart attack.    Blood sugar. High blood sugar can make artery walls tough and rough. Plaque (waxy material in the blood) can then build up, making it harder for blood to flow through the arteries. Having high blood sugar increases the chances of having high blood pressure and high cholesterol.    Blood pressure. When blood pressure is high all the time, artery walls become damaged, increasing the risk for  plaque build up.    Lipids. The body needs some lipids in the blood to stay healthy. But lipid levels that are too high can damage the artery walls. Lipids include cholesterol and triglycerides. There are two kinds of cholesterol. LDL ( bad ) cholesterol can damage the arteries. But HDL ( good ) cholesterol helps clear LDL cholesterol from the blood vessels. This helps keep the arteries healthy. When blood sugar is high, the level of triglycerides in the blood may also be high. High blood triglyceride levels can cause plaque to form.   Other risk factors  Certain lifestyle factors can increase levels of your blood sugar, blood pressure, and lipids. Such increases raise your risk of heart disease.    Smoking damages the lining of your arteries. This allows plaque to build up in the artery walls. Smoking also constricts (narrows) the arteries, which can raise blood pressure. Smoking also increases your risk of getting type 2 diabetes.    Not being active makes it harder for your heart to do its work. Inactivity is linked to many other risk factors, such as high blood pressure and poor cholesterol levels. Inactivity also increases your risk of getting type 2 diabetes.    Being overweight makes it harder for your body to use insulin. It also makes your heart work too hard. Being overweight is also the main contributor to the development of type 2 diabetes,   Changes you can make     Take your medications as directed each day, even if you feel fine.   Following a few simple steps can help keep your risk factors under control. Work with your health care team to reach your goals.    Quitting smoking could save your life. Smoking damages the lining of the blood vessels and raises blood pressure. Smoking also affects how your body uses insulin. This makes it harder to keep blood sugar under control. If you smoke and need help quitting, talk to your health care team.     Testing your blood sugar is the only way to know  whether it is under control. Be sure to test your blood sugar yourself. Also get your blood tested in the lab, as directed.    Monitoring your blood pressure and lipid levels can help you achieve safe levels. Visit your health care team as scheduled.    Taking medications as directed can help control blood sugar, blood pressure, blood clotting, and/or cholesterol levels.    Eating right can reduce your risk factors and help you lose weight. Try to limit the amount of processed or refined carbohydrates you eat at one time. Cut back on your total calorie intake. Eat foods low in saturated fat and cholesterol. Eat fiber, including vegetables and whole grains, and cut down on salt. A dietitian or diabetes educator can help form a meal plan that works for you.    Being active can help reduce your weight, strengthen your heart, and lower your lipid levels and blood pressure. Exercise and activity are good for your whole body. Talk to your health care team about increasing your activity safely over time.    Keeping your appointments with your health care provider helps you stay healthy. Go in for checkups and lab tests as scheduled.    9927-1024 [employer]. [address] [place] [address]. All rights reserved. This information is not intended as a substitute for professional medical care. Always follow your healthcare professional's instructions.             Review of your medicines      Our records show that you are taking the medicines listed below. If these are incorrect, please call your family doctor or clinic.        Dose / Directions Last dose taken    NO ACTIVE MEDICATIONS        Refills:  0        VITAMIN B 12 PO        Refills:  0        VITAMIN C PO        Refills:  0        VITAMIN D3 PO        Take by mouth daily   Refills:  0        zinc sulfate 220 MG capsule   Commonly known as:  ZINCATE   Dose:  220 mg        Take 220 mg by mouth daily   Refills:  0                Procedures and  "tests performed during your visit     Procedure/Test Number of Times Performed    Blood culture 2    CBC with platelets differential 1    CRP inflammation 1    Comprehensive metabolic panel 1    Erythrocyte sedimentation rate auto 1    Estimated Average Glucose 1    Hemoglobin A1c 1    Lactic acid 1    US Extremity Non Vascular Right 1      Orders Needing Specimen Collection     None      Pending Results     Date and Time Order Name Status Description    2017 1456 Blood culture In process     2017 1456 Blood culture In process     2017 1423 US Extremity Non Vascular Right In process             Pending Culture Results     Date and Time Order Name Status Description    2017 1456 Blood culture In process     2017 1456 Blood culture In process             Thank you for choosing [facility]       Thank you for choosing [facility] for your care. Our goal is always to provide you with excellent care. Hearing back from our patients is one way we can continue to improve our services. Please take a few minutes to complete the written survey that you may receive in the mail after you visit with us. Thank you!        [employer] Information     [employer] lets you send messages to your doctor, view your test results, renew your prescriptions, schedule appointments and more. To sign up, go to [email or web address] . Click on \"Log in\" on the left side of the screen, which will take you to the Welcome page. Then click on \"Sign up Now\" on the right side of the page.     You will be asked to enter the access code listed below, as well as some personal information. Please follow the directions to create your username and password.     Your access code is: [email or web address]  Expires: 2017  3:37 PM     Your access code will  in 90 days. If you need help or a new code, please call your [facility] clinic or [phone].        Care EveryWhere ID     This is your Care EveryWhere ID. This could be used by other " organizations to access your [facility] medical records  [id number]        After Visit Summary       This is your record. Keep this with you and show to your community pharmacist(s) and doctor(s) at your next visit.                   with verbal cues to initiate , sequence and attend to tasks/50% of the time

## (undated) DEVICE — ENDO FORCEP TRIPOD GRASPING 2.8MMX230CM FG-600U

## (undated) DEVICE — BIN-GASTROSTOMY/PEG TUBE BIN

## (undated) DEVICE — Device

## (undated) DEVICE — SOL WATER IRRIG 1000ML BOTTLE 2F7114

## (undated) DEVICE — SYR 50ML CATH TIP W/O NDL 309620

## (undated) DEVICE — PACK CYSTO SBA15CSFCA

## (undated) DEVICE — BAG URINARY DRAIN 4000ML LF 153509

## (undated) DEVICE — TEASPOON METAL STERILE 17506/24

## (undated) DEVICE — CATH HEMATRUIG 22FR LUBRICA 2551H22

## (undated) DEVICE — SLEEVE COMPRESSION SCD KNEE MED 74022

## (undated) DEVICE — PACK BASIN SET UP SUTCNBSBBA

## (undated) DEVICE — TUBING EXTENSION FOLEY CATH W/CONNECTOR 9346

## (undated) DEVICE — PAD CHUX UNDERPAD 30X36" P3036C

## (undated) DEVICE — DRAPE BACK TABLE  44X90" 8377

## (undated) DEVICE — GLOVE PROTEXIS POWDER FREE 8.0 ORTHOPEDIC 2D73ET80

## (undated) DEVICE — WATER STERILE 1000ML STERILE UROMATIC 2B-71-14

## (undated) DEVICE — PEN MARKING SKIN W/LABELS 31145918

## (undated) DEVICE — BAG URINARY DRAIN LEG MEDIUM 19OZ LF 150719

## (undated) DEVICE — SOL WATER 1500ML

## (undated) DEVICE — DEVICE CATH STABILIZATION STATLOCK FOLEY 3-WAY FOL0105

## (undated) DEVICE — GLOVE PROTEXIS POWDER FREE SMT 8.5 2D72PT85X

## (undated) RX ORDER — ONDANSETRON 2 MG/ML
INJECTION INTRAMUSCULAR; INTRAVENOUS
Status: DISPENSED
Start: 2022-01-01

## (undated) RX ORDER — ACETAMINOPHEN 325 MG/1
TABLET ORAL
Status: DISPENSED
Start: 2021-06-30

## (undated) RX ORDER — FENTANYL CITRATE 50 UG/ML
INJECTION, SOLUTION INTRAMUSCULAR; INTRAVENOUS
Status: DISPENSED
Start: 2021-07-27

## (undated) RX ORDER — SODIUM CHLORIDE 9 MG/ML
INJECTION, SOLUTION INTRAVENOUS
Status: DISPENSED
Start: 2021-06-30

## (undated) RX ORDER — SODIUM CHLORIDE 9 MG/ML
INJECTION, SOLUTION INTRAVENOUS
Status: DISPENSED
Start: 2022-01-01

## (undated) RX ORDER — ONDANSETRON 2 MG/ML
INJECTION INTRAMUSCULAR; INTRAVENOUS
Status: DISPENSED
Start: 2021-07-27

## (undated) RX ORDER — CEFTRIAXONE SODIUM 1 G/50ML
INJECTION, SOLUTION INTRAVENOUS
Status: DISPENSED
Start: 2022-01-01

## (undated) RX ORDER — METHYLPREDNISOLONE SODIUM SUCCINATE 40 MG/ML
INJECTION, POWDER, LYOPHILIZED, FOR SOLUTION INTRAMUSCULAR; INTRAVENOUS
Status: DISPENSED
Start: 2021-06-30

## (undated) RX ORDER — AZITHROMYCIN 500 MG/5ML
INJECTION, POWDER, LYOPHILIZED, FOR SOLUTION INTRAVENOUS
Status: DISPENSED
Start: 2021-06-30

## (undated) RX ORDER — CODEINE PHOSPHATE AND GUAIFENESIN 10; 100 MG/5ML; MG/5ML
SOLUTION ORAL
Status: DISPENSED
Start: 2022-01-01

## (undated) RX ORDER — LIDOCAINE HYDROCHLORIDE 20 MG/ML
INJECTION, SOLUTION EPIDURAL; INFILTRATION; INTRACAUDAL; PERINEURAL
Status: DISPENSED
Start: 2021-07-27

## (undated) RX ORDER — DEXAMETHASONE SODIUM PHOSPHATE 10 MG/ML
INJECTION, SOLUTION INTRAMUSCULAR; INTRAVENOUS
Status: DISPENSED
Start: 2022-01-01

## (undated) RX ORDER — SODIUM CHLORIDE, SODIUM LACTATE, POTASSIUM CHLORIDE, CALCIUM CHLORIDE 600; 310; 30; 20 MG/100ML; MG/100ML; MG/100ML; MG/100ML
INJECTION, SOLUTION INTRAVENOUS
Status: DISPENSED
Start: 2021-07-27

## (undated) RX ORDER — DEXAMETHASONE SODIUM PHOSPHATE 4 MG/ML
INJECTION, SOLUTION INTRA-ARTICULAR; INTRALESIONAL; INTRAMUSCULAR; INTRAVENOUS; SOFT TISSUE
Status: DISPENSED
Start: 2021-07-27

## (undated) RX ORDER — PROPOFOL 10 MG/ML
INJECTION, EMULSION INTRAVENOUS
Status: DISPENSED
Start: 2021-07-27

## (undated) RX ORDER — FENTANYL CITRATE-0.9 % NACL/PF 10 MCG/ML
PLASTIC BAG, INJECTION (ML) INTRAVENOUS
Status: DISPENSED
Start: 2022-01-01

## (undated) RX ORDER — CLINDAMYCIN PHOSPHATE 900 MG/50ML
INJECTION, SOLUTION INTRAVENOUS
Status: DISPENSED
Start: 2021-07-27

## (undated) RX ORDER — CEFEPIME HYDROCHLORIDE 2 G/1
INJECTION, POWDER, FOR SOLUTION INTRAVENOUS
Status: DISPENSED
Start: 2021-06-30

## (undated) RX ORDER — ATROPA BELLADONNA AND OPIUM 16.2; 3 MG/1; MG/1
SUPPOSITORY RECTAL
Status: DISPENSED
Start: 2021-07-27

## (undated) RX ORDER — PROPOFOL 10 MG/ML
INJECTION, EMULSION INTRAVENOUS
Status: DISPENSED
Start: 2022-01-01